# Patient Record
Sex: FEMALE | Race: WHITE | NOT HISPANIC OR LATINO | Employment: UNEMPLOYED | URBAN - NONMETROPOLITAN AREA
[De-identification: names, ages, dates, MRNs, and addresses within clinical notes are randomized per-mention and may not be internally consistent; named-entity substitution may affect disease eponyms.]

---

## 2017-05-09 ENCOUNTER — HOSPITAL ENCOUNTER (INPATIENT)
Facility: HOSPITAL | Age: 37
LOS: 3 days | Discharge: HOME | End: 2017-05-12
Attending: PSYCHIATRY & NEUROLOGY | Admitting: PSYCHIATRY & NEUROLOGY

## 2017-05-09 ENCOUNTER — HOSPITAL ENCOUNTER (EMERGENCY)
Facility: HOSPITAL | Age: 37
Discharge: PSYCHIATRIC OR SUBSTANCE USE FACILITY | End: 2017-05-09
Attending: EMERGENCY MEDICINE | Admitting: EMERGENCY MEDICINE

## 2017-05-09 VITALS
SYSTOLIC BLOOD PRESSURE: 132 MMHG | DIASTOLIC BLOOD PRESSURE: 87 MMHG | HEART RATE: 102 BPM | BODY MASS INDEX: 31.1 KG/M2 | TEMPERATURE: 98.9 F | HEIGHT: 69 IN | RESPIRATION RATE: 18 BRPM | WEIGHT: 210 LBS | OXYGEN SATURATION: 100 %

## 2017-05-09 DIAGNOSIS — F32.A DEPRESSION WITH SUICIDAL IDEATION: Primary | ICD-10-CM

## 2017-05-09 DIAGNOSIS — R45.851 DEPRESSION WITH SUICIDAL IDEATION: Primary | ICD-10-CM

## 2017-05-09 DIAGNOSIS — F29 PSYCHOSIS, UNSPECIFIED PSYCHOSIS TYPE (HCC): ICD-10-CM

## 2017-05-09 LAB
6-ACETYL MORPHINE: NEGATIVE
ALBUMIN SERPL-MCNC: 4.5 G/DL (ref 3.5–5)
ALBUMIN/GLOB SERPL: 1.3 G/DL (ref 1.5–2.5)
ALP SERPL-CCNC: 97 U/L (ref 35–104)
ALT SERPL W P-5'-P-CCNC: 23 U/L (ref 10–36)
AMPHET+METHAMPHET UR QL: NEGATIVE
ANION GAP SERPL CALCULATED.3IONS-SCNC: 7.8 MMOL/L (ref 3.6–11.2)
AST SERPL-CCNC: 23 U/L (ref 10–30)
B-HCG UR QL: NEGATIVE
BARBITURATES UR QL SCN: NEGATIVE
BASOPHILS # BLD AUTO: 0.05 10*3/MM3 (ref 0–0.3)
BASOPHILS NFR BLD AUTO: 0.4 % (ref 0–2)
BENZODIAZ UR QL SCN: NEGATIVE
BILIRUB SERPL-MCNC: 0.5 MG/DL (ref 0.2–1.8)
BILIRUB UR QL STRIP: NEGATIVE
BUN BLD-MCNC: 8 MG/DL (ref 7–21)
BUN/CREAT SERPL: 10.5 (ref 7–25)
BUPRENORPHINE SERPL-MCNC: NEGATIVE NG/ML
CALCIUM SPEC-SCNC: 10.2 MG/DL (ref 7.7–10)
CANNABINOIDS SERPL QL: POSITIVE
CHLORIDE SERPL-SCNC: 104 MMOL/L (ref 99–112)
CLARITY UR: CLEAR
CO2 SERPL-SCNC: 25.2 MMOL/L (ref 24.3–31.9)
COCAINE UR QL: NEGATIVE
COLOR UR: YELLOW
CREAT BLD-MCNC: 0.76 MG/DL (ref 0.43–1.29)
DEPRECATED RDW RBC AUTO: 40.9 FL (ref 37–54)
EOSINOPHIL # BLD AUTO: 0.04 10*3/MM3 (ref 0–0.7)
EOSINOPHIL NFR BLD AUTO: 0.3 % (ref 0–5)
ERYTHROCYTE [DISTWIDTH] IN BLOOD BY AUTOMATED COUNT: 13.2 % (ref 11.5–14.5)
ETHANOL BLD-MCNC: <10 MG/DL
ETHANOL UR QL: <0.01 %
GFR SERPL CREATININE-BSD FRML MDRD: 87 ML/MIN/1.73
GLOBULIN UR ELPH-MCNC: 3.4 GM/DL
GLUCOSE BLD-MCNC: 156 MG/DL (ref 70–110)
GLUCOSE UR STRIP-MCNC: NEGATIVE MG/DL
HCT VFR BLD AUTO: 42.6 % (ref 37–47)
HGB BLD-MCNC: 15 G/DL (ref 12–16)
HGB UR QL STRIP.AUTO: NEGATIVE
IMM GRANULOCYTES # BLD: 0.02 10*3/MM3 (ref 0–0.03)
IMM GRANULOCYTES NFR BLD: 0.1 % (ref 0–0.5)
KETONES UR QL STRIP: NEGATIVE
LEUKOCYTE ESTERASE UR QL STRIP.AUTO: NEGATIVE
LYMPHOCYTES # BLD AUTO: 3.94 10*3/MM3 (ref 1–3)
LYMPHOCYTES NFR BLD AUTO: 28.6 % (ref 21–51)
MCH RBC QN AUTO: 30.5 PG (ref 27–33)
MCHC RBC AUTO-ENTMCNC: 35.2 G/DL (ref 33–37)
MCV RBC AUTO: 86.8 FL (ref 80–94)
MDMA UR QL SCN: NEGATIVE
METHADONE UR QL SCN: NEGATIVE
MONOCYTES # BLD AUTO: 1.15 10*3/MM3 (ref 0.1–0.9)
MONOCYTES NFR BLD AUTO: 8.3 % (ref 0–10)
NEUTROPHILS # BLD AUTO: 8.6 10*3/MM3 (ref 1.4–6.5)
NEUTROPHILS NFR BLD AUTO: 62.3 % (ref 30–70)
NITRITE UR QL STRIP: NEGATIVE
OPIATES UR QL: NEGATIVE
OSMOLALITY SERPL CALC.SUM OF ELEC: 275.3 MOSM/KG (ref 273–305)
OXYCODONE UR QL SCN: NEGATIVE
PCP UR QL SCN: NEGATIVE
PH UR STRIP.AUTO: 7 [PH] (ref 5–8)
PLATELET # BLD AUTO: 310 10*3/MM3 (ref 130–400)
PMV BLD AUTO: 10.1 FL (ref 6–10)
POTASSIUM BLD-SCNC: 3.1 MMOL/L (ref 3.5–5.3)
PROT SERPL-MCNC: 7.9 G/DL (ref 6–8)
PROT UR QL STRIP: NEGATIVE
RBC # BLD AUTO: 4.91 10*6/MM3 (ref 4.2–5.4)
SODIUM BLD-SCNC: 137 MMOL/L (ref 135–153)
SP GR UR STRIP: 1.01 (ref 1–1.03)
UROBILINOGEN UR QL STRIP: NORMAL
WBC NRBC COR # BLD: 13.8 10*3/MM3 (ref 4.5–12.5)

## 2017-05-09 RX ORDER — ONDANSETRON 4 MG/1
4 TABLET, FILM COATED ORAL EVERY 6 HOURS PRN
Status: CANCELLED | OUTPATIENT
Start: 2017-05-09

## 2017-05-09 RX ORDER — MAGNESIUM HYDROXIDE/ALUMINUM HYDROXICE/SIMETHICONE 120; 1200; 1200 MG/30ML; MG/30ML; MG/30ML
30 SUSPENSION ORAL EVERY 6 HOURS PRN
Status: DISCONTINUED | OUTPATIENT
Start: 2017-05-09 | End: 2017-05-09 | Stop reason: CLARIF

## 2017-05-09 RX ORDER — LOPERAMIDE HYDROCHLORIDE 2 MG/1
2 CAPSULE ORAL 4 TIMES DAILY PRN
Status: CANCELLED | OUTPATIENT
Start: 2017-05-09

## 2017-05-09 RX ORDER — TRAZODONE HYDROCHLORIDE 50 MG/1
50 TABLET ORAL NIGHTLY PRN
Status: CANCELLED | OUTPATIENT
Start: 2017-05-09

## 2017-05-09 RX ORDER — ONDANSETRON 4 MG/1
4 TABLET, FILM COATED ORAL EVERY 6 HOURS PRN
Status: DISCONTINUED | OUTPATIENT
Start: 2017-05-09 | End: 2017-05-12 | Stop reason: HOSPADM

## 2017-05-09 RX ORDER — HYDROXYZINE 50 MG/1
50 TABLET, FILM COATED ORAL EVERY 6 HOURS PRN
Status: DISCONTINUED | OUTPATIENT
Start: 2017-05-09 | End: 2017-05-12 | Stop reason: HOSPADM

## 2017-05-09 RX ORDER — LOPERAMIDE HYDROCHLORIDE 2 MG/1
2 CAPSULE ORAL 4 TIMES DAILY PRN
Status: DISCONTINUED | OUTPATIENT
Start: 2017-05-09 | End: 2017-05-12 | Stop reason: HOSPADM

## 2017-05-09 RX ORDER — ECHINACEA PURPUREA EXTRACT 125 MG
2 TABLET ORAL AS NEEDED
Status: CANCELLED | OUTPATIENT
Start: 2017-05-09

## 2017-05-09 RX ORDER — TRAZODONE HYDROCHLORIDE 50 MG/1
50 TABLET ORAL NIGHTLY PRN
Status: DISCONTINUED | OUTPATIENT
Start: 2017-05-09 | End: 2017-05-12 | Stop reason: HOSPADM

## 2017-05-09 RX ORDER — IBUPROFEN 400 MG/1
600 TABLET ORAL EVERY 6 HOURS PRN
Status: DISCONTINUED | OUTPATIENT
Start: 2017-05-09 | End: 2017-05-12 | Stop reason: HOSPADM

## 2017-05-09 RX ORDER — ALUMINA, MAGNESIA, AND SIMETHICONE 2400; 2400; 240 MG/30ML; MG/30ML; MG/30ML
15 SUSPENSION ORAL EVERY 6 HOURS PRN
Status: DISCONTINUED | OUTPATIENT
Start: 2017-05-09 | End: 2017-05-12 | Stop reason: HOSPADM

## 2017-05-09 RX ORDER — BENZONATATE 100 MG/1
100 CAPSULE ORAL 3 TIMES DAILY PRN
Status: CANCELLED | OUTPATIENT
Start: 2017-05-09

## 2017-05-09 RX ORDER — ECHINACEA PURPUREA EXTRACT 125 MG
2 TABLET ORAL AS NEEDED
Status: DISCONTINUED | OUTPATIENT
Start: 2017-05-09 | End: 2017-05-12 | Stop reason: HOSPADM

## 2017-05-09 RX ORDER — NICOTINE 21 MG/24HR
1 PATCH, TRANSDERMAL 24 HOURS TRANSDERMAL EVERY 24 HOURS
Status: DISCONTINUED | OUTPATIENT
Start: 2017-05-10 | End: 2017-05-12 | Stop reason: HOSPADM

## 2017-05-09 RX ORDER — HYDROXYZINE 50 MG/1
50 TABLET, FILM COATED ORAL EVERY 6 HOURS PRN
Status: CANCELLED | OUTPATIENT
Start: 2017-05-09

## 2017-05-09 RX ORDER — BENZONATATE 100 MG/1
100 CAPSULE ORAL 3 TIMES DAILY PRN
Status: DISCONTINUED | OUTPATIENT
Start: 2017-05-09 | End: 2017-05-12 | Stop reason: HOSPADM

## 2017-05-09 RX ORDER — FAMOTIDINE 20 MG/1
20 TABLET, FILM COATED ORAL 2 TIMES DAILY PRN
Status: DISCONTINUED | OUTPATIENT
Start: 2017-05-09 | End: 2017-05-12 | Stop reason: HOSPADM

## 2017-05-09 RX ORDER — BUSPIRONE HYDROCHLORIDE 10 MG/1
10 TABLET ORAL 3 TIMES DAILY
Status: ON HOLD | COMMUNITY
End: 2017-05-12

## 2017-05-09 RX ORDER — IBUPROFEN 400 MG/1
600 TABLET ORAL EVERY 6 HOURS PRN
Status: CANCELLED | OUTPATIENT
Start: 2017-05-09

## 2017-05-09 RX ORDER — MAGNESIUM HYDROXIDE/ALUMINUM HYDROXICE/SIMETHICONE 120; 1200; 1200 MG/30ML; MG/30ML; MG/30ML
30 SUSPENSION ORAL EVERY 6 HOURS PRN
Status: CANCELLED | OUTPATIENT
Start: 2017-05-09

## 2017-05-09 RX ORDER — FAMOTIDINE 20 MG/1
20 TABLET, FILM COATED ORAL 2 TIMES DAILY PRN
Status: CANCELLED | OUTPATIENT
Start: 2017-05-09

## 2017-05-09 RX ORDER — NICOTINE 21 MG/24HR
1 PATCH, TRANSDERMAL 24 HOURS TRANSDERMAL EVERY 24 HOURS
Status: CANCELLED | OUTPATIENT
Start: 2017-05-09

## 2017-05-09 RX ORDER — OMEPRAZOLE 20 MG/1
20 CAPSULE, DELAYED RELEASE ORAL DAILY PRN
COMMUNITY
End: 2020-08-18 | Stop reason: HOSPADM

## 2017-05-09 RX ORDER — HYDROXYZINE HYDROCHLORIDE 25 MG/1
50 TABLET, FILM COATED ORAL EVERY 6 HOURS PRN
Status: CANCELLED | OUTPATIENT
Start: 2017-05-09

## 2017-05-09 RX ORDER — LISINOPRIL AND HYDROCHLOROTHIAZIDE 12.5; 1 MG/1; MG/1
1 TABLET ORAL DAILY
COMMUNITY
End: 2020-08-18 | Stop reason: HOSPADM

## 2017-05-09 RX ORDER — IBUPROFEN 600 MG/1
600 TABLET ORAL EVERY 6 HOURS PRN
Status: CANCELLED | OUTPATIENT
Start: 2017-05-09

## 2017-05-09 RX ORDER — FLUOXETINE HYDROCHLORIDE 40 MG/1
40 CAPSULE ORAL DAILY
COMMUNITY
End: 2017-05-12 | Stop reason: HOSPADM

## 2017-05-10 PROBLEM — F25.0 SCHIZOAFFECTIVE DISORDER, BIPOLAR TYPE (HCC): Status: ACTIVE | Noted: 2017-05-09

## 2017-05-10 PROCEDURE — 99222 1ST HOSP IP/OBS MODERATE 55: CPT | Performed by: PSYCHIATRY & NEUROLOGY

## 2017-05-10 RX ORDER — QUETIAPINE FUMARATE 25 MG/1
50 TABLET, FILM COATED ORAL NIGHTLY
Status: DISCONTINUED | OUTPATIENT
Start: 2017-05-10 | End: 2017-05-11

## 2017-05-10 RX ORDER — PANTOPRAZOLE SODIUM 40 MG/1
40 TABLET, DELAYED RELEASE ORAL EVERY MORNING
Status: DISCONTINUED | OUTPATIENT
Start: 2017-05-10 | End: 2017-05-12 | Stop reason: HOSPADM

## 2017-05-10 RX ORDER — POTASSIUM CHLORIDE 20 MEQ/1
20 TABLET, EXTENDED RELEASE ORAL ONCE
Status: COMPLETED | OUTPATIENT
Start: 2017-05-10 | End: 2017-05-10

## 2017-05-10 RX ORDER — POTASSIUM CHLORIDE 20 MEQ/1
40 TABLET, EXTENDED RELEASE ORAL EVERY 4 HOURS
Status: DISPENSED | OUTPATIENT
Start: 2017-05-10 | End: 2017-05-11

## 2017-05-10 RX ORDER — POTASSIUM CHLORIDE 20 MEQ/1
40 TABLET, EXTENDED RELEASE ORAL
Status: DISPENSED | OUTPATIENT
Start: 2017-05-10 | End: 2017-05-11

## 2017-05-10 RX ORDER — POTASSIUM CHLORIDE 750 MG/1
40 CAPSULE, EXTENDED RELEASE ORAL AS NEEDED
Status: DISCONTINUED | OUTPATIENT
Start: 2017-05-10 | End: 2017-05-12 | Stop reason: HOSPADM

## 2017-05-10 RX ORDER — LISINOPRIL AND HYDROCHLOROTHIAZIDE 12.5; 1 MG/1; MG/1
1 TABLET ORAL DAILY
Status: DISCONTINUED | OUTPATIENT
Start: 2017-05-10 | End: 2017-05-10 | Stop reason: CLARIF

## 2017-05-10 RX ORDER — HYDROCHLOROTHIAZIDE 12.5 MG/1
12.5 CAPSULE, GELATIN COATED ORAL DAILY
Status: DISCONTINUED | OUTPATIENT
Start: 2017-05-10 | End: 2017-05-12 | Stop reason: HOSPADM

## 2017-05-10 RX ORDER — BUSPIRONE HYDROCHLORIDE 10 MG/1
10 TABLET ORAL EVERY 8 HOURS SCHEDULED
Status: DISCONTINUED | OUTPATIENT
Start: 2017-05-10 | End: 2017-05-12 | Stop reason: HOSPADM

## 2017-05-10 RX ORDER — LISINOPRIL 10 MG/1
10 TABLET ORAL
Status: DISCONTINUED | OUTPATIENT
Start: 2017-05-10 | End: 2017-05-12 | Stop reason: HOSPADM

## 2017-05-10 RX ORDER — FLUOXETINE HYDROCHLORIDE 20 MG/1
40 CAPSULE ORAL DAILY
Status: CANCELLED | OUTPATIENT
Start: 2017-05-10

## 2017-05-10 RX ORDER — OLANZAPINE 5 MG/1
5 TABLET, ORALLY DISINTEGRATING ORAL EVERY 8 HOURS PRN
Status: DISCONTINUED | OUTPATIENT
Start: 2017-05-10 | End: 2017-05-12 | Stop reason: HOSPADM

## 2017-05-10 RX ADMIN — POTASSIUM CHLORIDE 20 MEQ: 1500 TABLET, EXTENDED RELEASE ORAL at 15:39

## 2017-05-10 RX ADMIN — NICOTINE 1 PATCH: 21 PATCH TRANSDERMAL at 08:42

## 2017-05-10 RX ADMIN — PANTOPRAZOLE SODIUM 40 MG: 40 TABLET, DELAYED RELEASE ORAL at 15:39

## 2017-05-10 RX ADMIN — LISINOPRIL 10 MG: 10 TABLET ORAL at 15:39

## 2017-05-10 RX ADMIN — BUSPIRONE HYDROCHLORIDE 10 MG: 10 TABLET ORAL at 15:39

## 2017-05-10 RX ADMIN — OLANZAPINE 5 MG: 5 TABLET, ORALLY DISINTEGRATING ORAL at 16:35

## 2017-05-10 RX ADMIN — HYDROCHLOROTHIAZIDE 12.5 MG: 12.5 CAPSULE, GELATIN COATED ORAL at 15:39

## 2017-05-10 RX ADMIN — POTASSIUM CHLORIDE 20 MEQ: 1500 TABLET, EXTENDED RELEASE ORAL at 16:35

## 2017-05-11 LAB — POTASSIUM BLD-SCNC: 3.8 MMOL/L (ref 3.5–5.3)

## 2017-05-11 PROCEDURE — 84132 ASSAY OF SERUM POTASSIUM: CPT | Performed by: PSYCHIATRY & NEUROLOGY

## 2017-05-11 PROCEDURE — 99232 SBSQ HOSP IP/OBS MODERATE 35: CPT | Performed by: PSYCHIATRY & NEUROLOGY

## 2017-05-11 RX ORDER — QUETIAPINE FUMARATE 100 MG/1
100 TABLET, FILM COATED ORAL NIGHTLY
Status: DISCONTINUED | OUTPATIENT
Start: 2017-05-11 | End: 2017-05-12 | Stop reason: HOSPADM

## 2017-05-11 RX ORDER — QUETIAPINE FUMARATE 25 MG/1
50 TABLET, FILM COATED ORAL DAILY
Status: DISCONTINUED | OUTPATIENT
Start: 2017-05-12 | End: 2017-05-12 | Stop reason: HOSPADM

## 2017-05-11 RX ORDER — FLUOXETINE HYDROCHLORIDE 20 MG/1
20 CAPSULE ORAL DAILY
Status: DISCONTINUED | OUTPATIENT
Start: 2017-05-11 | End: 2017-05-12 | Stop reason: HOSPADM

## 2017-05-11 RX ADMIN — BUSPIRONE HYDROCHLORIDE 10 MG: 10 TABLET ORAL at 05:11

## 2017-05-11 RX ADMIN — BUSPIRONE HYDROCHLORIDE 10 MG: 10 TABLET ORAL at 13:39

## 2017-05-11 RX ADMIN — LISINOPRIL 10 MG: 10 TABLET ORAL at 08:07

## 2017-05-11 RX ADMIN — IBUPROFEN 600 MG: 400 TABLET ORAL at 13:40

## 2017-05-11 RX ADMIN — QUETIAPINE FUMARATE 100 MG: 100 TABLET ORAL at 21:28

## 2017-05-11 RX ADMIN — BUSPIRONE HYDROCHLORIDE 10 MG: 10 TABLET ORAL at 21:27

## 2017-05-11 RX ADMIN — PANTOPRAZOLE SODIUM 40 MG: 40 TABLET, DELAYED RELEASE ORAL at 05:11

## 2017-05-11 RX ADMIN — FLUOXETINE 20 MG: 20 CAPSULE ORAL at 15:24

## 2017-05-11 RX ADMIN — NICOTINE 1 PATCH: 21 PATCH TRANSDERMAL at 08:07

## 2017-05-11 RX ADMIN — HYDROCHLOROTHIAZIDE 12.5 MG: 12.5 CAPSULE, GELATIN COATED ORAL at 08:06

## 2017-05-11 RX ADMIN — OLANZAPINE 5 MG: 5 TABLET, ORALLY DISINTEGRATING ORAL at 13:39

## 2017-05-12 VITALS
OXYGEN SATURATION: 99 % | RESPIRATION RATE: 18 BRPM | HEART RATE: 82 BPM | DIASTOLIC BLOOD PRESSURE: 72 MMHG | SYSTOLIC BLOOD PRESSURE: 122 MMHG | HEIGHT: 67 IN | WEIGHT: 202 LBS | BODY MASS INDEX: 31.71 KG/M2 | TEMPERATURE: 98.8 F

## 2017-05-12 PROCEDURE — 99238 HOSP IP/OBS DSCHRG MGMT 30/<: CPT | Performed by: PSYCHIATRY & NEUROLOGY

## 2017-05-12 RX ORDER — QUETIAPINE FUMARATE 100 MG/1
TABLET, FILM COATED ORAL
Qty: 45 TABLET | Refills: 0 | Status: SHIPPED | OUTPATIENT
Start: 2017-05-12 | End: 2017-06-01

## 2017-05-12 RX ORDER — FLUOXETINE HYDROCHLORIDE 20 MG/1
20 CAPSULE ORAL DAILY
Qty: 30 CAPSULE | Refills: 0 | Status: SHIPPED | OUTPATIENT
Start: 2017-05-12 | End: 2017-06-01 | Stop reason: SDUPTHER

## 2017-05-12 RX ORDER — BUSPIRONE HYDROCHLORIDE 10 MG/1
10 TABLET ORAL 3 TIMES DAILY
Qty: 90 TABLET | Refills: 0 | Status: SHIPPED | OUTPATIENT
Start: 2017-05-12 | End: 2017-06-01 | Stop reason: ALTCHOICE

## 2017-05-12 RX ADMIN — FLUOXETINE 20 MG: 20 CAPSULE ORAL at 08:56

## 2017-05-12 RX ADMIN — HYDROCHLOROTHIAZIDE 12.5 MG: 12.5 CAPSULE, GELATIN COATED ORAL at 08:55

## 2017-05-12 RX ADMIN — PANTOPRAZOLE SODIUM 40 MG: 40 TABLET, DELAYED RELEASE ORAL at 06:01

## 2017-05-12 RX ADMIN — BUSPIRONE HYDROCHLORIDE 10 MG: 10 TABLET ORAL at 14:26

## 2017-05-12 RX ADMIN — NICOTINE 1 PATCH: 21 PATCH TRANSDERMAL at 08:56

## 2017-05-12 RX ADMIN — LISINOPRIL 10 MG: 10 TABLET ORAL at 08:55

## 2017-05-12 RX ADMIN — QUETIAPINE FUMARATE 50 MG: 25 TABLET, FILM COATED ORAL at 08:56

## 2017-05-12 RX ADMIN — BUSPIRONE HYDROCHLORIDE 10 MG: 10 TABLET ORAL at 05:12

## 2017-06-01 ENCOUNTER — OFFICE VISIT (OUTPATIENT)
Dept: PSYCHIATRY | Facility: CLINIC | Age: 37
End: 2017-06-01

## 2017-06-01 VITALS
WEIGHT: 222 LBS | BODY MASS INDEX: 35.68 KG/M2 | DIASTOLIC BLOOD PRESSURE: 77 MMHG | HEIGHT: 66 IN | SYSTOLIC BLOOD PRESSURE: 114 MMHG | HEART RATE: 74 BPM

## 2017-06-01 DIAGNOSIS — F25.0 SCHIZOAFFECTIVE DISORDER, BIPOLAR TYPE (HCC): Primary | ICD-10-CM

## 2017-06-01 DIAGNOSIS — Z79.899 MEDICATION MANAGEMENT: ICD-10-CM

## 2017-06-01 LAB
AMPHETAMINE CUT-OFF: ABNORMAL
BENZODIAZIPINE CUT-OFF: ABNORMAL
BUPRENORPHINE CUT-OFF: ABNORMAL
COCAINE CUT-OFF: ABNORMAL
EXTERNAL AMPHETAMINE SCREEN URINE: NEGATIVE
EXTERNAL BENZODIAZEPINE SCREEN URINE: NEGATIVE
EXTERNAL BUPRENORPHINE SCREEN URINE: NEGATIVE
EXTERNAL COCAINE SCREEN URINE: NEGATIVE
EXTERNAL MDMA: NEGATIVE
EXTERNAL METHADONE SCREEN URINE: NEGATIVE
EXTERNAL METHAMPHETAMINE SCREEN URINE: NEGATIVE
EXTERNAL OPIATES SCREEN URINE: NEGATIVE
EXTERNAL OXYCODONE SCREEN URINE: NEGATIVE
EXTERNAL THC SCREEN URINE: POSITIVE
MDMA CUT-OFF: ABNORMAL
METHADONE CUT-OFF: ABNORMAL
METHAMPHETAMINE CUT-OFF: ABNORMAL
OPIATES CUT-OFF: ABNORMAL
OXYCODONE CUT-OFF: ABNORMAL
THC CUT-OFF: ABNORMAL

## 2017-06-01 PROCEDURE — 99214 OFFICE O/P EST MOD 30 MIN: CPT | Performed by: NURSE PRACTITIONER

## 2017-06-01 RX ORDER — QUETIAPINE FUMARATE 100 MG/1
TABLET, FILM COATED ORAL
Qty: 75 TABLET | Refills: 0 | Status: SHIPPED | OUTPATIENT
Start: 2017-06-01 | End: 2017-06-27 | Stop reason: SDUPTHER

## 2017-06-01 RX ORDER — HYDROXYZINE PAMOATE 25 MG/1
25-50 CAPSULE ORAL 3 TIMES DAILY PRN
Qty: 45 CAPSULE | Refills: 1 | Status: SHIPPED | OUTPATIENT
Start: 2017-06-01 | End: 2017-07-31 | Stop reason: SDUPTHER

## 2017-06-01 RX ORDER — FLUOXETINE HYDROCHLORIDE 40 MG/1
CAPSULE ORAL DAILY
Refills: 0 | COMMUNITY
Start: 2017-05-22 | End: 2017-06-01 | Stop reason: ALTCHOICE

## 2017-06-01 RX ORDER — FLUOXETINE HYDROCHLORIDE 20 MG/1
20 CAPSULE ORAL DAILY
Qty: 30 CAPSULE | Refills: 0 | Status: SHIPPED | OUTPATIENT
Start: 2017-06-01 | End: 2018-02-21 | Stop reason: SDUPTHER
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
## 2017-06-27 RX ORDER — QUETIAPINE FUMARATE 100 MG/1
TABLET, FILM COATED ORAL
Qty: 75 TABLET | Refills: 0 | Status: SHIPPED | OUTPATIENT
Start: 2017-06-27 | End: 2017-07-31 | Stop reason: SDUPTHER

## 2017-07-31 RX ORDER — HYDROXYZINE PAMOATE 25 MG/1
25-50 CAPSULE ORAL 3 TIMES DAILY PRN
Qty: 45 CAPSULE | Refills: 1 | Status: SHIPPED | OUTPATIENT
Start: 2017-07-31 | End: 2017-09-18 | Stop reason: SDUPTHER

## 2017-07-31 RX ORDER — QUETIAPINE FUMARATE 100 MG/1
TABLET, FILM COATED ORAL
Qty: 75 TABLET | Refills: 0 | Status: SHIPPED | OUTPATIENT
Start: 2017-07-31 | End: 2017-09-18 | Stop reason: SDUPTHER

## 2017-09-18 RX ORDER — QUETIAPINE FUMARATE 100 MG/1
TABLET, FILM COATED ORAL
Qty: 75 TABLET | Refills: 0 | Status: SHIPPED | OUTPATIENT
Start: 2017-09-18 | End: 2018-02-21 | Stop reason: SDUPTHER

## 2017-09-18 RX ORDER — HYDROXYZINE PAMOATE 25 MG/1
25-50 CAPSULE ORAL 3 TIMES DAILY PRN
Qty: 45 CAPSULE | Refills: 1 | Status: SHIPPED | OUTPATIENT
Start: 2017-09-18 | End: 2018-02-21 | Stop reason: SDUPTHER

## 2018-02-12 ENCOUNTER — TELEPHONE (OUTPATIENT)
Dept: PSYCHIATRY | Facility: CLINIC | Age: 38
End: 2018-02-12

## 2018-02-21 ENCOUNTER — OFFICE VISIT (OUTPATIENT)
Dept: PSYCHIATRY | Facility: CLINIC | Age: 38
End: 2018-02-21

## 2018-02-21 VITALS
WEIGHT: 232 LBS | HEIGHT: 66 IN | BODY MASS INDEX: 37.28 KG/M2 | HEART RATE: 79 BPM | SYSTOLIC BLOOD PRESSURE: 113 MMHG | DIASTOLIC BLOOD PRESSURE: 67 MMHG

## 2018-02-21 DIAGNOSIS — F43.21 UNRESOLVED GRIEF: ICD-10-CM

## 2018-02-21 DIAGNOSIS — Z79.899 MEDICATION MANAGEMENT: ICD-10-CM

## 2018-02-21 DIAGNOSIS — F25.0 SCHIZOAFFECTIVE DISORDER, MIXED TYPE (HCC): Primary | ICD-10-CM

## 2018-02-21 DIAGNOSIS — F12.90 MARIJUANA USE, CONTINUOUS: ICD-10-CM

## 2018-02-21 LAB
AMPHETAMINE CUT-OFF: 1000
BENZODIAZIPINE CUT-OFF: 300
BUPRENORPHINE CUT-OFF: 10
COCAINE CUT-OFF: 300
EXTERNAL AMPHETAMINE SCREEN URINE: NEGATIVE
EXTERNAL BENZODIAZEPINE SCREEN URINE: NEGATIVE
EXTERNAL BUPRENORPHINE SCREEN URINE: NEGATIVE
EXTERNAL COCAINE SCREEN URINE: NEGATIVE
EXTERNAL MDMA: NEGATIVE
EXTERNAL METHADONE SCREEN URINE: NEGATIVE
EXTERNAL METHAMPHETAMINE SCREEN URINE: NEGATIVE
EXTERNAL OPIATES SCREEN URINE: NEGATIVE
EXTERNAL OXYCODONE SCREEN URINE: NEGATIVE
EXTERNAL THC SCREEN URINE: POSITIVE
MDMA CUT-OFF: 500
METHADONE CUT-OFF: 300
METHAMPHETAMINE CUT-OFF: 1000
OPIATES CUT-OFF: 300
OXYCODONE CUT-OFF: 100
THC CUT-OFF: 50

## 2018-02-21 PROCEDURE — 99214 OFFICE O/P EST MOD 30 MIN: CPT | Performed by: NURSE PRACTITIONER

## 2018-02-21 RX ORDER — ARIPIPRAZOLE 2 MG/1
2 TABLET ORAL DAILY
Qty: 30 TABLET | Refills: 0 | Status: ON HOLD | OUTPATIENT
Start: 2018-02-21 | End: 2020-08-12

## 2018-02-21 RX ORDER — FLUTICASONE PROPIONATE 50 MCG
2 SPRAY, SUSPENSION (ML) NASAL DAILY
Refills: 0 | COMMUNITY
Start: 2018-01-26 | End: 2020-08-18 | Stop reason: HOSPADM

## 2018-02-21 RX ORDER — QUETIAPINE FUMARATE 100 MG/1
100 TABLET, FILM COATED ORAL NIGHTLY
Qty: 30 TABLET | Refills: 1 | Status: SHIPPED | OUTPATIENT
Start: 2018-02-21

## 2018-02-21 RX ORDER — TIZANIDINE 4 MG/1
TABLET ORAL
Refills: 2 | Status: ON HOLD | COMMUNITY
Start: 2018-01-18 | End: 2020-08-12

## 2018-02-21 RX ORDER — NICOTINE POLACRILEX 4 MG
GUM BUCCAL
Refills: 3 | Status: ON HOLD | COMMUNITY
Start: 2018-01-26 | End: 2020-08-12

## 2018-02-21 RX ORDER — AZELASTINE 1 MG/ML
SPRAY, METERED NASAL
Refills: 0 | Status: ON HOLD | COMMUNITY
Start: 2018-01-26 | End: 2020-08-12

## 2018-02-21 RX ORDER — ALBUTEROL SULFATE 90 UG/1
2 AEROSOL, METERED RESPIRATORY (INHALATION) EVERY 4 HOURS PRN
Refills: 0 | COMMUNITY
Start: 2017-12-21 | End: 2020-08-18 | Stop reason: HOSPADM

## 2018-02-21 RX ORDER — HYDROXYZINE PAMOATE 25 MG/1
25-50 CAPSULE ORAL 3 TIMES DAILY PRN
Qty: 90 CAPSULE | Refills: 1 | Status: ON HOLD | OUTPATIENT
Start: 2018-02-21 | End: 2020-08-12

## 2018-02-21 RX ORDER — LORATADINE 10 MG/1
TABLET ORAL
Refills: 0 | Status: ON HOLD | COMMUNITY
Start: 2018-01-02 | End: 2020-08-12

## 2018-02-21 RX ORDER — MIRTAZAPINE 15 MG/1
TABLET, FILM COATED ORAL
Refills: 0 | COMMUNITY
Start: 2018-01-18 | End: 2018-02-21 | Stop reason: SDUPTHER

## 2018-02-21 RX ORDER — MIRTAZAPINE 15 MG/1
15 TABLET, FILM COATED ORAL NIGHTLY
Qty: 30 TABLET | Refills: 1 | Status: ON HOLD | OUTPATIENT
Start: 2018-02-21 | End: 2020-08-12

## 2018-02-21 RX ORDER — DILTIAZEM HYDROCHLORIDE 60 MG/1
2 TABLET, FILM COATED ORAL 2 TIMES DAILY PRN
Refills: 0 | COMMUNITY
Start: 2017-12-21 | End: 2020-08-18 | Stop reason: HOSPADM

## 2018-02-21 RX ORDER — FLUOXETINE HYDROCHLORIDE 20 MG/1
20 CAPSULE ORAL DAILY
Qty: 30 CAPSULE | Refills: 1 | Status: SHIPPED | OUTPATIENT
Start: 2018-02-21 | End: 2020-08-18 | Stop reason: HOSPADM
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
## 2018-09-18 ENCOUNTER — HOSPITAL ENCOUNTER (EMERGENCY)
Facility: HOSPITAL | Age: 38
Discharge: HOME | End: 2018-09-18
Attending: EMERGENCY MEDICINE | Admitting: FAMILY MEDICINE

## 2018-09-18 VITALS
DIASTOLIC BLOOD PRESSURE: 68 MMHG | HEART RATE: 78 BPM | BODY MASS INDEX: 31.1 KG/M2 | TEMPERATURE: 98.1 F | RESPIRATION RATE: 18 BRPM | SYSTOLIC BLOOD PRESSURE: 115 MMHG | OXYGEN SATURATION: 98 % | HEIGHT: 69 IN | WEIGHT: 210 LBS

## 2018-09-18 DIAGNOSIS — M54.50 CHRONIC BILATERAL LOW BACK PAIN WITHOUT SCIATICA: Primary | ICD-10-CM

## 2018-09-18 DIAGNOSIS — G89.29 CHRONIC BILATERAL LOW BACK PAIN WITHOUT SCIATICA: Primary | ICD-10-CM

## 2018-09-18 LAB
ALBUMIN SERPL-MCNC: 4 G/DL (ref 3.5–5)
ALBUMIN/GLOB SERPL: 1.3 G/DL (ref 1.5–2.5)
ALP SERPL-CCNC: 74 U/L (ref 35–104)
ALT SERPL W P-5'-P-CCNC: 16 U/L (ref 10–36)
AMYLASE SERPL-CCNC: 45 U/L (ref 28–100)
ANION GAP SERPL CALCULATED.3IONS-SCNC: 5.9 MMOL/L (ref 3.6–11.2)
AST SERPL-CCNC: 38 U/L (ref 10–30)
BASOPHILS # BLD AUTO: 0.04 10*3/MM3 (ref 0–0.3)
BASOPHILS NFR BLD AUTO: 0.5 % (ref 0–2)
BILIRUB SERPL-MCNC: 0.4 MG/DL (ref 0.2–1.8)
BILIRUB UR QL STRIP: NEGATIVE
BUN BLD-MCNC: 9 MG/DL (ref 7–21)
BUN/CREAT SERPL: 10.1 (ref 7–25)
CALCIUM SPEC-SCNC: 9.2 MG/DL (ref 7.7–10)
CHLORIDE SERPL-SCNC: 109 MMOL/L (ref 99–112)
CLARITY UR: CLEAR
CO2 SERPL-SCNC: 25.1 MMOL/L (ref 24.3–31.9)
COLOR UR: YELLOW
CREAT BLD-MCNC: 0.89 MG/DL (ref 0.43–1.29)
DEPRECATED RDW RBC AUTO: 47.1 FL (ref 37–54)
EOSINOPHIL # BLD AUTO: 0.12 10*3/MM3 (ref 0–0.7)
EOSINOPHIL NFR BLD AUTO: 1.4 % (ref 0–5)
ERYTHROCYTE [DISTWIDTH] IN BLOOD BY AUTOMATED COUNT: 14.8 % (ref 11.5–14.5)
GFR SERPL CREATININE-BSD FRML MDRD: 71 ML/MIN/1.73
GLOBULIN UR ELPH-MCNC: 3.1 GM/DL
GLUCOSE BLD-MCNC: 105 MG/DL (ref 70–110)
GLUCOSE UR STRIP-MCNC: NEGATIVE MG/DL
HCT VFR BLD AUTO: 35 % (ref 37–47)
HGB BLD-MCNC: 12.2 G/DL (ref 12–16)
HGB UR QL STRIP.AUTO: NEGATIVE
IMM GRANULOCYTES # BLD: 0.01 10*3/MM3 (ref 0–0.03)
IMM GRANULOCYTES NFR BLD: 0.1 % (ref 0–0.5)
KETONES UR QL STRIP: NEGATIVE
LEUKOCYTE ESTERASE UR QL STRIP.AUTO: NEGATIVE
LIPASE SERPL-CCNC: 105 U/L (ref 13–60)
LYMPHOCYTES # BLD AUTO: 3.17 10*3/MM3 (ref 1–3)
LYMPHOCYTES NFR BLD AUTO: 37.1 % (ref 21–51)
MCH RBC QN AUTO: 30.9 PG (ref 27–33)
MCHC RBC AUTO-ENTMCNC: 34.9 G/DL (ref 33–37)
MCV RBC AUTO: 88.6 FL (ref 80–94)
MONOCYTES # BLD AUTO: 0.73 10*3/MM3 (ref 0.1–0.9)
MONOCYTES NFR BLD AUTO: 8.5 % (ref 0–10)
NEUTROPHILS # BLD AUTO: 4.47 10*3/MM3 (ref 1.4–6.5)
NEUTROPHILS NFR BLD AUTO: 52.4 % (ref 30–70)
NITRITE UR QL STRIP: NEGATIVE
OSMOLALITY SERPL CALC.SUM OF ELEC: 278.4 MOSM/KG (ref 273–305)
PH UR STRIP.AUTO: 7 [PH] (ref 5–8)
PLATELET # BLD AUTO: 268 10*3/MM3 (ref 130–400)
PMV BLD AUTO: 10.6 FL (ref 6–10)
POTASSIUM BLD-SCNC: 3.8 MMOL/L (ref 3.5–5.3)
PROT SERPL-MCNC: 7.1 G/DL (ref 6–8)
PROT UR QL STRIP: NEGATIVE
RBC # BLD AUTO: 3.95 10*6/MM3 (ref 4.2–5.4)
SODIUM BLD-SCNC: 140 MMOL/L (ref 135–153)
SP GR UR STRIP: 1.01 (ref 1–1.03)
UROBILINOGEN UR QL STRIP: NORMAL
WBC NRBC COR # BLD: 8.54 10*3/MM3 (ref 4.5–12.5)

## 2018-09-18 PROCEDURE — 25010000002 DEXAMETHASONE SODIUM PHOSPHATE 20 MG/5ML SOLUTION: Performed by: NURSE PRACTITIONER

## 2018-09-18 PROCEDURE — 25010000002 KETOROLAC TROMETHAMINE PER 15 MG: Performed by: NURSE PRACTITIONER

## 2018-09-18 PROCEDURE — 85025 COMPLETE CBC W/AUTO DIFF WBC: CPT | Performed by: NURSE PRACTITIONER

## 2018-09-18 PROCEDURE — 96374 THER/PROPH/DIAG INJ IV PUSH: CPT

## 2018-09-18 PROCEDURE — 96361 HYDRATE IV INFUSION ADD-ON: CPT

## 2018-09-18 PROCEDURE — 80053 COMPREHEN METABOLIC PANEL: CPT | Performed by: NURSE PRACTITIONER

## 2018-09-18 PROCEDURE — 82150 ASSAY OF AMYLASE: CPT | Performed by: NURSE PRACTITIONER

## 2018-09-18 PROCEDURE — 25010000002 ORPHENADRINE CITRATE PER 60 MG: Performed by: NURSE PRACTITIONER

## 2018-09-18 PROCEDURE — 99283 EMERGENCY DEPT VISIT LOW MDM: CPT

## 2018-09-18 PROCEDURE — 81003 URINALYSIS AUTO W/O SCOPE: CPT | Performed by: NURSE PRACTITIONER

## 2018-09-18 PROCEDURE — 96375 TX/PRO/DX INJ NEW DRUG ADDON: CPT

## 2018-09-18 PROCEDURE — 83690 ASSAY OF LIPASE: CPT | Performed by: NURSE PRACTITIONER

## 2018-09-18 RX ORDER — ORPHENADRINE CITRATE 30 MG/ML
60 INJECTION INTRAMUSCULAR; INTRAVENOUS ONCE
Status: COMPLETED | OUTPATIENT
Start: 2018-09-18 | End: 2018-09-18

## 2018-09-18 RX ORDER — SODIUM CHLORIDE 0.9 % (FLUSH) 0.9 %
10 SYRINGE (ML) INJECTION AS NEEDED
Status: DISCONTINUED | OUTPATIENT
Start: 2018-09-18 | End: 2018-09-19 | Stop reason: HOSPADM

## 2018-09-18 RX ORDER — HYDROCODONE BITARTRATE AND ACETAMINOPHEN 5; 325 MG/1; MG/1
1 TABLET ORAL EVERY 6 HOURS PRN
Status: DISCONTINUED | OUTPATIENT
Start: 2018-09-18 | End: 2018-09-18

## 2018-09-18 RX ORDER — DEXAMETHASONE SODIUM PHOSPHATE 4 MG/ML
8 INJECTION, SOLUTION INTRA-ARTICULAR; INTRALESIONAL; INTRAMUSCULAR; INTRAVENOUS; SOFT TISSUE ONCE
Status: COMPLETED | OUTPATIENT
Start: 2018-09-18 | End: 2018-09-18

## 2018-09-18 RX ORDER — KETOROLAC TROMETHAMINE 30 MG/ML
30 INJECTION, SOLUTION INTRAMUSCULAR; INTRAVENOUS ONCE
Status: COMPLETED | OUTPATIENT
Start: 2018-09-18 | End: 2018-09-18

## 2018-09-18 RX ORDER — HYDROCODONE BITARTRATE AND ACETAMINOPHEN 5; 325 MG/1; MG/1
1 TABLET ORAL ONCE
Status: COMPLETED | OUTPATIENT
Start: 2018-09-18 | End: 2018-09-18

## 2018-09-18 RX ADMIN — SODIUM CHLORIDE 1000 ML: 9 INJECTION, SOLUTION INTRAVENOUS at 20:34

## 2018-09-18 RX ADMIN — DEXAMETHASONE SODIUM PHOSPHATE 8 MG: 4 INJECTION, SOLUTION INTRAMUSCULAR; INTRAVENOUS at 22:13

## 2018-09-18 RX ADMIN — HYDROCODONE BITARTRATE AND ACETAMINOPHEN 1 TABLET: 5; 325 TABLET ORAL at 22:00

## 2018-09-18 RX ADMIN — KETOROLAC TROMETHAMINE 30 MG: 30 INJECTION, SOLUTION INTRAMUSCULAR; INTRAVENOUS at 21:59

## 2018-09-18 RX ADMIN — ORPHENADRINE CITRATE 60 MG: 30 INJECTION INTRAMUSCULAR; INTRAVENOUS at 22:02

## 2018-09-19 NOTE — ED PROVIDER NOTES
Subjective   Patient reports that she was advised to come to the ER for evaluation for pancreatitis as her lipase was elevated.         History provided by:  Patient  Back Pain   Location:  Lumbar spine  Quality:  Aching  Radiates to:  Does not radiate  Pain severity:  Moderate  Pain is:  Same all the time  Onset quality:  Gradual  Timing:  Constant  Progression:  Worsening  Chronicity:  Recurrent  Relieved by:  None tried  Worsened by:  Nothing  Ineffective treatments:  None tried  Associated symptoms: no abdominal pain, no bladder incontinence, no bowel incontinence, no chest pain, no dysuria, no fever, no numbness, no paresthesias and no perianal numbness        Review of Systems   Constitutional: Negative.  Negative for fever.   HENT: Negative.    Respiratory: Negative.    Cardiovascular: Negative.  Negative for chest pain.   Gastrointestinal: Negative.  Negative for abdominal pain and bowel incontinence.   Endocrine: Negative.    Genitourinary: Negative.  Negative for bladder incontinence and dysuria.   Musculoskeletal: Positive for back pain.   Skin: Negative.    Neurological: Negative.  Negative for numbness and paresthesias.   Psychiatric/Behavioral: Negative.    All other systems reviewed and are negative.      Past Medical History:   Diagnosis Date   • Anxiety    • Bipolar disorder (CMS/HCC)    • Crohn disease (CMS/HCC)    • Depression    • Hypertension    • Schizoaffective disorder (CMS/HCC)        Allergies   Allergen Reactions   • Imuran [Azathioprine] Other (See Comments)     Pt reports this medication caused her to have pancreatitis.        Past Surgical History:   Procedure Laterality Date   • APPENDECTOMY     •  SECTION     • CHOLECYSTECTOMY     • COLON SURGERY     • COLONOSCOPY     • MANDIBLE FRACTURE SURGERY     • OVARIAN CYST SURGERY     • TUBAL ABDOMINAL LIGATION         Family History   Problem Relation Age of Onset   • Diabetes Mother    • Anxiety disorder Mother    • Depression Mother     • Bipolar disorder Mother    • COPD Father    • Schizophrenia Father    • Schizophrenia Paternal Grandfather        Social History     Social History   • Marital status:      Social History Main Topics   • Smoking status: Current Every Day Smoker     Packs/day: 1.00     Years: 20.00     Types: Cigarettes   • Smokeless tobacco: Never Used   • Alcohol use No      Comment: denies   • Drug use: Yes     Types: Marijuana   • Sexual activity: No     Other Topics Concern   • Not on file           Objective   Physical Exam   Constitutional: She is oriented to person, place, and time. She appears well-developed and well-nourished. No distress.   HENT:   Head: Normocephalic and atraumatic.   Right Ear: External ear normal.   Left Ear: External ear normal.   Nose: Nose normal.   Eyes: Pupils are equal, round, and reactive to light. Conjunctivae and EOM are normal.   Neck: Normal range of motion. Neck supple. No JVD present. No tracheal deviation present.   Cardiovascular: Normal rate, regular rhythm and normal heart sounds.    No murmur heard.  Pulmonary/Chest: Effort normal and breath sounds normal. No respiratory distress. She has no wheezes.   Abdominal: Soft. Bowel sounds are normal. There is no tenderness.   Musculoskeletal: Normal range of motion. She exhibits no edema or deformity.        Lumbar back: She exhibits no tenderness.   Neurological: She is alert and oriented to person, place, and time. No cranial nerve deficit.   Skin: Skin is warm and dry. No rash noted. She is not diaphoretic. No erythema. No pallor.   Psychiatric: She has a normal mood and affect. Her behavior is normal. Thought content normal.   Nursing note and vitals reviewed.      Procedures           ED Course                  MDM      Final diagnoses:   Chronic bilateral low back pain without sciatica            Edith Madera APRN  09/18/18 [id number]       Edith Madera APRN  10/09/18 0148

## 2020-06-09 ENCOUNTER — TRANSCRIBE ORDERS (OUTPATIENT)
Dept: ADMINISTRATIVE | Facility: HOSPITAL | Age: 40
End: 2020-06-09

## 2020-06-09 DIAGNOSIS — Z01.818 PREOP EXAMINATION: Primary | ICD-10-CM

## 2020-06-17 ENCOUNTER — TRANSCRIBE ORDERS (OUTPATIENT)
Dept: ADMINISTRATIVE | Facility: HOSPITAL | Age: 40
End: 2020-06-17

## 2020-06-17 DIAGNOSIS — Z01.818 OTHER SPECIFIED PRE-OPERATIVE EXAMINATION: Primary | ICD-10-CM

## 2020-07-01 ENCOUNTER — APPOINTMENT (OUTPATIENT)
Dept: MAMMOGRAPHY | Facility: HOSPITAL | Age: 40
End: 2020-07-01

## 2020-07-01 ENCOUNTER — APPOINTMENT (OUTPATIENT)
Dept: ULTRASOUND IMAGING | Facility: HOSPITAL | Age: 40
End: 2020-07-01

## 2020-07-22 ENCOUNTER — HOSPITAL ENCOUNTER (OUTPATIENT)
Dept: ULTRASOUND IMAGING | Facility: HOSPITAL | Age: 40
Discharge: HOME | End: 2020-07-22
Admitting: NURSE PRACTITIONER

## 2020-07-22 ENCOUNTER — HOSPITAL ENCOUNTER (OUTPATIENT)
Dept: MAMMOGRAPHY | Facility: HOSPITAL | Age: 40
Discharge: HOME | End: 2020-07-22

## 2020-07-22 DIAGNOSIS — N63.20 LEFT BREAST LUMP: ICD-10-CM

## 2020-07-22 PROCEDURE — 76642 ULTRASOUND BREAST LIMITED: CPT

## 2020-07-22 PROCEDURE — G0279 TOMOSYNTHESIS, MAMMO: HCPCS

## 2020-07-22 PROCEDURE — 77066 DX MAMMO INCL CAD BI: CPT

## 2020-07-22 PROCEDURE — 76642 ULTRASOUND BREAST LIMITED: CPT | Performed by: RADIOLOGY

## 2020-07-22 PROCEDURE — 77066 DX MAMMO INCL CAD BI: CPT | Performed by: RADIOLOGY

## 2020-07-22 PROCEDURE — 77062 BREAST TOMOSYNTHESIS BI: CPT | Performed by: RADIOLOGY

## 2020-08-11 ENCOUNTER — HOSPITAL ENCOUNTER (INPATIENT)
Facility: HOSPITAL | Age: 40
LOS: 5 days | Discharge: ANOTHER HOSPITAL | End: 2020-08-17
Attending: EMERGENCY MEDICINE | Admitting: INTERNAL MEDICINE

## 2020-08-11 ENCOUNTER — APPOINTMENT (OUTPATIENT)
Dept: GENERAL RADIOLOGY | Facility: HOSPITAL | Age: 40
End: 2020-08-11

## 2020-08-11 ENCOUNTER — APPOINTMENT (OUTPATIENT)
Dept: CT IMAGING | Facility: HOSPITAL | Age: 40
End: 2020-08-11

## 2020-08-11 DIAGNOSIS — J18.9 PNEUMONIA OF BOTH LUNGS DUE TO INFECTIOUS ORGANISM, UNSPECIFIED PART OF LUNG: Primary | ICD-10-CM

## 2020-08-11 DIAGNOSIS — J96.21 ACUTE ON CHRONIC RESPIRATORY FAILURE WITH HYPOXIA (HCC): ICD-10-CM

## 2020-08-11 LAB
A-A DO2: 57.1 MMHG (ref 0–300)
ALBUMIN SERPL-MCNC: 3.23 G/DL (ref 3.5–5.2)
ALBUMIN/GLOB SERPL: 1.1 G/DL
ALP SERPL-CCNC: 67 U/L (ref 39–117)
ALT SERPL W P-5'-P-CCNC: 11 U/L (ref 1–33)
ANION GAP SERPL CALCULATED.3IONS-SCNC: 14.2 MMOL/L (ref 5–15)
ARTERIAL PATENCY WRIST A: POSITIVE
AST SERPL-CCNC: 16 U/L (ref 1–32)
ATMOSPHERIC PRESS: 727 MMHG
BACTERIA UR QL AUTO: ABNORMAL /HPF
BASE EXCESS BLDA CALC-SCNC: -0.5 MMOL/L (ref 0–2)
BASOPHILS # BLD AUTO: 0.08 10*3/MM3 (ref 0–0.2)
BASOPHILS NFR BLD AUTO: 0.5 % (ref 0–1.5)
BDY SITE: ABNORMAL
BILIRUB SERPL-MCNC: 1.3 MG/DL (ref 0–1.2)
BILIRUB UR QL STRIP: NEGATIVE
BODY TEMPERATURE: 0 C
BUN SERPL-MCNC: 8 MG/DL (ref 6–20)
BUN/CREAT SERPL: 12.5 (ref 7–25)
CALCIUM SPEC-SCNC: 8.5 MG/DL (ref 8.6–10.5)
CHLORIDE SERPL-SCNC: 101 MMOL/L (ref 98–107)
CLARITY UR: CLEAR
CO2 BLDA-SCNC: 22.6 MMOL/L (ref 22–33)
CO2 SERPL-SCNC: 19.8 MMOL/L (ref 22–29)
COHGB MFR BLD: 2.4 % (ref 0–5)
COLOR UR: YELLOW
CREAT SERPL-MCNC: 0.64 MG/DL (ref 0.57–1)
CRP SERPL-MCNC: 21.91 MG/DL (ref 0–0.5)
D-LACTATE SERPL-SCNC: 1 MMOL/L (ref 0.5–2)
DEPRECATED RDW RBC AUTO: 44.4 FL (ref 37–54)
EOSINOPHIL # BLD AUTO: 0.02 10*3/MM3 (ref 0–0.4)
EOSINOPHIL NFR BLD AUTO: 0.1 % (ref 0.3–6.2)
ERYTHROCYTE [DISTWIDTH] IN BLOOD BY AUTOMATED COUNT: 13.5 % (ref 12.3–15.4)
FERRITIN SERPL-MCNC: 176.5 NG/ML (ref 13–150)
GFR SERPL CREATININE-BSD FRML MDRD: 103 ML/MIN/1.73
GLOBULIN UR ELPH-MCNC: 3 GM/DL
GLUCOSE SERPL-MCNC: 133 MG/DL (ref 65–99)
GLUCOSE UR STRIP-MCNC: NEGATIVE MG/DL
HCO3 BLDA-SCNC: 21.8 MMOL/L (ref 20–26)
HCT VFR BLD AUTO: 32.2 % (ref 34–46.6)
HCT VFR BLD CALC: 35.8 % (ref 38–51)
HGB BLD-MCNC: 10.9 G/DL (ref 12–15.9)
HGB BLDA-MCNC: 11.7 G/DL (ref 13.5–17.5)
HGB UR QL STRIP.AUTO: NEGATIVE
HYALINE CASTS UR QL AUTO: ABNORMAL /LPF
IMM GRANULOCYTES # BLD AUTO: 0.06 10*3/MM3 (ref 0–0.05)
IMM GRANULOCYTES NFR BLD AUTO: 0.4 % (ref 0–0.5)
INHALED O2 CONCENTRATION: 21 %
KETONES UR QL STRIP: ABNORMAL
LDH SERPL-CCNC: 261 U/L (ref 135–214)
LEUKOCYTE ESTERASE UR QL STRIP.AUTO: ABNORMAL
LYMPHOCYTES # BLD AUTO: 2.18 10*3/MM3 (ref 0.7–3.1)
LYMPHOCYTES NFR BLD AUTO: 13.3 % (ref 19.6–45.3)
Lab: ABNORMAL
Lab: ABNORMAL
MCH RBC QN AUTO: 30.3 PG (ref 26.6–33)
MCHC RBC AUTO-ENTMCNC: 33.9 G/DL (ref 31.5–35.7)
MCV RBC AUTO: 89.4 FL (ref 79–97)
METHGB BLD QL: 0.2 % (ref 0–3)
MODALITY: ABNORMAL
MONOCYTES # BLD AUTO: 0.85 10*3/MM3 (ref 0.1–0.9)
MONOCYTES NFR BLD AUTO: 5.2 % (ref 5–12)
NEUTROPHILS NFR BLD AUTO: 13.23 10*3/MM3 (ref 1.7–7)
NEUTROPHILS NFR BLD AUTO: 80.5 % (ref 42.7–76)
NITRITE UR QL STRIP: NEGATIVE
NOTE: ABNORMAL
NOTIFIED BY: ABNORMAL
NOTIFIED WHO: ABNORMAL
NRBC BLD AUTO-RTO: 0 /100 WBC (ref 0–0.2)
OXYHGB MFR BLDV: 88.6 % (ref 94–99)
PCO2 BLDA: 28 MM HG (ref 35–45)
PCO2 TEMP ADJ BLD: ABNORMAL MM[HG]
PH BLDA: 7.5 PH UNITS (ref 7.35–7.45)
PH UR STRIP.AUTO: 6.5 [PH] (ref 5–8)
PH, TEMP CORRECTED: ABNORMAL
PLATELET # BLD AUTO: 233 10*3/MM3 (ref 140–450)
PMV BLD AUTO: 10.3 FL (ref 6–12)
PO2 BLDA: 54 MM HG (ref 83–108)
PO2 TEMP ADJ BLD: ABNORMAL MM[HG]
POTASSIUM SERPL-SCNC: 3.2 MMOL/L (ref 3.5–5.2)
PROT SERPL-MCNC: 6.2 G/DL (ref 6–8.5)
PROT UR QL STRIP: NEGATIVE
RBC # BLD AUTO: 3.6 10*6/MM3 (ref 3.77–5.28)
RBC # UR: ABNORMAL /HPF
REF LAB TEST METHOD: ABNORMAL
SAO2 % BLDCOA: 91 % (ref 94–99)
SARS-COV-2 RDRP RESP QL NAA+PROBE: NOT DETECTED
SODIUM SERPL-SCNC: 135 MMOL/L (ref 136–145)
SP GR UR STRIP: 1.01 (ref 1–1.03)
SQUAMOUS #/AREA URNS HPF: ABNORMAL /HPF
UROBILINOGEN UR QL STRIP: ABNORMAL
VENTILATOR MODE: ABNORMAL
WBC # BLD AUTO: 16.42 10*3/MM3 (ref 3.4–10.8)
WBC UR QL AUTO: ABNORMAL /HPF

## 2020-08-11 PROCEDURE — 80307 DRUG TEST PRSMV CHEM ANLYZR: CPT | Performed by: INTERNAL MEDICINE

## 2020-08-11 PROCEDURE — 83050 HGB METHEMOGLOBIN QUAN: CPT

## 2020-08-11 PROCEDURE — 82805 BLOOD GASES W/O2 SATURATION: CPT

## 2020-08-11 PROCEDURE — 25010000002 PIPERACILLIN SOD-TAZOBACTAM PER 1 G: Performed by: EMERGENCY MEDICINE

## 2020-08-11 PROCEDURE — 85025 COMPLETE CBC W/AUTO DIFF WBC: CPT | Performed by: EMERGENCY MEDICINE

## 2020-08-11 PROCEDURE — 81001 URINALYSIS AUTO W/SCOPE: CPT | Performed by: EMERGENCY MEDICINE

## 2020-08-11 PROCEDURE — 87086 URINE CULTURE/COLONY COUNT: CPT | Performed by: EMERGENCY MEDICINE

## 2020-08-11 PROCEDURE — 94640 AIRWAY INHALATION TREATMENT: CPT

## 2020-08-11 PROCEDURE — 83615 LACTATE (LD) (LDH) ENZYME: CPT | Performed by: EMERGENCY MEDICINE

## 2020-08-11 PROCEDURE — 36600 WITHDRAWAL OF ARTERIAL BLOOD: CPT

## 2020-08-11 PROCEDURE — 93005 ELECTROCARDIOGRAM TRACING: CPT | Performed by: NURSE PRACTITIONER

## 2020-08-11 PROCEDURE — 94799 UNLISTED PULMONARY SVC/PX: CPT

## 2020-08-11 PROCEDURE — 87635 SARS-COV-2 COVID-19 AMP PRB: CPT | Performed by: EMERGENCY MEDICINE

## 2020-08-11 PROCEDURE — 87040 BLOOD CULTURE FOR BACTERIA: CPT | Performed by: EMERGENCY MEDICINE

## 2020-08-11 PROCEDURE — 71045 X-RAY EXAM CHEST 1 VIEW: CPT

## 2020-08-11 PROCEDURE — 82728 ASSAY OF FERRITIN: CPT | Performed by: EMERGENCY MEDICINE

## 2020-08-11 PROCEDURE — 82375 ASSAY CARBOXYHB QUANT: CPT

## 2020-08-11 PROCEDURE — 93010 ELECTROCARDIOGRAM REPORT: CPT | Performed by: INTERNAL MEDICINE

## 2020-08-11 PROCEDURE — 86140 C-REACTIVE PROTEIN: CPT | Performed by: EMERGENCY MEDICINE

## 2020-08-11 PROCEDURE — 80053 COMPREHEN METABOLIC PANEL: CPT | Performed by: EMERGENCY MEDICINE

## 2020-08-11 PROCEDURE — 83605 ASSAY OF LACTIC ACID: CPT | Performed by: EMERGENCY MEDICINE

## 2020-08-11 PROCEDURE — 99284 EMERGENCY DEPT VISIT MOD MDM: CPT

## 2020-08-11 RX ORDER — IPRATROPIUM BROMIDE AND ALBUTEROL SULFATE 2.5; .5 MG/3ML; MG/3ML
3 SOLUTION RESPIRATORY (INHALATION) ONCE
Status: COMPLETED | OUTPATIENT
Start: 2020-08-11 | End: 2020-08-11

## 2020-08-11 RX ORDER — ACETAMINOPHEN 325 MG/1
650 TABLET ORAL ONCE
Status: COMPLETED | OUTPATIENT
Start: 2020-08-11 | End: 2020-08-11

## 2020-08-11 RX ORDER — POTASSIUM CHLORIDE 20 MEQ/1
40 TABLET, EXTENDED RELEASE ORAL ONCE
Status: COMPLETED | OUTPATIENT
Start: 2020-08-11 | End: 2020-08-11

## 2020-08-11 RX ORDER — FEXOFENADINE HYDROCHLORIDE 60 MG/1
60 TABLET, FILM COATED ORAL DAILY
COMMUNITY

## 2020-08-11 RX ADMIN — IPRATROPIUM BROMIDE AND ALBUTEROL SULFATE 3 ML: .5; 3 SOLUTION RESPIRATORY (INHALATION) at 23:49

## 2020-08-11 RX ADMIN — POTASSIUM CHLORIDE 40 MEQ: 1500 TABLET, EXTENDED RELEASE ORAL at 23:28

## 2020-08-11 RX ADMIN — ACETAMINOPHEN 650 MG: 325 TABLET ORAL at 23:29

## 2020-08-12 ENCOUNTER — APPOINTMENT (OUTPATIENT)
Dept: CT IMAGING | Facility: HOSPITAL | Age: 40
End: 2020-08-12

## 2020-08-12 PROBLEM — J18.9 PNEUMONIA OF BOTH LUNGS DUE TO INFECTIOUS ORGANISM: Status: ACTIVE | Noted: 2020-08-12

## 2020-08-12 LAB
6-ACETYL MORPHINE: NEGATIVE
A-A DO2: 80 MMHG (ref 0–300)
ALBUMIN SERPL-MCNC: 2.77 G/DL (ref 3.5–5.2)
ALBUMIN/GLOB SERPL: 0.9 G/DL
ALP SERPL-CCNC: 62 U/L (ref 39–117)
ALT SERPL W P-5'-P-CCNC: 9 U/L (ref 1–33)
AMPHET+METHAMPHET UR QL: POSITIVE
ANION GAP SERPL CALCULATED.3IONS-SCNC: 12.2 MMOL/L (ref 5–15)
ARTERIAL PATENCY WRIST A: POSITIVE
AST SERPL-CCNC: 15 U/L (ref 1–32)
ATMOSPHERIC PRESS: 727 MMHG
B PERT DNA SPEC QL NAA+PROBE: NOT DETECTED
BACTERIA SPEC AEROBE CULT: NO GROWTH
BARBITURATES UR QL SCN: NEGATIVE
BASE EXCESS BLDA CALC-SCNC: -1.6 MMOL/L (ref 0–2)
BASOPHILS # BLD AUTO: 0.06 10*3/MM3 (ref 0–0.2)
BASOPHILS NFR BLD AUTO: 0.5 % (ref 0–1.5)
BDY SITE: ABNORMAL
BENZODIAZ UR QL SCN: NEGATIVE
BILIRUB SERPL-MCNC: 1.2 MG/DL (ref 0–1.2)
BODY TEMPERATURE: 0 C
BUN SERPL-MCNC: 7 MG/DL (ref 6–20)
BUN/CREAT SERPL: 11.7 (ref 7–25)
BUPRENORPHINE SERPL-MCNC: NEGATIVE NG/ML
C PNEUM DNA NPH QL NAA+NON-PROBE: NOT DETECTED
CALCIUM SPEC-SCNC: 7.9 MG/DL (ref 8.6–10.5)
CANNABINOIDS SERPL QL: NEGATIVE
CHLORIDE SERPL-SCNC: 106 MMOL/L (ref 98–107)
CO2 BLDA-SCNC: 22.6 MMOL/L (ref 22–33)
CO2 SERPL-SCNC: 19.8 MMOL/L (ref 22–29)
COCAINE UR QL: NEGATIVE
COHGB MFR BLD: 2.2 % (ref 0–5)
CREAT SERPL-MCNC: 0.6 MG/DL (ref 0.57–1)
CRP SERPL-MCNC: 24.89 MG/DL (ref 0–0.5)
DEPRECATED RDW RBC AUTO: 47.1 FL (ref 37–54)
EOSINOPHIL # BLD AUTO: 0.05 10*3/MM3 (ref 0–0.4)
EOSINOPHIL NFR BLD AUTO: 0.4 % (ref 0.3–6.2)
ERYTHROCYTE [DISTWIDTH] IN BLOOD BY AUTOMATED COUNT: 13.8 % (ref 12.3–15.4)
FLUAV H1 2009 PAND RNA NPH QL NAA+PROBE: NOT DETECTED
FLUAV H1 HA GENE NPH QL NAA+PROBE: NOT DETECTED
FLUAV H3 RNA NPH QL NAA+PROBE: NOT DETECTED
FLUAV SUBTYP SPEC NAA+PROBE: NOT DETECTED
FLUBV RNA ISLT QL NAA+PROBE: NOT DETECTED
GFR SERPL CREATININE-BSD FRML MDRD: 111 ML/MIN/1.73
GLOBULIN UR ELPH-MCNC: 3 GM/DL
GLUCOSE SERPL-MCNC: 118 MG/DL (ref 65–99)
HADV DNA SPEC NAA+PROBE: NOT DETECTED
HCO3 BLDA-SCNC: 21.6 MMOL/L (ref 20–26)
HCOV 229E RNA SPEC QL NAA+PROBE: NOT DETECTED
HCOV HKU1 RNA SPEC QL NAA+PROBE: NOT DETECTED
HCOV NL63 RNA SPEC QL NAA+PROBE: NOT DETECTED
HCOV OC43 RNA SPEC QL NAA+PROBE: NOT DETECTED
HCT VFR BLD AUTO: 32.3 % (ref 34–46.6)
HCT VFR BLD CALC: 32 % (ref 38–51)
HGB BLD-MCNC: 10.1 G/DL (ref 12–15.9)
HGB BLDA-MCNC: 10.5 G/DL (ref 13.5–17.5)
HMPV RNA NPH QL NAA+NON-PROBE: NOT DETECTED
HPIV1 RNA SPEC QL NAA+PROBE: NOT DETECTED
HPIV2 RNA SPEC QL NAA+PROBE: NOT DETECTED
HPIV3 RNA NPH QL NAA+PROBE: NOT DETECTED
HPIV4 P GENE NPH QL NAA+PROBE: NOT DETECTED
IMM GRANULOCYTES # BLD AUTO: 0.05 10*3/MM3 (ref 0–0.05)
IMM GRANULOCYTES NFR BLD AUTO: 0.4 % (ref 0–0.5)
INHALED O2 CONCENTRATION: 28 %
L PNEUMO1 AG UR QL IA: NEGATIVE
LYMPHOCYTES # BLD AUTO: 2.62 10*3/MM3 (ref 0.7–3.1)
LYMPHOCYTES NFR BLD AUTO: 22.9 % (ref 19.6–45.3)
Lab: ABNORMAL
M PNEUMO IGG SER IA-ACNC: NOT DETECTED
MAGNESIUM SERPL-MCNC: 1.5 MG/DL (ref 1.6–2.6)
MCH RBC QN AUTO: 29.3 PG (ref 26.6–33)
MCHC RBC AUTO-ENTMCNC: 31.3 G/DL (ref 31.5–35.7)
MCV RBC AUTO: 93.6 FL (ref 79–97)
METHADONE UR QL SCN: NEGATIVE
METHGB BLD QL: 0.3 % (ref 0–3)
MODALITY: ABNORMAL
MONOCYTES # BLD AUTO: 0.78 10*3/MM3 (ref 0.1–0.9)
MONOCYTES NFR BLD AUTO: 6.8 % (ref 5–12)
NEUTROPHILS NFR BLD AUTO: 69 % (ref 42.7–76)
NEUTROPHILS NFR BLD AUTO: 7.89 10*3/MM3 (ref 1.7–7)
NOTE: ABNORMAL
NRBC BLD AUTO-RTO: 0 /100 WBC (ref 0–0.2)
OPIATES UR QL: NEGATIVE
OXYCODONE UR QL SCN: NEGATIVE
OXYHGB MFR BLDV: 93.9 % (ref 94–99)
PCO2 BLDA: 30.7 MM HG (ref 35–45)
PCO2 TEMP ADJ BLD: ABNORMAL MM[HG]
PCP UR QL SCN: NEGATIVE
PH BLDA: 7.46 PH UNITS (ref 7.35–7.45)
PH, TEMP CORRECTED: ABNORMAL
PLATELET # BLD AUTO: 213 10*3/MM3 (ref 140–450)
PMV BLD AUTO: 10.2 FL (ref 6–12)
PO2 BLDA: 76 MM HG (ref 83–108)
PO2 TEMP ADJ BLD: ABNORMAL MM[HG]
POTASSIUM SERPL-SCNC: 3.2 MMOL/L (ref 3.5–5.2)
POTASSIUM SERPL-SCNC: 3.7 MMOL/L (ref 3.5–5.2)
PROT SERPL-MCNC: 5.8 G/DL (ref 6–8.5)
RBC # BLD AUTO: 3.45 10*6/MM3 (ref 3.77–5.28)
RHINOVIRUS RNA SPEC NAA+PROBE: NOT DETECTED
RSV RNA NPH QL NAA+NON-PROBE: NOT DETECTED
S PNEUM AG SPEC QL LA: NEGATIVE
SAO2 % BLDCOA: 96.3 % (ref 94–99)
SODIUM SERPL-SCNC: 138 MMOL/L (ref 136–145)
VENTILATOR MODE: ABNORMAL
WBC # BLD AUTO: 11.45 10*3/MM3 (ref 3.4–10.8)

## 2020-08-12 PROCEDURE — 85025 COMPLETE CBC W/AUTO DIFF WBC: CPT | Performed by: INTERNAL MEDICINE

## 2020-08-12 PROCEDURE — 82375 ASSAY CARBOXYHB QUANT: CPT

## 2020-08-12 PROCEDURE — 84132 ASSAY OF SERUM POTASSIUM: CPT | Performed by: INTERNAL MEDICINE

## 2020-08-12 PROCEDURE — 83735 ASSAY OF MAGNESIUM: CPT | Performed by: INTERNAL MEDICINE

## 2020-08-12 PROCEDURE — 87205 SMEAR GRAM STAIN: CPT | Performed by: INTERNAL MEDICINE

## 2020-08-12 PROCEDURE — 94799 UNLISTED PULMONARY SVC/PX: CPT

## 2020-08-12 PROCEDURE — 86140 C-REACTIVE PROTEIN: CPT | Performed by: INTERNAL MEDICINE

## 2020-08-12 PROCEDURE — 87070 CULTURE OTHR SPECIMN AEROBIC: CPT | Performed by: INTERNAL MEDICINE

## 2020-08-12 PROCEDURE — 71250 CT THORAX DX C-: CPT

## 2020-08-12 PROCEDURE — 82805 BLOOD GASES W/O2 SATURATION: CPT

## 2020-08-12 PROCEDURE — 25010000002 CEFTRIAXONE PER 250 MG: Performed by: INTERNAL MEDICINE

## 2020-08-12 PROCEDURE — 99223 1ST HOSP IP/OBS HIGH 75: CPT | Performed by: INTERNAL MEDICINE

## 2020-08-12 PROCEDURE — 80053 COMPREHEN METABOLIC PANEL: CPT | Performed by: INTERNAL MEDICINE

## 2020-08-12 PROCEDURE — 25010000002 METHYLPREDNISOLONE PER 40 MG: Performed by: INTERNAL MEDICINE

## 2020-08-12 PROCEDURE — 87899 AGENT NOS ASSAY W/OPTIC: CPT | Performed by: INTERNAL MEDICINE

## 2020-08-12 PROCEDURE — 36600 WITHDRAWAL OF ARTERIAL BLOOD: CPT

## 2020-08-12 PROCEDURE — 25010000002 VANCOMYCIN: Performed by: EMERGENCY MEDICINE

## 2020-08-12 PROCEDURE — 83050 HGB METHEMOGLOBIN QUAN: CPT

## 2020-08-12 PROCEDURE — 0099U HC BIOFIRE FILMARRAY RESP PANEL 1: CPT | Performed by: INTERNAL MEDICINE

## 2020-08-12 PROCEDURE — 25010000002 HEPARIN (PORCINE) PER 1000 UNITS: Performed by: INTERNAL MEDICINE

## 2020-08-12 RX ORDER — FLUTICASONE PROPIONATE 50 MCG
2 SPRAY, SUSPENSION (ML) NASAL DAILY
Status: DISCONTINUED | OUTPATIENT
Start: 2020-08-12 | End: 2020-08-17

## 2020-08-12 RX ORDER — POTASSIUM CHLORIDE 7.45 MG/ML
10 INJECTION INTRAVENOUS
Status: DISCONTINUED | OUTPATIENT
Start: 2020-08-12 | End: 2020-08-18 | Stop reason: HOSPADM

## 2020-08-12 RX ORDER — TIZANIDINE 4 MG/1
4 TABLET ORAL EVERY 8 HOURS PRN
Status: DISCONTINUED | OUTPATIENT
Start: 2020-08-12 | End: 2020-08-18 | Stop reason: HOSPADM

## 2020-08-12 RX ORDER — QUETIAPINE FUMARATE 100 MG/1
100 TABLET, FILM COATED ORAL NIGHTLY
Status: DISCONTINUED | OUTPATIENT
Start: 2020-08-12 | End: 2020-08-18 | Stop reason: HOSPADM

## 2020-08-12 RX ORDER — SODIUM CHLORIDE 9 MG/ML
50 INJECTION, SOLUTION INTRAVENOUS CONTINUOUS
Status: DISCONTINUED | OUTPATIENT
Start: 2020-08-12 | End: 2020-08-14

## 2020-08-12 RX ORDER — POTASSIUM CHLORIDE 20 MEQ/1
40 TABLET, EXTENDED RELEASE ORAL EVERY 4 HOURS
Status: COMPLETED | OUTPATIENT
Start: 2020-08-12 | End: 2020-08-12

## 2020-08-12 RX ORDER — HEPARIN SODIUM 5000 [USP'U]/ML
5000 INJECTION, SOLUTION INTRAVENOUS; SUBCUTANEOUS EVERY 12 HOURS SCHEDULED
Status: DISCONTINUED | OUTPATIENT
Start: 2020-08-12 | End: 2020-08-14

## 2020-08-12 RX ORDER — METHYLPREDNISOLONE SODIUM SUCCINATE 40 MG/ML
40 INJECTION, POWDER, LYOPHILIZED, FOR SOLUTION INTRAMUSCULAR; INTRAVENOUS EVERY 12 HOURS
Status: DISCONTINUED | OUTPATIENT
Start: 2020-08-12 | End: 2020-08-17

## 2020-08-12 RX ORDER — FLUOXETINE HYDROCHLORIDE 20 MG/1
20 CAPSULE ORAL DAILY
Status: DISCONTINUED | OUTPATIENT
Start: 2020-08-12 | End: 2020-08-15

## 2020-08-12 RX ORDER — GUAIFENESIN/DEXTROMETHORPHAN 100-10MG/5
10 SYRUP ORAL EVERY 6 HOURS PRN
Status: DISCONTINUED | OUTPATIENT
Start: 2020-08-12 | End: 2020-08-18 | Stop reason: HOSPADM

## 2020-08-12 RX ORDER — IPRATROPIUM BROMIDE AND ALBUTEROL SULFATE 2.5; .5 MG/3ML; MG/3ML
3 SOLUTION RESPIRATORY (INHALATION)
Status: DISCONTINUED | OUTPATIENT
Start: 2020-08-12 | End: 2020-08-14

## 2020-08-12 RX ORDER — SODIUM CHLORIDE 0.9 % (FLUSH) 0.9 %
10 SYRINGE (ML) INJECTION AS NEEDED
Status: DISCONTINUED | OUTPATIENT
Start: 2020-08-12 | End: 2020-08-18 | Stop reason: HOSPADM

## 2020-08-12 RX ORDER — PANTOPRAZOLE SODIUM 40 MG/1
40 TABLET, DELAYED RELEASE ORAL EVERY MORNING
Status: DISCONTINUED | OUTPATIENT
Start: 2020-08-12 | End: 2020-08-15

## 2020-08-12 RX ORDER — BUDESONIDE AND FORMOTEROL FUMARATE DIHYDRATE 80; 4.5 UG/1; UG/1
2 AEROSOL RESPIRATORY (INHALATION)
Status: DISCONTINUED | OUTPATIENT
Start: 2020-08-13 | End: 2020-08-17

## 2020-08-12 RX ORDER — BUDESONIDE AND FORMOTEROL FUMARATE DIHYDRATE 80; 4.5 UG/1; UG/1
2 AEROSOL RESPIRATORY (INHALATION) 2 TIMES DAILY PRN
Status: DISCONTINUED | OUTPATIENT
Start: 2020-08-12 | End: 2020-08-12

## 2020-08-12 RX ORDER — ALBUTEROL SULFATE 2.5 MG/3ML
2.5 SOLUTION RESPIRATORY (INHALATION) EVERY 4 HOURS PRN
Status: CANCELLED | OUTPATIENT
Start: 2020-08-12

## 2020-08-12 RX ORDER — SODIUM CHLORIDE 0.9 % (FLUSH) 0.9 %
10 SYRINGE (ML) INJECTION EVERY 12 HOURS SCHEDULED
Status: DISCONTINUED | OUTPATIENT
Start: 2020-08-12 | End: 2020-08-18 | Stop reason: HOSPADM

## 2020-08-12 RX ORDER — POTASSIUM CHLORIDE 20 MEQ/1
40 TABLET, EXTENDED RELEASE ORAL AS NEEDED
Status: DISCONTINUED | OUTPATIENT
Start: 2020-08-12 | End: 2020-08-18 | Stop reason: HOSPADM

## 2020-08-12 RX ORDER — ACETAMINOPHEN 325 MG/1
650 TABLET ORAL EVERY 6 HOURS PRN
Status: DISCONTINUED | OUTPATIENT
Start: 2020-08-12 | End: 2020-08-18 | Stop reason: HOSPADM

## 2020-08-12 RX ORDER — TIZANIDINE 4 MG/1
4 TABLET ORAL EVERY 8 HOURS PRN
COMMUNITY

## 2020-08-12 RX ORDER — POTASSIUM CHLORIDE 1.5 G/1.77G
40 POWDER, FOR SOLUTION ORAL AS NEEDED
Status: DISCONTINUED | OUTPATIENT
Start: 2020-08-12 | End: 2020-08-18 | Stop reason: HOSPADM

## 2020-08-12 RX ORDER — CETIRIZINE HYDROCHLORIDE 10 MG/1
10 TABLET ORAL DAILY
Status: DISCONTINUED | OUTPATIENT
Start: 2020-08-12 | End: 2020-08-18 | Stop reason: HOSPADM

## 2020-08-12 RX ORDER — NICOTINE 21 MG/24HR
1 PATCH, TRANSDERMAL 24 HOURS TRANSDERMAL
Status: DISCONTINUED | OUTPATIENT
Start: 2020-08-12 | End: 2020-08-18 | Stop reason: HOSPADM

## 2020-08-12 RX ADMIN — SODIUM CHLORIDE 1000 ML: 9 INJECTION, SOLUTION INTRAVENOUS at 00:00

## 2020-08-12 RX ADMIN — DOXYCYCLINE 100 MG: 100 INJECTION, POWDER, LYOPHILIZED, FOR SOLUTION INTRAVENOUS at 17:11

## 2020-08-12 RX ADMIN — ACETAMINOPHEN 650 MG: 325 TABLET ORAL at 18:06

## 2020-08-12 RX ADMIN — IPRATROPIUM BROMIDE AND ALBUTEROL SULFATE 3 ML: .5; 3 SOLUTION RESPIRATORY (INHALATION) at 14:15

## 2020-08-12 RX ADMIN — QUETIAPINE FUMARATE 100 MG: 100 TABLET ORAL at 20:08

## 2020-08-12 RX ADMIN — SODIUM CHLORIDE, PRESERVATIVE FREE 10 ML: 5 INJECTION INTRAVENOUS at 20:08

## 2020-08-12 RX ADMIN — IPRATROPIUM BROMIDE AND ALBUTEROL SULFATE 3 ML: .5; 3 SOLUTION RESPIRATORY (INHALATION) at 06:29

## 2020-08-12 RX ADMIN — BUDESONIDE AND FORMOTEROL FUMARATE DIHYDRATE 2 PUFF: 80; 4.5 AEROSOL RESPIRATORY (INHALATION) at 06:29

## 2020-08-12 RX ADMIN — VANCOMYCIN HYDROCHLORIDE 2000 MG: 1 INJECTION, POWDER, LYOPHILIZED, FOR SOLUTION INTRAVENOUS at 00:00

## 2020-08-12 RX ADMIN — FLUOXETINE HYDROCHLORIDE 20 MG: 20 CAPSULE ORAL at 08:00

## 2020-08-12 RX ADMIN — GUAIFENESIN AND DEXTROMETHORPHAN 10 ML: 100; 10 SYRUP ORAL at 11:50

## 2020-08-12 RX ADMIN — PANTOPRAZOLE SODIUM 40 MG: 40 TABLET, DELAYED RELEASE ORAL at 08:00

## 2020-08-12 RX ADMIN — HEPARIN SODIUM 5000 UNITS: 5000 INJECTION INTRAVENOUS; SUBCUTANEOUS at 20:08

## 2020-08-12 RX ADMIN — NICOTINE 1 PATCH: 21 PATCH TRANSDERMAL at 08:01

## 2020-08-12 RX ADMIN — POTASSIUM CHLORIDE 40 MEQ: 1500 TABLET, EXTENDED RELEASE ORAL at 08:00

## 2020-08-12 RX ADMIN — POTASSIUM CHLORIDE 40 MEQ: 1500 TABLET, EXTENDED RELEASE ORAL at 11:50

## 2020-08-12 RX ADMIN — SODIUM CHLORIDE 100 ML/HR: 9 INJECTION, SOLUTION INTRAVENOUS at 04:25

## 2020-08-12 RX ADMIN — BUDESONIDE AND FORMOTEROL FUMARATE DIHYDRATE 2 PUFF: 80; 4.5 AEROSOL RESPIRATORY (INHALATION) at 18:28

## 2020-08-12 RX ADMIN — CETIRIZINE HYDROCHLORIDE 10 MG: 10 TABLET, FILM COATED ORAL at 08:00

## 2020-08-12 RX ADMIN — HEPARIN SODIUM 5000 UNITS: 5000 INJECTION INTRAVENOUS; SUBCUTANEOUS at 09:52

## 2020-08-12 RX ADMIN — IPRATROPIUM BROMIDE AND ALBUTEROL SULFATE 3 ML: .5; 3 SOLUTION RESPIRATORY (INHALATION) at 10:10

## 2020-08-12 RX ADMIN — METHYLPREDNISOLONE SODIUM SUCCINATE 40 MG: 40 INJECTION, POWDER, FOR SOLUTION INTRAMUSCULAR; INTRAVENOUS at 20:08

## 2020-08-12 RX ADMIN — ACETAMINOPHEN 650 MG: 325 TABLET ORAL at 12:12

## 2020-08-12 RX ADMIN — IPRATROPIUM BROMIDE AND ALBUTEROL SULFATE 3 ML: .5; 3 SOLUTION RESPIRATORY (INHALATION) at 04:53

## 2020-08-12 RX ADMIN — IPRATROPIUM BROMIDE AND ALBUTEROL SULFATE 3 ML: .5; 3 SOLUTION RESPIRATORY (INHALATION) at 18:28

## 2020-08-12 RX ADMIN — FLUTICASONE PROPIONATE 2 SPRAY: 50 SPRAY, METERED NASAL at 08:01

## 2020-08-12 RX ADMIN — ACETAMINOPHEN 650 MG: 325 TABLET ORAL at 05:27

## 2020-08-12 RX ADMIN — IPRATROPIUM BROMIDE AND ALBUTEROL SULFATE 3 ML: .5; 3 SOLUTION RESPIRATORY (INHALATION) at 22:24

## 2020-08-12 RX ADMIN — CEFTRIAXONE 2 G: 2 INJECTION, POWDER, FOR SOLUTION INTRAMUSCULAR; INTRAVENOUS at 04:25

## 2020-08-12 RX ADMIN — DOXYCYCLINE 100 MG: 100 INJECTION, POWDER, LYOPHILIZED, FOR SOLUTION INTRAVENOUS at 05:22

## 2020-08-12 RX ADMIN — SODIUM CHLORIDE, PRESERVATIVE FREE 10 ML: 5 INJECTION INTRAVENOUS at 08:01

## 2020-08-12 RX ADMIN — SODIUM CHLORIDE 100 ML/HR: 9 INJECTION, SOLUTION INTRAVENOUS at 16:33

## 2020-08-12 NOTE — ED PROVIDER NOTES
Subjective   History of Present Illness    Review of Systems    Past Medical History:   Diagnosis Date   • Anxiety    • Asthma    • Bipolar disorder (CMS/HCC)    • Crohn disease (CMS/HCC)    • Depression    • Hypertension    • Schizoaffective disorder (CMS/HCC)        Allergies   Allergen Reactions   • Imuran [Azathioprine] Other (See Comments)     Pt reports this medication caused her to have pancreatitis.        Past Surgical History:   Procedure Laterality Date   • APPENDECTOMY     •  SECTION     • CHOLECYSTECTOMY     • COLON SURGERY     • COLONOSCOPY     • MANDIBLE FRACTURE SURGERY     • OVARIAN CYST SURGERY     • TUBAL ABDOMINAL LIGATION         Family History   Problem Relation Age of Onset   • Diabetes Mother    • Anxiety disorder Mother    • Depression Mother    • Bipolar disorder Mother    • COPD Father    • Schizophrenia Father    • Schizophrenia Paternal Grandfather    • Breast cancer Neg Hx        Social History     Socioeconomic History   • Marital status:      Spouse name: Not on file   • Number of children: Not on file   • Years of education: Not on file   • Highest education level: Not on file   Tobacco Use   • Smoking status: Current Every Day Smoker     Packs/day: 1.00     Years: 20.00     Pack years: 20.00     Types: Cigarettes   • Smokeless tobacco: Never Used   Substance and Sexual Activity   • Alcohol use: No     Comment: denies   • Drug use: Yes     Types: Marijuana   • Sexual activity: Never           Objective   Physical Exam    Procedures           ED Course  ED Course as of Aug 12 0250   Tue Aug 11, 2020   2328 IMPRESSION:  Bilateral infiltrates most compatible with pneumonia.   XR Chest 1 View [KK]   Wed Aug 12, 2020   0158    IMPRESSION:     1. Diffuse bilateral pneumonia with small bilateral pleural effusions.  2. Mild mediastinal adenopathy, probably reactive. Consider follow-up chest CT in 3 months.   CT Chest Without Contrast [KK]      ED Course User Index  [KK]  Francesca Minaya, APRN                                           [facility]  Number of Diagnoses or Management Options  Pneumonia of both lungs due to infectious organism, unspecified part of lung: new and requires workup     Amount and/or Complexity of Data Reviewed  Clinical lab tests: ordered and reviewed  Tests in the radiology section of CPT®: reviewed and ordered  Tests in the medicine section of CPT®: ordered and reviewed    Risk of Complications, Morbidity, and/or Mortality  Presenting problems: moderate  Diagnostic procedures: moderate  Management options: moderate    Patient Progress  Patient progress: improved      Final diagnoses:   Pneumonia of both lungs due to infectious organism, unspecified part of lung            Francesca Minaya, DIEGO  08/12/20 0250

## 2020-08-12 NOTE — PLAN OF CARE
Problem: Patient Care Overview  Goal: Plan of Care Review  Outcome: Ongoing (interventions implemented as appropriate)  Flowsheets (Taken 8/12/2020 4508)  Progress: no change  Note:   Patient resting in bed. VSS, NSR. Patient has ran a low grade fever this shift, gave tylenol. She is was tachypneic with crackled breath sounds on assessment. 2L NC, and infrequent productive cough. No other complaints, will continue to monitor.

## 2020-08-12 NOTE — ED NOTES
Pt leaving the ED at this time via stretcher with ED tech and RN. Pt is alert and oriented, respirations even and unlabored, VSS, NADN. Pt IV patent and intact. Pt belongings being transferred with pt.      Pinky Lucero, RN  08/12/20 8341

## 2020-08-12 NOTE — ED NOTES
Unable to gain IV access at this time. Provider aware another RN going to attempt     Pinky Lucero RN  08/11/20 8280

## 2020-08-12 NOTE — PROGRESS NOTES
Discharge Planning Assessment  [facility]     Patient Name: Manda Al  MRN: [id number]  Today's Date: 8/12/2020    Admit Date: 8/11/2020    Discharge Needs Assessment     Row Name 08/12/20 1026       Living Environment    Lives With  parent(s)    Name(s) of Who Lives With Patient  Mother Itzel Ness     Current Living Arrangements  home/apartment/condo    Primary Care Provided by  self    Provides Primary Care For  no one    Family Caregiver if Needed  none    Quality of Family Relationships  helpful;involved;supportive       Resource/Environmental Concerns    Resource/Environmental Concerns  none    Transportation Concerns  car, none       Transition Planning    Patient/Family Anticipates Transition to  family members' home Mother     Patient/Family Anticipated Services at Transition  none    Transportation Anticipated  car, drives self;family or friend will provide       Discharge Needs Assessment    Readmission Within the Last 30 Days  no previous admission in last 30 days    Concerns to be Addressed  no discharge needs identified    Equipment Currently Used at Home  none    Anticipated Changes Related to Illness  none    Equipment Needed After Discharge  none    Provided Post Acute Provider List?  N/A    N/A Provider List Comment  No needs identified        Discharge Plan     Row Name 08/12/20 1027       Plan    Plan  Patient is an independent disabled female who lives at home with her Mother, where she plans to return at discharge.  Patient's PCP is Adali Sharpe and she uses [facility] in [place] for her prescription needs.  Patient denies any insurance or financial issues at this time.  Patient does not utilize Home Health or DME and denies the need at this time.  Patient's family will provide transportation at discharge via private vehicle.  No issues or concerns are noted at this time.  CM will continue to follow and assist with any discharge needs.      Row Name 08/12/20 0748       Plan    Plan  Comments  8/12:  R 20-32, Regular Diet, 2L N/C; IV Rocephin, Doxy, NS; pO2 54, Na+ 135, K+ 3.2, Bili 1.3, , Ferritin 176.5, UA +, UDS + Amphetamine, CRP 24.89, Mg 1.5, WBC 16.42, CXR=B infiltrates compatible with PNA, CT Chest=Diffuse B pNA with sm B pleural effusions, Mild mediastinal adenopathy, follow-up CT in 3 months; C.O brown productive cough & SOB X 2 days, Smokes 1 ppd X 20 years, THC use-KLS         Destination      Coordination has not been started for this encounter.      Durable Medical Equipment      Coordination has not been started for this encounter.      Dialysis/Infusion      Coordination has not been started for this encounter.      Home Medical Care      Coordination has not been started for this encounter.      Therapy      Coordination has not been started for this encounter.      Community Resources      Coordination has not been started for this encounter.          Demographic Summary     Row Name 08/12/20 1025       General Information    Admission Type  inpatient    Arrived From  home;emergency department    Referral Source  high risk screening;admission list    Reason for Consult  discharge planning;other (see comments) CM Trigger    Preferred Language  English     Used During This Interaction  no        Functional Status     Row Name 08/12/20 1025       Functional Status    Usual Activity Tolerance  good    Current Activity Tolerance  fair       Functional Status, IADL    Medications  independent    Meal Preparation  independent    Housekeeping  independent    Laundry  independent    Shopping  independent       Mental Status    General Appearance WDL  WDL       Mental Status Summary    Recent Changes in Mental Status/Cognitive Functioning  no changes       Employment/    Employment Status  disabled    Current or Previous Occupation  service industry    Employment/ Comments  /        Psychosocial    No documentation.       Abuse/Neglect    No  documentation.       Legal    No documentation.       Substance Abuse    No documentation.       Patient Forms    No documentation.           Chana Jeff RN

## 2020-08-12 NOTE — H&P
Hospitalist History and Physical    Patient Identification:  Name: Manda Al  Age/Sex: 40 y.o. female  :  1980  MRN: [id number]         Primary Care Physician: Adali Sharpe APRN    Chief Complaint   Patient presents with   • Cough   • Fever   • Shortness of Breath       History of Present Illness  Patient is a 40 y.o. female presents with the following: Cough, shortness of air and subjective fever    The patient is a 40-year-old female with past medical history significant for anxiety, asthma, Crohn's disease and tobacco abuse who presents to the emergency department complaining of a 2-day history of cough, fever and shortness of air.    The patient reports shortness of air with minimal exertion and occasionally upon talking in long sentences.  Patient reports subjective fevers.  The patient reports a productive cough with blood-tinged sputum; no clots.  She reports some abdominal tenderness from repeated coughing but denies any pleuritic-like chest pain.  She has no chest pressure and no palpitations.    Patient denies any nausea or vomiting.  She reports that she typically has Crohn's related diarrhea but states she has had constipation for the past 2 days which she relates to poor appetite.    Patient denies any known sick contacts. She states that she stays with her mother and will occasionally stay with a friend as she assists in caring for her friend's mother.  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  She denies any known coworkers with upper respiratory symptoms.  She denies any recent hospitalizations.  Patient denies any previous history of pneumonia.    Patient reports that she smokes approximately 1 pack of cigarettes per day.  She states that about 1 week ago she did try a THC pen caused her nausea and vomiting.  She denies illicit drug use.  She denies dysphasia.    In the emergency department, patient's  initial temperature 99.6 °F, heart rate 103, respiratory rate 24 with a blood pressure 115/72 mmHg.  Room air arterial blood gas revealed a pH of 7.499, PCO2 28, PO2 54, bicarbonate 21.8 with an oxygen saturation of 91%.  CMP revealed a sodium of 135, potassium 3.2, CO2 19.8, albumin 3.23 and calcium 8.5.  C-RP was elevated at 21.91.  Lactate normal at 1.0.  CBC revealed a white blood cell count of 16.42 with 80.5% neutrophils.  CT scan of the chest without contrast revealed diffuse bilateral pneumonia and small pleural effusions.  Patient was also noted to have mild mediastinal adenopathy, probably reactive.  Patient's COVID-19 swab ([facility] in house) was negative.    Patient was placed on 2 L per nasal cannula and has been admitted to the progressive care unit for further evaluation and management.    Past History:  Past Medical History:   Diagnosis Date   • Anxiety    • Asthma    • Bipolar disorder (CMS/HCC)    • Crohn disease (CMS/HCC)    • Depression    • Hypertension    • Schizoaffective disorder (CMS/HCC)      Past Surgical History:   Procedure Laterality Date   • APPENDECTOMY     •  SECTION     • CHOLECYSTECTOMY     • COLON SURGERY     • COLONOSCOPY     • MANDIBLE FRACTURE SURGERY     • OVARIAN CYST SURGERY     • TUBAL ABDOMINAL LIGATION       Family History   Problem Relation Age of Onset   • Diabetes Mother    • Anxiety disorder Mother    • Depression Mother    • Bipolar disorder Mother    • COPD Father    • Schizophrenia Father    • Schizophrenia Paternal Grandfather    • Breast cancer Neg Hx      Social History     Tobacco Use   • Smoking status: Current Every Day Smoker     Packs/day: 1.00     Years: 20.00     Pack years: 20.00     Types: Cigarettes   • Smokeless tobacco: Never Used   Substance Use Topics   • Alcohol use: No     Comment: denies   • Drug use: Yes     Types: Marijuana     Medications Prior to Admission   Medication Sig Dispense Refill Last Dose   • azelastine (ASTELIN) 0.1 % nasal  spray   0 Taking   • fexofenadine (ALLEGRA) 60 MG tablet Take 60 mg by mouth Daily.      • FLUoxetine (PROzac) 20 MG capsule Take 1 capsule by mouth Daily. 30 capsule 1    • fluticasone (FLONASE) 50 MCG/ACT nasal spray   0 Taking   • lisinopril-hydrochlorothiazide (PRINZIDE,ZESTORETIC) 10-12.5 MG per tablet Take 1 tablet by mouth Daily.   Taking   • omeprazole (priLOSEC) 20 MG capsule Take 20 mg by mouth Daily. Before biggest meal of the day   Taking   • QUEtiapine (SEROquel) 100 MG tablet Take 1 tablet by mouth Every Night. 30 tablet 1    • SYMBICORT 80-4.5 MCG/ACT inhaler   0 Taking   • VENTOLIN  (90 Base) MCG/ACT inhaler   0 Taking   • ARIPiprazole (ABILIFY) 2 MG tablet Take 1 tablet by mouth Daily. 30 tablet 0    • hydrOXYzine (VISTARIL) 25 MG capsule Take 1-2 capsules by mouth 3 (Three) Times a Day As Needed for Anxiety. 90 capsule 1    • loratadine (CLARITIN) 10 MG tablet   0 Taking   • mirtazapine (REMERON) 15 MG tablet Take 1 tablet by mouth Every Night. 30 tablet 1    • NICORETTE 4 MG gum   3 Taking   • tiZANidine (ZANAFLEX) 4 MG tablet   2 Taking     Allergies: Imuran [azathioprine]    Review of Systems:  Review of Systems   Constitutional: Positive for appetite change and fever. Negative for chills and diaphoresis.   HENT: Negative for hearing loss, tinnitus and trouble swallowing.    Eyes: Negative for photophobia, discharge and visual disturbance.   Respiratory: Positive for cough and shortness of breath. Negative for wheezing.    Cardiovascular: Negative for chest pain, palpitations and leg swelling.   Gastrointestinal: Positive for constipation. Negative for abdominal pain, diarrhea, nausea and vomiting.   Endocrine: Negative for polydipsia, polyphagia and polyuria.   Genitourinary: Negative for dysuria, frequency and hematuria.   Musculoskeletal: Negative for gait problem, myalgias and neck pain.   Skin: Negative for rash and wound.   Neurological: Positive for weakness. Negative for  dizziness, tremors, seizures, syncope and light-headedness.   Hematological: Does not bruise/bleed easily.   Psychiatric/Behavioral: Negative for confusion, hallucinations and suicidal ideas.        Vital Signs  Temp:  [98.4 °F (36.9 °C)-99.6 °F (37.6 °C)] 98.4 °F (36.9 °C)  Heart Rate:  [] 85  Resp:  [20-24] 22  BP: (104-115)/(55-72) 104/64  Body mass index is 35.36 kg/m².    Physical Exam:  Physical Exam   Constitutional: She is oriented to person, place, and time. She appears well-developed and well-nourished. She appears ill. No distress. Nasal cannula in place.   HENT:   Head: Normocephalic and atraumatic.   Mouth/Throat: Oropharynx is clear and moist.   Eyes: Pupils are equal, round, and reactive to light. Conjunctivae and EOM are normal.   Neck: Neck supple. No tracheal deviation present.   Cardiovascular: Normal rate and regular rhythm. Exam reveals no gallop and no friction rub.   No murmur heard.  Pulses:       Posterior tibial pulses are 2+ on the right side, and 2+ on the left side.   Pulmonary/Chest: No respiratory distress. She has decreased breath sounds in the left lower field. She has wheezes in the right middle field and the right lower field. She has no rales.   Abdominal: Soft. Bowel sounds are normal. She exhibits no distension. There is no tenderness. There is no guarding.   Musculoskeletal: Normal range of motion. She exhibits no tenderness.   No significant lower extremity edema.   Neurological: She is alert and oriented to person, place, and time. No cranial nerve deficit.   Skin: Skin is warm and dry. No rash noted. No erythema.   Psychiatric: She has a normal mood and affect.      Results Review:    Results from last 7 days   Lab Units 08/12/20  0233   PH, ARTERIAL pH units 7.456*   PO2 ART mm Hg 76.0*   PCO2, ARTERIAL mm Hg 30.7*   HCO3 ART mmol/L 21.6       Results from last 7 days   Lab Units 08/11/20  2129   WBC 10*3/mm3 16.42*   HEMOGLOBIN g/dL 10.9*   HEMATOCRIT % 32.2*    PLATELETS 10*3/mm3 233     Results from last 7 days   Lab Units 08/11/20  2129   SODIUM mmol/L 135*   POTASSIUM mmol/L 3.2*   CHLORIDE mmol/L 101   CO2 mmol/L 19.8*   BUN mg/dL 8   CREATININE mg/dL 0.64   CALCIUM mg/dL 8.5*   GLUCOSE mg/dL 133*     Results from last 7 days   Lab Units 08/11/20  2129   BILIRUBIN mg/dL 1.3*   ALK PHOS U/L 67   AST (SGOT) U/L 16   ALT (SGPT) U/L 11     Results from last 7 days   Lab Units 08/11/20  2254   CRP mg/dL 21.91*     Imaging:    I have personally reviewed the EKG. EKG is pending cardiology interpretation, however, per my view the patient has a normal sinus rhythm with no significant ST-T wave changes.  QTc 457 ms.    CT chest images reviewed and per my view patient has diffuse bilateral infiltrates are centrally located.    Imaging Results (Most Recent)     Procedure Component Value Units Date/Time    CT Chest Without Contrast [[id number]] Collected:  08/12/20 0130     Updated:  08/12/20 0133    Narrative:       CT Chest WO    INDICATION:   Shortness of air. Pneumonia. Abnormal chest x-ray. Fever. Negative COVID 19 test 8/11/2020.    TECHNIQUE:   CT of the thorax without IV contrast. Coronal and sagittal reconstructions were obtained.  Radiation dose reduction techniques included automated exposure control or exposure modulation based on body size. Count of known CT and cardiac nuc med studies  performed in previous 12 months: 0.     COMPARISON:   Chest x-ray 8/11/2020    FINDINGS:  There are small bilateral pleural effusions. No pericardial effusion is identified. Right paratracheal adenopathy measures 1.3 cm. Prominent AP window node measures 9 mm short axis. A few other small mediastinal nodes are present. The yudi are poorly  evaluated without contrast. Images through the upper abdomen show changes of cholecystectomy. There are multifocal groundglass and alveolar infiltrates with some septal thickening. Air bronchograms are present. Findings likely reflect bilateral  diffuse  pneumonia rather than pulmonary edema.      Impression:         1. Diffuse bilateral pneumonia with small bilateral pleural effusions.  2. Mild mediastinal adenopathy, probably reactive. Consider follow-up chest CT in 3 months.    Signer Name: Khadar Quintero MD   Signed: 8/12/2020 1:30 AM   Workstation [facility]: [facility]    [facility]    XR Chest 1 View [[id number]] Collected:  08/11/20 2203     Updated:  08/11/20 2205    Narrative:       CR Chest 1 Vw    INDICATION:   Fever and cough with shortness of air.     COMPARISON:    10/13/2012    FINDINGS:  Single portable AP view(s) of the chest.  Cardiac and mediastinal contours are normal. There are patchy bilateral fairly diffuse infiltrates with a basilar predominance. Findings likely reflect pneumonia. No pneumothorax is seen.      Impression:       Bilateral infiltrates most compatible with pneumonia.    Signer Name: Khadar Quintero MD   Signed: 8/11/2020 10:03 PM   Workstation Name: [facility]    [facility]iology [facility]          Assessment/Plan     -Severe sepsis, present upon admission and secondary to bilateral community-acquired pneumonia causing acute hypoxic respiratory failure: Patient has been admitted to the progressive care unit.  Continue with oxygen therapy as needed and titrate for saturations 90 to 95%.  Patient has been started on high-dose IV Rocephin and IV doxycycline.  Patient has been started on scheduled nebulized inhalants.  I have ordered as needed Robitussin.    I have requested a respiratory culture and testing for Streptococcus pneumonia and urine Legionella.  I will also request a respiratory viral PCR panel.  Repeat CBC and C-RP in a.m. and monitor temperature curve.    -Mediastinal adenopathy that is likely reactive: Recommend repeat CT scan of the chest in approximately 3 months.    -Acute, mild hypokalemia: Patient's potassium level is 3.2.  She has been started on supplementation.  Obtain  magnesium level.  Monitor on telemetry.    -Normocytic anemia: Patient's hemoglobin and hematocrit are 10.9 and 32.2 respectively.  Repeat CBC in a.m.    -Tobacco abuse: Counseled cessation.  Nicotine patch ordered.    -Crohn's disease: Patient reports she is currently on no maintenance medications.     -Generalized anxiety disorder/depression with bipolar disorder: Currently awaiting patient's home medication list.    -Essential hypertension, currently controlled: Currently awaiting patient's home medication list; consider holding antihypertensive medications for now in the setting of severe sepsis.    DVT prophylaxis: Subcutaneous heparin    Estimated Length of Stay: > 2 MNs    Patient is considered to be a high risk patient due to: severe sepsis, pneumonia, respiratory failure    I discussed the patients findings and my recommendations with patient      Pinky Corrales DO  08/12/20  04:29

## 2020-08-12 NOTE — ED PROVIDER NOTES
Subjective   Patient presents to ER with productive cough and shortness of breath for two days.      Cough   Cough characteristics:  Productive  Sputum characteristics:  Brown  Severity:  Moderate  Onset quality:  Gradual  Duration:  2 days  Timing:  Constant  Progression:  Worsening  Chronicity:  New  Smoker: yes    Relieved by:  Nothing  Worsened by:  Deep breathing and activity  Ineffective treatments:  None tried  Associated symptoms: chills, fever, shortness of breath and wheezing        Review of Systems   Constitutional: Positive for activity change, chills, fatigue and fever.   HENT: Negative.    Eyes: Negative.    Respiratory: Positive for cough, shortness of breath and wheezing.    Cardiovascular: Negative.    Gastrointestinal: Negative.    Endocrine: Negative.    Genitourinary: Negative.    Musculoskeletal: Negative.    Skin: Negative.    Allergic/Immunologic: Negative.    Neurological: Negative.    Hematological: Negative.    Psychiatric/Behavioral: Negative.        Past Medical History:   Diagnosis Date   • Anxiety    • Asthma    • Bipolar disorder (CMS/HCC)    • Crohn disease (CMS/HCC)    • Depression    • Hypertension    • Schizoaffective disorder (CMS/HCC)        Allergies   Allergen Reactions   • Imuran [Azathioprine] Other (See Comments)     Pt reports this medication caused her to have pancreatitis.        Past Surgical History:   Procedure Laterality Date   • APPENDECTOMY     •  SECTION     • CHOLECYSTECTOMY     • COLON SURGERY     • COLONOSCOPY     • MANDIBLE FRACTURE SURGERY     • OVARIAN CYST SURGERY     • TUBAL ABDOMINAL LIGATION         Family History   Problem Relation Age of Onset   • Diabetes Mother    • Anxiety disorder Mother    • Depression Mother    • Bipolar disorder Mother    • COPD Father    • Schizophrenia Father    • Schizophrenia Paternal Grandfather    • Breast cancer Neg Hx        Social History     Socioeconomic History   • Marital status:      Spouse name: Not  on file   • Number of children: Not on file   • Years of education: Not on file   • Highest education level: Not on file   Tobacco Use   • Smoking status: Current Every Day Smoker     Packs/day: 1.00     Years: 20.00     Pack years: 20.00     Types: Cigarettes   • Smokeless tobacco: Never Used   Substance and Sexual Activity   • Alcohol use: No     Comment: denies   • Drug use: Yes     Types: Marijuana   • Sexual activity: Never           Objective   Physical Exam   Constitutional: She appears toxic. She appears ill. She appears distressed.   HENT:   Head: Normocephalic and atraumatic.   Mouth/Throat: Oropharynx is clear and moist.   Eyes: Pupils are equal, round, and reactive to light. EOM are normal.   Neck: Normal range of motion.   Cardiovascular: Normal rate.   Pulmonary/Chest: Tachypnea noted. She is in respiratory distress. She has decreased breath sounds in the right middle field, the right lower field, the left middle field and the left lower field. She has wheezes in the right upper field and the left upper field. She has rales in the right upper field and the left upper field.   Abdominal: Soft.   Musculoskeletal: Normal range of motion.        Right lower leg: Normal.        Left lower leg: Normal.   Skin: Skin is warm. Capillary refill takes less than 2 seconds.   Psychiatric: She has a normal mood and affect.   Nursing note and vitals reviewed.      Procedures           ED Course  ED Course as of Aug 16 1344   Tue Aug 11, 2020   2328 IMPRESSION:  Bilateral infiltrates most compatible with pneumonia.   XR Chest 1 View [KK]   Wed Aug 12, 2020   0158    IMPRESSION:     1. Diffuse bilateral pneumonia with small bilateral pleural effusions.  2. Mild mediastinal adenopathy, probably reactive. Consider follow-up chest CT in 3 months.   CT Chest Without Contrast [KK]      ED Course User Index  [KK] Francesca Minaya, APRN                                           MDM    Final diagnoses:   Pneumonia of  both lungs due to infectious organism, unspecified part of lung            Armando Veloz MD  08/11/20 6455       Armando Veloz MD  08/16/20 5536

## 2020-08-12 NOTE — PLAN OF CARE
Problem: Patient Care Overview  Goal: Plan of Care Review  Outcome: Ongoing (interventions implemented as appropriate)  Flowsheets  Taken 8/12/2020 0442  Progress: improving  Taken 8/12/2020 0340  Plan of Care Reviewed With: patient  Note:   VSS. Patient is on 2L NC. Afebrile. Started on IV fluids and antibiotics. Will continue to monitor.   Goal: Individualization and Mutuality  Outcome: Ongoing (interventions implemented as appropriate)  Goal: Discharge Needs Assessment  Outcome: Ongoing (interventions implemented as appropriate)  Goal: Interprofessional Rounds/Family Conf  Outcome: Ongoing (interventions implemented as appropriate)     Problem: Infection, Risk/Actual (Adult)  Goal: Identify Related Risk Factors and Signs and Symptoms  Outcome: Ongoing (interventions implemented as appropriate)  Flowsheets (Taken 8/12/2020 0442)  Signs and Symptoms (Infection, Risk/Actual): respiratory rate increase; body temperature changes; skin cool/clammy  Goal: Infection Prevention/Resolution  Outcome: Ongoing (interventions implemented as appropriate)     Problem: Pneumonia (Adult)  Goal: Signs and Symptoms of Listed Potential Problems Will be Absent, Minimized or Managed (Pneumonia)  Outcome: Ongoing (interventions implemented as appropriate)     Problem: Sepsis/Septic Shock (Adult)  Goal: Signs and Symptoms of Listed Potential Problems Will be Absent, Minimized or Managed (Sepsis/Septic Shock)  Outcome: Ongoing (interventions implemented as appropriate)

## 2020-08-12 NOTE — PAYOR COMM NOTE
"CONTACT:  STALIN SUNG MSN, APRN  UTILIZATION MANAGEMENT DEPT.  [facility]  [address] [place], [address]  PHONE:  [phone]  FAX: [phone]    REQUEST FOR INPATIENT AUTHORIZATION.    Aminata Al (40 y.o. Female)     Date of Birth Social Security Number Address Home Phone MRN    1980  [address] [facility] [address] [phone] [id number]    Yazidism Marital Status          None        Admission Date Admission Type Admitting Provider Attending Provider Department, Room/Bed    20 Emergency Pinky Corrales DO Khan, Hafiz Sarfraz, MD [facility] PROGRESS CARE, P204/S2    Discharge Date Discharge Disposition Discharge Destination                       Attending Provider:  Lydia Hou MD    Allergies:  Imuran [Azathioprine]    Isolation:  None   Infection:  COVID (rule out) (20)   Code Status:  CPR    Ht:  172.7 cm (67.99\")   Wt:  106 kg (233 lb 11 oz)    Admission Cmt:  None   Principal Problem:  None                Active Insurance as of 2020     Primary Coverage     Payor Plan Insurance Group Employer/Plan Group    [facility] MEDICAID [id number]     Payor Plan Address Payor Plan Phone Number Payor Plan Fax Number Effective Dates    [address] [address] [phone]  2016 - None Entered    [place] [address]       Subscriber Name Subscriber Birth Date Member ID       AMINATA AL 1980 [id number]                 Emergency Contacts      (Rel.) Home Phone Work Phone Mobile Phone    PhanshelleyTran (Relative) [phone] -- --               History & Physical      Pinky Corrales DO at 20 0428          Hospitalist History and Physical    Patient Identification:  Name: Aminata Al  Age/Sex: 40 y.o. female  :  1980  MRN: [id number]         Primary Care Physician: Adali Sharpe, DIEGO    Chief Complaint   Patient presents with   • Cough   • Fever   • Shortness of Breath       "       History of Present Illness  Patient is a 40 y.o. female presents with the following: Cough, shortness of air and subjective fever    The patient is a 40-year-old female with past medical history significant for anxiety, asthma, Crohn's disease and tobacco abuse who presents to the emergency department complaining of a 2-day history of cough, fever and shortness of air.    The patient reports shortness of air with minimal exertion and occasionally upon talking in long sentences.  Patient reports subjective fevers.  The patient reports a productive cough with blood-tinged sputum; no clots.  She reports some abdominal tenderness from repeated coughing but denies any pleuritic-like chest pain.  She has no chest pressure and no palpitations.    Patient denies any nausea or vomiting.  She reports that she typically has Crohn's related diarrhea but states she has had constipation for the past 2 days which she relates to poor appetite.    Patient denies any known sick contacts. She states that she stays with her mother and will occasionally stay with a friend as she assists in caring for her friend's mother.  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  She denies any known coworkers with upper respiratory symptoms.  She denies any recent hospitalizations.  Patient denies any previous history of pneumonia.    Patient reports that she smokes approximately 1 pack of cigarettes per day.  She states that about 1 week ago she did try a THC pen caused her nausea and vomiting.  She denies illicit drug use.  She denies dysphasia.    In the emergency department, patient's initial temperature 99.6 °F, heart rate 103, respiratory rate 24 with a blood pressure 115/72 mmHg.  Room air arterial blood gas revealed a pH of 7.499, PCO2 28, PO2 54, bicarbonate 21.8 with an oxygen saturation of 91%.  CMP revealed a sodium of 135, potassium 3.2, CO2 19.8, albumin 3.23  and calcium 8.5.  C-RP was elevated at 21.91.  Lactate normal at 1.0.  CBC revealed a white blood cell count of 16.42 with 80.5% neutrophils.  CT scan of the chest without contrast revealed diffuse bilateral pneumonia and small pleural effusions.  Patient was also noted to have mild mediastinal adenopathy, probably reactive.  Patient's COVID-19 swab ([facility] in house) was negative.    Patient was placed on 2 L per nasal cannula and has been admitted to the progressive care unit for further evaluation and management.    Past History:  Past Medical History:   Diagnosis Date   • Anxiety    • Asthma    • Bipolar disorder (CMS/HCC)    • Crohn disease (CMS/HCC)    • Depression    • Hypertension    • Schizoaffective disorder (CMS/HCC)      Past Surgical History:   Procedure Laterality Date   • APPENDECTOMY     •  SECTION     • CHOLECYSTECTOMY     • COLON SURGERY     • COLONOSCOPY     • MANDIBLE FRACTURE SURGERY     • OVARIAN CYST SURGERY     • TUBAL ABDOMINAL LIGATION       Family History   Problem Relation Age of Onset   • Diabetes Mother    • Anxiety disorder Mother    • Depression Mother    • Bipolar disorder Mother    • COPD Father    • Schizophrenia Father    • Schizophrenia Paternal Grandfather    • Breast cancer Neg Hx      Social History     Tobacco Use   • Smoking status: Current Every Day Smoker     Packs/day: 1.00     Years: 20.00     Pack years: 20.00     Types: Cigarettes   • Smokeless tobacco: Never Used   Substance Use Topics   • Alcohol use: No     Comment: denies   • Drug use: Yes     Types: Marijuana     Medications Prior to Admission   Medication Sig Dispense Refill Last Dose   • azelastine (ASTELIN) 0.1 % nasal spray   0 Taking   • fexofenadine (ALLEGRA) 60 MG tablet Take 60 mg by mouth Daily.      • FLUoxetine (PROzac) 20 MG capsule Take 1 capsule by mouth Daily. 30 capsule 1    • fluticasone (FLONASE) 50 MCG/ACT nasal spray   0 Taking   • lisinopril-hydrochlorothiazide (PRINZIDE,ZESTORETIC)  10-12.5 MG per tablet Take 1 tablet by mouth Daily.   Taking   • omeprazole (priLOSEC) 20 MG capsule Take 20 mg by mouth Daily. Before biggest meal of the day   Taking   • QUEtiapine (SEROquel) 100 MG tablet Take 1 tablet by mouth Every Night. 30 tablet 1    • SYMBICORT 80-4.5 MCG/ACT inhaler   0 Taking   • VENTOLIN  (90 Base) MCG/ACT inhaler   0 Taking   • ARIPiprazole (ABILIFY) 2 MG tablet Take 1 tablet by mouth Daily. 30 tablet 0    • hydrOXYzine (VISTARIL) 25 MG capsule Take 1-2 capsules by mouth 3 (Three) Times a Day As Needed for Anxiety. 90 capsule 1    • loratadine (CLARITIN) 10 MG tablet   0 Taking   • mirtazapine (REMERON) 15 MG tablet Take 1 tablet by mouth Every Night. 30 tablet 1    • NICORETTE 4 MG gum   3 Taking   • tiZANidine (ZANAFLEX) 4 MG tablet   2 Taking     Allergies: Imuran [azathioprine]    Review of Systems:  Review of Systems   Constitutional: Positive for appetite change and fever. Negative for chills and diaphoresis.   HENT: Negative for hearing loss, tinnitus and trouble swallowing.    Eyes: Negative for photophobia, discharge and visual disturbance.   Respiratory: Positive for cough and shortness of breath. Negative for wheezing.    Cardiovascular: Negative for chest pain, palpitations and leg swelling.   Gastrointestinal: Positive for constipation. Negative for abdominal pain, diarrhea, nausea and vomiting.   Endocrine: Negative for polydipsia, polyphagia and polyuria.   Genitourinary: Negative for dysuria, frequency and hematuria.   Musculoskeletal: Negative for gait problem, myalgias and neck pain.   Skin: Negative for rash and wound.   Neurological: Positive for weakness. Negative for dizziness, tremors, seizures, syncope and light-headedness.   Hematological: Does not bruise/bleed easily.   Psychiatric/Behavioral: Negative for confusion, hallucinations and suicidal ideas.        Vital Signs  Temp:  [98.4 °F (36.9 °C)-99.6 °F (37.6 °C)] 98.4 °F (36.9 °C)  Heart Rate:   [] 85  Resp:  [20-24] 22  BP: (104-115)/(55-72) 104/64  Body mass index is 35.36 kg/m².    Physical Exam:  Physical Exam   Constitutional: She is oriented to person, place, and time. She appears well-developed and well-nourished. She appears ill. No distress. Nasal cannula in place.   HENT:   Head: Normocephalic and atraumatic.   Mouth/Throat: Oropharynx is clear and moist.   Eyes: Pupils are equal, round, and reactive to light. Conjunctivae and EOM are normal.   Neck: Neck supple. No tracheal deviation present.   Cardiovascular: Normal rate and regular rhythm. Exam reveals no gallop and no friction rub.   No murmur heard.  Pulses:       Posterior tibial pulses are 2+ on the right side, and 2+ on the left side.   Pulmonary/Chest: No respiratory distress. She has decreased breath sounds in the left lower field. She has wheezes in the right middle field and the right lower field. She has no rales.   Abdominal: Soft. Bowel sounds are normal. She exhibits no distension. There is no tenderness. There is no guarding.   Musculoskeletal: Normal range of motion. She exhibits no tenderness.   No significant lower extremity edema.   Neurological: She is alert and oriented to person, place, and time. No cranial nerve deficit.   Skin: Skin is warm and dry. No rash noted. No erythema.   Psychiatric: She has a normal mood and affect.      Results Review:    Results from last 7 days   Lab Units 08/12/20  0233   PH, ARTERIAL pH units 7.456*   PO2 ART mm Hg 76.0*   PCO2, ARTERIAL mm Hg 30.7*   HCO3 ART mmol/L 21.6       Results from last 7 days   Lab Units 08/11/20 2129   WBC 10*3/mm3 16.42*   HEMOGLOBIN g/dL 10.9*   HEMATOCRIT % 32.2*   PLATELETS 10*3/mm3 233     Results from last 7 days   Lab Units 08/11/20 2129   SODIUM mmol/L 135*   POTASSIUM mmol/L 3.2*   CHLORIDE mmol/L 101   CO2 mmol/L 19.8*   BUN mg/dL 8   CREATININE mg/dL 0.64   CALCIUM mg/dL 8.5*   GLUCOSE mg/dL 133*     Results from last 7 days   Lab Units  08/11/20  2129   BILIRUBIN mg/dL 1.3*   ALK PHOS U/L 67   AST (SGOT) U/L 16   ALT (SGPT) U/L 11     Results from last 7 days   Lab Units 08/11/20  2254   CRP mg/dL 21.91*     Imaging:    I have personally reviewed the EKG. EKG is pending cardiology interpretation, however, per my view the patient has a normal sinus rhythm with no significant ST-T wave changes.  QTc 457 ms.    CT chest images reviewed and per my view patient has diffuse bilateral infiltrates are centrally located.    Imaging Results (Most Recent)     Procedure Component Value Units Date/Time    CT Chest Without Contrast [[id number]] Collected:  08/12/20 0130     Updated:  08/12/20 0133    Narrative:       CT Chest WO    INDICATION:   Shortness of air. Pneumonia. Abnormal chest x-ray. Fever. Negative COVID 19 test 8/11/2020.    TECHNIQUE:   CT of the thorax without IV contrast. Coronal and sagittal reconstructions were obtained.  Radiation dose reduction techniques included automated exposure control or exposure modulation based on body size. Count of known CT and cardiac nuc med studies  performed in previous 12 months: 0.     COMPARISON:   Chest x-ray 8/11/2020    FINDINGS:  There are small bilateral pleural effusions. No pericardial effusion is identified. Right paratracheal adenopathy measures 1.3 cm. Prominent AP window node measures 9 mm short axis. A few other small mediastinal nodes are present. The yudi are poorly  evaluated without contrast. Images through the upper abdomen show changes of cholecystectomy. There are multifocal groundglass and alveolar infiltrates with some septal thickening. Air bronchograms are present. Findings likely reflect bilateral diffuse  pneumonia rather than pulmonary edema.      Impression:         1. Diffuse bilateral pneumonia with small bilateral pleural effusions.  2. Mild mediastinal adenopathy, probably reactive. Consider follow-up chest CT in 3 months.    Signer Name: Khadar Quintero MD   Signed: 8/12/2020  1:30 AM   Workstation Name: [facility]    [facility]    XR Chest 1 View [[id number]] Collected:  08/11/20 2203     Updated:  08/11/20 2205    Narrative:       CR Chest 1 Vw    INDICATION:   Fever and cough with shortness of air.     COMPARISON:    10/13/2012    FINDINGS:  Single portable AP view(s) of the chest.  Cardiac and mediastinal contours are normal. There are patchy bilateral fairly diffuse infiltrates with a basilar predominance. Findings likely reflect pneumonia. No pneumothorax is seen.      Impression:       Bilateral infiltrates most compatible with pneumonia.    Signer Name: Khadar Quintero MD   Signed: 8/11/2020 10:03 PM   Workstation Name: [facility]    [facility]          Assessment/Plan     -Severe sepsis, present upon admission and secondary to bilateral community-acquired pneumonia causing acute hypoxic respiratory failure: Patient has been admitted to the progressive care unit.  Continue with oxygen therapy as needed and titrate for saturations 90 to 95%.  Patient has been started on high-dose IV Rocephin and IV doxycycline.  Patient has been started on scheduled nebulized inhalants.  I have ordered as needed Robitussin.    I have requested a respiratory culture and testing for Streptococcus pneumonia and urine Legionella.  I will also request a respiratory viral PCR panel.  Repeat CBC and C-RP in a.m. and monitor temperature curve.    -Mediastinal adenopathy that is likely reactive: Recommend repeat CT scan of the chest in approximately 3 months.    -Acute, mild hypokalemia: Patient's potassium level is 3.2.  She has been started on supplementation.  Obtain magnesium level.  Monitor on telemetry.    -Normocytic anemia: Patient's hemoglobin and hematocrit are 10.9 and 32.2 respectively.  Repeat CBC in a.m.    -Tobacco abuse: Counseled cessation.  Nicotine patch ordered.    -Crohn's disease: Patient reports she is currently on no maintenance  medications.     -Generalized anxiety disorder/depression with bipolar disorder: Currently awaiting patient's home medication list.    -Essential hypertension, currently controlled: Currently awaiting patient's home medication list; consider holding antihypertensive medications for now in the setting of severe sepsis.    DVT prophylaxis: Subcutaneous heparin    Estimated Length of Stay: > 2 MNs    Patient is considered to be a high risk patient due to: severe sepsis, pneumonia, respiratory failure    I discussed the patients findings and my recommendations with patient      Pinky Corrales DO  08/12/20  04:29    Electronically signed by Pinky Corrales DO at 08/12/20 0520          Emergency Department Notes      Armando Veloz MD at 08/11/20 2233          Subjective   Patient presents to ER with productive cough and shortness of breath for two days.      Cough   Cough characteristics:  Productive  Sputum characteristics:  Brown  Severity:  Moderate  Onset quality:  Gradual  Duration:  2 days  Timing:  Constant  Progression:  Worsening  Chronicity:  New  Smoker: yes    Relieved by:  Nothing  Worsened by:  Deep breathing and activity  Ineffective treatments:  None tried  Associated symptoms: chills, fever, shortness of breath and wheezing        Review of Systems   Constitutional: Positive for activity change, chills, fatigue and fever.   HENT: Negative.    Eyes: Negative.    Respiratory: Positive for cough, shortness of breath and wheezing.    Cardiovascular: Negative.    Gastrointestinal: Negative.    Endocrine: Negative.    Genitourinary: Negative.    Musculoskeletal: Negative.    Skin: Negative.    Allergic/Immunologic: Negative.    Neurological: Negative.    Hematological: Negative.    Psychiatric/Behavioral: Negative.          Objective   Physical Exam   Constitutional: She appears toxic. She appears ill. She appears distressed.   HENT:   Head: Normocephalic and atraumatic.   Mouth/Throat:  Oropharynx is clear and moist.   Eyes: Pupils are equal, round, and reactive to light. EOM are normal.   Neck: Normal range of motion.   Cardiovascular: Normal rate.   Pulmonary/Chest: Tachypnea noted. She is in respiratory distress. She has decreased breath sounds in the right middle field, the right lower field, the left middle field and the left lower field. She has wheezes in the right upper field and the left upper field. She has rales in the right upper field and the left upper field.   Abdominal: Soft.   Musculoskeletal: Normal range of motion.        Right lower leg: Normal.        Left lower leg: Normal.   Skin: Skin is warm. Capillary refill takes less than 2 seconds.   Psychiatric: She has a normal mood and affect.   Nursing note and vitals reviewed.      Procedures          ED Course                                           MDM    Final diagnoses:   Pneumonia of both lungs due to infectious organism, unspecified part of lung            Armando Veloz MD  08/11/20 2237      Electronically signed by Armando Veloz MD at 08/11/20 2237       Francesca Minaya APRN at 08/12/20 0250          Subjective   History of Present Illness          ED Course  ED Course as of Aug 12 0250   Tue Aug 11, 2020   2328 IMPRESSION:  Bilateral infiltrates most compatible with pneumonia.   XR Chest 1 View [KK]   Wed Aug 12, 2020   0158    IMPRESSION:     1. Diffuse bilateral pneumonia with small bilateral pleural effusions.  2. Mild mediastinal adenopathy, probably reactive. Consider follow-up chest CT in 3 months.   CT Chest Without Contrast [KK]      ED Course User Index  [KK] Francesca Minaya APRN                                           MDM  Number of Diagnoses or Management Options  Pneumonia of both lungs due to infectious organism, unspecified part of lung: new and requires workup     Amount and/or Complexity of Data Reviewed  Clinical lab tests: ordered and reviewed  Tests in the radiology  section of CPT®:  reviewed and ordered  Tests in the medicine section of CPT®:  ordered and reviewed    Risk of Complications, Morbidity, and/or Mortality  Presenting problems: moderate  Diagnostic procedures: moderate  Management options: moderate    Patient Progress  Patient progress: improved      Final diagnoses:   Pneumonia of both lungs due to infectious organism, unspecified part of lung            Francesca Minaya APRN  08/12/20 0250      Electronically signed by Francesca Minaya APRN at 08/12/20 0250       Vital Signs (last 2 days)     Date/Time   Temp   Temp src   Pulse   Resp   BP   Patient Position   SpO2    08/12/20 0629   --   --   89   (!) 32   --   --   96    08/12/20 0508   99.2 (37.3)   Oral   88   24   116/73   Lying   99    08/12/20 0503   --   --   90   24   116/73   --   97    08/12/20 0456   99.2 (37.3)   Oral   --   --   --   --   --    08/12/20 0453   --   --   93   24   --   --   96    08/12/20 0403   --   --   85   24   126/83   --   93    08/12/20 0342   --   --   85   22   104/64   Lying   99    08/12/20 0333   98.4 (36.9)   Oral   --   --   --   --   --    08/12/20 0322   --   --   90   --   104/55   Lying   98    08/12/20 00:50:06   98.5 (36.9)   Oral   --   --   --   --   --    08/12/20 00:05:50   --   --   91   --   --   --   97    08/11/20 2356   --   --   96   20   --   --   97    08/11/20 2349   --   --   95   22   --   --   97    08/11/20 2042   99.6 (37.6)   Oral   103   24   115/72   Sitting   93            Intake & Output (last 2 days)       08/10 0701 - 08/11 0700 08/11 0701 - 08/12 0700 08/12 0701 - 08/13 0700    IV Piggyback  1600     Total Intake(mL/kg)  1600 (15.1)     Urine (mL/kg/hr)  300     Total Output  300     Net  +1300                  Facility-Administered Medications as of 8/12/2020   Medication Dose Route Frequency Provider Last Rate Last Dose   • [COMPLETED] acetaminophen (TYLENOL) tablet 650 mg  650 mg Oral Once Francesca Minaya,  APRN   650 mg at 08/11/20 2329   • acetaminophen (TYLENOL) tablet 650 mg  650 mg Oral Q6H PRN Pinky Corrales DO   650 mg at 08/12/20 0527   • budesonide-formoterol (SYMBICORT) 80-4.5 MCG/ACT inhaler 2 puff  2 puff Inhalation BID PRN Pinky Corrales DO   2 puff at 08/12/20 0629   • cefTRIAXone (ROCEPHIN) 2 g/100 mL 0.9% NS IVPB (MBP)  2 g Intravenous Q24H Pinky Corrales, DO   2 g at 08/12/20 0425   • cetirizine (zyrTEC) tablet 10 mg  10 mg Oral Daily Pinky Corrales, DO       • doxycycline (VIBRAMYCIN) 100 mg/100 mL 0.9% NS MBP  100 mg Intravenous Q12H Pinky Corrales, DO   100 mg at 08/12/20 0522   • FLUoxetine (PROzac) capsule 20 mg  20 mg Oral Daily Pinky Corrales, DO       • fluticasone (FLONASE) 50 MCG/ACT nasal spray 2 spray  2 spray Nasal Daily Pinky Corrales, DO       • guaiFENesin-dextromethorphan (ROBITUSSIN DM) 100-10 MG/5ML syrup 10 mL  10 mL Oral Q6H PRN Pinky Corrales, DO       • heparin (porcine) 5000 UNIT/ML injection 5,000 Units  5,000 Units Subcutaneous Q12H Pinky Corrales, DO       • [COMPLETED] ipratropium-albuterol (DUO-NEB) nebulizer solution 3 mL  3 mL Nebulization Once Francesca Minaya, APRN   3 mL at 08/11/20 2349   • ipratropium-albuterol (DUO-NEB) nebulizer solution 3 mL  3 mL Nebulization Q4H - RT Pinky Corrales DO   3 mL at 08/12/20 0629   • nicotine (NICODERM CQ) 21 MG/24HR patch 1 patch  1 patch Transdermal Q24H Pinky Corrales, DO       • pantoprazole (PROTONIX) EC tablet 40 mg  40 mg Oral QAM Pinky Corrales, DO       • [COMPLETED] piperacillin-tazobactam (ZOSYN) 3.375 g/100 mL 0.9% NS IVPB (mbp)  3.375 g Intravenous Once Armando Veloz MD   Stopped at 08/11/20 2330   • [COMPLETED] potassium chloride (K-DUR,KLOR-CON) CR tablet 40 mEq  40 mEq Oral Once Francesca Minaya APRN   40 mEq at 08/11/20 2328   • potassium chloride (K-DUR,KLOR-CON) CR tablet 40 mEq  40 mEq Oral PRN Pinky Corrales, DO         Or   • potassium chloride (KLOR-CON) packet 40 mEq  40 mEq Oral PRN Pinky Corrales, DO        Or   • potassium chloride 10 mEq in 100 mL IVPB  10 mEq Intravenous Q1H PRN Pinky Corrales DO       • potassium chloride (K-DUR,KLOR-CON) CR tablet 40 mEq  40 mEq Oral Q4H Pinky Corrales, DO       • QUEtiapine (SEROquel) tablet 100 mg  100 mg Oral Nightly Pinky Corrales, DO       • [COMPLETED] sodium chloride 0.9 % bolus 1,000 mL  1,000 mL Intravenous Once Francesca Minaya APRN   Stopped at 08/12/20 0030   • sodium chloride 0.9 % flush 10 mL  10 mL Intravenous Q12H Pinky Corrales DO       • sodium chloride 0.9 % flush 10 mL  10 mL Intravenous PRN Pinky Corrales, DO       • sodium chloride 0.9 % infusion  100 mL/hr Intravenous Continuous Pinky Corrales  mL/hr at 08/12/20 0425 100 mL/hr at 08/12/20 0425   • tiZANidine (ZANAFLEX) tablet 4 mg  4 mg Oral Q8H PRN Pinky Corrales DO       • [COMPLETED] vancomycin 2000 mg/500 mL 0.9% NS IVPB ([facility])  20 mg/kg Intravenous Once Armando Veloz MD   Stopped at 08/12/20 0302     Lab Results (all)     Procedure Component Value Units Date/Time    Legionella Antigen, Urine - Urine, Urine, Clean Catch [[id number]]  (Normal) Collected:  08/12/20 0529    Specimen:  Urine, Clean Catch Updated:  08/12/20 0553     LEGIONELLA ANTIGEN, URINE Negative    Narrative:       Presumptive negative for L. pneumophilia serogroup 1 antigen, suggesting no recent or current infection.    Respiratory Panel, PCR - Swab, Nasopharynx [[id number]]  (Normal) Collected:  08/12/20 0430    Specimen:  Swab from Nasopharynx Updated:  08/12/20 0552     ADENOVIRUS, PCR Not Detected     Coronavirus 229E Not Detected     Coronavirus HKU1 Not Detected     Coronavirus NL63 Not Detected     Coronavirus OC43 Not Detected     Human Metapneumovirus Not Detected     Human Rhinovirus/Enterovirus Not Detected     Influenza B PCR Not Detected     Parainfluenza  Virus 1 Not Detected     Parainfluenza Virus 2 Not Detected     Parainfluenza Virus 3 Not Detected     Parainfluenza Virus 4 Not Detected     Bordetella pertussis pcr Not Detected     Influenza A H1 2009 PCR Not Detected     Chlamydophila pneumoniae PCR Not Detected     Mycoplasma pneumo by PCR Not Detected     Influenza A PCR Not Detected     Influenza A H3 Not Detected     Influenza A H1 Not Detected     RSV, PCR Not Detected    Narrative:       The coronavirus on the RVP is NOT COVID-19 and is NOT indicative of infection with COVID-19.     Comprehensive Metabolic Panel [[id number]]  (Abnormal) Collected:  08/12/20 0420    Specimen:  Blood Updated:  08/12/20 0540     Glucose 118 mg/dL      BUN 7 mg/dL      Creatinine 0.60 mg/dL      Sodium 138 mmol/L      Potassium 3.2 mmol/L      Chloride 106 mmol/L      CO2 19.8 mmol/L      Calcium 7.9 mg/dL      Total Protein 5.8 g/dL      Albumin 2.77 g/dL      ALT (SGPT) 9 U/L      AST (SGOT) 15 U/L      Alkaline Phosphatase 62 U/L      Total Bilirubin 1.2 mg/dL      eGFR Non African Amer 111 mL/min/1.73      Globulin 3.0 gm/dL      A/G Ratio 0.9 g/dL      BUN/Creatinine Ratio 11.7     Anion Gap 12.2 mmol/L     Narrative:       GFR Normal >60  Chronic Kidney Disease <60  Kidney Failure <15      C-reactive Protein [[id number]]  (Abnormal) Collected:  08/12/20 0420    Specimen:  Blood Updated:  08/12/20 0540     C-Reactive Protein 24.89 mg/dL     Magnesium [[id number]]  (Abnormal) Collected:  08/12/20 0420    Specimen:  Blood Updated:  08/12/20 0540     Magnesium 1.5 mg/dL     S. Pneumo Ag Urine or CSF - Urine, Urine, Clean Catch [[id number]] Collected:  08/12/20 0529    Specimen:  Urine, Clean Catch Updated:  08/12/20 0536    Urine Drug Screen - Urine, Clean Catch [[id number]]  (Abnormal) Collected:  08/11/20 2304    Specimen:  Urine, Clean Catch Updated:  08/12/20 0534     Amphetamine Screen, Urine Positive     Barbiturates Screen, Urine Negative     Benzodiazepine Screen, Urine  Negative     Cocaine Screen, Urine Negative     Methadone Screen, Urine Negative     Opiate Screen Negative     Phencyclidine (PCP), Urine Negative     THC, Screen, Urine Negative     6-ACETYL MORPHINE Negative     Buprenorphine, Screen, Urine Negative     Oxycodone Screen, Urine Negative    Narrative:       Negative Thresholds For Drugs Screened:                  Amphetamines              1000 ng/ml               Barbiturates               200 ng/ml               Benzodiazepines            200 ng/ml              Cocaine                    300 ng/ml              Methadone                  300 ng/ml              Opiates                    300 ng/ml               Phencyclidine               25 ng/ml               THC                         50 ng/ml              6-Acetyl Morphine           10 ng/ml              Buprenorphine                5 ng/ml              Oxycodone                  300 ng/ml    The reference range for all drugs tested is negative. This report includes final unconfirmed qualitative results to be used for medical treatment purposes only. Unconfirmed results must not be used for non-medical purposes such as employment or legal testing. Clinical consideration should be applied to any drug of abuse test, especially when unconfirmed quantitative results are used.        CBC & Differential [[id number]] Collected:  08/12/20 0420    Specimen:  Blood Updated:  08/12/20 0523    Narrative:       The following orders were created for panel order CBC & Differential.  Procedure                               Abnormality         Status                     ---------                               -----------         ------                     CBC Auto Differential[[id number]]        Abnormal            Final result                 Please view results for these tests on the individual orders.    CBC Auto Differential [[id number]]  (Abnormal) Collected:  08/12/20 0420    Specimen:  Blood Updated:  08/12/20 0523     WBC  11.45 10*3/mm3      RBC 3.45 10*6/mm3      Hemoglobin 10.1 g/dL      Hematocrit 32.3 %      MCV 93.6 fL      MCH 29.3 pg      MCHC 31.3 g/dL      RDW 13.8 %      RDW-SD 47.1 fl      MPV 10.2 fL      Platelets 213 10*3/mm3      Neutrophil % 69.0 %      Lymphocyte % 22.9 %      Monocyte % 6.8 %      Eosinophil % 0.4 %      Basophil % 0.5 %      Immature Grans % 0.4 %      Neutrophils, Absolute 7.89 10*3/mm3      Lymphocytes, Absolute 2.62 10*3/mm3      Monocytes, Absolute 0.78 10*3/mm3      Eosinophils, Absolute 0.05 10*3/mm3      Basophils, Absolute 0.06 10*3/mm3      Immature Grans, Absolute 0.05 10*3/mm3      nRBC 0.0 /100 WBC     Blood Gas, Arterial With Co-Ox [[id number]]  (Abnormal) Collected:  08/12/20 0233    Specimen:  Arterial Blood Updated:  08/12/20 0242     Site Right Radial     Jose's Test Positive     pH, Arterial 7.456 pH units      Comment: 83 Value above reference range        pCO2, Arterial 30.7 mm Hg      pO2, Arterial 76.0 mm Hg      Comment: 84 Value below reference range        HCO3, Arterial 21.6 mmol/L      Base Excess, Arterial -1.6 mmol/L      O2 Saturation, Arterial 96.3 %      Hemoglobin, Blood Gas 10.5 g/dL      Comment: 84 Value below reference range        Hematocrit, Blood Gas 32.0 %      Comment: 84 Value below reference range        Oxyhemoglobin 93.9 %      Comment: 84 Value below reference range        Methemoglobin 0.30 %      Carboxyhemoglobin 2.2 %      A-a Gradiant 80.0 mmHg      CO2 Content 22.6 mmol/L      Temperature 0.0 C      Barometric Pressure for Blood Gas 727 mmHg      Modality Nasal Cannula     FIO2 28 %      Ventilator Mode NA     Note --     Collected by [id number]     Comment: Meter: [id number]     :  [id number]        pH, Temp Corrected --     pCO2, Temperature Corrected --     pO2, Temperature Corrected --    Ferritin [[id number]]  (Abnormal) Collected:  08/11/20 2254    Specimen:  Blood Updated:  08/11/20 2343     Ferritin 176.50 ng/mL     Narrative:        Results may be falsely decreased if patient taking Biotin.      Lactate Dehydrogenase [[id number]]  (Abnormal) Collected:  08/11/20 2254    Specimen:  Blood Updated:  08/11/20 2336      U/L     C-reactive Protein [[id number]]  (Abnormal) Collected:  08/11/20 2254    Specimen:  Blood Updated:  08/11/20 2336     C-Reactive Protein 21.91 mg/dL     Blood Culture - Blood, Hand, Left [[id number]] Collected:  08/11/20 2304    Specimen:  Blood from Hand, Left Updated:  08/11/20 2334    Lactic Acid, Plasma [[id number]]  (Normal) Collected:  08/11/20 2254    Specimen:  Blood Updated:  08/11/20 2331     Lactate 1.0 mmol/L     Urinalysis, Microscopic Only - Urine, Clean Catch [[id number]]  (Abnormal) Collected:  08/11/20 2304    Specimen:  Urine, Clean Catch Updated:  08/11/20 2324     RBC, UA 0-2 /HPF      WBC, UA 6-12 /HPF      Bacteria, UA 1+ /HPF      Squamous Epithelial Cells, UA 7-12 /HPF      Hyaline Casts, UA None Seen /LPF      Methodology Automated Microscopy    Urinalysis With Culture If Indicated - Urine, Clean Catch [[id number]]  (Abnormal) Collected:  08/11/20 2304    Specimen:  Urine, Clean Catch Updated:  08/11/20 2324     Color, UA Yellow     Appearance, UA Clear     pH, UA 6.5     Specific Gravity, UA 1.008     Glucose, UA Negative     Ketones, UA 15 mg/dL (1+)     Bilirubin, UA Negative     Blood, UA Negative     Protein, UA Negative     Leuk Esterase, UA Trace     Nitrite, UA Negative     Urobilinogen, UA 1.0 E.U./dL    Urine Culture - Urine, Urine, Clean Catch [[id number]] Collected:  08/11/20 2304    Specimen:  Urine, Clean Catch Updated:  08/11/20 2324    Blood Culture - Blood, Hand, Right [[id number]] Collected:  08/11/20 2240    Specimen:  Blood from Hand, Right Updated:  08/11/20 2312    COVID-19, [facility] IN-HOUSE,NP Swab (NO TRANSPORT MEDIA) 2 HR TAT - Swab, Nasopharynx [[id number]]  (Normal) Collected:  08/11/20 2133    Specimen:  Swab from Nasopharynx Updated:  08/11/20 2203     COVID19 Not Detected     Narrative:       Fact sheet for providers: https://www.fda.gov/media/249030/download     Fact sheet for patients: https://www.fda.gov/media/777276/download    Blood Gas, Arterial With Co-Ox [[id number]]  (Abnormal) Collected:  08/11/20 2202    Specimen:  Arterial Blood Updated:  08/11/20 2207     Site Right Radial     Jose's Test Positive     pH, Arterial 7.499 pH units      Comment: 83 Value above reference range        pCO2, Arterial 28.0 mm Hg      Comment: 84 Value below reference range        pO2, Arterial 54.0 mm Hg      Comment: 85 Value below critical limit        HCO3, Arterial 21.8 mmol/L      Base Excess, Arterial -0.5 mmol/L      O2 Saturation, Arterial 91.0 %      Comment: 84 Value below reference range        Hemoglobin, Blood Gas 11.7 g/dL      Comment: 84 Value below reference range        Hematocrit, Blood Gas 35.8 %      Comment: 84 Value below reference range        Oxyhemoglobin 88.6 %      Comment: 84 Value below reference range        Methemoglobin 0.20 %      Carboxyhemoglobin 2.4 %      A-a Gradiant 57.1 mmHg      CO2 Content 22.6 mmol/L      Temperature 0.0 C      Barometric Pressure for Blood Gas 727 mmHg      Modality Room Air     FIO2 21 %      Ventilator Mode NA     Note --     Notified Who DR CAMARGO AND RN     Notified By [id number]     Notified Time 08/11/2020 22:09     Collected by [id number]     Comment: Meter: [id number]     :  [id number]        pH, Temp Corrected --     pCO2, Temperature Corrected --     pO2, Temperature Corrected --    Comprehensive Metabolic Panel [[id number]]  (Abnormal) Collected:  08/11/20 2129    Specimen:  Blood Updated:  08/11/20 2201     Glucose 133 mg/dL      BUN 8 mg/dL      Creatinine 0.64 mg/dL      Sodium 135 mmol/L      Potassium 3.2 mmol/L      Chloride 101 mmol/L      CO2 19.8 mmol/L      Calcium 8.5 mg/dL      Total Protein 6.2 g/dL      Albumin 3.23 g/dL      ALT (SGPT) 11 U/L      AST (SGOT) 16 U/L      Alkaline Phosphatase 67 U/L       Total Bilirubin 1.3 mg/dL      eGFR Non African Amer 103 mL/min/1.73      Globulin 3.0 gm/dL      A/G Ratio 1.1 g/dL      BUN/Creatinine Ratio 12.5     Anion Gap 14.2 mmol/L     Narrative:       GFR Normal >60  Chronic Kidney Disease <60  Kidney Failure <15      CBC & Differential [[id number]] Collected:  08/11/20 2129    Specimen:  Blood Updated:  08/11/20 2141    Narrative:       The following orders were created for panel order CBC & Differential.  Procedure                               Abnormality         Status                     ---------                               -----------         ------                     CBC Auto Differential[[id number]]        Abnormal            Final result                 Please view results for these tests on the individual orders.    CBC Auto Differential [[id number]]  (Abnormal) Collected:  08/11/20 2129    Specimen:  Blood Updated:  08/11/20 2141     WBC 16.42 10*3/mm3      RBC 3.60 10*6/mm3      Hemoglobin 10.9 g/dL      Hematocrit 32.2 %      MCV 89.4 fL      MCH 30.3 pg      MCHC 33.9 g/dL      RDW 13.5 %      RDW-SD 44.4 fl      MPV 10.3 fL      Platelets 233 10*3/mm3      Neutrophil % 80.5 %      Lymphocyte % 13.3 %      Monocyte % 5.2 %      Eosinophil % 0.1 %      Basophil % 0.5 %      Immature Grans % 0.4 %      Neutrophils, Absolute 13.23 10*3/mm3      Lymphocytes, Absolute 2.18 10*3/mm3      Monocytes, Absolute 0.85 10*3/mm3      Eosinophils, Absolute 0.02 10*3/mm3      Basophils, Absolute 0.08 10*3/mm3      Immature Grans, Absolute 0.06 10*3/mm3      nRBC 0.0 /100 WBC           Imaging Results (All)     Procedure Component Value Units Date/Time    CT Chest Without Contrast [[id number]] Collected:  08/12/20 0130     Updated:  08/12/20 0133    Narrative:       CT Chest WO    INDICATION:   Shortness of air. Pneumonia. Abnormal chest x-ray. Fever. Negative COVID 19 test 8/11/2020.    TECHNIQUE:   CT of the thorax without IV contrast. Coronal and sagittal reconstructions  were obtained.  Radiation dose reduction techniques included automated exposure control or exposure modulation based on body size. Count of known CT and cardiac nuc med studies  performed in previous 12 months: 0.     COMPARISON:   Chest x-ray 8/11/2020    FINDINGS:  There are small bilateral pleural effusions. No pericardial effusion is identified. Right paratracheal adenopathy measures 1.3 cm. Prominent AP window node measures 9 mm short axis. A few other small mediastinal nodes are present. The yudi are poorly  evaluated without contrast. Images through the upper abdomen show changes of cholecystectomy. There are multifocal groundglass and alveolar infiltrates with some septal thickening. Air bronchograms are present. Findings likely reflect bilateral diffuse  pneumonia rather than pulmonary edema.      Impression:         1. Diffuse bilateral pneumonia with small bilateral pleural effusions.  2. Mild mediastinal adenopathy, probably reactive. Consider follow-up chest CT in 3 months.    Signer Name: Khadar Quintero MD   Signed: 8/12/2020 1:30 AM   Workstation Name: [facility]    XR Chest 1 View [[id number]] Collected:  08/11/20 2203     Updated:  08/11/20 2205    Narrative:       CR Chest 1 Vw    INDICATION:   Fever and cough with shortness of air.     COMPARISON:    10/13/2012    FINDINGS:  Single portable AP view(s) of the chest.  Cardiac and mediastinal contours are normal. There are patchy bilateral fairly diffuse infiltrates with a basilar predominance. Findings likely reflect pneumonia. No pneumothorax is seen.      Impression:       Bilateral infiltrates most compatible with pneumonia.    Signer Name: Khadar Quintero MD   Signed: 8/11/2020 10:03 PM   Workstation Name: [facility]          ECG/EMG Results (all)     Procedure Component Value Units Date/Time    ECG 12 Lead [[id number]] Collected:  08/11/20 2349     Updated:  08/11/20  2349          Orders (all)      Start     Ordered    08/12/20 0400  Vital Signs  Every 4 Hours     Comments:  Per per hospital policy    08/12/20 0338    08/12/20 0339  Intake & Output  Every Shift      08/12/20 0338    08/12/20 0339  Weigh patient  Once      08/12/20 0338    08/12/20 0339  Pulse Oximetry on Admit  Once      08/12/20 0338    08/12/20 0339  Tobacco Cessation Education  Once      08/12/20 0338    08/12/20 0339  Notify Provider if Patient Requires Greater Than 4LPM of Oxygen  Until Discontinued      08/12/20 0338    08/12/20 0339  Notify Physician (with Parameters)  Until Discontinued      08/12/20 0338    08/12/20 0339  Insert Peripheral IV  Once      08/12/20 0338    08/12/20 0339  Saline Lock & Maintain IV Access  Continuous      08/12/20 0338    08/12/20 0339  Pneumonia Finding Seen on CXR  Once      08/12/20 0338    08/12/20 0339  Pulse Oximetry, Continuous  Continuous      08/12/20 0338    08/12/20 0339  Activity - Ad Roberta  Until Discontinued      08/12/20 0338    08/12/20 0255  Code Status and Medical Interventions:  Continuous      08/12/20 0306    08/12/20 0255  Inpatient Admission  Once      08/12/20 0306    08/11/20 2217  Oxygen Therapy- Nasal Cannula; 2 LPM; Titrate for SPO2: equal to or greater than, 92%  Continuous      08/11/20 2216    08/11/20 2208  Blood Gas, Arterial With Co-Ox  Once      08/11/20 2202

## 2020-08-12 NOTE — PROGRESS NOTES
H&P reviewed.   Pt was seen with nursing staff. Pt is complaining of cough and sputum production. She states that her breathing is improved. Pt still continues to complain of shortness of breath.   Labs reviewed.   Continue rocephin and doxycycline. Continue duonebs, pulmicort and will add systemic steroids. Continue to monitor CBC, BMP and CRP.

## 2020-08-12 NOTE — PROGRESS NOTES
Pharmacy was consulted to  patient on smoking cessation. Patient seemed very motivated to quit and we discussed setting a quit date. The patient said the patch was somewhat helpful in reducing her cravings, but reported nicotine gum had helped her in the past. Dr. Hou said that it would be fine to order the gum for Ms. Al. I also talked to her about resources available for smoking cessation and gave her a couple booklets to help her quit.     Thank you,    Elena Cade, PharmD  08/12/20  16:12

## 2020-08-13 LAB
ANION GAP SERPL CALCULATED.3IONS-SCNC: 13.8 MMOL/L (ref 5–15)
BASOPHILS # BLD AUTO: 0.02 10*3/MM3 (ref 0–0.2)
BASOPHILS NFR BLD AUTO: 0.2 % (ref 0–1.5)
BUN SERPL-MCNC: 6 MG/DL (ref 6–20)
BUN/CREAT SERPL: 10.3 (ref 7–25)
CALCIUM SPEC-SCNC: 8.2 MG/DL (ref 8.6–10.5)
CHLORIDE SERPL-SCNC: 107 MMOL/L (ref 98–107)
CO2 SERPL-SCNC: 18.2 MMOL/L (ref 22–29)
CREAT SERPL-MCNC: 0.58 MG/DL (ref 0.57–1)
CRP SERPL-MCNC: 30.73 MG/DL (ref 0–0.5)
DEPRECATED RDW RBC AUTO: 47.3 FL (ref 37–54)
EOSINOPHIL # BLD AUTO: 0 10*3/MM3 (ref 0–0.4)
EOSINOPHIL NFR BLD AUTO: 0 % (ref 0.3–6.2)
ERYTHROCYTE [DISTWIDTH] IN BLOOD BY AUTOMATED COUNT: 13.7 % (ref 12.3–15.4)
GFR SERPL CREATININE-BSD FRML MDRD: 116 ML/MIN/1.73
GLUCOSE SERPL-MCNC: 219 MG/DL (ref 65–99)
HCT VFR BLD AUTO: 29.5 % (ref 34–46.6)
HGB BLD-MCNC: 9.5 G/DL (ref 12–15.9)
HYPOCHROMIA BLD QL: NORMAL
IMM GRANULOCYTES # BLD AUTO: 0.07 10*3/MM3 (ref 0–0.05)
IMM GRANULOCYTES NFR BLD AUTO: 0.6 % (ref 0–0.5)
LARGE PLATELETS: NORMAL
LYMPHOCYTES # BLD AUTO: 0.51 10*3/MM3 (ref 0.7–3.1)
LYMPHOCYTES NFR BLD AUTO: 4.6 % (ref 19.6–45.3)
MCH RBC QN AUTO: 30 PG (ref 26.6–33)
MCHC RBC AUTO-ENTMCNC: 32.2 G/DL (ref 31.5–35.7)
MCV RBC AUTO: 93.1 FL (ref 79–97)
MONOCYTES # BLD AUTO: 0.2 10*3/MM3 (ref 0.1–0.9)
MONOCYTES NFR BLD AUTO: 1.8 % (ref 5–12)
NEUTROPHILS NFR BLD AUTO: 10.18 10*3/MM3 (ref 1.7–7)
NEUTROPHILS NFR BLD AUTO: 92.8 % (ref 42.7–76)
NRBC BLD AUTO-RTO: 0 /100 WBC (ref 0–0.2)
PLATELET # BLD AUTO: 179 10*3/MM3 (ref 140–450)
PMV BLD AUTO: 10.9 FL (ref 6–12)
POTASSIUM SERPL-SCNC: 3.7 MMOL/L (ref 3.5–5.2)
RBC # BLD AUTO: 3.17 10*6/MM3 (ref 3.77–5.28)
SODIUM SERPL-SCNC: 139 MMOL/L (ref 136–145)
WBC # BLD AUTO: 10.98 10*3/MM3 (ref 3.4–10.8)

## 2020-08-13 PROCEDURE — 25010000002 HEPARIN (PORCINE) PER 1000 UNITS: Performed by: INTERNAL MEDICINE

## 2020-08-13 PROCEDURE — 94799 UNLISTED PULMONARY SVC/PX: CPT

## 2020-08-13 PROCEDURE — 85025 COMPLETE CBC W/AUTO DIFF WBC: CPT | Performed by: INTERNAL MEDICINE

## 2020-08-13 PROCEDURE — 86612 BLASTOMYCES ANTIBODY: CPT | Performed by: NURSE PRACTITIONER

## 2020-08-13 PROCEDURE — 25010000002 METHYLPREDNISOLONE PER 40 MG: Performed by: INTERNAL MEDICINE

## 2020-08-13 PROCEDURE — 85007 BL SMEAR W/DIFF WBC COUNT: CPT | Performed by: INTERNAL MEDICINE

## 2020-08-13 PROCEDURE — 99233 SBSQ HOSP IP/OBS HIGH 50: CPT | Performed by: INTERNAL MEDICINE

## 2020-08-13 PROCEDURE — 25010000002 MEROPENEM: Performed by: NURSE PRACTITIONER

## 2020-08-13 PROCEDURE — 25010000002 CEFTRIAXONE PER 250 MG: Performed by: INTERNAL MEDICINE

## 2020-08-13 PROCEDURE — 86606 ASPERGILLUS ANTIBODY: CPT | Performed by: NURSE PRACTITIONER

## 2020-08-13 PROCEDURE — 80048 BASIC METABOLIC PNL TOTAL CA: CPT | Performed by: INTERNAL MEDICINE

## 2020-08-13 PROCEDURE — 86140 C-REACTIVE PROTEIN: CPT | Performed by: INTERNAL MEDICINE

## 2020-08-13 PROCEDURE — 86698 HISTOPLASMA ANTIBODY: CPT | Performed by: NURSE PRACTITIONER

## 2020-08-13 RX ORDER — MAGNESIUM SULFATE HEPTAHYDRATE 40 MG/ML
4 INJECTION, SOLUTION INTRAVENOUS AS NEEDED
Status: DISCONTINUED | OUTPATIENT
Start: 2020-08-13 | End: 2020-08-18 | Stop reason: HOSPADM

## 2020-08-13 RX ORDER — MAGNESIUM SULFATE HEPTAHYDRATE 40 MG/ML
2 INJECTION, SOLUTION INTRAVENOUS AS NEEDED
Status: DISCONTINUED | OUTPATIENT
Start: 2020-08-13 | End: 2020-08-18 | Stop reason: HOSPADM

## 2020-08-13 RX ADMIN — ACETAMINOPHEN 650 MG: 325 TABLET ORAL at 23:31

## 2020-08-13 RX ADMIN — HEPARIN SODIUM 5000 UNITS: 5000 INJECTION INTRAVENOUS; SUBCUTANEOUS at 09:42

## 2020-08-13 RX ADMIN — IPRATROPIUM BROMIDE AND ALBUTEROL SULFATE 3 ML: .5; 3 SOLUTION RESPIRATORY (INHALATION) at 14:04

## 2020-08-13 RX ADMIN — TIZANIDINE 4 MG: 4 TABLET ORAL at 23:31

## 2020-08-13 RX ADMIN — SODIUM CHLORIDE 100 ML/HR: 9 INJECTION, SOLUTION INTRAVENOUS at 06:11

## 2020-08-13 RX ADMIN — PANTOPRAZOLE SODIUM 40 MG: 40 TABLET, DELAYED RELEASE ORAL at 06:11

## 2020-08-13 RX ADMIN — MEROPENEM 1 G: 1 INJECTION, POWDER, FOR SOLUTION INTRAVENOUS at 16:16

## 2020-08-13 RX ADMIN — QUETIAPINE FUMARATE 100 MG: 100 TABLET ORAL at 20:40

## 2020-08-13 RX ADMIN — SODIUM CHLORIDE, PRESERVATIVE FREE 10 ML: 5 INJECTION INTRAVENOUS at 20:40

## 2020-08-13 RX ADMIN — METHYLPREDNISOLONE SODIUM SUCCINATE 40 MG: 40 INJECTION, POWDER, FOR SOLUTION INTRAMUSCULAR; INTRAVENOUS at 09:42

## 2020-08-13 RX ADMIN — CEFTRIAXONE 2 G: 2 INJECTION, POWDER, FOR SOLUTION INTRAMUSCULAR; INTRAVENOUS at 04:51

## 2020-08-13 RX ADMIN — FLUOXETINE HYDROCHLORIDE 20 MG: 20 CAPSULE ORAL at 09:43

## 2020-08-13 RX ADMIN — IPRATROPIUM BROMIDE AND ALBUTEROL SULFATE 3 ML: .5; 3 SOLUTION RESPIRATORY (INHALATION) at 08:01

## 2020-08-13 RX ADMIN — BUDESONIDE AND FORMOTEROL FUMARATE DIHYDRATE 2 PUFF: 80; 4.5 AEROSOL RESPIRATORY (INHALATION) at 08:01

## 2020-08-13 RX ADMIN — SODIUM CHLORIDE, PRESERVATIVE FREE 10 ML: 5 INJECTION INTRAVENOUS at 09:43

## 2020-08-13 RX ADMIN — GUAIFENESIN AND DEXTROMETHORPHAN 10 ML: 100; 10 SYRUP ORAL at 18:12

## 2020-08-13 RX ADMIN — CETIRIZINE HYDROCHLORIDE 10 MG: 10 TABLET, FILM COATED ORAL at 09:43

## 2020-08-13 RX ADMIN — ACETAMINOPHEN 650 MG: 325 TABLET ORAL at 16:20

## 2020-08-13 RX ADMIN — METHYLPREDNISOLONE SODIUM SUCCINATE 40 MG: 40 INJECTION, POWDER, FOR SOLUTION INTRAMUSCULAR; INTRAVENOUS at 20:40

## 2020-08-13 RX ADMIN — HEPARIN SODIUM 5000 UNITS: 5000 INJECTION INTRAVENOUS; SUBCUTANEOUS at 20:40

## 2020-08-13 RX ADMIN — SODIUM CHLORIDE 100 ML/HR: 9 INJECTION, SOLUTION INTRAVENOUS at 16:16

## 2020-08-13 RX ADMIN — MEROPENEM 1 G: 1 INJECTION, POWDER, FOR SOLUTION INTRAVENOUS at 20:39

## 2020-08-13 RX ADMIN — FLUTICASONE PROPIONATE 2 SPRAY: 50 SPRAY, METERED NASAL at 09:43

## 2020-08-13 RX ADMIN — DOXYCYCLINE 100 MG: 100 INJECTION, POWDER, LYOPHILIZED, FOR SOLUTION INTRAVENOUS at 18:12

## 2020-08-13 RX ADMIN — DOXYCYCLINE 100 MG: 100 INJECTION, POWDER, LYOPHILIZED, FOR SOLUTION INTRAVENOUS at 06:11

## 2020-08-13 RX ADMIN — NICOTINE 1 PATCH: 21 PATCH TRANSDERMAL at 09:42

## 2020-08-13 NOTE — PLAN OF CARE
Patient has been afebrile today and states she is feeling a little bit better.  Respirations have been around 22 on average and states she doesn't feel as short of breath.  Continuing fluids and antibiotics at this time. Vitals are stable and will continue to monitor.

## 2020-08-13 NOTE — PLAN OF CARE
Problem: Patient Care Overview  Goal: Plan of Care Review  Outcome: Ongoing (interventions implemented as appropriate)  Flowsheets  Taken 8/12/2020 1634 by Vish Hernandez, RN  Progress: no change  Taken 8/13/2020 0240 by Ele Bruno, RN  Plan of Care Reviewed With: patient  Note:   VSS. Afebrile. NSR to ST. She is tachypneic with use of accessory muscles. Patient is on 2L NC. Patient has rested most of shift with no complaints. Continuing IV fluids and antibiotic therapy. Will continue to monitor.

## 2020-08-13 NOTE — PROGRESS NOTES
[facility] HOSPITALIST PROGRESS NOTE     Patient Identification:  Name:  Manda Al  Age:  40 y.o.  Sex:  female  :  1980  MRN:  [id number]  Visit Number:  [id number]  Primary Care Provider:  Adali Sharpe APRN    Length of stay:  1    Chief complaint:  40 y.o. old female admitted with Cough; Fever; and Shortness of Breath          Subjective:    Patient seen with nursing staff.  No family present at bedside.  Patient is lying comfortably in bed and said that she is feeling better than yesterday.  Patient still continues to have productive cough and shortness of breath.  She however states that her dyspnea is improved since yesterday.  Patient denies any chest pain or palpitations.           Current Hospital Meds:    budesonide-formoterol 2 puff Inhalation BID - RT   cefTRIAXone 2 g Intravenous Q24H   cetirizine 10 mg Oral Daily   doxycycline 100 mg Intravenous Q12H   FLUoxetine 20 mg Oral Daily   fluticasone 2 spray Nasal Daily   heparin (porcine) 5,000 Units Subcutaneous Q12H   ipratropium-albuterol 3 mL Nebulization Q4H - RT   methylPREDNISolone sodium succinate 40 mg Intravenous Q12H   nicotine 1 patch Transdermal Q24H   pantoprazole 40 mg Oral QAM   QUEtiapine 100 mg Oral Nightly   sodium chloride 10 mL Intravenous Q12H       sodium chloride 100 mL/hr Last Rate: 100 mL/hr (20 0611)     ----------------------------------------------------------------------------------------------------------------------  Vital Signs:  Temp:  [98.2 °F (36.8 °C)-99.3 °F (37.4 °C)] 98.2 °F (36.8 °C)  Heart Rate:  [] 97  Resp:  [19-34] 19  BP: ()/(42-83) 100/60      20  0400 20  0508 20  0400   Weight: 106 kg (232 lb 14.4 oz) 106 kg (233 lb 11 oz) 107 kg (234 lb 14.4 oz)     Body mass index is 35.72 kg/m².    Intake/Output Summary (Last 24 hours) at 2020 1014  Last data filed at 2020 0809  Gross per 24 hour   Intake 2388.06 ml   Output 3000 ml   Net  -611.94 ml     Diet Regular  ----------------------------------------------------------------------------------------------------------------------  Physical exam:  Constitutional:  Well-developed and well-nourished.     HENT:  Head:  Normocephalic and atraumatic.  Mouth:  Moist mucous membranes.    Eyes:  Conjunctivae and EOM are normal.  Pupils are equal, round, and reactive to light.   Neck:  Neck supple.  No JVD present.    Cardiovascular:  Regular rate and rhythm. S1+S2. No murmur, rubs or gallops.   Pulmonary/Chest: Inspiratory crackles bilateral bases.  Abdominal:  Soft. Non-tender. No viscera palpable.  Bowel sounds audible.   Musculoskeletal: No deformity or joint swelling.   Peripheral vascular: Bilateral dorsalis pedis palpable. No edema.   Neurological:  Alert and oriented to person, place, and time.  Cranial nerves grossly intact. Strength bilaterally symmetrical in upper and lower extremities.   Skin:  Skin is warm and dry. No rash noted. No pallor.   ----------------------------------------------------------------------------------------------------------------------  Tele:    ----------------------------------------------------------------------------------------------------------------------      Results from last 7 days   Lab Units 08/13/20  0119 08/12/20 0420 08/11/20 2254 08/11/20 2129   CRP mg/dL 30.73* 24.89* 21.91*  --    LACTATE mmol/L  --   --  1.0  --    WBC 10*3/mm3 10.98* 11.45*  --  16.42*   HEMOGLOBIN g/dL 9.5* 10.1*  --  10.9*   HEMATOCRIT % 29.5* 32.3*  --  32.2*   MCV fL 93.1 93.6  --  89.4   MCHC g/dL 32.2 31.3*  --  33.9   PLATELETS 10*3/mm3 179 213  --  233     Results from last 7 days   Lab Units 08/12/20  0233   PH, ARTERIAL pH units 7.456*   PO2 ART mm Hg 76.0*   PCO2, ARTERIAL mm Hg 30.7*   HCO3 ART mmol/L 21.6     Results from last 7 days   Lab Units 08/13/20  0119 08/12/20  1532 08/12/20 0420 08/11/20 2129   SODIUM mmol/L 139  --  138 135*   POTASSIUM mmol/L 3.7 3.7  3.2* 3.2*   MAGNESIUM mg/dL  --   --  1.5*  --    CHLORIDE mmol/L 107  --  106 101   CO2 mmol/L 18.2*  --  19.8* 19.8*   BUN mg/dL 6  --  7 8   CREATININE mg/dL 0.58  --  0.60 0.64   EGFR IF NONAFRICN AM mL/min/1.73 116  --  111 103   CALCIUM mg/dL 8.2*  --  7.9* 8.5*   GLUCOSE mg/dL 219*  --  118* 133*   ALBUMIN g/dL  --   --  2.77* 3.23*   BILIRUBIN mg/dL  --   --  1.2 1.3*   ALK PHOS U/L  --   --  62 67   AST (SGOT) U/L  --   --  15 16   ALT (SGPT) U/L  --   --  9 11   Estimated Creatinine Clearance: 166.7 mL/min (by C-G formula based on SCr of 0.58 mg/dL).    No results found for: AMMONIA      Blood Culture   Date Value Ref Range Status   08/11/2020 No growth at 24 hours  Preliminary   08/11/2020 No growth at 24 hours  Preliminary     Urine Culture   Date Value Ref Range Status   08/11/2020 No growth  Final     No results found for: WOUNDCX  No results found for: STOOLCX    I have personally looked at the labs and they are summarized above.  ----------------------------------------------------------------------------------------------------------------------  Imaging Results (Last 24 Hours)     ** No results found for the last 24 hours. **        ----------------------------------------------------------------------------------------------------------------------  Assessment and Plan:    -Severe sepsis due to pneumonia   Patient has been afebrile last 24 hours.  Tachycardia is improved.  Patient continues to have low blood pressure.  CRP is worsened and is up to 30 today from 24 yesterday.  Lactic acid was within normal limits.  WBC count remains unchanged since yesterday.  Blood cultures remain negative.  Currently on Rocephin and doxycycline will continue.  Will consult infectious diseases due to worsening CRP.    -Acute hypoxemic respiratory failure d/t PNA and COPD  Continue supplemental oxygen via nasal cannula as needed to keep SPO2 >90%.    -Bilateral pneumonia  CT scan of the chest showed diffuse  bilateral pneumonia with small bilateral effusions and mild mediastinal adenopathy.  Respiratory culture has not shown any growth so far.  Legionella antigen, Streptococcus pneumonia antigen and respiratory panel is negative.  Continue antibiotics as outlined above.  Pulmonology not available.  Patient may need bronchoscopy.     -Acute COPD exacerbation  Continue duo nebs, Symbicort and Solu-Medrol.  Continue antibiotics as outlined above.    -Mediastinal adenopathy  Likely reactive due to bilateral pneumonia.  Patient will need repeat imaging to document clearance.    -Essential hypertension  Blood pressure has been on the low side.  Continue to hold antihypertensives.    -Crohn's disease  Patient is not on any maintenance medications.  She denies any abdominal pain or diarrhea.    -Tobacco abuse/dependence  I discussed with the patient the dangers of tobacco use and advised her to quit smoking.    Activity: Up with assist  Nutrition: Regular diet  Fluids: NS at 100 mL/h  DVT prophylaxis: Heparin Subcu  GI prophylaxis: PPI    The patient is high risk due to: Respiratory failure, sepsis, pneumonia, COPD exacerbation    I discussed the patient's findings and my recommendations with patient and nursing staff.    Lydia Hou MD  08/13/20  10:14

## 2020-08-13 NOTE — CONSULTS
INFECTIOUS DISEASE CONSULTATION REPORT        Patient Identification:  Name:  Manda Al  Age:  40 y.o.  Sex:  female  :  1980  MRN:  [id number]   Visit Number:  [id number]  Primary Care Physician:  Adali Sharpe APRN       LOS: 1 day        Subjective       Subjective     History of present illness:      Thank you Dr. Hou for allowing us to participate in the care of your patient.  As you well know, Ms. Manda Al is a 40 y.o. female with past medical history significant for anxiety, asthma, Crohn's disease and tobacco abuse, who presented to [facility] Emergency Department on 2020 for shortness of breath, cough, and fever.  Patient denies any recent sick contacts or exposure.  T-max noted of 99.6.  WBC 10.98, improved from 16 on admission.  CRP significantly elevated at 30.73-worse, on admission noted to be 21.  Ferritin 176.5.  .  Respiratory panel PCR negative.  COVID-19 PCR negative.  Urinalysis on 2020 revealed 6-12 WBCs, trace leukocytes, negative nitrite, +1 bacteria.  Urine culture revealed no growth.  Urine drug screen positive for amphetamines.  CT scan on 2020 revealed diffuse bilateral pneumonia with bilateral mild pleural effusions.  Mediastinal adenopathy, probably reactive-recommend follow-up in 3 months, per radiology.      Infectious Disease consultation was requested for antimicrobial management.    ---------------------------------------------------------------------------------------------------------------------     Review Of Systems:    Constitutional: Reported fever at home.  Reports fatigue.    Eyes: no eye drainage, itching or redness.  HEENT: no mouth sores, dysphagia or nose bleed.  Respiratory: Reports shortness of breath and cough.  Cardiovascular: no chest pain, no palpitations, no orthopnea.  Gastrointestinal: no nausea, vomiting or diarrhea. No abdominal pain, hematemesis or rectal bleeding.  Genitourinary: no  dysuria or polyuria.  Hematologic/lymphatic: no lymph node abnormalities, no easy bruising or easy bleeding.  Musculoskeletal: no muscle or joint pain.  Skin: No rash and no itching.  Neurological: no loss of consciousness, no seizure, no headache.  Behavioral/Psych: no depression or suicidal ideation.  Endocrine: no hot flashes.  Immunologic: negative.    ---------------------------------------------------------------------------------------------------------------------     Past Medical History    Past Medical History:   Diagnosis Date   • Anxiety    • Asthma    • Bipolar disorder (CMS/HCC)    • Crohn disease (CMS/[facility])    • Depression    • Hypertension    • Schizoaffective disorder (CMS/HCC)        Past Surgical History    Past Surgical History:   Procedure Laterality Date   • APPENDECTOMY     •  SECTION     • CHOLECYSTECTOMY     • COLON SURGERY     • COLONOSCOPY     • MANDIBLE FRACTURE SURGERY     • OVARIAN CYST SURGERY     • TUBAL ABDOMINAL LIGATION         Family History    Family History   Problem Relation Age of Onset   • Diabetes Mother    • Anxiety disorder Mother    • Depression Mother    • Bipolar disorder Mother    • COPD Father    • Schizophrenia Father    • Schizophrenia Paternal Grandfather    • Breast cancer Neg Hx        Social History    Social History     Tobacco Use   • Smoking status: Current Every Day Smoker     Packs/day: 1.00     Years: 20.00     Pack years: 20.00     Types: Cigarettes   • Smokeless tobacco: Never Used   Substance Use Topics   • Alcohol use: No     Comment: denies   • Drug use: Yes     Types: Marijuana       Allergies    Imuran [azathioprine]  ---------------------------------------------------------------------------------------------------------------------     Home Medications:    Prior to Admission Medications     Prescriptions Last Dose Informant Patient Reported? Taking?    fexofenadine (ALLEGRA) 60 MG tablet 2020 Self Yes Yes    Take 60 mg by mouth Daily.      FLUoxetine (PROzac) 20 MG capsule 8/11/2020 Self No Yes    Take 1 capsule by mouth Daily.    fluticasone (FLONASE) 50 MCG/ACT nasal spray Past Week Self Yes Yes    2 sprays into the nostril(s) as directed by provider Daily.    lisinopril-hydrochlorothiazide (PRINZIDE,ZESTORETIC) 10-12.5 MG per tablet 8/11/2020 Self Yes Yes    Take 1 tablet by mouth Daily.    omeprazole (priLOSEC) 20 MG capsule Past Week Self Yes Yes    Take 20 mg by mouth Daily As Needed (acid reflux). Before biggest meal of the day     QUEtiapine (SEROquel) 100 MG tablet 8/10/2020 Self No Yes    Take 1 tablet by mouth Every Night.    SYMBICORT 80-4.5 MCG/ACT inhaler 8/11/2020 Self Yes Yes    Inhale 2 puffs 2 (Two) Times a Day As Needed (sob).    tiZANidine (ZANAFLEX) 4 MG tablet Past Month Self Yes Yes    Take 4 mg by mouth Every 8 (Eight) Hours As Needed for Muscle Spasms.    VENTOLIN  (90 Base) MCG/ACT inhaler 8/11/2020 Self Yes Yes    Inhale 2 puffs Every 4 (Four) Hours As Needed for Wheezing or Shortness of Air.        ---------------------------------------------------------------------------------------------------------------------    Objective       Objective     Hospital Scheduled Meds:    budesonide-formoterol 2 puff Inhalation BID - RT   cefTRIAXone 2 g Intravenous Q24H   cetirizine 10 mg Oral Daily   doxycycline 100 mg Intravenous Q12H   FLUoxetine 20 mg Oral Daily   fluticasone 2 spray Nasal Daily   heparin (porcine) 5,000 Units Subcutaneous Q12H   ipratropium-albuterol 3 mL Nebulization Q4H - RT   methylPREDNISolone sodium succinate 40 mg Intravenous Q12H   nicotine 1 patch Transdermal Q24H   pantoprazole 40 mg Oral QAM   QUEtiapine 100 mg Oral Nightly   sodium chloride 10 mL Intravenous Q12H       sodium chloride 100 mL/hr Last Rate: 100 mL/hr (08/13/20 0611)     ---------------------------------------------------------------------------------------------------------------------   Vital Signs:  Temp:  [98 °F (36.7 °C)-99.3  °F (37.4 °C)] 98 °F (36.7 °C)  Heart Rate:  [] 92  Resp:  [19-34] 22  BP: ()/(42-83) 103/65  Mean Arterial Pressure (Non-Invasive) for the past 24 hrs (Last 3 readings):   Noninvasive MAP (mmHg)   08/13/20 1205 82   08/13/20 1104 70   08/13/20 1004 73     SpO2 Percentage    08/13/20 1004 08/13/20 1104 08/13/20 1205   SpO2: 96% 96% 96%     SpO2:  [92 %-100 %] 96 %  on  Flow (L/min):  [2] 2;   Device (Oxygen Therapy): nasal cannula    Body mass index is 35.72 kg/m².  Wt Readings from Last 3 Encounters:   08/13/20 107 kg (234 lb 14.4 oz)   09/18/18 95.3 kg (210 lb)   02/21/18 105 kg (232 lb)     ---------------------------------------------------------------------------------------------------------------------     Physical Exam:    Constitutional:  Well-developed and well-nourished.  No respiratory distress.      HENT:  Head: Normocephalic and atraumatic.  Mouth:  Moist mucous membranes.    Eyes:  Conjunctivae and EOM are normal.  No scleral icterus.  Neck:  Neck supple.  No JVD present.    Cardiovascular:  Normal rate, regular rhythm and normal heart sounds with no murmur. No edema.  Pulmonary/Chest: Decreased breath sounds bilaterally with some scattered wheezes.  Abdominal:  Soft.  Bowel sounds are normal.  No distension and no tenderness.   Musculoskeletal:  No edema, no tenderness, and no deformity.  No swelling or redness of joints.  Neurological:  Alert and oriented to person, place, and time.  No facial droop.  No slurred speech.   Skin:  Skin is warm and dry.  No rash noted.  No pallor.   Psychiatric:  Normal mood and affect.  Behavior is normal.    ---------------------------------------------------------------------------------------------------------------------            Results from last 7 days   Lab Units 08/12/20  0233   PH, ARTERIAL pH units 7.456*   PO2 ART mm Hg 76.0*   PCO2, ARTERIAL mm Hg 30.7*   HCO3 ART mmol/L 21.6     Results from last 7 days   Lab Units 08/13/20  0119  08/12/20  0420 08/11/20 2254 08/11/20 2129   CRP mg/dL 30.73* 24.89* 21.91*  --    LACTATE mmol/L  --   --  1.0  --    WBC 10*3/mm3 10.98* 11.45*  --  16.42*   HEMOGLOBIN g/dL 9.5* 10.1*  --  10.9*   HEMATOCRIT % 29.5* 32.3*  --  32.2*   MCV fL 93.1 93.6  --  89.4   MCHC g/dL 32.2 31.3*  --  33.9   PLATELETS 10*3/mm3 179 213  --  233     Results from last 7 days   Lab Units 08/13/20  0119 08/12/20  1532 08/12/20 0420 08/11/20 2129   SODIUM mmol/L 139  --  138 135*   POTASSIUM mmol/L 3.7 3.7 3.2* 3.2*   MAGNESIUM mg/dL  --   --  1.5*  --    CHLORIDE mmol/L 107  --  106 101   CO2 mmol/L 18.2*  --  19.8* 19.8*   BUN mg/dL 6  --  7 8   CREATININE mg/dL 0.58  --  0.60 0.64   EGFR IF NONAFRICN AM mL/min/1.73 116  --  111 103   CALCIUM mg/dL 8.2*  --  7.9* 8.5*   GLUCOSE mg/dL 219*  --  118* 133*   ALBUMIN g/dL  --   --  2.77* 3.23*   BILIRUBIN mg/dL  --   --  1.2 1.3*   ALK PHOS U/L  --   --  62 67   AST (SGOT) U/L  --   --  15 16   ALT (SGPT) U/L  --   --  9 11   Estimated Creatinine Clearance: 166.7 mL/min (by C-G formula based on SCr of 0.58 mg/dL).  No results found for: AMMONIA    No results found for: HGBA1C, POCGLU  No results found for: HGBA1C  No results found for: TSH, FREET4    Blood Culture   Date Value Ref Range Status   08/11/2020 No growth at 24 hours  Preliminary   08/11/2020 No growth at 24 hours  Preliminary     Urine Culture   Date Value Ref Range Status   08/11/2020 No growth  Final     No results found for: WOUNDCX  No results found for: STOOLCX  Respiratory Culture   Date Value Ref Range Status   08/12/2020   Preliminary    Moderate growth (3+) Normal Respiratory Miya: NO S.aureus/MRSA or Pseudomonas aeruginosa     Pain Management Panel     Pain Management Panel Latest Ref Rng & Units 8/11/2020 5/9/2017    AMPHETAMINES SCREEN, URINE Negative Positive(A) Negative    BARBITURATES SCREEN Negative Negative Negative    BENZODIAZEPINE SCREEN, URINE Negative Negative Negative    BUPRENORPHINEUR  Negative Negative Negative    COCAINE SCREEN, URINE Negative Negative Negative    METHADONE SCREEN, URINE Negative Negative Negative        I have personally reviewed the above laboratory results.   ---------------------------------------------------------------------------------------------------------------------  Imaging Results (Last 7 Days)     Procedure Component Value Units Date/Time    CT Chest Without Contrast [[id number]] Collected:  08/12/20 0130     Updated:  08/12/20 0133    Narrative:       CT Chest WO    INDICATION:   Shortness of air. Pneumonia. Abnormal chest x-ray. Fever. Negative COVID 19 test 8/11/2020.    TECHNIQUE:   CT of the thorax without IV contrast. Coronal and sagittal reconstructions were obtained.  Radiation dose reduction techniques included automated exposure control or exposure modulation based on body size. Count of known CT and cardiac nuc med studies  performed in previous 12 months: 0.     COMPARISON:   Chest x-ray 8/11/2020    FINDINGS:  There are small bilateral pleural effusions. No pericardial effusion is identified. Right paratracheal adenopathy measures 1.3 cm. Prominent AP window node measures 9 mm short axis. A few other small mediastinal nodes are present. The yudi are poorly  evaluated without contrast. Images through the upper abdomen show changes of cholecystectomy. There are multifocal groundglass and alveolar infiltrates with some septal thickening. Air bronchograms are present. Findings likely reflect bilateral diffuse  pneumonia rather than pulmonary edema.      Impression:         1. Diffuse bilateral pneumonia with small bilateral pleural effusions.  2. Mild mediastinal adenopathy, probably reactive. Consider follow-up chest CT in 3 months.    Signer Name: Khadar Quintero MD   Signed: 8/12/2020 1:30 AM   Workstation Name: [email or web address]    [facility]    XR Chest 1 View [[id number]] Collected:  08/11/20 2203     Updated:  08/11/20 2205     Narrative:       CR Chest 1 Vw    INDICATION:   Fever and cough with shortness of air.     COMPARISON:    10/13/2012    FINDINGS:  Single portable AP view(s) of the chest.  Cardiac and mediastinal contours are normal. There are patchy bilateral fairly diffuse infiltrates with a basilar predominance. Findings likely reflect pneumonia. No pneumothorax is seen.      Impression:       Bilateral infiltrates most compatible with pneumonia.    Signer Name: Khadar Quintero MD   Signed: 8/11/2020 10:03 PM   Workstation Name: [facility]    [facility]        I have personally reviewed the above radiology results.   ---------------------------------------------------------------------------------------------------------------------      Assessment & Plan        Assessment/Plan       ASSESSMENT:    1.  Sepsis  2.  Acute respiratory failure  3.  Pneumonia    PLAN:    Patient presented with shortness of breath, cough, and fever.  Patient denies any recent sick contacts or exposure.  T-max noted of 99.6.  WBC 10.98, improved from 16 on admission.  CRP significantly elevated at 30.73-worse, on admission noted to be 21.  Ferritin 176.5.  .  Lactate 1.0.      Respiratory panel PCR negative.  COVID-19 PCR negative.      Urinalysis on 11/20/2020 revealed 6-12 WBCs, trace leukocytes, negative nitrite, +1 bacteria.  Urine culture revealed no growth.  Urine drug screen positive for amphetamines.      CT scan on 8/11/2020 revealed diffuse bilateral pneumonia with bilateral mild pleural effusions.  Mediastinal adenopathy, probably reactive-recommend follow-up in 3 months, per radiology.    Fungal serologies ordered.  We recommend bronchoscopy as we cannot rule out fungal and or Mycobacterium pneumonia.    We recommend to escalate coverage to Merrem IV and doxycycline 100 mg IV every 12 hours as we cannot rule out fungal or mycobacterial pneumonia.  If any further worsening from respiratory standpoint we will  consider starting empiric fungal coverage.    Again, thank you Dr. Hou for allowing us to participate in the care of your patient and please feel free to call for any questions you may have.        Code Status:   Code Status and Medical Interventions:   Ordered at: 08/12/20 0306     Code Status:    CPR     Medical Interventions (Level of Support Prior to Arrest):    Full           DIEGO Vogel  08/13/20  12:46

## 2020-08-14 ENCOUNTER — APPOINTMENT (OUTPATIENT)
Dept: GENERAL RADIOLOGY | Facility: HOSPITAL | Age: 40
End: 2020-08-14

## 2020-08-14 ENCOUNTER — APPOINTMENT (OUTPATIENT)
Dept: CARDIOLOGY | Facility: HOSPITAL | Age: 40
End: 2020-08-14

## 2020-08-14 LAB
A-A DO2: 221.2 MMHG (ref 0–300)
ANION GAP SERPL CALCULATED.3IONS-SCNC: 9.8 MMOL/L (ref 5–15)
ARTERIAL PATENCY WRIST A: POSITIVE
ATMOSPHERIC PRESS: 725 MMHG
BACTERIA SPEC RESP CULT: NORMAL
BASE EXCESS BLDA CALC-SCNC: -1.4 MMOL/L (ref 0–2)
BASOPHILS # BLD AUTO: 0.03 10*3/MM3 (ref 0–0.2)
BASOPHILS NFR BLD AUTO: 0.2 % (ref 0–1.5)
BDY SITE: ABNORMAL
BH CV ECHO MEAS - % IVS THICK: 33.8 %
BH CV ECHO MEAS - % LVPW THICK: 67.9 %
BH CV ECHO MEAS - ACS: 2 CM
BH CV ECHO MEAS - AI DEC SLOPE: 210 CM/SEC^2
BH CV ECHO MEAS - AI MAX PG: 57.2 MMHG
BH CV ECHO MEAS - AI MAX VEL: 378 CM/SEC
BH CV ECHO MEAS - AI P1/2T: 527.2 MSEC
BH CV ECHO MEAS - AO MAX PG: 13.5 MMHG
BH CV ECHO MEAS - AO MEAN PG: 7 MMHG
BH CV ECHO MEAS - AO ROOT AREA (BSA CORRECTED): 1.2
BH CV ECHO MEAS - AO ROOT AREA: 5.7 CM^2
BH CV ECHO MEAS - AO ROOT DIAM: 2.7 CM
BH CV ECHO MEAS - AO V2 MAX: 183.5 CM/SEC
BH CV ECHO MEAS - AO V2 MEAN: 120 CM/SEC
BH CV ECHO MEAS - AO V2 VTI: 32.1 CM
BH CV ECHO MEAS - BSA(HAYCOCK): 2.3 M^2
BH CV ECHO MEAS - BSA: 2.2 M^2
BH CV ECHO MEAS - BZI_BMI: 35.6 KILOGRAMS/M^2
BH CV ECHO MEAS - BZI_METRIC_HEIGHT: 172.7 CM
BH CV ECHO MEAS - BZI_METRIC_WEIGHT: 106.1 KG
BH CV ECHO MEAS - EDV(CUBED): 109.9 ML
BH CV ECHO MEAS - EDV(MOD-SP4): 38.9 ML
BH CV ECHO MEAS - EDV(TEICH): 107 ML
BH CV ECHO MEAS - EF(CUBED): 63.9 %
BH CV ECHO MEAS - EF(MOD-SP4): 60.9 %
BH CV ECHO MEAS - EF(TEICH): 55.4 %
BH CV ECHO MEAS - ESV(CUBED): 39.7 ML
BH CV ECHO MEAS - ESV(MOD-SP4): 15.2 ML
BH CV ECHO MEAS - ESV(TEICH): 47.8 ML
BH CV ECHO MEAS - FS: 28.8 %
BH CV ECHO MEAS - IVS/LVPW: 1.1
BH CV ECHO MEAS - IVSD: 0.92 CM
BH CV ECHO MEAS - IVSS: 1.2 CM
BH CV ECHO MEAS - LA DIMENSION: 4 CM
BH CV ECHO MEAS - LA/AO: 1.5
BH CV ECHO MEAS - LV DIASTOLIC VOL/BSA (35-75): 17.8 ML/M^2
BH CV ECHO MEAS - LV MASS(C)D: 143.4 GRAMS
BH CV ECHO MEAS - LV MASS(C)DI: 65.7 GRAMS/M^2
BH CV ECHO MEAS - LV MASS(C)S: 152.8 GRAMS
BH CV ECHO MEAS - LV MASS(C)SI: 69.9 GRAMS/M^2
BH CV ECHO MEAS - LV SYSTOLIC VOL/BSA (12-30): 7 ML/M^2
BH CV ECHO MEAS - LVIDD: 4.8 CM
BH CV ECHO MEAS - LVIDS: 3.4 CM
BH CV ECHO MEAS - LVLD AP4: 6.4 CM
BH CV ECHO MEAS - LVLS AP4: 5.4 CM
BH CV ECHO MEAS - LVOT AREA (M): 2.5 CM^2
BH CV ECHO MEAS - LVOT AREA: 2.5 CM^2
BH CV ECHO MEAS - LVOT DIAM: 1.8 CM
BH CV ECHO MEAS - LVPWD: 0.85 CM
BH CV ECHO MEAS - LVPWS: 1.4 CM
BH CV ECHO MEAS - MV A MAX VEL: 242 CM/SEC
BH CV ECHO MEAS - MV DEC SLOPE: 1284 CM/SEC^2
BH CV ECHO MEAS - MV E MAX VEL: 286 CM/SEC
BH CV ECHO MEAS - MV E/A: 1.2
BH CV ECHO MEAS - MV MAX PG: 29.1 MMHG
BH CV ECHO MEAS - MV MEAN PG: 16.5 MMHG
BH CV ECHO MEAS - MV P1/2T MAX VEL: 339 CM/SEC
BH CV ECHO MEAS - MV P1/2T: 77.3 MSEC
BH CV ECHO MEAS - MV V2 MAX: 269.5 CM/SEC
BH CV ECHO MEAS - MV V2 MEAN: 187.5 CM/SEC
BH CV ECHO MEAS - MV V2 VTI: 65.6 CM
BH CV ECHO MEAS - MVA P1/2T LCG: 0.65 CM^2
BH CV ECHO MEAS - MVA(P1/2T): 2.8 CM^2
BH CV ECHO MEAS - PA ACC TIME: 0.12 SEC
BH CV ECHO MEAS - PA PR(ACCEL): 25 MMHG
BH CV ECHO MEAS - RAP SYSTOLE: 10 MMHG
BH CV ECHO MEAS - RVSP: 82.5 MMHG
BH CV ECHO MEAS - SI(AO): 84 ML/M^2
BH CV ECHO MEAS - SI(CUBED): 32.2 ML/M^2
BH CV ECHO MEAS - SI(MOD-SP4): 10.8 ML/M^2
BH CV ECHO MEAS - SI(TEICH): 27.1 ML/M^2
BH CV ECHO MEAS - SV(AO): 183.5 ML
BH CV ECHO MEAS - SV(CUBED): 70.3 ML
BH CV ECHO MEAS - SV(MOD-SP4): 23.7 ML
BH CV ECHO MEAS - SV(TEICH): 59.2 ML
BH CV ECHO MEAS - TR MAX VEL: 425.3 CM/SEC
BODY TEMPERATURE: 0 C
BUN SERPL-MCNC: 12 MG/DL (ref 6–20)
BUN/CREAT SERPL: 20.3 (ref 7–25)
CALCIUM SPEC-SCNC: 8.2 MG/DL (ref 8.6–10.5)
CHLORIDE SERPL-SCNC: 111 MMOL/L (ref 98–107)
CO2 BLDA-SCNC: 22.3 MMOL/L (ref 22–33)
CO2 SERPL-SCNC: 19.2 MMOL/L (ref 22–29)
COHGB MFR BLD: 1.6 % (ref 0–5)
CREAT SERPL-MCNC: 0.59 MG/DL (ref 0.57–1)
CRP SERPL-MCNC: 18.6 MG/DL (ref 0–0.5)
DEPRECATED RDW RBC AUTO: 47.9 FL (ref 37–54)
EOSINOPHIL # BLD AUTO: 0 10*3/MM3 (ref 0–0.4)
EOSINOPHIL NFR BLD AUTO: 0 % (ref 0.3–6.2)
ERYTHROCYTE [DISTWIDTH] IN BLOOD BY AUTOMATED COUNT: 13.6 % (ref 12.3–15.4)
GAS FLOW AIRWAY: 6 LPM
GFR SERPL CREATININE-BSD FRML MDRD: 113 ML/MIN/1.73
GLUCOSE SERPL-MCNC: 188 MG/DL (ref 65–99)
GRAM STN SPEC: NORMAL
HCO3 BLDA-SCNC: 21.4 MMOL/L (ref 20–26)
HCT VFR BLD AUTO: 31.1 % (ref 34–46.6)
HCT VFR BLD CALC: 34.1 % (ref 38–51)
HGB BLD-MCNC: 9.6 G/DL (ref 12–15.9)
HGB BLDA-MCNC: 11.1 G/DL (ref 13.5–17.5)
IMM GRANULOCYTES # BLD AUTO: 0.09 10*3/MM3 (ref 0–0.05)
IMM GRANULOCYTES NFR BLD AUTO: 0.5 % (ref 0–0.5)
INHALED O2 CONCENTRATION: 44 %
LYMPHOCYTES # BLD AUTO: 0.88 10*3/MM3 (ref 0.7–3.1)
LYMPHOCYTES NFR BLD AUTO: 4.5 % (ref 19.6–45.3)
Lab: ABNORMAL
Lab: ABNORMAL
MAXIMAL PREDICTED HEART RATE: 181 BPM
MCH RBC QN AUTO: 29.4 PG (ref 26.6–33)
MCHC RBC AUTO-ENTMCNC: 30.9 G/DL (ref 31.5–35.7)
MCV RBC AUTO: 95.1 FL (ref 79–97)
METHGB BLD QL: 0 % (ref 0–3)
MODALITY: ABNORMAL
MONOCYTES # BLD AUTO: 0.48 10*3/MM3 (ref 0.1–0.9)
MONOCYTES NFR BLD AUTO: 2.5 % (ref 5–12)
NEUTROPHILS NFR BLD AUTO: 17.91 10*3/MM3 (ref 1.7–7)
NEUTROPHILS NFR BLD AUTO: 92.3 % (ref 42.7–76)
NOTE: ABNORMAL
NOTIFIED BY: ABNORMAL
NOTIFIED WHO: ABNORMAL
NRBC BLD AUTO-RTO: 0 /100 WBC (ref 0–0.2)
NT-PROBNP SERPL-MCNC: 3619 PG/ML (ref 0–450)
OXYHGB MFR BLDV: 82.3 % (ref 94–99)
PCO2 BLDA: 29 MM HG (ref 35–45)
PCO2 TEMP ADJ BLD: ABNORMAL MM[HG]
PH BLDA: 7.48 PH UNITS (ref 7.35–7.45)
PH, TEMP CORRECTED: ABNORMAL
PLATELET # BLD AUTO: 282 10*3/MM3 (ref 140–450)
PMV BLD AUTO: 10.4 FL (ref 6–12)
PO2 BLDA: 45.5 MM HG (ref 83–108)
PO2 TEMP ADJ BLD: ABNORMAL MM[HG]
POTASSIUM SERPL-SCNC: 3.8 MMOL/L (ref 3.5–5.2)
RBC # BLD AUTO: 3.27 10*6/MM3 (ref 3.77–5.28)
SAO2 % BLDCOA: 83.6 % (ref 94–99)
SODIUM SERPL-SCNC: 140 MMOL/L (ref 136–145)
STRESS TARGET HR: 154 BPM
VENTILATOR MODE: ABNORMAL
WBC # BLD AUTO: 19.39 10*3/MM3 (ref 3.4–10.8)

## 2020-08-14 PROCEDURE — 71045 X-RAY EXAM CHEST 1 VIEW: CPT

## 2020-08-14 PROCEDURE — 25010000002 HEPARIN (PORCINE) PER 1000 UNITS: Performed by: INTERNAL MEDICINE

## 2020-08-14 PROCEDURE — 36600 WITHDRAWAL OF ARTERIAL BLOOD: CPT

## 2020-08-14 PROCEDURE — 93306 TTE W/DOPPLER COMPLETE: CPT | Performed by: INTERNAL MEDICINE

## 2020-08-14 PROCEDURE — 25010000002 MEROPENEM: Performed by: NURSE PRACTITIONER

## 2020-08-14 PROCEDURE — 80048 BASIC METABOLIC PNL TOTAL CA: CPT | Performed by: INTERNAL MEDICINE

## 2020-08-14 PROCEDURE — 83050 HGB METHEMOGLOBIN QUAN: CPT

## 2020-08-14 PROCEDURE — 82375 ASSAY CARBOXYHB QUANT: CPT

## 2020-08-14 PROCEDURE — 25010000002 MEROPENEM PER 100 MG: Performed by: NURSE PRACTITIONER

## 2020-08-14 PROCEDURE — 99232 SBSQ HOSP IP/OBS MODERATE 35: CPT | Performed by: INTERNAL MEDICINE

## 2020-08-14 PROCEDURE — 25010000002 FUROSEMIDE PER 20 MG: Performed by: INTERNAL MEDICINE

## 2020-08-14 PROCEDURE — 85025 COMPLETE CBC W/AUTO DIFF WBC: CPT | Performed by: INTERNAL MEDICINE

## 2020-08-14 PROCEDURE — 25010000002 PROPOFOL 200 MG/20ML EMULSION

## 2020-08-14 PROCEDURE — 83880 ASSAY OF NATRIURETIC PEPTIDE: CPT | Performed by: NURSE PRACTITIONER

## 2020-08-14 PROCEDURE — 82805 BLOOD GASES W/O2 SATURATION: CPT

## 2020-08-14 PROCEDURE — 25010000002 MEROPENEM: Performed by: INTERNAL MEDICINE

## 2020-08-14 PROCEDURE — 94799 UNLISTED PULMONARY SVC/PX: CPT

## 2020-08-14 PROCEDURE — 71045 X-RAY EXAM CHEST 1 VIEW: CPT | Performed by: RADIOLOGY

## 2020-08-14 PROCEDURE — 86140 C-REACTIVE PROTEIN: CPT | Performed by: INTERNAL MEDICINE

## 2020-08-14 PROCEDURE — 93306 TTE W/DOPPLER COMPLETE: CPT

## 2020-08-14 PROCEDURE — 25010000002 METHYLPREDNISOLONE PER 40 MG: Performed by: INTERNAL MEDICINE

## 2020-08-14 RX ORDER — L.ACID,PARA/B.BIFIDUM/S.THERM 8B CELL
1 CAPSULE ORAL DAILY
Status: DISCONTINUED | OUTPATIENT
Start: 2020-08-14 | End: 2020-08-18 | Stop reason: HOSPADM

## 2020-08-14 RX ORDER — NICOTINE POLACRILEX 4 MG
15 LOZENGE BUCCAL
Status: DISCONTINUED | OUTPATIENT
Start: 2020-08-14 | End: 2020-08-18 | Stop reason: HOSPADM

## 2020-08-14 RX ORDER — DEXTROSE MONOHYDRATE 25 G/50ML
25 INJECTION, SOLUTION INTRAVENOUS
Status: DISCONTINUED | OUTPATIENT
Start: 2020-08-14 | End: 2020-08-18 | Stop reason: HOSPADM

## 2020-08-14 RX ORDER — FUROSEMIDE 10 MG/ML
40 INJECTION INTRAMUSCULAR; INTRAVENOUS ONCE
Status: COMPLETED | OUTPATIENT
Start: 2020-08-14 | End: 2020-08-14

## 2020-08-14 RX ADMIN — CETIRIZINE HYDROCHLORIDE 10 MG: 10 TABLET, FILM COATED ORAL at 09:22

## 2020-08-14 RX ADMIN — IPRATROPIUM BROMIDE 0.5 MG: 0.5 SOLUTION RESPIRATORY (INHALATION) at 13:32

## 2020-08-14 RX ADMIN — FLUOXETINE HYDROCHLORIDE 20 MG: 20 CAPSULE ORAL at 09:22

## 2020-08-14 RX ADMIN — IPRATROPIUM BROMIDE 0.5 MG: 0.5 SOLUTION RESPIRATORY (INHALATION) at 18:36

## 2020-08-14 RX ADMIN — QUETIAPINE FUMARATE 100 MG: 100 TABLET ORAL at 21:37

## 2020-08-14 RX ADMIN — BUDESONIDE AND FORMOTEROL FUMARATE DIHYDRATE 2 PUFF: 80; 4.5 AEROSOL RESPIRATORY (INHALATION) at 06:27

## 2020-08-14 RX ADMIN — PANTOPRAZOLE SODIUM 40 MG: 40 TABLET, DELAYED RELEASE ORAL at 06:03

## 2020-08-14 RX ADMIN — MEROPENEM 1 G: 1 INJECTION, POWDER, FOR SOLUTION INTRAVENOUS at 21:37

## 2020-08-14 RX ADMIN — MEROPENEM 1 G: 1 INJECTION, POWDER, FOR SOLUTION INTRAVENOUS at 05:59

## 2020-08-14 RX ADMIN — IPRATROPIUM BROMIDE AND ALBUTEROL SULFATE 3 ML: .5; 3 SOLUTION RESPIRATORY (INHALATION) at 06:28

## 2020-08-14 RX ADMIN — DOXYCYCLINE 100 MG: 100 INJECTION, POWDER, LYOPHILIZED, FOR SOLUTION INTRAVENOUS at 05:59

## 2020-08-14 RX ADMIN — Medication 1 CAPSULE: at 09:22

## 2020-08-14 RX ADMIN — BUDESONIDE AND FORMOTEROL FUMARATE DIHYDRATE 2 PUFF: 80; 4.5 AEROSOL RESPIRATORY (INHALATION) at 18:36

## 2020-08-14 RX ADMIN — ACETAMINOPHEN 650 MG: 325 TABLET ORAL at 21:29

## 2020-08-14 RX ADMIN — DOXYCYCLINE 100 MG: 100 INJECTION, POWDER, LYOPHILIZED, FOR SOLUTION INTRAVENOUS at 18:01

## 2020-08-14 RX ADMIN — FUROSEMIDE 40 MG: 10 INJECTION, SOLUTION INTRAMUSCULAR; INTRAVENOUS at 19:50

## 2020-08-14 RX ADMIN — NICOTINE 1 PATCH: 21 PATCH TRANSDERMAL at 09:24

## 2020-08-14 RX ADMIN — SODIUM CHLORIDE 100 ML/HR: 9 INJECTION, SOLUTION INTRAVENOUS at 05:59

## 2020-08-14 RX ADMIN — HEPARIN SODIUM 5000 UNITS: 5000 INJECTION INTRAVENOUS; SUBCUTANEOUS at 09:23

## 2020-08-14 RX ADMIN — SODIUM CHLORIDE, PRESERVATIVE FREE 10 ML: 5 INJECTION INTRAVENOUS at 21:37

## 2020-08-14 RX ADMIN — MEROPENEM 1 G: 1 INJECTION, POWDER, FOR SOLUTION INTRAVENOUS at 12:14

## 2020-08-14 RX ADMIN — FLUTICASONE PROPIONATE 2 SPRAY: 50 SPRAY, METERED NASAL at 09:32

## 2020-08-14 RX ADMIN — METHYLPREDNISOLONE SODIUM SUCCINATE 40 MG: 40 INJECTION, POWDER, FOR SOLUTION INTRAMUSCULAR; INTRAVENOUS at 09:23

## 2020-08-14 RX ADMIN — METHYLPREDNISOLONE SODIUM SUCCINATE 40 MG: 40 INJECTION, POWDER, FOR SOLUTION INTRAMUSCULAR; INTRAVENOUS at 19:50

## 2020-08-14 RX ADMIN — GUAIFENESIN AND DEXTROMETHORPHAN 10 ML: 100; 10 SYRUP ORAL at 16:47

## 2020-08-14 RX ADMIN — ACETAMINOPHEN 650 MG: 325 TABLET ORAL at 13:34

## 2020-08-14 NOTE — PLAN OF CARE
"  Problem: Patient Care Overview  Goal: Plan of Care Review  Outcome: Ongoing (interventions implemented as appropriate)  Flowsheets (Taken 8/14/2020 5082)  Outcome Summary: Pt continues to desat when moving or coughing.  Pt continues to cough up blood.  Pt states \"she is feeling some better today but very tired\".   Will continue to monitor.  Goal: Individualization and Mutuality  Outcome: Ongoing (interventions implemented as appropriate)  Goal: Discharge Needs Assessment  Outcome: Ongoing (interventions implemented as appropriate)  Goal: Interprofessional Rounds/Family Conf  Outcome: Ongoing (interventions implemented as appropriate)     Problem: Infection, Risk/Actual (Adult)  Goal: Identify Related Risk Factors and Signs and Symptoms  Outcome: Ongoing (interventions implemented as appropriate)  Goal: Infection Prevention/Resolution  Outcome: Ongoing (interventions implemented as appropriate)     Problem: Pneumonia (Adult)  Goal: Signs and Symptoms of Listed Potential Problems Will be Absent, Minimized or Managed (Pneumonia)  Outcome: Ongoing (interventions implemented as appropriate)     Problem: Sepsis/Septic Shock (Adult)  Goal: Signs and Symptoms of Listed Potential Problems Will be Absent, Minimized or Managed (Sepsis/Septic Shock)  Outcome: Ongoing (interventions implemented as appropriate)     Problem: Pain, Chronic (Adult)  Goal: Identify Related Risk Factors and Signs and Symptoms  Outcome: Ongoing (interventions implemented as appropriate)  Goal: Acceptable Pain/Comfort Level and Functional Ability  Outcome: Ongoing (interventions implemented as appropriate)     "

## 2020-08-14 NOTE — PROGRESS NOTES
[facility] HOSPITALIST PROGRESS NOTE     Patient Identification:  Name:  Manda Al  Age:  40 y.o.  Sex:  female  :  1980  MRN:  [id number]  Visit Number:  [id number]  Primary Care Provider:  Adali Sharpe APRN    Length of stay:  2    Chief complaint:  40 y.o. old female admitted with Cough; Fever; and Shortness of Breath          Subjective:    Patient seen with nursing staff.  No family present at bedside.  Patient is sitting in bed and states that she is feeling a little better.  Patient still continues to have cough productive of thin sputum which is blood-tinged today.  Patient denies any chest pain or palpitations.  She still continues to remain dyspneic which is slightly improved since yesterday.         Current Hospital Meds:    budesonide-formoterol 2 puff Inhalation BID - RT   cetirizine 10 mg Oral Daily   doxycycline 100 mg Intravenous Q12H   FLUoxetine 20 mg Oral Daily   fluticasone 2 spray Nasal Daily   heparin (porcine) 5,000 Units Subcutaneous Q12H   ipratropium-albuterol 3 mL Nebulization Q4H - RT   lactobacillus acidophilus 1 capsule Oral Daily   meropenem 1 g Intravenous Q8H   methylPREDNISolone sodium succinate 40 mg Intravenous Q12H   nicotine 1 patch Transdermal Q24H   pantoprazole 40 mg Oral QAM   QUEtiapine 100 mg Oral Nightly   sodium chloride 10 mL Intravenous Q12H       sodium chloride 100 mL/hr Last Rate: 100 mL/hr (20 0559)     ----------------------------------------------------------------------------------------------------------------------  Vital Signs:  Temp:  [98 °F (36.7 °C)-98.2 °F (36.8 °C)] 98 °F (36.7 °C)  Heart Rate:  [] 98  Resp:  [13-36] 36  BP: (100-124)/(52-87) 124/78      08/12/20  0508 20  0400 20  0400   Weight: 106 kg (233 lb 11 oz) 107 kg (234 lb 14.4 oz) 106 kg (234 lb 6.4 oz)     Body mass index is 35.65 kg/m².    Intake/Output Summary (Last 24 hours) at 2020 1022  Last data filed at 2020  0603  Gross per 24 hour   Intake 2815.16 ml   Output 3500 ml   Net -684.84 ml     Diet Regular  ----------------------------------------------------------------------------------------------------------------------  Physical exam:  Constitutional:  Well-developed and well-nourished.     HENT:  Head:  Normocephalic and atraumatic.  Mouth:  Moist mucous membranes.    Eyes:  Conjunctivae and EOM are normal.  Pupils are equal, round, and reactive to light.   Neck:  Neck supple.  No JVD present.    Cardiovascular:  Regular rate and rhythm. S1+S2. No murmur, rubs or gallops.   Pulmonary/Chest: Inspiratory crackles bilateral bases with some expiratory rhonchi.  Abdominal:  Soft. Non-tender. No viscera palpable.  Bowel sounds audible.   Musculoskeletal: No deformity or joint swelling.   Peripheral vascular: Bilateral dorsalis pedis palpable. No edema.   Neurological:  Alert and oriented to person, place, and time.  Cranial nerves grossly intact. Strength bilaterally symmetrical in upper and lower extremities.   Skin:  Skin is warm and dry. No rash noted. No pallor.   ----------------------------------------------------------------------------------------------------------------------  Tele: Sinus tachycardia with heart rate around 100  ----------------------------------------------------------------------------------------------------------------------      Results from last 7 days   Lab Units 08/14/20  0058 08/13/20  0119 08/12/20  0420 08/11/20  2254   CRP mg/dL 18.60* 30.73* 24.89* 21.91*   LACTATE mmol/L  --   --   --  1.0   WBC 10*3/mm3 19.39* 10.98* 11.45*  --    HEMOGLOBIN g/dL 9.6* 9.5* 10.1*  --    HEMATOCRIT % 31.1* 29.5* 32.3*  --    MCV fL 95.1 93.1 93.6  --    MCHC g/dL 30.9* 32.2 31.3*  --    PLATELETS 10*3/mm3 282 179 213  --      Results from last 7 days   Lab Units 08/12/20  0233   PH, ARTERIAL pH units 7.456*   PO2 ART mm Hg 76.0*   PCO2, ARTERIAL mm Hg 30.7*   HCO3 ART mmol/L 21.6     Results from last 7  days   Lab Units 08/14/20  0058 08/13/20  0119 08/12/20  1532 08/12/20  0420 08/11/20  2129   SODIUM mmol/L 140 139  --  138 135*   POTASSIUM mmol/L 3.8 3.7 3.7 3.2* 3.2*   MAGNESIUM mg/dL  --   --   --  1.5*  --    CHLORIDE mmol/L 111* 107  --  106 101   CO2 mmol/L 19.2* 18.2*  --  19.8* 19.8*   BUN mg/dL 12 6  --  7 8   CREATININE mg/dL 0.59 0.58  --  0.60 0.64   EGFR IF NONAFRICN AM mL/min/1.73 113 116  --  111 103   CALCIUM mg/dL 8.2* 8.2*  --  7.9* 8.5*   GLUCOSE mg/dL 188* 219*  --  118* 133*   ALBUMIN g/dL  --   --   --  2.77* 3.23*   BILIRUBIN mg/dL  --   --   --  1.2 1.3*   ALK PHOS U/L  --   --   --  62 67   AST (SGOT) U/L  --   --   --  15 16   ALT (SGPT) U/L  --   --   --  9 11   Estimated Creatinine Clearance: 163.1 mL/min (by C-G formula based on SCr of 0.59 mg/dL).    No results found for: AMMONIA      Blood Culture   Date Value Ref Range Status   08/11/2020 No growth at 24 hours  Preliminary   08/11/2020 No growth at 24 hours  Preliminary     Urine Culture   Date Value Ref Range Status   08/11/2020 No growth  Final     No results found for: WOUNDCX  No results found for: STOOLCX    I have personally looked at the labs and they are summarized above.  ----------------------------------------------------------------------------------------------------------------------  Imaging Results (Last 24 Hours)     ** No results found for the last 24 hours. **        ----------------------------------------------------------------------------------------------------------------------  Assessment and Plan:    -Severe sepsis due to pneumonia   Patient has remained afebrile last 24 hours.  Still continues to have some tachycardia but overall heart rate is improved since admission.  Blood pressure has been stable.  CRP is improving and down to 18 today from 30 yesterday.  Lactic acid was within normal limits.  WBC count has increased to 19.  Blood cultures have not shown any growth so far.  Antibiotic therapy  escalated to meropenem and doxycycline by infectious diseases and input is appreciated.  Continue antibiotics and follow inflammatory markers and culture results.    -Acute hypoxemic respiratory failure d/t PNA and COPD  Continue supplemental oxygen via nasal cannula as needed to keep SPO2 >90%.  I will check echocardiogram.    -Bilateral pneumonia  CT scan of the chest showed diffuse bilateral pneumonia with small bilateral effusions and mild mediastinal adenopathy.  Respiratory culture has not shown any growth so far.  Legionella antigen, Streptococcus pneumonia antigen and respiratory panel is negative.  Continue antibiotics as outlined above.  Fungal serologies sent by infectious diseases.  Patient is having some blood-tinged sputum.  Will discontinue heparin and and albuterol nebs.  We will continue to monitor patient closely in PCU.    -Acute COPD exacerbation  Continue duo nebs, Symbicort and Solu-Medrol.  Continue antibiotics as outlined above.    -Mediastinal adenopathy  Likely reactive due to bilateral pneumonia.  Patient will need repeat imaging to document clearance.    -Essential hypertension  Blood pressure has been on the low side.  Continue to hold antihypertensives.    -Crohn's disease  Patient is not on any maintenance medications.  She denies any abdominal pain or diarrhea.    -Tobacco abuse/dependence  I discussed with the patient the dangers of tobacco use and advised her to quit smoking.    Activity: Up with assist  Nutrition: Regular diet  Fluids: I will decrease IV fluids to 50 mL/h  DVT prophylaxis: Heparin Subcu  GI prophylaxis: PPI    The patient is high risk due to: Respiratory failure, sepsis, pneumonia, COPD exacerbation    I discussed the patient's findings and my recommendations with patient and nursing staff.    Lydia Hou MD  08/14/20  10:22

## 2020-08-14 NOTE — PROGRESS NOTES
Discharge Planning Assessment  [facility]     Patient Name: Manda Al  MRN: [id number]  Today's Date: 8/14/2020    Admit Date: 8/11/2020    Discharge Needs Assessment    No documentation.       Discharge Plan     Row Name 08/14/20 1444       Plan    Plan  Patient continues to go home with mother at discharge.  No needs identified at this time.     Row Name 08/14/20 1443       Plan    Plan Comments  8/14:  R 24-36 SpO2 , Regular Diet, 2L N/C; IV Doxy, Merrem, Solu-Medrol, NS; CRP 18.60, WBC 19.39; Continued SOB & blood tinged productive cough, can't r/o fungal or mycobacterial PNA, may consider empiric fungal coverage-KLS         Destination      Coordination has not been started for this encounter.      Durable Medical Equipment      Coordination has not been started for this encounter.      Dialysis/Infusion      Coordination has not been started for this encounter.      Home Medical Care      Coordination has not been started for this encounter.      Therapy      Coordination has not been started for this encounter.      Community Resources      Coordination has not been started for this encounter.        Expected Discharge Date and Time     Expected Discharge Date Expected Discharge Time    Aug 16, 2020         Demographic Summary    No documentation.       Functional Status    No documentation.       Psychosocial    No documentation.       Abuse/Neglect    No documentation.       Legal    No documentation.       Substance Abuse    No documentation.       Patient Forms    No documentation.           Chana Jeff RN

## 2020-08-14 NOTE — PROGRESS NOTES
I was contacted by patient's nurse regarding hypoxia and tachypnea.  Patient was saturating well on 2 L per nasal cannula until later this afternoon or evening when she states that she ambulated to the restroom and became severely dyspneic.  Patient states that since then she has not yet recovered and continues to report shortness of air.  Patient is currently saturating 88% via 6 L per nasal cannula    Physical exam reveals an acutely ill female sitting in her bedside recliner as she states she cannot lie supine.  Patient is tachypneic and appears in mild distress.  Heart is regular rate and tachycardic rhythm without obvious murmur or rub.  Lungs reveal diminished bilateral breath sounds with occasional left lower lobe rales heard anteriorly.    -Stat ABG ordered  -Discontinue intravenous fluids  -40 mg IV Lasix x 1 now    Discussed with patient that pending her blood gas results, we may need to consider BIPAP or CPAP. Also explained to the patient that should he condition continue to decline, we may need to consider intubation.     Patient verbalized understanding and was agreeable.     Patient's nurse present at bedside.         Patient to be placed on bubble nasal cannula now; titrate for saturations 90-95%. Will monitor for response to Lasix.    Patient got up to bedside commode and became very dyspnic with oxygen saturations dropping to the 60% via 10 L Nasal Cannula. The patient's oxygen was increased to 15 L bubble nasal cannula with no significant improvement in her O2 saturations. Patient was placed on a 100% NRB and saturation did improve to 90%. Explained to patient that we would try an external urinary catheter. Hopefully can place back on bubble nasal cannula once her tachypnea improves and she stabilizes.

## 2020-08-14 NOTE — PROGRESS NOTES
PROGRESS NOTE         Patient Identification:  Name:  Manda Al  Age:  40 y.o.  Sex:  female  :  1980  MRN:  [id number]  Visit Number:  [id number]  Primary Care Provider:  Adali Sharpe APRN         LOS: 2 days       ----------------------------------------------------------------------------------------------------------------------  Subjective       Chief Complaints:    Cough; Fever; and Shortness of Breath        Interval History:      Patient reports continued shortness of breath this morning.  She reports productive cough, states sputum is blood-tinged.  She is on 2 L via nasal cannula.  Afebrile, no diarrhea.  WBC trending up at 19.39.  CRP improving at 18.6.    Review of Systems:    Constitutional: Reported fever at home.  Reports fatigue.    Eyes: no eye drainage, itching or redness.  HEENT: no mouth sores, dysphagia or nose bleed.  Respiratory: Reports shortness of breath and cough.  Cardiovascular: no chest pain, no palpitations, no orthopnea.  Gastrointestinal: no nausea, vomiting or diarrhea. No abdominal pain, hematemesis or rectal bleeding.  Genitourinary: no dysuria or polyuria.  Hematologic/lymphatic: no lymph node abnormalities, no easy bruising or easy bleeding.  Musculoskeletal: no muscle or joint pain.  Skin: No rash and no itching.  Neurological: no loss of consciousness, no seizure, no headache.  Behavioral/Psych: no depression or suicidal ideation.  Endocrine: no hot flashes.  Immunologic: negative.  ----------------------------------------------------------------------------------------------------------------------      Objective       Current [facility] Meds:    budesonide-formoterol 2 puff Inhalation BID - RT   cetirizine 10 mg Oral Daily   doxycycline 100 mg Intravenous Q12H   FLUoxetine 20 mg Oral Daily   fluticasone 2 spray Nasal Daily   ipratropium 0.5 mg Nebulization 4x Daily - RT   lactobacillus acidophilus 1 capsule Oral Daily   meropenem 1 g  Intravenous Q8H   methylPREDNISolone sodium succinate 40 mg Intravenous Q12H   nicotine 1 patch Transdermal Q24H   pantoprazole 40 mg Oral QAM   QUEtiapine 100 mg Oral Nightly   sodium chloride 10 mL Intravenous Q12H       sodium chloride 50 mL/hr Last Rate: 50 mL/hr (08/14/20 1220)     ----------------------------------------------------------------------------------------------------------------------    Vital Signs:  Temp:  [97.6 °F (36.4 °C)-98.2 °F (36.8 °C)] 97.6 °F (36.4 °C)  Heart Rate:  [] 99  Resp:  [13-36] 24  BP: (100-135)/(52-93) 135/81  Mean Arterial Pressure (Non-Invasive) for the past 24 hrs (Last 3 readings):   Noninvasive MAP (mmHg)   08/14/20 1200 100   08/14/20 1104 110   08/14/20 0900 91     SpO2 Percentage    08/14/20 1000 08/14/20 1005 08/14/20 1200   SpO2: 94% 92% 91%     SpO2:  [89 %-99 %] 91 %  on  Flow (L/min):  [2] 2;   Device (Oxygen Therapy): humidified;nasal cannula    Body mass index is 35.65 kg/m².  Wt Readings from Last 3 Encounters:   08/14/20 106 kg (234 lb 6.4 oz)   09/18/18 95.3 kg (210 lb)   02/21/18 105 kg (232 lb)        Intake/Output Summary (Last 24 hours) at 8/14/2020 1238  Last data filed at 8/14/2020 1104  Gross per 24 hour   Intake 2565.16 ml   Output 3975 ml   Net -1409.84 ml     Diet Regular  ----------------------------------------------------------------------------------------------------------------------      Physical Exam:    Constitutional:  Well-developed and well-nourished.  No respiratory distress.      HENT:  Head: Normocephalic and atraumatic.  Mouth:  Moist mucous membranes.    Eyes:  Conjunctivae and EOM are normal.  No scleral icterus.  Neck:  Neck supple.  No JVD present.    Cardiovascular:  Normal rate, regular rhythm and normal heart sounds with no murmur. No edema.  Pulmonary/Chest: Decreased breath sounds bilaterally with some scattered wheezes.  Abdominal:  Soft.  Bowel sounds are normal.  No distension and no tenderness.   Musculoskeletal:   No edema, no tenderness, and no deformity.  No swelling or redness of joints.  Neurological:  Alert and oriented to person, place, and time.  No facial droop.  No slurred speech.   Skin:  Skin is warm and dry.  No rash noted.  No pallor.   Psychiatric:  Normal mood and affect.  Behavior is normal.  ----------------------------------------------------------------------------------------------------------------------          Results from last 7 days   Lab Units 08/12/20  0233   PH, ARTERIAL pH units 7.456*   PO2 ART mm Hg 76.0*   PCO2, ARTERIAL mm Hg 30.7*   HCO3 ART mmol/L 21.6     Results from last 7 days   Lab Units 08/14/20 0058 08/13/20 0119 08/12/20 0420 08/11/20  2254   CRP mg/dL 18.60* 30.73* 24.89* 21.91*   LACTATE mmol/L  --   --   --  1.0   WBC 10*3/mm3 19.39* 10.98* 11.45*  --    HEMOGLOBIN g/dL 9.6* 9.5* 10.1*  --    HEMATOCRIT % 31.1* 29.5* 32.3*  --    MCV fL 95.1 93.1 93.6  --    MCHC g/dL 30.9* 32.2 31.3*  --    PLATELETS 10*3/mm3 282 179 213  --      Results from last 7 days   Lab Units 08/14/20 0058 08/13/20 0119 08/12/20  1532 08/12/20 0420 08/11/20  2129   SODIUM mmol/L 140 139  --  138 135*   POTASSIUM mmol/L 3.8 3.7 3.7 3.2* 3.2*   MAGNESIUM mg/dL  --   --   --  1.5*  --    CHLORIDE mmol/L 111* 107  --  106 101   CO2 mmol/L 19.2* 18.2*  --  19.8* 19.8*   BUN mg/dL 12 6  --  7 8   CREATININE mg/dL 0.59 0.58  --  0.60 0.64   EGFR IF NONAFRICN AM mL/min/1.73 113 116  --  111 103   CALCIUM mg/dL 8.2* 8.2*  --  7.9* 8.5*   GLUCOSE mg/dL 188* 219*  --  118* 133*   ALBUMIN g/dL  --   --   --  2.77* 3.23*   BILIRUBIN mg/dL  --   --   --  1.2 1.3*   ALK PHOS U/L  --   --   --  62 67   AST (SGOT) U/L  --   --   --  15 16   ALT (SGPT) U/L  --   --   --  9 11   Estimated Creatinine Clearance: 163.1 mL/min (by C-G formula based on SCr of 0.59 mg/dL).  No results found for: AMMONIA    No results found for: HGBA1C, POCGLU  No results found for: HGBA1C  No results found for: TSH, FREET4    Blood Culture     Date Value Ref Range Status   08/11/2020 No growth at 2 days  Preliminary   08/11/2020 No growth at 2 days  Preliminary     Urine Culture   Date Value Ref Range Status   08/11/2020 No growth  Final     No results found for: WOUNDCX  No results found for: STOOLCX  Respiratory Culture   Date Value Ref Range Status   08/12/2020   Final    Moderate growth (3+) Normal Respiratory Miya: NO S.aureus/MRSA or Pseudomonas aeruginosa     Pain Management Panel     Pain Management Panel Latest Ref Rng & Units 8/11/2020 5/9/2017    AMPHETAMINES SCREEN, URINE Negative Positive(A) Negative    BARBITURATES SCREEN Negative Negative Negative    BENZODIAZEPINE SCREEN, URINE Negative Negative Negative    BUPRENORPHINEUR Negative Negative Negative    COCAINE SCREEN, URINE Negative Negative Negative    METHADONE SCREEN, URINE Negative Negative Negative            ----------------------------------------------------------------------------------------------------------------------  Imaging Results (Last 24 Hours)     ** No results found for the last 24 hours. **          ----------------------------------------------------------------------------------------------------------------------    Assessment/Plan       Assessment/Plan     ASSESSMENT:    1.  Sepsis  2.  Acute respiratory failure  3.  Pneumonia    PLAN:  Patient reports continued shortness of breath this morning.  She reports productive cough, states sputum is blood-tinged.  She is on 2 L via nasal cannula.  Afebrile, no diarrhea.  WBC trending up at 19.39.  CRP improving at 18.6.    Respiratory panel PCR negative.  COVID-19 PCR negative.       Urinalysis on 11/20/2020 revealed 6-12 WBCs, trace leukocytes, negative nitrite, +1 bacteria.  Urine culture revealed no growth.  Urine drug screen positive for amphetamines.       CT scan on 8/11/2020 revealed diffuse bilateral pneumonia with bilateral mild pleural effusions.  Mediastinal adenopathy, probably reactive-recommend follow-up  in 3 months, per radiology.     Fungal serologies ordered and pending.  We recommend bronchoscopy as we cannot rule out fungal and or Mycobacterium pneumonia.    Leukocytosis could be in relation to steroid therapy, as CRP is trending down.     We recommend to continue with Merrem IV and doxycycline 100 mg IV every 12 hours as we cannot rule out fungal or mycobacterial pneumonia.  If any further worsening from respiratory standpoint we will consider starting empiric fungal coverage.    Code Status:   Code Status and Medical Interventions:   Ordered at: 08/12/20 0306     Code Status:    CPR     Medical Interventions (Level of Support Prior to Arrest):    Full       Tanesha Mcdowell, DIEGO  08/14/20  12:38

## 2020-08-15 ENCOUNTER — APPOINTMENT (OUTPATIENT)
Dept: GENERAL RADIOLOGY | Facility: HOSPITAL | Age: 40
End: 2020-08-15

## 2020-08-15 LAB
A-A DO2: 528.8 MMHG (ref 0–300)
ANION GAP SERPL CALCULATED.3IONS-SCNC: 10.9 MMOL/L (ref 5–15)
ARTERIAL PATENCY WRIST A: ABNORMAL
ATMOSPHERIC PRESS: 724 MMHG
BASE EXCESS BLDA CALC-SCNC: 1.4 MMOL/L (ref 0–2)
BASOPHILS # BLD AUTO: 0.01 10*3/MM3 (ref 0–0.2)
BASOPHILS NFR BLD AUTO: 0.1 % (ref 0–1.5)
BODY TEMPERATURE: 0 C
BUN SERPL-MCNC: 16 MG/DL (ref 6–20)
BUN/CREAT SERPL: 24.6 (ref 7–25)
CALCIUM SPEC-SCNC: 8.5 MG/DL (ref 8.6–10.5)
CHLORIDE SERPL-SCNC: 104 MMOL/L (ref 98–107)
CO2 BLDA-SCNC: 28.3 MMOL/L (ref 22–33)
CO2 SERPL-SCNC: 22.1 MMOL/L (ref 22–29)
COHGB MFR BLD: 1.3 % (ref 0–5)
CREAT SERPL-MCNC: 0.65 MG/DL (ref 0.57–1)
CRP SERPL-MCNC: 12.14 MG/DL (ref 0–0.5)
DEPRECATED RDW RBC AUTO: 46.5 FL (ref 37–54)
EOSINOPHIL # BLD AUTO: 0.03 10*3/MM3 (ref 0–0.4)
EOSINOPHIL NFR BLD AUTO: 0.2 % (ref 0.3–6.2)
ERYTHROCYTE [DISTWIDTH] IN BLOOD BY AUTOMATED COUNT: 13.7 % (ref 12.3–15.4)
GFR SERPL CREATININE-BSD FRML MDRD: 101 ML/MIN/1.73
GLUCOSE BLDC GLUCOMTR-MCNC: 161 MG/DL (ref 70–130)
GLUCOSE BLDC GLUCOMTR-MCNC: 185 MG/DL (ref 70–130)
GLUCOSE SERPL-MCNC: 182 MG/DL (ref 65–99)
HCO3 BLDA-SCNC: 26.9 MMOL/L (ref 20–26)
HCT VFR BLD AUTO: 32 % (ref 34–46.6)
HCT VFR BLD CALC: 40.4 % (ref 38–51)
HGB BLD-MCNC: 10.1 G/DL (ref 12–15.9)
HGB BLDA-MCNC: 13.2 G/DL (ref 13.5–17.5)
IMM GRANULOCYTES # BLD AUTO: 0.09 10*3/MM3 (ref 0–0.05)
IMM GRANULOCYTES NFR BLD AUTO: 0.5 % (ref 0–0.5)
INHALED O2 CONCENTRATION: 100 %
LYMPHOCYTES # BLD AUTO: 1.49 10*3/MM3 (ref 0.7–3.1)
LYMPHOCYTES NFR BLD AUTO: 7.9 % (ref 19.6–45.3)
Lab: ABNORMAL
MAGNESIUM SERPL-MCNC: 1.7 MG/DL (ref 1.6–2.6)
MCH RBC QN AUTO: 29.4 PG (ref 26.6–33)
MCHC RBC AUTO-ENTMCNC: 31.6 G/DL (ref 31.5–35.7)
MCV RBC AUTO: 93.3 FL (ref 79–97)
METHGB BLD QL: 0.3 % (ref 0–3)
MODALITY: ABNORMAL
MONOCYTES # BLD AUTO: 0.79 10*3/MM3 (ref 0.1–0.9)
MONOCYTES NFR BLD AUTO: 4.2 % (ref 5–12)
NEUTROPHILS NFR BLD AUTO: 16.55 10*3/MM3 (ref 1.7–7)
NEUTROPHILS NFR BLD AUTO: 87.1 % (ref 42.7–76)
NOTE: ABNORMAL
NRBC BLD AUTO-RTO: 0 /100 WBC (ref 0–0.2)
OXYHGB MFR BLDV: 96.3 % (ref 94–99)
PCO2 BLDA: 45 MM HG (ref 35–45)
PCO2 TEMP ADJ BLD: ABNORMAL MM[HG]
PEEP RESPIRATORY: 5 CM[H2O]
PH BLDA: 7.38 PH UNITS (ref 7.35–7.45)
PH, TEMP CORRECTED: ABNORMAL
PHOSPHATE SERPL-MCNC: 3.6 MG/DL (ref 2.5–4.5)
PLATELET # BLD AUTO: 301 10*3/MM3 (ref 140–450)
PMV BLD AUTO: 10.4 FL (ref 6–12)
PO2 BLDA: 103 MM HG (ref 83–108)
PO2 TEMP ADJ BLD: ABNORMAL MM[HG]
POTASSIUM SERPL-SCNC: 3.6 MMOL/L (ref 3.5–5.2)
RBC # BLD AUTO: 3.43 10*6/MM3 (ref 3.77–5.28)
SAO2 % BLDCOA: 97.9 % (ref 94–99)
SARS-COV-2 RDRP RESP QL NAA+PROBE: NOT DETECTED
SET MECH RESP RATE: 18
SODIUM SERPL-SCNC: 137 MMOL/L (ref 136–145)
TOTAL RATE: 30 BREATHS/MINUTE
VENTILATOR MODE: ABNORMAL
VT ON VENT VENT: 500 ML
WBC # BLD AUTO: 18.96 10*3/MM3 (ref 3.4–10.8)

## 2020-08-15 PROCEDURE — 94799 UNLISTED PULMONARY SVC/PX: CPT

## 2020-08-15 PROCEDURE — 02H633Z INSERTION OF INFUSION DEVICE INTO RIGHT ATRIUM, PERCUTANEOUS APPROACH: ICD-10-PCS | Performed by: SURGERY

## 2020-08-15 PROCEDURE — 82375 ASSAY CARBOXYHB QUANT: CPT

## 2020-08-15 PROCEDURE — 99292 CRITICAL CARE ADDL 30 MIN: CPT | Performed by: INTERNAL MEDICINE

## 2020-08-15 PROCEDURE — 25010000002 MEROPENEM PER 100 MG: Performed by: INTERNAL MEDICINE

## 2020-08-15 PROCEDURE — 71045 X-RAY EXAM CHEST 1 VIEW: CPT

## 2020-08-15 PROCEDURE — 25010000002 FUROSEMIDE PER 20 MG: Performed by: INTERNAL MEDICINE

## 2020-08-15 PROCEDURE — 76937 US GUIDE VASCULAR ACCESS: CPT | Performed by: SURGERY

## 2020-08-15 PROCEDURE — 82805 BLOOD GASES W/O2 SATURATION: CPT

## 2020-08-15 PROCEDURE — 25010000002 MEROPENEM: Performed by: INTERNAL MEDICINE

## 2020-08-15 PROCEDURE — 0BH17EZ INSERTION OF ENDOTRACHEAL AIRWAY INTO TRACHEA, VIA NATURAL OR ARTIFICIAL OPENING: ICD-10-PCS | Performed by: INTERNAL MEDICINE

## 2020-08-15 PROCEDURE — 25010000002 PROPOFOL 10 MG/ML EMULSION: Performed by: INTERNAL MEDICINE

## 2020-08-15 PROCEDURE — 99291 CRITICAL CARE FIRST HOUR: CPT | Performed by: INTERNAL MEDICINE

## 2020-08-15 PROCEDURE — 25010000002 MICAFUNGIN SODIUM 100 MG RECONSTITUTED SOLUTION 1 EACH VIAL: Performed by: NURSE PRACTITIONER

## 2020-08-15 PROCEDURE — 25010000002 MIDAZOLAM PER 1MG: Performed by: INTERNAL MEDICINE

## 2020-08-15 PROCEDURE — 25010000002 FUROSEMIDE PER 20 MG: Performed by: SPECIALIST

## 2020-08-15 PROCEDURE — 87635 SARS-COV-2 COVID-19 AMP PRB: CPT | Performed by: NURSE PRACTITIONER

## 2020-08-15 PROCEDURE — 82962 GLUCOSE BLOOD TEST: CPT

## 2020-08-15 PROCEDURE — 85025 COMPLETE CBC W/AUTO DIFF WBC: CPT | Performed by: INTERNAL MEDICINE

## 2020-08-15 PROCEDURE — 83735 ASSAY OF MAGNESIUM: CPT | Performed by: INTERNAL MEDICINE

## 2020-08-15 PROCEDURE — 25010000003 MAGNESIUM SULFATE 4 GM/100ML SOLUTION: Performed by: INTERNAL MEDICINE

## 2020-08-15 PROCEDURE — 94002 VENT MGMT INPAT INIT DAY: CPT

## 2020-08-15 PROCEDURE — 31500 INSERT EMERGENCY AIRWAY: CPT | Performed by: INTERNAL MEDICINE

## 2020-08-15 PROCEDURE — 83050 HGB METHEMOGLOBIN QUAN: CPT

## 2020-08-15 PROCEDURE — 99251 PR INITL INPATIENT CONSULT NEW/ESTAB PT 20 MIN: CPT | Performed by: SURGERY

## 2020-08-15 PROCEDURE — 84100 ASSAY OF PHOSPHORUS: CPT | Performed by: NURSE PRACTITIONER

## 2020-08-15 PROCEDURE — 36556 INSERT NON-TUNNEL CV CATH: CPT | Performed by: SURGERY

## 2020-08-15 PROCEDURE — 86140 C-REACTIVE PROTEIN: CPT | Performed by: INTERNAL MEDICINE

## 2020-08-15 PROCEDURE — 25010000002 PROPOFOL 10 MG/ML EMULSION

## 2020-08-15 PROCEDURE — 94660 CPAP INITIATION&MGMT: CPT

## 2020-08-15 PROCEDURE — 63710000001 INSULIN ASPART PER 5 UNITS: Performed by: NURSE PRACTITIONER

## 2020-08-15 PROCEDURE — 99253 IP/OBS CNSLTJ NEW/EST LOW 45: CPT | Performed by: SPECIALIST

## 2020-08-15 PROCEDURE — 94003 VENT MGMT INPAT SUBQ DAY: CPT

## 2020-08-15 PROCEDURE — 80048 BASIC METABOLIC PNL TOTAL CA: CPT | Performed by: INTERNAL MEDICINE

## 2020-08-15 PROCEDURE — 25010000002 METHYLPREDNISOLONE PER 40 MG: Performed by: INTERNAL MEDICINE

## 2020-08-15 PROCEDURE — 36600 WITHDRAWAL OF ARTERIAL BLOOD: CPT

## 2020-08-15 RX ORDER — MIDAZOLAM HYDROCHLORIDE 1 MG/ML
4 INJECTION INTRAMUSCULAR; INTRAVENOUS ONCE
Status: COMPLETED | OUTPATIENT
Start: 2020-08-15 | End: 2020-08-15

## 2020-08-15 RX ORDER — FUROSEMIDE 10 MG/ML
20 INJECTION INTRAMUSCULAR; INTRAVENOUS EVERY 12 HOURS SCHEDULED
Status: DISCONTINUED | OUTPATIENT
Start: 2020-08-15 | End: 2020-08-18 | Stop reason: HOSPADM

## 2020-08-15 RX ORDER — SODIUM CHLORIDE 0.9 % (FLUSH) 0.9 %
10 SYRINGE (ML) INJECTION EVERY 12 HOURS SCHEDULED
Status: DISCONTINUED | OUTPATIENT
Start: 2020-08-15 | End: 2020-08-18 | Stop reason: HOSPADM

## 2020-08-15 RX ORDER — FENTANYL CITRATE/PF 100MCG/2ML
SYRINGE (ML) INTRAVENOUS
Status: COMPLETED
Start: 2020-08-15 | End: 2020-08-15

## 2020-08-15 RX ORDER — NOREPINEPHRINE BIT/0.9 % NACL 8 MG/250ML
.02-.3 INFUSION BOTTLE (ML) INTRAVENOUS
Status: DISCONTINUED | OUTPATIENT
Start: 2020-08-15 | End: 2020-08-15 | Stop reason: SDUPTHER

## 2020-08-15 RX ORDER — SODIUM CHLORIDE 0.9 % (FLUSH) 0.9 %
10 SYRINGE (ML) INJECTION AS NEEDED
Status: DISCONTINUED | OUTPATIENT
Start: 2020-08-15 | End: 2020-08-18 | Stop reason: HOSPADM

## 2020-08-15 RX ORDER — FENTANYL CITRATE/PF 100MCG/2ML
SYRINGE (ML) INTRAVENOUS
Status: DISCONTINUED | OUTPATIENT
Start: 2020-08-15 | End: 2020-08-18 | Stop reason: HOSPADM

## 2020-08-15 RX ORDER — NOREPINEPHRINE BIT/0.9 % NACL 8 MG/250ML
.02-.3 INFUSION BOTTLE (ML) INTRAVENOUS
Status: DISCONTINUED | OUTPATIENT
Start: 2020-08-15 | End: 2020-08-18 | Stop reason: HOSPADM

## 2020-08-15 RX ORDER — FUROSEMIDE 10 MG/ML
40 INJECTION INTRAMUSCULAR; INTRAVENOUS ONCE
Status: COMPLETED | OUTPATIENT
Start: 2020-08-15 | End: 2020-08-15

## 2020-08-15 RX ORDER — CHLORHEXIDINE GLUCONATE 0.12 MG/ML
15 RINSE ORAL EVERY 12 HOURS SCHEDULED
Status: DISCONTINUED | OUTPATIENT
Start: 2020-08-15 | End: 2020-08-18 | Stop reason: HOSPADM

## 2020-08-15 RX ORDER — POTASSIUM CHLORIDE 1.5 G/1.77G
40 POWDER, FOR SOLUTION ORAL EVERY 4 HOURS
Status: COMPLETED | OUTPATIENT
Start: 2020-08-15 | End: 2020-08-15

## 2020-08-15 RX ORDER — SODIUM CHLORIDE 0.9 % (FLUSH) 0.9 %
20 SYRINGE (ML) INJECTION AS NEEDED
Status: DISCONTINUED | OUTPATIENT
Start: 2020-08-15 | End: 2020-08-18 | Stop reason: HOSPADM

## 2020-08-15 RX ORDER — FAMOTIDINE 10 MG/ML
20 INJECTION, SOLUTION INTRAVENOUS EVERY 12 HOURS SCHEDULED
Status: DISCONTINUED | OUTPATIENT
Start: 2020-08-15 | End: 2020-08-18 | Stop reason: HOSPADM

## 2020-08-15 RX ORDER — MAGNESIUM SULFATE HEPTAHYDRATE 40 MG/ML
4 INJECTION, SOLUTION INTRAVENOUS ONCE
Status: COMPLETED | OUTPATIENT
Start: 2020-08-15 | End: 2020-08-15

## 2020-08-15 RX ORDER — PROPOFOL 10 MG/ML
VIAL (ML) INTRAVENOUS
Status: COMPLETED
Start: 2020-08-15 | End: 2020-08-15

## 2020-08-15 RX ADMIN — PROPOFOL 50 MCG/KG/MIN: 10 INJECTION, EMULSION INTRAVENOUS at 21:12

## 2020-08-15 RX ADMIN — QUETIAPINE FUMARATE 100 MG: 100 TABLET ORAL at 21:55

## 2020-08-15 RX ADMIN — FUROSEMIDE 40 MG: 10 INJECTION, SOLUTION INTRAMUSCULAR; INTRAVENOUS at 05:43

## 2020-08-15 RX ADMIN — POTASSIUM CHLORIDE 40 MEQ: 1.5 POWDER, FOR SOLUTION ORAL at 13:49

## 2020-08-15 RX ADMIN — DOXYCYCLINE 100 MG: 100 INJECTION, POWDER, LYOPHILIZED, FOR SOLUTION INTRAVENOUS at 05:41

## 2020-08-15 RX ADMIN — SODIUM CHLORIDE, PRESERVATIVE FREE 10 ML: 5 INJECTION INTRAVENOUS at 08:47

## 2020-08-15 RX ADMIN — PROPOFOL 50 MCG/KG/MIN: 10 INJECTION, EMULSION INTRAVENOUS at 18:51

## 2020-08-15 RX ADMIN — PROPOFOL 50 MCG/KG/MIN: 10 INJECTION, EMULSION INTRAVENOUS at 04:45

## 2020-08-15 RX ADMIN — Medication: at 15:30

## 2020-08-15 RX ADMIN — PROPOFOL INJECTABLE EMULSION 50 MCG/KG/MIN: 10 INJECTION, EMULSION INTRAVENOUS at 04:45

## 2020-08-15 RX ADMIN — IPRATROPIUM BROMIDE 0.5 MG: 0.5 SOLUTION RESPIRATORY (INHALATION) at 12:39

## 2020-08-15 RX ADMIN — FAMOTIDINE 20 MG: 10 INJECTION INTRAVENOUS at 21:55

## 2020-08-15 RX ADMIN — MIDAZOLAM HYDROCHLORIDE 4 MG: 1 INJECTION, SOLUTION INTRAMUSCULAR; INTRAVENOUS at 04:31

## 2020-08-15 RX ADMIN — CHLORHEXIDINE GLUCONATE 15 ML: 1.2 RINSE ORAL at 08:47

## 2020-08-15 RX ADMIN — MEROPENEM 1 G: 1 INJECTION, POWDER, FOR SOLUTION INTRAVENOUS at 21:55

## 2020-08-15 RX ADMIN — CHLORHEXIDINE GLUCONATE 15 ML: 1.2 RINSE ORAL at 21:55

## 2020-08-15 RX ADMIN — PROPOFOL 50 MCG/KG/MIN: 10 INJECTION, EMULSION INTRAVENOUS at 16:17

## 2020-08-15 RX ADMIN — IPRATROPIUM BROMIDE 0.5 MG: 0.5 SOLUTION RESPIRATORY (INHALATION) at 18:05

## 2020-08-15 RX ADMIN — Medication: at 05:39

## 2020-08-15 RX ADMIN — Medication 1 CAPSULE: at 08:48

## 2020-08-15 RX ADMIN — NOREPINEPHRINE BITARTRATE 0.02 MCG/KG/MIN: 1 INJECTION INTRAVENOUS at 08:34

## 2020-08-15 RX ADMIN — MEROPENEM 1 G: 1 INJECTION, POWDER, FOR SOLUTION INTRAVENOUS at 15:22

## 2020-08-15 RX ADMIN — CETIRIZINE HYDROCHLORIDE 10 MG: 10 TABLET, FILM COATED ORAL at 08:48

## 2020-08-15 RX ADMIN — POTASSIUM CHLORIDE 40 MEQ: 1.5 POWDER, FOR SOLUTION ORAL at 08:47

## 2020-08-15 RX ADMIN — PROPOFOL 40 MCG/KG/MIN: 10 INJECTION, EMULSION INTRAVENOUS at 06:49

## 2020-08-15 RX ADMIN — INSULIN ASPART 2 UNITS: 100 INJECTION, SOLUTION INTRAVENOUS; SUBCUTANEOUS at 01:46

## 2020-08-15 RX ADMIN — DOXYCYCLINE 100 MG: 100 INJECTION, POWDER, LYOPHILIZED, FOR SOLUTION INTRAVENOUS at 18:51

## 2020-08-15 RX ADMIN — PROPOFOL 50 MCG/KG/MIN: 10 INJECTION, EMULSION INTRAVENOUS at 13:48

## 2020-08-15 RX ADMIN — NICOTINE 1 PATCH: 21 PATCH TRANSDERMAL at 08:50

## 2020-08-15 RX ADMIN — Medication: at 20:35

## 2020-08-15 RX ADMIN — NOREPINEPHRINE BITARTRATE 0.14 MCG/KG/MIN: 1 INJECTION INTRAVENOUS at 15:30

## 2020-08-15 RX ADMIN — METHYLPREDNISOLONE SODIUM SUCCINATE 40 MG: 40 INJECTION, POWDER, FOR SOLUTION INTRAMUSCULAR; INTRAVENOUS at 21:55

## 2020-08-15 RX ADMIN — PROPOFOL 50 MCG/KG/MIN: 10 INJECTION, EMULSION INTRAVENOUS at 23:38

## 2020-08-15 RX ADMIN — IPRATROPIUM BROMIDE 0.5 MG: 0.5 SOLUTION RESPIRATORY (INHALATION) at 06:24

## 2020-08-15 RX ADMIN — PROPOFOL 50 MCG/KG/MIN: 10 INJECTION, EMULSION INTRAVENOUS at 09:47

## 2020-08-15 RX ADMIN — IPRATROPIUM BROMIDE 0.5 MG: 0.5 SOLUTION RESPIRATORY (INHALATION) at 01:08

## 2020-08-15 RX ADMIN — SODIUM CHLORIDE, PRESERVATIVE FREE 10 ML: 5 INJECTION INTRAVENOUS at 10:56

## 2020-08-15 RX ADMIN — FUROSEMIDE 20 MG: 10 INJECTION INTRAMUSCULAR; INTRAVENOUS at 15:30

## 2020-08-15 RX ADMIN — METHYLPREDNISOLONE SODIUM SUCCINATE 40 MG: 40 INJECTION, POWDER, FOR SOLUTION INTRAMUSCULAR; INTRAVENOUS at 08:49

## 2020-08-15 RX ADMIN — SODIUM CHLORIDE, PRESERVATIVE FREE 10 ML: 5 INJECTION INTRAVENOUS at 21:57

## 2020-08-15 RX ADMIN — MEROPENEM 1 G: 1 INJECTION, POWDER, FOR SOLUTION INTRAVENOUS at 05:41

## 2020-08-15 RX ADMIN — MICAFUNGIN SODIUM 100 MG: 100 INJECTION, POWDER, LYOPHILIZED, FOR SOLUTION INTRAVENOUS at 08:50

## 2020-08-15 RX ADMIN — Medication: at 10:55

## 2020-08-15 RX ADMIN — MAGNESIUM SULFATE HEPTAHYDRATE 4 G: 40 INJECTION, SOLUTION INTRAVENOUS at 08:47

## 2020-08-15 NOTE — PROCEDURES
"Insert Central Line At Bedside  Date/Time: 8/15/2020 10:20 AM  Performed by: Shravan Ramon MD  Authorized by: Shravan Ramon MD   Consent: The procedure was performed in an emergent situation.  Risks and benefits: risks, benefits and alternatives were discussed  Required items: required blood products, implants, devices, and special equipment available  Patient identity confirmed: arm band, provided demographic data and hospital-assigned identification number  Time out: Immediately prior to procedure a \"time out\" was called to verify the correct patient, procedure, equipment, support staff and site/side marked as required.  Indications: vascular access  Anesthesia: local infiltration    Anesthesia:  Local Anesthetic: lidocaine 1% with epinephrine  Preparation: skin prepped with ChloraPrep  Skin prep agent dried: skin prep agent completely dried prior to procedure  Sterile barriers: all five maximum sterile barriers used - cap, mask, sterile gown, sterile gloves, and large sterile sheet  Hand hygiene: hand hygiene performed prior to central venous catheter insertion  Location details: right internal jugular  Patient position: flat  Catheter type: triple lumen  Pre-procedure: landmarks identified  Ultrasound guidance: yes  Sterile ultrasound techniques: sterile gel and sterile probe covers were used  Number of attempts: 1  Successful placement: yes  Post-procedure: line sutured and dressing applied  Assessment: blood return through all ports,  free fluid flow and placement verified by x-ray  Patient tolerance: Patient tolerated the procedure well with no immediate complications        "

## 2020-08-15 NOTE — PAYOR COMM NOTE
"[facility]  PELON CONSTANTINO  PHONE  [phone]  FAX  [phone]  NPI:  [id number]    CLINICAL UPDATED    Aminata Al (40 y.o. Female)     Date of Birth Social Security Number Address Home Phone MRN    1980  [address] [facility] [address] [phone] [id number]    Alevism Marital Status          None        Admission Date Admission Type Admitting Provider Attending Provider Department, Room/Bed    8/11/20 Emergency Pinky Corrales DO Khan, Hafiz Sarfraz, MD [facility], CC08/1C    Discharge Date Discharge Disposition Discharge Destination                       Attending Provider:  Lydia Hou MD    Allergies:  Imuran [Azathioprine]    Isolation:  None   Infection:  None   Code Status:  CPR    Ht:  172.7 cm (67.99\")   Wt:  103 kg (227 lb 9.6 oz)    Admission Cmt:  None   Principal Problem:  None                Active Insurance as of 8/11/2020     Primary Coverage     Payor Plan Insurance Group Employer/Plan Group    [employer] [place] [employer] [place]ICA[id number]     Payor Plan Address Payor Plan Phone Number Payor Plan Fax Number Effective Dates    [address] [address] [phone]  12/2/2016 - None Entered    [facility] [id number]       Subscriber Name Subscriber Birth Date Member ID       AMINATA AL 1980 [id number]                 Emergency Contacts      (Rel.) Home Phone Work Phone Mobile Phone    Bela Al (Daughter) -- -- [phone]    Itzel Ness (Mother) -- -- [phone]    Tran Yee (Relative) [phone] -- --              Current Facility-Administered Medications   Medication Dose Route Frequency Provider Last Rate Last Dose   • acetaminophen (TYLENOL) tablet 650 mg  650 mg Oral Q6H PRN Pinky Corrales DO   650 mg at 08/14/20 2129   • budesonide-formoterol (SYMBICORT) 80-4.5 MCG/ACT inhaler 2 puff  2 puff Inhalation BID - RT Pinky Corrales DO   2 puff at 08/14/20 1836   • cetirizine " (zyrTEC) tablet 10 mg  10 mg Oral Daily Pinky Corrales DO   10 mg at 08/15/20 0848   • chlorhexidine (PERIDEX) 0.12 % solution 15 mL  15 mL Mouth/Throat Q12H Pinky Corrales DO   15 mL at 08/15/20 0847   • dextrose (D50W) 25 g/ 50mL Intravenous Solution 25 g  25 g Intravenous Q15 Min PRN Joe Watkins, APRN       • dextrose (GLUTOSE) oral gel 15 g  15 g Oral Q15 Min PRN Joe Watkins, APRN       • doxycycline (VIBRAMYCIN) 100 mg/100 mL 0.9% NS MBP  100 mg Intravenous Q12H Pinky Corrales DO   100 mg at 08/15/20 0541   • famotidine (PEPCID) injection 20 mg  20 mg Intravenous Q12H Pinky Corrales DO       • fentaNYL (SUBLIMAZE) PCA 1500 mcg/30 mL syringe   Intravenous Titrated Pinky Corrales DO       • fluticasone (FLONASE) 50 MCG/ACT nasal spray 2 spray  2 spray Nasal Daily Pinky Corrales DO   2 spray at 08/14/20 0932   • furosemide (LASIX) injection 20 mg  20 mg Intravenous Q12H Bess Montgomery MD       • glucagon (human recombinant) (GLUCAGEN DIAGNOSTIC) injection 1 mg  1 mg Subcutaneous Q15 Min PRN Joe Watkins APRN       • guaiFENesin-dextromethorphan (ROBITUSSIN DM) 100-10 MG/5ML syrup 10 mL  10 mL Oral Q6H PRN Pinky Corrales DO   10 mL at 08/14/20 1647   • insulin aspart (novoLOG) injection 0-9 Units  0-9 Units Subcutaneous Q6H Joe Watkins, APRN   2 Units at 08/15/20 0146   • ipratropium (ATROVENT) nebulizer solution 0.5 mg  0.5 mg Nebulization 4x Daily - RT Pinky Corrales DO   0.5 mg at 08/15/20 1239   • lactobacillus acidophilus (RISAQUAD) capsule 1 capsule  1 capsule Oral Daily Pinky Corrales DO   1 capsule at 08/15/20 0848   • Magnesium Sulfate 2 gram Bolus, followed by 8 gram infusion (total Mg dose 10 grams)- Mg less than or equal to 1mg/dL  2 g Intravenous PRN Pinky Corrales, DO        Or   • Magnesium Sulfate 2 gram / 50mL Infusion (GIVE X 3 BAGS TO EQUAL 6GM TOTAL DOSE) - Mg 1.1 - 1.5 mg/dl  2 g Intravenous PRN Pinky Corrales  DO Christiano        Or   • Magnesium Sulfate 4 gram infusion- Mg 1.6-1.9 mg/dL  4 g Intravenous PRN Pinky Corrales DO       • meropenem (MERREM) 1 g/100 mL 0.9% NS VTB (mbp)  1 g Intravenous Q8H Pinky Corrales DO   1 g at 08/15/20 0541   • methylPREDNISolone sodium succinate (SOLU-Medrol) injection 40 mg  40 mg Intravenous Q12H Pinky Corrales DO   40 mg at 08/15/20 0849   • micafungin sodium (MYCAMINE) 100 mg in sodium chloride 0.9 % 100 mL IVPB  100 mg Intravenous Q24H Tanesha Mcdowell APRN   100 mg at 08/15/20 0850   • nicotine (NICODERM CQ) 21 MG/24HR patch 1 patch  1 patch Transdermal Q24H Pinky Corrales DO   1 patch at 08/15/20 0850   • nicotine polacrilex (NICORETTE) gum 2 mg  2 mg Mouth/Throat Q1H PRN Pinky Corrales DO       • norepinephrine (LEVOPHED) 8 mg/250 mL (32 mcg/mL) in sodium chloride 0.9% infusion (premix)  0.02-0.3 mcg/kg/min Intravenous Titrated Lydia Hou MD 3.86 mL/hr at 08/15/20 0834 0.02 mcg/kg/min at 08/15/20 0834   • potassium chloride (K-DUR,KLOR-CON) CR tablet 40 mEq  40 mEq Oral PRN Pinky Corrales DO        Or   • potassium chloride (KLOR-CON) packet 40 mEq  40 mEq Oral PRN Pinky Corrales DO        Or   • potassium chloride 10 mEq in 100 mL IVPB  10 mEq Intravenous Q1H PRN Pinky Corrales DO       • propofol (DIPRIVAN) infusion 10 mg/mL 100 mL  5-50 mcg/kg/min Intravenous Titrated Pinky Corrales DO 31.8 mL/hr at 08/15/20 1348 50 mcg/kg/min at 08/15/20 1348   • QUEtiapine (SEROquel) tablet 100 mg  100 mg Oral Nightly Pinky Corrales DO   100 mg at 08/14/20 2137   • sodium chloride 0.9 % flush 10 mL  10 mL Intravenous Q12H Pinky Corrales, DO   10 mL at 08/15/20 0847   • sodium chloride 0.9 % flush 10 mL  10 mL Intravenous PRN Pinky Corrales, DO       • sodium chloride 0.9 % flush 10 mL  10 mL Intravenous PRN Pinky Corrales, DO       • sodium chloride 0.9 % flush 10 mL  10 mL Intravenous Q12H House,  Shravan Fenton MD       • sodium chloride 0.9 % flush 10 mL  10 mL Intravenous Q12H Shravan Ramon MD       • sodium chloride 0.9 % flush 10 mL  10 mL Intravenous Q12H Shravan Ramon MD   10 mL at 08/15/20 1056   • sodium chloride 0.9 % flush 10 mL  10 mL Intravenous PRN Shravan Ramon MD       • sodium chloride 0.9 % flush 20 mL  20 mL Intravenous PRN Shravan Ramon MD       • tiZANidine (ZANAFLEX) tablet 4 mg  4 mg Oral Q8H PRN Pinky Corrales DO   4 mg at 20 2331        Physician Progress Notes (last 72 hours) (Notes from 20 1515 through 08/15/20 1515)      Tanesha Mcdowell APRN at 08/15/20 1041                     PROGRESS NOTE         Patient Identification:  Name:  Manda Al  Age:  40 y.o.  Sex:  female  :  1980  MRN:  [id number]  Visit Number:  [id number]  Primary Care Provider:  Adali Sharpe APRN         LOS: 3 days       ----------------------------------------------------------------------------------------------------------------------  Subjective       Chief Complaints:    Cough; Fever; and Shortness of Breath        Interval History:      Patient became tachypneic with desaturations and required to be intubated overnight.  She is currently on 90% FiO2 with PEEP of 5.  Afebrile.  WBC worse at 18.9.  CRP trending down at 12.14.  Chest x-ray on 8/15/2020 revealed increase in bilateral diffuse infiltrates, concerning for ARDS.    Review of Systems:    Patient intubated and sedated.  Unable to obtain ROS.  ----------------------------------------------------------------------------------------------------------------------      Objective       Current [facility] Meds:    budesonide-formoterol 2 puff Inhalation BID - RT   cetirizine 10 mg Oral Daily   chlorhexidine 15 mL Mouth/Throat Q12H   doxycycline 100 mg Intravenous Q12H   famotidine 20 mg Intravenous Q12H   fluticasone 2 spray Nasal Daily   insulin aspart 0-9 Units Subcutaneous Q6H      ipratropium 0.5 mg Nebulization 4x Daily - RT   lactobacillus acidophilus 1 capsule Oral Daily   magnesium sulfate 4 g Intravenous Once   meropenem 1 g Intravenous Q8H   methylPREDNISolone sodium succinate 40 mg Intravenous Q12H   micafungin (MYCAMINE)  mg Intravenous Q24H   nicotine 1 patch Transdermal Q24H   potassium chloride 40 mEq Oral Q4H   QUEtiapine 100 mg Oral Nightly   sodium chloride 10 mL Intravenous Q12H   sodium chloride 10 mL Intravenous Q12H   sodium chloride 10 mL Intravenous Q12H   sodium chloride 10 mL Intravenous Q12H       fentaNYL Citrate     norepinephrine 0.02-0.3 mcg/kg/min Last Rate: 0.02 mcg/kg/min (08/15/20 0834)   propofol 5-50 mcg/kg/min Last Rate: 40 mcg/kg/min (08/15/20 0649)     ----------------------------------------------------------------------------------------------------------------------    Vital Signs:  Temp:  [97.6 °F (36.4 °C)-101 °F (38.3 °C)] 98.8 °F (37.1 °C)  Heart Rate:  [] 81  Resp:  [12-46] 18  BP: ()/() 116/73  FiO2 (%):  [90 %-100 %] 90 %  Mean Arterial Pressure (Non-Invasive) for the past 24 hrs (Last 3 readings):   Noninvasive MAP (mmHg)   08/15/20 0940 92   08/15/20 0935 85   08/15/20 0930 87     SpO2 Percentage    08/15/20 0930 08/15/20 0935 08/15/20 0940   SpO2: 100% 99% 99%     SpO2:  [77 %-100 %] 99 %  on  Flow (L/min):  [2-15] 15;   Device (Oxygen Therapy): ventilator    Body mass index is 34.61 kg/m².  Wt Readings from Last 3 Encounters:   08/15/20 103 kg (227 lb 9.6 oz)   09/18/18 95.3 kg (210 lb)   02/21/18 105 kg (232 lb)        Intake/Output Summary (Last 24 hours) at 8/15/2020 1041  Last data filed at 8/15/2020 0850  Gross per 24 hour   Intake 1731.66 ml   Output 3525 ml   Net -1793.34 ml     NPO Diet  ----------------------------------------------------------------------------------------------------------------------      Physical Exam:    Constitutional:   Intubated and sedated.    HENT:  Head: Normocephalic and  atraumatic.  Mouth:  Moist mucous membranes.    Eyes:  Conjunctivae and EOM are normal.  No scleral icterus.  Neck:  Neck supple.  No JVD present.    Cardiovascular:  Normal rate, regular rhythm and normal heart sounds with no murmur. No edema.  Pulmonary/Chest:  Diffuse wheezing bilaterally.  Coarse rhonchi present.  Abdominal:  Soft.  Bowel sounds are normal.  No distension and no tenderness.   Musculoskeletal:  No edema, no tenderness, and no deformity.  No swelling or redness of joints.  Neurological:    Intubated and sedated.  Skin:  Skin is warm and dry.  No rash noted.  No pallor.   Psychiatric:    Intubated and sedated.  ----------------------------------------------------------------------------------------------------------------------      Results from last 7 days   Lab Units 08/14/20  2304   PROBNP pg/mL 3,619.0*       Results from last 7 days   Lab Units 08/15/20  0554   PH, ARTERIAL pH units 7.385   PO2 ART mm Hg 103.0   PCO2, ARTERIAL mm Hg 45.0   HCO3 ART mmol/L 26.9*     Results from last 7 days   Lab Units 08/15/20  0412 08/14/20  0058 08/13/20  0119  08/11/20  2254   CRP mg/dL 12.14* 18.60* 30.73*   < > 21.91*   LACTATE mmol/L  --   --   --   --  1.0   WBC 10*3/mm3 18.96* 19.39* 10.98*   < >  --    HEMOGLOBIN g/dL 10.1* 9.6* 9.5*   < >  --    HEMATOCRIT % 32.0* 31.1* 29.5*   < >  --    MCV fL 93.3 95.1 93.1   < >  --    MCHC g/dL 31.6 30.9* 32.2   < >  --    PLATELETS 10*3/mm3 301 282 179   < >  --     < > = values in this interval not displayed.     Results from last 7 days   Lab Units 08/15/20  0412 08/14/20  0058 08/13/20  0119  08/12/20  0420 08/11/20  2129   SODIUM mmol/L 137 140 139  --  138 135*   POTASSIUM mmol/L 3.6 3.8 3.7   < > 3.2* 3.2*   MAGNESIUM mg/dL 1.7  --   --   --  1.5*  --    CHLORIDE mmol/L 104 111* 107  --  106 101   CO2 mmol/L 22.1 19.2* 18.2*  --  19.8* 19.8*   BUN mg/dL 16 12 6  --  7 8   CREATININE mg/dL 0.65 0.59 0.58  --  0.60 0.64   EGFR IF NONAFRICN AM mL/min/1.73  101 113 116  --  111 103   CALCIUM mg/dL 8.5* 8.2* 8.2*  --  7.9* 8.5*   GLUCOSE mg/dL 182* 188* 219*  --  118* 133*   ALBUMIN g/dL  --   --   --   --  2.77* 3.23*   BILIRUBIN mg/dL  --   --   --   --  1.2 1.3*   ALK PHOS U/L  --   --   --   --  62 67   AST (SGOT) U/L  --   --   --   --  15 16   ALT (SGPT) U/L  --   --   --   --  9 11    < > = values in this interval not displayed.   Estimated Creatinine Clearance: 145.8 mL/min (by C-G formula based on SCr of 0.65 mg/dL).  No results found for: AMMONIA    Glucose   Date/Time Value Ref Range Status   08/15/2020 0929 161 (H) 70 - 130 mg/dL Final   08/15/2020 0139 185 (H) 70 - 130 mg/dL Final     No results found for: HGBA1C  No results found for: TSH, FREET4    Blood Culture   Date Value Ref Range Status   08/11/2020 No growth at 2 days  Preliminary   08/11/2020 No growth at 2 days  Preliminary     Urine Culture   Date Value Ref Range Status   08/11/2020 No growth  Final     No results found for: WOUNDCX  No results found for: STOOLCX  Respiratory Culture   Date Value Ref Range Status   08/12/2020   Final    Moderate growth (3+) Normal Respiratory Miya: NO S.aureus/MRSA or Pseudomonas aeruginosa     Pain Management Panel     Pain Management Panel Latest Ref Rng & Units 8/11/2020 5/9/2017    AMPHETAMINES SCREEN, URINE Negative Positive(A) Negative    BARBITURATES SCREEN Negative Negative Negative    BENZODIAZEPINE SCREEN, URINE Negative Negative Negative    BUPRENORPHINEUR Negative Negative Negative    COCAINE SCREEN, URINE Negative Negative Negative    METHADONE SCREEN, URINE Negative Negative Negative            ----------------------------------------------------------------------------------------------------------------------  Imaging Results (Last 24 Hours)     Procedure Component Value Units Date/Time    XR Chest 1 View [[id number]] Resulted:  08/15/20 1023     Updated:  08/15/20 1040    XR Chest 1 View [[id number]] Collected:  08/15/20 0548     Updated:   08/15/20 0550    Narrative:       CR Chest 1 Vw    INDICATION:   Evaluate endotracheal tube and orogastric tube placement     COMPARISON:    8/14/2020    FINDINGS:  1 portable AP view(s) of the chest.    Endotracheal tube tip is 3 cm above the selina. Nasogastric tube is in the stomach    Dense bilateral infiltrates are present and much worse on yesterday's exam       Impression:       Increase in bilateral diffuse infiltrates suggesting ARDS.    Endotracheal tube and nasal gastric tube are in good position    Signer Name: Fady Laura MD   Signed: 8/15/2020 5:48 AM   Workstation Name: [email or web address]    [facility]    XR Chest 1 View [[id number]] Collected:  08/14/20 1351     Updated:  08/14/20 1524    Narrative:       EXAMINATION: XR CHEST 1 VW-      CLINICAL INDICATION:     dyspnea; J18.9-Pneumonia, unspecified organism     TECHNIQUE:  XR CHEST 1 VW-      COMPARISON: 1120      FINDINGS:      Interval worsening of diffuse bilateral airspace disease. Decreased lung  volumes.   Heart size, mediastinum, and pulmonary vascularity are unremarkable.   No pneumothorax.   No effusions.   No acute osseous findings.          Impression:       Interval worsening of diffuse lateral airspace disease with  decreased lung volumes.     This report was finalized on 8/14/2020 1:51 PM by Dr. Juan Carlos Smith MD.             ----------------------------------------------------------------------------------------------------------------------    Assessment/Plan       Assessment/Plan     ASSESSMENT:    1.  Sepsis  2.  Acute respiratory failure  3.  Pneumonia    PLAN:  Patient became tachypneic with desaturations and required to be intubated overnight.  She is currently on 90% FiO2 with PEEP of 5.  Afebrile.  WBC worse at 18.9.  CRP trending down at 12.14.  Chest x-ray on 8/15/2020 revealed increase in bilateral diffuse infiltrates, concerning for ARDS.    Respiratory panel PCR negative.  COVID-19 PCR negative.        Urine culture revealed no growth.  Urine drug screen positive for amphetamines.      Blood cultures on 2020 revealed no growth thus far.     CT scan on 2020 revealed diffuse bilateral pneumonia with bilateral mild pleural effusions.  Mediastinal adenopathy, probably reactive-recommend follow-up in 3 months, per radiology.     Fungal serologies ordered and pending.      We continue to recommend bronchoscopy, in the setting of clinical worsening and we are unable to rule out fungal and or Mycobacterium pneumonia.     We recommend to continue with Merrem IV and doxycycline 100 mg IV every 12 hours as we cannot rule out fungal or mycobacterial pneumonia.  As patient is demonstrating worsening from respiratory standpoint and required intubation overnight, we will escalate coverage and add micafungin 100 mg IV every 24 hours.    Code Status:   Code Status and Medical Interventions:   Ordered at: 20 0306     Code Status:    CPR     Medical Interventions (Level of Support Prior to Arrest):    Full       DIEGO Vogel  08/15/20  10:41    Electronically signed by Tanesha Mcdowell APRN at 08/15/20 1046     Lydia Hou MD at 08/15/20 1028              [facility] HOSPITALIST PROGRESS NOTE     Patient Identification:  Name:  Manda Al  Age:  40 y.o.  Sex:  female  :  1980  MRN:  [id number]  Visit Number:  [id number]  Primary Care Provider:  Adali Sharpe APRN    Length of stay:  3    Chief complaint:  40 y.o. old female admitted with Cough; Fever; and Shortness of Breath          Subjective:    Patient seen with nursing staff.  No family present at bedside.  Events overnight noted.  Patient became increasingly hypoxemic overnight and initially was put on high flow and then BiPAP.  Patient was eventually intubated and put on mechanical ventilator at this morning due to persistent tachypnea, respiratory distress.  Patient had central line placement due to low blood  patient was started on norepinephrine.  Patient received Lasix overnight and put out about 2 L of urine.   Currently patient is intubated and on vent.  Unable to help with HPI.     Patient is currently on norepinephrine, fentanyl and propofol for sedation.    Procedures:  8/15/2020: Intubation and mechanical ventilation  8/15/2020: Central line placement       Current [facility] Meds:    budesonide-formoterol 2 puff Inhalation BID - RT   cetirizine 10 mg Oral Daily   chlorhexidine 15 mL Mouth/Throat Q12H   doxycycline 100 mg Intravenous Q12H   famotidine 20 mg Intravenous Q12H   fluticasone 2 spray Nasal Daily   insulin aspart 0-9 Units Subcutaneous Q6H   ipratropium 0.5 mg Nebulization 4x Daily - RT   lactobacillus acidophilus 1 capsule Oral Daily   magnesium sulfate 4 g Intravenous Once   meropenem 1 g Intravenous Q8H   methylPREDNISolone sodium succinate 40 mg Intravenous Q12H   micafungin (MYCAMINE)  mg Intravenous Q24H   nicotine 1 patch Transdermal Q24H   potassium chloride 40 mEq Oral Q4H   QUEtiapine 100 mg Oral Nightly   sodium chloride 10 mL Intravenous Q12H   sodium chloride 10 mL Intravenous Q12H   sodium chloride 10 mL Intravenous Q12H   sodium chloride 10 mL Intravenous Q12H       fentaNYL Citrate     norepinephrine 0.02-0.3 mcg/kg/min Last Rate: 0.02 mcg/kg/min (08/15/20 0834)   propofol 5-50 mcg/kg/min Last Rate: 40 mcg/kg/min (08/15/20 0649)     ----------------------------------------------------------------------------------------------------------------------  Vital Signs:  Temp:  [97.6 °F (36.4 °C)-101 °F (38.3 °C)] 98.8 °F (37.1 °C)  Heart Rate:  [] 81  Resp:  [12-46] 18  BP: ()/() 116/73  FiO2 (%):  [90 %-100 %] 90 %      08/13/20  0400 08/14/20  0400 08/15/20  0649   Weight: 107 kg (234 lb 14.4 oz) 106 kg (234 lb 6.4 oz) 103 kg (227 lb 9.6 oz)     Body mass index is 34.61 kg/m².    Intake/Output Summary (Last 24 hours) at 8/15/2020 1028  Last data filed at 8/15/2020  0850  Gross per 24 hour   Intake 1731.66 ml   Output 3525 ml   Net -1793.34 ml     NPO Diet  ----------------------------------------------------------------------------------------------------------------------  Physical exam:  Constitutional: On vent.   HENT:  Head:  Normocephalic and atraumatic.    Eyes:  Pupils are equal, round, and reactive to light.    Cardiovascular:  Regular rate and rhythm. S1+S2.   Pulmonary/Chest: Bilateral inspiratory crackles and expiratory rhonchi.  Abdominal:  Soft. Non-tender. No viscera palpable.  Bowel sounds audible.   Musculoskeletal: No deformity or joint swelling.   Peripheral vascular: Bilateral dorsalis pedis palpable.   Neurological: Sedated.  Skin:  Skin is warm and dry. No rash noted. No pallor.   ----------------------------------------------------------------------------------------------------------------------  Tele: Sinus tachycardia with heart rate around 100  ----------------------------------------------------------------------------------------------------------------------      Results from last 7 days   Lab Units 08/15/20  0412 08/14/20  0058 08/13/20  0119  08/11/20  2254   CRP mg/dL 12.14* 18.60* 30.73*   < > 21.91*   LACTATE mmol/L  --   --   --   --  1.0   WBC 10*3/mm3 18.96* 19.39* 10.98*   < >  --    HEMOGLOBIN g/dL 10.1* 9.6* 9.5*   < >  --    HEMATOCRIT % 32.0* 31.1* 29.5*   < >  --    MCV fL 93.3 95.1 93.1   < >  --    MCHC g/dL 31.6 30.9* 32.2   < >  --    PLATELETS 10*3/mm3 301 282 179   < >  --     < > = values in this interval not displayed.     Results from last 7 days   Lab Units 08/15/20  0554   PH, ARTERIAL pH units 7.385   PO2 ART mm Hg 103.0   PCO2, ARTERIAL mm Hg 45.0   HCO3 ART mmol/L 26.9*     Results from last 7 days   Lab Units 08/15/20  0412 08/14/20  0058 08/13/20  0119  08/12/20  0420 08/11/20  2129   SODIUM mmol/L 137 140 139  --  138 135*   POTASSIUM mmol/L 3.6 3.8 3.7   < > 3.2* 3.2*   MAGNESIUM mg/dL 1.7  --   --   --  1.5*  --     CHLORIDE mmol/L 104 111* 107  --  106 101   CO2 mmol/L 22.1 19.2* 18.2*  --  19.8* 19.8*   BUN mg/dL 16 12 6  --  7 8   CREATININE mg/dL 0.65 0.59 0.58  --  0.60 0.64   EGFR IF NONAFRICN AM mL/min/1.73 101 113 116  --  111 103   CALCIUM mg/dL 8.5* 8.2* 8.2*  --  7.9* 8.5*   GLUCOSE mg/dL 182* 188* 219*  --  118* 133*   ALBUMIN g/dL  --   --   --   --  2.77* 3.23*   BILIRUBIN mg/dL  --   --   --   --  1.2 1.3*   ALK PHOS U/L  --   --   --   --  62 67   AST (SGOT) U/L  --   --   --   --  15 16   ALT (SGPT) U/L  --   --   --   --  9 11    < > = values in this interval not displayed.   Estimated Creatinine Clearance: 145.8 mL/min (by C-G formula based on SCr of 0.65 mg/dL).    No results found for: AMMONIA      Blood Culture   Date Value Ref Range Status   08/11/2020 No growth at 24 hours  Preliminary   08/11/2020 No growth at 24 hours  Preliminary     Urine Culture   Date Value Ref Range Status   08/11/2020 No growth  Final     No results found for: WOUNDCX  No results found for: STOOLCX    I have personally looked at the labs and they are summarized above.  ----------------------------------------------------------------------------------------------------------------------  Imaging Results (Last 24 Hours)     Procedure Component Value Units Date/Time    XR Chest 1 View [[id number]] Resulted:  08/15/20 1023     Updated:  08/15/20 1023    XR Chest 1 View [[id number]] Collected:  08/15/20 0548     Updated:  08/15/20 0550    Narrative:       CR Chest 1 Vw    INDICATION:   Evaluate endotracheal tube and orogastric tube placement     COMPARISON:    8/14/2020    FINDINGS:  1 portable AP view(s) of the chest.    Endotracheal tube tip is 3 cm above the selina. Nasogastric tube is in the stomach    Dense bilateral infiltrates are present and much worse on yesterday's exam       Impression:       Increase in bilateral diffuse infiltrates suggesting ARDS.    Endotracheal tube and nasal gastric tube are in good position    Signer  Name: Fady Laura MD   Signed: 8/15/2020 5:48 AM   Workstation Name: [email or web address]    [facility]    XR Chest 1 View [[id number]] Collected:  08/14/20 1351     Updated:  08/14/20 1524    Narrative:       EXAMINATION: XR CHEST 1 VW-      CLINICAL INDICATION:     dyspnea; J18.9-Pneumonia, unspecified organism     TECHNIQUE:  XR CHEST 1 VW-      COMPARISON: 1120      FINDINGS:      Interval worsening of diffuse bilateral airspace disease. Decreased lung  volumes.   Heart size, mediastinum, and pulmonary vascularity are unremarkable.   No pneumothorax.   No effusions.   No acute osseous findings.          Impression:       Interval worsening of diffuse lateral airspace disease with  decreased lung volumes.     This report was finalized on 8/14/2020 1:51 PM by Dr. Juan Carlos Smith MD.           ----------------------------------------------------------------------------------------------------------------------  Assessment and Plan:    -Septic shock d/t PNA  Patient has had worsening of her sepsis.  She is currently on norepinephrine to keep her MAP >65 mmHg.  Systolic blood pressure had gone down to 65 but is improved now and is up to 120 now.   CRP continues to improve and is down to 12 from 18 yesterday, WBC count is unchanged since yesterday and remains elevated.  Blood cultures have been negative so far.  Respiratory culture showing moderate growth of normal respiratory katherine.  Patient is on meropenem, doxycycline and micafungin added by ID.  Appreciate input from ID.    -Acute hypoxemic respiratory failure d/t PNA and COPD  Patient has had worsening of her respiratory status.  She is currently intubated and on vent and x-ray shows diffuse bilateral infiltrates to create ARDS.  Patient is currently on 90% FiO2 and maintaining her SPO2 >90%.  Patient had put out about 2 L last night after receiving Lasix.  I will give her another dose of Lasix and continue to monitor.  I will also called and  discussed with the intensivist in [facility] for expert opinion and advice since no intensivist is available.    -Bilateral pneumonia  CT scan of the chest on admission showed diffuse bilateral pneumonia with small bilateral effusions and mild mediastinal adenopathy.  Chest x-ray showed worsening diffuse bilateral infiltrates.    Respiratory culture has not shown any growth.  Legionella antigen, Streptococcus pneumonia antigen and respiratory panel is negative.  Continue antibiotics as outlined above.  Fungal serologies sent by infectious diseases.  Patient had some blood tinged sputum yesterday but has not had much secretions from ET tube.    -Acute COPD exacerbation  Continue Atrovent nebs, Symbicort and Solu-Medrol.  Continue antibiotics as outlined above.    -Mediastinal adenopathy  Likely reactive due to bilateral pneumonia.  Patient will need repeat imaging to document clearance.    -Essential hypertension  Currently patient has septic shock and antihypertensives are on hold.    -Crohn's disease  Patient is not on any maintenance medications.  Patient did not have any abdominal pain, diarrhea or hematochezia.    -Tobacco abuse/dependence  Patient is currently sedated and on vent.  Previously I discussed with the patient the dangers of continued tobacco use and I have advised her to quit smoking.  Patient was agreeable to quit.    Activity: Bedrest  Nutrition: N.p.o.  Fluids: None  DVT prophylaxis: SCDs  GI prophylaxis: PPI    The patient is high risk due to: Respiratory failure, sepsis, pneumonia, COPD exacerbation    I discussed the patient's findings and my recommendations with patient and nursing staff.    Lydia Hou MD  08/15/20  10:28     Time spent: 40 minutes    Electronically signed by Lydia Hou MD at 08/15/20 7592     Pinky Corrales DO at 08/15/20 8267        Patient continued to be tachypnic in the 40s-50s despite pressure support with CPAP. I discussed with patient  regarding intubation and she is agreeable. Lungs reveal coarse bilateral breath sounds. Heart is tachycardic rate; regular rhythm. No significant lower extremity edema.     Patient intubated by me; see procedure note for details. I have started the patient on propofol and fentanyl for sedation. I have placed an OG tube to 70 cm at the lip. I have made initial ventilator settings; will repeat ABG in 1 hour and make changes based on results. Will repeat IV Lasix later this morning. I have ordered a eckert catheter for accurate measurement of intake and output and to assess hemodynamic stability.  CXR has been ordered for tube placement and is currently pending; will follow up on results.     Critical Care time: 35 minutes    Electronically signed by Pinky Corrales DO at 08/15/20 0505     Progress Notes signed by Kyle Major MD at 08/14/20 9516         Nurse Practitioner - Brief Progress Note  PERMANENT  08/14/2020 22:30    [facility]  [facility] - [facility] - [facility] - [place], [place] ([facility])    AMINATA KUMAR    Date of Service 08/14/2020 22:30    HPI/Events of Note AICU Provider Assessment Note    39 y/o female admitted to ICU with Acute hypoxic respiratory failure, severe sepsis likely secondary to pneumonia    Hx of anxiety, asthma, Crohn's disease, tobacco abuse, bipolar disorder, HTN, schizoaffective disorder    Pt admitted to hospital on 8/11/2020 with cough, fever, SOB, sepsis/PNA. Urine drug screen positive for amphetamine. CT chest 8/12/2020: Diffuse bilateral pneumonia with small bilateral pleural effusions; mild mediastinal adenopathy, probably reactive;   consider follow-up chest CT in 3 months.    While on the floor, pt was hypoxic and tachypeneic. O2 sat 88% on 6L nasal cannula. ABG on 6L: pH 7.47, pCO2-29, pO2-45, HCO3-21. Placed on nonrebreather mask. Lasix 40 mg IV x1 given.  CXR 8/14/2020: Interval worsening of diffuse lateral airspace   disease with decreased lung volumes. Pt was  transferred to ICU for possible BiPAP.    Current VS:  ST, /74, RR 29, O2 sat 92% on HFNC, temp 100.9 F, GCS 15    Labs: Potassium 3.8, chloride 111, sodium 140, glucose 188, creatinine 0.59, calcium 8.2, magnesium 1.5, , lactate 1, Hgb 9.6/Hct 31.1, WBC 19, platelets 282, CRP 21 -> 18. Respiratory panel PCR negative.  COVID-19 PCR negative.    ECHO 8/14/2020: EF 61-65%    EKG 8/11/2020: Normal sinus rhythm rate 95. Nonspecific ST abnormality. QTc 457    Assessment and Plan:    Acute hypoxic respiratory failure, severe sepsis likely secondary to pneumonia  - On bubble high flow nasal cannula with O2 sat 92%. BiPAP PRN.   - Symbicort, Zyrtec, Flonase, Atrovent, Nicotine patch  - Lasix 40 mg IV x1 given. IV fluids heplock. Monitor urine output. Add BNP  - ID following.  Cannot rule out fungal or mycobacterial pneumonia per note. Continue Doxycycline, Meropenem  - Solumedrol 40 mg IV q12h. Accuchecks SSI q6h   - Electrolyte replacement protocol. BMP, Mg, Phos and CBC in the AM ordered  - Crohn's disease: Patient reports she is currently on no maintenance medications  -Mediastinal adenopathy that is likely reactive: Recommend repeat CT scan of the chest in approximately 3 months.  - Anemia, no active bleeding. Monitor H&H    __Y___   Video Assessment performed  __Y___   Most recent labs reviewed  __Y___   Vital Signs reviewed  __Y___   Best Practices addressed:                 VTE prophylaxis: SCDs                 SUP (when indicated): Protonix                 Glycemic control:                      Please notify bedside physician when present or [facility] if glc > 180 X 2                 Sepsis guidelines: Yes                 Lung protective strategy: N/A                 Targeted Temperature Management: N/A    __Y___     Spoke with bedside RN  __Y___     Orders written      Contact [facility] for any needs if bedside physician is not present.  Note drafted by Joe Watkins  ACNP-BC      Interventions Major-Respiratory failure - evaluation and management, Sepsis - evaluation and management  Intermediate-Best-practice therapies (e.g. VTE, beta blocker, etc.), Communication with other healthcare providers and/or family        Electronically Signed by: Joe Watkins (ANP,CCRN) on 08/14/2020 22:53    Annotated By: Kyle Major)    Date: 08/14/20 23:38  ===============    Reviewed AICU TONI assessment above. Video evaluation performed. Ms. Al is on high flow nasal cannula. She is tachypneic and mildly labored. The CT chest images from 08/11/20 are reviewed, bilateral opacities in all lobes with central predominance and   relative central sparing. Consider pneumonia and also consider pulmonary edema. The CXR 08/14/20 reveals dense bilateral opacities, perhaps more dense in the left lower lung fields. Respiratory status is tenuous, and if any clinical worsening she may   need endotracheal intubation. Continue closel monitoring in the ICU. Tele ICU will remain available to help; please call tele ICU with any clinical needs.    Electronically signed by Kyle Major MD at 08/14/20 4913     Pinky Corrales DO at 08/14/20 1957        I was contacted by patient's nurse regarding hypoxia and tachypnea.  Patient was saturating well on 2 L per nasal cannula until later this afternoon or evening when she states that she ambulated to the restroom and became severely dyspneic.  Patient states that since then she has not yet recovered and continues to report shortness of air.  Patient is currently saturating 88% via 6 L per nasal cannula    Physical exam reveals an acutely ill female sitting in her bedside recliner as she states she cannot lie supine.  Patient is tachypneic and appears in mild distress.  Heart is regular rate and tachycardic rhythm without obvious murmur or rub.  Lungs reveal diminished bilateral breath sounds with occasional left lower lobe rales heard anteriorly.    -Stat  ABG ordered  -Discontinue intravenous fluids  -40 mg IV Lasix x 1 now    Discussed with patient that pending her blood gas results, we may need to consider BIPAP or CPAP. Also explained to the patient that should he condition continue to decline, we may need to consider intubation.     Patient verbalized understanding and was agreeable.     Patient's nurse present at bedside.         Patient to be placed on bubble nasal cannula now; titrate for saturations 90-95%. Will monitor for response to Lasix.    Patient got up to bedside commode and became very dyspnic with oxygen saturations dropping to the 60% via 10 L Nasal Cannula. The patient's oxygen was increased to 15 L bubble nasal cannula with no significant improvement in her O2 saturations. Patient was placed on a 100% NRB and saturation did improve to 90%. Explained to patient that we would try an external urinary catheter. Hopefully can place back on bubble nasal cannula once her tachypnea improves and she stabilizes.     Electronically signed by Pinky Corrales DO at 20     Tanesha Mcdowell APRN at 20 1237                     PROGRESS NOTE         Patient Identification:  Name:  Manda Al  Age:  40 y.o.  Sex:  female  :  1980  MRN:  [id number]  Visit Number:  [id number]  Primary Care Provider:  Adali Sharpe APRN         LOS: 2 days       ----------------------------------------------------------------------------------------------------------------------  Subjective       Chief Complaints:    Cough; Fever; and Shortness of Breath        Interval History:      Patient reports continued shortness of breath this morning.  She reports productive cough, states sputum is blood-tinged.  She is on 2 L via nasal cannula.  Afebrile, no diarrhea.  WBC trending up at 19.39.  CRP improving at 18.6.    Review of Systems:    Constitutional: Reported fever at home.  Reports fatigue.    Eyes: no eye drainage, itching or redness.  HEENT:  no mouth sores, dysphagia or nose bleed.  Respiratory: Reports shortness of breath and cough.  Cardiovascular: no chest pain, no palpitations, no orthopnea.  Gastrointestinal: no nausea, vomiting or diarrhea. No abdominal pain, hematemesis or rectal bleeding.  Genitourinary: no dysuria or polyuria.  Hematologic/lymphatic: no lymph node abnormalities, no easy bruising or easy bleeding.  Musculoskeletal: no muscle or joint pain.  Skin: No rash and no itching.  Neurological: no loss of consciousness, no seizure, no headache.  Behavioral/Psych: no depression or suicidal ideation.  Endocrine: no hot flashes.  Immunologic: negative.  ----------------------------------------------------------------------------------------------------------------------      Objective       [facility] Meds:    budesonide-formoterol 2 puff Inhalation BID - RT   cetirizine 10 mg Oral Daily   doxycycline 100 mg Intravenous Q12H   FLUoxetine 20 mg Oral Daily   fluticasone 2 spray Nasal Daily   ipratropium 0.5 mg Nebulization 4x Daily - RT   lactobacillus acidophilus 1 capsule Oral Daily   meropenem 1 g Intravenous Q8H   methylPREDNISolone sodium succinate 40 mg Intravenous Q12H   nicotine 1 patch Transdermal Q24H   pantoprazole 40 mg Oral QAM   QUEtiapine 100 mg Oral Nightly   sodium chloride 10 mL Intravenous Q12H       sodium chloride 50 mL/hr Last Rate: 50 mL/hr (08/14/20 1220)     ----------------------------------------------------------------------------------------------------------------------    Vital Signs:  Temp:  [97.6 °F (36.4 °C)-98.2 °F (36.8 °C)] 97.6 °F (36.4 °C)  Heart Rate:  [] 99  Resp:  [13-36] 24  BP: (100-135)/(52-93) 135/81  Mean Arterial Pressure (Non-Invasive) for the past 24 hrs (Last 3 readings):   Noninvasive MAP (mmHg)   08/14/20 1200 100   08/14/20 1104 110   08/14/20 0900 91     SpO2 Percentage    08/14/20 1000 08/14/20 1005 08/14/20 1200   SpO2: 94% 92% 91%     SpO2:  [89 %-99 %] 91 %  on  Flow  (L/min):  [2] 2;   Device (Oxygen Therapy): humidified;nasal cannula    Body mass index is 35.65 kg/m².  Wt Readings from Last 3 Encounters:   08/14/20 106 kg (234 lb 6.4 oz)   09/18/18 95.3 kg (210 lb)   02/21/18 105 kg (232 lb)        Intake/Output Summary (Last 24 hours) at 8/14/2020 1238  Last data filed at 8/14/2020 1104  Gross per 24 hour   Intake 2565.16 ml   Output 3975 ml   Net -1409.84 ml     Diet Regular  ----------------------------------------------------------------------------------------------------------------------      Physical Exam:    Constitutional:  Well-developed and well-nourished.  No respiratory distress.      HENT:  Head: Normocephalic and atraumatic.  Mouth:  Moist mucous membranes.    Eyes:  Conjunctivae and EOM are normal.  No scleral icterus.  Neck:  Neck supple.  No JVD present.    Cardiovascular:  Normal rate, regular rhythm and normal heart sounds with no murmur. No edema.  Pulmonary/Chest: Decreased breath sounds bilaterally with some scattered wheezes.  Abdominal:  Soft.  Bowel sounds are normal.  No distension and no tenderness.   Musculoskeletal:  No edema, no tenderness, and no deformity.  No swelling or redness of joints.  Neurological:  Alert and oriented to person, place, and time.  No facial droop.  No slurred speech.   Skin:  Skin is warm and dry.  No rash noted.  No pallor.   Psychiatric:  Normal mood and affect.  Behavior is normal.  ----------------------------------------------------------------------------------------------------------------------          Results from last 7 days   Lab Units 08/12/20  0233   PH, ARTERIAL pH units 7.456*   PO2 ART mm Hg 76.0*   PCO2, ARTERIAL mm Hg 30.7*   HCO3 ART mmol/L 21.6     Results from last 7 days   Lab Units 08/14/20  0058 08/13/20  0119 08/12/20  0420 08/11/20  2254   CRP mg/dL 18.60* 30.73* 24.89* 21.91*   LACTATE mmol/L  --   --   --  1.0   WBC 10*3/mm3 19.39* 10.98* 11.45*  --    HEMOGLOBIN g/dL 9.6* 9.5* 10.1*  --     HEMATOCRIT % 31.1* 29.5* 32.3*  --    MCV fL 95.1 93.1 93.6  --    MCHC g/dL 30.9* 32.2 31.3*  --    PLATELETS 10*3/mm3 282 179 213  --      Results from last 7 days   Lab Units 08/14/20  0058 08/13/20  0119 08/12/20  1532 08/12/20  0420 08/11/20  2129   SODIUM mmol/L 140 139  --  138 135*   POTASSIUM mmol/L 3.8 3.7 3.7 3.2* 3.2*   MAGNESIUM mg/dL  --   --   --  1.5*  --    CHLORIDE mmol/L 111* 107  --  106 101   CO2 mmol/L 19.2* 18.2*  --  19.8* 19.8*   BUN mg/dL 12 6  --  7 8   CREATININE mg/dL 0.59 0.58  --  0.60 0.64   EGFR IF NONAFRICN AM mL/min/1.73 113 116  --  111 103   CALCIUM mg/dL 8.2* 8.2*  --  7.9* 8.5*   GLUCOSE mg/dL 188* 219*  --  118* 133*   ALBUMIN g/dL  --   --   --  2.77* 3.23*   BILIRUBIN mg/dL  --   --   --  1.2 1.3*   ALK PHOS U/L  --   --   --  62 67   AST (SGOT) U/L  --   --   --  15 16   ALT (SGPT) U/L  --   --   --  9 11   Estimated Creatinine Clearance: 163.1 mL/min (by C-G formula based on SCr of 0.59 mg/dL).  No results found for: AMMONIA    No results found for: HGBA1C, POCGLU  No results found for: HGBA1C  No results found for: TSH, FREET4    Blood Culture   Date Value Ref Range Status   08/11/2020 No growth at 2 days  Preliminary   08/11/2020 No growth at 2 days  Preliminary     Urine Culture   Date Value Ref Range Status   08/11/2020 No growth  Final     No results found for: WOUNDCX  No results found for: STOOLCX  Respiratory Culture   Date Value Ref Range Status   08/12/2020   Final    Moderate growth (3+) Normal Respiratory Miya: NO S.aureus/MRSA or Pseudomonas aeruginosa     Pain Management Panel     Pain Management Panel Latest Ref Rng & Units 8/11/2020 5/9/2017    AMPHETAMINES SCREEN, URINE Negative Positive(A) Negative    BARBITURATES SCREEN Negative Negative Negative    BENZODIAZEPINE SCREEN, URINE Negative Negative Negative    BUPRENORPHINEUR Negative Negative Negative    COCAINE SCREEN, URINE Negative Negative Negative    METHADONE SCREEN, URINE Negative Negative  Negative            ----------------------------------------------------------------------------------------------------------------------  Imaging Results (Last 24 Hours)     ** No results found for the last 24 hours. **          ----------------------------------------------------------------------------------------------------------------------    Assessment/Plan       Assessment/Plan     ASSESSMENT:    1.  Sepsis  2.  Acute respiratory failure  3.  Pneumonia    PLAN:  Patient reports continued shortness of breath this morning.  She reports productive cough, states sputum is blood-tinged.  She is on 2 L via nasal cannula.  Afebrile, no diarrhea.  WBC trending up at 19.39.  CRP improving at 18.6.    Respiratory panel PCR negative.  COVID-19 PCR negative.       Urinalysis on 11/20/2020 revealed 6-12 WBCs, trace leukocytes, negative nitrite, +1 bacteria.  Urine culture revealed no growth.  Urine drug screen positive for amphetamines.       CT scan on 8/11/2020 revealed diffuse bilateral pneumonia with bilateral mild pleural effusions.  Mediastinal adenopathy, probably reactive-recommend follow-up in 3 months, per radiology.     Fungal serologies ordered and pending.  We recommend bronchoscopy as we cannot rule out fungal and or Mycobacterium pneumonia.    Leukocytosis could be in relation to steroid therapy, as CRP is trending down.     We recommend to continue with Merrem IV and doxycycline 100 mg IV every 12 hours as we cannot rule out fungal or mycobacterial pneumonia.  If any further worsening from respiratory standpoint we will consider starting empiric fungal coverage.    Code Status:   Code Status and Medical Interventions:   Ordered at: 08/12/20 0306     Code Status:    CPR     Medical Interventions (Level of Support Prior to Arrest):    Full       DIEGO Vogel  08/14/20  12:38    Electronically signed by Tanesha Mcdowell APRN at 08/15/20 1041     Lydia Hou MD at 08/14/20 1022               [facility] HOSPITALIST PROGRESS NOTE     Patient Identification:  Name:  Manda Al  Age:  40 y.o.  Sex:  female  :  1980  MRN:  [id number]  Visit Number:  [id number]  Primary Care Provider:  Adali Sharpe APRN    Length of stay:  2    Chief complaint:  40 y.o. old female admitted with Cough; Fever; and Shortness of Breath          Subjective:    Patient seen with nursing staff.  No family present at bedside.  Patient is sitting in bed and states that she is feeling a little better.  Patient still continues to have cough productive of thin sputum which is blood-tinged today.  Patient denies any chest pain or palpitations.  She still continues to remain dyspneic which is slightly improved since yesterday.         Current Hospital Meds:    budesonide-formoterol 2 puff Inhalation BID - RT   cetirizine 10 mg Oral Daily   doxycycline 100 mg Intravenous Q12H   FLUoxetine 20 mg Oral Daily   fluticasone 2 spray Nasal Daily   heparin (porcine) 5,000 Units Subcutaneous Q12H   ipratropium-albuterol 3 mL Nebulization Q4H - RT   lactobacillus acidophilus 1 capsule Oral Daily   meropenem 1 g Intravenous Q8H   methylPREDNISolone sodium succinate 40 mg Intravenous Q12H   nicotine 1 patch Transdermal Q24H   pantoprazole 40 mg Oral QAM   QUEtiapine 100 mg Oral Nightly   sodium chloride 10 mL Intravenous Q12H       sodium chloride 100 mL/hr Last Rate: 100 mL/hr (20 0559)     ----------------------------------------------------------------------------------------------------------------------  Vital Signs:  Temp:  [98 °F (36.7 °C)-98.2 °F (36.8 °C)] 98 °F (36.7 °C)  Heart Rate:  [] 98  Resp:  [13-36] 36  BP: (100-124)/(52-87) 124/78      08/12/20  0508 20  0400 20  0400   Weight: 106 kg (233 lb 11 oz) 107 kg (234 lb 14.4 oz) 106 kg (234 lb 6.4 oz)     Body mass index is 35.65 kg/m².    Intake/Output Summary (Last 24 hours) at 2020 1022  Last data filed at 2020  0603  Gross per 24 hour   Intake 2815.16 ml   Output 3500 ml   Net -684.84 ml     Diet Regular  ----------------------------------------------------------------------------------------------------------------------  Physical exam:  Constitutional:  Well-developed and well-nourished.     HENT:  Head:  Normocephalic and atraumatic.  Mouth:  Moist mucous membranes.    Eyes:  Conjunctivae and EOM are normal.  Pupils are equal, round, and reactive to light.   Neck:  Neck supple.  No JVD present.    Cardiovascular:  Regular rate and rhythm. S1+S2. No murmur, rubs or gallops.   Pulmonary/Chest: Inspiratory crackles bilateral bases with some expiratory rhonchi.  Abdominal:  Soft. Non-tender. No viscera palpable.  Bowel sounds audible.   Musculoskeletal: No deformity or joint swelling.   Peripheral vascular: Bilateral dorsalis pedis palpable. No edema.   Neurological:  Alert and oriented to person, place, and time.  Cranial nerves grossly intact. Strength bilaterally symmetrical in upper and lower extremities.   Skin:  Skin is warm and dry. No rash noted. No pallor.   ----------------------------------------------------------------------------------------------------------------------  Tele: Sinus tachycardia with heart rate around 100  ----------------------------------------------------------------------------------------------------------------------      Results from last 7 days   Lab Units 08/14/20  0058 08/13/20  0119 08/12/20  0420 08/11/20  2254   CRP mg/dL 18.60* 30.73* 24.89* 21.91*   LACTATE mmol/L  --   --   --  1.0   WBC 10*3/mm3 19.39* 10.98* 11.45*  --    HEMOGLOBIN g/dL 9.6* 9.5* 10.1*  --    HEMATOCRIT % 31.1* 29.5* 32.3*  --    MCV fL 95.1 93.1 93.6  --    MCHC g/dL 30.9* 32.2 31.3*  --    PLATELETS 10*3/mm3 282 179 213  --      Results from last 7 days   Lab Units 08/12/20  0233   PH, ARTERIAL pH units 7.456*   PO2 ART mm Hg 76.0*   PCO2, ARTERIAL mm Hg 30.7*   HCO3 ART mmol/L 21.6     Results from last 7  days   Lab Units 08/14/20  0058 08/13/20  0119 08/12/20  1532 08/12/20  0420 08/11/20  2129   SODIUM mmol/L 140 139  --  138 135*   POTASSIUM mmol/L 3.8 3.7 3.7 3.2* 3.2*   MAGNESIUM mg/dL  --   --   --  1.5*  --    CHLORIDE mmol/L 111* 107  --  106 101   CO2 mmol/L 19.2* 18.2*  --  19.8* 19.8*   BUN mg/dL 12 6  --  7 8   CREATININE mg/dL 0.59 0.58  --  0.60 0.64   EGFR IF NONAFRICN AM mL/min/1.73 113 116  --  111 103   CALCIUM mg/dL 8.2* 8.2*  --  7.9* 8.5*   GLUCOSE mg/dL 188* 219*  --  118* 133*   ALBUMIN g/dL  --   --   --  2.77* 3.23*   BILIRUBIN mg/dL  --   --   --  1.2 1.3*   ALK PHOS U/L  --   --   --  62 67   AST (SGOT) U/L  --   --   --  15 16   ALT (SGPT) U/L  --   --   --  9 11   Estimated Creatinine Clearance: 163.1 mL/min (by C-G formula based on SCr of 0.59 mg/dL).    No results found for: AMMONIA      Blood Culture   Date Value Ref Range Status   08/11/2020 No growth at 24 hours  Preliminary   08/11/2020 No growth at 24 hours  Preliminary     Urine Culture   Date Value Ref Range Status   08/11/2020 No growth  Final     No results found for: WOUNDCX  No results found for: STOOLCX    I have personally looked at the labs and they are summarized above.  ----------------------------------------------------------------------------------------------------------------------  Imaging Results (Last 24 Hours)     ** No results found for the last 24 hours. **        ----------------------------------------------------------------------------------------------------------------------  Assessment and Plan:    -Severe sepsis due to pneumonia   Patient has remained afebrile last 24 hours.  Still continues to have some tachycardia but overall heart rate is improved since admission.  Blood pressure has been stable.  CRP is improving and down to 18 today from 30 yesterday.  Lactic acid was within normal limits.  WBC count has increased to 19.  Blood cultures have not shown any growth so far.  Antibiotic therapy  escalated to meropenem and doxycycline by infectious diseases and input is appreciated.  Continue antibiotics and follow inflammatory markers and culture results.    -Acute hypoxemic respiratory failure d/t PNA and COPD  Continue supplemental oxygen via nasal cannula as needed to keep SPO2 >90%.  I will check echocardiogram.    -Bilateral pneumonia  CT scan of the chest showed diffuse bilateral pneumonia with small bilateral effusions and mild mediastinal adenopathy.  Respiratory culture has not shown any growth so far.  Legionella antigen, Streptococcus pneumonia antigen and respiratory panel is negative.  Continue antibiotics as outlined above.  Fungal serologies sent by infectious diseases.  Patient is having some blood-tinged sputum.  Will discontinue heparin and and albuterol nebs.  We will continue to monitor patient closely in PCU.    -Acute COPD exacerbation  Continue duo nebs, Symbicort and Solu-Medrol.  Continue antibiotics as outlined above.    -Mediastinal adenopathy  Likely reactive due to bilateral pneumonia.  Patient will need repeat imaging to document clearance.    -Essential hypertension  Blood pressure has been on the low side.  Continue to hold antihypertensives.    -Crohn's disease  Patient is not on any maintenance medications.  She denies any abdominal pain or diarrhea.    -Tobacco abuse/dependence  I discussed with the patient the dangers of tobacco use and advised her to quit smoking.    Activity: Up with assist  Nutrition: Regular diet  Fluids: I will decrease IV fluids to 50 mL/h  DVT prophylaxis: Heparin Subcu  GI prophylaxis: PPI    The patient is high risk due to: Respiratory failure, sepsis, pneumonia, COPD exacerbation    I discussed the patient's findings and my recommendations with patient and nursing staff.    Lydia Hou MD  08/14/20  10:22    Electronically signed by Lydia Hou MD at 08/15/20 0000     Lydia Hou MD at 08/13/20 [facility]              [facility]  BEL HOSPITALIST PROGRESS NOTE     Patient Identification:  Name:  Manda Al  Age:  40 y.o.  Sex:  female  :  1980  MRN:  [id number]  Visit Number:  [id number]  Primary Care Provider:  Adali Sharpe APRN    Length of stay:  1    Chief complaint:  40 y.o. old female admitted with Cough; Fever; and Shortness of Breath          Subjective:    Patient seen with nursing staff.  No family present at bedside.  Patient is lying comfortably in bed and said that she is feeling better than yesterday.  Patient still continues to have productive cough and shortness of breath.  She however states that her dyspnea is improved since yesterday.  Patient denies any chest pain or palpitations.           Current Hospital Meds:    budesonide-formoterol 2 puff Inhalation BID - RT   cefTRIAXone 2 g Intravenous Q24H   cetirizine 10 mg Oral Daily   doxycycline 100 mg Intravenous Q12H   FLUoxetine 20 mg Oral Daily   fluticasone 2 spray Nasal Daily   heparin (porcine) 5,000 Units Subcutaneous Q12H   ipratropium-albuterol 3 mL Nebulization Q4H - RT   methylPREDNISolone sodium succinate 40 mg Intravenous Q12H   nicotine 1 patch Transdermal Q24H   pantoprazole 40 mg Oral QAM   QUEtiapine 100 mg Oral Nightly   sodium chloride 10 mL Intravenous Q12H       sodium chloride 100 mL/hr Last Rate: 100 mL/hr (20 0611)     ----------------------------------------------------------------------------------------------------------------------  Vital Signs:  Temp:  [98.2 °F (36.8 °C)-99.3 °F (37.4 °C)] 98.2 °F (36.8 °C)  Heart Rate:  [] 97  Resp:  [19-34] 19  BP: ()/(42-83) 100/60      20  0400 20  0508 20  0400   Weight: 106 kg (232 lb 14.4 oz) 106 kg (233 lb 11 oz) 107 kg (234 lb 14.4 oz)     Body mass index is 35.72 kg/m².    Intake/Output Summary (Last 24 hours) at 2020 1014  Last data filed at 2020 0809  Gross per 24 hour   Intake 2388.06 ml   Output 3000 ml   Net -611.94 ml     Diet  Regular  ----------------------------------------------------------------------------------------------------------------------  Physical exam:  Constitutional:  Well-developed and well-nourished.     HENT:  Head:  Normocephalic and atraumatic.  Mouth:  Moist mucous membranes.    Eyes:  Conjunctivae and EOM are normal.  Pupils are equal, round, and reactive to light.   Neck:  Neck supple.  No JVD present.    Cardiovascular:  Regular rate and rhythm. S1+S2. No murmur, rubs or gallops.   Pulmonary/Chest: Inspiratory crackles bilateral bases.  Abdominal:  Soft. Non-tender. No viscera palpable.  Bowel sounds audible.   Musculoskeletal: No deformity or joint swelling.   Peripheral vascular: Bilateral dorsalis pedis palpable. No edema.   Neurological:  Alert and oriented to person, place, and time.  Cranial nerves grossly intact. Strength bilaterally symmetrical in upper and lower extremities.   Skin:  Skin is warm and dry. No rash noted. No pallor.   ----------------------------------------------------------------------------------------------------------------------  Tele:    ----------------------------------------------------------------------------------------------------------------------      Results from last 7 days   Lab Units 08/13/20  0119 08/12/20 0420 08/11/20 2254 08/11/20 2129   CRP mg/dL 30.73* 24.89* 21.91*  --    LACTATE mmol/L  --   --  1.0  --    WBC 10*3/mm3 10.98* 11.45*  --  16.42*   HEMOGLOBIN g/dL 9.5* 10.1*  --  10.9*   HEMATOCRIT % 29.5* 32.3*  --  32.2*   MCV fL 93.1 93.6  --  89.4   MCHC g/dL 32.2 31.3*  --  33.9   PLATELETS 10*3/mm3 179 213  --  233     Results from last 7 days   Lab Units 08/12/20  0233   PH, ARTERIAL pH units 7.456*   PO2 ART mm Hg 76.0*   PCO2, ARTERIAL mm Hg 30.7*   HCO3 ART mmol/L 21.6     Results from last 7 days   Lab Units 08/13/20  0119 08/12/20  1532 08/12/20 0420 08/11/20 2129   SODIUM mmol/L 139  --  138 135*   POTASSIUM mmol/L 3.7 3.7 3.2* 3.2*   MAGNESIUM  mg/dL  --   --  1.5*  --    CHLORIDE mmol/L 107  --  106 101   CO2 mmol/L 18.2*  --  19.8* 19.8*   BUN mg/dL 6  --  7 8   CREATININE mg/dL 0.58  --  0.60 0.64   EGFR IF NONAFRICN AM mL/min/1.73 116  --  111 103   CALCIUM mg/dL 8.2*  --  7.9* 8.5*   GLUCOSE mg/dL 219*  --  118* 133*   ALBUMIN g/dL  --   --  2.77* 3.23*   BILIRUBIN mg/dL  --   --  1.2 1.3*   ALK PHOS U/L  --   --  62 67   AST (SGOT) U/L  --   --  15 16   ALT (SGPT) U/L  --   --  9 11   Estimated Creatinine Clearance: 166.7 mL/min (by C-G formula based on SCr of 0.58 mg/dL).    No results found for: AMMONIA      Blood Culture   Date Value Ref Range Status   08/11/2020 No growth at 24 hours  Preliminary   08/11/2020 No growth at 24 hours  Preliminary     Urine Culture   Date Value Ref Range Status   08/11/2020 No growth  Final     No results found for: WOUNDCX  No results found for: STOOLCX    I have personally looked at the labs and they are summarized above.  ----------------------------------------------------------------------------------------------------------------------  Imaging Results (Last 24 Hours)     ** No results found for the last 24 hours. **        ----------------------------------------------------------------------------------------------------------------------  Assessment and Plan:    -Severe sepsis due to pneumonia   Patient has been afebrile last 24 hours.  Tachycardia is improved.  Patient continues to have low blood pressure.  CRP is worsened and is up to 30 today from 24 yesterday.  Lactic acid was within normal limits.  WBC count remains unchanged since yesterday.  Blood cultures remain negative.  Currently on Rocephin and doxycycline will continue.  Will consult infectious diseases due to worsening CRP.    -Acute hypoxemic respiratory failure d/t PNA and COPD  Continue supplemental oxygen via nasal cannula as needed to keep SPO2 >90%.    -Bilateral pneumonia  CT scan of the chest showed diffuse bilateral pneumonia with  small bilateral effusions and mild mediastinal adenopathy.  Respiratory culture has not shown any growth so far.  Legionella antigen, Streptococcus pneumonia antigen and respiratory panel is negative.  Continue antibiotics as outlined above.  Pulmonology not available.  Patient may need bronchoscopy.     -Acute COPD exacerbation  Continue duo nebs, Symbicort and Solu-Medrol.  Continue antibiotics as outlined above.    -Mediastinal adenopathy  Likely reactive due to bilateral pneumonia.  Patient will need repeat imaging to document clearance.    -Essential hypertension  Blood pressure has been on the low side.  Continue to hold antihypertensives.    -Crohn's disease  Patient is not on any maintenance medications.  She denies any abdominal pain or diarrhea.    -Tobacco abuse/dependence  I discussed with the patient the dangers of tobacco use and advised her to quit smoking.    Activity: Up with assist  Nutrition: Regular diet  Fluids: NS at 100 mL/h  DVT prophylaxis: Heparin Subcu  GI prophylaxis: PPI    The patient is high risk due to: Respiratory failure, sepsis, pneumonia, COPD exacerbation    I discussed the patient's findings and my recommendations with patient and nursing staff.    Lydia Hou MD  08/13/20  10:14    Electronically signed by Lydia Hou MD at 08/13/20 1844     Lydia Hou MD at 08/12/20 1846        H&P reviewed.   Pt was seen with nursing staff. Pt is complaining of cough and sputum production. She states that her breathing is improved. Pt still continues to complain of shortness of breath.   Labs reviewed.   Continue rocephin and doxycycline. Continue duonebs, pulmicort and will add systemic steroids. Continue to monitor CBC, BMP and CRP.     Electronically signed by Lydia Hou MD at 08/12/20 1902          Consult Notes (last 72 hours) (Notes from 08/12/20 1515 through 08/15/20 1515)      Shravan Ramon MD at 08/15/20 1018          Consults    Patient Care  Team:  Adali Sharpe APRN as PCP - General (Family Medicine)    Chief complaint: Pneumonia with respiratory failure    Subjective     40-year-old female admitted with bilateral pneumonia requiring intubation due to respiratory failure.  The patient has poor venous access and requires pressors on multiple medications and will need central line placement.  No cervical incisions.  No contraindications to central line placement noted.      Review of Systems   Unable to perform ROS: Intubated        Past Medical History:   Diagnosis Date   • Anxiety    • Asthma    • Bipolar disorder (CMS/HCC)    • Crohn disease (CMS/HCC)    • Depression    • Hypertension    • Schizoaffective disorder (CMS/HCC)    ,   Past Surgical History:   Procedure Laterality Date   • APPENDECTOMY     •  SECTION     • CHOLECYSTECTOMY     • COLON SURGERY     • COLONOSCOPY     • MANDIBLE FRACTURE SURGERY     • OVARIAN CYST SURGERY     • TUBAL ABDOMINAL LIGATION     ,   Family History   Problem Relation Age of Onset   • Diabetes Mother    • Anxiety disorder Mother    • Depression Mother    • Bipolar disorder Mother    • COPD Father    • Schizophrenia Father    • Schizophrenia Paternal Grandfather    • Breast cancer Neg Hx    ,   Social History     Tobacco Use   • Smoking status: Current Every Day Smoker     Packs/day: 1.00     Years: 20.00     Pack years: 20.00     Types: Cigarettes   • Smokeless tobacco: Never Used   Substance Use Topics   • Alcohol use: No     Comment: denies   • Drug use: Yes     Types: Marijuana   ,   Medications Prior to Admission   Medication Sig Dispense Refill Last Dose   • fexofenadine (ALLEGRA) 60 MG tablet Take 60 mg by mouth Daily.   2020 at 0930   • FLUoxetine (PROzac) 20 MG capsule Take 1 capsule by mouth Daily. 30 capsule 1 2020 at 0930   • fluticasone (FLONASE) 50 MCG/ACT nasal spray 2 sprays into the nostril(s) as directed by provider Daily.  0 Past Week at Unknown time   •  lisinopril-hydrochlorothiazide (PRINZIDE,ZESTORETIC) 10-12.5 MG per tablet Take 1 tablet by mouth Daily.   8/11/2020 at 0930   • omeprazole (priLOSEC) 20 MG capsule Take 20 mg by mouth Daily As Needed (acid reflux). Before biggest meal of the day    Past Week at Unknown time   • QUEtiapine (SEROquel) 100 MG tablet Take 1 tablet by mouth Every Night. 30 tablet 1 8/10/2020 at Unknown time   • SYMBICORT 80-4.5 MCG/ACT inhaler Inhale 2 puffs 2 (Two) Times a Day As Needed (sob).  0 8/11/2020 at Unknown time   • tiZANidine (ZANAFLEX) 4 MG tablet Take 4 mg by mouth Every 8 (Eight) Hours As Needed for Muscle Spasms.   Past Month at Unknown time   • VENTOLIN  (90 Base) MCG/ACT inhaler Inhale 2 puffs Every 4 (Four) Hours As Needed for Wheezing or Shortness of Air.  0 8/11/2020 at Unknown time   , Scheduled Meds:    budesonide-formoterol 2 puff Inhalation BID - RT   cetirizine 10 mg Oral Daily   chlorhexidine 15 mL Mouth/Throat Q12H   doxycycline 100 mg Intravenous Q12H   famotidine 20 mg Intravenous Q12H   fluticasone 2 spray Nasal Daily   insulin aspart 0-9 Units Subcutaneous Q6H   ipratropium 0.5 mg Nebulization 4x Daily - RT   lactobacillus acidophilus 1 capsule Oral Daily   magnesium sulfate 4 g Intravenous Once   meropenem 1 g Intravenous Q8H   methylPREDNISolone sodium succinate 40 mg Intravenous Q12H   micafungin (MYCAMINE)  mg Intravenous Q24H   nicotine 1 patch Transdermal Q24H   potassium chloride 40 mEq Oral Q4H   QUEtiapine 100 mg Oral Nightly   sodium chloride 10 mL Intravenous Q12H   , Continuous Infusions:    fentaNYL Citrate     norepinephrine 0.02-0.3 mcg/kg/min Last Rate: 0.02 mcg/kg/min (08/15/20 0834)   propofol 5-50 mcg/kg/min Last Rate: 40 mcg/kg/min (08/15/20 0649)   , PRN Meds:  •  acetaminophen  •  dextrose  •  dextrose  •  glucagon (human recombinant)  •  guaiFENesin-dextromethorphan  •  magnesium sulfate **OR** magnesium sulfate **OR** magnesium sulfate  •  nicotine polacrilex  •   potassium chloride **OR** potassium chloride **OR** potassium chloride  •  sodium chloride  •  sodium chloride  •  tiZANidine and Allergies:  Imuran [azathioprine]    Objective      Vital Signs  Temp:  [97.6 °F (36.4 °C)-101 °F (38.3 °C)] 98.8 °F (37.1 °C)  Heart Rate:  [] 81  Resp:  [12-46] 18  BP: ()/() 116/73  FiO2 (%):  [90 %-100 %] 90 %    Physical Exam   Constitutional: She is oriented to person, place, and time. She appears well-developed.   HENT:   Head: Normocephalic and atraumatic.   Mouth/Throat: Mucous membranes are normal.   Eyes: Pupils are equal, round, and reactive to light. Conjunctivae are normal.   Neck: Neck supple. No JVD present. No tracheal deviation present. No thyromegaly present.   Cardiovascular: Normal rate and regular rhythm. Exam reveals no gallop and no friction rub.   No murmur heard.  Pulmonary/Chest: Effort normal. She has decreased breath sounds. She has wheezes.   Abdominal: Soft. She exhibits no distension. There is no splenomegaly or hepatomegaly. There is no tenderness. No hernia.   Musculoskeletal: Normal range of motion. She exhibits no deformity.   Neurological: She is alert and oriented to person, place, and time.   Skin: Skin is warm and dry.   Psychiatric: She has a normal mood and affect.       Results Review:    I reviewed the patient's new clinical results.        Assessment/Plan       Pneumonia of both lungs due to infectious organism      Assessment:  (40-year-old female with respiratory failure secondary to bilateral pneumonia and possibly developing a margins.  She has poor venous access and need for central line placement.).     Plan:   (Bedside central line placement performed.  Chest x-ray ordered to confirm placement.).       I discussed the patients findings and my recommendations with family and nursing staff    Shravan Ramon MD  08/15/20  10:18            Electronically signed by Shravan Ramon MD at 08/15/20 1019     [facility]  MD Bess at 08/15/20 1009      Consult Orders    1. Inpatient Cardiology Consult [[id number]] ordered by Pinky Corrales DO at 08/14/20 1946                Consult Note        Date of Admit: 8/11/2020  Date of Consult: 08/15/20  No ref. provider found          Reason for consultation: Mitral valve stenosis    Subjective       Subjective       History of Presenting Illness: Manda Al is a 40 y.o. female  presented with c/o worsening dyspnea, reported fever, and cough. She has a past medical history significant for asthma, tobacco abuse, and Crohn's disease. She presented to [facility] ED on 8/12/2929. She was diagnosed with pneumonia and sepsis initially. Yesterday, she developed respiratory distress and was intubated overnight. An echocardiogram was ordered and revealed normal LV function with possible severe mitral valve stenosis. Hence, cardiology has been consulted. A proBNP is 3619, CXR consistent with airspace disease.    Please note the history is taken from the chart as the patient is currently intubated, also history from her mother she said that she had a scarlet fever when she was a child also she used Fen/Phen as a diet pill she was told that she had a valve problem in the past but no particular action was taken.      Cardiac risk factors:hypertension and smoking    Last Echo: 8/14/2020  · Left ventricular systolic function is normal. Estimated EF appears to be in the range of 61 - 65%  · Left atrial cavity size is mild-to-moderately dilated.  · The mitral valve is abnormal in structure. There is severe bileaflet mitral valve thickening with rheumatic changes of the mitral valve apparatus is present. Mild-to-moderate mitral valve regurgitation is present. Severe mitral valve stenosis is present based on pressure gradients, mean gradient of 16 mmHg, but valve area calculated using PT1/2 was 2.8 cm2 which doesnt meet criteria for severe MS, pressure gradients are elevated disproportinately  party due to tachycardia and hyperdynamic flow.  · The tricuspid valve is grossly normal. Moderate tricuspid valve regurgitation is present. Estimated right ventricular systolic pressure from tricuspid regurgitation is markedly elevated (>55 mmHg). Severe pulmonary hypertension is present.  · Right ventricular cavity is mildly dilated.  · There is no evidence of pericardial effusion  · Pt will need repeat Echo / DONNA for assessment of MS severity after HR control.        Past Medical History:   Diagnosis Date   • Anxiety    • Asthma    • Bipolar disorder (CMS/HCC)    • Crohn disease (CMS/HCC)    • Depression    • Hypertension    • Schizoaffective disorder (CMS/HCC)      Past Surgical History:   Procedure Laterality Date   • APPENDECTOMY     •  SECTION     • CHOLECYSTECTOMY     • COLON SURGERY     • COLONOSCOPY     • MANDIBLE FRACTURE SURGERY     • OVARIAN CYST SURGERY     • TUBAL ABDOMINAL LIGATION       Family History   Problem Relation Age of Onset   • Diabetes Mother    • Anxiety disorder Mother    • Depression Mother    • Bipolar disorder Mother    • COPD Father    • Schizophrenia Father    • Schizophrenia Paternal Grandfather    • Breast cancer Neg Hx      Social History     Tobacco Use   • Smoking status: Current Every Day Smoker     Packs/day: 1.00     Years: 20.00     Pack years: 20.00     Types: Cigarettes   • Smokeless tobacco: Never Used   Substance Use Topics   • Alcohol use: No     Comment: denies   • Drug use: Yes     Types: Marijuana     Medications Prior to Admission   Medication Sig Dispense Refill Last Dose   • fexofenadine (ALLEGRA) 60 MG tablet Take 60 mg by mouth Daily.   2020 at 0930   • FLUoxetine (PROzac) 20 MG capsule Take 1 capsule by mouth Daily. 30 capsule 1 2020 at 0930   • fluticasone (FLONASE) 50 MCG/ACT nasal spray 2 sprays into the nostril(s) as directed by provider Daily.  0 Past Week at Unknown time   • lisinopril-hydrochlorothiazide (PRINZIDE,ZESTORETIC) 10-12.5  MG per tablet Take 1 tablet by mouth Daily.   8/11/2020 at 0930   • omeprazole (priLOSEC) 20 MG capsule Take 20 mg by mouth Daily As Needed (acid reflux). Before biggest meal of the day    Past Week at Unknown time   • QUEtiapine (SEROquel) 100 MG tablet Take 1 tablet by mouth Every Night. 30 tablet 1 8/10/2020 at Unknown time   • SYMBICORT 80-4.5 MCG/ACT inhaler Inhale 2 puffs 2 (Two) Times a Day As Needed (sob).  0 8/11/2020 at Unknown time   • tiZANidine (ZANAFLEX) 4 MG tablet Take 4 mg by mouth Every 8 (Eight) Hours As Needed for Muscle Spasms.   Past Month at Unknown time   • VENTOLIN  (90 Base) MCG/ACT inhaler Inhale 2 puffs Every 4 (Four) Hours As Needed for Wheezing or Shortness of Air.  0 8/11/2020 at Unknown time     Allergies:  Imuran [azathioprine]    Review of Systems   Unable to perform ROS: Intubated       Objective       Objective      Vital Signs  Temp:  [97.6 °F (36.4 °C)-101 °F (38.3 °C)] 98.8 °F (37.1 °C)  Heart Rate:  [] 81  Resp:  [12-46] 18  BP: ()/() 116/73  FiO2 (%):  [90 %-100 %] 90 %  Vital Signs (last 72 hrs)       08/12 0700  -  08/13 0659 08/13 0700  -  08/14 0659 08/14 0700  -  08/15 0659 08/15 0700  -  08/15 1010   Most Recent    Temp (°F) 98.2 -  99.3    98 -  98.2    97.6 -  101       98.8 (37.1)    Heart Rate 89 -  114    75 -  108    82 -  118    68 -  81     81    Resp 20 -  34    18 -  33    12 -  46       18    BP 92/50 -  132/79    84/62 -  119/87    101/63 -  149/101    62/37 -  121/74     116/73    SpO2 (%) 92 -  100    89 -  99    77 -  100    93 -  100     99        Body mass index is 34.61 kg/m².    Intake/Output Summary (Last 24 hours) at 8/15/2020 1010  Last data filed at 8/15/2020 0850  Gross per 24 hour   Intake 1731.66 ml   Output 3525 ml   Net -1793.34 ml       Physical Exam:  Physical exam:  Constitutional:    HENT:  Head:  Normocephalic and atraumatic.    Eyes:  Conjunctivae and EOM are normal.  Pupils are equal, round, and reactive to  light.  No scleral icterus.    Neck:  Neck supple.  No JVD present.    Cardiovascular: Normal rate, regular rhythm, loud S1 normal S2, 1/6 diastolic rumbling murmur heard best at the apex NO S3 / S4  Pulmonary/Chest:  Vesicular breath sounds B/L. Intubated bilateral inspiratory crackles.  Abdominal:  Soft.  Bowel sounds are normal.  No distension and no tenderness.    Neurological:  Patient sedated intubated and on the ventilator.  Skin:  Skin is warm and dry. No rash noted. No pallor.   Musculoskeletal:  No tenderness, and no deformity.  No red or swollen joints anywhere.   Lower extremities: No edema, peripheral vascular: equal and palpable,  2+ Pulses B/L       Results review     Results Review:    I reviewed the patient's new clinical results.      Results from last 7 days   Lab Units 08/15/20  0412 08/14/20  0058 08/13/20  0119 08/12/20  0420 08/11/20 2129   WBC 10*3/mm3 18.96* 19.39* 10.98* 11.45* 16.42*   HEMOGLOBIN g/dL 10.1* 9.6* 9.5* 10.1* 10.9*   PLATELETS 10*3/mm3 301 282 179 213 233     Results from last 7 days   Lab Units 08/15/20  0412 08/14/20  0058 08/13/20  0119 08/12/20  1532 08/12/20  0420 08/11/20  2129   SODIUM mmol/L 137 140 139  --  138 135*   POTASSIUM mmol/L 3.6 3.8 3.7 3.7 3.2* 3.2*   CHLORIDE mmol/L 104 111* 107  --  106 101   CO2 mmol/L 22.1 19.2* 18.2*  --  19.8* 19.8*   BUN mg/dL 16 12 6  --  7 8   CREATININE mg/dL 0.65 0.59 0.58  --  0.60 0.64   CALCIUM mg/dL 8.5* 8.2* 8.2*  --  7.9* 8.5*   GLUCOSE mg/dL 182* 188* 219*  --  118* 133*   ALT (SGPT) U/L  --   --   --   --  9 11   AST (SGOT) U/L  --   --   --   --  15 16     No results found for: INR  Lab Results   Component Value Date    MG 1.7 08/15/2020    MG 1.5 (L) 08/12/2020     No results found for: TSH, PSA, CHLPL, TRIG, HDL, LDL   Lab Results   Component Value Date    PROBNP 3,619.0 (H) 08/14/2020       ECG  ECG/EMG Results (last 24 hours)     Procedure Component Value Units Date/Time    Adult Transthoracic Echo Complete W/ Cont  if Necessary Per Protocol [[id number]] Collected:  08/14/20 1255     Updated:  08/14/20 1933     BSA 2.2 m^2      IVSd 0.92 cm      IVSs 1.2 cm      LVIDd 4.8 cm      LVIDs 3.4 cm      LVPWd 0.85 cm      BH CV ECHO KEILY - LVPWS 1.4 cm      IVS/LVPW 1.1     FS 28.8 %      EDV(Teich) 107.0 ml      ESV(Teich) 47.8 ml      EF(Teich) 55.4 %      EDV(cubed) 109.9 ml      ESV(cubed) 39.7 ml      EF(cubed) 63.9 %      % IVS thick 33.8 %      % LVPW thick 67.9 %      LV mass(C)d 143.4 grams      LV mass(C)dI 65.7 grams/m^2      LV mass(C)s 152.8 grams      LV mass(C)sI 69.9 grams/m^2      SV(Teich) 59.2 ml      SI(Teich) 27.1 ml/m^2      SV(cubed) 70.3 ml      SI(cubed) 32.2 ml/m^2      Ao root diam 2.7 cm      Ao root area 5.7 cm^2      ACS 2.0 cm      LA dimension 4.0 cm      LA/Ao 1.5     LVOT diam 1.8 cm      LVOT area 2.5 cm^2      LVOT area(traced) 2.5 cm^2      LVLd ap4 6.4 cm      EDV(MOD-sp4) 38.9 ml      LVLs ap4 5.4 cm      ESV(MOD-sp4) 15.2 ml      EF(MOD-sp4) 60.9 %      SV(MOD-sp4) 23.7 ml      SI(MOD-sp4) 10.8 ml/m^2      Ao root area (BSA corrected) 1.2     LV Herrmann Vol (BSA corrected) 17.8 ml/m^2      LV Sys Vol (BSA corrected) 7.0 ml/m^2      MV E max marlin 286.0 cm/sec      MV A max marlin 242.0 cm/sec      MV E/A 1.2     MV V2 max 269.5 cm/sec      MV max PG 29.1 mmHg      MV V2 mean 187.5 cm/sec      MV mean PG 16.5 mmHg      MV V2 VTI 65.6 cm      MV P1/2t max marlin 339.0 cm/sec      MV P1/2t 77.3 msec      MVA(P1/2t) 2.8 cm^2      MV dec slope 1,284 cm/sec^2      Ao pk marlin 183.5 cm/sec      Ao max PG 13.5 mmHg      Ao V2 mean 120.0 cm/sec      Ao mean PG 7.0 mmHg      Ao V2 VTI 32.1 cm      AI max marlin 378.0 cm/sec      AI max PG 57.2 mmHg      AI dec slope 210.0 cm/sec^2      AI P1/2t 527.2 msec      SV(Ao) 183.5 ml      SI(Ao) 84.0 ml/m^2      PA acc time 0.12 sec      TR max marlin 425.3 cm/sec      RVSP(TR) 82.5 mmHg      RAP systole 10.0 mmHg      PA pr(Accel) 25.0 mmHg      MVA P1/2T LCG 0.65 cm^2      BH CV  ECHO KEILY - BZI_BMI 35.6 kilograms/m^2      BH CV ECHO KEILY - BSA([facility]) 2.3 m^2      BH CV ECHO KEILY - BZI_METRIC_WEIGHT 106.1 kg      BH CV ECHO KEILY - BZI_METRIC_HEIGHT 172.7 cm      Target HR (85%) 154 bpm      Max. Pred. HR (100%) 181 bpm     Narrative:       · Left ventricular systolic function is normal. Estimated EF appears to be   in the range of 61 - 65%  · Left atrial cavity size is mild-to-moderately dilated.  · The mitral valve is abnormal in structure. There is severe bileaflet   mitral valve thickening with rheumatic changes of the mitral valve   apparatus is present. Mild-to-moderate mitral valve regurgitation is   present. Severe mitral valve stenosis is present based on pressure   gradients, mean gradient of 16 mmHg, but valve area calculated using PT1/2   was 2.8 cm2 which doesnt meet criteria for severe MS, pressure gradients   are elevated disproportinately party due to tachycardia and hyperdynamic   flow.  · The tricuspid valve is grossly normal. Moderate tricuspid valve   regurgitation is present. Estimated right ventricular systolic pressure   from tricuspid regurgitation is markedly elevated (>55 mmHg). Severe   pulmonary hypertension is present.  · Right ventricular cavity is mildly dilated.  · There is no evidence of pericardial effusion  · Pt will need repeat Echo / DONNA for assessment of MS severity after HR   control.             Imaging Results (Last 72 Hours)     Procedure Component Value Units Date/Time    XR Chest 1 View [[id number]] Collected:  08/15/20 0548     Updated:  08/15/20 0550    Narrative:       CR Chest 1 Vw    INDICATION:   Evaluate endotracheal tube and orogastric tube placement     COMPARISON:    8/14/2020    FINDINGS:  1 portable AP view(s) of the chest.    Endotracheal tube tip is 3 cm above the selina. Nasogastric tube is in the stomach    Dense bilateral infiltrates are present and much worse on yesterday's exam       Impression:       Increase in bilateral  diffuse infiltrates suggesting ARDS.    Endotracheal tube and nasal gastric tube are in good position    Signer Name: Fady Laura MD   Signed: 8/15/2020 5:48 AM   Workstation Name: [facility]    [facility]    XR Chest 1 View [[id number]] Collected:  08/14/20 1351     Updated:  08/14/20 1524    Narrative:       EXAMINATION: XR CHEST 1 VW-      CLINICAL INDICATION:     dyspnea; J18.9-Pneumonia, unspecified organism     TECHNIQUE:  XR CHEST 1 VW-      COMPARISON: 1120      FINDINGS:      Interval worsening of diffuse bilateral airspace disease. Decreased lung  volumes.   Heart size, mediastinum, and pulmonary vascularity are unremarkable.   No pneumothorax.   No effusions.   No acute osseous findings.          Impression:       Interval worsening of diffuse lateral airspace disease with  decreased lung volumes.     This report was finalized on 8/14/2020 1:51 PM by Dr. Juan Carlos Smith MD.             Assessment      1. Severe mitral valve stenosis noted on echocardiogram 8/14/2020.  2. Severe sepsis secondary to pneumonia  3. Acute respiratory failure  4. Acute COPD exacerbation  5. Bilateral pneumonia  6. Essential hypertension, currently hypotensive on levophed.  7. Crohn's Disease  8. Tobacco abuse      Recommendations     1. Patient with severe mitral valve stenosis and also moderate mitral valve regurgitation she probably will need eventually mitral valve surgery  2. I suspect also significant pulmonary edema so we will continue with diuresing we will keep her at least 1 L negative per day  3. Continue hemodynamic support she is requiring pressor support will try to avoid tachycardia      I have discussed my impression and recommendations with the patient and family.    Thank you very much for asking us to be involved in this patient's care.  We will follow along with you.      Екатерина Vergara, DIEGO  08/15/20  10:10  IBess MD, [facility], performed the services described in this  documentation as documented by the above-named individual under my supervision and made necessary changes in the note is both accurate and complete      Electronically signed by Bess Montgomery MD at 08/15/20 2331     Susan Eagle MD at 20 1246      Consult Orders    1. Inpatient Infectious Diseases Consult [[id number]] ordered by Lydia Hou MD at 20 1014                        INFECTIOUS DISEASE CONSULTATION REPORT        Patient Identification:  Name:  Manda Al  Age:  40 y.o.  Sex:  female  :  1980  MRN:  [id number]   Visit Number:  [id number]  Primary Care Physician:  Adali Sharpe APRN       LOS: 1 day        Subjective       Subjective     History of present illness:      Thank you Dr. Hou for allowing us to participate in the care of your patient.  As you well know, Ms. Manda Al is a 40 y.o. female with past medical history significant for anxiety, asthma, Crohn's disease and tobacco abuse, who presented to [facility] Emergency Department on 2020 for shortness of breath, cough, and fever.  Patient denies any recent sick contacts or exposure.  T-max noted of 99.6.  WBC 10.98, improved from 16 on admission.  CRP significantly elevated at 30.73-worse, on admission noted to be 21.  Ferritin 176.5.  .  Respiratory panel PCR negative.  COVID-19 PCR negative.  Urinalysis on 2020 revealed 6-12 WBCs, trace leukocytes, negative nitrite, +1 bacteria.  Urine culture revealed no growth.  Urine drug screen positive for amphetamines.  CT scan on 2020 revealed diffuse bilateral pneumonia with bilateral mild pleural effusions.  Mediastinal adenopathy, probably reactive-recommend follow-up in 3 months, per radiology.      Infectious Disease consultation was requested for antimicrobial management.    ---------------------------------------------------------------------------------------------------------------------     Review Of  Systems:    Constitutional: Reported fever at home.  Reports fatigue.    Eyes: no eye drainage, itching or redness.  HEENT: no mouth sores, dysphagia or nose bleed.  Respiratory: Reports shortness of breath and cough.  Cardiovascular: no chest pain, no palpitations, no orthopnea.  Gastrointestinal: no nausea, vomiting or diarrhea. No abdominal pain, hematemesis or rectal bleeding.  Genitourinary: no dysuria or polyuria.  Hematologic/lymphatic: no lymph node abnormalities, no easy bruising or easy bleeding.  Musculoskeletal: no muscle or joint pain.  Skin: No rash and no itching.  Neurological: no loss of consciousness, no seizure, no headache.  Behavioral/Psych: no depression or suicidal ideation.  Endocrine: no hot flashes.  Immunologic: negative.    ---------------------------------------------------------------------------------------------------------------------     Past Medical History    Past Medical History:   Diagnosis Date   • Anxiety    • Asthma    • Bipolar disorder (CMS/HCC)    • Crohn disease (CMS/HCC)    • Depression    • Hypertension    • Schizoaffective disorder (CMS/HCC)        Past Surgical History    Past Surgical History:   Procedure Laterality Date   • APPENDECTOMY     •  SECTION     • CHOLECYSTECTOMY     • COLON SURGERY     • COLONOSCOPY     • MANDIBLE FRACTURE SURGERY     • OVARIAN CYST SURGERY     • TUBAL ABDOMINAL LIGATION         Family History    Family History   Problem Relation Age of Onset   • Diabetes Mother    • Anxiety disorder Mother    • Depression Mother    • Bipolar disorder Mother    • COPD Father    • Schizophrenia Father    • Schizophrenia Paternal Grandfather    • Breast cancer Neg Hx        Social History    Social History     Tobacco Use   • Smoking status: Current Every Day Smoker     Packs/day: 1.00     Years: 20.00     Pack years: 20.00     Types: Cigarettes   • Smokeless tobacco: Never Used   Substance Use Topics   • Alcohol use: No     Comment: denies   • Drug  use: Yes     Types: Marijuana       Allergies    Imuran [azathioprine]  ---------------------------------------------------------------------------------------------------------------------     Home Medications:    Prior to Admission Medications     Prescriptions Last Dose Informant Patient Reported? Taking?    fexofenadine (ALLEGRA) 60 MG tablet 8/11/2020 Self Yes Yes    Take 60 mg by mouth Daily.    FLUoxetine (PROzac) 20 MG capsule 8/11/2020 Self No Yes    Take 1 capsule by mouth Daily.    fluticasone (FLONASE) 50 MCG/ACT nasal spray Past Week Self Yes Yes    2 sprays into the nostril(s) as directed by provider Daily.    lisinopril-hydrochlorothiazide (PRINZIDE,ZESTORETIC) 10-12.5 MG per tablet 8/11/2020 Self Yes Yes    Take 1 tablet by mouth Daily.    omeprazole (priLOSEC) 20 MG capsule Past Week Self Yes Yes    Take 20 mg by mouth Daily As Needed (acid reflux). Before biggest meal of the day     QUEtiapine (SEROquel) 100 MG tablet 8/10/2020 Self No Yes    Take 1 tablet by mouth Every Night.    SYMBICORT 80-4.5 MCG/ACT inhaler 8/11/2020 Self Yes Yes    Inhale 2 puffs 2 (Two) Times a Day As Needed (sob).    tiZANidine (ZANAFLEX) 4 MG tablet Past Month Self Yes Yes    Take 4 mg by mouth Every 8 (Eight) Hours As Needed for Muscle Spasms.    VENTOLIN  (90 Base) MCG/ACT inhaler 8/11/2020 Self Yes Yes    Inhale 2 puffs Every 4 (Four) Hours As Needed for Wheezing or Shortness of Air.        ---------------------------------------------------------------------------------------------------------------------    Objective       Objective     Hospital Scheduled Meds:    budesonide-formoterol 2 puff Inhalation BID - RT   cefTRIAXone 2 g Intravenous Q24H   cetirizine 10 mg Oral Daily   doxycycline 100 mg Intravenous Q12H   FLUoxetine 20 mg Oral Daily   fluticasone 2 spray Nasal Daily   heparin (porcine) 5,000 Units Subcutaneous Q12H   ipratropium-albuterol 3 mL Nebulization Q4H - RT   methylPREDNISolone sodium  succinate 40 mg Intravenous Q12H   nicotine 1 patch Transdermal Q24H   pantoprazole 40 mg Oral QAM   QUEtiapine 100 mg Oral Nightly   sodium chloride 10 mL Intravenous Q12H       sodium chloride 100 mL/hr Last Rate: 100 mL/hr (08/13/20 0611)     ---------------------------------------------------------------------------------------------------------------------   Vital Signs:  Temp:  [98 °F (36.7 °C)-99.3 °F (37.4 °C)] 98 °F (36.7 °C)  Heart Rate:  [] 92  Resp:  [19-34] 22  BP: ()/(42-83) 103/65  Mean Arterial Pressure (Non-Invasive) for the past 24 hrs (Last 3 readings):   Noninvasive MAP (mmHg)   08/13/20 1205 82   08/13/20 1104 70   08/13/20 1004 73     SpO2 Percentage    08/13/20 1004 08/13/20 1104 08/13/20 1205   SpO2: 96% 96% 96%     SpO2:  [92 %-100 %] 96 %  on  Flow (L/min):  [2] 2;   Device (Oxygen Therapy): nasal cannula    Body mass index is 35.72 kg/m².  Wt Readings from Last 3 Encounters:   08/13/20 107 kg (234 lb 14.4 oz)   09/18/18 95.3 kg (210 lb)   02/21/18 105 kg (232 lb)     ---------------------------------------------------------------------------------------------------------------------     Physical Exam:    Constitutional:  Well-developed and well-nourished.  No respiratory distress.      HENT:  Head: Normocephalic and atraumatic.  Mouth:  Moist mucous membranes.    Eyes:  Conjunctivae and EOM are normal.  No scleral icterus.  Neck:  Neck supple.  No JVD present.    Cardiovascular:  Normal rate, regular rhythm and normal heart sounds with no murmur. No edema.  Pulmonary/Chest: Decreased breath sounds bilaterally with some scattered wheezes.  Abdominal:  Soft.  Bowel sounds are normal.  No distension and no tenderness.   Musculoskeletal:  No edema, no tenderness, and no deformity.  No swelling or redness of joints.  Neurological:  Alert and oriented to person, place, and time.  No facial droop.  No slurred speech.   Skin:  Skin is warm and dry.  No rash noted.  No pallor.    Psychiatric:  Normal mood and affect.  Behavior is normal.    ---------------------------------------------------------------------------------------------------------------------            Results from last 7 days   Lab Units 08/12/20  0233   PH, ARTERIAL pH units 7.456*   PO2 ART mm Hg 76.0*   PCO2, ARTERIAL mm Hg 30.7*   HCO3 ART mmol/L 21.6     Results from last 7 days   Lab Units 08/13/20 0119 08/12/20 0420 08/11/20  2254 08/11/20  2129   CRP mg/dL 30.73* 24.89* 21.91*  --    LACTATE mmol/L  --   --  1.0  --    WBC 10*3/mm3 10.98* 11.45*  --  16.42*   HEMOGLOBIN g/dL 9.5* 10.1*  --  10.9*   HEMATOCRIT % 29.5* 32.3*  --  32.2*   MCV fL 93.1 93.6  --  89.4   MCHC g/dL 32.2 31.3*  --  33.9   PLATELETS 10*3/mm3 179 213  --  233     Results from last 7 days   Lab Units 08/13/20 0119 08/12/20  1532 08/12/20  0420 08/11/20  2129   SODIUM mmol/L 139  --  138 135*   POTASSIUM mmol/L 3.7 3.7 3.2* 3.2*   MAGNESIUM mg/dL  --   --  1.5*  --    CHLORIDE mmol/L 107  --  106 101   CO2 mmol/L 18.2*  --  19.8* 19.8*   BUN mg/dL 6  --  7 8   CREATININE mg/dL 0.58  --  0.60 0.64   EGFR IF NONAFRICN AM mL/min/1.73 116  --  111 103   CALCIUM mg/dL 8.2*  --  7.9* 8.5*   GLUCOSE mg/dL 219*  --  118* 133*   ALBUMIN g/dL  --   --  2.77* 3.23*   BILIRUBIN mg/dL  --   --  1.2 1.3*   ALK PHOS U/L  --   --  62 67   AST (SGOT) U/L  --   --  15 16   ALT (SGPT) U/L  --   --  9 11   Estimated Creatinine Clearance: 166.7 mL/min (by C-G formula based on SCr of 0.58 mg/dL).  No results found for: AMMONIA    No results found for: HGBA1C, POCGLU  No results found for: HGBA1C  No results found for: TSH, FREET4    Blood Culture   Date Value Ref Range Status   08/11/2020 No growth at 24 hours  Preliminary   08/11/2020 No growth at 24 hours  Preliminary     Urine Culture   Date Value Ref Range Status   08/11/2020 No growth  Final     No results found for: WOUNDCX  No results found for: STOOLCX  Respiratory Culture   Date Value Ref Range Status    08/12/2020   Preliminary    Moderate growth (3+) Normal Respiratory Miya: NO S.aureus/MRSA or Pseudomonas aeruginosa     Pain Management Panel     Pain Management Panel Latest Ref Rng & Units 8/11/2020 5/9/2017    AMPHETAMINES SCREEN, URINE Negative Positive(A) Negative    BARBITURATES SCREEN Negative Negative Negative    BENZODIAZEPINE SCREEN, URINE Negative Negative Negative    BUPRENORPHINEUR Negative Negative Negative    COCAINE SCREEN, URINE Negative Negative Negative    METHADONE SCREEN, URINE Negative Negative Negative        I have personally reviewed the above laboratory results.   ---------------------------------------------------------------------------------------------------------------------  Imaging Results (Last 7 Days)     Procedure Component Value Units Date/Time    CT Chest Without Contrast [[id number]] Collected:  08/12/20 0130     Updated:  08/12/20 0133    Narrative:       CT Chest WO    INDICATION:   Shortness of air. Pneumonia. Abnormal chest x-ray. Fever. Negative COVID 19 test 8/11/2020.    TECHNIQUE:   CT of the thorax without IV contrast. Coronal and sagittal reconstructions were obtained.  Radiation dose reduction techniques included automated exposure control or exposure modulation based on body size. Count of known CT and cardiac nuc med studies  performed in previous 12 months: 0.     COMPARISON:   Chest x-ray 8/11/2020    FINDINGS:  There are small bilateral pleural effusions. No pericardial effusion is identified. Right paratracheal adenopathy measures 1.3 cm. Prominent AP window node measures 9 mm short axis. A few other small mediastinal nodes are present. The yudi are poorly  evaluated without contrast. Images through the upper abdomen show changes of cholecystectomy. There are multifocal groundglass and alveolar infiltrates with some septal thickening. Air bronchograms are present. Findings likely reflect bilateral diffuse  pneumonia rather than pulmonary edema.       Impression:         1. Diffuse bilateral pneumonia with small bilateral pleural effusions.  2. Mild mediastinal adenopathy, probably reactive. Consider follow-up chest CT in 3 months.    Signer Name: Khadar Quintero MD   Signed: 8/12/2020 1:30 AM   Workstation Name: [facility]    [facility]    XR Chest 1 View [[id number]] Collected:  08/11/20 2203     Updated:  08/11/20 2205    Narrative:       CR Chest 1 Vw    INDICATION:   Fever and cough with shortness of air.     COMPARISON:    10/13/2012    FINDINGS:  Single portable AP view(s) of the chest.  Cardiac and mediastinal contours are normal. There are patchy bilateral fairly diffuse infiltrates with a basilar predominance. Findings likely reflect pneumonia. No pneumothorax is seen.      Impression:       Bilateral infiltrates most compatible with pneumonia.    Signer Name: Khadar Quintero MD   Signed: 8/11/2020 10:03 PM   Workstation Name: [facility]    [facility]        I have personally reviewed the above radiology results.   ---------------------------------------------------------------------------------------------------------------------      Assessment & Plan        Assessment/Plan       ASSESSMENT:    1.  Sepsis  2.  Acute respiratory failure  3.  Pneumonia    PLAN:    Patient presented with shortness of breath, cough, and fever.  Patient denies any recent sick contacts or exposure.  T-max noted of 99.6.  WBC 10.98, improved from 16 on admission.  CRP significantly elevated at 30.73-worse, on admission noted to be 21.  Ferritin 176.5.  .  Lactate 1.0.      Respiratory panel PCR negative.  COVID-19 PCR negative.      Urinalysis on 11/20/2020 revealed 6-12 WBCs, trace leukocytes, negative nitrite, +1 bacteria.  Urine culture revealed no growth.  Urine drug screen positive for amphetamines.      CT scan on 8/11/2020 revealed diffuse bilateral pneumonia with bilateral mild pleural effusions.  Mediastinal  adenopathy, probably reactive-recommend follow-up in 3 months, per radiology.    Fungal serologies ordered.  We recommend bronchoscopy as we cannot rule out fungal and or Mycobacterium pneumonia.    We recommend to escalate coverage to Merrem IV and doxycycline 100 mg IV every 12 hours as we cannot rule out fungal or mycobacterial pneumonia.  If any further worsening from respiratory standpoint we will consider starting empiric fungal coverage.    Again, thank you Dr. Hou for allowing us to participate in the care of your patient and please feel free to call for any questions you may have.        Code Status:   Code Status and Medical Interventions:   Ordered at: 08/12/20 0306     Code Status:    CPR     Medical Interventions (Level of Support Prior to Arrest):    Full           DIEGO Vogel  08/13/20  12:46    Electronically signed by Susan Eagle MD at 08/13/20 7906

## 2020-08-15 NOTE — PROCEDURES
Intubation  Date/Time: 8/15/2020 4:55 AM  Performed by: Pinky Corrales DO  Authorized by: Pinky Corrales DO   Consent: Verbal consent obtained.  Risks and benefits: risks, benefits and alternatives were discussed  Consent given by: patient  Patient understanding: patient states understanding of the procedure being performed  Patient identity confirmed: verbally with patient, arm band and provided demographic data  Indications: respiratory distress and  respiratory failure  Intubation method: video-assisted  Patient status: awake  Preoxygenation: BVM  Pretreatment medications: midazolam and vecuronium  Sedatives: etomidate, propofol and see MAR for details  Tube size: 8.0 mm  Tube type: cuffed  Number of attempts: 1  Cords visualized: yes  Post-procedure assessment: CO2 detector  Breath sounds: equal  Cuff inflated: yes  ETT to lip: 24 cm  Tube secured with: ETT chambers  Patient tolerance: Patient tolerated the procedure well with no immediate complications  Comments: CXR pending at this time.      Present during procedure: Tabitha RN, Leandro, CEDRIC, Kristi, RRT and Anjelica RRT

## 2020-08-15 NOTE — PLAN OF CARE
Problem: Patient Care Overview  Goal: Plan of Care Review  Outcome: Ongoing (interventions implemented as appropriate)  Goal: Individualization and Mutuality  Outcome: Ongoing (interventions implemented as appropriate)  Goal: Discharge Needs Assessment  Outcome: Ongoing (interventions implemented as appropriate)  Goal: Interprofessional Rounds/Family Conf  Outcome: Ongoing (interventions implemented as appropriate)     Problem: Infection, Risk/Actual (Adult)  Goal: Identify Related Risk Factors and Signs and Symptoms  Outcome: Ongoing (interventions implemented as appropriate)  Goal: Infection Prevention/Resolution  Outcome: Ongoing (interventions implemented as appropriate)     Problem: Pneumonia (Adult)  Goal: Signs and Symptoms of Listed Potential Problems Will be Absent, Minimized or Managed (Pneumonia)  Outcome: Ongoing (interventions implemented as appropriate)     Problem: Sepsis/Septic Shock (Adult)  Goal: Signs and Symptoms of Listed Potential Problems Will be Absent, Minimized or Managed (Sepsis/Septic Shock)  Outcome: Ongoing (interventions implemented as appropriate)     Problem: Pain, Chronic (Adult)  Goal: Identify Related Risk Factors and Signs and Symptoms  Outcome: Ongoing (interventions implemented as appropriate)  Goal: Acceptable Pain/Comfort Level and Functional Ability  Outcome: Ongoing (interventions implemented as appropriate)

## 2020-08-15 NOTE — CONSULTS
Consult Note        Date of Admit: 8/11/2020  Date of Consult: 08/15/20  No ref. provider found          Reason for consultation: Mitral valve stenosis    Subjective       Subjective       History of Presenting Illness: Manda Al is a 40 y.o. female  presented with c/o worsening dyspnea, reported fever, and cough. She has a past medical history significant for asthma, tobacco abuse, and Crohn's disease. She presented to [facility] ED on 8/12/2929. She was diagnosed with pneumonia and sepsis initially. Yesterday, she developed respiratory distress and was intubated overnight. An echocardiogram was ordered and revealed normal LV function with possible severe mitral valve stenosis. Hence, cardiology has been consulted. A proBNP is 3619, CXR consistent with airspace disease.    Please note the history is taken from the chart as the patient is currently intubated, also history from her mother she said that she had a scarlet fever when she was a child also she used Fen/Phen as a diet pill she was told that she had a valve problem in the past but no particular action was taken.      Cardiac risk factors:hypertension and smoking    Last Echo: 8/14/2020  · Left ventricular systolic function is normal. Estimated EF appears to be in the range of 61 - 65%  · Left atrial cavity size is mild-to-moderately dilated.  · The mitral valve is abnormal in structure. There is severe bileaflet mitral valve thickening with rheumatic changes of the mitral valve apparatus is present. Mild-to-moderate mitral valve regurgitation is present. Severe mitral valve stenosis is present based on pressure gradients, mean gradient of 16 mmHg, but valve area calculated using PT1/2 was 2.8 cm2 which doesnt meet criteria for severe MS, pressure gradients are elevated disproportinately party due to tachycardia and hyperdynamic flow.  · The tricuspid valve is grossly normal. Moderate tricuspid valve regurgitation is present. Estimated right  ventricular systolic pressure from tricuspid regurgitation is markedly elevated (>55 mmHg). Severe pulmonary hypertension is present.  · Right ventricular cavity is mildly dilated.  · There is no evidence of pericardial effusion  · Pt will need repeat Echo / DONNA for assessment of MS severity after HR control.        Past Medical History:   Diagnosis Date   • Anxiety    • Asthma    • Bipolar disorder (CMS/HCC)    • Crohn disease (CMS/HCC)    • Depression    • Hypertension    • Schizoaffective disorder (CMS/HCC)      Past Surgical History:   Procedure Laterality Date   • APPENDECTOMY     •  SECTION     • CHOLECYSTECTOMY     • COLON SURGERY     • COLONOSCOPY     • MANDIBLE FRACTURE SURGERY     • OVARIAN CYST SURGERY     • TUBAL ABDOMINAL LIGATION       Family History   Problem Relation Age of Onset   • Diabetes Mother    • Anxiety disorder Mother    • Depression Mother    • Bipolar disorder Mother    • COPD Father    • Schizophrenia Father    • Schizophrenia Paternal Grandfather    • Breast cancer Neg Hx      Social History     Tobacco Use   • Smoking status: Current Every Day Smoker     Packs/day: 1.00     Years: 20.00     Pack years: 20.00     Types: Cigarettes   • Smokeless tobacco: Never Used   Substance Use Topics   • Alcohol use: No     Comment: denies   • Drug use: Yes     Types: Marijuana     Medications Prior to Admission   Medication Sig Dispense Refill Last Dose   • fexofenadine (ALLEGRA) 60 MG tablet Take 60 mg by mouth Daily.   2020 at 0930   • FLUoxetine (PROzac) 20 MG capsule Take 1 capsule by mouth Daily. 30 capsule 1 2020 at 0930   • fluticasone (FLONASE) 50 MCG/ACT nasal spray 2 sprays into the nostril(s) as directed by provider Daily.  0 Past Week at Unknown time   • lisinopril-hydrochlorothiazide (PRINZIDE,ZESTORETIC) 10-12.5 MG per tablet Take 1 tablet by mouth Daily.   2020 at 0930   • omeprazole (priLOSEC) 20 MG capsule Take 20 mg by mouth Daily As Needed (acid reflux).  Before biggest meal of the day    Past Week at Unknown time   • QUEtiapine (SEROquel) 100 MG tablet Take 1 tablet by mouth Every Night. 30 tablet 1 8/10/2020 at Unknown time   • SYMBICORT 80-4.5 MCG/ACT inhaler Inhale 2 puffs 2 (Two) Times a Day As Needed (sob).  0 8/11/2020 at Unknown time   • tiZANidine (ZANAFLEX) 4 MG tablet Take 4 mg by mouth Every 8 (Eight) Hours As Needed for Muscle Spasms.   Past Month at Unknown time   • VENTOLIN  (90 Base) MCG/ACT inhaler Inhale 2 puffs Every 4 (Four) Hours As Needed for Wheezing or Shortness of Air.  0 8/11/2020 at Unknown time     Allergies:  Imuran [azathioprine]    Review of Systems   Unable to perform ROS: Intubated       Objective       Objective      Vital Signs  Temp:  [97.6 °F (36.4 °C)-101 °F (38.3 °C)] 98.8 °F (37.1 °C)  Heart Rate:  [] 81  Resp:  [12-46] 18  BP: ()/() 116/73  FiO2 (%):  [90 %-100 %] 90 %  Vital Signs (last 72 hrs)       08/12 0700  -  08/13 0659 08/13 0700  -  08/14 0659 08/14 0700  -  08/15 0659 08/15 0700  -  08/15 1010   Most Recent    Temp (°F) 98.2 -  99.3    98 -  98.2    97.6 -  101       98.8 (37.1)    Heart Rate 89 -  114    75 -  108    82 -  118    68 -  81     81    Resp 20 -  34    18 -  33    12 -  46       18    BP 92/50 -  132/79    84/62 -  119/87    101/63 -  149/101    62/37 -  121/74     116/73    SpO2 (%) 92 -  100    89 -  99    77 -  100    93 -  100     99        Body mass index is 34.61 kg/m².    Intake/Output Summary (Last 24 hours) at 8/15/2020 1010  Last data filed at 8/15/2020 0850  Gross per 24 hour   Intake 1731.66 ml   Output 3525 ml   Net -1793.34 ml       Physical Exam:  Physical exam:  Constitutional:    HENT:  Head:  Normocephalic and atraumatic.    Eyes:  Conjunctivae and EOM are normal.  Pupils are equal, round, and reactive to light.  No scleral icterus.    Neck:  Neck supple.  No JVD present.    Cardiovascular: Normal rate, regular rhythm, loud S1 normal S2, 1/6 diastolic rumbling  murmur heard best at the apex NO S3 / S4  Pulmonary/Chest:  Vesicular breath sounds B/L. Intubated bilateral inspiratory crackles.  Abdominal:  Soft.  Bowel sounds are normal.  No distension and no tenderness.    Neurological:  Patient sedated intubated and on the ventilator.  Skin:  Skin is warm and dry. No rash noted. No pallor.   Musculoskeletal:  No tenderness, and no deformity.  No red or swollen joints anywhere.   Lower extremities: No edema, peripheral vascular: equal and palpable,  2+ Pulses B/L       Results review     Results Review:    I reviewed the patient's new clinical results.      Results from last 7 days   Lab Units 08/15/20  0412 08/14/20  0058 08/13/20  0119 08/12/20  0420 08/11/20 2129   WBC 10*3/mm3 18.96* 19.39* 10.98* 11.45* 16.42*   HEMOGLOBIN g/dL 10.1* 9.6* 9.5* 10.1* 10.9*   PLATELETS 10*3/mm3 301 282 179 213 233     Results from last 7 days   Lab Units 08/15/20  0412 08/14/20  0058 08/13/20  0119 08/12/20  1532 08/12/20  0420 08/11/20 2129   SODIUM mmol/L 137 140 139  --  138 135*   POTASSIUM mmol/L 3.6 3.8 3.7 3.7 3.2* 3.2*   CHLORIDE mmol/L 104 111* 107  --  106 101   CO2 mmol/L 22.1 19.2* 18.2*  --  19.8* 19.8*   BUN mg/dL 16 12 6  --  7 8   CREATININE mg/dL 0.65 0.59 0.58  --  0.60 0.64   CALCIUM mg/dL 8.5* 8.2* 8.2*  --  7.9* 8.5*   GLUCOSE mg/dL 182* 188* 219*  --  118* 133*   ALT (SGPT) U/L  --   --   --   --  9 11   AST (SGOT) U/L  --   --   --   --  15 16     No results found for: INR  Lab Results   Component Value Date    MG 1.7 08/15/2020    MG 1.5 (L) 08/12/2020     No results found for: TSH, PSA, CHLPL, TRIG, HDL, LDL   Lab Results   Component Value Date    PROBNP 3,619.0 (H) 08/14/2020       ECG  ECG/EMG Results (last 24 hours)     Procedure Component Value Units Date/Time    Adult Transthoracic Echo Complete W/ Cont if Necessary Per Protocol [[id number]] Collected:  08/14/20 1255     Updated:  08/14/20 1933     BSA 2.2 m^2      IVSd 0.92 cm      IVSs 1.2 cm      LVIDd 4.8  cm      LVIDs 3.4 cm      LVPWd 0.85 cm      BH CV ECHO KEILY - LVPWS 1.4 cm      IVS/LVPW 1.1     FS 28.8 %      EDV(Teich) 107.0 ml      ESV(Teich) 47.8 ml      EF(Teich) 55.4 %      EDV(cubed) 109.9 ml      ESV(cubed) 39.7 ml      EF(cubed) 63.9 %      % IVS thick 33.8 %      % LVPW thick 67.9 %      LV mass(C)d 143.4 grams      LV mass(C)dI 65.7 grams/m^2      LV mass(C)s 152.8 grams      LV mass(C)sI 69.9 grams/m^2      SV(Teich) 59.2 ml      SI(Teich) 27.1 ml/m^2      SV(cubed) 70.3 ml      SI(cubed) 32.2 ml/m^2      Ao root diam 2.7 cm      Ao root area 5.7 cm^2      ACS 2.0 cm      LA dimension 4.0 cm      LA/Ao 1.5     LVOT diam 1.8 cm      LVOT area 2.5 cm^2      LVOT area(traced) 2.5 cm^2      LVLd ap4 6.4 cm      EDV(MOD-sp4) 38.9 ml      LVLs ap4 5.4 cm      ESV(MOD-sp4) 15.2 ml      EF(MOD-sp4) 60.9 %      SV(MOD-sp4) 23.7 ml      SI(MOD-sp4) 10.8 ml/m^2      Ao root area (BSA corrected) 1.2     LV Herrmann Vol (BSA corrected) 17.8 ml/m^2      LV Sys Vol (BSA corrected) 7.0 ml/m^2      MV E max marlin 286.0 cm/sec      MV A max marlin 242.0 cm/sec      MV E/A 1.2     MV V2 max 269.5 cm/sec      MV max PG 29.1 mmHg      MV V2 mean 187.5 cm/sec      MV mean PG 16.5 mmHg      MV V2 VTI 65.6 cm      MV P1/2t max marlin 339.0 cm/sec      MV P1/2t 77.3 msec      MVA(P1/2t) 2.8 cm^2      MV dec slope 1,284 cm/sec^2      Ao pk marlin 183.5 cm/sec      Ao max PG 13.5 mmHg      Ao V2 mean 120.0 cm/sec      Ao mean PG 7.0 mmHg      Ao V2 VTI 32.1 cm      AI max marlin 378.0 cm/sec      AI max PG 57.2 mmHg      AI dec slope 210.0 cm/sec^2      AI P1/2t 527.2 msec      SV(Ao) 183.5 ml      SI(Ao) 84.0 ml/m^2      PA acc time 0.12 sec      TR max marlin 425.3 cm/sec      RVSP(TR) 82.5 mmHg      RAP systole 10.0 mmHg      PA pr(Accel) 25.0 mmHg      MVA P1/2T LCG 0.65 cm^2       CV ECHO KEILY - BZI_BMI 35.6 kilograms/m^2       CV ECHO KEILY - BSA(HAYCOCK) 2.3 m^2       CV ECHO KEILY - BZI_METRIC_WEIGHT 106.1 kg       CV ECHO KEILY -  BZI_METRIC_HEIGHT 172.7 cm      Target HR (85%) 154 bpm      Max. Pred. HR (100%) 181 bpm     Narrative:       · Left ventricular systolic function is normal. Estimated EF appears to be   in the range of 61 - 65%  · Left atrial cavity size is mild-to-moderately dilated.  · The mitral valve is abnormal in structure. There is severe bileaflet   mitral valve thickening with rheumatic changes of the mitral valve   apparatus is present. Mild-to-moderate mitral valve regurgitation is   present. Severe mitral valve stenosis is present based on pressure   gradients, mean gradient of 16 mmHg, but valve area calculated using PT1/2   was 2.8 cm2 which doesnt meet criteria for severe MS, pressure gradients   are elevated disproportinately party due to tachycardia and hyperdynamic   flow.  · The tricuspid valve is grossly normal. Moderate tricuspid valve   regurgitation is present. Estimated right ventricular systolic pressure   from tricuspid regurgitation is markedly elevated (>55 mmHg). Severe   pulmonary hypertension is present.  · Right ventricular cavity is mildly dilated.  · There is no evidence of pericardial effusion  · Pt will need repeat Echo / DONNA for assessment of MS severity after HR   control.             Imaging Results (Last 72 Hours)     Procedure Component Value Units Date/Time    XR Chest 1 View [[id number]] Collected:  08/15/20 0548     Updated:  08/15/20 0550    Narrative:       CR Chest 1 Vw    INDICATION:   Evaluate endotracheal tube and orogastric tube placement     COMPARISON:    8/14/2020    FINDINGS:  1 portable AP view(s) of the chest.    Endotracheal tube tip is 3 cm above the selina. Nasogastric tube is in the stomach    Dense bilateral infiltrates are present and much worse on yesterday's exam       Impression:       Increase in bilateral diffuse infiltrates suggesting ARDS.    Endotracheal tube and nasal gastric tube are in good position    Signer Name: Fady Laura MD   Signed: 8/15/2020 5:48 AM    Workstation Name: [email or web address]    [facility]    XR Chest 1 View [[id number]] Collected:  08/14/20 1351     Updated:  08/14/20 1524    Narrative:       EXAMINATION: XR CHEST 1 VW-      CLINICAL INDICATION:     dyspnea; J18.9-Pneumonia, unspecified organism     TECHNIQUE:  XR CHEST 1 VW-      COMPARISON: 1120      FINDINGS:      Interval worsening of diffuse bilateral airspace disease. Decreased lung  volumes.   Heart size, mediastinum, and pulmonary vascularity are unremarkable.   No pneumothorax.   No effusions.   No acute osseous findings.          Impression:       Interval worsening of diffuse lateral airspace disease with  decreased lung volumes.     This report was finalized on 8/14/2020 1:51 PM by Dr. Juan Carlos Smith MD.             Assessment      1. Severe mitral valve stenosis noted on echocardiogram 8/14/2020.  2. Severe sepsis secondary to pneumonia  3. Acute respiratory failure  4. Acute COPD exacerbation  5. Bilateral pneumonia  6. Essential hypertension, currently hypotensive on levophed.  7. Crohn's Disease  8. Tobacco abuse      Recommendations     1. Patient with severe mitral valve stenosis and also moderate mitral valve regurgitation she probably will need eventually mitral valve surgery  2. I suspect also significant pulmonary edema so we will continue with diuresing we will keep her at least 1 L negative per day  3. Continue hemodynamic support she is requiring pressor support will try to avoid tachycardia      I have discussed my impression and recommendations with the patient and family.    Thank you very much for asking us to be involved in this patient's care.  We will follow along with you.      Екатерина Vergara, DIEGO  08/15/20  10:10  I, Bess Montgomery MD, [facility], performed the services described in this documentation as documented by the above-named individual under my supervision and made necessary changes in the note is both accurate and complete

## 2020-08-15 NOTE — PROGRESS NOTES
[facility] HOSPITALIST PROGRESS NOTE     Patient Identification:  Name:  Manda Al  Age:  40 y.o.  Sex:  female  :  1980  MRN:  [id number]  Visit Number:  [id number]  Primary Care Provider:  Adali Sharpe APRN    Length of stay:  3    Chief complaint:  40 y.o. old female admitted with Cough; Fever; and Shortness of Breath          Subjective:    Patient seen with nursing staff.  No family present at bedside.  Events overnight noted.  Patient became increasingly hypoxemic overnight and initially was put on high flow and then BiPAP.  Patient was eventually intubated and put on mechanical ventilator at this morning due to persistent tachypnea, respiratory distress.  Patient had central line placement due to low blood patient was started on norepinephrine.  Patient received Lasix overnight and put out about 2 L of urine.   Currently patient is intubated and on vent.  Unable to help with HPI.     Patient is currently on norepinephrine, fentanyl and propofol for sedation.    Procedures:  8/15/2020: Intubation and mechanical ventilation  8/15/2020: Central line placement       Current Hospital Meds:    budesonide-formoterol 2 puff Inhalation BID - RT   cetirizine 10 mg Oral Daily   chlorhexidine 15 mL Mouth/Throat Q12H   doxycycline 100 mg Intravenous Q12H   famotidine 20 mg Intravenous Q12H   fluticasone 2 spray Nasal Daily   insulin aspart 0-9 Units Subcutaneous Q6H   ipratropium 0.5 mg Nebulization 4x Daily - RT   lactobacillus acidophilus 1 capsule Oral Daily   magnesium sulfate 4 g Intravenous Once   meropenem 1 g Intravenous Q8H   methylPREDNISolone sodium succinate 40 mg Intravenous Q12H   micafungin (MYCAMINE)  mg Intravenous Q24H   nicotine 1 patch Transdermal Q24H   potassium chloride 40 mEq Oral Q4H   QUEtiapine 100 mg Oral Nightly   sodium chloride 10 mL Intravenous Q12H   sodium chloride 10 mL Intravenous Q12H   sodium chloride 10 mL Intravenous Q12H   sodium chloride  10 mL Intravenous Q12H       fentaNYL Citrate     norepinephrine 0.02-0.3 mcg/kg/min Last Rate: 0.02 mcg/kg/min (08/15/20 0834)   propofol 5-50 mcg/kg/min Last Rate: 40 mcg/kg/min (08/15/20 0649)     ----------------------------------------------------------------------------------------------------------------------  Vital Signs:  Temp:  [97.6 °F (36.4 °C)-101 °F (38.3 °C)] 98.8 °F (37.1 °C)  Heart Rate:  [] 81  Resp:  [12-46] 18  BP: ()/() 116/73  FiO2 (%):  [90 %-100 %] 90 %      08/13/20  0400 08/14/20  0400 08/15/20  0649   Weight: 107 kg (234 lb 14.4 oz) 106 kg (234 lb 6.4 oz) 103 kg (227 lb 9.6 oz)     Body mass index is 34.61 kg/m².    Intake/Output Summary (Last 24 hours) at 8/15/2020 1028  Last data filed at 8/15/2020 0850  Gross per 24 hour   Intake 1731.66 ml   Output 3525 ml   Net -1793.34 ml     NPO Diet  ----------------------------------------------------------------------------------------------------------------------  Physical exam:  Constitutional: On vent.   HENT:  Head:  Normocephalic and atraumatic.    Eyes:  Pupils are equal, round, and reactive to light.    Cardiovascular:  Regular rate and rhythm. S1+S2.   Pulmonary/Chest: Bilateral inspiratory crackles and expiratory rhonchi.  Abdominal:  Soft. Non-tender. No viscera palpable.  Bowel sounds audible.   Musculoskeletal: No deformity or joint swelling.   Peripheral vascular: Bilateral dorsalis pedis palpable.   Neurological: Sedated.  Skin:  Skin is warm and dry. No rash noted. No pallor.   ----------------------------------------------------------------------------------------------------------------------  Tele: Sinus tachycardia with heart rate around 100  ----------------------------------------------------------------------------------------------------------------------      Results from last 7 days   Lab Units 08/15/20  0412 08/14/20  0058 08/13/20  0119  08/11/20  2254   CRP mg/dL 12.14* 18.60* 30.73*   < > 21.91*      LACTATE mmol/L  --   --   --   --  1.0   WBC 10*3/mm3 18.96* 19.39* 10.98*   < >  --    HEMOGLOBIN g/dL 10.1* 9.6* 9.5*   < >  --    HEMATOCRIT % 32.0* 31.1* 29.5*   < >  --    MCV fL 93.3 95.1 93.1   < >  --    MCHC g/dL 31.6 30.9* 32.2   < >  --    PLATELETS 10*3/mm3 301 282 179   < >  --     < > = values in this interval not displayed.     Results from last 7 days   Lab Units 08/15/20  0554   PH, ARTERIAL pH units 7.385   PO2 ART mm Hg 103.0   PCO2, ARTERIAL mm Hg 45.0   HCO3 ART mmol/L 26.9*     Results from last 7 days   Lab Units 08/15/20  0412 08/14/20  0058 08/13/20  0119  08/12/20  0420 08/11/20  2129   SODIUM mmol/L 137 140 139  --  138 135*   POTASSIUM mmol/L 3.6 3.8 3.7   < > 3.2* 3.2*   MAGNESIUM mg/dL 1.7  --   --   --  1.5*  --    CHLORIDE mmol/L 104 111* 107  --  106 101   CO2 mmol/L 22.1 19.2* 18.2*  --  19.8* 19.8*   BUN mg/dL 16 12 6  --  7 8   CREATININE mg/dL 0.65 0.59 0.58  --  0.60 0.64   EGFR IF NONAFRICN AM mL/min/1.73 101 113 116  --  111 103   CALCIUM mg/dL 8.5* 8.2* 8.2*  --  7.9* 8.5*   GLUCOSE mg/dL 182* 188* 219*  --  118* 133*   ALBUMIN g/dL  --   --   --   --  2.77* 3.23*   BILIRUBIN mg/dL  --   --   --   --  1.2 1.3*   ALK PHOS U/L  --   --   --   --  62 67   AST (SGOT) U/L  --   --   --   --  15 16   ALT (SGPT) U/L  --   --   --   --  9 11    < > = values in this interval not displayed.   Estimated Creatinine Clearance: 145.8 mL/min (by C-G formula based on SCr of 0.65 mg/dL).    No results found for: AMMONIA      Blood Culture   Date Value Ref Range Status   08/11/2020 No growth at 24 hours  Preliminary   08/11/2020 No growth at 24 hours  Preliminary     Urine Culture   Date Value Ref Range Status   08/11/2020 No growth  Final     No results found for: WOUNDCX  No results found for: STOOLCX    I have personally looked at the labs and they are summarized  above.  ----------------------------------------------------------------------------------------------------------------------  Imaging Results (Last 24 Hours)     Procedure Component Value Units Date/Time    XR Chest 1 View [[id number]] Resulted:  08/15/20 1023     Updated:  08/15/20 1023    XR Chest 1 View [[id number]] Collected:  08/15/20 0548     Updated:  08/15/20 0550    Narrative:       CR Chest 1 Vw    INDICATION:   Evaluate endotracheal tube and orogastric tube placement     COMPARISON:    8/14/2020    FINDINGS:  1 portable AP view(s) of the chest.    Endotracheal tube tip is 3 cm above the selina. Nasogastric tube is in the stomach    Dense bilateral infiltrates are present and much worse on yesterday's exam       Impression:       Increase in bilateral diffuse infiltrates suggesting ARDS.    Endotracheal tube and nasal gastric tube are in good position    Signer Name: Fady Laura MD   Signed: 8/15/2020 5:48 AM   Workstation Name: [email or web address]    [facility]    XR Chest 1 View [[id number]] Collected:  08/14/20 1351     Updated:  08/14/20 1524    Narrative:       EXAMINATION: XR CHEST 1 VW-      CLINICAL INDICATION:     dyspnea; J18.9-Pneumonia, unspecified organism     TECHNIQUE:  XR CHEST 1 VW-      COMPARISON: 1120      FINDINGS:      Interval worsening of diffuse bilateral airspace disease. Decreased lung  volumes.   Heart size, mediastinum, and pulmonary vascularity are unremarkable.   No pneumothorax.   No effusions.   No acute osseous findings.          Impression:       Interval worsening of diffuse lateral airspace disease with  decreased lung volumes.     This report was finalized on 8/14/2020 1:51 PM by Dr. Juan Carlos Smith MD.           ----------------------------------------------------------------------------------------------------------------------  Assessment and Plan:    -Septic shock d/t PNA  Patient has had worsening of her sepsis.  She is currently on norepinephrine to  keep her MAP >65 mmHg.  Systolic blood pressure had gone down to 65 but is improved now and is up to 120 now.   CRP continues to improve and is down to 12 from 18 yesterday, WBC count is unchanged since yesterday and remains elevated.  Blood cultures have been negative so far.  Respiratory culture showing moderate growth of normal respiratory katherine.  Patient is on meropenem, doxycycline and micafungin added by ID.  Appreciate input from ID.    -Acute hypoxemic respiratory failure d/t PNA and COPD  Patient has had worsening of her respiratory status.  She is currently intubated and on vent and x-ray shows diffuse bilateral infiltrates to create ARDS.  Patient is currently on 90% FiO2 and maintaining her SPO2 >90%.  Patient had put out about 2 L last night after receiving Lasix.  I will give her another dose of Lasix and continue to monitor.  I will also called and discussed with the intensivist in [facility] for expert opinion and advice since no intensivist is available.    -Bilateral pneumonia  CT scan of the chest on admission showed diffuse bilateral pneumonia with small bilateral effusions and mild mediastinal adenopathy.  Chest x-ray showed worsening diffuse bilateral infiltrates.    Respiratory culture has not shown any growth.  Legionella antigen, Streptococcus pneumonia antigen and respiratory panel is negative.  Continue antibiotics as outlined above.  Fungal serologies sent by infectious diseases.  Patient had some blood tinged sputum yesterday but has not had much secretions from ET tube.    -Acute COPD exacerbation  Continue Atrovent nebs, Symbicort and Solu-Medrol.  Continue antibiotics as outlined above.    -Mediastinal adenopathy  Likely reactive due to bilateral pneumonia.  Patient will need repeat imaging to document clearance.    -Essential hypertension  Currently patient has septic shock and antihypertensives are on hold.    -Crohn's disease  Patient is not on any maintenance medications.   Patient did not have any abdominal pain, diarrhea or hematochezia.    -Tobacco abuse/dependence  Patient is currently sedated and on vent.  Previously I discussed with the patient the dangers of continued tobacco use and I have advised her to quit smoking.  Patient was agreeable to quit.    Activity: Bedrest  Nutrition: N.p.o.  Fluids: None  DVT prophylaxis: SCDs  GI prophylaxis: PPI    The patient is high risk due to: Respiratory failure, sepsis, pneumonia, COPD exacerbation    I discussed the patient's findings and my recommendations with patient and nursing staff.    Lydia Hou MD  08/15/20  10:28     Time spent: 40 minutes

## 2020-08-15 NOTE — CONSULTS
Consults    Patient Care Team:  Adali Sharpe APRN as PCP - General (Family Medicine)    Chief complaint: Pneumonia with respiratory failure    Subjective     40-year-old female admitted with bilateral pneumonia requiring intubation due to respiratory failure.  The patient has poor venous access and requires pressors on multiple medications and will need central line placement.  No cervical incisions.  No contraindications to central line placement noted.      Review of Systems   Unable to perform ROS: Intubated        Past Medical History:   Diagnosis Date   • Anxiety    • Asthma    • Bipolar disorder (CMS/HCC)    • Crohn disease (CMS/HCC)    • Depression    • Hypertension    • Schizoaffective disorder (CMS/HCC)    ,   Past Surgical History:   Procedure Laterality Date   • APPENDECTOMY     •  SECTION     • CHOLECYSTECTOMY     • COLON SURGERY     • COLONOSCOPY     • MANDIBLE FRACTURE SURGERY     • OVARIAN CYST SURGERY     • TUBAL ABDOMINAL LIGATION     ,   Family History   Problem Relation Age of Onset   • Diabetes Mother    • Anxiety disorder Mother    • Depression Mother    • Bipolar disorder Mother    • COPD Father    • Schizophrenia Father    • Schizophrenia Paternal Grandfather    • Breast cancer Neg Hx    ,   Social History     Tobacco Use   • Smoking status: Current Every Day Smoker     Packs/day: 1.00     Years: 20.00     Pack years: 20.00     Types: Cigarettes   • Smokeless tobacco: Never Used   Substance Use Topics   • Alcohol use: No     Comment: denies   • Drug use: Yes     Types: Marijuana   ,   Medications Prior to Admission   Medication Sig Dispense Refill Last Dose   • fexofenadine (ALLEGRA) 60 MG tablet Take 60 mg by mouth Daily.   2020 at 0930   • FLUoxetine (PROzac) 20 MG capsule Take 1 capsule by mouth Daily. 30 capsule 1 2020 at 0930   • fluticasone (FLONASE) 50 MCG/ACT nasal spray 2 sprays into the nostril(s) as directed by provider Daily.  0 Past Week at Unknown time    • lisinopril-hydrochlorothiazide (PRINZIDE,ZESTORETIC) 10-12.5 MG per tablet Take 1 tablet by mouth Daily.   8/11/2020 at 0930   • omeprazole (priLOSEC) 20 MG capsule Take 20 mg by mouth Daily As Needed (acid reflux). Before biggest meal of the day    Past Week at Unknown time   • QUEtiapine (SEROquel) 100 MG tablet Take 1 tablet by mouth Every Night. 30 tablet 1 8/10/2020 at Unknown time   • SYMBICORT 80-4.5 MCG/ACT inhaler Inhale 2 puffs 2 (Two) Times a Day As Needed (sob).  0 8/11/2020 at Unknown time   • tiZANidine (ZANAFLEX) 4 MG tablet Take 4 mg by mouth Every 8 (Eight) Hours As Needed for Muscle Spasms.   Past Month at Unknown time   • VENTOLIN  (90 Base) MCG/ACT inhaler Inhale 2 puffs Every 4 (Four) Hours As Needed for Wheezing or Shortness of Air.  0 8/11/2020 at Unknown time   , Scheduled Meds:    budesonide-formoterol 2 puff Inhalation BID - RT   cetirizine 10 mg Oral Daily   chlorhexidine 15 mL Mouth/Throat Q12H   doxycycline 100 mg Intravenous Q12H   famotidine 20 mg Intravenous Q12H   fluticasone 2 spray Nasal Daily   insulin aspart 0-9 Units Subcutaneous Q6H   ipratropium 0.5 mg Nebulization 4x Daily - RT   lactobacillus acidophilus 1 capsule Oral Daily   magnesium sulfate 4 g Intravenous Once   meropenem 1 g Intravenous Q8H   methylPREDNISolone sodium succinate 40 mg Intravenous Q12H   micafungin (MYCAMINE)  mg Intravenous Q24H   nicotine 1 patch Transdermal Q24H   potassium chloride 40 mEq Oral Q4H   QUEtiapine 100 mg Oral Nightly   sodium chloride 10 mL Intravenous Q12H   , Continuous Infusions:    fentaNYL Citrate     norepinephrine 0.02-0.3 mcg/kg/min Last Rate: 0.02 mcg/kg/min (08/15/20 0834)   propofol 5-50 mcg/kg/min Last Rate: 40 mcg/kg/min (08/15/20 0649)   , PRN Meds:  •  acetaminophen  •  dextrose  •  dextrose  •  glucagon (human recombinant)  •  guaiFENesin-dextromethorphan  •  magnesium sulfate **OR** magnesium sulfate **OR** magnesium sulfate  •  nicotine polacrilex  •   potassium chloride **OR** potassium chloride **OR** potassium chloride  •  sodium chloride  •  sodium chloride  •  tiZANidine and Allergies:  Imuran [azathioprine]    Objective      Vital Signs  Temp:  [97.6 °F (36.4 °C)-101 °F (38.3 °C)] 98.8 °F (37.1 °C)  Heart Rate:  [] 81  Resp:  [12-46] 18  BP: ()/() 116/73  FiO2 (%):  [90 %-100 %] 90 %    Physical Exam   Constitutional: She is oriented to person, place, and time. She appears well-developed.   HENT:   Head: Normocephalic and atraumatic.   Mouth/Throat: Mucous membranes are normal.   Eyes: Pupils are equal, round, and reactive to light. Conjunctivae are normal.   Neck: Neck supple. No JVD present. No tracheal deviation present. No thyromegaly present.   Cardiovascular: Normal rate and regular rhythm. Exam reveals no gallop and no friction rub.   No murmur heard.  Pulmonary/Chest: Effort normal. She has decreased breath sounds. She has wheezes.   Abdominal: Soft. She exhibits no distension. There is no splenomegaly or hepatomegaly. There is no tenderness. No hernia.   Musculoskeletal: Normal range of motion. She exhibits no deformity.   Neurological: She is alert and oriented to person, place, and time.   Skin: Skin is warm and dry.   Psychiatric: She has a normal mood and affect.       Results Review:    I reviewed the patient's new clinical results.        Assessment/Plan       Pneumonia of both lungs due to infectious organism      Assessment:  (40-year-old female with respiratory failure secondary to bilateral pneumonia and possibly developing a margins.  She has poor venous access and need for central line placement.).     Plan:   (Bedside central line placement performed.  Chest x-ray ordered to confirm placement.).       I discussed the patients findings and my recommendations with family and nursing staff    Shravan Ramon MD  08/15/20  10:18

## 2020-08-15 NOTE — PROGRESS NOTES
Patient continued to be tachypnic in the 40s-50s despite pressure support with CPAP. I discussed with patient regarding intubation and she is agreeable. Lungs reveal coarse bilateral breath sounds. Heart is tachycardic rate; regular rhythm. No significant lower extremity edema.     Patient intubated by me; see procedure note for details. I have started the patient on propofol and fentanyl for sedation. I have placed an OG tube to 70 cm at the lip. I have made initial ventilator settings; will repeat ABG in 1 hour and make changes based on results. Will repeat IV Lasix later this morning. I have ordered a eckert catheter for accurate measurement of intake and output and to assess hemodynamic stability.  CXR has been ordered for tube placement and is currently pending; will follow up on results.     Critical Care time: 35 minutes

## 2020-08-15 NOTE — PLAN OF CARE
Problem: Patient Care Overview  Goal: Plan of Care Review  Outcome: Ongoing (interventions implemented as appropriate)  Flowsheets (Taken 8/15/2020 2086)  Progress: declining  Outcome Summary: pt requiring levophed at 0.14, sedation at maximum titrations to keep RR less than 35, UOP 2650 for the shift. TM

## 2020-08-15 NOTE — PROGRESS NOTES
PROGRESS NOTE         Patient Identification:  Name:  Manda Al  Age:  40 y.o.  Sex:  female  :  1980  MRN:  [id number]  Visit Number:  [id number]  Primary Care Provider:  Adali Sharpe APRN         LOS: 3 days       ----------------------------------------------------------------------------------------------------------------------  Subjective       Chief Complaints:    Cough; Fever; and Shortness of Breath        Interval History:      Patient became tachypneic with desaturations and required to be intubated overnight.  She is currently on 90% FiO2 with PEEP of 5.  Afebrile.  WBC worse at 18.9.  CRP trending down at 12.14.  Chest x-ray on 8/15/2020 revealed increase in bilateral diffuse infiltrates, concerning for ARDS.    Review of Systems:    Patient intubated and sedated.  Unable to obtain ROS.  ----------------------------------------------------------------------------------------------------------------------      Objective       Current Hospital Meds:    budesonide-formoterol 2 puff Inhalation BID - RT   cetirizine 10 mg Oral Daily   chlorhexidine 15 mL Mouth/Throat Q12H   doxycycline 100 mg Intravenous Q12H   famotidine 20 mg Intravenous Q12H   fluticasone 2 spray Nasal Daily   insulin aspart 0-9 Units Subcutaneous Q6H   ipratropium 0.5 mg Nebulization 4x Daily - RT   lactobacillus acidophilus 1 capsule Oral Daily   magnesium sulfate 4 g Intravenous Once   meropenem 1 g Intravenous Q8H   methylPREDNISolone sodium succinate 40 mg Intravenous Q12H   micafungin (MYCAMINE)  mg Intravenous Q24H   nicotine 1 patch Transdermal Q24H   potassium chloride 40 mEq Oral Q4H   QUEtiapine 100 mg Oral Nightly   sodium chloride 10 mL Intravenous Q12H   sodium chloride 10 mL Intravenous Q12H   sodium chloride 10 mL Intravenous Q12H   sodium chloride 10 mL Intravenous Q12H       fentaNYL Citrate     norepinephrine 0.02-0.3 mcg/kg/min Last Rate: 0.02 mcg/kg/min (08/15/20 0834)   propofol  5-50 mcg/kg/min Last Rate: 40 mcg/kg/min (08/15/20 0649)     ----------------------------------------------------------------------------------------------------------------------    Vital Signs:  Temp:  [97.6 °F (36.4 °C)-101 °F (38.3 °C)] 98.8 °F (37.1 °C)  Heart Rate:  [] 81  Resp:  [12-46] 18  BP: ()/() 116/73  FiO2 (%):  [90 %-100 %] 90 %  Mean Arterial Pressure (Non-Invasive) for the past 24 hrs (Last 3 readings):   Noninvasive MAP (mmHg)   08/15/20 0940 92   08/15/20 0935 85   08/15/20 0930 87     SpO2 Percentage    08/15/20 0930 08/15/20 0935 08/15/20 0940   SpO2: 100% 99% 99%     SpO2:  [77 %-100 %] 99 %  on  Flow (L/min):  [2-15] 15;   Device (Oxygen Therapy): ventilator    Body mass index is 34.61 kg/m².  Wt Readings from Last 3 Encounters:   08/15/20 103 kg (227 lb 9.6 oz)   09/18/18 95.3 kg (210 lb)   02/21/18 105 kg (232 lb)        Intake/Output Summary (Last 24 hours) at 8/15/2020 1041  Last data filed at 8/15/2020 0850  Gross per 24 hour   Intake 1731.66 ml   Output 3525 ml   Net -1793.34 ml     NPO Diet  ----------------------------------------------------------------------------------------------------------------------      Physical Exam:    Constitutional:   Intubated and sedated.    HENT:  Head: Normocephalic and atraumatic.  Mouth:  Moist mucous membranes.    Eyes:  Conjunctivae and EOM are normal.  No scleral icterus.  Neck:  Neck supple.  No JVD present.    Cardiovascular:  Normal rate, regular rhythm and normal heart sounds with no murmur. No edema.  Pulmonary/Chest:  Diffuse wheezing bilaterally.  Coarse rhonchi present.  Abdominal:  Soft.  Bowel sounds are normal.  No distension and no tenderness.   Musculoskeletal:  No edema, no tenderness, and no deformity.  No swelling or redness of joints.  Neurological:   Intubated and sedated.  Skin:  Skin is warm and dry.  No rash noted.  No pallor.   Psychiatric:   Intubated and  sedated.  ----------------------------------------------------------------------------------------------------------------------      Results from last 7 days   Lab Units 08/14/20  2304   PROBNP pg/mL 3,619.0*       Results from last 7 days   Lab Units 08/15/20  0554   PH, ARTERIAL pH units 7.385   PO2 ART mm Hg 103.0   PCO2, ARTERIAL mm Hg 45.0   HCO3 ART mmol/L 26.9*     Results from last 7 days   Lab Units 08/15/20  0412 08/14/20  0058 08/13/20  0119  08/11/20  2254   CRP mg/dL 12.14* 18.60* 30.73*   < > 21.91*   LACTATE mmol/L  --   --   --   --  1.0   WBC 10*3/mm3 18.96* 19.39* 10.98*   < >  --    HEMOGLOBIN g/dL 10.1* 9.6* 9.5*   < >  --    HEMATOCRIT % 32.0* 31.1* 29.5*   < >  --    MCV fL 93.3 95.1 93.1   < >  --    MCHC g/dL 31.6 30.9* 32.2   < >  --    PLATELETS 10*3/mm3 301 282 179   < >  --     < > = values in this interval not displayed.     Results from last 7 days   Lab Units 08/15/20  0412 08/14/20  0058 08/13/20  0119  08/12/20  0420 08/11/20  2129   SODIUM mmol/L 137 140 139  --  138 135*   POTASSIUM mmol/L 3.6 3.8 3.7   < > 3.2* 3.2*   MAGNESIUM mg/dL 1.7  --   --   --  1.5*  --    CHLORIDE mmol/L 104 111* 107  --  106 101   CO2 mmol/L 22.1 19.2* 18.2*  --  19.8* 19.8*   BUN mg/dL 16 12 6  --  7 8   CREATININE mg/dL 0.65 0.59 0.58  --  0.60 0.64   EGFR IF NONAFRICN AM mL/min/1.73 101 113 116  --  111 103   CALCIUM mg/dL 8.5* 8.2* 8.2*  --  7.9* 8.5*   GLUCOSE mg/dL 182* 188* 219*  --  118* 133*   ALBUMIN g/dL  --   --   --   --  2.77* 3.23*   BILIRUBIN mg/dL  --   --   --   --  1.2 1.3*   ALK PHOS U/L  --   --   --   --  62 67   AST (SGOT) U/L  --   --   --   --  15 16   ALT (SGPT) U/L  --   --   --   --  9 11    < > = values in this interval not displayed.   Estimated Creatinine Clearance: 145.8 mL/min (by C-G formula based on SCr of 0.65 mg/dL).  No results found for: AMMONIA    Glucose   Date/Time Value Ref Range Status   08/15/2020 0929 161 (H) 70 - 130 mg/dL Final   08/15/2020 0139 185 (H) 70  - 130 mg/dL Final     No results found for: HGBA1C  No results found for: TSH, FREET4    Blood Culture   Date Value Ref Range Status   08/11/2020 No growth at 2 days  Preliminary   08/11/2020 No growth at 2 days  Preliminary     Urine Culture   Date Value Ref Range Status   08/11/2020 No growth  Final     No results found for: WOUNDCX  No results found for: STOOLCX  Respiratory Culture   Date Value Ref Range Status   08/12/2020   Final    Moderate growth (3+) Normal Respiratory Miya: NO S.aureus/MRSA or Pseudomonas aeruginosa     Pain Management Panel     Pain Management Panel Latest Ref Rng & Units 8/11/2020 5/9/2017    AMPHETAMINES SCREEN, URINE Negative Positive(A) Negative    BARBITURATES SCREEN Negative Negative Negative    BENZODIAZEPINE SCREEN, URINE Negative Negative Negative    BUPRENORPHINEUR Negative Negative Negative    COCAINE SCREEN, URINE Negative Negative Negative    METHADONE SCREEN, URINE Negative Negative Negative            ----------------------------------------------------------------------------------------------------------------------  Imaging Results (Last 24 Hours)     Procedure Component Value Units Date/Time    XR Chest 1 View [[id number]] Resulted:  08/15/20 1023     Updated:  08/15/20 1040    XR Chest 1 View [[id number]] Collected:  08/15/20 0548     Updated:  08/15/20 0550    Narrative:       CR Chest 1 Vw    INDICATION:   Evaluate endotracheal tube and orogastric tube placement     COMPARISON:    8/14/2020    FINDINGS:  1 portable AP view(s) of the chest.    Endotracheal tube tip is 3 cm above the selina. Nasogastric tube is in the stomach    Dense bilateral infiltrates are present and much worse on yesterday's exam       Impression:       Increase in bilateral diffuse infiltrates suggesting ARDS.    Endotracheal tube and nasal gastric tube are in good position    Signer Name: Fady Laura MD   Signed: 8/15/2020 5:48 AM   Workstation Name: [facility]    [facility]    XR Chest 1 View [[id number]] Collected:  08/14/20 1351     Updated:  08/14/20 1524    Narrative:       EXAMINATION: XR CHEST 1 VW-      CLINICAL INDICATION:     dyspnea; J18.9-Pneumonia, unspecified organism     TECHNIQUE:  XR CHEST 1 VW-      COMPARISON: 1120      FINDINGS:      Interval worsening of diffuse bilateral airspace disease. Decreased lung  volumes.   Heart size, mediastinum, and pulmonary vascularity are unremarkable.   No pneumothorax.   No effusions.   No acute osseous findings.          Impression:       Interval worsening of diffuse lateral airspace disease with  decreased lung volumes.     This report was finalized on 8/14/2020 1:51 PM by Dr. Juan Carlos Smith MD.             ----------------------------------------------------------------------------------------------------------------------    Assessment/Plan       Assessment/Plan     ASSESSMENT:    1.  Sepsis  2.  Acute respiratory failure  3.  Pneumonia    PLAN:  Patient became tachypneic with desaturations and required to be intubated overnight.  She is currently on 90% FiO2 with PEEP of 5.  Afebrile.  WBC worse at 18.9.  CRP trending down at 12.14.  Chest x-ray on 8/15/2020 revealed increase in bilateral diffuse infiltrates, concerning for ARDS.    Respiratory panel PCR negative.  COVID-19 PCR negative.       Urine culture revealed no growth.  Urine drug screen positive for amphetamines.      Blood cultures on 8/11/2020 revealed no growth thus far.     CT scan on 8/11/2020 revealed diffuse bilateral pneumonia with bilateral mild pleural effusions.  Mediastinal adenopathy, probably reactive-recommend follow-up in 3 months, per radiology.     Fungal serologies ordered and pending.      We continue to recommend bronchoscopy, in the setting of clinical worsening and we are unable to rule out fungal and or Mycobacterium pneumonia.     We recommend to continue with Merrem IV and doxycycline 100 mg IV every 12 hours as we cannot rule out  fungal or mycobacterial pneumonia.  As patient is demonstrating worsening from respiratory standpoint and required intubation overnight, we will escalate coverage and add micafungin 100 mg IV every 24 hours.    Code Status:   Code Status and Medical Interventions:   Ordered at: 08/12/20 0306     Code Status:    CPR     Medical Interventions (Level of Support Prior to Arrest):    Full       Tanesha Mcdowell, APRN  08/15/20  10:41

## 2020-08-15 NOTE — PROGRESS NOTES
Nurse Practitioner - Brief Progress Note  PERMANENT  08/14/2020 22:30    [facility]  [facility] - [facility] - [facility] - [place], [place] ([facility])    AMINATA KUMAR    Date of Service 08/14/2020 22:30    HPI/Events of Note AICU Provider Assessment Note    39 y/o female admitted to ICU with Acute hypoxic respiratory failure, severe sepsis likely secondary to pneumonia    Hx of anxiety, asthma, Crohn's disease, tobacco abuse, bipolar disorder, HTN, schizoaffective disorder    Pt admitted to hospital on 8/11/2020 with cough, fever, SOB, sepsis/PNA. Urine drug screen positive for amphetamine. CT chest 8/12/2020: Diffuse bilateral pneumonia with small bilateral pleural effusions; mild mediastinal adenopathy, probably reactive;   consider follow-up chest CT in 3 months.    While on the floor, pt was hypoxic and tachypeneic. O2 sat 88% on 6L nasal cannula. ABG on 6L: pH 7.47, pCO2-29, pO2-45, HCO3-21. Placed on nonrebreather mask. Lasix 40 mg IV x1 given.  CXR 8/14/2020: Interval worsening of diffuse lateral airspace   disease with decreased lung volumes. Pt was transferred to ICU for possible BiPAP.    Current VS:  ST, /74, RR 29, O2 sat 92% on HFNC, temp 100.9 F, GCS 15    Labs: Potassium 3.8, chloride 111, sodium 140, glucose 188, creatinine 0.59, calcium 8.2, magnesium 1.5, , lactate 1, Hgb 9.6/Hct 31.1, WBC 19, platelets 282, CRP 21 -> 18. Respiratory panel PCR negative.  COVID-19 PCR negative.    ECHO 8/14/2020: EF 61-65%    EKG 8/11/2020: Normal sinus rhythm rate 95. Nonspecific ST abnormality. QTc 457    Assessment and Plan:    Acute hypoxic respiratory failure, severe sepsis likely secondary to pneumonia  - On bubble high flow nasal cannula with O2 sat 92%. BiPAP PRN.   - Symbicort, Zyrtec, Flonase, Atrovent, Nicotine patch  - Lasix 40 mg IV x1 given. IV fluids heplock. Monitor urine output. Add BNP  - ID following.  Cannot rule out fungal or mycobacterial pneumonia per note. Continue  Doxycycline, Meropenem  - Solumedrol 40 mg IV q12h. Accuchecks SSI q6h   - Electrolyte replacement protocol. BMP, Mg, Phos and CBC in the AM ordered  - Crohn's disease: Patient reports she is currently on no maintenance medications  -Mediastinal adenopathy that is likely reactive: Recommend repeat CT scan of the chest in approximately 3 months.  - Anemia, no active bleeding. Monitor H&H    __Y___   Video Assessment performed  __Y___   Most recent labs reviewed  __Y___   Vital Signs reviewed  __Y___   Best Practices addressed:                 VTE prophylaxis: SCDs                 SUP (when indicated): Protonix                 Glycemic control:                      Please notify bedside physician when present or [facility] if glc > 180 X 2                 Sepsis guidelines: Yes                 Lung protective strategy: N/A                 Targeted Temperature Management: N/A    __Y___     Spoke with bedside RN  __Y___     Orders written      Contact [facility] for any needs if bedside physician is not present.  Note drafted by KENA Holden-BC      Interventions Major-Respiratory failure - evaluation and management, Sepsis - evaluation and management  Intermediate-Best-practice therapies (e.g. VTE, beta blocker, etc.), Communication with other healthcare providers and/or family        Electronically Signed by: Joe Watkins (ANP,CCRN) on 08/14/2020 22:53    Annotated By: Kyle Major)    Date: 08/14/20 23:38  ===============    Reviewed AICU TONI assessment above. Video evaluation performed. Ms. Al is on high flow nasal cannula. She is tachypneic and mildly labored. The CT chest images from 08/11/20 are reviewed, bilateral opacities in all lobes with central predominance and   relative central sparing. Consider pneumonia and also consider pulmonary edema. The CXR 08/14/20 reveals dense bilateral opacities, perhaps more dense in the left lower lung fields. Respiratory status is tenuous, and  if any clinical worsening she may   need endotracheal intubation. Continue closel monitoring in the ICU. Tele ICU will remain available to help; please call tele ICU with any clinical needs.

## 2020-08-16 LAB
ANION GAP SERPL CALCULATED.3IONS-SCNC: 9.5 MMOL/L (ref 5–15)
BACTERIA SPEC AEROBE CULT: NORMAL
BACTERIA SPEC AEROBE CULT: NORMAL
BUN SERPL-MCNC: 20 MG/DL (ref 6–20)
BUN/CREAT SERPL: 31.3 (ref 7–25)
CALCIUM SPEC-SCNC: 8.6 MG/DL (ref 8.6–10.5)
CHLORIDE SERPL-SCNC: 101 MMOL/L (ref 98–107)
CO2 SERPL-SCNC: 28.5 MMOL/L (ref 22–29)
CREAT SERPL-MCNC: 0.64 MG/DL (ref 0.57–1)
CRP SERPL-MCNC: 18.43 MG/DL (ref 0–0.5)
DEPRECATED RDW RBC AUTO: 44.7 FL (ref 37–54)
ERYTHROCYTE [DISTWIDTH] IN BLOOD BY AUTOMATED COUNT: 13.2 % (ref 12.3–15.4)
GFR SERPL CREATININE-BSD FRML MDRD: 103 ML/MIN/1.73
GLUCOSE BLDC GLUCOMTR-MCNC: 213 MG/DL (ref 70–130)
GLUCOSE BLDC GLUCOMTR-MCNC: 222 MG/DL (ref 70–130)
GLUCOSE BLDC GLUCOMTR-MCNC: 234 MG/DL (ref 70–130)
GLUCOSE BLDC GLUCOMTR-MCNC: 256 MG/DL (ref 70–130)
GLUCOSE SERPL-MCNC: 214 MG/DL (ref 65–99)
HCT VFR BLD AUTO: 29.7 % (ref 34–46.6)
HGB BLD-MCNC: 9.5 G/DL (ref 12–15.9)
HYPOCHROMIA BLD QL: ABNORMAL
LYMPHOCYTES # BLD MANUAL: 1 10*3/MM3 (ref 0.7–3.1)
LYMPHOCYTES NFR BLD MANUAL: 4 % (ref 5–12)
LYMPHOCYTES NFR BLD MANUAL: 8 % (ref 19.6–45.3)
MCH RBC QN AUTO: 29.4 PG (ref 26.6–33)
MCHC RBC AUTO-ENTMCNC: 32 G/DL (ref 31.5–35.7)
MCV RBC AUTO: 92 FL (ref 79–97)
MONOCYTES # BLD AUTO: 0.5 10*3/MM3 (ref 0.1–0.9)
NEUTROPHILS # BLD AUTO: 10.95 10*3/MM3 (ref 1.7–7)
NEUTROPHILS NFR BLD MANUAL: 84 % (ref 42.7–76)
NEUTS BAND NFR BLD MANUAL: 4 % (ref 0–5)
PLAT MORPH BLD: NORMAL
PLATELET # BLD AUTO: 309 10*3/MM3 (ref 140–450)
PMV BLD AUTO: 9.9 FL (ref 6–12)
POTASSIUM SERPL-SCNC: 4.4 MMOL/L (ref 3.5–5.2)
RBC # BLD AUTO: 3.23 10*6/MM3 (ref 3.77–5.28)
SODIUM SERPL-SCNC: 139 MMOL/L (ref 136–145)
WBC # BLD AUTO: 12.44 10*3/MM3 (ref 3.4–10.8)

## 2020-08-16 PROCEDURE — 94799 UNLISTED PULMONARY SVC/PX: CPT

## 2020-08-16 PROCEDURE — 82962 GLUCOSE BLOOD TEST: CPT

## 2020-08-16 PROCEDURE — 25010000002 FUROSEMIDE PER 20 MG: Performed by: SPECIALIST

## 2020-08-16 PROCEDURE — 99232 SBSQ HOSP IP/OBS MODERATE 35: CPT | Performed by: SPECIALIST

## 2020-08-16 PROCEDURE — 86140 C-REACTIVE PROTEIN: CPT | Performed by: INTERNAL MEDICINE

## 2020-08-16 PROCEDURE — 63710000001 INSULIN ASPART PER 5 UNITS: Performed by: NURSE PRACTITIONER

## 2020-08-16 PROCEDURE — 80048 BASIC METABOLIC PNL TOTAL CA: CPT | Performed by: INTERNAL MEDICINE

## 2020-08-16 PROCEDURE — 99233 SBSQ HOSP IP/OBS HIGH 50: CPT | Performed by: INTERNAL MEDICINE

## 2020-08-16 PROCEDURE — 25010000002 MICAFUNGIN SODIUM 100 MG RECONSTITUTED SOLUTION 1 EACH VIAL: Performed by: NURSE PRACTITIONER

## 2020-08-16 PROCEDURE — 25010000002 MEROPENEM: Performed by: INTERNAL MEDICINE

## 2020-08-16 PROCEDURE — 25010000002 METHYLPREDNISOLONE PER 40 MG: Performed by: INTERNAL MEDICINE

## 2020-08-16 PROCEDURE — 25010000002 PROPOFOL 10 MG/ML EMULSION: Performed by: INTERNAL MEDICINE

## 2020-08-16 PROCEDURE — 85025 COMPLETE CBC W/AUTO DIFF WBC: CPT | Performed by: INTERNAL MEDICINE

## 2020-08-16 PROCEDURE — 85007 BL SMEAR W/DIFF WBC COUNT: CPT | Performed by: INTERNAL MEDICINE

## 2020-08-16 PROCEDURE — 94003 VENT MGMT INPAT SUBQ DAY: CPT

## 2020-08-16 RX ADMIN — INSULIN ASPART 4 UNITS: 100 INJECTION, SOLUTION INTRAVENOUS; SUBCUTANEOUS at 05:12

## 2020-08-16 RX ADMIN — PROPOFOL 50 MCG/KG/MIN: 10 INJECTION, EMULSION INTRAVENOUS at 14:52

## 2020-08-16 RX ADMIN — MEROPENEM 1 G: 1 INJECTION, POWDER, FOR SOLUTION INTRAVENOUS at 12:10

## 2020-08-16 RX ADMIN — SODIUM CHLORIDE, PRESERVATIVE FREE 10 ML: 5 INJECTION INTRAVENOUS at 22:03

## 2020-08-16 RX ADMIN — CHLORHEXIDINE GLUCONATE 15 ML: 1.2 RINSE ORAL at 22:03

## 2020-08-16 RX ADMIN — PROPOFOL 50 MCG/KG/MIN: 10 INJECTION, EMULSION INTRAVENOUS at 02:03

## 2020-08-16 RX ADMIN — FAMOTIDINE 20 MG: 10 INJECTION INTRAVENOUS at 09:40

## 2020-08-16 RX ADMIN — DOXYCYCLINE 100 MG: 100 INJECTION, POWDER, LYOPHILIZED, FOR SOLUTION INTRAVENOUS at 17:26

## 2020-08-16 RX ADMIN — MEROPENEM 1 G: 1 INJECTION, POWDER, FOR SOLUTION INTRAVENOUS at 22:02

## 2020-08-16 RX ADMIN — Medication: at 07:04

## 2020-08-16 RX ADMIN — IPRATROPIUM BROMIDE 0.5 MG: 0.5 SOLUTION RESPIRATORY (INHALATION) at 06:24

## 2020-08-16 RX ADMIN — IPRATROPIUM BROMIDE 0.5 MG: 0.5 SOLUTION RESPIRATORY (INHALATION) at 12:09

## 2020-08-16 RX ADMIN — CHLORHEXIDINE GLUCONATE 15 ML: 1.2 RINSE ORAL at 09:43

## 2020-08-16 RX ADMIN — Medication 1 CAPSULE: at 09:41

## 2020-08-16 RX ADMIN — FAMOTIDINE 20 MG: 10 INJECTION INTRAVENOUS at 22:02

## 2020-08-16 RX ADMIN — IPRATROPIUM BROMIDE 0.5 MG: 0.5 SOLUTION RESPIRATORY (INHALATION) at 18:39

## 2020-08-16 RX ADMIN — INSULIN ASPART 4 UNITS: 100 INJECTION, SOLUTION INTRAVENOUS; SUBCUTANEOUS at 17:36

## 2020-08-16 RX ADMIN — INSULIN ASPART 4 UNITS: 100 INJECTION, SOLUTION INTRAVENOUS; SUBCUTANEOUS at 11:27

## 2020-08-16 RX ADMIN — FUROSEMIDE 20 MG: 10 INJECTION INTRAMUSCULAR; INTRAVENOUS at 11:25

## 2020-08-16 RX ADMIN — QUETIAPINE FUMARATE 100 MG: 100 TABLET ORAL at 22:02

## 2020-08-16 RX ADMIN — IPRATROPIUM BROMIDE 0.5 MG: 0.5 SOLUTION RESPIRATORY (INHALATION) at 00:40

## 2020-08-16 RX ADMIN — SODIUM CHLORIDE, PRESERVATIVE FREE 10 ML: 5 INJECTION INTRAVENOUS at 09:43

## 2020-08-16 RX ADMIN — PROPOFOL 50 MCG/KG/MIN: 10 INJECTION, EMULSION INTRAVENOUS at 17:26

## 2020-08-16 RX ADMIN — METHYLPREDNISOLONE SODIUM SUCCINATE 40 MG: 40 INJECTION, POWDER, FOR SOLUTION INTRAMUSCULAR; INTRAVENOUS at 09:41

## 2020-08-16 RX ADMIN — Medication: at 01:47

## 2020-08-16 RX ADMIN — CETIRIZINE HYDROCHLORIDE 10 MG: 10 TABLET, FILM COATED ORAL at 09:41

## 2020-08-16 RX ADMIN — PROPOFOL 50 MCG/KG/MIN: 10 INJECTION, EMULSION INTRAVENOUS at 22:31

## 2020-08-16 RX ADMIN — NOREPINEPHRINE BITARTRATE 0.08 MCG/KG/MIN: 1 INJECTION INTRAVENOUS at 03:20

## 2020-08-16 RX ADMIN — MICAFUNGIN SODIUM 100 MG: 100 INJECTION, POWDER, LYOPHILIZED, FOR SOLUTION INTRAVENOUS at 09:41

## 2020-08-16 RX ADMIN — PROPOFOL 50 MCG/KG/MIN: 10 INJECTION, EMULSION INTRAVENOUS at 12:09

## 2020-08-16 RX ADMIN — NICOTINE 1 PATCH: 21 PATCH TRANSDERMAL at 09:41

## 2020-08-16 RX ADMIN — PROPOFOL 50 MCG/KG/MIN: 10 INJECTION, EMULSION INTRAVENOUS at 19:54

## 2020-08-16 RX ADMIN — METHYLPREDNISOLONE SODIUM SUCCINATE 40 MG: 40 INJECTION, POWDER, FOR SOLUTION INTRAMUSCULAR; INTRAVENOUS at 22:02

## 2020-08-16 RX ADMIN — PROPOFOL 50 MCG/KG/MIN: 10 INJECTION, EMULSION INTRAVENOUS at 07:04

## 2020-08-16 RX ADMIN — Medication: at 12:09

## 2020-08-16 RX ADMIN — Medication: at 17:26

## 2020-08-16 RX ADMIN — FUROSEMIDE 20 MG: 10 INJECTION INTRAMUSCULAR; INTRAVENOUS at 00:56

## 2020-08-16 RX ADMIN — DOXYCYCLINE 100 MG: 100 INJECTION, POWDER, LYOPHILIZED, FOR SOLUTION INTRAVENOUS at 05:08

## 2020-08-16 RX ADMIN — PROPOFOL 50 MCG/KG/MIN: 10 INJECTION, EMULSION INTRAVENOUS at 04:56

## 2020-08-16 RX ADMIN — MEROPENEM 1 G: 1 INJECTION, POWDER, FOR SOLUTION INTRAVENOUS at 04:55

## 2020-08-16 RX ADMIN — Medication: at 22:31

## 2020-08-16 RX ADMIN — PROPOFOL 50 MCG/KG/MIN: 10 INJECTION, EMULSION INTRAVENOUS at 09:40

## 2020-08-16 NOTE — PROGRESS NOTES
PROGRESS NOTE         Patient Identification:  Name:  Manda Al  Age:  40 y.o.  Sex:  female  :  1980  MRN:  [id number]  Visit Number:  [id number]  Primary Care Provider:  Adali Sharpe APRN         LOS: 4 days       ----------------------------------------------------------------------------------------------------------------------  Subjective       Chief Complaints:    Cough; Fever; and Shortness of Breath        Interval History:      Patient remains intubated and sedated on 60% FiO2.  CRP trending up at 18.43.  WBC improved at 12.44.  Afebrile, no reports of diarrhea.  COVID-19 PCR on 8/15/2020-.  Chest x-ray on 8/15/2020 revealed severe diffuse alveolar disease worsening.    Review of Systems:    Patient intubated and sedated.  Unable to obtain ROS.  ----------------------------------------------------------------------------------------------------------------------      Objective       Current Hospital Meds:    budesonide-formoterol 2 puff Inhalation BID - RT   cetirizine 10 mg Oral Daily   chlorhexidine 15 mL Mouth/Throat Q12H   doxycycline 100 mg Intravenous Q12H   famotidine 20 mg Intravenous Q12H   fluticasone 2 spray Nasal Daily   furosemide 20 mg Intravenous Q12H   insulin aspart 0-9 Units Subcutaneous Q6H   ipratropium 0.5 mg Nebulization 4x Daily - RT   lactobacillus acidophilus 1 capsule Oral Daily   meropenem 1 g Intravenous Q8H   methylPREDNISolone sodium succinate 40 mg Intravenous Q12H   micafungin (MYCAMINE)  mg Intravenous Q24H   nicotine 1 patch Transdermal Q24H   QUEtiapine 100 mg Oral Nightly   sodium chloride 10 mL Intravenous Q12H   sodium chloride 10 mL Intravenous Q12H   sodium chloride 10 mL Intravenous Q12H   sodium chloride 10 mL Intravenous Q12H       fentaNYL Citrate     norepinephrine 0.02-0.3 mcg/kg/min Last Rate: 0.02 mcg/kg/min (20 1053)   propofol 5-50 mcg/kg/min Last Rate: 50 mcg/kg/min (20 1209)          ----------------------------------------------------------------------------------------------------------------------    Vital Signs:  Temp:  [98.2 °F (36.8 °C)-99 °F (37.2 °C)] 98.3 °F (36.8 °C)  Heart Rate:  [57-81] 67  Resp:  [18] 18  BP: ()/(41-91) 88/54  FiO2 (%):  [55 %-85 %] 55 %  Mean Arterial Pressure (Non-Invasive) for the past 24 hrs (Last 3 readings):   Noninvasive MAP (mmHg)   08/16/20 1203 66   08/16/20 1133 68   08/16/20 1103 66     SpO2 Percentage    08/16/20 1133 08/16/20 1203 08/16/20 1209   SpO2: 99% 97% 97%     SpO2:  [97 %-100 %] 97 %  on   ;   Device (Oxygen Therapy): ventilator    Body mass index is 33.15 kg/m².  Wt Readings from Last 3 Encounters:   08/16/20 98.9 kg (218 lb)   09/18/18 95.3 kg (210 lb)   02/21/18 105 kg (232 lb)        Intake/Output Summary (Last 24 hours) at 8/16/2020 1234  Last data filed at 8/16/2020 1209  Gross per 24 hour   Intake 1962.41 ml   Output 4500 ml   Net -2537.59 ml     NPO Diet  ----------------------------------------------------------------------------------------------------------------------      Physical Exam:    Constitutional:   Intubated and sedated.    HENT:  Head: Normocephalic and atraumatic.  Mouth:  Moist mucous membranes.    Eyes:  Conjunctivae and EOM are normal.  No scleral icterus.  Neck:  Neck supple.  No JVD present.    Cardiovascular:  Normal rate, regular rhythm and normal heart sounds with no murmur. No edema.  Pulmonary/Chest:  Diffuse wheezing bilaterally.  Coarse rhonchi present.  Abdominal:  Soft.  Bowel sounds are normal.  No distension and no tenderness.   Musculoskeletal:  No edema, no tenderness, and no deformity.  No swelling or redness of joints.  Neurological:   Intubated and sedated.  Skin:  Skin is warm and dry.  No rash noted.  No pallor.   Psychiatric:   Intubated and sedated.  ----------------------------------------------------------------------------------------------------------------------      Results from  last 7 days   Lab Units 08/14/20  2304   PROBNP pg/mL 3,619.0*       Results from last 7 days   Lab Units 08/15/20  0554   PH, ARTERIAL pH units 7.385   PO2 ART mm Hg 103.0   PCO2, ARTERIAL mm Hg 45.0   HCO3 ART mmol/L 26.9*     Results from last 7 days   Lab Units 08/16/20  0123 08/16/20  0042 08/15/20  0412 08/14/20 0058  08/11/20  2254   CRP mg/dL  --  18.43* 12.14* 18.60*   < > 21.91*   LACTATE mmol/L  --   --   --   --   --  1.0   WBC 10*3/mm3 12.44*  --  18.96* 19.39*   < >  --    HEMOGLOBIN g/dL 9.5*  --  10.1* 9.6*   < >  --    HEMATOCRIT % 29.7*  --  32.0* 31.1*   < >  --    MCV fL 92.0  --  93.3 95.1   < >  --    MCHC g/dL 32.0  --  31.6 30.9*   < >  --    PLATELETS 10*3/mm3 309  --  301 282   < >  --     < > = values in this interval not displayed.     Results from last 7 days   Lab Units 08/16/20  0042 08/15/20  0412 08/14/20  0058  08/12/20  0420 08/11/20  2129   SODIUM mmol/L 139 137 140   < > 138 135*   POTASSIUM mmol/L 4.4 3.6 3.8   < > 3.2* 3.2*   MAGNESIUM mg/dL  --  1.7  --   --  1.5*  --    CHLORIDE mmol/L 101 104 111*   < > 106 101   CO2 mmol/L 28.5 22.1 19.2*   < > 19.8* 19.8*   BUN mg/dL 20 16 12   < > 7 8   CREATININE mg/dL 0.64 0.65 0.59   < > 0.60 0.64   EGFR IF NONAFRICN AM mL/min/1.73 103 101 113   < > 111 103   CALCIUM mg/dL 8.6 8.5* 8.2*   < > 7.9* 8.5*   GLUCOSE mg/dL 214* 182* 188*   < > 118* 133*   ALBUMIN g/dL  --   --   --   --  2.77* 3.23*   BILIRUBIN mg/dL  --   --   --   --  1.2 1.3*   ALK PHOS U/L  --   --   --   --  62 67   AST (SGOT) U/L  --   --   --   --  15 16   ALT (SGPT) U/L  --   --   --   --  9 11    < > = values in this interval not displayed.   Estimated Creatinine Clearance: 145.1 mL/min (by C-G formula based on SCr of 0.64 mg/dL).  No results found for: AMMONIA    Glucose   Date/Time Value Ref Range Status   08/16/2020 1123 222 (H) 70 - 130 mg/dL Final   08/16/2020 0511 256 (H) 70 - 130 mg/dL Final   08/16/2020 0056 234 (H) 70 - 130 mg/dL Final   08/15/2020 0929  161 (H) 70 - 130 mg/dL Final   08/15/2020 0139 185 (H) 70 - 130 mg/dL Final     No results found for: HGBA1C  No results found for: TSH, FREET4    Blood Culture   Date Value Ref Range Status   08/11/2020 No growth at 2 days  Preliminary   08/11/2020 No growth at 2 days  Preliminary     Urine Culture   Date Value Ref Range Status   08/11/2020 No growth  Final     No results found for: WOUNDCX  No results found for: STOOLCX  Respiratory Culture   Date Value Ref Range Status   08/12/2020   Final    Moderate growth (3+) Normal Respiratory Miya: NO S.aureus/MRSA or Pseudomonas aeruginosa     Pain Management Panel     Pain Management Panel Latest Ref Rng & Units 8/11/2020 5/9/2017    AMPHETAMINES SCREEN, URINE Negative Positive(A) Negative    BARBITURATES SCREEN Negative Negative Negative    BENZODIAZEPINE SCREEN, URINE Negative Negative Negative    BUPRENORPHINEUR Negative Negative Negative    COCAINE SCREEN, URINE Negative Negative Negative    METHADONE SCREEN, URINE Negative Negative Negative            ----------------------------------------------------------------------------------------------------------------------  Imaging Results (Last 24 Hours)     ** No results found for the last 24 hours. **          ----------------------------------------------------------------------------------------------------------------------    Assessment/Plan       Assessment/Plan     ASSESSMENT:    1.  Sepsis  2.  Acute respiratory failure  3.  Pneumonia    PLAN:  Patient remains intubated and sedated on 60% FiO2.  CRP trending up at 18.43.  WBC improved at 12.44.  Afebrile, no reports of diarrhea.  COVID-19 PCR on 8/15/2020-.  Chest x-ray on 8/15/2020 revealed severe diffuse alveolar disease worsening.    Respiratory panel PCR negative.  COVID-19 PCR negative.       Urine culture revealed no growth.  Urine drug screen positive for amphetamines.      Blood cultures on 8/11/2020 revealed no growth thus far.     CT scan on 8/11/2020  revealed diffuse bilateral pneumonia with bilateral mild pleural effusions.  Mediastinal adenopathy, probably reactive-recommend follow-up in 3 months, per radiology.     Fungal serologies ordered and pending.      We continue to recommend bronchoscopy, in the setting of clinical worsening and we are unable to rule out fungal and or Mycobacterium pneumonia.     We recommend to continue with Merrem IV and doxycycline 100 mg IV every 12 hours as we cannot rule out fungal or mycobacterial pneumonia.  Recommend to continue with micafungin IV for empiric fungal coverage.      Code Status:   Code Status and Medical Interventions:   Ordered at: 08/12/20 0306     Code Status:    CPR     Medical Interventions (Level of Support Prior to Arrest):    Full       Tanesha Mcdowell, DIEGO  08/16/20  12:34

## 2020-08-16 NOTE — PLAN OF CARE
Problem: Patient Care Overview  Goal: Plan of Care Review  Outcome: Ongoing (interventions implemented as appropriate)  Flowsheets (Taken 8/16/2020 4299)  Progress: improving  Outcome Summary: Fio2 down to 55%, levophed down to 0.02, VSS, UOP 2900 for shift, pulmonology consult placed for Monday with recommendation for bronchoscopy from ID and hospitalist.

## 2020-08-16 NOTE — NURSING NOTE
Pt. Respiratory rate consistently 40-50's on 15L BHF and NRB, sats in the mid 90's.  Changed to CPAP with no improvement in RR.  Dr. Corrales made decision to intubate, at bedside at this time.      0431: 4mg Versed  0433: 20 Etomidate  0436: 1 mg Vec  0438: 15 Propofol   0439: intubated 8.0 ETT, 24 at the lip.   0445: 3mg Vec

## 2020-08-16 NOTE — PROGRESS NOTES
Patient Identification:  Name:  Manda Al  Age:  40 y.o.  Sex:  female  :  1980  MRN:  [id number]  Visit Number:  [id number]    Chief Complaint:   Severe mitral valve stenosis    Subjective:  No history is available from the patient was still on the ventilator and sedated no new issues ----------------------------------------------------------------------------------------------------------------------  [facility] Meds:    budesonide-formoterol 2 puff Inhalation BID - RT   cetirizine 10 mg Oral Daily   chlorhexidine 15 mL Mouth/Throat Q12H   doxycycline 100 mg Intravenous Q12H   famotidine 20 mg Intravenous Q12H   fluticasone 2 spray Nasal Daily   furosemide 20 mg Intravenous Q12H   insulin aspart 0-9 Units Subcutaneous Q6H   ipratropium 0.5 mg Nebulization 4x Daily - RT   lactobacillus acidophilus 1 capsule Oral Daily   meropenem 1 g Intravenous Q8H   methylPREDNISolone sodium succinate 40 mg Intravenous Q12H   micafungin (MYCAMINE)  mg Intravenous Q24H   nicotine 1 patch Transdermal Q24H   QUEtiapine 100 mg Oral Nightly   sodium chloride 10 mL Intravenous Q12H   sodium chloride 10 mL Intravenous Q12H   sodium chloride 10 mL Intravenous Q12H   sodium chloride 10 mL Intravenous Q12H       fentaNYL Citrate     norepinephrine 0.02-0.3 mcg/kg/min Last Rate: 0.08 mcg/kg/min (20)   propofol 5-50 mcg/kg/min Last Rate: 50 mcg/kg/min (20)     ----------------------------------------------------------------------------------------------------------------------  Vital Signs:  Temp:  [98.2 °F (36.8 °C)-99 °F (37.2 °C)] 98.3 °F (36.8 °C)  Heart Rate:  [57-85] 67  Resp:  [18] 18  BP: ()/(41-91) 131/74  FiO2 (%):  [60 %-85 %] 60 %      20  0400 08/15/20  0649 20  0500   Weight: 106 kg (234 lb 6.4 oz) 103 kg (227 lb 9.6 oz) 98.9 kg (218 lb)     Body mass index is 33.15 kg/m².    Intake/Output Summary (Last 24 hours) at 2020 1030  Last data filed at 2020  0941  Gross per 24 hour   Intake 2102.41 ml   Output 5100 ml   Net -2997.59 ml     NPO Diet  ----------------------------------------------------------------------------------------------------------------------  Physical exam:    HEENT:  Head:  Normocephalic and atraumatic.      Neck:  Neck supple.  No JVD present.  No bruit.  Cardiovascular: Normal rate, regular rhythm, 1/6 diastolic rumbling murmur heard best at the apex NO S3 / S4  Pulmonary/Chest:  Vesicular breath sounds B/L, intubated, bilateral inspiratory crackles remains on the ventilator.  Abdominal:  Soft.  Bowel sounds are normal.  No distension and no tenderness.  No organomegaly.  Neurological:  Sedated, No focal defecits  Skin:  Skin is warm and dry. No rash noted. No pallor.   Musculoskeletal:  No tenderness, and no deformity.  No red or swollen joints anywhere.   Lower extremities: No edema, Peripheral vascular:  2+ Pulses B/L DP.  ----------------------------------------------------------------------------------------------------------------------    ----------------------------------------------------------------------------------------------------------------------      Results from last 7 days   Lab Units 08/16/20  0123 08/16/20  0042 08/15/20  0412 08/14/20  0058  08/11/20  2254   CRP mg/dL  --  18.43* 12.14* 18.60*   < > 21.91*   LACTATE mmol/L  --   --   --   --   --  1.0   WBC 10*3/mm3 12.44*  --  18.96* 19.39*   < >  --    HEMOGLOBIN g/dL 9.5*  --  10.1* 9.6*   < >  --    HEMATOCRIT % 29.7*  --  32.0* 31.1*   < >  --    MCV fL 92.0  --  93.3 95.1   < >  --    MCHC g/dL 32.0  --  31.6 30.9*   < >  --    PLATELETS 10*3/mm3 309  --  301 282   < >  --     < > = values in this interval not displayed.     Results from last 7 days   Lab Units 08/15/20  0554   PH, ARTERIAL pH units 7.385   PO2 ART mm Hg 103.0   PCO2, ARTERIAL mm Hg 45.0   HCO3 ART mmol/L 26.9*     Results from last 7 days   Lab Units 08/16/20  0042 08/15/20  0412 08/14/20  0058   08/12/20  0420 08/11/20  2129   SODIUM mmol/L 139 137 140   < > 138 135*   POTASSIUM mmol/L 4.4 3.6 3.8   < > 3.2* 3.2*   MAGNESIUM mg/dL  --  1.7  --   --  1.5*  --    CHLORIDE mmol/L 101 104 111*   < > 106 101   CO2 mmol/L 28.5 22.1 19.2*   < > 19.8* 19.8*   BUN mg/dL 20 16 12   < > 7 8   CREATININE mg/dL 0.64 0.65 0.59   < > 0.60 0.64   EGFR IF NONAFRICN AM mL/min/1.73 103 101 113   < > 111 103   CALCIUM mg/dL 8.6 8.5* 8.2*   < > 7.9* 8.5*   GLUCOSE mg/dL 214* 182* 188*   < > 118* 133*   ALBUMIN g/dL  --   --   --   --  2.77* 3.23*   BILIRUBIN mg/dL  --   --   --   --  1.2 1.3*   ALK PHOS U/L  --   --   --   --  62 67   AST (SGOT) U/L  --   --   --   --  15 16   ALT (SGPT) U/L  --   --   --   --  9 11    < > = values in this interval not displayed.   Estimated Creatinine Clearance: 145.1 mL/min (by C-G formula based on SCr of 0.64 mg/dL).    No results found for: AMMONIA      Blood Culture   Date Value Ref Range Status   08/11/2020 No growth at 4 days  Preliminary   08/11/2020 No growth at 4 days  Preliminary     Urine Culture   Date Value Ref Range Status   08/11/2020 No growth  Final     No results found for: WOUNDCX  No results found for: STOOLCX    I have personally looked at the labs and they are summarized above.  ----------------------------------------------------------------------------------------------------------------------  Imaging Results (Last 24 Hours)     Procedure Component Value Units Date/Time    XR Chest 1 View [[id number]] Collected:  08/15/20 1054     Updated:  08/15/20 1056    Narrative:       CR Chest 1 Vw    INDICATION:   Central line placement pneumonia unspecified organism hypoxia.     COMPARISON:    Chest x-ray 8/15/2020 at 5:17 AM    FINDINGS:  Single portable AP view(s) of the chest.    Endotracheal tube satisfactory. Nasogastric tube terminates in the stomach. There is new right IJ approach central venous catheter that terminates expected location right atrium. There is severe  bilateral alveolar disease. This obscures the cardiac  silhouette. Prior.    There is no pneumothorax. Cannot exclude pleural fluid.       Impression:       Interval placement of a right IJ approach catheter that terminates right atrial level. No pneumothorax.  2. Severe diffuse alveolar disease worsening since earlier film. Correlate for clinical evidence of Covid pneumonia.  3. Tubes and lines are otherwise unchanged    Signer Name: Zhane Hays MD   Signed: 8/15/2020 10:54 AM   Workstation Name: [email or web address]    [facility]        ----------------------------------------------------------------------------------------------------------------------    Assessment:  1. Severe mitral valve stenosis noted on echocardiogram 8/14/2020.  2. Acute diastolic congestive heart failure, proBNP 3619, CT chest revealing pulmonary edema  3. Severe sepsis secondary to pneumonia  4. Acute respiratory failure  5. Acute COPD exacerbation  6. Bilateral pneumonia  7. Essential hypertension, currently hypotensive on levophed.  8. Crohn's Disease  9. Tobacco abuse    Plan:  1.  Severe mitral stenosis is tachycardia resolved issue the mitral stenosis can be addressed after patient is extubated and respiratory status improved  2.  She is a been diuresing really well continue current management  3.  We will can wean off the norepinephrine today her blood pressure is much better  4.  Bronchoscopy tomorrow  5.  Antibiotic management as per medicine service      Екатерина Jai, APRN   08/16/20 10:30     Addendum:  I, Bess Montgomery MD, [facility], performed the services described in this documentation as documented by the above-named individual under my supervision and made necessary changes in the note is both accurate and complete  Dr. Bess Montgomery MD

## 2020-08-16 NOTE — PROGRESS NOTES
[facility] HOSPITALIST PROGRESS NOTE     Patient Identification:  Name:  Manda Al  Age:  40 y.o.  Sex:  female  :  1980  MRN:  [id number]  Visit Number:  [id number]  Primary Care Provider:  Adali Sharpe APRN    Length of stay:  4    Chief complaint:  40 y.o. old female admitted with Cough; Fever; and Shortness of Breath          Subjective:    Patient seen with nursing staff.  No family present at bedside.  Patient remains intubated and on vent.  Patient unable to help with HPI.  Nursing staff reported patient has had improvement in her oxygenation and her FiO2 is down to 60% this morning.  Patient has been diuresing well.    Patient remains on norepinephrine the blood pressure is improving and is being titrated down.  Continues to remain on fentanyl and propofol for sedation.    Vent Setting:  FiO2 (%):  [60 %-85 %] 60 %  S RR:  [18] 18  PEEP/CPAP (cm H2O):  [5 cm H20] 5 cm H20  MAP (cm H2O):  [7-12] 11      Procedures:  8/15/2020: Intubation and mechanical ventilation  8/15/2020: Central line placement       Current [facility] Meds:    budesonide-formoterol 2 puff Inhalation BID - RT   cetirizine 10 mg Oral Daily   chlorhexidine 15 mL Mouth/Throat Q12H   doxycycline 100 mg Intravenous Q12H   famotidine 20 mg Intravenous Q12H   fluticasone 2 spray Nasal Daily   furosemide 20 mg Intravenous Q12H   insulin aspart 0-9 Units Subcutaneous Q6H   ipratropium 0.5 mg Nebulization 4x Daily - RT   lactobacillus acidophilus 1 capsule Oral Daily   meropenem 1 g Intravenous Q8H   methylPREDNISolone sodium succinate 40 mg Intravenous Q12H   micafungin (MYCAMINE)  mg Intravenous Q24H   nicotine 1 patch Transdermal Q24H   QUEtiapine 100 mg Oral Nightly   sodium chloride 10 mL Intravenous Q12H   sodium chloride 10 mL Intravenous Q12H   sodium chloride 10 mL Intravenous Q12H   sodium chloride 10 mL Intravenous Q12H       fentaNYL Citrate     norepinephrine 0.02-0.3 mcg/kg/min Last Rate: 0.08  mcg/kg/min (08/16/20 0320)   propofol 5-50 mcg/kg/min Last Rate: 50 mcg/kg/min (08/16/20 0940)     ----------------------------------------------------------------------------------------------------------------------  Vital Signs:  Temp:  [98.2 °F (36.8 °C)-99 °F (37.2 °C)] 98.3 °F (36.8 °C)  Heart Rate:  [57-85] 67  Resp:  [18] 18  BP: ()/(41-91) 131/74  FiO2 (%):  [60 %-90 %] 60 %      08/14/20  0400 08/15/20  0649 08/16/20  0500   Weight: 106 kg (234 lb 6.4 oz) 103 kg (227 lb 9.6 oz) 98.9 kg (218 lb)     Body mass index is 33.15 kg/m².    Intake/Output Summary (Last 24 hours) at 8/16/2020 1002  Last data filed at 8/16/2020 0941  Gross per 24 hour   Intake 2102.41 ml   Output 5100 ml   Net -2997.59 ml     NPO Diet  ----------------------------------------------------------------------------------------------------------------------  Physical exam:  Constitutional: On vent.   HENT:  Head:  Normocephalic and atraumatic.    Eyes:  Pupils are equal, round, and reactive to light.    Cardiovascular:  Regular rate and rhythm. S1+S2.   Pulmonary/Chest: Bilateral inspiratory crackles, rhonchi improved since yesterday.  Patient is breathing better.  Abdominal:  Soft. Non-tender. No viscera palpable.  Bowel sounds audible.   Musculoskeletal: No deformity or joint swelling.   Peripheral vascular: Bilateral dorsalis pedis palpable.   Neurological: Sedated.  Skin:  Skin is warm and dry. No rash noted. No pallor.   ----------------------------------------------------------------------------------------------------------------------  Tele: Sinus rhythm  ----------------------------------------------------------------------------------------------------------------------      Results from last 7 days   Lab Units 08/16/20  0123 08/16/20  0042 08/15/20  0412 08/14/20  0058  08/11/20  4653   CRP mg/dL  --  18.43* 12.14* 18.60*   < > 21.91*   LACTATE mmol/L  --   --   --   --   --  1.0   WBC 10*3/mm3 12.44*  --  18.96* 19.39*    < >  --    HEMOGLOBIN g/dL 9.5*  --  10.1* 9.6*   < >  --    HEMATOCRIT % 29.7*  --  32.0* 31.1*   < >  --    MCV fL 92.0  --  93.3 95.1   < >  --    MCHC g/dL 32.0  --  31.6 30.9*   < >  --    PLATELETS 10*3/mm3 309  --  301 282   < >  --     < > = values in this interval not displayed.     Results from last 7 days   Lab Units 08/15/20  0554   PH, ARTERIAL pH units 7.385   PO2 ART mm Hg 103.0   PCO2, ARTERIAL mm Hg 45.0   HCO3 ART mmol/L 26.9*     Results from last 7 days   Lab Units 08/16/20  0042 08/15/20  0412 08/14/20  0058  08/12/20  0420 08/11/20  2129   SODIUM mmol/L 139 137 140   < > 138 135*   POTASSIUM mmol/L 4.4 3.6 3.8   < > 3.2* 3.2*   MAGNESIUM mg/dL  --  1.7  --   --  1.5*  --    CHLORIDE mmol/L 101 104 111*   < > 106 101   CO2 mmol/L 28.5 22.1 19.2*   < > 19.8* 19.8*   BUN mg/dL 20 16 12   < > 7 8   CREATININE mg/dL 0.64 0.65 0.59   < > 0.60 0.64   EGFR IF NONAFRICN AM mL/min/1.73 103 101 113   < > 111 103   CALCIUM mg/dL 8.6 8.5* 8.2*   < > 7.9* 8.5*   GLUCOSE mg/dL 214* 182* 188*   < > 118* 133*   ALBUMIN g/dL  --   --   --   --  2.77* 3.23*   BILIRUBIN mg/dL  --   --   --   --  1.2 1.3*   ALK PHOS U/L  --   --   --   --  62 67   AST (SGOT) U/L  --   --   --   --  15 16   ALT (SGPT) U/L  --   --   --   --  9 11    < > = values in this interval not displayed.   Estimated Creatinine Clearance: 145.1 mL/min (by C-G formula based on SCr of 0.64 mg/dL).    No results found for: AMMONIA      Blood Culture   Date Value Ref Range Status   08/11/2020 No growth at 24 hours  Preliminary   08/11/2020 No growth at 24 hours  Preliminary     Urine Culture   Date Value Ref Range Status   08/11/2020 No growth  Final     No results found for: WOUNDCX  No results found for: STOOLCX    I have personally looked at the labs and they are summarized above.  ----------------------------------------------------------------------------------------------------------------------  Imaging Results (Last 24 Hours)     Procedure  Component Value Units Date/Time    XR Chest 1 View [[id number]] Collected:  08/15/20 1054     Updated:  08/15/20 1056    Narrative:       CR Chest 1 Vw    INDICATION:   Central line placement pneumonia unspecified organism hypoxia.     COMPARISON:    Chest x-ray 8/15/2020 at 5:17 AM    FINDINGS:  Single portable AP view(s) of the chest.    Endotracheal tube satisfactory. Nasogastric tube terminates in the stomach. There is new right IJ approach central venous catheter that terminates expected location right atrium. There is severe bilateral alveolar disease. This obscures the cardiac  silhouette. Prior.    There is no pneumothorax. Cannot exclude pleural fluid.       Impression:       Interval placement of a right IJ approach catheter that terminates right atrial level. No pneumothorax.  2. Severe diffuse alveolar disease worsening since earlier film. Correlate for clinical evidence of Covid pneumonia.  3. Tubes and lines are otherwise unchanged    Signer Name: Zhane Hays MD   Signed: 8/15/2020 10:54 AM   Workstation Name: [email or web address]    [facility]        ----------------------------------------------------------------------------------------------------------------------  Assessment and Plan:    -Septic shock d/t PNA  Patient has remained afebrile last 24 hours, heart rate and blood pressure is improved though she still continues to remain on norepinephrine.  CRP has increased to 18 today from 12 yesterday, however, but WBC count is improved to 12 from 18.9 yesterday.    Continue vasopressor as needed to keep MAP >65 mmHg.    Blood cultures have been negative so far.  Respiratory culture showing moderate growth of normal respiratory katherine.  Patient is on meropenem, doxycycline and micafungin added by ID.  Appreciate input from ID.    -Acute hypoxemic respiratory failure/ARDS d/t PNA and COPD  Patient is improving and FiO2 is down to 60% today.  Patient remains intubated and on vent.    Chest  x-ray showed diffuse bilateral infiltrates.    Patient has been diuresing well and output was about 5 L last 24 hours.  Continue supplemental oxygen as needed to keep SPO2 >90% and continue diuresis.  Continue treatment of underlying pneumonia..    -Bilateral pneumonia  CT scan of the chest on admission showed diffuse bilateral pneumonia with small bilateral effusions and mild mediastinal adenopathy.  Chest x-ray showed worsening diffuse bilateral infiltrates.    Respiratory culture has not shown any growth.  Legionella antigen, Streptococcus pneumonia antigen and respiratory panel is negative.  Continue antibiotics as outlined above.  Fungal serologies sent by infectious diseases.  Patient is not having much secretions from ET tube.  Pulmonology not available today but will be available tomorrow.  Will consult pulmonology in a.m. once available.  Patient likely will need bronchoscopy.  Will defer the decision to pulmonology.    -Acute decompensation of chronic diastolic CHF  proBNP was 3600.  CT chest and chest x-ray showing diffuse pulmonary edema.  Patient is diuresing well and net fluid balance is -4800 mL.  Continue diuresis with Lasix.  Echocardiogram showed normal LV SF, EF of 61 to 65%, mild to moderately dilated LA cavity, mild to moderate MVR, severe MVS, moderate TVR, markedly elevated RV systolic pressure (>55 mmHg), severe pulmonary hypertension, mildly dilated RV cavity and no evidence of pericardial effusion.    -Severe MVS  Echocardiogram showed severe bileaflet mitral valve thickening with rheumatic changes, mild to moderate MVR and severe MVS with a mean gradient of 16 mmHg.  Cardiology on board and patient may need valve replacement.     -Acute COPD exacerbation  Continue Atrovent nebs, Symbicort and Solu-Medrol.  Continue antibiotics as outlined above.    -Mediastinal adenopathy  Likely reactive due to bilateral pneumonia.  Patient will need bronchoscopy and repeat imaging to document  clearance.    -Essential hypertension  Currently patient has septic shock and antihypertensives are on hold.    -Crohn's disease  Patient is not on any maintenance medications.  Patient did not have any abdominal pain, diarrhea or hematochezia.    -Tobacco abuse/dependence  Patient is currently sedated and on vent.  Previously I discussed with the patient the dangers of continued tobacco use and I have advised her to quit smoking.  Patient was agreeable to quit.    Activity: Bedrest  Nutrition: N.p.o.  Fluids: None  DVT prophylaxis: SCDs  GI prophylaxis: PPI    The patient is high risk due to: Respiratory failure, sepsis, pneumonia, COPD exacerbation    I discussed the patient's findings and my recommendations with patient and nursing staff.    Lydia Hou MD  08/16/20  10:02

## 2020-08-16 NOTE — PLAN OF CARE
Problem: Patient Care Overview  Goal: Plan of Care Review  Outcome: Ongoing (interventions implemented as appropriate)  Goal: Individualization and Mutuality  Outcome: Ongoing (interventions implemented as appropriate)  Goal: Discharge Needs Assessment  Outcome: Ongoing (interventions implemented as appropriate)  Goal: Interprofessional Rounds/Family Conf  Outcome: Ongoing (interventions implemented as appropriate)     Problem: Infection, Risk/Actual (Adult)  Goal: Identify Related Risk Factors and Signs and Symptoms  Outcome: Ongoing (interventions implemented as appropriate)  Goal: Infection Prevention/Resolution  Outcome: Ongoing (interventions implemented as appropriate)     Problem: Pneumonia (Adult)  Goal: Signs and Symptoms of Listed Potential Problems Will be Absent, Minimized or Managed (Pneumonia)  Outcome: Ongoing (interventions implemented as appropriate)     Problem: Sepsis/Septic Shock (Adult)  Goal: Signs and Symptoms of Listed Potential Problems Will be Absent, Minimized or Managed (Sepsis/Septic Shock)  Outcome: Ongoing (interventions implemented as appropriate)     Problem: Pain, Chronic (Adult)  Goal: Identify Related Risk Factors and Signs and Symptoms  Outcome: Ongoing (interventions implemented as appropriate)  Goal: Acceptable Pain/Comfort Level and Functional Ability  Outcome: Ongoing (interventions implemented as appropriate)     Problem: Fall Risk (Adult)  Goal: Identify Related Risk Factors and Signs and Symptoms  Outcome: Ongoing (interventions implemented as appropriate)  Goal: Absence of Fall  Outcome: Ongoing (interventions implemented as appropriate)

## 2020-08-17 ENCOUNTER — APPOINTMENT (OUTPATIENT)
Dept: CT IMAGING | Facility: HOSPITAL | Age: 40
End: 2020-08-17

## 2020-08-17 ENCOUNTER — APPOINTMENT (OUTPATIENT)
Dept: CARDIOLOGY | Facility: HOSPITAL | Age: 40
End: 2020-08-17

## 2020-08-17 VITALS
SYSTOLIC BLOOD PRESSURE: 113 MMHG | HEIGHT: 68 IN | WEIGHT: 212 LBS | RESPIRATION RATE: 16 BRPM | BODY MASS INDEX: 32.13 KG/M2 | HEART RATE: 69 BPM | OXYGEN SATURATION: 99 % | TEMPERATURE: 98.3 F | DIASTOLIC BLOOD PRESSURE: 71 MMHG

## 2020-08-17 LAB
A-A DO2: 107.6 MMHG (ref 0–300)
A-A DO2: 119.6 MMHG (ref 0–300)
AMYLASE SERPL-CCNC: 16 U/L (ref 28–100)
ANION GAP SERPL CALCULATED.3IONS-SCNC: 12.4 MMOL/L (ref 5–15)
ARTERIAL PATENCY WRIST A: ABNORMAL
ARTERIAL PATENCY WRIST A: ABNORMAL
ATMOSPHERIC PRESS: 728 MMHG
ATMOSPHERIC PRESS: 728 MMHG
BASE EXCESS BLDA CALC-SCNC: 14.1 MMOL/L (ref 0–2)
BASE EXCESS BLDA CALC-SCNC: 15.1 MMOL/L (ref 0–2)
BASOPHILS # BLD AUTO: 0.01 10*3/MM3 (ref 0–0.2)
BASOPHILS NFR BLD AUTO: 0.1 % (ref 0–1.5)
BDY SITE: ABNORMAL
BDY SITE: ABNORMAL
BH CV ECHO MEAS - BSA(HAYCOCK): 2.2 M^2
BH CV ECHO MEAS - BSA: 2.1 M^2
BH CV ECHO MEAS - BZI_BMI: 32.2 KILOGRAMS/M^2
BH CV ECHO MEAS - BZI_METRIC_HEIGHT: 172.7 CM
BH CV ECHO MEAS - BZI_METRIC_WEIGHT: 96.2 KG
BH CV ECHO MEAS - LV MAX PG: 4.8 MMHG
BH CV ECHO MEAS - LV MEAN PG: 2 MMHG
BH CV ECHO MEAS - LV V1 MAX: 109 CM/SEC
BH CV ECHO MEAS - LV V1 MEAN: 70.6 CM/SEC
BH CV ECHO MEAS - LV V1 VTI: 23.8 CM
BH CV ECHO MEAS - LVOT AREA (M): 3.5 CM^2
BH CV ECHO MEAS - LVOT AREA: 3.5 CM^2
BH CV ECHO MEAS - LVOT DIAM: 2.1 CM
BH CV ECHO MEAS - MV A MAX VEL: 134 CM/SEC
BH CV ECHO MEAS - MV DEC SLOPE: 219 CM/SEC^2
BH CV ECHO MEAS - MV E MAX VEL: 178 CM/SEC
BH CV ECHO MEAS - MV E/A: 1.3
BH CV ECHO MEAS - MV MAX PG: 10.8 MMHG
BH CV ECHO MEAS - MV MEAN PG: 3.5 MMHG
BH CV ECHO MEAS - MV P1/2T MAX VEL: 171 CM/SEC
BH CV ECHO MEAS - MV P1/2T: 228.7 MSEC
BH CV ECHO MEAS - MV V2 MAX: 164.5 CM/SEC
BH CV ECHO MEAS - MV V2 MEAN: 84.9 CM/SEC
BH CV ECHO MEAS - MV V2 VTI: 75.5 CM
BH CV ECHO MEAS - MVA P1/2T LCG: 1.3 CM^2
BH CV ECHO MEAS - MVA(P1/2T): 0.96 CM^2
BH CV ECHO MEAS - MVA(VTI): 1.1 CM^2
BH CV ECHO MEAS - SI(LVOT): 39.3 ML/M^2
BH CV ECHO MEAS - SV(LVOT): 82.4 ML
BODY TEMPERATURE: 0 C
BODY TEMPERATURE: 0 C
BUN SERPL-MCNC: 23 MG/DL (ref 6–20)
BUN/CREAT SERPL: 32.9 (ref 7–25)
CALCIUM SPEC-SCNC: 9.4 MG/DL (ref 8.6–10.5)
CHLORIDE SERPL-SCNC: 94 MMOL/L (ref 98–107)
CO2 BLDA-SCNC: 39.9 MMOL/L (ref 22–33)
CO2 BLDA-SCNC: 40.8 MMOL/L (ref 22–33)
CO2 SERPL-SCNC: 34.6 MMOL/L (ref 22–29)
COHGB MFR BLD: 1 % (ref 0–5)
COHGB MFR BLD: 1 % (ref 0–5)
CREAT SERPL-MCNC: 0.7 MG/DL (ref 0.57–1)
CRP SERPL-MCNC: 10.54 MG/DL (ref 0–0.5)
D-LACTATE SERPL-SCNC: 1.3 MMOL/L (ref 0.5–2)
DEPRECATED RDW RBC AUTO: 44 FL (ref 37–54)
EOSINOPHIL # BLD AUTO: 0.01 10*3/MM3 (ref 0–0.4)
EOSINOPHIL NFR BLD AUTO: 0.1 % (ref 0.3–6.2)
ERYTHROCYTE [DISTWIDTH] IN BLOOD BY AUTOMATED COUNT: 13.1 % (ref 12.3–15.4)
GFR SERPL CREATININE-BSD FRML MDRD: 93 ML/MIN/1.73
GLUCOSE BLDC GLUCOMTR-MCNC: 153 MG/DL (ref 70–130)
GLUCOSE BLDC GLUCOMTR-MCNC: 189 MG/DL (ref 70–130)
GLUCOSE BLDC GLUCOMTR-MCNC: 205 MG/DL (ref 70–130)
GLUCOSE BLDC GLUCOMTR-MCNC: 212 MG/DL (ref 70–130)
GLUCOSE SERPL-MCNC: 170 MG/DL (ref 65–99)
HCO3 BLDA-SCNC: 38.6 MMOL/L (ref 20–26)
HCO3 BLDA-SCNC: 39.2 MMOL/L (ref 20–26)
HCT VFR BLD AUTO: 34.3 % (ref 34–46.6)
HCT VFR BLD CALC: 33.2 % (ref 38–51)
HCT VFR BLD CALC: 34.3 % (ref 38–51)
HGB BLD-MCNC: 10.7 G/DL (ref 12–15.9)
HGB BLDA-MCNC: 10.8 G/DL (ref 13.5–17.5)
HGB BLDA-MCNC: 11.2 G/DL (ref 13.5–17.5)
IMM GRANULOCYTES # BLD AUTO: 0.07 10*3/MM3 (ref 0–0.05)
IMM GRANULOCYTES NFR BLD AUTO: 0.5 % (ref 0–0.5)
INHALED O2 CONCENTRATION: 40 %
INHALED O2 CONCENTRATION: 40 %
LIPASE SERPL-CCNC: 12 U/L (ref 13–60)
LYMPHOCYTES # BLD AUTO: 1.76 10*3/MM3 (ref 0.7–3.1)
LYMPHOCYTES NFR BLD AUTO: 13.6 % (ref 19.6–45.3)
Lab: ABNORMAL
Lab: ABNORMAL
MAGNESIUM SERPL-MCNC: 2.4 MG/DL (ref 1.6–2.6)
MCH RBC QN AUTO: 29 PG (ref 26.6–33)
MCHC RBC AUTO-ENTMCNC: 31.2 G/DL (ref 31.5–35.7)
MCV RBC AUTO: 93 FL (ref 79–97)
METHGB BLD QL: 0.3 % (ref 0–3)
METHGB BLD QL: 0.4 % (ref 0–3)
MODALITY: ABNORMAL
MODALITY: ABNORMAL
MONOCYTES # BLD AUTO: 0.66 10*3/MM3 (ref 0.1–0.9)
MONOCYTES NFR BLD AUTO: 5.1 % (ref 5–12)
NEUTROPHILS NFR BLD AUTO: 10.46 10*3/MM3 (ref 1.7–7)
NEUTROPHILS NFR BLD AUTO: 80.6 % (ref 42.7–76)
NOTE: ABNORMAL
NOTE: ABNORMAL
NRBC BLD AUTO-RTO: 0 /100 WBC (ref 0–0.2)
OXYHGB MFR BLDV: 96.7 % (ref 94–99)
OXYHGB MFR BLDV: 98.2 % (ref 94–99)
PCO2 BLDA: 42.1 MM HG (ref 35–45)
PCO2 BLDA: 50.7 MM HG (ref 35–45)
PCO2 TEMP ADJ BLD: ABNORMAL MM[HG]
PCO2 TEMP ADJ BLD: ABNORMAL MM[HG]
PEEP RESPIRATORY: 5 CM[H2O]
PEEP RESPIRATORY: 5 CM[H2O]
PH BLDA: 7.5 PH UNITS (ref 7.35–7.45)
PH BLDA: 7.57 PH UNITS (ref 7.35–7.45)
PH, TEMP CORRECTED: ABNORMAL
PH, TEMP CORRECTED: ABNORMAL
PLATELET # BLD AUTO: 387 10*3/MM3 (ref 140–450)
PMV BLD AUTO: 10 FL (ref 6–12)
PO2 BLDA: 119 MM HG (ref 83–108)
PO2 BLDA: 97 MM HG (ref 83–108)
PO2 TEMP ADJ BLD: ABNORMAL MM[HG]
PO2 TEMP ADJ BLD: ABNORMAL MM[HG]
POTASSIUM SERPL-SCNC: 3.9 MMOL/L (ref 3.5–5.2)
RBC # BLD AUTO: 3.69 10*6/MM3 (ref 3.77–5.28)
RETICS # AUTO: 0.08 10*6/MM3 (ref 0.02–0.13)
RETICS/RBC NFR AUTO: 2.07 % (ref 0.7–1.9)
SAO2 % BLDCOA: 98.1 % (ref 94–99)
SAO2 % BLDCOA: >99.2 % (ref 94–99)
SET MECH RESP RATE: 16
SET MECH RESP RATE: 18
SODIUM SERPL-SCNC: 141 MMOL/L (ref 136–145)
TSH SERPL DL<=0.05 MIU/L-ACNC: 0.98 UIU/ML (ref 0.27–4.2)
VENTILATOR MODE: ABNORMAL
VENTILATOR MODE: ABNORMAL
VT ON VENT VENT: 450 ML
VT ON VENT VENT: 500 ML
WBC # BLD AUTO: 12.97 10*3/MM3 (ref 3.4–10.8)

## 2020-08-17 PROCEDURE — 94003 VENT MGMT INPAT SUBQ DAY: CPT

## 2020-08-17 PROCEDURE — 82150 ASSAY OF AMYLASE: CPT | Performed by: INTERNAL MEDICINE

## 2020-08-17 PROCEDURE — 93325 DOPPLER ECHO COLOR FLOW MAPG: CPT | Performed by: SPECIALIST

## 2020-08-17 PROCEDURE — 99291 CRITICAL CARE FIRST HOUR: CPT | Performed by: INTERNAL MEDICINE

## 2020-08-17 PROCEDURE — 94799 UNLISTED PULMONARY SVC/PX: CPT

## 2020-08-17 PROCEDURE — 71250 CT THORAX DX C-: CPT | Performed by: RADIOLOGY

## 2020-08-17 PROCEDURE — 5A1945Z RESPIRATORY VENTILATION, 24-96 CONSECUTIVE HOURS: ICD-10-PCS | Performed by: INTERNAL MEDICINE

## 2020-08-17 PROCEDURE — 36600 WITHDRAWAL OF ARTERIAL BLOOD: CPT

## 2020-08-17 PROCEDURE — 93005 ELECTROCARDIOGRAM TRACING: CPT | Performed by: INTERNAL MEDICINE

## 2020-08-17 PROCEDURE — 25010000002 MEROPENEM PER 100 MG: Performed by: INTERNAL MEDICINE

## 2020-08-17 PROCEDURE — 25010000002 MICAFUNGIN SODIUM 100 MG RECONSTITUTED SOLUTION 1 EACH VIAL: Performed by: NURSE PRACTITIONER

## 2020-08-17 PROCEDURE — 82375 ASSAY CARBOXYHB QUANT: CPT

## 2020-08-17 PROCEDURE — 25010000002 PROPOFOL 10 MG/ML EMULSION: Performed by: INTERNAL MEDICINE

## 2020-08-17 PROCEDURE — 99232 SBSQ HOSP IP/OBS MODERATE 35: CPT | Performed by: SPECIALIST

## 2020-08-17 PROCEDURE — 25010000002 HEPARIN (PORCINE) PER 1000 UNITS: Performed by: INTERNAL MEDICINE

## 2020-08-17 PROCEDURE — 82962 GLUCOSE BLOOD TEST: CPT

## 2020-08-17 PROCEDURE — 93010 ELECTROCARDIOGRAM REPORT: CPT | Performed by: INTERNAL MEDICINE

## 2020-08-17 PROCEDURE — 83050 HGB METHEMOGLOBIN QUAN: CPT

## 2020-08-17 PROCEDURE — 25010000002 FUROSEMIDE PER 20 MG: Performed by: SPECIALIST

## 2020-08-17 PROCEDURE — 25010000002 ACETAZOLAMIDE PER 500 MG: Performed by: INTERNAL MEDICINE

## 2020-08-17 PROCEDURE — 83690 ASSAY OF LIPASE: CPT | Performed by: INTERNAL MEDICINE

## 2020-08-17 PROCEDURE — 93325 DOPPLER ECHO COLOR FLOW MAPG: CPT

## 2020-08-17 PROCEDURE — 93321 DOPPLER ECHO F-UP/LMTD STD: CPT | Performed by: SPECIALIST

## 2020-08-17 PROCEDURE — 82805 BLOOD GASES W/O2 SATURATION: CPT

## 2020-08-17 PROCEDURE — 99239 HOSP IP/OBS DSCHRG MGMT >30: CPT | Performed by: INTERNAL MEDICINE

## 2020-08-17 PROCEDURE — 85025 COMPLETE CBC W/AUTO DIFF WBC: CPT | Performed by: INTERNAL MEDICINE

## 2020-08-17 PROCEDURE — 83735 ASSAY OF MAGNESIUM: CPT | Performed by: INTERNAL MEDICINE

## 2020-08-17 PROCEDURE — 83605 ASSAY OF LACTIC ACID: CPT | Performed by: PHYSICIAN ASSISTANT

## 2020-08-17 PROCEDURE — 71250 CT THORAX DX C-: CPT

## 2020-08-17 PROCEDURE — 84443 ASSAY THYROID STIM HORMONE: CPT | Performed by: INTERNAL MEDICINE

## 2020-08-17 PROCEDURE — 85060 BLOOD SMEAR INTERPRETATION: CPT | Performed by: INTERNAL MEDICINE

## 2020-08-17 PROCEDURE — 93308 TTE F-UP OR LMTD: CPT

## 2020-08-17 PROCEDURE — 93321 DOPPLER ECHO F-UP/LMTD STD: CPT

## 2020-08-17 PROCEDURE — 25010000002 MEROPENEM: Performed by: INTERNAL MEDICINE

## 2020-08-17 PROCEDURE — 86140 C-REACTIVE PROTEIN: CPT | Performed by: NURSE PRACTITIONER

## 2020-08-17 PROCEDURE — 25010000002 METHYLPREDNISOLONE PER 40 MG: Performed by: INTERNAL MEDICINE

## 2020-08-17 PROCEDURE — 63710000001 INSULIN ASPART PER 5 UNITS: Performed by: NURSE PRACTITIONER

## 2020-08-17 PROCEDURE — 85045 AUTOMATED RETICULOCYTE COUNT: CPT | Performed by: INTERNAL MEDICINE

## 2020-08-17 PROCEDURE — 93308 TTE F-UP OR LMTD: CPT | Performed by: SPECIALIST

## 2020-08-17 PROCEDURE — 99292 CRITICAL CARE ADDL 30 MIN: CPT | Performed by: INTERNAL MEDICINE

## 2020-08-17 PROCEDURE — 80048 BASIC METABOLIC PNL TOTAL CA: CPT | Performed by: INTERNAL MEDICINE

## 2020-08-17 RX ORDER — HEPARIN SODIUM 5000 [USP'U]/ML
5000 INJECTION, SOLUTION INTRAVENOUS; SUBCUTANEOUS EVERY 8 HOURS SCHEDULED
Status: DISCONTINUED | OUTPATIENT
Start: 2020-08-17 | End: 2020-08-18 | Stop reason: HOSPADM

## 2020-08-17 RX ORDER — FUROSEMIDE 10 MG/ML
20 INJECTION INTRAMUSCULAR; INTRAVENOUS EVERY 12 HOURS SCHEDULED
Start: 2020-08-18

## 2020-08-17 RX ORDER — FAMOTIDINE 10 MG/ML
20 INJECTION, SOLUTION INTRAVENOUS EVERY 12 HOURS SCHEDULED
Start: 2020-08-17

## 2020-08-17 RX ORDER — HEPARIN SODIUM 5000 [USP'U]/ML
5000 INJECTION, SOLUTION INTRAVENOUS; SUBCUTANEOUS EVERY 8 HOURS SCHEDULED
Start: 2020-08-17

## 2020-08-17 RX ORDER — NOREPINEPHRINE BIT/0.9 % NACL 8 MG/250ML
.02-.3 INFUSION BOTTLE (ML) INTRAVENOUS
Start: 2020-08-17

## 2020-08-17 RX ORDER — MIDAZOLAM HCL IN 0.9 % NACL/PF 1 MG/ML
1-10 PLASTIC BAG, INJECTION (ML) INTRAVENOUS
Start: 2020-08-17 | End: 2020-08-27

## 2020-08-17 RX ORDER — FENTANYL CITRATE/PF 100MCG/2ML
SYRINGE (ML) INTRAVENOUS
Start: 2020-08-17

## 2020-08-17 RX ORDER — MIDAZOLAM HCL IN 0.9 % NACL/PF 1 MG/ML
1-10 PLASTIC BAG, INJECTION (ML) INTRAVENOUS
Status: DISCONTINUED | OUTPATIENT
Start: 2020-08-17 | End: 2020-08-18 | Stop reason: HOSPADM

## 2020-08-17 RX ADMIN — NOREPINEPHRINE BITARTRATE 0.02 MCG/KG/MIN: 1 INJECTION INTRAVENOUS at 08:55

## 2020-08-17 RX ADMIN — DOXYCYCLINE 100 MG: 100 INJECTION, POWDER, LYOPHILIZED, FOR SOLUTION INTRAVENOUS at 05:07

## 2020-08-17 RX ADMIN — FAMOTIDINE 20 MG: 10 INJECTION INTRAVENOUS at 08:12

## 2020-08-17 RX ADMIN — FUROSEMIDE 20 MG: 10 INJECTION INTRAMUSCULAR; INTRAVENOUS at 00:00

## 2020-08-17 RX ADMIN — MEROPENEM 1 G: 1 INJECTION, POWDER, FOR SOLUTION INTRAVENOUS at 12:04

## 2020-08-17 RX ADMIN — PROPOFOL 40 MCG/KG/MIN: 10 INJECTION, EMULSION INTRAVENOUS at 08:11

## 2020-08-17 RX ADMIN — Medication 1 MG/HR: at 17:48

## 2020-08-17 RX ADMIN — MEROPENEM 1 G: 1 INJECTION, POWDER, FOR SOLUTION INTRAVENOUS at 04:13

## 2020-08-17 RX ADMIN — SODIUM CHLORIDE 250 MG: 9 INJECTION, SOLUTION INTRAVENOUS at 10:02

## 2020-08-17 RX ADMIN — SODIUM CHLORIDE, PRESERVATIVE FREE 10 ML: 5 INJECTION INTRAVENOUS at 08:13

## 2020-08-17 RX ADMIN — ACETAMINOPHEN 650 MG: 325 TABLET ORAL at 05:06

## 2020-08-17 RX ADMIN — SODIUM CHLORIDE, PRESERVATIVE FREE 10 ML: 5 INJECTION INTRAVENOUS at 08:27

## 2020-08-17 RX ADMIN — NICOTINE 1 PATCH: 21 PATCH TRANSDERMAL at 08:16

## 2020-08-17 RX ADMIN — INSULIN ASPART 2 UNITS: 100 INJECTION, SOLUTION INTRAVENOUS; SUBCUTANEOUS at 11:36

## 2020-08-17 RX ADMIN — PROPOFOL 35 MCG/KG/MIN: 10 INJECTION, EMULSION INTRAVENOUS at 15:04

## 2020-08-17 RX ADMIN — MICAFUNGIN SODIUM 100 MG: 100 INJECTION, POWDER, LYOPHILIZED, FOR SOLUTION INTRAVENOUS at 08:55

## 2020-08-17 RX ADMIN — FUROSEMIDE 20 MG: 10 INJECTION INTRAMUSCULAR; INTRAVENOUS at 12:01

## 2020-08-17 RX ADMIN — CETIRIZINE HYDROCHLORIDE 10 MG: 10 TABLET, FILM COATED ORAL at 08:12

## 2020-08-17 RX ADMIN — PROPOFOL 35 MCG/KG/MIN: 10 INJECTION, EMULSION INTRAVENOUS at 11:40

## 2020-08-17 RX ADMIN — IPRATROPIUM BROMIDE 0.5 MG: 0.5 SOLUTION RESPIRATORY (INHALATION) at 00:20

## 2020-08-17 RX ADMIN — HEPARIN SODIUM 5000 UNITS: 5000 INJECTION INTRAVENOUS; SUBCUTANEOUS at 14:02

## 2020-08-17 RX ADMIN — Medication: at 15:05

## 2020-08-17 RX ADMIN — Medication: at 03:30

## 2020-08-17 RX ADMIN — SODIUM CHLORIDE, PRESERVATIVE FREE 10 ML: 5 INJECTION INTRAVENOUS at 08:14

## 2020-08-17 RX ADMIN — SODIUM CHLORIDE, PRESERVATIVE FREE 10 ML: 5 INJECTION INTRAVENOUS at 08:12

## 2020-08-17 RX ADMIN — CHLORHEXIDINE GLUCONATE 15 ML: 1.2 RINSE ORAL at 08:12

## 2020-08-17 RX ADMIN — DOXYCYCLINE 100 MG: 100 INJECTION, POWDER, LYOPHILIZED, FOR SOLUTION INTRAVENOUS at 17:00

## 2020-08-17 RX ADMIN — Medication 1 CAPSULE: at 08:12

## 2020-08-17 RX ADMIN — INSULIN ASPART 4 UNITS: 100 INJECTION, SOLUTION INTRAVENOUS; SUBCUTANEOUS at 16:54

## 2020-08-17 RX ADMIN — METHYLPREDNISOLONE SODIUM SUCCINATE 40 MG: 40 INJECTION, POWDER, FOR SOLUTION INTRAMUSCULAR; INTRAVENOUS at 08:12

## 2020-08-17 RX ADMIN — IPRATROPIUM BROMIDE 0.5 MG: 0.5 SOLUTION RESPIRATORY (INHALATION) at 12:42

## 2020-08-17 RX ADMIN — PROPOFOL 45 MCG/KG/MIN: 10 INJECTION, EMULSION INTRAVENOUS at 01:56

## 2020-08-17 RX ADMIN — PROPOFOL 40 MCG/KG/MIN: 10 INJECTION, EMULSION INTRAVENOUS at 05:03

## 2020-08-17 RX ADMIN — IPRATROPIUM BROMIDE 0.5 MG: 0.5 SOLUTION RESPIRATORY (INHALATION) at 06:31

## 2020-08-17 RX ADMIN — Medication: at 08:13

## 2020-08-17 NOTE — PROGRESS NOTES
Nutrition Services    Patient Name:  Manda Al  YOB: 1980  MRN: [id number]  Admit Date:  8/11/2020    Noted pt on mechanical vent and propofol, providing 671 kcals at this time.   If TF to be initiated recommend Peptamen Intense VHP @ 45 mL/hr with 20 mL/hr water flushes. This regimen will provide 1080 kcals w/o propofol, 1751 kcal w/ propofol, 100g pro, and 1387 mL total fluids.    Will continue to follow.   Electronically signed by:  Evi Barrett  08/17/20 10:59

## 2020-08-17 NOTE — PROGRESS NOTES
[facility] HOSPITALIST PROGRESS NOTE     Patient Identification:  Name:  Manda Al  Age:  40 y.o.  Sex:  female  :  1980  MRN:  [id number]  Visit Number:  [id number]  ROOM: [address]     Primary Care Provider:  Adali Sharpe APRN    Length of stay in inpatient status:  5    Subjective     Chief Compliant:    Chief Complaint   Patient presents with   • Cough   • Fever   • Shortness of Breath     History of Presenting Illness:    Patient remains critically ill, intubated and sedated, I reviewed hospital course, all labs and images, AM ABG stable though did decrease TV based on height, decreased RR as well as was slightly alkalotic, discussed case w/ pulmonary who do not plan to bronch and recommending transfer to higher level of care of evaluation of heart valve and swan-cristhian monitoring, she is continued on broad spectrum Abx as per ID and awaiting updated recs today, has fungal serologies pending, CRP had been elevated up to 18 but back down to 10 today, WBC count flat at 12K, patient remains afebrile, BP stable, still on levophed as well, cards has been diuresing, awaiting updated cardiology and ID recs today, plan to call for transfer.    Objective     Current Hospital Meds:  cetirizine 10 mg Oral Daily   chlorhexidine 15 mL Mouth/Throat Q12H   doxycycline 100 mg Intravenous Q12H   famotidine 20 mg Intravenous Q12H   furosemide 20 mg Intravenous Q12H   heparin (porcine) 5,000 Units Subcutaneous Q8H   insulin aspart 0-9 Units Subcutaneous Q6H   ipratropium 0.5 mg Nebulization 4x Daily - RT   lactobacillus acidophilus 1 capsule Oral Daily   meropenem 1 g Intravenous Q8H   micafungin (MYCAMINE)  mg Intravenous Q24H   nicotine 1 patch Transdermal Q24H   QUEtiapine 100 mg Oral Nightly   sodium chloride 10 mL Intravenous Q12H   sodium chloride 10 mL Intravenous Q12H   sodium chloride 10 mL Intravenous Q12H   sodium chloride 10 mL Intravenous Q12H     fentaNYL Citrate        norepinephrine 0.02-0.3 mcg/kg/min Last Rate: 0.02 mcg/kg/min (08/17/20 0855)   propofol 5-50 mcg/kg/min Last Rate: 35 mcg/kg/min (08/17/20 1002)     Current Antimicrobial Therapy:  Anti-Infectives (From admission, onward)    Ordered     Dose/Rate Route Frequency Start Stop    08/15/20 0729  micafungin sodium (MYCAMINE) 100 mg in sodium chloride 0.9 % 100 mL IVPB     Tanesha Mcdowell APRN reviewed the order on 08/16/20 0818.   Ordering Provider:  Tanesha Mcdowell APRN    100 mg  over 60 Minutes Intravenous Every 24 Hours 08/15/20 0900 08/22/20 0859    08/13/20 1414  meropenem (MERREM) 1 g/100 mL 0.9% NS VTB (mbp)     Tanesha Mcdowell APRN reviewed the order on 08/14/20 0753.   Ordering Provider:  Pinky Corrales DO    1 g  over 3 Hours Intravenous Every 8 Hours 08/13/20 2100 08/20/20 2059    08/13/20 1414  meropenem (MERREM) 1 g/100 mL 0.9% NS VTB (mbp)     Ordering Provider:  Tanesha Mcdowell APRN    1 g  over 30 Minutes Intravenous Once 08/13/20 1500 08/13/20 1646    08/12/20 0338  doxycycline (VIBRAMYCIN) 100 mg/100 mL 0.9% NS MBP     Tanesha Mcdowell APRN reviewed the order on 08/13/20 1414.   Ordering Provider:  Pinky Corrales DO    100 mg  over 60 Minutes Intravenous Every 12 Hours 08/12/20 0600 08/19/20 0559    08/11/20 2216  piperacillin-tazobactam (ZOSYN) 3.375 g/100 mL 0.9% NS IVPB (mbp)     Ordering Provider:  Armando Veloz MD    3.375 g  over 30 Minutes Intravenous Once 08/11/20 2218 08/11/20 2330    08/11/20 2216  vancomycin 2000 mg/500 mL 0.9% NS IVPB ([facility])     Ordering Provider:  Armando Veloz MD    20 mg/kg × 104 kg  over 120 Minutes Intravenous Once 08/11/20 2218 08/12/20 0302        Current Diuretic Therapy:  Diuretics (From admission, onward)    Ordered     Dose/Rate Route Frequency Start Stop    08/17/20 0840  acetaZOLAMIDE (DIAMOX) 250 mg in sodium chloride 0.9 % 50 mL IVPB     Ordering Provider:  Barrett Rizzo MD    250 mg  200 mL/hr over 15 Minutes Intravenous Once 08/17/20 0930  08/17/20 1017    08/15/20 1351  furosemide (LASIX) injection 20 mg     Ordering Provider:  Bess Montgomery MD    20 mg Intravenous Every 12 Hours Scheduled 08/15/20 1500      08/15/20 0453  furosemide (LASIX) injection 40 mg     Ordering Provider:  Pinky Corrales DO    40 mg Intravenous Once 08/15/20 0600 08/15/20 0543    08/14/20 1935  furosemide (LASIX) injection 40 mg     Ordering Provider:  Pinky Corrales, DO    40 mg Intravenous Once 08/14/20 2030 08/14/20 1950        ----------------------------------------------------------------------------------------------------------------------  Vital Signs:  Temp:  [98.7 °F (37.1 °C)-99.6 °F (37.6 °C)] 99 °F (37.2 °C)  Heart Rate:  [54-88] 57  Resp:  [16-18] 16  BP: ()/(43-89) 95/60  FiO2 (%):  [40 %-60 %] 40 %  SpO2:  [96 %-100 %] 99 %  on   ;   Device (Oxygen Therapy): ventilator  Body mass index is 32.24 kg/m².    Wt Readings from Last 3 Encounters:   08/17/20 96.2 kg (212 lb)   09/18/18 95.3 kg (210 lb)   02/21/18 105 kg (232 lb)     Intake & Output (last 3 days)       08/14 0701 - 08/15 0700 08/15 0701 - 08/16 0700 08/16 0701 - 08/17 0700 08/17 0701 - 08/18 0700    P.O. 400       I.V. (mL/kg) 831.7 (8.1) 1347.4 (13.6) 991.8 (10.3)     NG/GT  455 135     IV Piggyback 400 500 700 150    Total Intake(mL/kg) 1631.7 (15.8) 2302.4 (23.3) 1826.8 (19) 150 (1.6)    Urine (mL/kg/hr) 3525 (1.4) 5250 (2.2) 5000 (2.2) 350 (1.1)    Emesis/NG output 0 0      Stool 0 0 0     Total Output 3525 5250 5000 350    Net -1893.3 -2947.6 -3173.2 -200            Urine Unmeasured Occurrence 1 x       Stool Unmeasured Occurrence 2 x           NPO Diet  ----------------------------------------------------------------------------------------------------------------------  Physical exam:  Constitutional:  Well-developed and well-nourished.  Intubated/sedated   HENT:  Head:  Normocephalic and atraumatic.  Mouth:  Moist mucous membranes.    Eyes:  Conjunctivae and EOM are normal.  No scleral icterus.    Neck:  Neck supple.  No JVD present.    Cardiovascular:  Normal rate, regular rhythm and normal heart sounds with no murmur.  Pulmonary/Chest: intubated, mechanically ventilated, RR 18, satting mid 90's, b/l bibasilar crackles  Abdominal:  Soft.  No distension and no tenderness.   Musculoskeletal:  No tenderness, and no deformity.  No red or swollen joints anywhere.    Neurological:  Unable to determine due to intubation/sedation   Skin:  Skin is warm and dry. No rash noted. No pallor.   Peripheral vascular:  no clubbing, no cyanosis, no edema.  ----------------------------------------------------------------------------------------------------------------------  Tele:    ----------------------------------------------------------------------------------------------------------------------  Results from last 7 days   Lab Units 08/17/20  0922 08/17/20  0044 08/16/20  0123 08/16/20  0042 08/15/20  0412  08/11/20  6024   CRP mg/dL  --  10.54*  --  18.43* 12.14*   < > 21.91*   LACTATE mmol/L 1.3  --   --   --   --   --  1.0   WBC 10*3/mm3  --  12.97* 12.44*  --  18.96*   < >  --    HEMOGLOBIN g/dL  --  10.7* 9.5*  --  10.1*   < >  --    HEMATOCRIT %  --  34.3 29.7*  --  32.0*   < >  --    MCV fL  --  93.0 92.0  --  93.3   < >  --    MCHC g/dL  --  31.2* 32.0  --  31.6   < >  --    PLATELETS 10*3/mm3  --  387 309  --  301   < >  --     < > = values in this interval not displayed.     Results from last 7 days   Lab Units 08/17/20  0923   PH, ARTERIAL pH units 7.497*   PO2 ART mm Hg 97.0   PCO2, ARTERIAL mm Hg 50.7*   HCO3 ART mmol/L 39.2*     Results from last 7 days   Lab Units 08/17/20  0044 08/16/20  0042 08/15/20  0412  08/12/20  0420 08/11/20  2129   SODIUM mmol/L 141 139 137   < > 138 135*   POTASSIUM mmol/L 3.9 4.4 3.6   < > 3.2* 3.2*   MAGNESIUM mg/dL 2.4  --  1.7  --  1.5*  --    CHLORIDE mmol/L 94* 101 104   < > 106 101   CO2 mmol/L 34.6* 28.5 22.1   < > 19.8* 19.8*   BUN mg/dL 23* 20 16    < > 7 8   CREATININE mg/dL 0.70 0.64 0.65   < > 0.60 0.64   EGFR IF NONAFRICN AM mL/min/1.73 93 103 101   < > 111 103   CALCIUM mg/dL 9.4 8.6 8.5*   < > 7.9* 8.5*   PHOSPHORUS mg/dL  --   --  3.6  --   --   --    GLUCOSE mg/dL 170* 214* 182*   < > 118* 133*   ALBUMIN g/dL  --   --   --   --  2.77* 3.23*   BILIRUBIN mg/dL  --   --   --   --  1.2 1.3*   ALK PHOS U/L  --   --   --   --  62 67   AST (SGOT) U/L  --   --   --   --  15 16   ALT (SGPT) U/L  --   --   --   --  9 11    < > = values in this interval not displayed.   Estimated Creatinine Clearance: 130.8 mL/min (by C-G formula based on SCr of 0.7 mg/dL).  No results found for: AMMONIA      Results from last 7 days   Lab Units 08/14/20  2304   PROBNP pg/mL 3,619.0*         Glucose   Date/Time Value Ref Range Status   08/17/2020 0606 212 (H) 70 - 130 mg/dL Final   08/17/2020 0002 153 (H) 70 - 130 mg/dL Final   08/16/2020 1735 213 (H) 70 - 130 mg/dL Final   08/16/2020 1123 222 (H) 70 - 130 mg/dL Final   08/16/2020 0511 256 (H) 70 - 130 mg/dL Final   08/16/2020 0056 234 (H) 70 - 130 mg/dL Final   08/15/2020 0929 161 (H) 70 - 130 mg/dL Final   08/15/2020 0139 185 (H) 70 - 130 mg/dL Final     Lab Results   Component Value Date    TSH 0.985 08/17/2020     Lab Results   Component Value Date    PREGTESTUR Negative 05/09/2017     Pain Management Panel     Pain Management Panel Latest Ref Rng & Units 8/11/2020 5/9/2017    AMPHETAMINES SCREEN, URINE Negative Positive(A) Negative    BARBITURATES SCREEN Negative Negative Negative    BENZODIAZEPINE SCREEN, URINE Negative Negative Negative    BUPRENORPHINEUR Negative Negative Negative    COCAINE SCREEN, URINE Negative Negative Negative    METHADONE SCREEN, URINE Negative Negative Negative        Brief Urine Lab Results  (Last result in the past 365 days)      Color   Clarity   Blood   Leuk Est   Nitrite   Protein   CREAT   Urine HCG        08/11/20 2304 Yellow Clear Negative Trace Negative Negative             Blood Culture      Date Value Ref Range Status   08/11/2020 No growth at 5 days  Final   08/11/2020 No growth at 5 days  Final     Urine Culture   Date Value Ref Range Status   08/11/2020 No growth  Final     No results found for: WOUNDCX  No results found for: STOOLCX  Respiratory Culture   Date Value Ref Range Status   08/12/2020   Final    Moderate growth (3+) Normal Respiratory Miya: NO S.aureus/MRSA or Pseudomonas aeruginosa     No results found for: AFBCX  Results from last 7 days   Lab Units 08/17/20  0922 08/17/20  0044 08/16/20  0042 08/15/20  0412 08/14/20  0058 08/13/20  0119 08/12/20  0420 08/11/20  2254   LACTATE mmol/L 1.3  --   --   --   --   --   --  1.0   CRP mg/dL  --  10.54* 18.43* 12.14* 18.60* 30.73* 24.89* 21.91*     I have personally looked at the labs and they are summarized above.  ----------------------------------------------------------------------------------------------------------------------  Detailed radiology reports for the last 24 hours:    Imaging Results (Last 24 Hours)     ** No results found for the last 24 hours. **        Assessment & Plan    40F Smoker PMH Anxiety, Asthma, Crohn's, presented to  [facility] 8[facility] w/ complaint of cough, SOA, subjective fevers.    #Shock 2/2 Sepsis vs Cardiogenic  #PNA, Bacterial, treating for MDR Organisms but also covering for fungal etiology  #Severe MS w/ Mild-Mod MR and Severe Pulm HTN c/b Acute Decompensated HFpEF  #Acute Hypoxic Respiratory Failure/ARDS requiring intubation and MV 2/2 PNA, CHF Exacerbation, Acute COPD Exacerbation  - Patient presented w/ , RR 24, Afebrile, cough, shortness of breath and worsening respiratory distress requiring intubation and mechanical ventilation  - Labs on admission showed WBC count 16K, CRP 21, lactate 1.0, proBNP 3619, Covid 19 negative, Resp panel and cultures were negative, S. Pneumo negative, Bcx's NGTD.  - CT chest showed diffuse b/l infiltrates w/ small pleural effusions, mild mediastinal lymphadenopathy  -  Echo showed LVEF 61-65%, mild-mod dilated LA, severe bileaflet MV thickening w/ rheumatic changes of MV w/ severe MVS w/ mean gradient 16mmHg, mild-mod MVR, RVSP > 55mmHg, mild dilated RV  - Cardiology consulted; Following  - ID consulted; Following  - Pulm consulted; Recs pending  - Continue doxy, terrie, micafungin per ID, fungal serologies pending  - Continue IV lasix per Cardiology  - Continue MV, have decreased TV and RR based on AM ABG, repeat ABG pending  - S/p 5 days steroids, continue nebs PRN  - Continue propofol and Fentanyl for sedation, continue norepinephrine   - Continue to monitor in CCU, discussed w/ pulmonary and will need transfer to higher level of care, will work on transfer, awaiting updated cardiology recs but per pulm no bronchoscopy planned at this time.     #HTN  - Holding home antihypertensives due to lower blood pressures.    #Crohn's Disease  - No home maintenance medications, supportive care    #Tobacco Dependence  - On ventilator, NRT PRN, address when appropriate.    F: NPO  E: Monitor & Replace PRN  N: NPO  Ppx: SQH  Code: Full Code    Dispo: Pending transfer for higher level of care    *This patient is considered high risk due to shock, sepsis, acute copd exacerbation, PNA, acute CHF exacerbation, severe MVS, mild-mod MVR, severe pulm HTN.     *I have spent 30min of critical care time (8:30AM-9:00AM) w/ > 50% of time in direct patient care reviewing hospital course, adjusting vent settings based on AM ABG, evaluating the patient at bedside, managing pain/sedation gtt's and reviewing w/ nursing staff, discussing case w/ pulmonary team.    VTE Prophylaxis:   Mechanical Order History:      Ordered        08/14/20 1133  Place Sequential Compression Device  Once         08/14/20 1133  Maintain Sequential Compression Device  Continuous                 Pharmalogical Order History:     Ordered     Dose Route Frequency Stop    08/17/20 0843  heparin (porcine) 5000 UNIT/ML injection 5,000 Units       5,000 Units SC Every 8 Hours Scheduled --    08/12/20 0338  heparin (porcine) 5000 UNIT/ML injection 5,000 Units  Status:  Discontinued      5,000 Units SC Every 12 Hours Scheduled 08/14/20 1133        Kevin Woodard MD  [facility] Hospitalist  08/17/20  10:28

## 2020-08-17 NOTE — PROGRESS NOTES
PROGRESS NOTE         Patient Identification:  Name:  Manda Al  Age:  40 y.o.  Sex:  female  :  1980  MRN:  [id number]  Visit Number:  [id number]  Primary Care Provider:  Adali Sharpe APRN         LOS: 5 days       ----------------------------------------------------------------------------------------------------------------------  Subjective       Chief Complaints:    Cough; Fever; and Shortness of Breath        Interval History:      Patient remains intubated at 40% FiO2 and PEEP of 5. WBC 12.97. CRP 10.54. T-max of 99.4. No diarrhea reported. Lung sounds decreased on exam. Remains on Levophed.    Review of Systems:    Patient intubated and sedated.  Unable to obtain ROS.  ----------------------------------------------------------------------------------------------------------------------      Objective       Current [facility] Meds:    cetirizine 10 mg Oral Daily   chlorhexidine 15 mL Mouth/Throat Q12H   doxycycline 100 mg Intravenous Q12H   famotidine 20 mg Intravenous Q12H   furosemide 20 mg Intravenous Q12H   heparin (porcine) 5,000 Units Subcutaneous Q8H   insulin aspart 0-9 Units Subcutaneous Q6H   ipratropium 0.5 mg Nebulization 4x Daily - RT   lactobacillus acidophilus 1 capsule Oral Daily   meropenem 1 g Intravenous Q8H   micafungin (MYCAMINE)  mg Intravenous Q24H   nicotine 1 patch Transdermal Q24H   QUEtiapine 100 mg Oral Nightly   sodium chloride 10 mL Intravenous Q12H   sodium chloride 10 mL Intravenous Q12H   sodium chloride 10 mL Intravenous Q12H   sodium chloride 10 mL Intravenous Q12H       fentaNYL Citrate     norepinephrine 0.02-0.3 mcg/kg/min Last Rate: 0.04 mcg/kg/min (20 1409)   propofol 5-50 mcg/kg/min Last Rate: 35 mcg/kg/min (20 1140)     ----------------------------------------------------------------------------------------------------------------------    Vital Signs:  Temp:  [98.6 °F (37 °C)-99.6 °F (37.6 °C)] 98.6 °F (37 °C)  Heart  Rate:  [48-88] 52  Resp:  [16-18] 16  BP: ()/(43-89) 117/77  FiO2 (%):  [40 %-50 %] 40 %  Mean Arterial Pressure (Non-Invasive) for the past 24 hrs (Last 3 readings):   Noninvasive MAP (mmHg)   08/17/20 1402 62   08/17/20 1332 65   08/17/20 1302 63     SpO2 Percentage    08/17/20 1332 08/17/20 1402 08/17/20 1409   SpO2: 99% 98% 100%     SpO2:  [96 %-100 %] 100 %  on   ;   Device (Oxygen Therapy): ventilator    Body mass index is 32.24 kg/m².  Wt Readings from Last 3 Encounters:   08/17/20 96.2 kg (212 lb)   09/18/18 95.3 kg (210 lb)   02/21/18 105 kg (232 lb)        Intake/Output Summary (Last 24 hours) at 8/17/2020 1454  Last data filed at 8/17/2020 1409  Gross per 24 hour   Intake 1392.22 ml   Output 4180 ml   Net -2787.78 ml     NPO Diet  ----------------------------------------------------------------------------------------------------------------------      Physical Exam:    Constitutional:   Intubated and sedated.    HENT:  Head: Normocephalic and atraumatic.  Mouth:  Moist mucous membranes.    Eyes:  Conjunctivae and EOM are normal.  No scleral icterus.  Neck:  Neck supple.  No JVD present.    Cardiovascular:  Normal rate, regular rhythm and normal heart sounds with no murmur. No edema.  Pulmonary/Chest:  Diffuse wheezing bilaterally.  Coarse rhonchi present.  Abdominal:  Soft.  Bowel sounds are normal.  No distension and no tenderness.   Musculoskeletal:  No edema, no tenderness, and no deformity.  No swelling or redness of joints.  Neurological:   Intubated and sedated.  Skin:  Skin is warm and dry.  No rash noted.  No pallor.   Psychiatric:   Intubated and sedated.  ----------------------------------------------------------------------------------------------------------------------      Results from last 7 days   Lab Units 08/14/20  2304   PROBNP pg/mL 3,619.0*       Results from last 7 days   Lab Units 08/17/20  0923   PH, ARTERIAL pH units 7.497*   PO2 ART mm Hg 97.0   PCO2, ARTERIAL mm Hg 50.7*     HCO3 ART mmol/L 39.2*     Results from last 7 days   Lab Units 08/17/20  0922 08/17/20  0044 08/16/20  0123 08/16/20  0042 08/15/20  0412  08/11/20  2254   CRP mg/dL  --  10.54*  --  18.43* 12.14*   < > 21.91*   LACTATE mmol/L 1.3  --   --   --   --   --  1.0   WBC 10*3/mm3  --  12.97* 12.44*  --  18.96*   < >  --    HEMOGLOBIN g/dL  --  10.7* 9.5*  --  10.1*   < >  --    HEMATOCRIT %  --  34.3 29.7*  --  32.0*   < >  --    MCV fL  --  93.0 92.0  --  93.3   < >  --    MCHC g/dL  --  31.2* 32.0  --  31.6   < >  --    PLATELETS 10*3/mm3  --  387 309  --  301   < >  --     < > = values in this interval not displayed.     Results from last 7 days   Lab Units 08/17/20  0044 08/16/20  0042 08/15/20  0412  08/12/20  0420 08/11/20  2129   SODIUM mmol/L 141 139 137   < > 138 135*   POTASSIUM mmol/L 3.9 4.4 3.6   < > 3.2* 3.2*   MAGNESIUM mg/dL 2.4  --  1.7  --  1.5*  --    CHLORIDE mmol/L 94* 101 104   < > 106 101   CO2 mmol/L 34.6* 28.5 22.1   < > 19.8* 19.8*   BUN mg/dL 23* 20 16   < > 7 8   CREATININE mg/dL 0.70 0.64 0.65   < > 0.60 0.64   EGFR IF NONAFRICN AM mL/min/1.73 93 103 101   < > 111 103   CALCIUM mg/dL 9.4 8.6 8.5*   < > 7.9* 8.5*   GLUCOSE mg/dL 170* 214* 182*   < > 118* 133*   ALBUMIN g/dL  --   --   --   --  2.77* 3.23*   BILIRUBIN mg/dL  --   --   --   --  1.2 1.3*   ALK PHOS U/L  --   --   --   --  62 67   AST (SGOT) U/L  --   --   --   --  15 16   ALT (SGPT) U/L  --   --   --   --  9 11    < > = values in this interval not displayed.   Estimated Creatinine Clearance: 130.8 mL/min (by C-G formula based on SCr of 0.7 mg/dL).  No results found for: AMMONIA    Glucose   Date/Time Value Ref Range Status   08/17/2020 1049 189 (H) 70 - 130 mg/dL Final   08/17/2020 0606 212 (H) 70 - 130 mg/dL Final   08/17/2020 0002 153 (H) 70 - 130 mg/dL Final   08/16/2020 1735 213 (H) 70 - 130 mg/dL Final   08/16/2020 1123 222 (H) 70 - 130 mg/dL Final   08/16/2020 0511 256 (H) 70 - 130 mg/dL Final   08/16/2020 0056 234 (H) 70  - 130 mg/dL Final   08/15/2020 0929 161 (H) 70 - 130 mg/dL Final     No results found for: HGBA1C  Lab Results   Component Value Date    TSH 0.985 08/17/2020       Blood Culture   Date Value Ref Range Status   08/11/2020 No growth at 2 days  Preliminary   08/11/2020 No growth at 2 days  Preliminary     Urine Culture   Date Value Ref Range Status   08/11/2020 No growth  Final     No results found for: WOUNDCX  No results found for: STOOLCX  Respiratory Culture   Date Value Ref Range Status   08/12/2020   Final    Moderate growth (3+) Normal Respiratory Miya: NO S.aureus/MRSA or Pseudomonas aeruginosa     Pain Management Panel     Pain Management Panel Latest Ref Rng & Units 8/11/2020 5/9/2017    AMPHETAMINES SCREEN, URINE Negative Positive(A) Negative    BARBITURATES SCREEN Negative Negative Negative    BENZODIAZEPINE SCREEN, URINE Negative Negative Negative    BUPRENORPHINEUR Negative Negative Negative    COCAINE SCREEN, URINE Negative Negative Negative    METHADONE SCREEN, URINE Negative Negative Negative            ----------------------------------------------------------------------------------------------------------------------  Imaging Results (Last 24 Hours)     ** No results found for the last 24 hours. **          ----------------------------------------------------------------------------------------------------------------------    Assessment/Plan       Assessment/Plan     ASSESSMENT:    1.  Sepsis  2.  Acute respiratory failure  3.  Pneumonia    PLAN:  Patient remains intubated at 40% FiO2 and PEEP of 5. WBC 12.97. CRP 10.54. T-max of 99.4. No diarrhea reported. Lung sounds decreased on exam. Remains on Levophed. Chest x-ray on 8/15/2020 revealed severe diffuse alveolar disease worsening.    Respiratory panel PCR negative.  COVID-19 PCR negative.       Urine culture revealed no growth.  Urine drug screen positive for amphetamines.      Blood cultures on 8/11/2020 revealed no growth thus far.     CT  scan on 8/11/2020 revealed diffuse bilateral pneumonia with bilateral mild pleural effusions.  Mediastinal adenopathy, probably reactive-recommend follow-up in 3 months, per radiology.     Fungal serologies ordered and pending.      We continue to recommend bronchoscopy, in the setting of clinical worsening and we are unable to rule out fungal and or Mycobacterium pneumonia.     We recommend to continue with Merrem IV and doxycycline 100 mg IV every 12 hours as we cannot rule out fungal or mycobacterial pneumonia.  Recommend to continue with micafungin IV for empiric fungal coverage.      Code Status:   Code Status and Medical Interventions:   Ordered at: 08/12/20 0306     Code Status:    CPR     Medical Interventions (Level of Support Prior to Arrest):    Full       DIEGO Vogel  08/17/20  14:54

## 2020-08-17 NOTE — PROGRESS NOTES
Called healthcare surrogate Tran Chisholmmatthewerika to update on transfer, she was amenable to transfer.

## 2020-08-17 NOTE — NURSING NOTE
Spoke with [facility] concerning transfer to [facility] , stated could take transfer to [place]

## 2020-08-17 NOTE — DISCHARGE INSTR - APPOINTMENTS
Patient is going for a higher level of care to [facility] all follow ups will be done at time of discharge

## 2020-08-17 NOTE — PLAN OF CARE
Pt remains intubated, sedated and on levophed.  MD has accepted patient but no bed availabe yet,; face sheet has been faxed. VSS, NAD noted, awaiting transport team for CT scan of chest. Will continue to monitor.

## 2020-08-17 NOTE — NURSING NOTE
now has bed for patient: [facility] *th floor bed 215 with Dr. Byron Stallings accepting. Dr. Izquierdo has been informed and discharge is done. Lead RN currently on phone with  [facility] ambulance service.

## 2020-08-17 NOTE — PROGRESS NOTES
Patient Identification:  Name:  Manda Al  Age:  40 y.o.  Sex:  female  :  1980  MRN:  [id number]  Visit Number:  [id number]    Chief Complaint:   Severe mitral stenosis, respiratory failure with pulmonary edema and possible pneumonia    Subjective:    Patient still intubated on the ventilator sedated no new events  ----------------------------------------------------------------------------------------------------------------------  Current Hospital Meds:    cetirizine 10 mg Oral Daily   chlorhexidine 15 mL Mouth/Throat Q12H   doxycycline 100 mg Intravenous Q12H   famotidine 20 mg Intravenous Q12H   furosemide 20 mg Intravenous Q12H   heparin (porcine) 5,000 Units Subcutaneous Q8H   insulin aspart 0-9 Units Subcutaneous Q6H   ipratropium 0.5 mg Nebulization 4x Daily - RT   lactobacillus acidophilus 1 capsule Oral Daily   meropenem 1 g Intravenous Q8H   micafungin (MYCAMINE)  mg Intravenous Q24H   nicotine 1 patch Transdermal Q24H   QUEtiapine 100 mg Oral Nightly   sodium chloride 10 mL Intravenous Q12H   sodium chloride 10 mL Intravenous Q12H   sodium chloride 10 mL Intravenous Q12H   sodium chloride 10 mL Intravenous Q12H       fentaNYL Citrate     norepinephrine 0.02-0.3 mcg/kg/min Last Rate: 0.02 mcg/kg/min (20 0855)   propofol 5-50 mcg/kg/min Last Rate: 35 mcg/kg/min (20 1002)     ----------------------------------------------------------------------------------------------------------------------  Vital Signs:  Temp:  [98.7 °F (37.1 °C)-99.6 °F (37.6 °C)] 99 °F (37.2 °C)  Heart Rate:  [54-88] 57  Resp:  [16-18] 16  BP: ()/(43-89) 95/60  FiO2 (%):  [40 %-55 %] 40 %  Vital Signs (last 72 hrs)        0700  -  08/15 0659 08/15 07  -   0659  07  -   0659  07  -   1100   Most Recent    Temp (°F) 97.6 -  101    98.2 -  99    98.3 -  99.6       99 (37.2)    Heart Rate 82 -  118    57 -  85    54 -  83    56 -  88     57    Resp 12 -  46      18       18      16     16    /63 -  149/101    62/37 -  143/91    75/43 -  136/83    88/49 -  138/89     95/60    SpO2 (%) 77 -  100    93 -  100    96 -  100    96 -  100     99            08/15/20  0649 08/16/20  0500 08/17/20  0400   Weight: 103 kg (227 lb 9.6 oz) 98.9 kg (218 lb) 96.2 kg (212 lb)     Body mass index is 32.24 kg/m².    Intake/Output Summary (Last 24 hours) at 8/17/2020 1100  Last data filed at 8/17/2020 1002  Gross per 24 hour   Intake 1876.77 ml   Output 5100 ml   Net -3223.23 ml     NPO Diet  ----------------------------------------------------------------------------------------------------------------------  Physical exam:    HEENT:  Head:  Normocephalic and atraumatic.     Eyes:  Conjunctivae and EOM are normal.  Pupils are equal, round, and reactive to light.  No scleral icterus.    Neck:  Neck supple.  No JVD present.  No bruit.  Cardiovascular: Normal rate, regular rhythm, S1 S2+, 1/6 mid diastolic murmur heard best at the apex , NO S3 / S4  Pulmonary/Chest:  Vesicular breath sounds B/L, Clear to auscultation, with bilateral rest expiratory crackles at the bases  Abdominal:  Soft.  Bowel sounds are normal.  No distension and no tenderness.  No organomegaly.  Neurological:  Alert and oriented to person, place, and time. No focal defecits  Skin:  Skin is warm and dry. No rash noted. No pallor.   Musculoskeletal:  No tenderness, and no deformity.  No red or swollen joints anywhere.   Lower extremities: No edema, Peripheral vascular:  2+ Pulses B/L DP.  ----------------------------------------------------------------------------------------------------------------------    ----------------------------------------------------------------------------------------------------------------------      Results from last 7 days   Lab Units 08/17/20  0922 08/17/20  0044 08/16/20  0123 08/16/20  0042 08/15/20  0412  08/11/20  2254   CRP mg/dL  --  10.54*  --  18.43* 12.14*   < > 21.91*   LACTATE  mmol/L 1.3  --   --   --   --   --  1.0   WBC 10*3/mm3  --  12.97* 12.44*  --  18.96*   < >  --    HEMOGLOBIN g/dL  --  10.7* 9.5*  --  10.1*   < >  --    HEMATOCRIT %  --  34.3 29.7*  --  32.0*   < >  --    MCV fL  --  93.0 92.0  --  93.3   < >  --    MCHC g/dL  --  31.2* 32.0  --  31.6   < >  --    PLATELETS 10*3/mm3  --  387 309  --  301   < >  --     < > = values in this interval not displayed.     Results from last 7 days   Lab Units 08/17/20  0923   PH, ARTERIAL pH units 7.497*   PO2 ART mm Hg 97.0   PCO2, ARTERIAL mm Hg 50.7*   HCO3 ART mmol/L 39.2*     Results from last 7 days   Lab Units 08/17/20  0044 08/16/20  0042 08/15/20  0412  08/12/20  0420 08/11/20  2129   SODIUM mmol/L 141 139 137   < > 138 135*   POTASSIUM mmol/L 3.9 4.4 3.6   < > 3.2* 3.2*   MAGNESIUM mg/dL 2.4  --  1.7  --  1.5*  --    CHLORIDE mmol/L 94* 101 104   < > 106 101   CO2 mmol/L 34.6* 28.5 22.1   < > 19.8* 19.8*   BUN mg/dL 23* 20 16   < > 7 8   CREATININE mg/dL 0.70 0.64 0.65   < > 0.60 0.64   EGFR IF NONAFRICN AM mL/min/1.73 93 103 101   < > 111 103   CALCIUM mg/dL 9.4 8.6 8.5*   < > 7.9* 8.5*   GLUCOSE mg/dL 170* 214* 182*   < > 118* 133*   ALBUMIN g/dL  --   --   --   --  2.77* 3.23*   BILIRUBIN mg/dL  --   --   --   --  1.2 1.3*   ALK PHOS U/L  --   --   --   --  62 67   AST (SGOT) U/L  --   --   --   --  15 16   ALT (SGPT) U/L  --   --   --   --  9 11    < > = values in this interval not displayed.   Estimated Creatinine Clearance: 130.8 mL/min (by C-G formula based on SCr of 0.7 mg/dL).    No results found for: AMMONIA      Blood Culture   Date Value Ref Range Status   08/11/2020 No growth at 5 days  Final   08/11/2020 No growth at 5 days  Final     Urine Culture   Date Value Ref Range Status   08/11/2020 No growth  Final     No results found for: WOUNDCX  No results found for: STOOLCX    I have personally looked at the labs and they are summarized  above.  ----------------------------------------------------------------------------------------------------------------------  Imaging Results (Last 24 Hours)     ** No results found for the last 24 hours. **        ----------------------------------------------------------------------------------------------------------------------    Assessment:  1.  Severe mitral stenosis  2.  Respiratory failure combined with pulmonary edema and bilateral pneumonia  3.  Severe sepsis secondary to pneumonia  4.  Acute COPD exacerbation  5.  History of essential hypertension currently hypotensive requiring pressor support  6.  Crohn's disease  7.  Tobacco abuse    Plan:  1.  Patient has been diuresing well again avoid tachycardia continue with current dose of diuretics  2.  Her CRP is coming down as well as her white blood count  3.  She remains a little bit hypotensive we will try to wean off the Levophed if she can tolerate that  4.  Eventually the patient will need a mitral valve surgery  5.  Apparently [facility] pulmonology has recommended transferring to a tertiary center which should be appropriate      Bess Montgomery MD   08/17/20 11:00

## 2020-08-17 NOTE — DISCHARGE SUMMARY
[facility] HOSPITALISTS DISCHARGE SUMMARY    Patient Identification:  Name:  Manda Al  Age:  40 y.o.  Sex:  female  :  1980  MRN:  [id number]  Visit Number:  [id number]    Date of Admission: 2020  Date of Discharge:  2020     PCP: Adali Sharpe, APRN    DISCHARGE DIAGNOSIS  Shock 2/2 Sepsis vs Cardiogenic  PNA, Bacterial, treating for MDR Organisms but also covering for fungal etiology  Mod-Severe MS w/ Mild-Mod MR and Severe Pulm HTN  Acute Decompensated HFpEF  Acute Hypoxic Respiratory Failure/ARDS requiring intubation and MV  HTN  Crohn's Disease  Acute Mild Hypokalemia  Tobacco Dependence  Obesity    CONSULTS   Cardiology  Infectious Disease  Pulmonary  General Surgery    PROCEDURES PERFORMED  Intubation    HOSPITAL COURSE  40F Smoker PMH Anxiety, Asthma, Crohn's, presented to [facility]  w/ complaint of cough, SOA, subjective fevers.    #Shock 2/2 Sepsis vs Cardiogenic  #PNA, Bacterial, treating for MDR Organisms but also covering for fungal etiology  #Severe MS w/ Mild-Mod MR and Severe Pulm HTN c/b Acute Decompensated HFpEF  #Acute Hypoxic Respiratory Failure/ARDS requiring intubation and MV 2/2 PNA, CHF Exacerbation, Acute COPD Exacerbation  Patient presented with on  with complaints of cough, SOA, subjective fevers, shortness of breath with minimal exertion and difficulty speaking in full sentences, also complaining of pleuritic like chest pain.  On admission , RR 24, afebrile.  Her cough, shortness of breath and respiratory distress worsened requiring intubation and mechanical ventilation on 8/15.  Labs on admission showed WBC count 16K, CRP 21, lactate 1.0, proBNP 3619, Covid 19 negative, Resp panel and cultures were negative, S. Pneumo negative, Bcx's NGTD. CT chest on admission showed diffuse b/l infiltrates w/ small pleural effusions, mild mediastinal lymphadenopathy.  Repeat CT chest today showed RLL consolidation, minimal LLL airspace dz,  patchy b/l GGO's c/f atypical infection vs superimposed alveolar edema.  Echo on 8/14 showed LVEF 61-65%, mild-mod dilated LA, severe bileaflet MV thickening w/ rheumatic changes of MV w/ severe MVS w/ mean gradient 16mmHg, mild-mod MVR, RVSP > 55mmHg, mild dilated RV.  Repeat Echo today showed LVEF 51-55%, mod-severe MVS w/ estimated MV area 1cm^2, mean gradient 3mmHg, peak 11mmHg, mild-mod MVR.  Cardiology consulted and have been following, have been diuresing w/ IV lasix BID but also has required IV pressor medications for lower blood pressures.  ID consulted and have been following, currently have on Ritu, Doxy and micafungin w/ fungal serologies pending.  Pulmonary consulted today as had been out of town over the weekend and were asked to perform bronchoscopy but have deferred and recommending transfer to higher level of care which I agree with given her young age and valvular abnormalities.  General surgery was consulted for central line placement 8/15.  This AM patient remains intubated and sedated, her oxygenation has been stable and slightly alkalotic and have decreased TV and RR.  Her HR and vitals have remained stable.  On this day I have assumed care from previous hospitalist and called and discussed w/  Cardiac Critical Care attending Dr. Stallings who has accepted for transfer. Will arrange patient to travel ALS as she has required continued pain and sedation gtt's as well as low dose levophed.      #HTN  Holding home antihypertensives due to lower blood pressures.    #Crohn's Disease  No home maintenance medications, supportive care    #Electrolyte Abnormalities  Acute Mild Hypokalemia - Replaced, treated per protocol    #Tobacco Dependence  On ventilator, NRT PRN, address when appropriate.    #Obesity  BMI 32, complicates all aspects of care.    VITAL SIGNS:  Temp:  [98.3 °F (36.8 °C)-99.6 °F (37.6 °C)] 98.3 °F (36.8 °C)  Heart Rate:  [48-88] 60  Resp:  [16-18] 16  BP: ()/(43-89) 104/54  FiO2  (%):  [40 %-50 %] 40 %  SpO2:  [96 %-100 %] 98 %  on   ;   Device (Oxygen Therapy): ventilator    Body mass index is 32.24 kg/m².  Wt Readings from Last 3 Encounters:   08/17/20 96.2 kg (212 lb)   09/18/18 95.3 kg (210 lb)   02/21/18 105 kg (232 lb)     PHYSICAL EXAM:  Constitutional:  Well-developed and well-nourished.  Intubated/sedated   HENT:  Head:  Normocephalic and atraumatic.  Mouth:  Moist mucous membranes.    Eyes:  Conjunctivae and EOM are normal. No scleral icterus.    Neck:  Neck supple.  No JVD present.    Cardiovascular:  Normal rate, regular rhythm and normal heart sounds with no murmur.  Pulmonary/Chest: intubated, mechanically ventilated, RR 18, satting mid 90's, b/l bibasilar crackles  Abdominal:  Soft.  No distension and no tenderness.   Musculoskeletal:  No tenderness, and no deformity.  No red or swollen joints anywhere.    Neurological:  Unable to determine due to intubation/sedation   Skin:  Skin is warm and dry. No rash noted. No pallor.   Peripheral vascular:  no clubbing, no cyanosis, no edema.    DISCHARGE DISPOSITION   Guarded    DISCHARGE MEDICATIONS:     Discharge Medications      New Medications      Instructions Start Date   doxycycline  Commonly known as:  VIBRAMYCIN   100 mg, Intravenous, Every 12 Hours      famotidine 10 MG/ML solution injection  Commonly known as:  PEPCID   20 mg, Intravenous, Every 12 Hours Scheduled      fentaNYL Citrate 1500 MCG/30ML solution prefilled syringe   Nurse Loading Dose: 0 mcg Patient Bolus Dose: 0 mcg Lockout Interval: Not Ordered Basal Rate: 50 mcg/hr One Hour Dose Limit: 300 mcg      furosemide 10 MG/ML injection  Commonly known as:  LASIX   20 mg, Intravenous, Every 12 Hours Scheduled   Start Date:  August 18, 2020     heparin (porcine) 5000 UNIT/ML injection   5,000 Units, Subcutaneous, Every 8 Hours Scheduled      insulin aspart 100 UNIT/ML injection  Commonly known as:  novoLOG   0-9 Units, Subcutaneous, Every 6 Hours      ipratropium 0.02 %  nebulizer solution  Commonly known as:  ATROVENT   0.5 mg, Nebulization, 4 Times Daily - RT      meropenem  Commonly known as:  MERREM   1 g, Intravenous, Every 8 Hours      micafungin sodium 100 mg in sodium chloride 0.9 % 100 mL IVPB   100 mg, Intravenous, Every 24 Hours   Start Date:  August 18, 2020     midazolam 1 mg/mL 100mL -0.9 MG/100ML-% solution  Commonly known as:  VERSED   1-10 mg/hr (1-10 mg/hr), Intravenous, Titrated      Norepinephrine-Sodium Chloride 8-0.9 MG/250ML-% solution infusion  Commonly known as:  LEVOPHED   0.02-0.3 mcg/kg/min (2.06-30.9 mcg/min), Intravenous, Titrated         Continue These Medications      Instructions Start Date   fexofenadine 60 MG tablet  Commonly known as:  ALLEGRA   60 mg, Oral, Daily      QUEtiapine 100 MG tablet  Commonly known as:  SEROquel   100 mg, Oral, Nightly      tiZANidine 4 MG tablet  Commonly known as:  ZANAFLEX   4 mg, Oral, Every 8 Hours PRN         Stop These Medications    FLUoxetine 20 MG capsule  Commonly known as:  PROzac     fluticasone 50 MCG/ACT nasal spray  Commonly known as:  FLONASE     lisinopril-hydrochlorothiazide 10-12.5 MG per tablet  Commonly known as:  PRINZIDE,ZESTORETIC     omeprazole 20 MG capsule  Commonly known as:  priLOSEC     Symbicort 80-4.5 MCG/ACT inhaler  Generic drug:  budesonide-formoterol     Ventolin  (90 Base) MCG/ACT inhaler  Generic drug:  albuterol sulfate HFA            Follow-up Information     Adali Sharpe, APRN .    Specialty:  Family Medicine  Contact information:  [address] [place] [address] [address]  [facility] [address] [phone]                  TEST  RESULTS PENDING AT DISCHARGE   Order Current Status    Fungal Antibodies, DID In process    Peripheral Blood Smear In process        CODE STATUS  Code Status and Medical Interventions:   Ordered at: 08/12/20 0306     Code Status:    CPR     Medical Interventions (Level of Support Prior to Arrest):    Full     Kevin Woodard MD  [facility]  Hospitalist  08/17/20  17:02    Please note that this discharge summary required more than 30 minutes to complete.

## 2020-08-17 NOTE — NURSING NOTE
Progressive Mobility    Date: 08/17/20     Mobility Initiation Contradictions: Heart Rate <60, >120 beats.min    Day of Mobility  Activity Performed: none      Patient: not attempted    Assisted by 0 person/persons    Device(s) used: none    Laurel Hargrove RN   08/17/20

## 2020-08-17 NOTE — PLAN OF CARE
Problem: Patient Care Overview  Goal: Plan of Care Review  Outcome: Ongoing (interventions implemented as appropriate)  Goal: Individualization and Mutuality  Outcome: Ongoing (interventions implemented as appropriate)  Goal: Discharge Needs Assessment  Outcome: Ongoing (interventions implemented as appropriate)  Goal: Interprofessional Rounds/Family Conf  Outcome: Ongoing (interventions implemented as appropriate)     Problem: Infection, Risk/Actual (Adult)  Goal: Identify Related Risk Factors and Signs and Symptoms  Outcome: Ongoing (interventions implemented as appropriate)  Goal: Infection Prevention/Resolution  Outcome: Ongoing (interventions implemented as appropriate)     Problem: Pneumonia (Adult)  Goal: Signs and Symptoms of Listed Potential Problems Will be Absent, Minimized or Managed (Pneumonia)  Outcome: Ongoing (interventions implemented as appropriate)     Problem: Sepsis/Septic Shock (Adult)  Goal: Signs and Symptoms of Listed Potential Problems Will be Absent, Minimized or Managed (Sepsis/Septic Shock)  Outcome: Ongoing (interventions implemented as appropriate)     Problem: Pain, Chronic (Adult)  Goal: Identify Related Risk Factors and Signs and Symptoms  Outcome: Ongoing (interventions implemented as appropriate)  Goal: Acceptable Pain/Comfort Level and Functional Ability  Outcome: Ongoing (interventions implemented as appropriate)     Problem: Fall Risk (Adult)  Goal: Identify Related Risk Factors and Signs and Symptoms  Outcome: Ongoing (interventions implemented as appropriate)  Goal: Absence of Fall  Outcome: Ongoing (interventions implemented as appropriate)     Problem: Ventilation, Mechanical Invasive (Adult)  Goal: Signs and Symptoms of Listed Potential Problems Will be Absent, Minimized or Managed (Ventilation, Mechanical Invasive)  Outcome: Ongoing (interventions implemented as appropriate)     Problem: Skin Injury Risk (Adult)  Goal: Identify Related Risk Factors and Signs and  Symptoms  Outcome: Ongoing (interventions implemented as appropriate)  Goal: Skin Health and Integrity  Outcome: Ongoing (interventions implemented as appropriate)

## 2020-08-17 NOTE — CONSULTS
Patient Care Team:  Adali Sharpe APRN as PCP - General (Family Medicine)    Chief complaint: Shortness of breath    Subjective     History of Present Illness     Patient is a female past medical history including anxiety, asthma, Crohn's disease, tobacco abuse.  History is obtained today from as the patient is currently intubated and sedated.  She initially presented to [facility] ED on 8/11/2020 due to complaints of shortness of breath and productive cough for the past 2 days with progressive worsening decreased activity.  No other complaints were noted at that time.  Cough was significant for blood-tinged sputum.  Symptoms included subjective fever, decreased appetite.  He denies chest pain, nausea, vomiting.  She typically experiences diarrhea related to Crohn's disease, but has had constipation for the past 2 days. She denies any known sick contacts.  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  She reported smoking history significant for 1 pack/day.  She admits to trying to use the 1 week ago, but has not used this since as it resulted in nausea vomiting.  Initial evaluation was notable for hyponatremia, hypokalemia hypocalcemia with hypoalbuminemia.  Decreased serum bicarbonate noted.  CRP was elevated at 20.1.  Lactate noted at 1.0.  LDH was elevated at 261.  Ferritin was elevated at 176.  CBC was notable for leukocytosis and anemia with hemoglobin of 10.9.  No platelet abnormality present.  Urinalysis was significant for 1+ ketones, trace leukocytes, 1+ bacteria, few WBCs and squamous epithelial cells.  Urine toxicology screen was positive for amphetamines.   Initial ABG was notable for respiratory alkalosis with decreased PO2 on room air.  Chest x-ray was notable for bilateral infiltrates.  COVID-19 testing was negative.  She was started on 2 L per nasal cannula and admitted to PCU initially for acute hypoxia, sepsis related to  bilateral pneumonia and concern of UTI.  Infectious diseases consulted and patient was started on broad-spectrum antibiotics. Initial O2 decline was noted during the night of 8/14, requiring increase from 2 to 6 L. After only brief stabilization and PO2 noted at 45.5 on ABG, O2 was increased to 10 L and eventually 15 L to NRB mask.   Echo was completed on 8/14 and notable for severe mitral valve stenosis, mitral valve regurgitation, and severe pulmonary hypertension. Cardiology was consulted, and recommended for continuation of intermittent diuresis with goal net negative output of 1 L.  Due to continued respiratory decline the morning of 8/15, she was progressed to BiPAP, but later intubated. Central line was also placed at that time and norepinephrine was started following drop in blood pressure.   She remains on ventilator support today with settings of rate 16, tidal volume 450, FIO2 40% and PEEP 5. Sedated wit propofol and fentanyl. Recent ventilator changes were made of the current rate and tidal volume in the setting of alkalosis. She remains on norepinephrine at 0.02 mcg/kg/min, with RN reporting progressive improvement in BP this morning. Afebrile overnight. Notable for 5 L output in the last 24 hours with overall net negative 3.1 L.         Review of Systems     Unable to obtain review of systems due to intubation and sedation.       Objective     Vital Signs  Temp:  [98.6 °F (37 °C)-99.6 °F (37.6 °C)] 98.6 °F (37 °C)  Heart Rate:  [51-88] 51  Resp:  [16-18] 16  BP: ()/(43-89) 105/62  FiO2 (%):  [40 %-55 %] 40 %  Body mass index is 32.24 kg/m².    Intake/Output Summary (Last 24 hours) at 8/17/2020 1205  Last data filed at 8/17/2020 1204  Gross per 24 hour   Intake 1976.77 ml   Output 5150 ml   Net -3173.23 ml     I/O this shift:  In: 250 [IV Piggyback:250]  Out: 750 [Urine:750]    Physical Exam   Constitutional:   Intubated and sedated.  Appears to be resting comfortably.   HENT:   Head:  Normocephalic and atraumatic.   Nose: Nose normal.   ET tube in place.  Oral mucosa pink, moist   Eyes: Pupils are equal, round, and reactive to light. No scleral icterus.   Neck: No tracheal deviation present.   Cardiovascular: Normal rate, regular rhythm, normal heart sounds and intact distal pulses.   No murmur heard.  Pulmonary/Chest:   Bilateral air entry positive.  No wheezing, rhonchi. Fine crackles present bilaterally.    Abdominal: Soft. Bowel sounds are normal. She exhibits no distension.   Genitourinary:   Genitourinary Comments: Thornton catheter present.    Musculoskeletal: She exhibits edema (1+ of the lower extremities). She exhibits no deformity.   Neurological:   Unable to assess due to sedation   Skin: Skin is warm and dry.   Psychiatric:   Unable to assess due to sedation   Vitals reviewed.         Results Review:    I reviewed the patient's new clinical results.    ABG this morning notable for alkalosis with pH of 7.571.  PO2 noted 119 on 40% FiO2.  BMP notable for CO2 elevation in the setting of alkalosis, hypochloremia.   Creatinine stable at 0.7 with BUN elevated at 23.  TSH is within normal limits.  CRP remains elevated but trending downward today.  CBC shows stable leukocytosis.  Anemia showing interval improvement.  No platelet abnormality.    Initial urinalysis from 8/11 was notable for 1+ ketones, trace leukocytes, WBCs, squamous cells, 1+ bacteria.  Blood cultures and urine culture from 8/11 were negative.  Respiratory PCR, Legionella antigen, strep antigen, respiratory culture from 8/12 are negative.  COVID-19 testing was negative on 8/15 and 8/11.  Urine toxicology screen on 8/11 was positive for amphetamines.    Chest x-ray imaging from 8/15 notable for diffuse bilateral infiltrates.    CT chest without contrast on 8/12 notable for diffuse bilateral pleural effusions and mild mediastinal lymphadenopathy.    Echocardiogram on 8/14 notable for        EKG from 8/1 was reviewed.  Normal  sinus rhythm noted without QTC prolongation.      WBC WBC   Date Value Ref Range Status   08/17/2020 12.97 (H) 3.40 - 10.80 10*3/mm3 Final   08/16/2020 12.44 (H) 3.40 - 10.80 10*3/mm3 Final   08/15/2020 18.96 (H) 3.40 - 10.80 10*3/mm3 Final      HGB Hemoglobin   Date Value Ref Range Status   08/17/2020 10.7 (L) 12.0 - 15.9 g/dL Final   08/16/2020 9.5 (L) 12.0 - 15.9 g/dL Final   08/15/2020 10.1 (L) 12.0 - 15.9 g/dL Final      HCT Hematocrit   Date Value Ref Range Status   08/17/2020 34.3 34.0 - 46.6 % Final   08/16/2020 29.7 (L) 34.0 - 46.6 % Final   08/15/2020 32.0 (L) 34.0 - 46.6 % Final      Platlets No results found for: LABPLAT     PT/INR:    No results found for: PROTIME/No results found for: INR    Sodium Sodium   Date Value Ref Range Status   08/17/2020 141 136 - 145 mmol/L Final   08/16/2020 139 136 - 145 mmol/L Final   08/15/2020 137 136 - 145 mmol/L Final      Potassium Potassium   Date Value Ref Range Status   08/17/2020 3.9 3.5 - 5.2 mmol/L Final   08/16/2020 4.4 3.5 - 5.2 mmol/L Final   08/15/2020 3.6 3.5 - 5.2 mmol/L Final      Chloride Chloride   Date Value Ref Range Status   08/17/2020 94 (L) 98 - 107 mmol/L Final   08/16/2020 101 98 - 107 mmol/L Final   08/15/2020 104 98 - 107 mmol/L Final      Bicarbonate No results found for: PLASMABICARB   BUN BUN   Date Value Ref Range Status   08/17/2020 23 (H) 6 - 20 mg/dL Final   08/16/2020 20 6 - 20 mg/dL Final   08/15/2020 16 6 - 20 mg/dL Final      Creatinine Creatinine   Date Value Ref Range Status   08/17/2020 0.70 0.57 - 1.00 mg/dL Final   08/16/2020 0.64 0.57 - 1.00 mg/dL Final   08/15/2020 0.65 0.57 - 1.00 mg/dL Final      Calcium Calcium   Date Value Ref Range Status   08/17/2020 9.4 8.6 - 10.5 mg/dL Final   08/16/2020 8.6 8.6 - 10.5 mg/dL Final   08/15/2020 8.5 (L) 8.6 - 10.5 mg/dL Final      Magnesium Magnesium   Date Value Ref Range Status   08/17/2020 2.4 1.6 - 2.6 mg/dL Final   08/15/2020 1.7 1.6 - 2.6 mg/dL Final        pH pH, Arterial   Date  Value Ref Range Status   08/17/2020 7.497 (H) 7.350 - 7.450 pH units Final     Comment:     83 Value above reference range   08/17/2020 7.571 (H) 7.350 - 7.450 pH units Final     Comment:     83 Value above reference range   08/15/2020 7.385 7.350 - 7.450 pH units Final   08/14/2020 7.476 (H) 7.350 - 7.450 pH units Final     Comment:     83 Value above reference range      pO2 pO2, Arterial   Date Value Ref Range Status   08/17/2020 97.0 83.0 - 108.0 mm Hg Final   08/17/2020 119.0 (H) 83.0 - 108.0 mm Hg Final     Comment:     83 Value above reference range   08/15/2020 103.0 83.0 - 108.0 mm Hg Final     Comment:     83 Value above reference range   08/14/2020 45.5 (C) 83.0 - 108.0 mm Hg Final     Comment:     85 Value below critical limit      pCO2 pCO2, Arterial   Date Value Ref Range Status   08/17/2020 50.7 (H) 35.0 - 45.0 mm Hg Final     Comment:     83 Value above reference range   08/17/2020 42.1 35.0 - 45.0 mm Hg Final   08/15/2020 45.0 35.0 - 45.0 mm Hg Final   08/14/2020 29.0 (L) 35.0 - 45.0 mm Hg Final     Comment:     84 Value below reference range      HCO3 HCO3, Arterial   Date Value Ref Range Status   08/17/2020 39.2 (H) 20.0 - 26.0 mmol/L Final     Comment:     83 Value above reference range   08/17/2020 38.6 (H) 20.0 - 26.0 mmol/L Final     Comment:     83 Value above reference range   08/15/2020 26.9 (H) 20.0 - 26.0 mmol/L Final     Comment:     83 Value above reference range   08/14/2020 21.4 20.0 - 26.0 mmol/L Final        Scheduled Meds:  cetirizine 10 mg Oral Daily   chlorhexidine 15 mL Mouth/Throat Q12H   doxycycline 100 mg Intravenous Q12H   famotidine 20 mg Intravenous Q12H   furosemide 20 mg Intravenous Q12H   heparin (porcine) 5,000 Units Subcutaneous Q8H   insulin aspart 0-9 Units Subcutaneous Q6H   ipratropium 0.5 mg Nebulization 4x Daily - RT   lactobacillus acidophilus 1 capsule Oral Daily   meropenem 1 g Intravenous Q8H   micafungin (MYCAMINE)  mg Intravenous Q24H   nicotine 1  patch Transdermal Q24H   QUEtiapine 100 mg Oral Nightly   sodium chloride 10 mL Intravenous Q12H   sodium chloride 10 mL Intravenous Q12H   sodium chloride 10 mL Intravenous Q12H   sodium chloride 10 mL Intravenous Q12H     Continuous Infusions:  fentaNYL Citrate     norepinephrine 0.02-0.3 mcg/kg/min Last Rate: 0.02 mcg/kg/min (08/17/20 0855)   propofol 5-50 mcg/kg/min Last Rate: 35 mcg/kg/min (08/17/20 1140)     PRN Meds:.•  acetaminophen  •  dextrose  •  dextrose  •  glucagon (human recombinant)  •  guaiFENesin-dextromethorphan  •  magnesium sulfate **OR** magnesium sulfate **OR** magnesium sulfate  •  nicotine polacrilex  •  potassium chloride **OR** potassium chloride **OR** potassium chloride  •  sodium chloride  •  sodium chloride  •  sodium chloride  •  sodium chloride  •  tiZANidine      Assessment/Plan     Patient Active Problem List   Diagnosis Code   • Schizoaffective disorder, bipolar type (CMS/[facility]) F25.0   • Pneumonia of both lungs due to infectious organism J18.9       Assessment & Plan         Central nervous system  Urine toxicology screen positive for amphetamines    Plan:  -Patient is currently on  Propofol, fentanyl; RASS goal: 0 to -1. Patient does not have biot's type of breathing pattern while on opioids.   -Avoid nighttime disturbances and light exposure during night to maintain normal circadian rhythms to avoid hypoactive or hyperactive delirium.       Cardiovascular system:  Severe mitral valve stenosis  Mild-moderate mitral regurgitation  Moderate tricuspid regurgitation  Severe pulmonary hypertension  Septic shock    Plan:  -Vasopressor: Norepinephrine,  Targeting mean arterial pressure of 65 mmHg     Results for orders placed during the hospital encounter of 08/11/20   Adult Transthoracic Echo Complete W/ Cont if Necessary Per Protocol    Narrative · Left ventricular systolic function is normal. Estimated EF appears to be   in the range of 61 - 65%  · Left atrial cavity size is  mild-to-moderately dilated.  · The mitral valve is abnormal in structure. There is severe bileaflet   mitral valve thickening with rheumatic changes of the mitral valve   apparatus is present. Mild-to-moderate mitral valve regurgitation is   present. Severe mitral valve stenosis is present based on pressure   gradients, mean gradient of 16 mmHg, but valve area calculated using PT1/2   was 2.8 cm2 which doesnt meet criteria for severe MS, pressure gradients   are elevated disproportinately party due to tachycardia and hyperdynamic   flow.  · The tricuspid valve is grossly normal. Moderate tricuspid valve   regurgitation is present. Estimated right ventricular systolic pressure   from tricuspid regurgitation is markedly elevated (>55 mmHg). Severe   pulmonary hypertension is present.  · Right ventricular cavity is mildly dilated.  · There is no evidence of pericardial effusion  · Pt will need repeat Echo / DONNA for assessment of MS severity after HR   control.          -Ordered EKG  -Ordered lactic acid level  -Ordered limited echo for additional assessment of the mitral valve.  -Cardiology on board.  Recommended continued intermittent diuresis and weaning of norepinephrine as tolerated.  Discussed with Dr. Woodard that patient would benefit from transfer to allow for Proctor-Clifford catheter to allow for more frequent PCWP monitoring in the setting of pulmonary hypertension requiring intermittent diuresis with ongoing pressure support.  [facility] has been contacted for transfer.   -Started on DVT prophylaxis with heparin subcutaneous.  Currently on diuresis with Lasix 20 mg IV every 12 hours-          Respiratory system:  Acute hypoxic respiratory failure requiring mechanical ventilation  Bilateral diffuse pneumonia of unspecified organism  Pulmonary edema in the setting of Mitral Stenosis/Regurgitation  Severe pulmonary hypertension  History of asthma  Tobacco abuse    Plan:  -Mode of mechanical ventilation:  Descending ramp flow assist control volume-cycled mode of mechanical ventilation.     Vent Settings       Vt (Set, L): 0.45 L  Resp Rate (Set): 16     FiO2 (%): 40 %  PEEP/CPAP (cm H2O): 5 cm H20    Minute Ventilation (L/min) (Obs): 7.35 L/min  Resp Rate (Observed) Vent: 16     I:E Ratio (Obs): 1:3.60    PIP Observed (cm H2O): 20 cm H2O      -  Patient Lines/Drains/Airways Status    Active Airway     Name:   Placement date:   Placement time:   Site:   Days:    ETT    08/15/20    0439     2              -Ventilator settings were recently changed to rate 16 and tidal volume 450.  ABG will be repeated to assess these changes.     - I have reviewed the ventilator graphics. Flow time scalar was reviewed and auto PEEP is negative, volume time scalar was reviewed and leak is negative. Pressure volume loop was assessed.  Auto triggering is negative.  Missed triggering is negative.  Double triggering is negative.  Active expiration is negative.  Peak pressure is 20.  Plateau pressure is 17.   -Suctioning of oral secretions as per protocol  -Maintaining internal cuff pressures within recommended range.  Recommend careful monitoring of the internal cuff pressure during transport of the patient outside ICU.  -Oral care with chlorhexidine  -Keeping the patient at semi-recumbent position.  Head of the bed elevated at 35 degrees.  -On lateral rotation therapy  -Ordered ET tube respiratory culture  -Significant improvement in FiO2 and PEEP with diuresis.  Patient is currently on broad-spectrum antibiotics.  Yield of bronchoscopy will be low in the setting of broad-spectrum antibiotics.  I will hold off on bronchoscopy for now.  -Severe pulmonary hypertension.  Likely group 2 due to valvular abnormalities but group 1, 3, and 4 cannot be ruled out.  Patient will require extensive work-up for pulmonary hypertension.    Patient will be transferred to [facility] center of care for management of valvular pathology  Currently receiving Lasix  20 mg IV every 12 hours  - Discontinued methylprednisolone, Symbicort, fluticasone  -Ordered repeat CT chest without contrast to assess infiltrates following diuresis since admission.  -On doxycycline, meropenem, micafungin  -On scheduled Atrovent nebs  -On NicoDerm CQ         Liver, gallbladder, Pancreas and gastrointestinal system:    Plan:  -On famotidine for GI ulcer prophylaxis.  -Ordered amylase, lipase levels    Dietary Orders (From admission, onward)     Start     Ordered    08/15/20 0453  NPO Diet  Diet Effective Now      08/15/20 0453                     Genitourinary system:   Metabolic alkalosis    Plan     Intake/Output Summary (Last 24 hours) at 8/17/2020 1205  Last data filed at 8/17/2020 1204  Gross per 24 hour   Intake 1976.77 ml   Output 5150 ml   Net -3173.23 ml     -Avoiding nephrotoxic agent.  -Following serum creatinine and GFR closely with urine output daily.  -Initial concern for UTI upon admission with urine culture negative.  -Ordered Diamox 250 mg for metabolic alkalosis due to diuresis  -On Lasix 20 mg IV every 12 hours         Endocrine system:    Plan:  - Insulin coverage: NovoLog  - Goal serum glucose level is  140-180 mg/dL while in ICU.    - I strongly recommend starting insulin drip if 2 consecutive serum glucose levels are greater than 200 mg/dL.   -Ordered TSH            Infectious disease system:  Bilateral diffuse pneumonia of unspecified organism    Blood Culture   Date Value Ref Range Status   08/11/2020 No growth at 5 days  Final     No results found for: BCIDPCR, CXREFLEX, CSFCX, CULTURETIS  No results found for: CULTURES, HSVCX, URCX  No results found for: EYECULTURE, GCCX, LABHSV  No results found for: LEGIONELLA, MRSACX, MUMPSCX, MYCOPLASCX  No results found for: NOCARDIACX, STOOLCX  Urine Culture   Date Value Ref Range Status   08/11/2020 No growth  Final     No results found for: VIRALCULTU, WOUNDCX     Plan:  - Current antibiotics: Doxycycline, meropenem, micafungin  -  Cultures:   Blood cultures and urine culture from 8/11 were negative.  Respiratory PCR, Legionella antigen, strep antigen, respiratory culture from 8/12 are negative.  COVID-19 testing was negative on 8/15 and 8/11.  Ordered repeat respiratory culture  Repeating chest x-ray is there is likely a significant component of pulmonary edema with underlying pneumonia in the setting of severe pulmonary hypertension         Hematological system:  Normocytic anemia    Plan   - I recommend PRBC transfusion if hemoglobin is less than 7 g/dL unless active bleeding or cardiac ischemia.  Mechanical Order History:      Ordered        08/14/20 1133  Place Sequential Compression Device  Once         08/14/20 1133  Maintain Sequential Compression Device  Continuous                 Pharmalogical Order History:     Ordered     Dose Route Frequency Stop    08/17/20 0843  heparin (porcine) 5000 UNIT/ML injection 5,000 Units      5,000 Units SC Every 8 Hours Scheduled --    08/12/20 0338  heparin (porcine) 5000 UNIT/ML injection 5,000 Units  Status:  Discontinued      5,000 Units SC Every 12 Hours Scheduled 08/14/20 1133        - On DVT prophylaxis.  I recommend holding chemical anticoagulation if platelet count is less than 50,000.  -Reticulocyte count, peripheral smear, iron profile, ferritin level  -Consider hematology referral for normocytic anemia.       Rheumatological and dermatological system:    Plan:  - Turn the patient every 2 hours       Activity:  - I recommend aggressive PT/OT while in hospital to avoid critical care neuropathy/myopathy.         I have updated CEDRIC Barragan of the plan of care.       Rita Wright PA-C  08/17/20  12:05    Scribed for Dr. Rizzo by Rita Wright PA-C    The clinical plan was discussed with   The clinical plan was discussed extensively with the nurse, and respiratory therapist.   Critical Care time spent in direct patient care: 86 minutes (excluding procedure time).  Patient is  critically ill and is at higher risk for further mortality/morbidity.     I, Barrett iRzzo M.D. attest that the above note accurately reflects the work and decisions made by me. Patient was seen and evaluated by me, including history of present illness, physical exam, assessment, and treatment plan.  The above note was reviewed and edited by me.    Barrett Rizzo M.D  Pulmonary and Critical Care Medicine

## 2020-08-18 LAB
A FLAVUS AB SER QL ID: NEGATIVE
A FUMIGATUS AB SER QL ID: NEGATIVE
A NIGER AB SER QL ID: NEGATIVE
B DERMAT AB TITR SER: NEGATIVE {TITER}
CYTOLOGIST CVX/VAG CYTO: NORMAL
H CAPSUL AB TITR SER ID: NEGATIVE {TITER}
PATH INTERP BLD-IMP: NORMAL

## 2020-08-18 NOTE — PAYOR COMM NOTE
"[facility]  NPI: [id number]    Utilization Review   Contact:Winnie Tipton MSN, APRN, FNP- BC  Phone: [phone]  Fax: [phone]    [employer]/ attn:yanet  Discharge Notification  REF# [id number]  D/c to [facility] 20    Aminata Al (40 y.o. Female)     Date of Birth Social Security Number Address Home Phone MRN    1980  [address] [place], [place] [id number] [phone] [id number]    [employer] Marital Status          None        Admission Date Admission Type Admitting Provider Attending Provider Department, Room/Bed    20 Emergency Pinky Corrales DO  [facility] CRITICAL CARE, CC08/1C    Discharge Date Discharge Disposition Discharge Destination        2020 Transfer to Another Facility              Attending Provider:  (none)   Allergies:  Imuran [Azathioprine]    Isolation:  None   Infection:  None   Code Status:  Prior    Ht:  172.7 cm (67.99\")   Wt:  96.2 kg (212 lb)    Admission Cmt:  None   Principal Problem:  None                Active Insurance as of 2020     Primary Coverage     Payor Plan Insurance Group Employer/Plan Group    [facility] MEDICAID      Payor Plan Address Payor Plan Phone Number Payor Plan Fax Number Effective Dates    [address] [address] [phone]  2016 - None Entered    [facility] [id number]       Subscriber Name Subscriber Birth Date Member ID       AMINATA AL 1980 [id number]                 Emergency Contacts      (Rel.) Home Phone Work Phone Mobile Phone    Bela Al (Daughter) -- -- [phone]    Itzel Ness (Mother) -- -- [phone]    Tran Yee (Relative) [phone] -- --               Discharge Summary      Kevin Woodard MD at [address] [place] [address] HOSPITALISTS DISCHARGE SUMMARY    Patient Identification:  Name:  Aminata Al  Age:  40 y.o.  Sex:  female  :  1980  MRN:  [id number]  Visit Number:  " [id number]    Date of Admission: 8/11/2020  Date of Discharge:  8/17/2020     PCP: Adali Sharpe, APRN    DISCHARGE DIAGNOSIS  Shock 2/2 Sepsis vs Cardiogenic  PNA, Bacterial, treating for MDR Organisms but also covering for fungal etiology  Mod-Severe MS w/ Mild-Mod MR and Severe Pulm HTN  Acute Decompensated HFpEF  Acute Hypoxic Respiratory Failure/ARDS requiring intubation and MV  HTN  Crohn's Disease  Acute Mild Hypokalemia  Tobacco Dependence  Obesity    CONSULTS   Cardiology  Infectious Disease  Pulmonary  General Surgery    PROCEDURES PERFORMED  Intubation    HOSPITAL COURSE  40F Smoker PMH Anxiety, Asthma, Crohn's, presented to [facility] 8/12 w/ complaint of cough, SOA, subjective fevers.    #Shock 2/2 Sepsis vs Cardiogenic  #PNA, Bacterial, treating for MDR Organisms but also covering for fungal etiology  #Severe MS w/ Mild-Mod MR and Severe Pulm HTN c/b Acute Decompensated HFpEF  #Acute Hypoxic Respiratory Failure/ARDS requiring intubation and MV 2/2 PNA, CHF Exacerbation, Acute COPD Exacerbation  Patient presented with on 8/12 with complaints of cough, SOA, subjective fevers, shortness of breath with minimal exertion and difficulty speaking in full sentences, also complaining of pleuritic like chest pain.  On admission , RR 24, afebrile.  Her cough, shortness of breath and respiratory distress worsened requiring intubation and mechanical ventilation on 8/15.  Labs on admission showed WBC count 16K, CRP 21, lactate 1.0, proBNP 3619, Covid 19 negative, Resp panel and cultures were negative, S. Pneumo negative, Bcx's NGTD. CT chest on admission showed diffuse b/l infiltrates w/ small pleural effusions, mild mediastinal lymphadenopathy.  Repeat CT chest today showed RLL consolidation, minimal LLL airspace dz, patchy b/l GGO's c/f atypical infection vs superimposed alveolar edema.  Echo on 8/14 showed LVEF 61-65%, mild-mod dilated LA, severe bileaflet MV thickening w/ rheumatic changes of MV w/  severe MVS w/ mean gradient 16mmHg, mild-mod MVR, RVSP > 55mmHg, mild dilated RV.  Repeat Echo today showed LVEF 51-55%, mod-severe MVS w/ estimated MV area 1cm^2, mean gradient 3mmHg, peak 11mmHg, mild-mod MVR.  Cardiology consulted and have been following, have been diuresing w/ IV lasix BID but also has required IV pressor medications for lower blood pressures.  ID consulted and have been following, currently have on Ritu, Doxy and micafungin w/ fungal serologies pending.  Pulmonary consulted today as had been out of town over the weekend and were asked to perform bronchoscopy but have deferred and recommending transfer to higher level of care which I agree with given her young age and valvular abnormalities.  General surgery was consulted for central line placement 8/15.  This AM patient remains intubated and sedated, her oxygenation has been stable and slightly alkalotic and have decreased TV and RR.  Her HR and vitals have remained stable.  On this day I have assumed care from previous hospitalist and called and discussed w/ [facility] attending Dr. Stallings who has accepted for transfer. Will arrange patient to travel ALS as she has required continued pain and sedation gtt's as well as low dose levophed.      #HTN  Holding home antihypertensives due to lower blood pressures.    #Crohn's Disease  No home maintenance medications, supportive care    #Electrolyte Abnormalities  Acute Mild Hypokalemia - Replaced, treated per protocol    #Tobacco Dependence  On ventilator, NRT PRN, address when appropriate.    #Obesity  BMI 32, complicates all aspects of care.    VITAL SIGNS:  Temp:  [98.3 °F (36.8 °C)-99.6 °F (37.6 °C)] 98.3 °F (36.8 °C)  Heart Rate:  [48-88] 60  Resp:  [16-18] 16  BP: ()/(43-89) 104/54  FiO2 (%):  [40 %-50 %] 40 %  SpO2:  [96 %-100 %] 98 %  on   ;   Device (Oxygen Therapy): ventilator    Body mass index is 32.24 kg/m².  Wt Readings from Last 3 Encounters:   08/17/20 96.2 kg (212  lb)   09/18/18 95.3 kg (210 lb)   02/21/18 105 kg (232 lb)     PHYSICAL EXAM:  Constitutional:  Well-developed and well-nourished.  Intubated/sedated   HENT:  Head:  Normocephalic and atraumatic.  Mouth:  Moist mucous membranes.    Eyes:  Conjunctivae and EOM are normal. No scleral icterus.    Neck:  Neck supple.  No JVD present.    Cardiovascular:  Normal rate, regular rhythm and normal heart sounds with no murmur.  Pulmonary/Chest: intubated, mechanically ventilated, RR 18, satting mid 90's, b/l bibasilar crackles  Abdominal:  Soft.  No distension and no tenderness.   Musculoskeletal:  No tenderness, and no deformity.  No red or swollen joints anywhere.    Neurological:  Unable to determine due to intubation/sedation   Skin:  Skin is warm and dry. No rash noted. No pallor.   Peripheral vascular:  no clubbing, no cyanosis, no edema.    DISCHARGE DISPOSITION   Guarded    DISCHARGE MEDICATIONS:     Discharge Medications      New Medications      Instructions Start Date   doxycycline  Commonly known as:  VIBRAMYCIN   100 mg, Intravenous, Every 12 Hours      famotidine 10 MG/ML solution injection  Commonly known as:  PEPCID   20 mg, Intravenous, Every 12 Hours Scheduled      fentaNYL Citrate 1500 MCG/30ML solution prefilled syringe   Nurse Loading Dose: 0 mcg Patient Bolus Dose: 0 mcg Lockout Interval: Not Ordered Basal Rate: 50 mcg/hr One Hour Dose Limit: 300 mcg      furosemide 10 MG/ML injection  Commonly known as:  LASIX   20 mg, Intravenous, Every 12 Hours Scheduled   Start Date:  August 18, 2020     heparin (porcine) 5000 UNIT/ML injection   5,000 Units, Subcutaneous, Every 8 Hours Scheduled      insulin aspart 100 UNIT/ML injection  Commonly known as:  novoLOG   0-9 Units, Subcutaneous, Every 6 Hours      ipratropium 0.02 % nebulizer solution  Commonly known as:  ATROVENT   0.5 mg, Nebulization, 4 Times Daily - RT      meropenem  Commonly known as:  MERREM   1 g, Intravenous, Every 8 Hours      micafungin sodium  100 mg in sodium chloride 0.9 % 100 mL IVPB   100 mg, Intravenous, Every 24 Hours   Start Date:  August 18, 2020     midazolam 1 mg/mL 100mL -0.9 MG/100ML-% solution  Commonly known as:  VERSED   1-10 mg/hr (1-10 mg/hr), Intravenous, Titrated      Norepinephrine-Sodium Chloride 8-0.9 MG/250ML-% solution infusion  Commonly known as:  LEVOPHED   0.02-0.3 mcg/kg/min (2.06-30.9 mcg/min), Intravenous, Titrated         Continue These Medications      Instructions Start Date   fexofenadine 60 MG tablet  Commonly known as:  ALLEGRA   60 mg, Oral, Daily      QUEtiapine 100 MG tablet  Commonly known as:  SEROquel   100 mg, Oral, Nightly      tiZANidine 4 MG tablet  Commonly known as:  ZANAFLEX   4 mg, Oral, Every 8 Hours PRN         Stop These Medications    FLUoxetine 20 MG capsule  Commonly known as:  PROzac     fluticasone 50 MCG/ACT nasal spray  Commonly known as:  FLONASE     lisinopril-hydrochlorothiazide 10-12.5 MG per tablet  Commonly known as:  PRINZIDE,ZESTORETIC     omeprazole 20 MG capsule  Commonly known as:  priLOSEC     Symbicort 80-4.5 MCG/ACT inhaler  Generic drug:  budesonide-formoterol     Ventolin  (90 Base) MCG/ACT inhaler  Generic drug:  albuterol sulfate HFA            Follow-up Information     Adali Sharpe, APRN .    Specialty:  Family Medicine  Contact information:  [address] [place] [address] DR MONTES DE OCA [address] [place] [id number] [phone]                  TEST  RESULTS PENDING AT DISCHARGE   Order Current Status    Fungal Antibodies, DID In process    Peripheral Blood Smear In process        CODE STATUS  Code Status and Medical Interventions:   Ordered at: 08/12/20 0306     Code Status:    CPR     Medical Interventions (Level of Support Prior to Arrest):    Full     Kevin Woodard MD  [facility]  08/17/20  17:02    Please note that this discharge summary required more than 30 minutes to complete.      Electronically signed by Kevin Woodard MD at 08/17/20 1801

## 2021-03-18 ENCOUNTER — BULK ORDERING (OUTPATIENT)
Dept: CASE MANAGEMENT | Facility: OTHER | Age: 41
End: 2021-03-18

## 2021-03-18 DIAGNOSIS — Z23 IMMUNIZATION DUE: ICD-10-CM

## 2022-03-19 ENCOUNTER — HOSPITAL ENCOUNTER (EMERGENCY)
Facility: HOSPITAL | Age: 42
Discharge: HOME | End: 2022-03-20
Attending: EMERGENCY MEDICINE | Admitting: EMERGENCY MEDICINE

## 2022-03-19 ENCOUNTER — APPOINTMENT (OUTPATIENT)
Dept: GENERAL RADIOLOGY | Facility: HOSPITAL | Age: 42
End: 2022-03-19

## 2022-03-19 DIAGNOSIS — B97.89 VIRAL RESPIRATORY INFECTION: ICD-10-CM

## 2022-03-19 DIAGNOSIS — J98.8 VIRAL RESPIRATORY INFECTION: ICD-10-CM

## 2022-03-19 DIAGNOSIS — J02.8 PHARYNGITIS DUE TO OTHER ORGANISM: ICD-10-CM

## 2022-03-19 DIAGNOSIS — J01.10 ACUTE FRONTAL SINUSITIS, RECURRENCE NOT SPECIFIED: Primary | ICD-10-CM

## 2022-03-19 LAB
ALBUMIN SERPL-MCNC: 3.97 G/DL (ref 3.5–5.2)
ALBUMIN/GLOB SERPL: 1 G/DL
ALP SERPL-CCNC: 127 U/L (ref 39–117)
ALT SERPL W P-5'-P-CCNC: 22 U/L (ref 1–33)
ANION GAP SERPL CALCULATED.3IONS-SCNC: 16.4 MMOL/L (ref 5–15)
AST SERPL-CCNC: 21 U/L (ref 1–32)
BASOPHILS # BLD MANUAL: 0.09 10*3/MM3 (ref 0–0.2)
BASOPHILS NFR BLD MANUAL: 1 % (ref 0–1.5)
BILIRUB SERPL-MCNC: 0.3 MG/DL (ref 0–1.2)
BUN SERPL-MCNC: 19 MG/DL (ref 6–20)
BUN/CREAT SERPL: 19 (ref 7–25)
CALCIUM SPEC-SCNC: 9.2 MG/DL (ref 8.6–10.5)
CHLORIDE SERPL-SCNC: 96 MMOL/L (ref 98–107)
CO2 SERPL-SCNC: 21.6 MMOL/L (ref 22–29)
CREAT SERPL-MCNC: 1 MG/DL (ref 0.57–1)
CRP SERPL-MCNC: 0.63 MG/DL (ref 0–0.5)
DEPRECATED RDW RBC AUTO: 39.7 FL (ref 37–54)
EGFRCR SERPLBLD CKD-EPI 2021: 73.2 ML/MIN/1.73
ERYTHROCYTE [DISTWIDTH] IN BLOOD BY AUTOMATED COUNT: 13 % (ref 12.3–15.4)
FLUAV SUBTYP SPEC NAA+PROBE: NOT DETECTED
FLUBV RNA ISLT QL NAA+PROBE: NOT DETECTED
GLOBULIN UR ELPH-MCNC: 4 GM/DL
GLUCOSE SERPL-MCNC: 175 MG/DL (ref 65–99)
HCT VFR BLD AUTO: 44.5 % (ref 34–46.6)
HGB BLD-MCNC: 15.4 G/DL (ref 12–15.9)
HOLD SPECIMEN: NORMAL
HOLD SPECIMEN: NORMAL
LYMPHOCYTES # BLD MANUAL: 3.98 10*3/MM3 (ref 0.7–3.1)
LYMPHOCYTES NFR BLD MANUAL: 11 % (ref 5–12)
MCH RBC QN AUTO: 29.5 PG (ref 26.6–33)
MCHC RBC AUTO-ENTMCNC: 34.6 G/DL (ref 31.5–35.7)
MCV RBC AUTO: 85.2 FL (ref 79–97)
MONOCYTES # BLD: 1.02 10*3/MM3 (ref 0.1–0.9)
NEUTROPHILS # BLD AUTO: 4.17 10*3/MM3 (ref 1.7–7)
NEUTROPHILS NFR BLD MANUAL: 43 % (ref 42.7–76)
NEUTS BAND NFR BLD MANUAL: 2 % (ref 0–5)
PLAT MORPH BLD: NORMAL
PLATELET # BLD AUTO: 323 10*3/MM3 (ref 140–450)
PMV BLD AUTO: 9 FL (ref 6–12)
POTASSIUM SERPL-SCNC: 3.5 MMOL/L (ref 3.5–5.2)
PROT SERPL-MCNC: 8 G/DL (ref 6–8.5)
RBC # BLD AUTO: 5.22 10*6/MM3 (ref 3.77–5.28)
RBC MORPH BLD: NORMAL
S PYO AG THROAT QL: NEGATIVE
SARS-COV-2 RNA PNL SPEC NAA+PROBE: NOT DETECTED
SCAN SLIDE: NORMAL
SODIUM SERPL-SCNC: 134 MMOL/L (ref 136–145)
VARIANT LYMPHS NFR BLD MANUAL: 43 % (ref 19.6–45.3)
WBC NRBC COR # BLD: 9.26 10*3/MM3 (ref 3.4–10.8)
WHOLE BLOOD HOLD SPECIMEN: NORMAL
WHOLE BLOOD HOLD SPECIMEN: NORMAL

## 2022-03-19 PROCEDURE — 36415 COLL VENOUS BLD VENIPUNCTURE: CPT

## 2022-03-19 PROCEDURE — 86140 C-REACTIVE PROTEIN: CPT | Performed by: PHYSICIAN ASSISTANT

## 2022-03-19 PROCEDURE — 87880 STREP A ASSAY W/OPTIC: CPT | Performed by: PHYSICIAN ASSISTANT

## 2022-03-19 PROCEDURE — 85025 COMPLETE CBC W/AUTO DIFF WBC: CPT | Performed by: PHYSICIAN ASSISTANT

## 2022-03-19 PROCEDURE — 99283 EMERGENCY DEPT VISIT LOW MDM: CPT

## 2022-03-19 PROCEDURE — 87081 CULTURE SCREEN ONLY: CPT | Performed by: PHYSICIAN ASSISTANT

## 2022-03-19 PROCEDURE — 87147 CULTURE TYPE IMMUNOLOGIC: CPT | Performed by: PHYSICIAN ASSISTANT

## 2022-03-19 PROCEDURE — 71045 X-RAY EXAM CHEST 1 VIEW: CPT

## 2022-03-19 PROCEDURE — 80053 COMPREHEN METABOLIC PANEL: CPT | Performed by: PHYSICIAN ASSISTANT

## 2022-03-19 PROCEDURE — 85007 BL SMEAR W/DIFF WBC COUNT: CPT | Performed by: PHYSICIAN ASSISTANT

## 2022-03-19 PROCEDURE — 87636 SARSCOV2 & INF A&B AMP PRB: CPT | Performed by: PHYSICIAN ASSISTANT

## 2022-03-20 VITALS
OXYGEN SATURATION: 100 % | BODY MASS INDEX: 37.03 KG/M2 | WEIGHT: 250 LBS | HEART RATE: 74 BPM | HEIGHT: 69 IN | SYSTOLIC BLOOD PRESSURE: 124 MMHG | DIASTOLIC BLOOD PRESSURE: 71 MMHG | RESPIRATION RATE: 18 BRPM | TEMPERATURE: 97 F

## 2022-03-20 RX ORDER — AMOXICILLIN AND CLAVULANATE POTASSIUM 875; 125 MG/1; MG/1
1 TABLET, FILM COATED ORAL 2 TIMES DAILY
Qty: 20 TABLET | Refills: 0 | Status: SHIPPED | OUTPATIENT
Start: 2022-03-19 | End: 2022-03-29

## 2022-03-20 NOTE — ED NOTES
MEDICAL SCREENING:    Reason for Visit: Cough, fever, fatigue    Patient initially seen in triage.  The patient was advised further evaluation and diagnostic testing will be needed, some of the treatment and testing will be initiated in the lobby in order to begin the process.  The patient will be returned to the waiting area for the time being and possibly be re-assessed by a subsequent ED provider.  The patient will be brought back to the treatment area in as timely manner as possible.         Nancy Linares PA-C  03/19/22 7513

## 2022-03-22 LAB — BACTERIA SPEC AEROBE CULT: ABNORMAL

## 2022-03-25 NOTE — ED PROVIDER NOTES
Subjective   Patient is a 41-year-old female with past medical history positive for anxiety, asthma, bipolar, Crohn's disease, hypertension presenting to the ER reporting mild cough nonproductive, sore throat and fatigue starting 12 days ago.  Patient states that she started off with a sore throat and then it developed into a light cough nonproductive and now she is experiencing fatigue.  Patient denies chest pain, shortness of air, nausea, vomiting, diarrhea, abdominal pain or any additional symptoms at this time.      History provided by:  Patient   used: No        Review of Systems   Constitutional: Positive for fatigue. Negative for fever.   HENT: Positive for sore throat.    Respiratory: Positive for cough.    Cardiovascular: Negative.  Negative for chest pain.   Gastrointestinal: Negative.  Negative for abdominal pain.   Endocrine: Negative.    Genitourinary: Negative.  Negative for dysuria.   Skin: Negative.    Neurological: Negative.    Psychiatric/Behavioral: Negative.    All other systems reviewed and are negative.      Past Medical History:   Diagnosis Date   • Anxiety    • Asthma    • Bipolar disorder (CMS/HCC)    • Crohn disease (CMS/HCC)    • Depression    • Hypertension    • Schizoaffective disorder (CMS/HCC)        Allergies   Allergen Reactions   • Imuran [Azathioprine] Other (See Comments)     Pt reports this medication caused her to have pancreatitis.        Past Surgical History:   Procedure Laterality Date   • APPENDECTOMY     •  SECTION     • CHOLECYSTECTOMY     • COLON SURGERY     • COLONOSCOPY     • MANDIBLE FRACTURE SURGERY     • OVARIAN CYST SURGERY     • TUBAL ABDOMINAL LIGATION         Family History   Problem Relation Age of Onset   • Diabetes Mother    • Anxiety disorder Mother    • Depression Mother    • Bipolar disorder Mother    • COPD Father    • Schizophrenia Father    • Schizophrenia Paternal Grandfather    • Breast cancer Neg Hx        Social History      Socioeconomic History   • Marital status:    Tobacco Use   • Smoking status: Current Every Day Smoker     Packs/day: 1.00     Years: 20.00     Pack years: 20.00     Types: Cigarettes   • Smokeless tobacco: Never Used   Substance and Sexual Activity   • Alcohol use: No     Comment: denies   • Drug use: Yes     Types: Marijuana   • Sexual activity: Never           Objective   Physical Exam  Vitals and nursing note reviewed.   Constitutional:       General: She is not in acute distress.     Appearance: Normal appearance. She is well-developed. She is not ill-appearing or diaphoretic.   HENT:      Head: Normocephalic and atraumatic.      Right Ear: Tympanic membrane, ear canal and external ear normal.      Left Ear: Tympanic membrane, ear canal and external ear normal.      Nose: Congestion present.      Mouth/Throat:      Pharynx: Posterior oropharyngeal erythema present.   Eyes:      Conjunctiva/sclera: Conjunctivae normal.      Pupils: Pupils are equal, round, and reactive to light.   Neck:      Vascular: No JVD.      Trachea: No tracheal deviation.   Cardiovascular:      Rate and Rhythm: Normal rate and regular rhythm.      Heart sounds: Normal heart sounds. No murmur heard.  Pulmonary:      Effort: Pulmonary effort is normal. No respiratory distress.      Breath sounds: Normal breath sounds. No wheezing.   Abdominal:      General: Bowel sounds are normal.      Palpations: Abdomen is soft.      Tenderness: There is no abdominal tenderness.   Musculoskeletal:         General: No deformity. Normal range of motion.      Cervical back: Normal range of motion and neck supple.   Skin:     General: Skin is warm and dry.      Coloration: Skin is not pale.      Findings: No erythema or rash.   Neurological:      Mental Status: She is alert and oriented to person, place, and time.      Cranial Nerves: No cranial nerve deficit.   Psychiatric:         Behavior: Behavior normal.         Thought Content: Thought  content normal.         Procedures           ED Course  ED Course as of 03/26/22 2250   Sat Mar 19, 2022   2158 XR Chest 1 View  IMPRESSION:  No acute cardiopulmonary findings.     Signer Name: Khadar Avelar MD   Signed: 3/19/2022 9:07 PM   Workstation Name: [id number]    [facility] [[facility]]   2158 Work up and results were discussed throughly with the patients.  The patient will be discharged for further monitoring and management with their PCP.  Red flags, warning signs, worsening symptoms, and when to return to the ER discussed with and understood by the patients.  Patient will follow up with their PCP in a timely manner.  Vitals stable at discharge.  [SM]   2159 This care is provided during an unprecedented national emergency due to the Novel Coronavirus (COVID-19). COVID-19 infections and transmission risks place heavy strains on healthcare resources. As this pandemic evolves, the Hospital and providers strive to respond fluidly, to remain operational, and to provide care relative to available resources and information. Outcomes are unpredictable and treatments are without well-defined guidelines. Further, the impact of COVID-19 on all aspects of emergency care, including the impact to patients seeking care for reasons other than COVID-19, is unavoidable during this national emergency.    This note was dictated using a [id number] tool. Occasional wrong-word or 'sound-a-like' substitutions may have occurred due to the inherent limitations of voice recognition software.  Read the chart carefully and recognize, using context, where substitutions have occurred.  [SM]      ED Course User Index  [] Yamilka Whitlock, APRN                                                 [facility]    Final diagnoses:   Acute frontal sinusitis, recurrence not specified   Pharyngitis due to other organism   Viral respiratory infection       ED Disposition  ED Disposition     ED Disposition   Discharge    Condition    Stable    Comment   --             Adali Sharpe, APRN  [address] [facility] [address]  [phone]    Schedule an appointment as soon as possible for a visit in 2 days           Medication List      New Prescriptions    amoxicillin-clavulanate 875-125 MG per tablet  Commonly known as: AUGMENTIN  Take 1 tablet by mouth 2 (Two) Times a Day for 10 days.           Where to Get Your Medications      These medications were sent to [facility]  [address] [place] [id number]    Hours: 8AM-6PM Mon-Fri Phone: [phone]   · amoxicillin-clavulanate 875-125 MG per tablet          Yamilka Whitlock, APRN  03/26/22 7885

## 2022-09-12 ENCOUNTER — APPOINTMENT (OUTPATIENT)
Dept: MAMMOGRAPHY | Facility: HOSPITAL | Age: 42
End: 2022-09-12

## 2022-09-13 ENCOUNTER — HOSPITAL ENCOUNTER (OUTPATIENT)
Dept: MAMMOGRAPHY | Facility: HOSPITAL | Age: 42
Discharge: HOME | End: 2022-09-13
Admitting: FAMILY MEDICINE

## 2022-09-13 DIAGNOSIS — Z12.31 VISIT FOR SCREENING MAMMOGRAM: ICD-10-CM

## 2022-09-13 PROCEDURE — 77067 SCR MAMMO BI INCL CAD: CPT | Performed by: RADIOLOGY

## 2022-09-13 PROCEDURE — 77063 BREAST TOMOSYNTHESIS BI: CPT | Performed by: RADIOLOGY

## 2022-09-13 PROCEDURE — 77067 SCR MAMMO BI INCL CAD: CPT

## 2022-09-13 PROCEDURE — 77063 BREAST TOMOSYNTHESIS BI: CPT

## 2023-11-09 ENCOUNTER — TRANSCRIBE ORDERS (OUTPATIENT)
Dept: ADMINISTRATIVE | Facility: HOSPITAL | Age: 43
End: 2023-11-09
Payer: COMMERCIAL

## 2023-11-09 DIAGNOSIS — Z12.31 VISIT FOR SCREENING MAMMOGRAM: Primary | ICD-10-CM

## 2024-05-07 ENCOUNTER — APPOINTMENT (OUTPATIENT)
Dept: CT IMAGING | Facility: HOSPITAL | Age: 44
End: 2024-05-07
Payer: COMMERCIAL

## 2024-05-07 ENCOUNTER — HOSPITAL ENCOUNTER (EMERGENCY)
Facility: HOSPITAL | Age: 44
Discharge: HOME | End: 2024-05-08
Attending: STUDENT IN AN ORGANIZED HEALTH CARE EDUCATION/TRAINING PROGRAM
Payer: COMMERCIAL

## 2024-05-07 DIAGNOSIS — E87.6 HYPOKALEMIA: ICD-10-CM

## 2024-05-07 DIAGNOSIS — K52.9 ENTERITIS: Primary | ICD-10-CM

## 2024-05-07 LAB
ALBUMIN SERPL-MCNC: 3.4 G/DL (ref 3.5–5.2)
ALBUMIN/GLOB SERPL: 0.9 G/DL
ALP SERPL-CCNC: 85 U/L (ref 39–117)
ALT SERPL W P-5'-P-CCNC: 17 U/L (ref 1–33)
AMYLASE SERPL-CCNC: 22 U/L (ref 28–100)
ANION GAP SERPL CALCULATED.3IONS-SCNC: 13.3 MMOL/L (ref 5–15)
AST SERPL-CCNC: 20 U/L (ref 1–32)
BACTERIA UR QL AUTO: ABNORMAL /HPF
BASOPHILS # BLD AUTO: 0.04 10*3/MM3 (ref 0–0.2)
BASOPHILS # BLD AUTO: 0.06 10*3/MM3 (ref 0–0.2)
BASOPHILS NFR BLD AUTO: 0.4 % (ref 0–1.5)
BASOPHILS NFR BLD AUTO: 0.6 % (ref 0–1.5)
BILIRUB SERPL-MCNC: 0.3 MG/DL (ref 0–1.2)
BILIRUB UR QL STRIP: NEGATIVE
BUN SERPL-MCNC: 8 MG/DL (ref 6–20)
BUN/CREAT SERPL: 5.7 (ref 7–25)
CALCIUM SPEC-SCNC: 8.8 MG/DL (ref 8.6–10.5)
CHLORIDE SERPL-SCNC: 95 MMOL/L (ref 98–107)
CLARITY UR: ABNORMAL
CO2 SERPL-SCNC: 22.7 MMOL/L (ref 22–29)
COLOR UR: YELLOW
CREAT SERPL-MCNC: 1.41 MG/DL (ref 0.57–1)
DEPRECATED RDW RBC AUTO: 37.7 FL (ref 37–54)
DEPRECATED RDW RBC AUTO: 38.3 FL (ref 37–54)
EGFRCR SERPLBLD CKD-EPI 2021: 47.9 ML/MIN/1.73
EOSINOPHIL # BLD AUTO: 0.15 10*3/MM3 (ref 0–0.4)
EOSINOPHIL # BLD AUTO: 0.17 10*3/MM3 (ref 0–0.4)
EOSINOPHIL NFR BLD AUTO: 1.5 % (ref 0.3–6.2)
EOSINOPHIL NFR BLD AUTO: 1.6 % (ref 0.3–6.2)
ERYTHROCYTE [DISTWIDTH] IN BLOOD BY AUTOMATED COUNT: 12.5 % (ref 12.3–15.4)
ERYTHROCYTE [DISTWIDTH] IN BLOOD BY AUTOMATED COUNT: 12.7 % (ref 12.3–15.4)
GLOBULIN UR ELPH-MCNC: 3.7 GM/DL
GLUCOSE SERPL-MCNC: 188 MG/DL (ref 65–99)
GLUCOSE UR STRIP-MCNC: NEGATIVE MG/DL
HCG SERPL QL: NEGATIVE
HCT VFR BLD AUTO: 42.1 % (ref 34–46.6)
HCT VFR BLD AUTO: 42.2 % (ref 34–46.6)
HGB BLD-MCNC: 15.3 G/DL (ref 12–15.9)
HGB BLD-MCNC: 15.3 G/DL (ref 12–15.9)
HGB UR QL STRIP.AUTO: ABNORMAL
HOLD SPECIMEN: NORMAL
HYALINE CASTS UR QL AUTO: ABNORMAL /LPF
IMM GRANULOCYTES # BLD AUTO: 0.02 10*3/MM3 (ref 0–0.05)
IMM GRANULOCYTES # BLD AUTO: 0.04 10*3/MM3 (ref 0–0.05)
IMM GRANULOCYTES NFR BLD AUTO: 0.2 % (ref 0–0.5)
IMM GRANULOCYTES NFR BLD AUTO: 0.4 % (ref 0–0.5)
KETONES UR QL STRIP: NEGATIVE
LEUKOCYTE ESTERASE UR QL STRIP.AUTO: ABNORMAL
LIPASE SERPL-CCNC: 120 U/L (ref 13–60)
LYMPHOCYTES # BLD AUTO: 3.99 10*3/MM3 (ref 0.7–3.1)
LYMPHOCYTES # BLD AUTO: 4.33 10*3/MM3 (ref 0.7–3.1)
LYMPHOCYTES NFR BLD AUTO: 38 % (ref 19.6–45.3)
LYMPHOCYTES NFR BLD AUTO: 44.2 % (ref 19.6–45.3)
MAGNESIUM SERPL-MCNC: 1.7 MG/DL (ref 1.6–2.6)
MCH RBC QN AUTO: 30.6 PG (ref 26.6–33)
MCH RBC QN AUTO: 30.6 PG (ref 26.6–33)
MCHC RBC AUTO-ENTMCNC: 36.3 G/DL (ref 31.5–35.7)
MCHC RBC AUTO-ENTMCNC: 36.3 G/DL (ref 31.5–35.7)
MCV RBC AUTO: 84.2 FL (ref 79–97)
MCV RBC AUTO: 84.4 FL (ref 79–97)
MONOCYTES # BLD AUTO: 0.81 10*3/MM3 (ref 0.1–0.9)
MONOCYTES # BLD AUTO: 0.89 10*3/MM3 (ref 0.1–0.9)
MONOCYTES NFR BLD AUTO: 8.3 % (ref 5–12)
MONOCYTES NFR BLD AUTO: 8.5 % (ref 5–12)
NEUTROPHILS NFR BLD AUTO: 4.42 10*3/MM3 (ref 1.7–7)
NEUTROPHILS NFR BLD AUTO: 45.2 % (ref 42.7–76)
NEUTROPHILS NFR BLD AUTO: 5.37 10*3/MM3 (ref 1.7–7)
NEUTROPHILS NFR BLD AUTO: 51.1 % (ref 42.7–76)
NITRITE UR QL STRIP: NEGATIVE
NRBC BLD AUTO-RTO: 0 /100 WBC (ref 0–0.2)
NRBC BLD AUTO-RTO: 0 /100 WBC (ref 0–0.2)
PH UR STRIP.AUTO: 6.5 [PH] (ref 5–8)
PLATELET # BLD AUTO: 280 10*3/MM3 (ref 140–450)
PLATELET # BLD AUTO: 282 10*3/MM3 (ref 140–450)
PMV BLD AUTO: 10.2 FL (ref 6–12)
PMV BLD AUTO: 10.2 FL (ref 6–12)
POTASSIUM SERPL-SCNC: 2.7 MMOL/L (ref 3.5–5.2)
PROT SERPL-MCNC: 7.1 G/DL (ref 6–8.5)
PROT UR QL STRIP: NEGATIVE
RBC # BLD AUTO: 5 10*6/MM3 (ref 3.77–5.28)
RBC # BLD AUTO: 5 10*6/MM3 (ref 3.77–5.28)
RBC # UR STRIP: ABNORMAL /HPF
REF LAB TEST METHOD: ABNORMAL
SODIUM SERPL-SCNC: 131 MMOL/L (ref 136–145)
SP GR UR STRIP: 1.01 (ref 1–1.03)
SQUAMOUS #/AREA URNS HPF: ABNORMAL /HPF
UROBILINOGEN UR QL STRIP: ABNORMAL
WBC # UR STRIP: ABNORMAL /HPF
WBC NRBC COR # BLD AUTO: 10.5 10*3/MM3 (ref 3.4–10.8)
WBC NRBC COR # BLD AUTO: 9.79 10*3/MM3 (ref 3.4–10.8)
WHOLE BLOOD HOLD COAG: NORMAL
WHOLE BLOOD HOLD SPECIMEN: NORMAL

## 2024-05-07 PROCEDURE — 96366 THER/PROPH/DIAG IV INF ADDON: CPT

## 2024-05-07 PROCEDURE — 96365 THER/PROPH/DIAG IV INF INIT: CPT

## 2024-05-07 PROCEDURE — 85025 COMPLETE CBC W/AUTO DIFF WBC: CPT | Performed by: STUDENT IN AN ORGANIZED HEALTH CARE EDUCATION/TRAINING PROGRAM

## 2024-05-07 PROCEDURE — 74176 CT ABD & PELVIS W/O CONTRAST: CPT

## 2024-05-07 PROCEDURE — 80053 COMPREHEN METABOLIC PANEL: CPT | Performed by: STUDENT IN AN ORGANIZED HEALTH CARE EDUCATION/TRAINING PROGRAM

## 2024-05-07 PROCEDURE — 82150 ASSAY OF AMYLASE: CPT | Performed by: NURSE PRACTITIONER

## 2024-05-07 PROCEDURE — 83735 ASSAY OF MAGNESIUM: CPT | Performed by: NURSE PRACTITIONER

## 2024-05-07 PROCEDURE — 83690 ASSAY OF LIPASE: CPT | Performed by: STUDENT IN AN ORGANIZED HEALTH CARE EDUCATION/TRAINING PROGRAM

## 2024-05-07 PROCEDURE — 87086 URINE CULTURE/COLONY COUNT: CPT | Performed by: STUDENT IN AN ORGANIZED HEALTH CARE EDUCATION/TRAINING PROGRAM

## 2024-05-07 PROCEDURE — 96375 TX/PRO/DX INJ NEW DRUG ADDON: CPT

## 2024-05-07 PROCEDURE — 81001 URINALYSIS AUTO W/SCOPE: CPT | Performed by: STUDENT IN AN ORGANIZED HEALTH CARE EDUCATION/TRAINING PROGRAM

## 2024-05-07 PROCEDURE — 96361 HYDRATE IV INFUSION ADD-ON: CPT

## 2024-05-07 PROCEDURE — 84703 CHORIONIC GONADOTROPIN ASSAY: CPT | Performed by: STUDENT IN AN ORGANIZED HEALTH CARE EDUCATION/TRAINING PROGRAM

## 2024-05-07 PROCEDURE — 99284 EMERGENCY DEPT VISIT MOD MDM: CPT

## 2024-05-07 RX ORDER — POTASSIUM CHLORIDE 7.45 MG/ML
10 INJECTION INTRAVENOUS ONCE
Qty: 100 ML | Refills: 0 | Status: COMPLETED | OUTPATIENT
Start: 2024-05-08 | End: 2024-05-08

## 2024-05-07 RX ORDER — SODIUM CHLORIDE 0.9 % (FLUSH) 0.9 %
10 SYRINGE (ML) INJECTION AS NEEDED
Status: DISCONTINUED | OUTPATIENT
Start: 2024-05-07 | End: 2024-05-08 | Stop reason: HOSPADM

## 2024-05-07 RX ORDER — POTASSIUM CHLORIDE 7.45 MG/ML
10 INJECTION INTRAVENOUS ONCE
Qty: 100 ML | Refills: 0 | Status: DISCONTINUED | OUTPATIENT
Start: 2024-05-08 | End: 2024-05-08

## 2024-05-08 VITALS
SYSTOLIC BLOOD PRESSURE: 100 MMHG | BODY MASS INDEX: 32.43 KG/M2 | OXYGEN SATURATION: 100 % | HEIGHT: 68 IN | RESPIRATION RATE: 18 BRPM | WEIGHT: 214 LBS | HEART RATE: 66 BPM | DIASTOLIC BLOOD PRESSURE: 70 MMHG | TEMPERATURE: 98.9 F

## 2024-05-08 LAB
ALBUMIN SERPL-MCNC: 2.9 G/DL (ref 3.5–5.2)
ALBUMIN/GLOB SERPL: 1 G/DL
ALP SERPL-CCNC: 65 U/L (ref 39–117)
ALT SERPL W P-5'-P-CCNC: 13 U/L (ref 1–33)
ANION GAP SERPL CALCULATED.3IONS-SCNC: 9.2 MMOL/L (ref 5–15)
AST SERPL-CCNC: 16 U/L (ref 1–32)
BILIRUB SERPL-MCNC: 0.2 MG/DL (ref 0–1.2)
BUN SERPL-MCNC: 6 MG/DL (ref 6–20)
BUN/CREAT SERPL: 5.3 (ref 7–25)
CALCIUM SPEC-SCNC: 7.5 MG/DL (ref 8.6–10.5)
CHLORIDE SERPL-SCNC: 104 MMOL/L (ref 98–107)
CO2 SERPL-SCNC: 20.8 MMOL/L (ref 22–29)
CREAT SERPL-MCNC: 1.13 MG/DL (ref 0.57–1)
EGFRCR SERPLBLD CKD-EPI 2021: 62.4 ML/MIN/1.73
GLOBULIN UR ELPH-MCNC: 2.8 GM/DL
GLUCOSE SERPL-MCNC: 155 MG/DL (ref 65–99)
POTASSIUM SERPL-SCNC: 2.9 MMOL/L (ref 3.5–5.2)
PROT SERPL-MCNC: 5.7 G/DL (ref 6–8.5)
QT INTERVAL: 424 MS
QTC INTERVAL: 492 MS
SODIUM SERPL-SCNC: 134 MMOL/L (ref 136–145)

## 2024-05-08 PROCEDURE — 96361 HYDRATE IV INFUSION ADD-ON: CPT

## 2024-05-08 PROCEDURE — 25810000003 SODIUM CHLORIDE 0.9 % SOLUTION: Performed by: NURSE PRACTITIONER

## 2024-05-08 PROCEDURE — 36415 COLL VENOUS BLD VENIPUNCTURE: CPT | Performed by: NURSE PRACTITIONER

## 2024-05-08 PROCEDURE — 96375 TX/PRO/DX INJ NEW DRUG ADDON: CPT

## 2024-05-08 PROCEDURE — 96366 THER/PROPH/DIAG IV INF ADDON: CPT

## 2024-05-08 PROCEDURE — 25010000002 PROCHLORPERAZINE 10 MG/2ML SOLUTION: Performed by: NURSE PRACTITIONER

## 2024-05-08 PROCEDURE — 96365 THER/PROPH/DIAG IV INF INIT: CPT

## 2024-05-08 PROCEDURE — 74176 CT ABD & PELVIS W/O CONTRAST: CPT | Performed by: RADIOLOGY

## 2024-05-08 PROCEDURE — 25010000002 POTASSIUM CHLORIDE 10 MEQ/100ML SOLUTION: Performed by: NURSE PRACTITIONER

## 2024-05-08 PROCEDURE — 93005 ELECTROCARDIOGRAM TRACING: CPT | Performed by: NURSE PRACTITIONER

## 2024-05-08 PROCEDURE — 25010000002 HYDROMORPHONE 1 MG/ML SOLUTION: Performed by: NURSE PRACTITIONER

## 2024-05-08 PROCEDURE — 80053 COMPREHEN METABOLIC PANEL: CPT | Performed by: NURSE PRACTITIONER

## 2024-05-08 RX ORDER — POTASSIUM CHLORIDE 20 MEQ/1
40 TABLET, EXTENDED RELEASE ORAL ONCE
Status: COMPLETED | OUTPATIENT
Start: 2024-05-08 | End: 2024-05-08

## 2024-05-08 RX ORDER — SODIUM CHLORIDE 9 MG/ML
125 INJECTION, SOLUTION INTRAVENOUS CONTINUOUS
Status: DISCONTINUED | OUTPATIENT
Start: 2024-05-08 | End: 2024-05-08 | Stop reason: HOSPADM

## 2024-05-08 RX ORDER — PROCHLORPERAZINE EDISYLATE 5 MG/ML
5 INJECTION INTRAMUSCULAR; INTRAVENOUS ONCE
Status: COMPLETED | OUTPATIENT
Start: 2024-05-08 | End: 2024-05-08

## 2024-05-08 RX ADMIN — POTASSIUM CHLORIDE 10 MEQ: 7.46 INJECTION, SOLUTION INTRAVENOUS at 02:53

## 2024-05-08 RX ADMIN — HYDROMORPHONE HYDROCHLORIDE 1 MG: 1 INJECTION, SOLUTION INTRAMUSCULAR; INTRAVENOUS; SUBCUTANEOUS at 01:57

## 2024-05-08 RX ADMIN — SODIUM CHLORIDE 125 ML/HR: 9 INJECTION, SOLUTION INTRAVENOUS at 02:55

## 2024-05-08 RX ADMIN — PROCHLORPERAZINE EDISYLATE 5 MG: 5 INJECTION INTRAMUSCULAR; INTRAVENOUS at 02:50

## 2024-05-08 RX ADMIN — POTASSIUM CHLORIDE 10 MEQ: 7.46 INJECTION, SOLUTION INTRAVENOUS at 01:40

## 2024-05-08 RX ADMIN — POTASSIUM CHLORIDE 10 MEQ: 7.46 INJECTION, SOLUTION INTRAVENOUS at 00:35

## 2024-05-08 RX ADMIN — SODIUM CHLORIDE 1000 ML: 9 INJECTION, SOLUTION INTRAVENOUS at 01:37

## 2024-05-08 RX ADMIN — POTASSIUM CHLORIDE 40 MEQ: 1500 TABLET, EXTENDED RELEASE ORAL at 04:03

## 2024-05-08 RX ADMIN — SODIUM CHLORIDE 1000 ML: 9 INJECTION, SOLUTION INTRAVENOUS at 00:34

## 2024-05-08 RX ADMIN — POTASSIUM CHLORIDE 10 MEQ: 7.46 INJECTION, SOLUTION INTRAVENOUS at 04:00

## 2024-05-08 NOTE — ED PROVIDER NOTES
Subjective   History of Present Illness  Patient is a 43-year-old female with past medical history significant for Crohn's disease, hypertension, pancreatitis, schizoaffective disorder, depression, anxiety and bipolar disorder.  She presents to the ED today with complaints of generalized abdominal pain and vomiting.  She states that she was recently diagnosed with a UTI by her PCP and started on Bactrim.  She reports that she started having a lot of abdominal pain and back pain with vomiting and was concerned that she may have pancreatitis again.  She denies any fever.  Denies any other significant complaints.        Review of Systems   Constitutional: Negative.  Negative for fever.   HENT: Negative.     Eyes: Negative.    Respiratory: Negative.     Cardiovascular: Negative.  Negative for chest pain.   Gastrointestinal:  Positive for abdominal pain, nausea and vomiting.   Endocrine: Negative.    Genitourinary: Negative.  Negative for dysuria.   Musculoskeletal:  Positive for back pain.   Skin: Negative.    Allergic/Immunologic: Negative.    Neurological: Negative.    Hematological: Negative.    Psychiatric/Behavioral: Negative.     All other systems reviewed and are negative.      Past Medical History:   Diagnosis Date    Anxiety     Asthma     Bipolar disorder     Crohn disease     Depression     Hypertension     Schizoaffective disorder        Allergies   Allergen Reactions    Imuran [Azathioprine] Other (See Comments)     Pt reports this medication caused her to have pancreatitis.        Past Surgical History:   Procedure Laterality Date    APPENDECTOMY       SECTION      CHOLECYSTECTOMY      COLON SURGERY      COLONOSCOPY      MANDIBLE FRACTURE SURGERY      OVARIAN CYST SURGERY      TUBAL ABDOMINAL LIGATION         Family History   Problem Relation Age of Onset    Diabetes Mother     Anxiety disorder Mother     Depression Mother     Bipolar disorder Mother     COPD Father     Schizophrenia Father      Schizophrenia Paternal Grandfather     Breast cancer Neg Hx        Social History     Socioeconomic History    Marital status:    Tobacco Use    Smoking status: Every Day     Current packs/day: 1.00     Average packs/day: 1 pack/day for 20.0 years (20.0 ttl pk-yrs)     Types: Cigarettes    Smokeless tobacco: Never   Substance and Sexual Activity    Alcohol use: No     Comment: denies    Drug use: Yes     Types: Marijuana    Sexual activity: Never           Objective   Physical Exam  Vitals and nursing note reviewed.   Constitutional:       General: She is not in acute distress.     Appearance: She is well-developed. She is not diaphoretic.   HENT:      Head: Normocephalic and atraumatic.      Right Ear: External ear normal.      Left Ear: External ear normal.      Nose: Nose normal.   Eyes:      Conjunctiva/sclera: Conjunctivae normal.      Pupils: Pupils are equal, round, and reactive to light.   Neck:      Vascular: No JVD.      Trachea: No tracheal deviation.   Cardiovascular:      Rate and Rhythm: Normal rate and regular rhythm.      Heart sounds: Normal heart sounds. No murmur heard.  Pulmonary:      Effort: Pulmonary effort is normal. No respiratory distress.      Breath sounds: Normal breath sounds. No wheezing.   Abdominal:      General: Bowel sounds are normal.      Palpations: Abdomen is soft.      Tenderness: There is no abdominal tenderness.   Musculoskeletal:         General: No deformity. Normal range of motion.      Cervical back: Normal range of motion and neck supple.   Skin:     General: Skin is warm and dry.      Capillary Refill: Capillary refill takes less than 2 seconds.      Coloration: Skin is not pale.      Findings: No erythema or rash.   Neurological:      General: No focal deficit present.      Mental Status: She is alert and oriented to person, place, and time.      Cranial Nerves: No cranial nerve deficit.   Psychiatric:         Mood and Affect: Mood normal.         Behavior:  Behavior normal.         Thought Content: Thought content normal.         Judgment: Judgment normal.         Procedures       Results for orders placed or performed during the hospital encounter of 05/07/24   Comprehensive Metabolic Panel    Specimen: Arm, Right; Blood   Result Value Ref Range    Glucose 188 (H) 65 - 99 mg/dL    BUN 8 6 - 20 mg/dL    Creatinine 1.41 (H) 0.57 - 1.00 mg/dL    Sodium 131 (L) 136 - 145 mmol/L    Potassium 2.7 (L) 3.5 - 5.2 mmol/L    Chloride 95 (L) 98 - 107 mmol/L    CO2 22.7 22.0 - 29.0 mmol/L    Calcium 8.8 8.6 - 10.5 mg/dL    Total Protein 7.1 6.0 - 8.5 g/dL    Albumin 3.4 (L) 3.5 - 5.2 g/dL    ALT (SGPT) 17 1 - 33 U/L    AST (SGOT) 20 1 - 32 U/L    Alkaline Phosphatase 85 39 - 117 U/L    Total Bilirubin 0.3 0.0 - 1.2 mg/dL    Globulin 3.7 gm/dL    A/G Ratio 0.9 g/dL    BUN/Creatinine Ratio 5.7 (L) 7.0 - 25.0    Anion Gap 13.3 5.0 - 15.0 mmol/L    eGFR 47.9 (L) >60.0 mL/min/1.73   Lipase    Specimen: Arm, Right; Blood   Result Value Ref Range    Lipase 120 (H) 13 - 60 U/L   hCG, Serum, Qualitative    Specimen: Arm, Right; Blood   Result Value Ref Range    HCG Qualitative Negative Negative   Urinalysis With Culture If Indicated - Urine, Clean Catch    Specimen: Urine, Clean Catch   Result Value Ref Range    Color, UA Yellow Yellow, Straw    Appearance, UA Cloudy (A) Clear    pH, UA 6.5 5.0 - 8.0    Specific Gravity, UA 1.008 1.005 - 1.030    Glucose, UA Negative Negative    Ketones, UA Negative Negative    Bilirubin, UA Negative Negative    Blood, UA Large (3+) (A) Negative    Protein, UA Negative Negative    Leuk Esterase, UA Small (1+) (A) Negative    Nitrite, UA Negative Negative    Urobilinogen, UA 0.2 E.U./dL 0.2 - 1.0 E.U./dL   CBC Auto Differential    Specimen: Arm, Right; Blood   Result Value Ref Range    WBC 9.79 3.40 - 10.80 10*3/mm3    RBC 5.00 3.77 - 5.28 10*6/mm3    Hemoglobin 15.3 12.0 - 15.9 g/dL    Hematocrit 42.1 34.0 - 46.6 %    MCV 84.2 79.0 - 97.0 fL    MCH 30.6  26.6 - 33.0 pg    MCHC 36.3 (H) 31.5 - 35.7 g/dL    RDW 12.7 12.3 - 15.4 %    RDW-SD 38.3 37.0 - 54.0 fl    MPV 10.2 6.0 - 12.0 fL    Platelets 280 140 - 450 10*3/mm3    Neutrophil % 45.2 42.7 - 76.0 %    Lymphocyte % 44.2 19.6 - 45.3 %    Monocyte % 8.3 5.0 - 12.0 %    Eosinophil % 1.5 0.3 - 6.2 %    Basophil % 0.6 0.0 - 1.5 %    Immature Grans % 0.2 0.0 - 0.5 %    Neutrophils, Absolute 4.42 1.70 - 7.00 10*3/mm3    Lymphocytes, Absolute 4.33 (H) 0.70 - 3.10 10*3/mm3    Monocytes, Absolute 0.81 0.10 - 0.90 10*3/mm3    Eosinophils, Absolute 0.15 0.00 - 0.40 10*3/mm3    Basophils, Absolute 0.06 0.00 - 0.20 10*3/mm3    Immature Grans, Absolute 0.02 0.00 - 0.05 10*3/mm3    nRBC 0.0 0.0 - 0.2 /100 WBC   Urinalysis, Microscopic Only - Urine, Clean Catch    Specimen: Urine, Clean Catch   Result Value Ref Range    RBC, UA 3-5 (A) None Seen, 0-2 /HPF    WBC, UA 6-10 (A) None Seen, 0-2 /HPF    Bacteria, UA Trace (A) None Seen /HPF    Squamous Epithelial Cells, UA 7-12 (A) None Seen, 0-2 /HPF    Hyaline Casts, UA 0-2 None Seen /LPF    Methodology Manual Light Microscopy    CBC Auto Differential    Specimen: Arm, Right; Blood   Result Value Ref Range    WBC 10.50 3.40 - 10.80 10*3/mm3    RBC 5.00 3.77 - 5.28 10*6/mm3    Hemoglobin 15.3 12.0 - 15.9 g/dL    Hematocrit 42.2 34.0 - 46.6 %    MCV 84.4 79.0 - 97.0 fL    MCH 30.6 26.6 - 33.0 pg    MCHC 36.3 (H) 31.5 - 35.7 g/dL    RDW 12.5 12.3 - 15.4 %    RDW-SD 37.7 37.0 - 54.0 fl    MPV 10.2 6.0 - 12.0 fL    Platelets 282 140 - 450 10*3/mm3    Neutrophil % 51.1 42.7 - 76.0 %    Lymphocyte % 38.0 19.6 - 45.3 %    Monocyte % 8.5 5.0 - 12.0 %    Eosinophil % 1.6 0.3 - 6.2 %    Basophil % 0.4 0.0 - 1.5 %    Immature Grans % 0.4 0.0 - 0.5 %    Neutrophils, Absolute 5.37 1.70 - 7.00 10*3/mm3    Lymphocytes, Absolute 3.99 (H) 0.70 - 3.10 10*3/mm3    Monocytes, Absolute 0.89 0.10 - 0.90 10*3/mm3    Eosinophils, Absolute 0.17 0.00 - 0.40 10*3/mm3    Basophils, Absolute 0.04 0.00 - 0.20  10*3/mm3    Immature Grans, Absolute 0.04 0.00 - 0.05 10*3/mm3    nRBC 0.0 0.0 - 0.2 /100 WBC   Magnesium    Specimen: Arm, Right; Blood   Result Value Ref Range    Magnesium 1.7 1.6 - 2.6 mg/dL   Amylase    Specimen: Arm, Right; Blood   Result Value Ref Range    Amylase 22 (L) 28 - 100 U/L   Comprehensive Metabolic Panel    Specimen: Arm, Right; Blood   Result Value Ref Range    Glucose 155 (H) 65 - 99 mg/dL    BUN 6 6 - 20 mg/dL    Creatinine 1.13 (H) 0.57 - 1.00 mg/dL    Sodium 134 (L) 136 - 145 mmol/L    Potassium 2.9 (L) 3.5 - 5.2 mmol/L    Chloride 104 98 - 107 mmol/L    CO2 20.8 (L) 22.0 - 29.0 mmol/L    Calcium 7.5 (L) 8.6 - 10.5 mg/dL    Total Protein 5.7 (L) 6.0 - 8.5 g/dL    Albumin 2.9 (L) 3.5 - 5.2 g/dL    ALT (SGPT) 13 1 - 33 U/L    AST (SGOT) 16 1 - 32 U/L    Alkaline Phosphatase 65 39 - 117 U/L    Total Bilirubin 0.2 0.0 - 1.2 mg/dL    Globulin 2.8 gm/dL    A/G Ratio 1.0 g/dL    BUN/Creatinine Ratio 5.3 (L) 7.0 - 25.0    Anion Gap 9.2 5.0 - 15.0 mmol/L    eGFR 62.4 >60.0 mL/min/1.73   ECG 12 Lead Electrolyte Imbalance   Result Value Ref Range    QT Interval 424 ms    QTC Interval 492 ms   Green Top (Gel)   Result Value Ref Range    Extra Tube Hold for add-ons.    Lavender Top   Result Value Ref Range    Extra Tube hold for add-on    Light Blue Top   Result Value Ref Range    Extra Tube Hold for add-ons.       CT Abdomen Pelvis Without Contrast   Final Result       1.  Mild enteritis and mild mesenteric adenitis.   2.  Postoperative changes to bowel segments in the right lower quadrant.   3.  Appendectomy.   4.  Bilateral adnexal clips.   5.  Cholecystectomy.   6.  No free fluid or free air.   7.  No abscess or hematoma.       This report was finalized on 5/8/2024 12:59 AM by Khadar Marie MD.               ED Course  ED Course as of 05/08/24 0734   Wed May 08, 2024   0329 Patient receiving potassium replacement.  Denies any complaints at this time. [MB]      ED Course User Index  [MB] Tanesha Mcdowell,  APRN                                             Medical Decision Making  Amount and/or Complexity of Data Reviewed  Labs: ordered.  Radiology: ordered.  ECG/medicine tests: ordered.    Risk  Prescription drug management.        Final diagnoses:   Enteritis   Hypokalemia       ED Disposition  ED Disposition       ED Disposition   Discharge    Condition   Stable    Comment   --               Mi Rivera MD  [address] [place] [address]  [phone]    Call in 2 days           Medication List      No changes were made to your prescriptions during this visit.            Tanesha Mcdowell, DIEGO  05/08/24 0734

## 2024-05-08 NOTE — ED TRIAGE NOTES
MEDICAL SCREENING:    Reason for Visit: dysuria, recent abx    Patient initially seen in triage.  The patient was advised further evaluation and diagnostic testing will be needed, some of the treatment and testing will be initiated in the lobby in order to begin the process.  The patient will be returned to the waiting area for the time being and possibly be re-assessed by a subsequent ED provider.  The patient will be brought back to the treatment area in as timely manner as possible.

## 2024-05-09 LAB — BACTERIA SPEC AEROBE CULT: NO GROWTH

## 2024-05-12 ENCOUNTER — HOSPITAL ENCOUNTER (INPATIENT)
Facility: HOSPITAL | Age: 44
LOS: 12 days | Discharge: HOME | DRG: 438 | End: 2024-05-24
Attending: EMERGENCY MEDICINE | Admitting: HOSPITALIST
Payer: COMMERCIAL

## 2024-05-12 ENCOUNTER — APPOINTMENT (OUTPATIENT)
Dept: CT IMAGING | Facility: HOSPITAL | Age: 44
DRG: 438 | End: 2024-05-12
Payer: COMMERCIAL

## 2024-05-12 DIAGNOSIS — R50.9 RECURRENT FEVER, CAUSE UNKNOWN: ICD-10-CM

## 2024-05-12 DIAGNOSIS — I50.30 HEART FAILURE WITH PRESERVED EJECTION FRACTION, UNSPECIFIED HF CHRONICITY: ICD-10-CM

## 2024-05-12 DIAGNOSIS — K85.90 ACUTE PANCREATITIS, UNSPECIFIED COMPLICATION STATUS, UNSPECIFIED PANCREATITIS TYPE: Primary | ICD-10-CM

## 2024-05-12 DIAGNOSIS — I34.0 MODERATE MITRAL REGURGITATION: ICD-10-CM

## 2024-05-12 DIAGNOSIS — J96.01 ACUTE RESPIRATORY FAILURE WITH HYPOXIA: ICD-10-CM

## 2024-05-12 DIAGNOSIS — I05.0 MODERATE MITRAL STENOSIS: ICD-10-CM

## 2024-05-12 LAB
ALBUMIN SERPL-MCNC: 3.4 G/DL (ref 3.5–5.2)
ALBUMIN/GLOB SERPL: 1 G/DL
ALP SERPL-CCNC: 74 U/L (ref 39–117)
ALT SERPL W P-5'-P-CCNC: 16 U/L (ref 1–33)
ANION GAP SERPL CALCULATED.3IONS-SCNC: 11.4 MMOL/L (ref 5–15)
AST SERPL-CCNC: 23 U/L (ref 1–32)
BACTERIA UR QL AUTO: ABNORMAL /HPF
BASOPHILS # BLD AUTO: 0.09 10*3/MM3 (ref 0–0.2)
BASOPHILS NFR BLD AUTO: 1 % (ref 0–1.5)
BILIRUB SERPL-MCNC: 0.3 MG/DL (ref 0–1.2)
BILIRUB UR QL STRIP: NEGATIVE
BUN SERPL-MCNC: 5 MG/DL (ref 6–20)
BUN/CREAT SERPL: 5.3 (ref 7–25)
CALCIUM SPEC-SCNC: 8.8 MG/DL (ref 8.6–10.5)
CHLORIDE SERPL-SCNC: 105 MMOL/L (ref 98–107)
CLARITY UR: ABNORMAL
CO2 SERPL-SCNC: 21.6 MMOL/L (ref 22–29)
COLOR UR: YELLOW
CREAT SERPL-MCNC: 0.94 MG/DL (ref 0.57–1)
CRP SERPL-MCNC: 0.56 MG/DL (ref 0–0.5)
D-LACTATE SERPL-SCNC: 1.5 MMOL/L (ref 0.5–2)
DEPRECATED RDW RBC AUTO: 40.6 FL (ref 37–54)
EGFRCR SERPLBLD CKD-EPI 2021: 77.9 ML/MIN/1.73
EOSINOPHIL # BLD AUTO: 0.23 10*3/MM3 (ref 0–0.4)
EOSINOPHIL NFR BLD AUTO: 2.6 % (ref 0.3–6.2)
ERYTHROCYTE [DISTWIDTH] IN BLOOD BY AUTOMATED COUNT: 13 % (ref 12.3–15.4)
GLOBULIN UR ELPH-MCNC: 3.4 GM/DL
GLUCOSE BLDC GLUCOMTR-MCNC: 113 MG/DL (ref 70–130)
GLUCOSE BLDC GLUCOMTR-MCNC: 156 MG/DL (ref 70–130)
GLUCOSE SERPL-MCNC: 278 MG/DL (ref 65–99)
GLUCOSE UR STRIP-MCNC: ABNORMAL MG/DL
HBA1C MFR BLD: 6.9 % (ref 4.8–5.6)
HCT VFR BLD AUTO: 39.2 % (ref 34–46.6)
HGB BLD-MCNC: 13.9 G/DL (ref 12–15.9)
HGB UR QL STRIP.AUTO: ABNORMAL
HOLD SPECIMEN: NORMAL
HOLD SPECIMEN: NORMAL
HYALINE CASTS UR QL AUTO: ABNORMAL /LPF
IMM GRANULOCYTES # BLD AUTO: 0.02 10*3/MM3 (ref 0–0.05)
IMM GRANULOCYTES NFR BLD AUTO: 0.2 % (ref 0–0.5)
KETONES UR QL STRIP: NEGATIVE
LEUKOCYTE ESTERASE UR QL STRIP.AUTO: ABNORMAL
LIPASE SERPL-CCNC: 251 U/L (ref 13–60)
LYMPHOCYTES # BLD AUTO: 3.9 10*3/MM3 (ref 0.7–3.1)
LYMPHOCYTES NFR BLD AUTO: 43.9 % (ref 19.6–45.3)
MAGNESIUM SERPL-MCNC: 1.7 MG/DL (ref 1.6–2.6)
MCH RBC QN AUTO: 30.9 PG (ref 26.6–33)
MCHC RBC AUTO-ENTMCNC: 35.5 G/DL (ref 31.5–35.7)
MCV RBC AUTO: 87.1 FL (ref 79–97)
MONOCYTES # BLD AUTO: 0.72 10*3/MM3 (ref 0.1–0.9)
MONOCYTES NFR BLD AUTO: 8.1 % (ref 5–12)
NEUTROPHILS NFR BLD AUTO: 3.93 10*3/MM3 (ref 1.7–7)
NEUTROPHILS NFR BLD AUTO: 44.2 % (ref 42.7–76)
NITRITE UR QL STRIP: NEGATIVE
NRBC BLD AUTO-RTO: 0 /100 WBC (ref 0–0.2)
PH UR STRIP.AUTO: 6.5 [PH] (ref 5–8)
PLATELET # BLD AUTO: 308 10*3/MM3 (ref 140–450)
PMV BLD AUTO: 9.4 FL (ref 6–12)
POTASSIUM SERPL-SCNC: 3.5 MMOL/L (ref 3.5–5.2)
PROT SERPL-MCNC: 6.8 G/DL (ref 6–8.5)
PROT UR QL STRIP: NEGATIVE
RBC # BLD AUTO: 4.5 10*6/MM3 (ref 3.77–5.28)
RBC # UR STRIP: ABNORMAL /HPF
REF LAB TEST METHOD: ABNORMAL
SODIUM SERPL-SCNC: 138 MMOL/L (ref 136–145)
SP GR UR STRIP: <=1.005 (ref 1–1.03)
SQUAMOUS #/AREA URNS HPF: ABNORMAL /HPF
UROBILINOGEN UR QL STRIP: ABNORMAL
WBC # UR STRIP: ABNORMAL /HPF
WBC NRBC COR # BLD AUTO: 8.89 10*3/MM3 (ref 3.4–10.8)
WHOLE BLOOD HOLD COAG: NORMAL
WHOLE BLOOD HOLD SPECIMEN: NORMAL

## 2024-05-12 PROCEDURE — 25810000003 SODIUM CHLORIDE 0.9 % SOLUTION: Performed by: HOSPITALIST

## 2024-05-12 PROCEDURE — 85025 COMPLETE CBC W/AUTO DIFF WBC: CPT | Performed by: PHYSICIAN ASSISTANT

## 2024-05-12 PROCEDURE — 81001 URINALYSIS AUTO W/SCOPE: CPT | Performed by: PHYSICIAN ASSISTANT

## 2024-05-12 PROCEDURE — 74178 CT ABD&PLV WO CNTR FLWD CNTR: CPT | Performed by: RADIOLOGY

## 2024-05-12 PROCEDURE — 87040 BLOOD CULTURE FOR BACTERIA: CPT

## 2024-05-12 PROCEDURE — 25010000002 ONDANSETRON PER 1 MG: Performed by: EMERGENCY MEDICINE

## 2024-05-12 PROCEDURE — 25010000002 HYDROMORPHONE PER 4 MG: Performed by: EMERGENCY MEDICINE

## 2024-05-12 PROCEDURE — 93005 ELECTROCARDIOGRAM TRACING: CPT | Performed by: HOSPITALIST

## 2024-05-12 PROCEDURE — 99223 1ST HOSP IP/OBS HIGH 75: CPT | Performed by: HOSPITALIST

## 2024-05-12 PROCEDURE — 86140 C-REACTIVE PROTEIN: CPT | Performed by: PHYSICIAN ASSISTANT

## 2024-05-12 PROCEDURE — 25010000002 HYDROMORPHONE PER 4 MG: Performed by: HOSPITALIST

## 2024-05-12 PROCEDURE — 83735 ASSAY OF MAGNESIUM: CPT | Performed by: HOSPITALIST

## 2024-05-12 PROCEDURE — 25010000002 CEFTRIAXONE PER 250 MG: Performed by: PHYSICIAN ASSISTANT

## 2024-05-12 PROCEDURE — 87040 BLOOD CULTURE FOR BACTERIA: CPT | Performed by: HOSPITALIST

## 2024-05-12 PROCEDURE — 25010000002 MAGNESIUM SULFATE 4 GM/100ML SOLUTION: Performed by: HOSPITALIST

## 2024-05-12 PROCEDURE — 83605 ASSAY OF LACTIC ACID: CPT | Performed by: HOSPITALIST

## 2024-05-12 PROCEDURE — 99285 EMERGENCY DEPT VISIT HI MDM: CPT

## 2024-05-12 PROCEDURE — 80053 COMPREHEN METABOLIC PANEL: CPT | Performed by: PHYSICIAN ASSISTANT

## 2024-05-12 PROCEDURE — 25010000002 MORPHINE PER 10 MG: Performed by: EMERGENCY MEDICINE

## 2024-05-12 PROCEDURE — 25010000002 ONDANSETRON PER 1 MG: Performed by: HOSPITALIST

## 2024-05-12 PROCEDURE — 74178 CT ABD&PLV WO CNTR FLWD CNTR: CPT

## 2024-05-12 PROCEDURE — 63710000001 INSULIN REGULAR HUMAN PER 5 UNITS: Performed by: HOSPITALIST

## 2024-05-12 PROCEDURE — 93010 ELECTROCARDIOGRAM REPORT: CPT | Performed by: INTERNAL MEDICINE

## 2024-05-12 PROCEDURE — 25510000001 IOPAMIDOL 61 % SOLUTION: Performed by: EMERGENCY MEDICINE

## 2024-05-12 PROCEDURE — 87086 URINE CULTURE/COLONY COUNT: CPT | Performed by: PHYSICIAN ASSISTANT

## 2024-05-12 PROCEDURE — 83036 HEMOGLOBIN GLYCOSYLATED A1C: CPT | Performed by: HOSPITALIST

## 2024-05-12 PROCEDURE — 36415 COLL VENOUS BLD VENIPUNCTURE: CPT

## 2024-05-12 PROCEDURE — 83690 ASSAY OF LIPASE: CPT | Performed by: PHYSICIAN ASSISTANT

## 2024-05-12 PROCEDURE — 82948 REAGENT STRIP/BLOOD GLUCOSE: CPT

## 2024-05-12 PROCEDURE — 25810000003 SODIUM CHLORIDE 0.9 % SOLUTION: Performed by: PHYSICIAN ASSISTANT

## 2024-05-12 RX ORDER — HYDROMORPHONE HYDROCHLORIDE 1 MG/ML
0.5 INJECTION, SOLUTION INTRAMUSCULAR; INTRAVENOUS; SUBCUTANEOUS
Status: DISCONTINUED | OUTPATIENT
Start: 2024-05-12 | End: 2024-05-14

## 2024-05-12 RX ORDER — MAGNESIUM SULFATE HEPTAHYDRATE 40 MG/ML
4 INJECTION, SOLUTION INTRAVENOUS ONCE
Status: COMPLETED | OUTPATIENT
Start: 2024-05-12 | End: 2024-05-13

## 2024-05-12 RX ORDER — SODIUM CHLORIDE 0.9 % (FLUSH) 0.9 %
10 SYRINGE (ML) INJECTION AS NEEDED
Status: DISCONTINUED | OUTPATIENT
Start: 2024-05-12 | End: 2024-05-24 | Stop reason: HOSPADM

## 2024-05-12 RX ORDER — SODIUM CHLORIDE 9 MG/ML
40 INJECTION, SOLUTION INTRAVENOUS AS NEEDED
Status: DISCONTINUED | OUTPATIENT
Start: 2024-05-12 | End: 2024-05-24 | Stop reason: HOSPADM

## 2024-05-12 RX ORDER — METFORMIN HYDROCHLORIDE 500 MG/1
1000 TABLET, EXTENDED RELEASE ORAL 2 TIMES DAILY
COMMUNITY

## 2024-05-12 RX ORDER — METOPROLOL SUCCINATE 200 MG/1
200 TABLET, EXTENDED RELEASE ORAL DAILY
COMMUNITY

## 2024-05-12 RX ORDER — CETIRIZINE HYDROCHLORIDE 10 MG/1
10 TABLET ORAL DAILY
Status: DISCONTINUED | OUTPATIENT
Start: 2024-05-13 | End: 2024-05-24 | Stop reason: HOSPADM

## 2024-05-12 RX ORDER — FLUOXETINE 10 MG/1
10 CAPSULE ORAL DAILY
Status: DISCONTINUED | OUTPATIENT
Start: 2024-05-13 | End: 2024-05-24 | Stop reason: HOSPADM

## 2024-05-12 RX ORDER — BISACODYL 5 MG/1
5 TABLET, DELAYED RELEASE ORAL DAILY PRN
Status: DISCONTINUED | OUTPATIENT
Start: 2024-05-12 | End: 2024-05-24 | Stop reason: HOSPADM

## 2024-05-12 RX ORDER — ONDANSETRON 2 MG/ML
4 INJECTION INTRAMUSCULAR; INTRAVENOUS EVERY 6 HOURS PRN
Status: DISCONTINUED | OUTPATIENT
Start: 2024-05-12 | End: 2024-05-18

## 2024-05-12 RX ORDER — METOPROLOL SUCCINATE 50 MG/1
200 TABLET, EXTENDED RELEASE ORAL DAILY
Status: DISCONTINUED | OUTPATIENT
Start: 2024-05-13 | End: 2024-05-18

## 2024-05-12 RX ORDER — BISACODYL 10 MG
10 SUPPOSITORY, RECTAL RECTAL DAILY PRN
Status: DISCONTINUED | OUTPATIENT
Start: 2024-05-12 | End: 2024-05-24 | Stop reason: HOSPADM

## 2024-05-12 RX ORDER — CETIRIZINE HYDROCHLORIDE 10 MG/1
10 TABLET ORAL DAILY
Status: CANCELLED | OUTPATIENT
Start: 2024-05-13

## 2024-05-12 RX ORDER — GLUCAGON 1 MG/ML
1 KIT INJECTION
Status: DISCONTINUED | OUTPATIENT
Start: 2024-05-12 | End: 2024-05-19

## 2024-05-12 RX ORDER — CHLORTHALIDONE 50 MG/1
25 TABLET ORAL DAILY
Status: CANCELLED | OUTPATIENT
Start: 2024-05-13

## 2024-05-12 RX ORDER — AMOXICILLIN 250 MG
2 CAPSULE ORAL 2 TIMES DAILY PRN
Status: DISCONTINUED | OUTPATIENT
Start: 2024-05-12 | End: 2024-05-24 | Stop reason: HOSPADM

## 2024-05-12 RX ORDER — SODIUM CHLORIDE 0.9 % (FLUSH) 0.9 %
10 SYRINGE (ML) INJECTION EVERY 12 HOURS SCHEDULED
Status: DISCONTINUED | OUTPATIENT
Start: 2024-05-12 | End: 2024-05-24 | Stop reason: HOSPADM

## 2024-05-12 RX ORDER — NICOTINE POLACRILEX 4 MG
15 LOZENGE BUCCAL
Status: DISCONTINUED | OUTPATIENT
Start: 2024-05-12 | End: 2024-05-19

## 2024-05-12 RX ORDER — POTASSIUM CHLORIDE 20 MEQ/1
40 TABLET, EXTENDED RELEASE ORAL EVERY 4 HOURS
Status: COMPLETED | OUTPATIENT
Start: 2024-05-12 | End: 2024-05-13

## 2024-05-12 RX ORDER — FLUOXETINE 10 MG/1
10 CAPSULE ORAL DAILY
Status: CANCELLED | OUTPATIENT
Start: 2024-05-13

## 2024-05-12 RX ORDER — MAGNESIUM OXIDE 400 MG/1
400 TABLET ORAL DAILY
COMMUNITY

## 2024-05-12 RX ORDER — SODIUM CHLORIDE 9 MG/ML
125 INJECTION, SOLUTION INTRAVENOUS CONTINUOUS
Status: DISCONTINUED | OUTPATIENT
Start: 2024-05-12 | End: 2024-05-16

## 2024-05-12 RX ORDER — FLUOXETINE 10 MG/1
10 CAPSULE ORAL DAILY
COMMUNITY

## 2024-05-12 RX ORDER — ONDANSETRON 2 MG/ML
4 INJECTION INTRAMUSCULAR; INTRAVENOUS ONCE
Status: COMPLETED | OUTPATIENT
Start: 2024-05-12 | End: 2024-05-12

## 2024-05-12 RX ORDER — METOPROLOL SUCCINATE 50 MG/1
200 TABLET, EXTENDED RELEASE ORAL DAILY
Status: CANCELLED | OUTPATIENT
Start: 2024-05-13

## 2024-05-12 RX ORDER — POLYETHYLENE GLYCOL 3350 17 G/17G
17 POWDER, FOR SOLUTION ORAL DAILY PRN
Status: DISCONTINUED | OUTPATIENT
Start: 2024-05-12 | End: 2024-05-24 | Stop reason: HOSPADM

## 2024-05-12 RX ORDER — DEXTROSE MONOHYDRATE 25 G/50ML
25 INJECTION, SOLUTION INTRAVENOUS
Status: DISCONTINUED | OUTPATIENT
Start: 2024-05-12 | End: 2024-05-19

## 2024-05-12 RX ORDER — FEXOFENADINE HCL 180 MG/1
180 TABLET ORAL DAILY
COMMUNITY

## 2024-05-12 RX ORDER — HYDROMORPHONE HYDROCHLORIDE 1 MG/ML
0.25 INJECTION, SOLUTION INTRAMUSCULAR; INTRAVENOUS; SUBCUTANEOUS ONCE
Status: COMPLETED | OUTPATIENT
Start: 2024-05-12 | End: 2024-05-12

## 2024-05-12 RX ORDER — CHLORTHALIDONE 25 MG/1
25 TABLET ORAL DAILY
COMMUNITY
End: 2024-05-24 | Stop reason: HOSPADM

## 2024-05-12 RX ADMIN — POTASSIUM CHLORIDE 40 MEQ: 1500 TABLET, EXTENDED RELEASE ORAL at 21:09

## 2024-05-12 RX ADMIN — ONDANSETRON 4 MG: 2 INJECTION INTRAMUSCULAR; INTRAVENOUS at 21:08

## 2024-05-12 RX ADMIN — SODIUM CHLORIDE 1000 ML: 9 INJECTION, SOLUTION INTRAVENOUS at 16:42

## 2024-05-12 RX ADMIN — CEFTRIAXONE 1000 MG: 1 INJECTION, POWDER, FOR SOLUTION INTRAMUSCULAR; INTRAVENOUS at 18:53

## 2024-05-12 RX ADMIN — HYDROMORPHONE HYDROCHLORIDE 0.5 MG: 1 INJECTION, SOLUTION INTRAMUSCULAR; INTRAVENOUS; SUBCUTANEOUS at 21:08

## 2024-05-12 RX ADMIN — MORPHINE SULFATE 4 MG: 4 INJECTION, SOLUTION INTRAMUSCULAR; INTRAVENOUS at 16:44

## 2024-05-12 RX ADMIN — ONDANSETRON 4 MG: 2 INJECTION INTRAMUSCULAR; INTRAVENOUS at 16:43

## 2024-05-12 RX ADMIN — MAGNESIUM SULFATE HEPTAHYDRATE 4 G: 40 INJECTION, SOLUTION INTRAVENOUS at 22:43

## 2024-05-12 RX ADMIN — SODIUM CHLORIDE 125 ML/HR: 9 INJECTION, SOLUTION INTRAVENOUS at 21:08

## 2024-05-12 RX ADMIN — HYDROMORPHONE HYDROCHLORIDE 0.25 MG: 1 INJECTION, SOLUTION INTRAMUSCULAR; INTRAVENOUS; SUBCUTANEOUS at 19:27

## 2024-05-12 RX ADMIN — IOPAMIDOL 85 ML: 612 INJECTION, SOLUTION INTRAVENOUS at 17:37

## 2024-05-12 RX ADMIN — Medication 10 ML: at 21:08

## 2024-05-12 RX ADMIN — INSULIN HUMAN 2 UNITS: 100 INJECTION, SOLUTION PARENTERAL at 20:57

## 2024-05-12 NOTE — ED PROVIDER NOTES
Subjective   History of Present Illness  43-year-old female with past medical history of anxiety, asthma, bipolar disorder, Crohn's, depression, hypertension, schizoaffective disorder, and pancreatitis presents to the emergency room with left flank pain which radiates to her back.  Patient states the pain that she is feeling feels consistent with prior episodes of pancreatitis which she has had before.  She does report nausea and vomiting.  Denies dysuria, hematuria, or decreased urination.  Patient states she was seen at our facility 5 days ago and since that time has continued to have abdominal pain and has been unable to sleep secondary to the pain and nausea.  She denies any fever, chills, or bodyaches.  She denies any alcohol use.  She states that prior episodes of pancreatitis was thought to be secondary to medications.  Denies any alleviating factors.  Denies any other complaints or concerns at this time.    History provided by:  Patient   used: No        Review of Systems   Constitutional: Negative.  Negative for fever.   HENT: Negative.     Respiratory: Negative.     Cardiovascular: Negative.  Negative for chest pain.   Gastrointestinal:  Positive for abdominal pain, nausea and vomiting.   Endocrine: Negative.    Genitourinary:  Positive for flank pain. Negative for dysuria.   Skin: Negative.    Neurological: Negative.    Psychiatric/Behavioral: Negative.     All other systems reviewed and are negative.      Past Medical History:   Diagnosis Date    Anxiety     Asthma     Bipolar disorder     Crohn disease     Depression     Hypertension     Schizoaffective disorder        Allergies   Allergen Reactions    Imuran [Azathioprine] Other (See Comments)     Pt reports this medication caused her to have pancreatitis.        Past Surgical History:   Procedure Laterality Date    APPENDECTOMY       SECTION      CHOLECYSTECTOMY      COLON SURGERY      COLONOSCOPY      MANDIBLE FRACTURE  SURGERY      OVARIAN CYST SURGERY      TUBAL ABDOMINAL LIGATION         Family History   Problem Relation Age of Onset    Diabetes Mother     Anxiety disorder Mother     Depression Mother     Bipolar disorder Mother     COPD Father     Schizophrenia Father     Schizophrenia Paternal Grandfather     Breast cancer Neg Hx        Social History     Socioeconomic History    Marital status:    Tobacco Use    Smoking status: Every Day     Current packs/day: 1.00     Average packs/day: 1 pack/day for 20.0 years (20.0 ttl pk-yrs)     Types: Cigarettes    Smokeless tobacco: Never   Substance and Sexual Activity    Alcohol use: No     Comment: denies    Drug use: Yes     Types: Marijuana    Sexual activity: Never           Objective   Physical Exam  Vitals and nursing note reviewed.   Constitutional:       General: She is not in acute distress.     Appearance: She is well-developed. She is not diaphoretic.   HENT:      Head: Normocephalic and atraumatic.      Right Ear: External ear normal.      Left Ear: External ear normal.      Nose: Nose normal.   Eyes:      Conjunctiva/sclera: Conjunctivae normal.      Pupils: Pupils are equal, round, and reactive to light.   Neck:      Vascular: No JVD.      Trachea: No tracheal deviation.   Cardiovascular:      Rate and Rhythm: Normal rate and regular rhythm.      Heart sounds: Normal heart sounds. No murmur heard.  Pulmonary:      Effort: Pulmonary effort is normal. No respiratory distress.      Breath sounds: Normal breath sounds. No wheezing.   Abdominal:      General: Bowel sounds are normal.      Palpations: Abdomen is soft.      Tenderness: There is no abdominal tenderness in the right upper quadrant, epigastric area and left upper quadrant. There is left CVA tenderness. There is no right CVA tenderness.   Musculoskeletal:         General: No deformity. Normal range of motion.      Cervical back: Normal range of motion and neck supple.   Skin:     General: Skin is warm and  dry.      Coloration: Skin is not pale.      Findings: No erythema or rash.   Neurological:      Mental Status: She is alert and oriented to person, place, and time.      Cranial Nerves: No cranial nerve deficit.   Psychiatric:         Behavior: Behavior normal.         Thought Content: Thought content normal.         Procedures           ED Course  ED Course as of 05/12/24 2040   Sun May 12, 2024   1822 CT Abdomen Pelvis With & Without Contrast [TK]   1823 Lipase(!): 251 [TK]   1832 Paged hospitalist [TK]   1934 Pt has been accepted to hospitalist services by Dr. Sauceda. [TK]   2025 EKG obtained and independently interpreted sinus normal sinus rhythm without acute ischemic findings or arrhythmia  Electronically signed by Antonio Yarbrough MD, 05/12/24, 8:25 PM EDT.   [AS]      ED Course User Index  [AS] Antonio Yarbrough MD  [TK] Florinda Torres PA-C                                   Results for orders placed or performed during the hospital encounter of 05/12/24   Comprehensive Metabolic Panel    Specimen: Arm, Right; Blood   Result Value Ref Range    Glucose 278 (H) 65 - 99 mg/dL    BUN 5 (L) 6 - 20 mg/dL    Creatinine 0.94 0.57 - 1.00 mg/dL    Sodium 138 136 - 145 mmol/L    Potassium 3.5 3.5 - 5.2 mmol/L    Chloride 105 98 - 107 mmol/L    CO2 21.6 (L) 22.0 - 29.0 mmol/L    Calcium 8.8 8.6 - 10.5 mg/dL    Total Protein 6.8 6.0 - 8.5 g/dL    Albumin 3.4 (L) 3.5 - 5.2 g/dL    ALT (SGPT) 16 1 - 33 U/L    AST (SGOT) 23 1 - 32 U/L    Alkaline Phosphatase 74 39 - 117 U/L    Total Bilirubin 0.3 0.0 - 1.2 mg/dL    Globulin 3.4 gm/dL    A/G Ratio 1.0 g/dL    BUN/Creatinine Ratio 5.3 (L) 7.0 - 25.0    Anion Gap 11.4 5.0 - 15.0 mmol/L    eGFR 77.9 >60.0 mL/min/1.73   Lipase    Specimen: Arm, Right; Blood   Result Value Ref Range    Lipase 251 (H) 13 - 60 U/L   Urinalysis With Culture If Indicated - Urine, Clean Catch    Specimen: Urine, Clean Catch   Result Value Ref Range    Color, UA Yellow Yellow, Straw    Appearance,  UA Cloudy (A) Clear    pH, UA 6.5 5.0 - 8.0    Specific Gravity, UA <=1.005 1.005 - 1.030    Glucose,  mg/dL (1+) (A) Negative    Ketones, UA Negative Negative    Bilirubin, UA Negative Negative    Blood, UA Trace (A) Negative    Protein, UA Negative Negative    Leuk Esterase, UA Small (1+) (A) Negative    Nitrite, UA Negative Negative    Urobilinogen, UA 0.2 E.U./dL 0.2 - 1.0 E.U./dL   CBC Auto Differential    Specimen: Arm, Right; Blood   Result Value Ref Range    WBC 8.89 3.40 - 10.80 10*3/mm3    RBC 4.50 3.77 - 5.28 10*6/mm3    Hemoglobin 13.9 12.0 - 15.9 g/dL    Hematocrit 39.2 34.0 - 46.6 %    MCV 87.1 79.0 - 97.0 fL    MCH 30.9 26.6 - 33.0 pg    MCHC 35.5 31.5 - 35.7 g/dL    RDW 13.0 12.3 - 15.4 %    RDW-SD 40.6 37.0 - 54.0 fl    MPV 9.4 6.0 - 12.0 fL    Platelets 308 140 - 450 10*3/mm3    Neutrophil % 44.2 42.7 - 76.0 %    Lymphocyte % 43.9 19.6 - 45.3 %    Monocyte % 8.1 5.0 - 12.0 %    Eosinophil % 2.6 0.3 - 6.2 %    Basophil % 1.0 0.0 - 1.5 %    Immature Grans % 0.2 0.0 - 0.5 %    Neutrophils, Absolute 3.93 1.70 - 7.00 10*3/mm3    Lymphocytes, Absolute 3.90 (H) 0.70 - 3.10 10*3/mm3    Monocytes, Absolute 0.72 0.10 - 0.90 10*3/mm3    Eosinophils, Absolute 0.23 0.00 - 0.40 10*3/mm3    Basophils, Absolute 0.09 0.00 - 0.20 10*3/mm3    Immature Grans, Absolute 0.02 0.00 - 0.05 10*3/mm3    nRBC 0.0 0.0 - 0.2 /100 WBC   C-reactive Protein    Specimen: Arm, Right; Blood   Result Value Ref Range    C-Reactive Protein 0.56 (H) 0.00 - 0.50 mg/dL   Urinalysis, Microscopic Only - Urine, Clean Catch    Specimen: Urine, Clean Catch   Result Value Ref Range    RBC, UA 6-10 (A) None Seen, 0-2 /HPF    WBC, UA 11-20 (A) None Seen, 0-2 /HPF    Bacteria, UA 2+ (A) None Seen /HPF    Squamous Epithelial Cells, UA 7-12 (A) None Seen, 0-2 /HPF    Hyaline Casts, UA None Seen None Seen /LPF    Methodology Automated Microscopy    Magnesium    Specimen: Arm, Right; Blood   Result Value Ref Range    Magnesium 1.7 1.6 - 2.6 mg/dL    Hemoglobin A1c    Specimen: Arm, Right; Blood   Result Value Ref Range    Hemoglobin A1C 6.90 (H) 4.80 - 5.60 %   ECG 12 Lead QT Measurement   Result Value Ref Range    QT Interval 376 ms    QTC Interval 436 ms   Green Top (Gel)   Result Value Ref Range    Extra Tube Hold for add-ons.    Lavender Top   Result Value Ref Range    Extra Tube hold for add-on    Gold Top - SST   Result Value Ref Range    Extra Tube Hold for add-ons.    Light Blue Top   Result Value Ref Range    Extra Tube Hold for add-ons.        CT Abdomen Pelvis With & Without Contrast   Final Result       Findings may represent acute pancreatitis. Correlation with lab values.        This report was finalized on 5/12/2024 6:14 PM by Titus Martinez MD.                        Medical Decision Making  43-year-old female with past medical history of anxiety, asthma, bipolar disorder, Crohn's, depression, hypertension, schizoaffective disorder, and pancreatitis presents to the emergency room with left flank pain which radiates to her back.  Patient states the pain that she is feeling feels consistent with prior episodes of pancreatitis which she has had before.  She does report nausea and vomiting.  Denies dysuria, hematuria, or decreased urination.  Patient states she was seen at our facility 5 days ago and since that time has continued to have abdominal pain and has been unable to sleep secondary to the pain and nausea.  She denies any fever, chills, or bodyaches.  She denies any alcohol use.  She states that prior episodes of pancreatitis was thought to be secondary to medications.  Denies any alleviating factors.  Denies any other complaints or concerns at this time.      Problems Addressed:  Acute pancreatitis, unspecified complication status, unspecified pancreatitis type: complicated acute illness or injury    Amount and/or Complexity of Data Reviewed  Labs: ordered. Decision-making details documented in ED Course.  Radiology: ordered.  Decision-making details documented in ED Course.  Discussion of management or test interpretation with external provider(s): Sohail - hospitalist    Risk  Prescription drug management.  Decision regarding hospitalization.        Final diagnoses:   Acute pancreatitis, unspecified complication status, unspecified pancreatitis type       ED Disposition  ED Disposition       ED Disposition   Decision to Admit    Condition   --    Comment   Level of Care: Med/Surg [1]   Diagnosis: Acute pancreatitis [577.0.ICD-9-CM]   Admitting Physician: JOSESITO ELY [[id number]]   Attending Physician: JOSESITO ELY [[id number]]   Certification: I Certify That Inpatient Hospital Services Are Medically Necessary For Greater Than 2 Midnights                 No follow-up provider specified.       Medication List      No changes were made to your prescriptions during this visit.            Florinda Torres PA-C  05/12/24 2040

## 2024-05-13 LAB
ALBUMIN SERPL-MCNC: 2.9 G/DL (ref 3.5–5.2)
ALBUMIN/GLOB SERPL: 1 G/DL
ALP SERPL-CCNC: 62 U/L (ref 39–117)
ALT SERPL W P-5'-P-CCNC: 17 U/L (ref 1–33)
ANION GAP SERPL CALCULATED.3IONS-SCNC: 6.9 MMOL/L (ref 5–15)
AST SERPL-CCNC: 27 U/L (ref 1–32)
BASOPHILS # BLD AUTO: 0.09 10*3/MM3 (ref 0–0.2)
BASOPHILS NFR BLD AUTO: 1.1 % (ref 0–1.5)
BILIRUB SERPL-MCNC: 0.2 MG/DL (ref 0–1.2)
BUN SERPL-MCNC: 5 MG/DL (ref 6–20)
BUN/CREAT SERPL: 5.4 (ref 7–25)
CALCIUM SPEC-SCNC: 8.3 MG/DL (ref 8.6–10.5)
CHLORIDE SERPL-SCNC: 109 MMOL/L (ref 98–107)
CO2 SERPL-SCNC: 21.1 MMOL/L (ref 22–29)
CREAT SERPL-MCNC: 0.92 MG/DL (ref 0.57–1)
DEPRECATED RDW RBC AUTO: 44.2 FL (ref 37–54)
EGFRCR SERPLBLD CKD-EPI 2021: 79.9 ML/MIN/1.73
EOSINOPHIL # BLD AUTO: 0.27 10*3/MM3 (ref 0–0.4)
EOSINOPHIL NFR BLD AUTO: 3.3 % (ref 0.3–6.2)
ERYTHROCYTE [DISTWIDTH] IN BLOOD BY AUTOMATED COUNT: 13.3 % (ref 12.3–15.4)
GLOBULIN UR ELPH-MCNC: 2.8 GM/DL
GLUCOSE BLDC GLUCOMTR-MCNC: 106 MG/DL (ref 70–130)
GLUCOSE BLDC GLUCOMTR-MCNC: 115 MG/DL (ref 70–130)
GLUCOSE BLDC GLUCOMTR-MCNC: 119 MG/DL (ref 70–130)
GLUCOSE BLDC GLUCOMTR-MCNC: 124 MG/DL (ref 70–130)
GLUCOSE SERPL-MCNC: 135 MG/DL (ref 65–99)
HCT VFR BLD AUTO: 34.3 % (ref 34–46.6)
HGB BLD-MCNC: 11.4 G/DL (ref 12–15.9)
IMM GRANULOCYTES # BLD AUTO: 0.03 10*3/MM3 (ref 0–0.05)
IMM GRANULOCYTES NFR BLD AUTO: 0.4 % (ref 0–0.5)
LIPASE SERPL-CCNC: 116 U/L (ref 13–60)
LYMPHOCYTES # BLD AUTO: 4.14 10*3/MM3 (ref 0.7–3.1)
LYMPHOCYTES NFR BLD AUTO: 51.4 % (ref 19.6–45.3)
MAGNESIUM SERPL-MCNC: 2.9 MG/DL (ref 1.6–2.6)
MCH RBC QN AUTO: 30.4 PG (ref 26.6–33)
MCHC RBC AUTO-ENTMCNC: 33.2 G/DL (ref 31.5–35.7)
MCV RBC AUTO: 91.5 FL (ref 79–97)
MONOCYTES # BLD AUTO: 0.67 10*3/MM3 (ref 0.1–0.9)
MONOCYTES NFR BLD AUTO: 8.3 % (ref 5–12)
NEUTROPHILS NFR BLD AUTO: 2.86 10*3/MM3 (ref 1.7–7)
NEUTROPHILS NFR BLD AUTO: 35.5 % (ref 42.7–76)
NRBC BLD AUTO-RTO: 0 /100 WBC (ref 0–0.2)
PLATELET # BLD AUTO: 243 10*3/MM3 (ref 140–450)
PMV BLD AUTO: 9.7 FL (ref 6–12)
POTASSIUM SERPL-SCNC: 4.3 MMOL/L (ref 3.5–5.2)
POTASSIUM SERPL-SCNC: 4.3 MMOL/L (ref 3.5–5.2)
PROT SERPL-MCNC: 5.7 G/DL (ref 6–8.5)
RBC # BLD AUTO: 3.75 10*6/MM3 (ref 3.77–5.28)
SODIUM SERPL-SCNC: 137 MMOL/L (ref 136–145)
WBC NRBC COR # BLD AUTO: 8.06 10*3/MM3 (ref 3.4–10.8)

## 2024-05-13 PROCEDURE — 84132 ASSAY OF SERUM POTASSIUM: CPT | Performed by: HOSPITALIST

## 2024-05-13 PROCEDURE — 25010000002 ONDANSETRON PER 1 MG: Performed by: HOSPITALIST

## 2024-05-13 PROCEDURE — 25010000002 HYDROMORPHONE PER 4 MG: Performed by: HOSPITALIST

## 2024-05-13 PROCEDURE — 36410 VNPNXR 3YR/> PHY/QHP DX/THER: CPT

## 2024-05-13 PROCEDURE — 25810000003 SODIUM CHLORIDE 0.9 % SOLUTION: Performed by: HOSPITALIST

## 2024-05-13 PROCEDURE — 80053 COMPREHEN METABOLIC PANEL: CPT | Performed by: HOSPITALIST

## 2024-05-13 PROCEDURE — 99232 SBSQ HOSP IP/OBS MODERATE 35: CPT

## 2024-05-13 PROCEDURE — C1751 CATH, INF, PER/CENT/MIDLINE: HCPCS

## 2024-05-13 PROCEDURE — 82948 REAGENT STRIP/BLOOD GLUCOSE: CPT

## 2024-05-13 PROCEDURE — 83690 ASSAY OF LIPASE: CPT | Performed by: HOSPITALIST

## 2024-05-13 PROCEDURE — 83735 ASSAY OF MAGNESIUM: CPT | Performed by: HOSPITALIST

## 2024-05-13 PROCEDURE — 85025 COMPLETE CBC W/AUTO DIFF WBC: CPT | Performed by: HOSPITALIST

## 2024-05-13 RX ORDER — FLUCONAZOLE 150 MG/1
150 TABLET ORAL ONCE
Status: COMPLETED | OUTPATIENT
Start: 2024-05-13 | End: 2024-05-13

## 2024-05-13 RX ORDER — SODIUM CHLORIDE 0.9 % (FLUSH) 0.9 %
10 SYRINGE (ML) INJECTION EVERY 12 HOURS SCHEDULED
Status: DISCONTINUED | OUTPATIENT
Start: 2024-05-13 | End: 2024-05-24 | Stop reason: HOSPADM

## 2024-05-13 RX ORDER — SODIUM CHLORIDE 0.9 % (FLUSH) 0.9 %
10 SYRINGE (ML) INJECTION AS NEEDED
Status: DISCONTINUED | OUTPATIENT
Start: 2024-05-13 | End: 2024-05-24 | Stop reason: HOSPADM

## 2024-05-13 RX ORDER — SODIUM CHLORIDE 9 MG/ML
40 INJECTION, SOLUTION INTRAVENOUS AS NEEDED
Status: DISCONTINUED | OUTPATIENT
Start: 2024-05-13 | End: 2024-05-24 | Stop reason: HOSPADM

## 2024-05-13 RX ADMIN — ONDANSETRON 4 MG: 2 INJECTION INTRAMUSCULAR; INTRAVENOUS at 03:19

## 2024-05-13 RX ADMIN — HYDROMORPHONE HYDROCHLORIDE 0.5 MG: 1 INJECTION, SOLUTION INTRAMUSCULAR; INTRAVENOUS; SUBCUTANEOUS at 23:50

## 2024-05-13 RX ADMIN — SODIUM CHLORIDE 125 ML/HR: 9 INJECTION, SOLUTION INTRAVENOUS at 08:10

## 2024-05-13 RX ADMIN — Medication 10 ML: at 20:17

## 2024-05-13 RX ADMIN — HYDROMORPHONE HYDROCHLORIDE 0.5 MG: 1 INJECTION, SOLUTION INTRAMUSCULAR; INTRAVENOUS; SUBCUTANEOUS at 12:01

## 2024-05-13 RX ADMIN — HYDROMORPHONE HYDROCHLORIDE 0.5 MG: 1 INJECTION, SOLUTION INTRAMUSCULAR; INTRAVENOUS; SUBCUTANEOUS at 08:10

## 2024-05-13 RX ADMIN — ONDANSETRON 4 MG: 2 INJECTION INTRAMUSCULAR; INTRAVENOUS at 17:39

## 2024-05-13 RX ADMIN — ONDANSETRON 4 MG: 2 INJECTION INTRAMUSCULAR; INTRAVENOUS at 10:01

## 2024-05-13 RX ADMIN — HYDROMORPHONE HYDROCHLORIDE 0.5 MG: 1 INJECTION, SOLUTION INTRAMUSCULAR; INTRAVENOUS; SUBCUTANEOUS at 17:41

## 2024-05-13 RX ADMIN — HYDROMORPHONE HYDROCHLORIDE 0.5 MG: 1 INJECTION, SOLUTION INTRAMUSCULAR; INTRAVENOUS; SUBCUTANEOUS at 05:07

## 2024-05-13 RX ADMIN — HYDROMORPHONE HYDROCHLORIDE 0.5 MG: 1 INJECTION, SOLUTION INTRAMUSCULAR; INTRAVENOUS; SUBCUTANEOUS at 15:02

## 2024-05-13 RX ADMIN — SODIUM CHLORIDE 125 ML/HR: 9 INJECTION, SOLUTION INTRAVENOUS at 17:39

## 2024-05-13 RX ADMIN — CETIRIZINE HYDROCHLORIDE 10 MG: 10 TABLET, FILM COATED ORAL at 08:10

## 2024-05-13 RX ADMIN — METOPROLOL SUCCINATE 200 MG: 50 TABLET, EXTENDED RELEASE ORAL at 08:10

## 2024-05-13 RX ADMIN — POTASSIUM CHLORIDE 40 MEQ: 1500 TABLET, EXTENDED RELEASE ORAL at 01:27

## 2024-05-13 RX ADMIN — HYDROMORPHONE HYDROCHLORIDE 0.5 MG: 1 INJECTION, SOLUTION INTRAMUSCULAR; INTRAVENOUS; SUBCUTANEOUS at 01:27

## 2024-05-13 RX ADMIN — FLUCONAZOLE 150 MG: 150 TABLET ORAL at 12:01

## 2024-05-13 RX ADMIN — CARIPRAZINE 3 MG: 3 CAPSULE, GELATIN COATED ORAL at 08:10

## 2024-05-13 RX ADMIN — FLUOXETINE HYDROCHLORIDE 10 MG: 10 CAPSULE ORAL at 08:10

## 2024-05-13 RX ADMIN — Medication 10 ML: at 08:10

## 2024-05-13 NOTE — CASE MANAGEMENT/SOCIAL WORK
Discharge Planning Assessment  [facility]     Patient Name: Manda Al  MRN: [id number]  Today's Date: 5/12/2024    Admit Date: 5/12/2024    Plan: Pt lives at home with her mom Itzel and plans to return home at discharge her mom provides her transportation. Pcp is Dr Rivera, she uses [facility] and has [facility]. Pt is independent with adl's and does not use any dme, home health or home o2.   Discharge Needs Assessment       Row Name 05/12/24 2036       Living Environment    People in Home parent(s)    Current Living Arrangements home    Potentially Unsafe Housing Conditions none    Primary Care Provided by self    Family Caregiver if Needed none    Quality of Family Relationships helpful;involved;supportive    Able to Return to Prior Arrangements yes       Resource/Environmental Concerns    Resource/Environmental Concerns none       Transition Planning    Patient/Family Anticipates Transition to home with family    Patient/Family Anticipated Services at Transition none    Transportation Anticipated family or friend will provide       Discharge Needs Assessment    Readmission Within the Last 30 Days no previous admission in last 30 days    Equipment Currently Used at Home none    Concerns to be Addressed no discharge needs identified;denies needs/concerns at this time    Anticipated Changes Related to Illness none    Equipment Needed After Discharge none                   Discharge Plan       Row Name 05/12/24 2037       Plan    Plan Pt lives at home with her mom Itzel and plans to return home at discharge her mom provides her transportation. Pcp is Dr Rivera, she uses [facility] and has [facility]. Pt is independent with adl's and does not use any dme, home health or home o2.    Patient/Family in Agreement with Plan yes                  Continued Care and Services - Admitted Since 5/12/2024    No active coordination exists for this encounter.       Expected Discharge Date and Time        Expected Discharge Date Expected Discharge Time    May 14, 2024            Demographic Summary       Row Name 05/12/24 2036       General Information    Admission Type inpatient    Arrived From home    Referral Source emergency department    Reason for Consult discharge planning                    Laura Nuñez RN

## 2024-05-13 NOTE — PLAN OF CARE
Goal Outcome Evaluation:  Plan of Care Reviewed With: patient        Progress: no change     Pt was admitted via er. Pt is resting well no acute changes at this time will continue to monitor and follow current plan of care.

## 2024-05-13 NOTE — PROGRESS NOTES
Patient Identification:  Name:  Manda Al  Age:  43 y.o.  Sex:  female  :  1980  MRN:  [id number]  Visit Number:  [id number]  Primary Care Provider:  Mi Rivera MD    Length of stay:  1    Subjective/Interval History/Consultants/Procedures     Chief Complaint:   Chief Complaint   Patient presents with    Flank Pain       Subjective/Interval History:    43 y.o. female who was admitted on 2024 with acute pancreatitis     PMH is significant for anxiety, schizoaffective disorder- bipolar type, HTN, Crohn's disease   For complete admission information, please see history and physical.     Consultations:      Procedures/Scans:  CT abdomen and pelvis w, w/o contrast       Today, the patient was seen and examined sitting up in bed in no distress. She reports feeling improved this AM and abdominal/back pain controlled with current regimen. Denied any desire to eat or advance diet. She denied N/V. No dysuria or difficulty with urination but did report itching consistent with past yeast infections. She denies any alcohol use.      Room location at the time of evaluation was 340b.    ----------------------------------------------------------------------------------------------------------------------  Current Hospital Meds:  Cariprazine HCl, 3 mg, Oral, Daily  cetirizine, 10 mg, Oral, Daily  FLUoxetine, 10 mg, Oral, Daily  insulin regular, 2-7 Units, Subcutaneous, Q6H  metoprolol succinate XL, 200 mg, Oral, Daily  sodium chloride, 10 mL, Intravenous, Q12H      sodium chloride, 125 mL/hr, Last Rate: 125 mL/hr (24 0810)      ----------------------------------------------------------------------------------------------------------------------      Objective     Vital Signs:  Temp:  [97.7 °F (36.5 °C)-98.6 °F (37 °C)] 97.7 °F (36.5 °C)  Heart Rate:  [74-88] 76  Resp:  [16-18] 16  BP: (103-155)/(57-95) 103/57      24  1556 24   Weight: 97.1 kg (214 lb) 96.4 kg (212 lb 8.4 oz)      Body mass index is 32.31 kg/m².    Intake/Output Summary (Last 24 hours) at 5/13/2024 1055  Last data filed at 5/13/2024 0900  Gross per 24 hour   Intake 517 ml   Output --   Net 517 ml     No intake/output data recorded.  NPO Diet NPO Type: Ice Chips, Sips with Meds  ----------------------------------------------------------------------------------------------------------------------    Physical Exam  Vitals and nursing note reviewed.   Constitutional:       General: She is not in acute distress.     Appearance: She is obese.   HENT:      Head: Normocephalic and atraumatic.   Cardiovascular:      Rate and Rhythm: Normal rate and regular rhythm.   Pulmonary:      Effort: Pulmonary effort is normal.      Breath sounds: Normal breath sounds.   Abdominal:      Palpations: Abdomen is soft.      Tenderness: There is abdominal tenderness (epigastric).   Musculoskeletal:      Right lower leg: No edema.      Left lower leg: No edema.   Skin:     General: Skin is warm and dry.   Neurological:      Mental Status: She is alert. Mental status is at baseline.   Psychiatric:         Mood and Affect: Mood normal.         Behavior: Behavior normal.                ----------------------------------------------------------------------------------------------------------------------  Tele:        ----------------------------------------------------------------------------------------------------------------------      Results from last 7 days   Lab Units 05/13/24 0645 05/12/24 2021 05/12/24 1648 05/07/24  2326   CRP mg/dL  --   --  0.56*  --    LACTATE mmol/L  --  1.5  --   --    WBC 10*3/mm3 8.06  --  8.89 10.50   HEMOGLOBIN g/dL 11.4*  --  13.9 15.3   HEMATOCRIT % 34.3  --  39.2 42.2   MCV fL 91.5  --  87.1 84.4   MCHC g/dL 33.2  --  35.5 36.3*   PLATELETS 10*3/mm3 243  --  308 282         Results from last 7 days   Lab Units 05/13/24 0645 05/12/24 1648 05/08/24 0557 05/07/24 2047   SODIUM mmol/L 137 138 134* 131*  "  POTASSIUM mmol/L 4.3  4.3 3.5 2.9* 2.7*   MAGNESIUM mg/dL  --  1.7  --  1.7   CHLORIDE mmol/L 109* 105 104 95*   CO2 mmol/L 21.1* 21.6* 20.8* 22.7   BUN mg/dL 5* 5* 6 8   CREATININE mg/dL 0.92 0.94 1.13* 1.41*   CALCIUM mg/dL 8.3* 8.8 7.5* 8.8   GLUCOSE mg/dL 135* 278* 155* 188*   ALBUMIN g/dL 2.9* 3.4* 2.9* 3.4*   BILIRUBIN mg/dL 0.2 0.3 0.2 0.3   ALK PHOS U/L 62 74 65 85   AST (SGOT) U/L 27 23 16 20   ALT (SGPT) U/L 17 16 13 17   Estimated Creatinine Clearance: 96.7 mL/min (by C-G formula based on SCr of 0.92 mg/dL).  No results found for: \"AMMONIA\"      No results found for: \"BLOODCX\"  Urine Culture   Date Value Ref Range Status   05/07/2024 No growth  Final     No results found for: \"WOUNDCX\"  No results found for: \"STOOLCX\"  ----------------------------------------------------------------------------------------------------------------------  Imaging Results (Last 24 Hours)       Procedure Component Value Units Date/Time    CT Abdomen Pelvis With & Without Contrast [[id number]] Collected: 05/12/24 1811     Updated: 05/12/24 1816    Narrative:      Procedure: Helical CT Abdomen and Pelvis examination performed with  axial sections prior to and after administration of non-ionic IV  contrast. Coronal and sagittal 3 mm thick reformats also acquired. CT  scan performed according to ALARA(as low as reasonably achievable)dose  protocol.     Comparison: None available.     History: flank pain; LLQ     Date: 5/12/2024 5:17 PM     Findings:     Liver: Normal liver and size.  No focal lesion.  Gallbladder: Surgically absent.   Spleen: Normal in size.  Pancreas: Atrophy. Mild surrounding edema.  Adrenal glands: No nodules.  Kidneys: Symmetric bilateral renal enhancement is present. No  hydronephrosis.  Peritoneum: There is no free air or abscess.  Bowel: No obstruction. No evidence of inflamed bowel loops. Appendix is  normal.  Mesentery: No adenopathy.  Retroperitoneum: Aorta and inferior vena cava " unremarkable.  Pelvis: Bladder is unremarkable.  Bones: No acute fracture.  Lung bases: Visualized lung bases are negative. Heart is normal in size.       Impression:         Findings may represent acute pancreatitis. Correlation with lab values.      This report was finalized on 5/12/2024 6:14 PM by Titus Martinez MD.             ----------------------------------------------------------------------------------------------------------------------   I have reviewed the above laboratory values for 05/13/24    Assessment/Plan     Active Hospital Problems    Diagnosis  POA    **Acute pancreatitis [K85.90]  Yes         ASSESSMENT/PLAN:    Acute pancreatitis  Continue supportive care measures   Continue IVF replacement   Lipase repeat remains pending   Replace electrolytes as needed per protocol   Home chlorthalidone held on admission given association with pancreatitis.   Repeat labs in the AM     Abnormal UA  Recent UTI, s/p antibiotic course  Received rocephin in the ED. F/u urine culture and treat as indicated. Patient reports recently completed course of bactrim, flagyl in the outpatient setting.  Does report  itching consistent with previous yeast infections. Diflucan x1 dose and repeat in 72 hours if symptoms persist     Chronic:   Anxiety/depression  Schizoaffective disorder- bipolar type  HTN  Crohn's disease  Home meds resumed as indicated  Monitor VS  Chlorthalidone held as above     -----------  -DVT prophylaxis: SCDs  -Disposition plans/anticipated needs: pending course, anticipate return home once clinically improved         The patient is high risk due to the following diagnoses/reasons:  acute pancreatitis         Brenton Alves PA-C  05/13/24  10:55 EDT

## 2024-05-13 NOTE — H&P
Hospitalist History and Physical        Patient Identification  Name: Manda Al  Age/Sex: 43 y.o. female  :  1980        MRN: [id number]  Visit Number: [id number]  Admit Date: 2024   PCP: Mi Rivera MD          Chief complaint abdominal pain, nausea, vomiting    History of Present Illness:  Patient is a 43 y.o. female with history of anxiety, depression, bipolar disorder, asthms, crohn disease, HTN, schizoaffective disorder, and recurrent episodes of pancreatitis along with type II DM who presents with complaints of epigastric abdominal pain, nausea and vomiting for nearly a week now. She reports 4 episodes of pancreatitis in the past--the first time due to gallstones, the second and third due to medication (imuran for one episode, the other she cannot remember but she thinks it was an immunologic medication for crohn disease as well), and the fourth time no source was discovered. She initially presented to our ED on  with her above symptoms. She had been recently diagnosed with a UTI and started on bactrim by her PCP. She was concerned that she may have pancreatitis again. Lipase at that time was only 120 and CT reportedly showed mild enteritis and mesenteric adenitis, without evidence of pancreatitis. She received IV and oral potassium replacement along with I dilaudid and this was discharged home without any prescriptions. She reports she has had worsening epigastric abdominal pain as well as worsening nausea and vomiting since then. She describes the pain as gnawing and radiating to both sides of her upper abdomen as well as to her back. In the ED today, vitals were fairly stable. Labs showed K+ 3.5, mag 1.7, bicarb 21.6, anion gap normal, BUN 5, Cr 0.94, glucose 278, A1c 6.9, CRP 0.56, lactate 1.5, lipase 257, CBC unremarkable, and UA showed small leuk esterase, 6-10 RBC, 11-20 WBC (up from 6-10 5/7), 2+ bacteria (up from trace /), but still with 7-12 squamous epithelial cells.  Patient reports her urinary symptoms have resolved since completing her antibiotic. Urine culture is pending. CT abdomen/pelvis showed findings that may represent acute pancreatitis on this occasion.     Review of Systems  Review of Systems   Constitutional:  Positive for appetite change and fatigue. Negative for activity change, chills and fever.   HENT:  Negative for congestion, postnasal drip, rhinorrhea, sinus pressure, sinus pain and sore throat.    Eyes:  Negative for photophobia and visual disturbance.   Respiratory:  Negative for cough, shortness of breath and wheezing.    Cardiovascular:  Negative for chest pain, palpitations and leg swelling.   Gastrointestinal:  Positive for abdominal distention, nausea and vomiting. Negative for abdominal pain, constipation and diarrhea.   Genitourinary:  Negative for difficulty urinating, dysuria, flank pain, frequency and hematuria.   Musculoskeletal:  Negative for arthralgias, back pain, joint swelling and myalgias.   Skin:  Negative for color change, pallor, rash and wound.   Neurological:  Positive for weakness (generalized). Negative for dizziness, seizures, syncope, light-headedness, numbness and headaches.   Hematological:  Negative for adenopathy. Does not bruise/bleed easily.   Psychiatric/Behavioral:  Negative for agitation, behavioral problems and confusion.        History  Past Medical History:   Diagnosis Date    Anxiety     Asthma     Bipolar disorder     Crohn disease     Depression     Hypertension     Schizoaffective disorder      Past Surgical History:   Procedure Laterality Date    APPENDECTOMY       SECTION      CHOLECYSTECTOMY      COLON SURGERY      COLONOSCOPY      MANDIBLE FRACTURE SURGERY      OVARIAN CYST SURGERY      TUBAL ABDOMINAL LIGATION       Family History   Problem Relation Age of Onset    Diabetes Mother     Anxiety disorder Mother     Depression Mother     Bipolar disorder Mother     COPD Father     Schizophrenia Father      Schizophrenia Paternal Grandfather     Breast cancer Neg Hx      Social History     Tobacco Use    Smoking status: Every Day     Current packs/day: 1.00     Average packs/day: 1 pack/day for 20.0 years (20.0 ttl pk-yrs)     Types: Cigarettes    Smokeless tobacco: Never   Substance Use Topics    Alcohol use: No     Comment: denies    Drug use: Yes     Types: Marijuana     (Not in a hospital admission)    Allergies:  Imuran [azathioprine]    Objective     Vital Signs  Temp:  [97.7 °F (36.5 °C)] 97.7 °F (36.5 °C)  Heart Rate:  [74-88] 78  Resp:  [17] 17  BP: (123-153)/(92-95) 123/92  Body mass index is 32.54 kg/m².    Physical Exam:  Physical Exam  Constitutional:       General: She is not in acute distress.     Appearance: Normal appearance. She is ill-appearing.   HENT:      Head: Normocephalic and atraumatic.      Right Ear: External ear normal.      Left Ear: External ear normal.      Nose: Nose normal.      Mouth/Throat:      Mouth: Mucous membranes are dry.      Pharynx: Oropharynx is clear.   Eyes:      Extraocular Movements: Extraocular movements intact.      Conjunctiva/sclera: Conjunctivae normal.      Pupils: Pupils are equal, round, and reactive to light.   Cardiovascular:      Rate and Rhythm: Normal rate and regular rhythm.      Pulses: Normal pulses.      Heart sounds: Normal heart sounds. No murmur heard.  Pulmonary:      Effort: Pulmonary effort is normal. No respiratory distress.      Breath sounds: Normal breath sounds. No wheezing or rales.   Abdominal:      General: Abdomen is flat. Bowel sounds are normal. There is no distension.      Palpations: Abdomen is soft.      Tenderness: There is abdominal tenderness (epigastric region).   Musculoskeletal:         General: Normal range of motion.      Cervical back: Normal range of motion and neck supple. No tenderness.      Right lower leg: No edema.      Left lower leg: No edema.   Lymphadenopathy:      Cervical: No cervical adenopathy.   Skin:      General: Skin is warm and dry.      Capillary Refill: Capillary refill takes less than 2 seconds.      Coloration: Skin is not jaundiced.      Findings: No bruising or lesion.   Neurological:      General: No focal deficit present.      Mental Status: She is alert and oriented to person, place, and time.   Psychiatric:         Mood and Affect: Mood normal.         Behavior: Behavior normal.           Results Review:       Lab Results:  Results from last 7 days   Lab Units 05/12/24  1648 05/07/24  2326 05/07/24 2047   WBC 10*3/mm3 8.89 10.50 9.79   HEMOGLOBIN g/dL 13.9 15.3 15.3   PLATELETS 10*3/mm3 308 282 280     Results from last 7 days   Lab Units 05/12/24  1648   CRP mg/dL 0.56*     Results from last 7 days   Lab Units 05/12/24 1648 05/08/24  0557 05/07/24 2047   SODIUM mmol/L 138 134* 131*   POTASSIUM mmol/L 3.5 2.9* 2.7*   CHLORIDE mmol/L 105 104 95*   CO2 mmol/L 21.6* 20.8* 22.7   BUN mg/dL 5* 6 8   CREATININE mg/dL 0.94 1.13* 1.41*   CALCIUM mg/dL 8.8 7.5* 8.8   GLUCOSE mg/dL 278* 155* 188*     Results from last 7 days   Lab Units 05/12/24  1648 05/07/24 2047   MAGNESIUM mg/dL 1.7 1.7     Hemoglobin A1C   Date Value Ref Range Status   05/12/2024 6.90 (H) 4.80 - 5.60 % Final     Results from last 7 days   Lab Units 05/12/24 1648 05/08/24  0557 05/07/24 2047   BILIRUBIN mg/dL 0.3 0.2 0.3   ALK PHOS U/L 74 65 85   AST (SGOT) U/L 23 16 20   ALT (SGPT) U/L 16 13 17                       I have reviewed the patient's laboratory results.    Imaging:  Imaging Results (Last 72 Hours)       Procedure Component Value Units Date/Time    CT Abdomen Pelvis With & Without Contrast [[id number]] Collected: 05/12/24 1811     Updated: 05/12/24 1816    Narrative:      Procedure: Helical CT Abdomen and Pelvis examination performed with  axial sections prior to and after administration of non-ionic IV  contrast. Coronal and sagittal 3 mm thick reformats also acquired. CT  scan performed according to ALARA(as low as  reasonably achievable)dose  protocol.     Comparison: None available.     History: flank pain; LLQ     Date: 5/12/2024 5:17 PM     Findings:     Liver: Normal liver and size.  No focal lesion.  Gallbladder: Surgically absent.   Spleen: Normal in size.  Pancreas: Atrophy. Mild surrounding edema.  Adrenal glands: No nodules.  Kidneys: Symmetric bilateral renal enhancement is present. No  hydronephrosis.  Peritoneum: There is no free air or abscess.  Bowel: No obstruction. No evidence of inflamed bowel loops. Appendix is  normal.  Mesentery: No adenopathy.  Retroperitoneum: Aorta and inferior vena cava unremarkable.  Pelvis: Bladder is unremarkable.  Bones: No acute fracture.  Lung bases: Visualized lung bases are negative. Heart is normal in size.       Impression:         Findings may represent acute pancreatitis. Correlation with lab values.      This report was finalized on 5/12/2024 6:14 PM by Titus Martinez MD.               I have personally reviewed the patient's radiologic imaging.        EKG:   Normal sinus rhythm, HR 81, QTc 436  Junctional ST depression, probably normal  Borderline ECG  When compared with ECG of 08-MAY-2024 02:17,  QT has shortened    I have personally reviewed the patient's EKG. No overt ST changes appreciated.         Assessment & Plan     - Acute pancreatitis: admit to medical surgical unit. Treat supportively with bowel rest, NPO status except ice chips, IV analgesics and antiemetics, and IV fluid hydration. Repeat lipase in the morning. Start advancing diet as patient tolerates in the morning. As for etiology, patient's prelim med list includes chlorthalidone which can be associated with pancreatitis. If this is medication is confirmed, would recommend discontinuing.   - Borderline low K+: 3.5, up from 2.7-2.9 when in our ED last week. Replace per protocol. Mag borderline low at 1.7 so will replace it as well.   - Abnormal UA: worse than on 5/7, with 11-20 WBC and 2+ bacteria now, but  still with 7-12 squamous epithelial cells so could just be from contamination. Patient states she has been on period recently as well which could be contributing. No urinary symptoms at this time. Received IV rocephin in the ED. Will follow up urine culture and if positive, then will continue rocephin further.   - Bipolar disorder, schizoaffective disorder: review home meds once confirmed by pharmacy.   - Type II DM, non insulin dependent: hold metformin. Monitor accuchecks, cover with SSI for now.     DVT Prophylaxis: SCDs    Estimated Length of Stay >2 midnights    I discussed the patient's findings, assessment and plan with the patient and ED provider Florinda Torres PA-C    Patient is high risk due to acute pancreatitis requiring parenteral opioids for pain control.    Adin Sauceda MD  05/12/24  20:43 EDT

## 2024-05-13 NOTE — PAYOR COMM NOTE
"CONTACT: AGATA ROA RN  UTILIZATION MANAGEMENT DEPT.  [facility]  [address] [place] [address]  PHONE: [phone]  FAX: [phone]      INPATIENT AUTH REQUEST    Aminata Al (43 y.o. Female)       Date of Birth   1980    Social Security Number       Address   [address] [place] [id number]    Home Phone   [phone]    MRN   [id number]       Baptism   None    Marital Status                               Admission Date   24    Admission Type   Emergency    Admitting Provider   Adin Sauceda MD    Attending Provider   Rohan Yee MD    Department, Room/Bed   [address], [facility]/2S       Discharge Date       Discharge Disposition       Discharge Destination                                 Attending Provider: Rohan Yee MD    Allergies: Imuran [Azathioprine]    Isolation: None   Infection: None   Code Status: CPR    Ht: 172.7 cm (68\")   Wt: 96.4 kg (212 lb 8.4 oz)    Admission Cmt: None   Principal Problem: Acute pancreatitis [K85.90]                   Active Insurance as of 2024       Primary Coverage       Payor Plan Insurance Group Employer/Plan Group    [employer] [place] [employer] [place]ICA[place]        Payor Plan Address Payor Plan Phone Number Payor Plan Fax Number Effective Dates    [address] [address] [phone]  2016 - None Entered    [facility] [id number]         Subscriber Name Subscriber Birth Date Member ID       AMINATA AL 1980 [id number]                     Emergency Contacts        (Rel.) Home Phone Work Phone Mobile Phone    NessItzel garcia (Mother) -- -- [phone]    Tran Yee (Relative) [phone] -- --                 History & Physical        Adin Sauceda MD at 24 2043          Hospitalist History and Physical        Patient Identification  Name: Aminata Al  Age/Sex: 43 y.o. female  :  1980        MRN: [id number]  Visit Number: " [id number]  Admit Date: 5/12/2024   PCP: Mi Rivera MD          Chief complaint abdominal pain, nausea, vomiting    History of Present Illness:  Patient is a 43 y.o. female with history of anxiety, depression, bipolar disorder, asthms, crohn disease, HTN, schizoaffective disorder, and recurrent episodes of pancreatitis along with type II DM who presents with complaints of epigastric abdominal pain, nausea and vomiting for nearly a week now. She reports 4 episodes of pancreatitis in the past--the first time due to gallstones, the second and third due to medication (imuran for one episode, the other she cannot remember but she thinks it was an immunologic medication for crohn disease as well), and the fourth time no source was discovered. She initially presented to our ED on 5/7 with her above symptoms. She had been recently diagnosed with a UTI and started on bactrim by her PCP. She was concerned that she may have pancreatitis again. Lipase at that time was only 120 and CT reportedly showed mild enteritis and mesenteric adenitis, without evidence of pancreatitis. She received IV and oral potassium replacement along with I dilaudid and this was discharged home without any prescriptions. She reports she has had worsening epigastric abdominal pain as well as worsening nausea and vomiting since then. She describes the pain as gnawing and radiating to both sides of her upper abdomen as well as to her back. In the ED today, vitals were fairly stable. Labs showed K+ 3.5, mag 1.7, bicarb 21.6, anion gap normal, BUN 5, Cr 0.94, glucose 278, A1c 6.9, CRP 0.56, lactate 1.5, lipase 257, CBC unremarkable, and UA showed small leuk esterase, 6-10 RBC, 11-20 WBC (up from 6-10 5/7), 2+ bacteria (up from trace 5/7), but still with 7-12 squamous epithelial cells. Patient reports her urinary symptoms have resolved since completing her antibiotic. Urine culture is pending. CT abdomen/pelvis showed findings that may represent acute  pancreatitis on this occasion.     Review of Systems  Review of Systems   Constitutional:  Positive for appetite change and fatigue. Negative for activity change, chills and fever.   HENT:  Negative for congestion, postnasal drip, rhinorrhea, sinus pressure, sinus pain and sore throat.    Eyes:  Negative for photophobia and visual disturbance.   Respiratory:  Negative for cough, shortness of breath and wheezing.    Cardiovascular:  Negative for chest pain, palpitations and leg swelling.   Gastrointestinal:  Positive for abdominal distention, nausea and vomiting. Negative for abdominal pain, constipation and diarrhea.   Genitourinary:  Negative for difficulty urinating, dysuria, flank pain, frequency and hematuria.   Musculoskeletal:  Negative for arthralgias, back pain, joint swelling and myalgias.   Skin:  Negative for color change, pallor, rash and wound.   Neurological:  Positive for weakness (generalized). Negative for dizziness, seizures, syncope, light-headedness, numbness and headaches.   Hematological:  Negative for adenopathy. Does not bruise/bleed easily.   Psychiatric/Behavioral:  Negative for agitation, behavioral problems and confusion.        History  Past Medical History:   Diagnosis Date    Anxiety     Asthma     Bipolar disorder     Crohn disease     Depression     Hypertension     Schizoaffective disorder      Past Surgical History:   Procedure Laterality Date    APPENDECTOMY       SECTION      CHOLECYSTECTOMY      COLON SURGERY      COLONOSCOPY      MANDIBLE FRACTURE SURGERY      OVARIAN CYST SURGERY      TUBAL ABDOMINAL LIGATION       Family History   Problem Relation Age of Onset    Diabetes Mother     Anxiety disorder Mother     Depression Mother     Bipolar disorder Mother     COPD Father     Schizophrenia Father     Schizophrenia Paternal Grandfather     Breast cancer Neg Hx      Social History     Tobacco Use    Smoking status: Every Day     Current packs/day: 1.00     Average  packs/day: 1 pack/day for 20.0 years (20.0 ttl pk-yrs)     Types: Cigarettes    Smokeless tobacco: Never   Substance Use Topics    Alcohol use: No     Comment: denies    Drug use: Yes     Types: Marijuana     (Not in a hospital admission)    Allergies:  Imuran [azathioprine]    Objective    Vital Signs  Temp:  [97.7 °F (36.5 °C)] 97.7 °F (36.5 °C)  Heart Rate:  [74-88] 78  Resp:  [17] 17  BP: (123-153)/(92-95) 123/92  Body mass index is 32.54 kg/m².    Physical Exam:  Physical Exam  Constitutional:       General: She is not in acute distress.     Appearance: Normal appearance. She is ill-appearing.   HENT:      Head: Normocephalic and atraumatic.      Right Ear: External ear normal.      Left Ear: External ear normal.      Nose: Nose normal.      Mouth/Throat:      Mouth: Mucous membranes are dry.      Pharynx: Oropharynx is clear.   Eyes:      Extraocular Movements: Extraocular movements intact.      Conjunctiva/sclera: Conjunctivae normal.      Pupils: Pupils are equal, round, and reactive to light.   Cardiovascular:      Rate and Rhythm: Normal rate and regular rhythm.      Pulses: Normal pulses.      Heart sounds: Normal heart sounds. No murmur heard.  Pulmonary:      Effort: Pulmonary effort is normal. No respiratory distress.      Breath sounds: Normal breath sounds. No wheezing or rales.   Abdominal:      General: Abdomen is flat. Bowel sounds are normal. There is no distension.      Palpations: Abdomen is soft.      Tenderness: There is abdominal tenderness (epigastric region).   Musculoskeletal:         General: Normal range of motion.      Cervical back: Normal range of motion and neck supple. No tenderness.      Right lower leg: No edema.      Left lower leg: No edema.   Lymphadenopathy:      Cervical: No cervical adenopathy.   Skin:     General: Skin is warm and dry.      Capillary Refill: Capillary refill takes less than 2 seconds.      Coloration: Skin is not jaundiced.      Findings: No bruising or  lesion.   Neurological:      General: No focal deficit present.      Mental Status: She is alert and oriented to person, place, and time.   Psychiatric:         Mood and Affect: Mood normal.         Behavior: Behavior normal.           Results Review:       Lab Results:  Results from last 7 days   Lab Units 05/12/24  1648 05/07/24  2326 05/07/24  2047   WBC 10*3/mm3 8.89 10.50 9.79   HEMOGLOBIN g/dL 13.9 15.3 15.3   PLATELETS 10*3/mm3 308 282 280     Results from last 7 days   Lab Units 05/12/24  1648   CRP mg/dL 0.56*     Results from last 7 days   Lab Units 05/12/24  1648 05/08/24  0557 05/07/24  2047   SODIUM mmol/L 138 134* 131*   POTASSIUM mmol/L 3.5 2.9* 2.7*   CHLORIDE mmol/L 105 104 95*   CO2 mmol/L 21.6* 20.8* 22.7   BUN mg/dL 5* 6 8   CREATININE mg/dL 0.94 1.13* 1.41*   CALCIUM mg/dL 8.8 7.5* 8.8   GLUCOSE mg/dL 278* 155* 188*     Results from last 7 days   Lab Units 05/12/24  1648 05/07/24  2047   MAGNESIUM mg/dL 1.7 1.7     Hemoglobin A1C   Date Value Ref Range Status   05/12/2024 6.90 (H) 4.80 - 5.60 % Final     Results from last 7 days   Lab Units 05/12/24 1648 05/08/24  0557 05/07/24  2047   BILIRUBIN mg/dL 0.3 0.2 0.3   ALK PHOS U/L 74 65 85   AST (SGOT) U/L 23 16 20   ALT (SGPT) U/L 16 13 17                       I have reviewed the patient's laboratory results.    Imaging:  Imaging Results (Last 72 Hours)       Procedure Component Value Units Date/Time    CT Abdomen Pelvis With & Without Contrast [[id number]] Collected: 05/12/24 1811     Updated: 05/12/24 1816    Narrative:      Procedure: Helical CT Abdomen and Pelvis examination performed with  axial sections prior to and after administration of non-ionic IV  contrast. Coronal and sagittal 3 mm thick reformats also acquired. CT  scan performed according to ALARA(as low as reasonably achievable)dose  protocol.     Comparison: None available.     History: flank pain; LLQ     Date: 5/12/2024 5:17 PM     Findings:     Liver: Normal liver and size.   No focal lesion.  Gallbladder: Surgically absent.   Spleen: Normal in size.  Pancreas: Atrophy. Mild surrounding edema.  Adrenal glands: No nodules.  Kidneys: Symmetric bilateral renal enhancement is present. No  hydronephrosis.  Peritoneum: There is no free air or abscess.  Bowel: No obstruction. No evidence of inflamed bowel loops. Appendix is  normal.  Mesentery: No adenopathy.  Retroperitoneum: Aorta and inferior vena cava unremarkable.  Pelvis: Bladder is unremarkable.  Bones: No acute fracture.  Lung bases: Visualized lung bases are negative. Heart is normal in size.       Impression:         Findings may represent acute pancreatitis. Correlation with lab values.      This report was finalized on 5/12/2024 6:14 PM by Titus Martinez MD.               I have personally reviewed the patient's radiologic imaging.        EKG:   Normal sinus rhythm, HR 81, QTc 436  Junctional ST depression, probably normal  Borderline ECG  When compared with ECG of 08-MAY-2024 02:17,  QT has shortened    I have personally reviewed the patient's EKG. No overt ST changes appreciated.         Assessment & Plan    - Acute pancreatitis: admit to medical surgical unit. Treat supportively with bowel rest, NPO status except ice chips, IV analgesics and antiemetics, and IV fluid hydration. Repeat lipase in the morning. Start advancing diet as patient tolerates in the morning. As for etiology, patient's prelim med list includes chlorthalidone which can be associated with pancreatitis. If this is medication is confirmed, would recommend discontinuing.   - Borderline low K+: 3.5, up from 2.7-2.9 when in our ED last week. Replace per protocol. Mag borderline low at 1.7 so will replace it as well.   - Abnormal UA: worse than on 5/7, with 11-20 WBC and 2+ bacteria now, but still with 7-12 squamous epithelial cells so could just be from contamination. Patient states she has been on period recently as well which could be contributing. No urinary  symptoms at this time. Received IV rocephin in the ED. Will follow up urine culture and if positive, then will continue rocephin further.   - Bipolar disorder, schizoaffective disorder: review home meds once confirmed by pharmacy.   - Type II DM, non insulin dependent: hold metformin. Monitor accuchecks, cover with SSI for now.     DVT Prophylaxis: SCDs    Estimated Length of Stay >2 midnights    I discussed the patient's findings, assessment and plan with the patient and ED provider Florinda Torres PA-C    Patient is high risk due to acute pancreatitis requiring parenteral opioids for pain control.    Adin Sauceda MD  05/12/24  20:43 EDT      Electronically signed by Adin Sauceda MD at 05/12/24 2106          Emergency Department Notes        Rosi Marcus at 05/12/24 2024          EKG completed @ 19:55 given to Dr. Yarbrough @ 19:57    Electronically signed by Rosi Marcus at 05/12/24 2025       Michael Evans PCT at 05/12/24 1946          Went to get labs patient not in room     Electronically signed by Michael Evans PCT at 05/12/24 1947       Florinda Torres PA-C at 05/12/24 1618          Subjective   History of Present Illness  43-year-old female with past medical history of anxiety, asthma, bipolar disorder, Crohn's, depression, hypertension, schizoaffective disorder, and pancreatitis presents to the emergency room with left flank pain which radiates to her back.  Patient states the pain that she is feeling feels consistent with prior episodes of pancreatitis which she has had before.  She does report nausea and vomiting.  Denies dysuria, hematuria, or decreased urination.  Patient states she was seen at our facility 5 days ago and since that time has continued to have abdominal pain and has been unable to sleep secondary to the pain and nausea.  She denies any fever, chills, or bodyaches.  She denies any alcohol use.  She states that prior episodes of pancreatitis was thought to be  secondary to medications.  Denies any alleviating factors.  Denies any other complaints or concerns at this time.    History provided by:  Patient   used: No        Review of Systems   Constitutional: Negative.  Negative for fever.   HENT: Negative.     Respiratory: Negative.     Cardiovascular: Negative.  Negative for chest pain.   Gastrointestinal:  Positive for abdominal pain, nausea and vomiting.   Endocrine: Negative.    Genitourinary:  Positive for flank pain. Negative for dysuria.   Skin: Negative.    Neurological: Negative.    Psychiatric/Behavioral: Negative.     All other systems reviewed and are negative.      Past Medical History:   Diagnosis Date    Anxiety     Asthma     Bipolar disorder     Crohn disease     Depression     Hypertension     Schizoaffective disorder        Allergies   Allergen Reactions    Imuran [Azathioprine] Other (See Comments)     Pt reports this medication caused her to have pancreatitis.        Past Surgical History:   Procedure Laterality Date    APPENDECTOMY       SECTION      CHOLECYSTECTOMY      COLON SURGERY      COLONOSCOPY      MANDIBLE FRACTURE SURGERY      OVARIAN CYST SURGERY      TUBAL ABDOMINAL LIGATION         Family History   Problem Relation Age of Onset    Diabetes Mother     Anxiety disorder Mother     Depression Mother     Bipolar disorder Mother     COPD Father     Schizophrenia Father     Schizophrenia Paternal Grandfather     Breast cancer Neg Hx        Social History     Socioeconomic History    Marital status:    Tobacco Use    Smoking status: Every Day     Current packs/day: 1.00     Average packs/day: 1 pack/day for 20.0 years (20.0 ttl pk-yrs)     Types: Cigarettes    Smokeless tobacco: Never   Substance and Sexual Activity    Alcohol use: No     Comment: denies    Drug use: Yes     Types: Marijuana    Sexual activity: Never           Objective   Physical Exam  Vitals and nursing note reviewed.   Constitutional:        General: She is not in acute distress.     Appearance: She is well-developed. She is not diaphoretic.   HENT:      Head: Normocephalic and atraumatic.      Right Ear: External ear normal.      Left Ear: External ear normal.      Nose: Nose normal.   Eyes:      Conjunctiva/sclera: Conjunctivae normal.      Pupils: Pupils are equal, round, and reactive to light.   Neck:      Vascular: No JVD.      Trachea: No tracheal deviation.   Cardiovascular:      Rate and Rhythm: Normal rate and regular rhythm.      Heart sounds: Normal heart sounds. No murmur heard.  Pulmonary:      Effort: Pulmonary effort is normal. No respiratory distress.      Breath sounds: Normal breath sounds. No wheezing.   Abdominal:      General: Bowel sounds are normal.      Palpations: Abdomen is soft.      Tenderness: There is no abdominal tenderness in the right upper quadrant, epigastric area and left upper quadrant. There is left CVA tenderness. There is no right CVA tenderness.   Musculoskeletal:         General: No deformity. Normal range of motion.      Cervical back: Normal range of motion and neck supple.   Skin:     General: Skin is warm and dry.      Coloration: Skin is not pale.      Findings: No erythema or rash.   Neurological:      Mental Status: She is alert and oriented to person, place, and time.      Cranial Nerves: No cranial nerve deficit.   Psychiatric:         Behavior: Behavior normal.         Thought Content: Thought content normal.         Procedures          ED Course  ED Course as of 05/12/24 2040   Sun May 12, 2024   1822 CT Abdomen Pelvis With & Without Contrast [TK]   1823 Lipase(!): 251 [TK]   1832 Paged hospitalist [TK]   1934 Pt has been accepted to hospitalist services by Dr. Sauceda. [TK]   2025 EKG obtained and independently interpreted sinus normal sinus rhythm without acute ischemic findings or arrhythmia  Electronically signed by Antonio Yarbrough MD, 05/12/24, 8:25 PM EDT.   [AS]      ED Course User Index  [AS]  Antonio Yarbrough MD  [TK] Florinda Torres PA-C                                   Results for orders placed or performed during the hospital encounter of 05/12/24   Comprehensive Metabolic Panel    Specimen: Arm, Right; Blood   Result Value Ref Range    Glucose 278 (H) 65 - 99 mg/dL    BUN 5 (L) 6 - 20 mg/dL    Creatinine 0.94 0.57 - 1.00 mg/dL    Sodium 138 136 - 145 mmol/L    Potassium 3.5 3.5 - 5.2 mmol/L    Chloride 105 98 - 107 mmol/L    CO2 21.6 (L) 22.0 - 29.0 mmol/L    Calcium 8.8 8.6 - 10.5 mg/dL    Total Protein 6.8 6.0 - 8.5 g/dL    Albumin 3.4 (L) 3.5 - 5.2 g/dL    ALT (SGPT) 16 1 - 33 U/L    AST (SGOT) 23 1 - 32 U/L    Alkaline Phosphatase 74 39 - 117 U/L    Total Bilirubin 0.3 0.0 - 1.2 mg/dL    Globulin 3.4 gm/dL    A/G Ratio 1.0 g/dL    BUN/Creatinine Ratio 5.3 (L) 7.0 - 25.0    Anion Gap 11.4 5.0 - 15.0 mmol/L    eGFR 77.9 >60.0 mL/min/1.73   Lipase    Specimen: Arm, Right; Blood   Result Value Ref Range    Lipase 251 (H) 13 - 60 U/L   Urinalysis With Culture If Indicated - Urine, Clean Catch    Specimen: Urine, Clean Catch   Result Value Ref Range    Color, UA Yellow Yellow, Straw    Appearance, UA Cloudy (A) Clear    pH, UA 6.5 5.0 - 8.0    Specific Gravity, UA <=1.005 1.005 - 1.030    Glucose,  mg/dL (1+) (A) Negative    Ketones, UA Negative Negative    Bilirubin, UA Negative Negative    Blood, UA Trace (A) Negative    Protein, UA Negative Negative    Leuk Esterase, UA Small (1+) (A) Negative    Nitrite, UA Negative Negative    Urobilinogen, UA 0.2 E.U./dL 0.2 - 1.0 E.U./dL   CBC Auto Differential    Specimen: Arm, Right; Blood   Result Value Ref Range    WBC 8.89 3.40 - 10.80 10*3/mm3    RBC 4.50 3.77 - 5.28 10*6/mm3    Hemoglobin 13.9 12.0 - 15.9 g/dL    Hematocrit 39.2 34.0 - 46.6 %    MCV 87.1 79.0 - 97.0 fL    MCH 30.9 26.6 - 33.0 pg    MCHC 35.5 31.5 - 35.7 g/dL    RDW 13.0 12.3 - 15.4 %    RDW-SD 40.6 37.0 - 54.0 fl    MPV 9.4 6.0 - 12.0 fL    Platelets 308 140 - 450 10*3/mm3     Neutrophil % 44.2 42.7 - 76.0 %    Lymphocyte % 43.9 19.6 - 45.3 %    Monocyte % 8.1 5.0 - 12.0 %    Eosinophil % 2.6 0.3 - 6.2 %    Basophil % 1.0 0.0 - 1.5 %    Immature Grans % 0.2 0.0 - 0.5 %    Neutrophils, Absolute 3.93 1.70 - 7.00 10*3/mm3    Lymphocytes, Absolute 3.90 (H) 0.70 - 3.10 10*3/mm3    Monocytes, Absolute 0.72 0.10 - 0.90 10*3/mm3    Eosinophils, Absolute 0.23 0.00 - 0.40 10*3/mm3    Basophils, Absolute 0.09 0.00 - 0.20 10*3/mm3    Immature Grans, Absolute 0.02 0.00 - 0.05 10*3/mm3    nRBC 0.0 0.0 - 0.2 /100 WBC   C-reactive Protein    Specimen: Arm, Right; Blood   Result Value Ref Range    C-Reactive Protein 0.56 (H) 0.00 - 0.50 mg/dL   Urinalysis, Microscopic Only - Urine, Clean Catch    Specimen: Urine, Clean Catch   Result Value Ref Range    RBC, UA 6-10 (A) None Seen, 0-2 /HPF    WBC, UA 11-20 (A) None Seen, 0-2 /HPF    Bacteria, UA 2+ (A) None Seen /HPF    Squamous Epithelial Cells, UA 7-12 (A) None Seen, 0-2 /HPF    Hyaline Casts, UA None Seen None Seen /LPF    Methodology Automated Microscopy    Magnesium    Specimen: Arm, Right; Blood   Result Value Ref Range    Magnesium 1.7 1.6 - 2.6 mg/dL   Hemoglobin A1c    Specimen: Arm, Right; Blood   Result Value Ref Range    Hemoglobin A1C 6.90 (H) 4.80 - 5.60 %   ECG 12 Lead QT Measurement   Result Value Ref Range    QT Interval 376 ms    QTC Interval 436 ms   Green Top (Gel)   Result Value Ref Range    Extra Tube Hold for add-ons.    Lavender Top   Result Value Ref Range    Extra Tube hold for add-on    Gold Top - SST   Result Value Ref Range    Extra Tube Hold for add-ons.    Light Blue Top   Result Value Ref Range    Extra Tube Hold for add-ons.        CT Abdomen Pelvis With & Without Contrast   Final Result       Findings may represent acute pancreatitis. Correlation with lab values.        This report was finalized on 5/12/2024 6:14 PM by Titus Martinez MD.                        Medical Decision Making  43-year-old female with past medical  history of anxiety, asthma, bipolar disorder, Crohn's, depression, hypertension, schizoaffective disorder, and pancreatitis presents to the emergency room with left flank pain which radiates to her back.  Patient states the pain that she is feeling feels consistent with prior episodes of pancreatitis which she has had before.  She does report nausea and vomiting.  Denies dysuria, hematuria, or decreased urination.  Patient states she was seen at our facility 5 days ago and since that time has continued to have abdominal pain and has been unable to sleep secondary to the pain and nausea.  She denies any fever, chills, or bodyaches.  She denies any alcohol use.  She states that prior episodes of pancreatitis was thought to be secondary to medications.  Denies any alleviating factors.  Denies any other complaints or concerns at this time.      Problems Addressed:  Acute pancreatitis, unspecified complication status, unspecified pancreatitis type: complicated acute illness or injury    Amount and/or Complexity of Data Reviewed  Labs: ordered. Decision-making details documented in ED Course.  Radiology: ordered. Decision-making details documented in ED Course.  Discussion of management or test interpretation with external provider(s): Sohail - hospitalist    Risk  Prescription drug management.  Decision regarding hospitalization.        Final diagnoses:   Acute pancreatitis, unspecified complication status, unspecified pancreatitis type       ED Disposition  ED Disposition       ED Disposition   Decision to Admit    Condition   --    Comment   Level of Care: Med/Surg [1]   Diagnosis: Acute pancreatitis [577.0.ICD-9-CM]   Admitting Physician: JOSESITO ELY [[id number]]   Attending Physician: JOSESITO ELY [[id number]]   Certification: I Certify That Inpatient Hospital Services Are Medically Necessary For Greater Than 2 Midnights                 No follow-up provider specified.       Medication List      No changes  were made to your prescriptions during this visit.            Florinda Torres PA-C  05/12/24 2040      Electronically signed by Florinda Torres PA-C at 05/12/24 2040       Facility-Administered Medications as of 5/13/2024   Medication Dose Route Frequency Provider Last Rate Last Admin    sennosides-docusate (PERICOLACE) 8.6-50 MG per tablet 2 tablet  2 tablet Oral BID PRN Adin Sauceda MD        And    polyethylene glycol (MIRALAX) packet 17 g  17 g Oral Daily PRN Adin Sauceda MD        And    bisacodyl (DULCOLAX) EC tablet 5 mg  5 mg Oral Daily PRN Adin Sauceda MD        And    bisacodyl (DULCOLAX) suppository 10 mg  10 mg Rectal Daily PRN Adin Sauceda MD        Cariprazine HCl (VRAYLAR) capsule capsule 3 mg  3 mg Oral Daily Adin Sauceda MD   3 mg at 05/13/24 0810    [COMPLETED] cefTRIAXone (ROCEPHIN) 1,000 mg in sodium chloride 0.9 % 100 mL IVPB-VTB  1,000 mg Intravenous Once Florinda Torres PA-C   Stopped at 05/12/24 1923    cetirizine (zyrTEC) tablet 10 mg  10 mg Oral Daily Adin Sauceda MD   10 mg at 05/13/24 0810    dextrose (D50W) (25 g/50 mL) IV injection 25 g  25 g Intravenous Q15 Min PRN Adin Sauceda MD        dextrose (GLUTOSE) oral gel 15 g  15 g Oral Q15 Min PRN Adin Sauceda MD        FLUoxetine (PROzac) capsule 10 mg  10 mg Oral Daily Adin Sauceda MD   10 mg at 05/13/24 0810    glucagon HCl (Diagnostic) injection 1 mg  1 mg Intramuscular Q15 Min PRN Adin Sauceda MD        [COMPLETED] HYDROmorphone (DILAUDID) injection 0.25 mg  0.25 mg Intravenous Once Neeraj Keith MD   0.25 mg at 05/12/24 1927    HYDROmorphone (DILAUDID) injection 0.5 mg  0.5 mg Intravenous Q3H PRN Adin Sauceda MD   0.5 mg at 05/13/24 0810    insulin regular (humuLIN R,novoLIN R) injection 2-7 Units  2-7 Units Subcutaneous Q6H Adin Sauceda MD   2 Units at 05/12/24 2057    [COMPLETED] iopamidol  (ISOVUE-300) 61 % injection 100 mL  100 mL Intravenous Once in imaging Neeraj Keith MD   85 mL at 05/12/24 1737    Magnesium Cardiology Dose Replacement - Follow Nurse / BPA Driven Protocol   Does not apply PRN Adin Sauceda MD        [COMPLETED] magnesium sulfate 4g/100mL (PREMIX) infusion  4 g Intravenous Once Adin Sauceda MD   4 g at 05/12/24 2243    metoprolol succinate XL (TOPROL-XL) 24 hr tablet 200 mg  200 mg Oral Daily Adin Sauceda MD   200 mg at 05/13/24 0810    [COMPLETED] morphine injection 4 mg  4 mg Intravenous Once Neeraj Keith MD   4 mg at 05/12/24 1644    [COMPLETED] ondansetron (ZOFRAN) injection 4 mg  4 mg Intravenous Once Neeraj Keith MD   4 mg at 05/12/24 1643    ondansetron (ZOFRAN) injection 4 mg  4 mg Intravenous Q6H PRN Adin Sauceda MD   4 mg at 05/13/24 0319    [COMPLETED] potassium chloride (KLOR-CON M20) CR tablet 40 mEq  40 mEq Oral Q4H Adin Sauceda MD   40 mEq at 05/13/24 0127    Potassium Replacement - Follow Nurse / BPA Driven Protocol   Does not apply PRN Adin Sauceda MD        [COMPLETED] sodium chloride 0.9 % bolus 1,000 mL  1,000 mL Intravenous Once Florinda Torres PALamontC   Stopped at 05/12/24 1905    sodium chloride 0.9 % flush 10 mL  10 mL Intravenous PRN Adin Sauceda MD        sodium chloride 0.9 % flush 10 mL  10 mL Intravenous Q12H Adin Sauceda MD   10 mL at 05/13/24 0810    sodium chloride 0.9 % flush 10 mL  10 mL Intravenous PRN Adin Sauceda MD        sodium chloride 0.9 % infusion 40 mL  40 mL Intravenous PRN Adin Sauceda MD        sodium chloride 0.9 % infusion  125 mL/hr Intravenous Continuous Adin Sauceda  mL/hr at 05/13/24 0810 125 mL/hr at 05/13/24 0810     Orders (all)        Start     Ordered    05/13/24 0900  Cariprazine HCl (VRAYLAR) capsule capsule 3 mg  Daily         05/12/24 2152 05/13/24 0900  cetirizine (zyrTEC) tablet 10 mg  Daily      "    05/12/24 2152 05/13/24 0900  FLUoxetine (PROzac) capsule 10 mg  Daily         05/12/24 2152 05/13/24 0900  metoprolol succinate XL (TOPROL-XL) 24 hr tablet 200 mg  Daily         05/12/24 2152 05/13/24 0800  Oral Care  2 Times Daily       05/12/24 2048 05/13/24 0632  Comprehensive Metabolic Panel  Morning Draw         05/12/24 2048 05/13/24 0632  Potassium  Timed         05/12/24 2050 05/13/24 0610  CBC Auto Differential  Morning Draw         05/12/24 2048 05/13/24 0600  Lipase  Morning Draw         05/12/24 2059 05/13/24 0512  POC Glucose Once  PROCEDURE ONCE        Comments: Complete no more than 45 minutes prior to patient eating      05/13/24 0506    05/13/24 0000  Vital Signs  Every 8 Hours        Comments: Per per hospital policy    05/12/24 2048 05/13/24 0000  Strict Intake & Output  Every 6 Hours         05/12/24 2048 05/13/24 0000  Magnesium  Morning Draw         05/12/24 2050 05/12/24 2326  POC Glucose Once  PROCEDURE ONCE        Comments: Complete no more than 45 minutes prior to patient eating      05/12/24 2319 05/12/24 2325  Blood Culture - Blood, Arm, Right  STAT        Placed in \"And\" Linked Group    05/12/24 2304 05/12/24 2305  Blood Culture - Blood, Hand, Left  STAT        Comments: From a different site than #1.     Placed in \"And\" Linked Group    05/12/24 2304 05/12/24 2200  Apply Cold Compress to Affected Area for 20 Minutes  4 Times Daily       05/12/24 2159 05/12/24 2159  Extravasation Standing Orders - IMMEDIATELY STOP INFUSION  Once         05/12/24 2159 05/12/24 2159  Extravasation Standing Orders - DO NOT REMOVE CATHETER / NEEDLE  Once         05/12/24 2159 05/12/24 2159  Extravasation Standing Orders - AVOID PRESSURE  Once         05/12/24 2159 05/12/24 2159  Extravasation Standing Orders - Disconnect the IV Administration Set  Once         05/12/24 2159 05/12/24 2159  Extravasation Standing Orders - Evaluate the Site for " Extravasation of Fluid (Check for Blood Return)  Once         05/12/24 2159 05/12/24 2159  Extravasation Standing Orders - Aspirate as Much of the Medication From the Needle / Cannula As Possible Using A Small (1-5mL) Syringe - Verify Recommended Treatment - If Antidote Does Not Require Infusion Through Extravasated Line Remove Needle / Cannula After Aspiration  Once         05/12/24 2159 05/12/24 2159  Extravasation Standing Orders - David Around the Area With A Pen  Once         05/12/24 2159 05/12/24 2159  Extravasation Standing Orders - Photograph the Area for Medical Record If Indicated Per Photo Policy  Once         05/12/24 2159 05/12/24 2159  Extravasation Standing Orders - Elevate Affected Extremity for 24-48 Hours  Continuous         05/12/24 2159 05/12/24 2159  Notify Provider Prior to Administration of Antidote - Extravasation Standing Orders  Continuous        Comments: Open Order Report to View Parameters Requiring Provider Notification    05/12/24 2159 05/12/24 2159  Notify Provider for Tissue Sloughing, Necrosis or Blistering at Extravasation Site  Continuous        Comments: Open Order Report to View Parameters Requiring Provider Notification    05/12/24 2159 05/12/24 2159  Indicate Extravasated Medication to Determine Antidote to Initiate  Once        Comments: Select infusion extravasated to determine antidote to initiate. (Search by generic name)    05/12/24 2159 05/12/24 2159  No Specific Antidote Recommendations  Once         05/12/24 2159 05/12/24 2145  sodium chloride 0.9 % flush 10 mL  Every 12 Hours Scheduled         05/12/24 2048 05/12/24 2145  sodium chloride 0.9 % infusion  Continuous         05/12/24 2048 05/12/24 2145  magnesium sulfate 4g/100mL (PREMIX) infusion  Once         05/12/24 2050 05/12/24 2145  potassium chloride (KLOR-CON M20) CR tablet 40 mEq  Every 4 Hours         05/12/24 2050 05/12/24 2105  NPO Diet NPO Type: Ice Chips, Sips with  "Meds  Diet Effective Now         05/12/24 2105 05/12/24 2057  ondansetron (ZOFRAN) injection 4 mg  Every 6 Hours PRN         05/12/24 2057 05/12/24 2051  POC Glucose Once  PROCEDURE ONCE        Comments: Complete no more than 45 minutes prior to patient eating      05/12/24 2044 05/12/24 2049  Notify Physician (with Parameters)  Until Discontinued         05/12/24 2048 05/12/24 2049  Insert Peripheral IV  Once         05/12/24 2048 05/12/24 2049  Saline Lock & Maintain IV Access  Continuous         05/12/24 2048 05/12/24 2049  Place Sequential Compression Device  Once         05/12/24 2048 05/12/24 2049  Maintain Sequential Compression Device  Continuous         05/12/24 2048 05/12/24 2049  Continuous Pulse Oximetry  Continuous         05/12/24 2048 05/12/24 2049  Daily Weights  Daily       05/12/24 2048 05/12/24 2049  NPO Diet NPO Type: Ice Chips  Diet Effective Now,   Status:  Canceled         05/12/24 2048 05/12/24 2048  sodium chloride 0.9 % flush 10 mL  As Needed         05/12/24 2048 05/12/24 2048  sodium chloride 0.9 % infusion 40 mL  As Needed         05/12/24 2048 05/12/24 2048  sennosides-docusate (PERICOLACE) 8.6-50 MG per tablet 2 tablet  2 Times Daily PRN        Placed in \"And\" Linked Group    05/12/24 2048 05/12/24 2048  polyethylene glycol (MIRALAX) packet 17 g  Daily PRN        Placed in \"And\" Linked Group    05/12/24 2048 05/12/24 2048  bisacodyl (DULCOLAX) EC tablet 5 mg  Daily PRN        Placed in \"And\" Linked Group    05/12/24 2048 05/12/24 2048  bisacodyl (DULCOLAX) suppository 10 mg  Daily PRN        Placed in \"And\" Linked Group    05/12/24 2048 05/12/24 2000  insulin regular (humuLIN R,novoLIN R) injection 2-7 Units  Every 6 Hours Scheduled         05/12/24 1932 05/12/24 1933  POC Glucose Q6H  Every 6 Hours      Comments: Complete no more than 45 minutes prior to patient eating      05/12/24 1932 05/12/24 1933  Hemoglobin A1c  STAT      " "   05/12/24 1932    05/12/24 1932  Magnesium  STAT         05/12/24 1931 05/12/24 1932  Lactic Acid, Plasma  STAT         05/12/24 1931 05/12/24 1931  dextrose (GLUTOSE) oral gel 15 g  Every 15 Minutes PRN         05/12/24 1932    05/12/24 1931  dextrose (D50W) (25 g/50 mL) IV injection 25 g  Every 15 Minutes PRN         05/12/24 1932 05/12/24 1931  glucagon HCl (Diagnostic) injection 1 mg  Every 15 Minutes PRN         05/12/24 1932    05/12/24 1931  Potassium Replacement - Follow Nurse / BPA Driven Protocol  As Needed         05/12/24 1931 05/12/24 1931  Magnesium Cardiology Dose Replacement - Follow Nurse / BPA Driven Protocol  As Needed         05/12/24 1931 05/12/24 1931  Hospitalist (on-call MD unless specified)  Once        Specialty:  Hospitalist  Provider:  (Not yet assigned)    05/12/24 1930 05/12/24 1930  HYDROmorphone (DILAUDID) injection 0.5 mg  Every 3 Hours PRN         05/12/24 1930    05/12/24 1929  Inpatient Admission  Once         05/12/24 1930 05/12/24 1929  Code Status and Medical Interventions:  Continuous         05/12/24 1930 05/12/24 1928  Blood Culture - Blood,  Once,   Status:  Canceled        Placed in \"And\" Linked Group    05/12/24 1927 05/12/24 1928  Blood Culture - Blood, Arm, Left  Once        Comments: From a different site than #1.     Placed in \"And\" Linked Group    05/12/24 1927 05/12/24 1928  ECG 12 Lead QT Measurement  STAT         05/12/24 1927 05/12/24 1926  HYDROmorphone (DILAUDID) injection 0.25 mg  Once         05/12/24 1910 05/12/24 1850  cefTRIAXone (ROCEPHIN) 1,000 mg in sodium chloride 0.9 % 100 mL IVPB-VTB  Once         05/12/24 1834    05/12/24 1752  iopamidol (ISOVUE-300) 61 % injection 100 mL  Once in Imaging         05/12/24 1736    05/12/24 1700  Urine Culture - Urine, Urine, Clean Catch  Once         05/12/24 1659    05/12/24 1658  Urinalysis, Microscopic Only - Urine, Clean Catch  Once         05/12/24 1657    05/12/24 1652  " "ondansetron (ZOFRAN) injection 4 mg  Once         05/12/24 1636    05/12/24 1652  morphine injection 4 mg  Once         05/12/24 1636    05/12/24 1635  sodium chloride 0.9 % bolus 1,000 mL  Once         05/12/24 1619    05/12/24 1621  CT Abdomen Pelvis With & Without Contrast  1 Time Imaging         05/12/24 1620    05/12/24 1620  [place] Draw  Once,   Status:  Canceled         05/12/24 1619    05/12/24 1620  Insert Peripheral IV  Once        Placed in \"And\" Linked Group    05/12/24 1619    05/12/24 1620  C-reactive Protein  STAT         05/12/24 1619    05/12/24 1620  Green Top (Gel)  PROCEDURE ONCE,   Status:  Canceled         05/12/24 1619    05/12/24 1620  Lavender Top  PROCEDURE ONCE,   Status:  Canceled         05/12/24 1619    05/12/24 1620  Gold Top - SST  PROCEDURE ONCE,   Status:  Canceled         05/12/24 1619    05/12/24 1620  Light Blue Top  PROCEDURE ONCE,   Status:  Canceled         05/12/24 1619    05/12/24 1619  sodium chloride 0.9 % flush 10 mL  As Needed        Placed in \"And\" Linked Group    05/12/24 1619    05/12/24 1609  CBC & Differential  Once         05/12/24 1608    05/12/24 1609  Comprehensive Metabolic Panel  Once         05/12/24 1608    05/12/24 1609  Lipase  Once         05/12/24 1608    05/12/24 1609  Urinalysis With Culture If Indicated - Urine, Clean Catch  Once         05/12/24 1608    05/12/24 1609  [place] Draw  Once         05/12/24 1608    05/12/24 1609  CBC Auto Differential  PROCEDURE ONCE         05/12/24 1608    05/12/24 1609  Green Top (Gel)  PROCEDURE ONCE         05/12/24 1608    05/12/24 1609  Lavender Top  PROCEDURE ONCE         05/12/24 1608    05/12/24 1609  Gold Top - SST  PROCEDURE ONCE         05/12/24 1608    05/12/24 1609  Light Blue Top  PROCEDURE ONCE         05/12/24 1608    Unscheduled  Up With Assistance  As Needed       05/12/24 2048    Unscheduled  Follow Hypoglycemia Standing Orders For Blood Glucose <70 & Notify Provider of Treatment  As Needed    "   Comments: Follow Hypoglycemia Orders As Outlined in Process Instructions (Open Order Report to View Full Instructions)  Notify Provider Any Time Hypoglycemia Treatment is Administered    05/12/24 1932    Unscheduled  Extravasation Standing Orders - Encourage Active Range of Motion After 48 Hours  As Needed       05/12/24 2159    --  chlorthalidone (HYGROTON) 25 MG tablet  Daily         05/12/24 2055    --  metoprolol succinate XL (Toprol XL) 200 MG 24 hr tablet  Daily         05/12/24 2055    --  FLUoxetine (PROzac) 10 MG capsule  Daily         05/12/24 2055    --  metFORMIN ER (GLUCOPHAGE-XR) 500 MG 24 hr tablet  2 Times Daily         05/12/24 2055    --  Cariprazine HCl (Vraylar) 3 MG capsule capsule  Daily         05/12/24 2055    --  fexofenadine (ALLEGRA) 180 MG tablet  Daily         05/12/24 2055    --  magnesium oxide (MAG-OX) 400 MG tablet  Daily         05/12/24 2055    Pending  chlorthalidone (HYGROTEN) tablet 25 mg  Daily         Pending    Pending  metoprolol succinate XL (TOPROL-XL) 24 hr tablet 200 mg  Daily         Pending    Pending  FLUoxetine (PROzac) capsule 10 mg  Daily         Pending    Pending  Cariprazine HCl (VRAYLAR) capsule capsule 3 mg  Daily         Pending    Pending  magnesium oxide (MAG-OX) tablet 400 mg  Daily         Pending    Pending  cetirizine (zyrTEC) tablet 10 mg  Daily         Pending                  Physician Progress Notes (all)    No notes of this type exist for this encounter.       Consult Notes (all)    No notes of this type exist for this encounter.

## 2024-05-13 NOTE — PLAN OF CARE
Goal Outcome Evaluation:           Progress: no change  Outcome Evaluation: Patient has been given PRN medication for nausea and pain, US guided IV has been placed by midline RN, patient has been tolerating ice chips. Patient has ambulated independently

## 2024-05-13 NOTE — CONSULTS
"Assessment:  Diagnosis: acute pancreatitis    Allergies   Allergen Reactions    Imuran [Azathioprine] Other (See Comments)     Pt reports this medication caused her to have pancreatitis.        Order Date/Time: 5/13/2024 1328  Indications: difficult access  LABS:  No results found for: \"INR\", \"PROTIME\"  No results found for: \"PTT\"  Lab Results   Component Value Date    WBC 8.06 05/13/2024    HGB 11.4 (L) 05/13/2024    HCT 34.3 05/13/2024    MCV 91.5 05/13/2024     05/13/2024     Lab Results   Component Value Date    BUN 5 (L) 05/13/2024     Lab Results   Component Value Date    CREATININE 0.92 05/13/2024     Lab Results   Component Value Date    EGFRIFNONA 93 08/17/2020     Labs Reviewed: all labs reviewed    Contraindications for PICC/Midline:  No contraindications noted    US PIV requested per consult    Recommendations:  AccuCath Ace Intravascular Catheter; 18 g; 2.25 in   Upper Right Cephalic    Procedure Time Out:  Time out Time: 1340  Correct Patient Identity: Yes  Correct Surgical Side and Site Are Marked: Yes  Agreement on Procedure to be done: Yes  Antibiotic Given: N/A  RN: OSMANI Leal RN    AccuCath Ace Intravascular Catheter placed with ultrasound guidance and verified by blood return. Minimal blood loss noted. Catheter flushes easily. Blood return noted. Patient nurse, Rosanne STEELE, made aware that catheter is in upper R arm and ready for use.      Fransisca Leal RN    "

## 2024-05-14 LAB
ANION GAP SERPL CALCULATED.3IONS-SCNC: 9.4 MMOL/L (ref 5–15)
BACTERIA SPEC AEROBE CULT: NO GROWTH
BUN SERPL-MCNC: 5 MG/DL (ref 6–20)
BUN/CREAT SERPL: 4.7 (ref 7–25)
CALCIUM SPEC-SCNC: 8.3 MG/DL (ref 8.6–10.5)
CHLORIDE SERPL-SCNC: 105 MMOL/L (ref 98–107)
CO2 SERPL-SCNC: 18.6 MMOL/L (ref 22–29)
CREAT SERPL-MCNC: 1.06 MG/DL (ref 0.57–1)
DEPRECATED RDW RBC AUTO: 43.8 FL (ref 37–54)
EGFRCR SERPLBLD CKD-EPI 2021: 67.4 ML/MIN/1.73
ERYTHROCYTE [DISTWIDTH] IN BLOOD BY AUTOMATED COUNT: 13.3 % (ref 12.3–15.4)
GLUCOSE BLDC GLUCOMTR-MCNC: 115 MG/DL (ref 70–130)
GLUCOSE BLDC GLUCOMTR-MCNC: 117 MG/DL (ref 70–130)
GLUCOSE BLDC GLUCOMTR-MCNC: 99 MG/DL (ref 70–130)
GLUCOSE SERPL-MCNC: 105 MG/DL (ref 65–99)
HCT VFR BLD AUTO: 35 % (ref 34–46.6)
HGB BLD-MCNC: 11.9 G/DL (ref 12–15.9)
MCH RBC QN AUTO: 31.2 PG (ref 26.6–33)
MCHC RBC AUTO-ENTMCNC: 34 G/DL (ref 31.5–35.7)
MCV RBC AUTO: 91.6 FL (ref 79–97)
PLATELET # BLD AUTO: 230 10*3/MM3 (ref 140–450)
PMV BLD AUTO: 9.5 FL (ref 6–12)
POTASSIUM SERPL-SCNC: 4.9 MMOL/L (ref 3.5–5.2)
RBC # BLD AUTO: 3.82 10*6/MM3 (ref 3.77–5.28)
SODIUM SERPL-SCNC: 133 MMOL/L (ref 136–145)
WBC NRBC COR # BLD AUTO: 6.99 10*3/MM3 (ref 3.4–10.8)

## 2024-05-14 PROCEDURE — 80048 BASIC METABOLIC PNL TOTAL CA: CPT

## 2024-05-14 PROCEDURE — 94761 N-INVAS EAR/PLS OXIMETRY MLT: CPT

## 2024-05-14 PROCEDURE — 99232 SBSQ HOSP IP/OBS MODERATE 35: CPT

## 2024-05-14 PROCEDURE — 82948 REAGENT STRIP/BLOOD GLUCOSE: CPT

## 2024-05-14 PROCEDURE — 25010000002 ONDANSETRON PER 1 MG: Performed by: HOSPITALIST

## 2024-05-14 PROCEDURE — 25010000002 HYDROMORPHONE PER 4 MG: Performed by: HOSPITALIST

## 2024-05-14 PROCEDURE — 85027 COMPLETE CBC AUTOMATED: CPT

## 2024-05-14 PROCEDURE — 25810000003 SODIUM CHLORIDE 0.9 % SOLUTION: Performed by: HOSPITALIST

## 2024-05-14 RX ORDER — OXYCODONE HYDROCHLORIDE 5 MG/1
5 TABLET ORAL EVERY 4 HOURS PRN
Status: DISPENSED | OUTPATIENT
Start: 2024-05-14 | End: 2024-05-24

## 2024-05-14 RX ADMIN — SODIUM CHLORIDE 125 ML/HR: 9 INJECTION, SOLUTION INTRAVENOUS at 10:45

## 2024-05-14 RX ADMIN — ONDANSETRON 4 MG: 2 INJECTION INTRAMUSCULAR; INTRAVENOUS at 11:18

## 2024-05-14 RX ADMIN — OXYCODONE HYDROCHLORIDE 5 MG: 10 TABLET ORAL at 21:43

## 2024-05-14 RX ADMIN — CARIPRAZINE 3 MG: 3 CAPSULE, GELATIN COATED ORAL at 09:00

## 2024-05-14 RX ADMIN — Medication 10 ML: at 09:02

## 2024-05-14 RX ADMIN — HYDROMORPHONE HYDROCHLORIDE 0.5 MG: 1 INJECTION, SOLUTION INTRAMUSCULAR; INTRAVENOUS; SUBCUTANEOUS at 07:37

## 2024-05-14 RX ADMIN — Medication 10 ML: at 07:37

## 2024-05-14 RX ADMIN — OXYCODONE HYDROCHLORIDE 5 MG: 10 TABLET ORAL at 17:53

## 2024-05-14 RX ADMIN — ONDANSETRON 4 MG: 2 INJECTION INTRAMUSCULAR; INTRAVENOUS at 21:46

## 2024-05-14 RX ADMIN — HYDROMORPHONE HYDROCHLORIDE 0.5 MG: 1 INJECTION, SOLUTION INTRAMUSCULAR; INTRAVENOUS; SUBCUTANEOUS at 10:47

## 2024-05-14 RX ADMIN — SODIUM CHLORIDE 125 ML/HR: 9 INJECTION, SOLUTION INTRAVENOUS at 02:42

## 2024-05-14 RX ADMIN — HYDROMORPHONE HYDROCHLORIDE 0.5 MG: 1 INJECTION, SOLUTION INTRAMUSCULAR; INTRAVENOUS; SUBCUTANEOUS at 04:15

## 2024-05-14 RX ADMIN — CETIRIZINE HYDROCHLORIDE 10 MG: 10 TABLET, FILM COATED ORAL at 09:00

## 2024-05-14 RX ADMIN — OXYCODONE HYDROCHLORIDE 5 MG: 10 TABLET ORAL at 14:01

## 2024-05-14 RX ADMIN — FLUOXETINE HYDROCHLORIDE 10 MG: 10 CAPSULE ORAL at 09:00

## 2024-05-14 NOTE — PLAN OF CARE
Goal Outcome Evaluation:      Pt has rested well this shift. Pt ambulates in room independently. Pt tolerating advancement of diet well. Pt had complaints of pain, PRN pain med given. Pt refused to bathe this shift, pt educated on importance of daily hygiene. No acute changes noted at this time. Will continue to follow plan of care.

## 2024-05-14 NOTE — PLAN OF CARE
Goal Outcome Evaluation:  Plan of Care Reviewed With: patient        Progress: no change  Outcome Evaluation: Pt resting in bed during assessment. Pt C/O pain, PRN meds given per orders. Pt has no other complaints or concerns at this time. No acute changes to note. Will cont POC

## 2024-05-15 ENCOUNTER — APPOINTMENT (OUTPATIENT)
Dept: GENERAL RADIOLOGY | Facility: HOSPITAL | Age: 44
DRG: 438 | End: 2024-05-15
Payer: COMMERCIAL

## 2024-05-15 LAB
ALBUMIN SERPL-MCNC: 3 G/DL (ref 3.5–5.2)
ALBUMIN/GLOB SERPL: 1 G/DL
ALP SERPL-CCNC: 69 U/L (ref 39–117)
ALT SERPL W P-5'-P-CCNC: 17 U/L (ref 1–33)
ANION GAP SERPL CALCULATED.3IONS-SCNC: 11.3 MMOL/L (ref 5–15)
AST SERPL-CCNC: 32 U/L (ref 1–32)
BASOPHILS # BLD AUTO: 0.04 10*3/MM3 (ref 0–0.2)
BASOPHILS NFR BLD AUTO: 0.8 % (ref 0–1.5)
BILIRUB SERPL-MCNC: 0.3 MG/DL (ref 0–1.2)
BILIRUB UR QL STRIP: NEGATIVE
BUN SERPL-MCNC: 3 MG/DL (ref 6–20)
BUN/CREAT SERPL: 2.6 (ref 7–25)
CALCIUM SPEC-SCNC: 8.2 MG/DL (ref 8.6–10.5)
CHLORIDE SERPL-SCNC: 100 MMOL/L (ref 98–107)
CLARITY UR: CLEAR
CO2 SERPL-SCNC: 19.7 MMOL/L (ref 22–29)
COLOR UR: YELLOW
CREAT SERPL-MCNC: 1.14 MG/DL (ref 0.57–1)
DEPRECATED RDW RBC AUTO: 41.8 FL (ref 37–54)
EGFRCR SERPLBLD CKD-EPI 2021: 61.8 ML/MIN/1.73
EOSINOPHIL # BLD AUTO: 0 10*3/MM3 (ref 0–0.4)
EOSINOPHIL NFR BLD AUTO: 0 % (ref 0.3–6.2)
ERYTHROCYTE [DISTWIDTH] IN BLOOD BY AUTOMATED COUNT: 12.8 % (ref 12.3–15.4)
FLUAV RNA RESP QL NAA+PROBE: NOT DETECTED
FLUBV RNA ISLT QL NAA+PROBE: NOT DETECTED
GLOBULIN UR ELPH-MCNC: 3.1 GM/DL
GLUCOSE BLDC GLUCOMTR-MCNC: 114 MG/DL (ref 70–130)
GLUCOSE BLDC GLUCOMTR-MCNC: 150 MG/DL (ref 70–130)
GLUCOSE BLDC GLUCOMTR-MCNC: 151 MG/DL (ref 70–130)
GLUCOSE BLDC GLUCOMTR-MCNC: 153 MG/DL (ref 70–130)
GLUCOSE BLDC GLUCOMTR-MCNC: 154 MG/DL (ref 70–130)
GLUCOSE BLDC GLUCOMTR-MCNC: 162 MG/DL (ref 70–130)
GLUCOSE SERPL-MCNC: 163 MG/DL (ref 65–99)
GLUCOSE UR STRIP-MCNC: NEGATIVE MG/DL
HCT VFR BLD AUTO: 35.4 % (ref 34–46.6)
HGB BLD-MCNC: 12.3 G/DL (ref 12–15.9)
HGB UR QL STRIP.AUTO: NEGATIVE
IMM GRANULOCYTES # BLD AUTO: 0.02 10*3/MM3 (ref 0–0.05)
IMM GRANULOCYTES NFR BLD AUTO: 0.4 % (ref 0–0.5)
KETONES UR QL STRIP: NEGATIVE
LEUKOCYTE ESTERASE UR QL STRIP.AUTO: NEGATIVE
LYMPHOCYTES # BLD AUTO: 1.28 10*3/MM3 (ref 0.7–3.1)
LYMPHOCYTES NFR BLD AUTO: 24.9 % (ref 19.6–45.3)
MCH RBC QN AUTO: 30.9 PG (ref 26.6–33)
MCHC RBC AUTO-ENTMCNC: 34.7 G/DL (ref 31.5–35.7)
MCV RBC AUTO: 88.9 FL (ref 79–97)
MONOCYTES # BLD AUTO: 0.22 10*3/MM3 (ref 0.1–0.9)
MONOCYTES NFR BLD AUTO: 4.3 % (ref 5–12)
NEUTROPHILS NFR BLD AUTO: 3.59 10*3/MM3 (ref 1.7–7)
NEUTROPHILS NFR BLD AUTO: 69.6 % (ref 42.7–76)
NITRITE UR QL STRIP: NEGATIVE
NRBC BLD AUTO-RTO: 0 /100 WBC (ref 0–0.2)
PH UR STRIP.AUTO: 6 [PH] (ref 5–8)
PLATELET # BLD AUTO: 181 10*3/MM3 (ref 140–450)
PMV BLD AUTO: 9.4 FL (ref 6–12)
POTASSIUM SERPL-SCNC: 3.7 MMOL/L (ref 3.5–5.2)
PROT SERPL-MCNC: 6.1 G/DL (ref 6–8.5)
PROT UR QL STRIP: NEGATIVE
QT INTERVAL: 376 MS
QTC INTERVAL: 436 MS
RBC # BLD AUTO: 3.98 10*6/MM3 (ref 3.77–5.28)
SARS-COV-2 RNA RESP QL NAA+PROBE: NOT DETECTED
SODIUM SERPL-SCNC: 131 MMOL/L (ref 136–145)
SP GR UR STRIP: <=1.005 (ref 1–1.03)
UROBILINOGEN UR QL STRIP: NORMAL
WBC NRBC COR # BLD AUTO: 5.15 10*3/MM3 (ref 3.4–10.8)

## 2024-05-15 PROCEDURE — 87636 SARSCOV2 & INF A&B AMP PRB: CPT | Performed by: INTERNAL MEDICINE

## 2024-05-15 PROCEDURE — 71045 X-RAY EXAM CHEST 1 VIEW: CPT | Performed by: RADIOLOGY

## 2024-05-15 PROCEDURE — 25010000002 ONDANSETRON PER 1 MG: Performed by: HOSPITALIST

## 2024-05-15 PROCEDURE — 81003 URINALYSIS AUTO W/O SCOPE: CPT | Performed by: INTERNAL MEDICINE

## 2024-05-15 PROCEDURE — 80053 COMPREHEN METABOLIC PANEL: CPT | Performed by: INTERNAL MEDICINE

## 2024-05-15 PROCEDURE — 82948 REAGENT STRIP/BLOOD GLUCOSE: CPT

## 2024-05-15 PROCEDURE — 87040 BLOOD CULTURE FOR BACTERIA: CPT | Performed by: INTERNAL MEDICINE

## 2024-05-15 PROCEDURE — 63710000001 INSULIN REGULAR HUMAN PER 5 UNITS: Performed by: HOSPITALIST

## 2024-05-15 PROCEDURE — 71045 X-RAY EXAM CHEST 1 VIEW: CPT

## 2024-05-15 PROCEDURE — 85025 COMPLETE CBC W/AUTO DIFF WBC: CPT | Performed by: INTERNAL MEDICINE

## 2024-05-15 PROCEDURE — 99232 SBSQ HOSP IP/OBS MODERATE 35: CPT

## 2024-05-15 PROCEDURE — 25810000003 SODIUM CHLORIDE 0.9 % SOLUTION: Performed by: INTERNAL MEDICINE

## 2024-05-15 PROCEDURE — 25810000003 SODIUM CHLORIDE 0.9 % SOLUTION

## 2024-05-15 PROCEDURE — 25010000002 KETOROLAC TROMETHAMINE PER 15 MG: Performed by: INTERNAL MEDICINE

## 2024-05-15 RX ORDER — ACETAMINOPHEN 325 MG/1
650 TABLET ORAL EVERY 6 HOURS PRN
Status: DISCONTINUED | OUTPATIENT
Start: 2024-05-15 | End: 2024-05-17

## 2024-05-15 RX ORDER — KETOROLAC TROMETHAMINE 30 MG/ML
30 INJECTION, SOLUTION INTRAMUSCULAR; INTRAVENOUS ONCE
Status: COMPLETED | OUTPATIENT
Start: 2024-05-15 | End: 2024-05-15

## 2024-05-15 RX ORDER — ACETAMINOPHEN 325 MG/1
650 TABLET ORAL EVERY 6 HOURS PRN
Status: DISCONTINUED | OUTPATIENT
Start: 2024-05-15 | End: 2024-05-15

## 2024-05-15 RX ADMIN — OXYCODONE HYDROCHLORIDE 5 MG: 10 TABLET ORAL at 01:43

## 2024-05-15 RX ADMIN — OXYCODONE HYDROCHLORIDE 5 MG: 10 TABLET ORAL at 18:27

## 2024-05-15 RX ADMIN — OXYCODONE HYDROCHLORIDE 5 MG: 10 TABLET ORAL at 05:43

## 2024-05-15 RX ADMIN — CARIPRAZINE 3 MG: 3 CAPSULE, GELATIN COATED ORAL at 08:45

## 2024-05-15 RX ADMIN — SODIUM CHLORIDE 50 ML/HR: 9 INJECTION, SOLUTION INTRAVENOUS at 01:39

## 2024-05-15 RX ADMIN — ACETAMINOPHEN 650 MG: 325 TABLET ORAL at 07:47

## 2024-05-15 RX ADMIN — ACETAMINOPHEN 650 MG: 325 TABLET ORAL at 05:37

## 2024-05-15 RX ADMIN — Medication 10 ML: at 08:45

## 2024-05-15 RX ADMIN — ACETAMINOPHEN 650 MG: 325 TABLET ORAL at 16:38

## 2024-05-15 RX ADMIN — CETIRIZINE HYDROCHLORIDE 10 MG: 10 TABLET, FILM COATED ORAL at 08:44

## 2024-05-15 RX ADMIN — ONDANSETRON 4 MG: 2 INJECTION INTRAMUSCULAR; INTRAVENOUS at 18:27

## 2024-05-15 RX ADMIN — KETOROLAC TROMETHAMINE 30 MG: 30 INJECTION, SOLUTION INTRAMUSCULAR; INTRAVENOUS at 09:44

## 2024-05-15 RX ADMIN — FLUOXETINE HYDROCHLORIDE 10 MG: 10 CAPSULE ORAL at 08:45

## 2024-05-15 RX ADMIN — INSULIN HUMAN 2 UNITS: 100 INJECTION, SOLUTION PARENTERAL at 11:15

## 2024-05-15 RX ADMIN — Medication 10 ML: at 08:46

## 2024-05-15 RX ADMIN — OXYCODONE HYDROCHLORIDE 5 MG: 10 TABLET ORAL at 23:30

## 2024-05-15 RX ADMIN — SODIUM CHLORIDE 100 ML/HR: 9 INJECTION, SOLUTION INTRAVENOUS at 16:38

## 2024-05-15 RX ADMIN — OXYCODONE HYDROCHLORIDE 5 MG: 10 TABLET ORAL at 11:15

## 2024-05-15 NOTE — PROGRESS NOTES
Patient Identification:  Name:  Manda Al  Age:  43 y.o.  Sex:  female  :  1980  MRN:  [id number]  Visit Number:  [id number]  Primary Care Provider:  Mi Rivera MD    Length of stay:  3    Subjective/Interval History/Consultants/Procedures     Chief Complaint:   Chief Complaint   Patient presents with    Flank Pain       Subjective/Interval History:    43 y.o. female who was admitted on 2024 with acute pancreatitis     PMH is significant for anxiety, schizoaffective disorder- bipolar type, HTN, Crohn's disease   For complete admission information, please see history and physical.     Consultations:      Procedures/Scans:  CT abdomen and pelvis w & w/o contrast   CXR    Today, the patient was seen and examined on 3N. She appeared ill but in no discomfort. She reports headache this AM without neck stiffness or rigidity. Febrile overnight. No diarrhea, shortness of breath or cough. No dysuria or difficulty with urination. Flu/Covid negative. Stated she tolerated diet well yesterday. Abdominal pain seems to be resolved at this point, some mid upper back pain persisting but she states substantially improved. Had some mild nausea following meals.     Room location at the time of evaluation was 340b.    ----------------------------------------------------------------------------------------------------------------------  Current Hospital Meds:  Cariprazine HCl, 3 mg, Oral, Daily  cetirizine, 10 mg, Oral, Daily  FLUoxetine, 10 mg, Oral, Daily  insulin regular, 2-7 Units, Subcutaneous, Q6H  metoprolol succinate XL, 200 mg, Oral, Daily  sodium chloride, 10 mL, Intravenous, Q12H  sodium chloride, 10 mL, Intravenous, Q12H      sodium chloride, 100 mL/hr, Last Rate: 100 mL/hr (05/15/24 0944)      ----------------------------------------------------------------------------------------------------------------------      Objective     Vital Signs:  Temp:  [98.2 °F (36.8 °C)-102.9 °F (39.4 °C)] 98.4 °F  (36.9 °C)  Heart Rate:  [] 99  Resp:  [16-18] 18  BP: ()/(61-71) 107/68      05/12/24 2036 05/14/24  0500 05/15/24  0500   Weight: 96.4 kg (212 lb 8.4 oz) 99.6 kg (219 lb 9.6 oz) (Rn Aware.) 100 kg (220 lb 6.4 oz)     Body mass index is 33.51 kg/m².    Intake/Output Summary (Last 24 hours) at 5/15/2024 1026  Last data filed at 5/15/2024 0843  Gross per 24 hour   Intake 2716 ml   Output --   Net 2716 ml     I/O this shift:  In: 240 [P.O.:240]  Out: -   Diet: Liquid; Clear Liquid; Fluid Consistency: Thin (IDDSI 0)  ----------------------------------------------------------------------------------------------------------------------    Physical Exam  Vitals and nursing note reviewed.   Constitutional:       Appearance: She is obese. She is ill-appearing.   HENT:      Head: Normocephalic and atraumatic.   Eyes:      Extraocular Movements: Extraocular movements intact.      Conjunctiva/sclera: Conjunctivae normal.   Cardiovascular:      Rate and Rhythm: Normal rate and regular rhythm.   Pulmonary:      Effort: Pulmonary effort is normal.      Breath sounds: Normal breath sounds.   Abdominal:      Palpations: Abdomen is soft.      Tenderness: There is no abdominal tenderness.   Musculoskeletal:      Right lower leg: No edema.      Left lower leg: No edema.   Skin:     General: Skin is warm and dry.   Neurological:      Mental Status: She is alert. Mental status is at baseline.   Psychiatric:         Mood and Affect: Mood normal.         Behavior: Behavior normal.                ----------------------------------------------------------------------------------------------------------------------  Tele:      ----------------------------------------------------------------------------------------------------------------------      Results from last 7 days   Lab Units 05/15/24  0827 05/14/24  0559 05/13/24  0645 05/12/24  2021 05/12/24  1648   CRP mg/dL  --   --   --   --  0.56*   LACTATE mmol/L  --   --   --   "1.5  --    WBC 10*3/mm3 5.15 6.99 8.06  --  8.89   HEMOGLOBIN g/dL 12.3 11.9* 11.4*  --  13.9   HEMATOCRIT % 35.4 35.0 34.3  --  39.2   MCV fL 88.9 91.6 91.5  --  87.1   MCHC g/dL 34.7 34.0 33.2  --  35.5   PLATELETS 10*3/mm3 181 230 243  --  308         Results from last 7 days   Lab Units 05/15/24  0827 05/14/24  0508 05/13/24  0645 05/13/24  0549 05/12/24  1648   SODIUM mmol/L 131* 133* 137  --  138   POTASSIUM mmol/L 3.7 4.9 4.3  4.3  --  3.5   MAGNESIUM mg/dL  --   --   --  2.9* 1.7   CHLORIDE mmol/L 100 105 109*  --  105   CO2 mmol/L 19.7* 18.6* 21.1*  --  21.6*   BUN mg/dL 3* 5* 5*  --  5*   CREATININE mg/dL 1.14* 1.06* 0.92  --  0.94   CALCIUM mg/dL 8.2* 8.3* 8.3*  --  8.8   GLUCOSE mg/dL 163* 105* 135*  --  278*   ALBUMIN g/dL 3.0*  --  2.9*  --  3.4*   BILIRUBIN mg/dL 0.3  --  0.2  --  0.3   ALK PHOS U/L 69  --  62  --  74   AST (SGOT) U/L 32  --  27  --  23   ALT (SGPT) U/L 17  --  17  --  16   Estimated Creatinine Clearance: 79.5 mL/min (A) (by C-G formula based on SCr of 1.14 mg/dL (H)).  No results found for: \"AMMONIA\"      Blood Culture   Date Value Ref Range Status   05/12/2024 No growth at 2 days  Preliminary   05/12/2024 No growth at 2 days  Preliminary   05/12/2024 No growth at 2 days  Preliminary     Urine Culture   Date Value Ref Range Status   05/12/2024 No growth  Final     No results found for: \"WOUNDCX\"  No results found for: \"STOOLCX\"  ----------------------------------------------------------------------------------------------------------------------  Imaging Results (Last 24 Hours)       Procedure Component Value Units Date/Time    XR Chest 1 View [[id number]] Collected: 05/15/24 0941     Updated: 05/15/24 0943    Narrative:      EXAM:    XR Chest, 1 View     EXAM DATE:    5/15/2024 8:43 AM     CLINICAL HISTORY:    fever; K85.90-Acute pancreatitis without necrosis or infection,  unspecified     TECHNIQUE:    Frontal view of the chest.     COMPARISON:    No relevant prior studies " available.     FINDINGS:    Lungs and pleural spaces:  Unremarkable as visualized.  No  consolidation.  No pneumothorax.    Heart:  Unremarkable as visualized.  No cardiomegaly.    Mediastinum:  Unremarkable as visualized.  Normal mediastinal contour.    Bones/joints:  Unremarkable as visualized.  No acute fracture.       Impression:        Unremarkable exam. No acute cardiopulmonary findings identified.        This report was finalized on 5/15/2024 9:41 AM by Dr. Juan Carlos Smith MD.             ----------------------------------------------------------------------------------------------------------------------   I have reviewed the above laboratory values for 05/15/24    Assessment/Plan     Active Hospital Problems    Diagnosis  POA    **Acute pancreatitis [K85.90]  Yes         ASSESSMENT/PLAN:    Acute, recurrent,  pancreatitis  Continue supportive care measures- transitioned to PO oxycodone PRN.  Advanced diet to CLD and tolerated OK.   Lipase repeated and downtrending.   Replace electrolytes as needed per protocol   Home chlorthalidone held on admission given association with pancreatitis.   Abdominal pain resolved, back pain resolving. Unfortunately with new fevers overnight, Tmax 102.9. Now mildly tachycardic as well. Flu/Covid negative, CXR neg, UA negative. No leukocytosis on CBC. Repeat blood cultures pending. Fever potentially related to inflammatory response in the setting of pancreatitis.   Creatinine trend up, increase NS rate to 100 mL/h. Supportive care for fevers.  Repeat CBC/BMP in the AM     Abnormal UA  Recent UTI, s/p antibiotic course  Received rocephin in the ED and urine culture negative. Patient reports recently completed course of bactrim, flagyl in the outpatient setting as well. Hold further Abx for now  Does report  itching consistent with previous yeast infections. Diflucan x1 dose and repeat in 72 hours if symptoms persist- reports improvement on follow up      Chronic:    Anxiety/depression  Schizoaffective disorder- bipolar type  HTN  Crohn's disease  Home meds resumed as indicated  Monitor VS  Chlorthalidone held as above       -----------  -DVT prophylaxis: SCDs  -Disposition plans/anticipated needs: pending course.         The patient is high risk due to the following diagnoses/reasons:  Acute pancreatitis         Brenotn Alves PA-C  05/15/24  10:26 EDT

## 2024-05-15 NOTE — PLAN OF CARE
Goal Outcome Evaluation:  Plan of Care Reviewed With: patient        Progress: no change  Outcome Evaluation: pt complaining of headache this shift. temp 100.7. pa notified. see orders. pt refusing nasal spray. nausea x1. tolerating po liquids. will continue poc

## 2024-05-15 NOTE — PLAN OF CARE
Goal Outcome Evaluation:  Plan of Care Reviewed With: patient        Progress: no change  Outcome Evaluation: Pt's resting in bed, A&O, stable on RA, c/o back pain. Pt had fever this am, dropped to normal with tylenol given, some lab.work done per MD order. Temps started building up afternoon, tylenol given PRN. Con't IV fluid. Will con't to monitor.

## 2024-05-16 ENCOUNTER — APPOINTMENT (OUTPATIENT)
Dept: ULTRASOUND IMAGING | Facility: HOSPITAL | Age: 44
DRG: 438 | End: 2024-05-16
Payer: COMMERCIAL

## 2024-05-16 ENCOUNTER — APPOINTMENT (OUTPATIENT)
Dept: CT IMAGING | Facility: HOSPITAL | Age: 44
DRG: 438 | End: 2024-05-16
Payer: COMMERCIAL

## 2024-05-16 ENCOUNTER — APPOINTMENT (OUTPATIENT)
Dept: GENERAL RADIOLOGY | Facility: HOSPITAL | Age: 44
DRG: 438 | End: 2024-05-16
Payer: COMMERCIAL

## 2024-05-16 ENCOUNTER — APPOINTMENT (OUTPATIENT)
Dept: NUCLEAR MEDICINE | Facility: HOSPITAL | Age: 44
DRG: 438 | End: 2024-05-16
Payer: COMMERCIAL

## 2024-05-16 LAB
A-A DO2: 50.1 MMHG (ref 0–300)
ALBUMIN SERPL-MCNC: 2.3 G/DL (ref 3.5–5.2)
ALP SERPL-CCNC: 57 U/L (ref 39–117)
ALT SERPL W P-5'-P-CCNC: 40 U/L (ref 1–33)
ANION GAP SERPL CALCULATED.3IONS-SCNC: 10.5 MMOL/L (ref 5–15)
ARTERIAL PATENCY WRIST A: ABNORMAL
AST SERPL-CCNC: 93 U/L (ref 1–32)
ATMOSPHERIC PRESS: 723 MMHG
B PARAPERT DNA SPEC QL NAA+PROBE: NOT DETECTED
B PERT DNA SPEC QL NAA+PROBE: NOT DETECTED
BASE EXCESS BLDA CALC-SCNC: -3.2 MMOL/L (ref 0–2)
BASOPHILS # BLD AUTO: 0.05 10*3/MM3 (ref 0–0.2)
BASOPHILS NFR BLD AUTO: 1.4 % (ref 0–1.5)
BDY SITE: ABNORMAL
BILIRUB CONJ SERPL-MCNC: <0.2 MG/DL (ref 0–0.3)
BILIRUB INDIRECT SERPL-MCNC: ABNORMAL MG/DL
BILIRUB SERPL-MCNC: 0.4 MG/DL (ref 0–1.2)
BUN SERPL-MCNC: 6 MG/DL (ref 6–20)
BUN/CREAT SERPL: 5.2 (ref 7–25)
C PNEUM DNA NPH QL NAA+NON-PROBE: NOT DETECTED
CALCIUM SPEC-SCNC: 7.9 MG/DL (ref 8.6–10.5)
CHLORIDE SERPL-SCNC: 101 MMOL/L (ref 98–107)
CO2 BLDA-SCNC: 20.3 MMOL/L (ref 22–33)
CO2 SERPL-SCNC: 21.5 MMOL/L (ref 22–29)
COHGB MFR BLD: 1.8 % (ref 0–5)
CORTIS SERPL-MCNC: 9.38 MCG/DL
CREAT SERPL-MCNC: 1.15 MG/DL (ref 0.57–1)
CRP SERPL-MCNC: 9.82 MG/DL (ref 0–0.5)
D DIMER PPP FEU-MCNC: >20 MCGFEU/ML (ref 0–0.5)
D-LACTATE SERPL-SCNC: 0.8 MMOL/L (ref 0.5–2)
DEPRECATED RDW RBC AUTO: 43.2 FL (ref 37–54)
EGFRCR SERPLBLD CKD-EPI 2021: 61.1 ML/MIN/1.73
EOSINOPHIL # BLD AUTO: 0 10*3/MM3 (ref 0–0.4)
EOSINOPHIL NFR BLD AUTO: 0 % (ref 0.3–6.2)
ERYTHROCYTE [DISTWIDTH] IN BLOOD BY AUTOMATED COUNT: 13.2 % (ref 12.3–15.4)
FERRITIN SERPL-MCNC: 501.2 NG/ML (ref 13–150)
FLUAV SUBTYP SPEC NAA+PROBE: NOT DETECTED
FLUBV RNA ISLT QL NAA+PROBE: NOT DETECTED
GLUCOSE BLDC GLUCOMTR-MCNC: 109 MG/DL (ref 70–130)
GLUCOSE BLDC GLUCOMTR-MCNC: 138 MG/DL (ref 70–130)
GLUCOSE BLDC GLUCOMTR-MCNC: 139 MG/DL (ref 70–130)
GLUCOSE BLDC GLUCOMTR-MCNC: 145 MG/DL (ref 70–130)
GLUCOSE BLDC GLUCOMTR-MCNC: 173 MG/DL (ref 70–130)
GLUCOSE SERPL-MCNC: 133 MG/DL (ref 65–99)
HADV DNA SPEC NAA+PROBE: NOT DETECTED
HAV IGM SERPL QL IA: NORMAL
HBV CORE IGM SERPL QL IA: NORMAL
HBV SURFACE AG SERPL QL IA: NORMAL
HCO3 BLDA-SCNC: 19.4 MMOL/L (ref 20–26)
HCOV 229E RNA SPEC QL NAA+PROBE: NOT DETECTED
HCOV HKU1 RNA SPEC QL NAA+PROBE: NOT DETECTED
HCOV NL63 RNA SPEC QL NAA+PROBE: NOT DETECTED
HCOV OC43 RNA SPEC QL NAA+PROBE: NOT DETECTED
HCT VFR BLD AUTO: 36.2 % (ref 34–46.6)
HCT VFR BLD CALC: 34.4 % (ref 38–51)
HCV AB SER QL: NORMAL
HGB BLD-MCNC: 12.7 G/DL (ref 12–15.9)
HGB BLDA-MCNC: 11.2 G/DL (ref 13.5–17.5)
HIV 1+2 AB+HIV1 P24 AG SERPL QL IA: NORMAL
HMPV RNA NPH QL NAA+NON-PROBE: NOT DETECTED
HPIV1 RNA ISLT QL NAA+PROBE: NOT DETECTED
HPIV2 RNA SPEC QL NAA+PROBE: NOT DETECTED
HPIV3 RNA NPH QL NAA+PROBE: NOT DETECTED
HPIV4 P GENE NPH QL NAA+PROBE: NOT DETECTED
IMM GRANULOCYTES # BLD AUTO: 0.02 10*3/MM3 (ref 0–0.05)
IMM GRANULOCYTES NFR BLD AUTO: 0.5 % (ref 0–0.5)
INHALED O2 CONCENTRATION: 21 %
LYMPHOCYTES # BLD AUTO: 0.83 10*3/MM3 (ref 0.7–3.1)
LYMPHOCYTES NFR BLD AUTO: 22.4 % (ref 19.6–45.3)
Lab: ABNORMAL
M PNEUMO IGG SER IA-ACNC: NOT DETECTED
MCH RBC QN AUTO: 31.4 PG (ref 26.6–33)
MCHC RBC AUTO-ENTMCNC: 35.1 G/DL (ref 31.5–35.7)
MCV RBC AUTO: 89.6 FL (ref 79–97)
METHGB BLD QL: <-0.1 % (ref 0–3)
MODALITY: ABNORMAL
MONOCYTES # BLD AUTO: 0.17 10*3/MM3 (ref 0.1–0.9)
MONOCYTES NFR BLD AUTO: 4.6 % (ref 5–12)
NEUTROPHILS NFR BLD AUTO: 2.63 10*3/MM3 (ref 1.7–7)
NEUTROPHILS NFR BLD AUTO: 71.1 % (ref 42.7–76)
NRBC BLD AUTO-RTO: 0 /100 WBC (ref 0–0.2)
OXYHGB MFR BLDV: 93.3 % (ref 94–99)
PCO2 BLDA: 26.9 MM HG (ref 35–45)
PCO2 TEMP ADJ BLD: ABNORMAL MM[HG]
PH BLDA: 7.47 PH UNITS (ref 7.35–7.45)
PH, TEMP CORRECTED: ABNORMAL
PLATELET # BLD AUTO: 151 10*3/MM3 (ref 140–450)
PMV BLD AUTO: 9.7 FL (ref 6–12)
PO2 BLDA: 61.5 MM HG (ref 83–108)
PO2 TEMP ADJ BLD: ABNORMAL MM[HG]
POTASSIUM SERPL-SCNC: 4.3 MMOL/L (ref 3.5–5.2)
PROCALCITONIN SERPL-MCNC: 0.76 NG/ML (ref 0–0.25)
PROT SERPL-MCNC: 5 G/DL (ref 6–8.5)
RBC # BLD AUTO: 4.04 10*6/MM3 (ref 3.77–5.28)
RHINOVIRUS RNA SPEC NAA+PROBE: NOT DETECTED
RSV RNA NPH QL NAA+NON-PROBE: NOT DETECTED
SAO2 % BLDCOA: 94.6 % (ref 94–99)
SARS-COV-2 RNA NPH QL NAA+NON-PROBE: NOT DETECTED
SODIUM SERPL-SCNC: 133 MMOL/L (ref 136–145)
TSH SERPL DL<=0.05 MIU/L-ACNC: 1.84 UIU/ML (ref 0.27–4.2)
VENTILATOR MODE: ABNORMAL
WBC NRBC COR # BLD AUTO: 3.7 10*3/MM3 (ref 3.4–10.8)

## 2024-05-16 PROCEDURE — A9567 TECHNETIUM TC-99M AEROSOL: HCPCS | Performed by: INTERNAL MEDICINE

## 2024-05-16 PROCEDURE — A9540 TC99M MAA: HCPCS | Performed by: INTERNAL MEDICINE

## 2024-05-16 PROCEDURE — 82728 ASSAY OF FERRITIN: CPT | Performed by: INTERNAL MEDICINE

## 2024-05-16 PROCEDURE — 82805 BLOOD GASES W/O2 SATURATION: CPT

## 2024-05-16 PROCEDURE — 78582 LUNG VENTILAT&PERFUS IMAGING: CPT

## 2024-05-16 PROCEDURE — 94799 UNLISTED PULMONARY SVC/PX: CPT

## 2024-05-16 PROCEDURE — 83605 ASSAY OF LACTIC ACID: CPT | Performed by: INTERNAL MEDICINE

## 2024-05-16 PROCEDURE — 0202U NFCT DS 22 TRGT SARS-COV-2: CPT | Performed by: INTERNAL MEDICINE

## 2024-05-16 PROCEDURE — 84145 PROCALCITONIN (PCT): CPT

## 2024-05-16 PROCEDURE — 80074 ACUTE HEPATITIS PANEL: CPT | Performed by: INTERNAL MEDICINE

## 2024-05-16 PROCEDURE — 87484 EHRLICHA CHAFFEENSIS AMP PRB: CPT | Performed by: INTERNAL MEDICINE

## 2024-05-16 PROCEDURE — 36410 VNPNXR 3YR/> PHY/QHP DX/THER: CPT

## 2024-05-16 PROCEDURE — 86618 LYME DISEASE ANTIBODY: CPT | Performed by: INTERNAL MEDICINE

## 2024-05-16 PROCEDURE — 87468 ANAPLSMA PHGCYTOPHLM AMP PRB: CPT | Performed by: INTERNAL MEDICINE

## 2024-05-16 PROCEDURE — 25010000002 ONDANSETRON PER 1 MG: Performed by: INTERNAL MEDICINE

## 2024-05-16 PROCEDURE — 85379 FIBRIN DEGRADATION QUANT: CPT | Performed by: INTERNAL MEDICINE

## 2024-05-16 PROCEDURE — 82375 ASSAY CARBOXYHB QUANT: CPT

## 2024-05-16 PROCEDURE — 63710000001 INSULIN REGULAR HUMAN PER 5 UNITS: Performed by: INTERNAL MEDICINE

## 2024-05-16 PROCEDURE — 86140 C-REACTIVE PROTEIN: CPT

## 2024-05-16 PROCEDURE — 87798 DETECT AGENT NOS DNA AMP: CPT | Performed by: INTERNAL MEDICINE

## 2024-05-16 PROCEDURE — 25010000002 PIPERACILLIN SOD-TAZOBACTAM PER 1 G: Performed by: INTERNAL MEDICINE

## 2024-05-16 PROCEDURE — 36600 WITHDRAWAL OF ARTERIAL BLOOD: CPT

## 2024-05-16 PROCEDURE — 94761 N-INVAS EAR/PLS OXIMETRY MLT: CPT

## 2024-05-16 PROCEDURE — 25010000002 VANCOMYCIN 1 G RECONSTITUTED SOLUTION 1 EACH VIAL: Performed by: INTERNAL MEDICINE

## 2024-05-16 PROCEDURE — 83050 HGB METHEMOGLOBIN QUAN: CPT

## 2024-05-16 PROCEDURE — 86037 ANCA TITER EACH ANTIBODY: CPT | Performed by: INTERNAL MEDICINE

## 2024-05-16 PROCEDURE — 74178 CT ABD&PLV WO CNTR FLWD CNTR: CPT | Performed by: RADIOLOGY

## 2024-05-16 PROCEDURE — 74178 CT ABD&PLV WO CNTR FLWD CNTR: CPT

## 2024-05-16 PROCEDURE — 25810000003 SODIUM CHLORIDE 0.9 % SOLUTION: Performed by: INTERNAL MEDICINE

## 2024-05-16 PROCEDURE — 0 TECHNETIUM TC 99M PENTETATE INHALER: Performed by: INTERNAL MEDICINE

## 2024-05-16 PROCEDURE — 99232 SBSQ HOSP IP/OBS MODERATE 35: CPT

## 2024-05-16 PROCEDURE — 83516 IMMUNOASSAY NONANTIBODY: CPT | Performed by: INTERNAL MEDICINE

## 2024-05-16 PROCEDURE — G0432 EIA HIV-1/HIV-2 SCREEN: HCPCS | Performed by: INTERNAL MEDICINE

## 2024-05-16 PROCEDURE — 25510000001 IOPAMIDOL 61 % SOLUTION: Performed by: INTERNAL MEDICINE

## 2024-05-16 PROCEDURE — 85025 COMPLETE CBC W/AUTO DIFF WBC: CPT

## 2024-05-16 PROCEDURE — 82533 TOTAL CORTISOL: CPT | Performed by: INTERNAL MEDICINE

## 2024-05-16 PROCEDURE — 93970 EXTREMITY STUDY: CPT | Performed by: RADIOLOGY

## 2024-05-16 PROCEDURE — 80076 HEPATIC FUNCTION PANEL: CPT | Performed by: INTERNAL MEDICINE

## 2024-05-16 PROCEDURE — 84443 ASSAY THYROID STIM HORMONE: CPT | Performed by: INTERNAL MEDICINE

## 2024-05-16 PROCEDURE — 0 TECHNETIUM ALBUMIN AGGREGATED: Performed by: INTERNAL MEDICINE

## 2024-05-16 PROCEDURE — 80048 BASIC METABOLIC PNL TOTAL CA: CPT

## 2024-05-16 PROCEDURE — 25010000002 VANCOMYCIN 5 G RECONSTITUTED SOLUTION: Performed by: INTERNAL MEDICINE

## 2024-05-16 PROCEDURE — 71045 X-RAY EXAM CHEST 1 VIEW: CPT

## 2024-05-16 PROCEDURE — 25810000003 SODIUM CHLORIDE 0.9 % SOLUTION 250 ML FLEX CONT: Performed by: INTERNAL MEDICINE

## 2024-05-16 PROCEDURE — 93970 EXTREMITY STUDY: CPT

## 2024-05-16 PROCEDURE — C1751 CATH, INF, PER/CENT/MIDLINE: HCPCS

## 2024-05-16 PROCEDURE — 78582 LUNG VENTILAT&PERFUS IMAGING: CPT | Performed by: RADIOLOGY

## 2024-05-16 PROCEDURE — 25010000002 ONDANSETRON PER 1 MG: Performed by: HOSPITALIST

## 2024-05-16 PROCEDURE — 82948 REAGENT STRIP/BLOOD GLUCOSE: CPT

## 2024-05-16 PROCEDURE — 71045 X-RAY EXAM CHEST 1 VIEW: CPT | Performed by: RADIOLOGY

## 2024-05-16 RX ORDER — NICOTINE 21 MG/24HR
1 PATCH, TRANSDERMAL 24 HOURS TRANSDERMAL
Status: DISCONTINUED | OUTPATIENT
Start: 2024-05-16 | End: 2024-05-24 | Stop reason: HOSPADM

## 2024-05-16 RX ORDER — SODIUM CHLORIDE 0.9 % (FLUSH) 0.9 %
10 SYRINGE (ML) INJECTION AS NEEDED
Status: DISCONTINUED | OUTPATIENT
Start: 2024-05-16 | End: 2024-05-24 | Stop reason: HOSPADM

## 2024-05-16 RX ORDER — SODIUM CHLORIDE 9 MG/ML
40 INJECTION, SOLUTION INTRAVENOUS AS NEEDED
Status: DISCONTINUED | OUTPATIENT
Start: 2024-05-16 | End: 2024-05-24 | Stop reason: HOSPADM

## 2024-05-16 RX ORDER — VANCOMYCIN 2 GRAM/500 ML IN 0.9 % SODIUM CHLORIDE INTRAVENOUS
2000 ONCE
Status: COMPLETED | OUTPATIENT
Start: 2024-05-16 | End: 2024-05-16

## 2024-05-16 RX ORDER — SODIUM CHLORIDE 0.9 % (FLUSH) 0.9 %
10 SYRINGE (ML) INJECTION EVERY 12 HOURS SCHEDULED
Status: DISCONTINUED | OUTPATIENT
Start: 2024-05-16 | End: 2024-05-24 | Stop reason: HOSPADM

## 2024-05-16 RX ORDER — IPRATROPIUM BROMIDE AND ALBUTEROL SULFATE 2.5; .5 MG/3ML; MG/3ML
3 SOLUTION RESPIRATORY (INHALATION)
Status: DISCONTINUED | OUTPATIENT
Start: 2024-05-16 | End: 2024-05-23

## 2024-05-16 RX ADMIN — FLUOXETINE HYDROCHLORIDE 10 MG: 10 CAPSULE ORAL at 08:33

## 2024-05-16 RX ADMIN — Medication 10 ML: at 20:33

## 2024-05-16 RX ADMIN — Medication 10 ML: at 20:32

## 2024-05-16 RX ADMIN — PIPERACILLIN SODIUM AND TAZOBACTAM SODIUM 3.38 G: 3; .375 INJECTION, POWDER, LYOPHILIZED, FOR SOLUTION INTRAVENOUS at 15:52

## 2024-05-16 RX ADMIN — NICOTINE 1 PATCH: 14 PATCH, EXTENDED RELEASE TRANSDERMAL at 14:17

## 2024-05-16 RX ADMIN — VANCOMYCIN HYDROCHLORIDE 1000 MG: 1 INJECTION, POWDER, FOR SOLUTION INTRAVENOUS at 20:32

## 2024-05-16 RX ADMIN — Medication 10 ML: at 08:32

## 2024-05-16 RX ADMIN — ONDANSETRON 4 MG: 2 INJECTION INTRAMUSCULAR; INTRAVENOUS at 17:18

## 2024-05-16 RX ADMIN — IPRATROPIUM BROMIDE AND ALBUTEROL SULFATE 3 ML: 2.5; .5 SOLUTION RESPIRATORY (INHALATION) at 19:34

## 2024-05-16 RX ADMIN — DOXYCYCLINE 100 MG: 100 INJECTION, POWDER, LYOPHILIZED, FOR SOLUTION INTRAVENOUS at 14:18

## 2024-05-16 RX ADMIN — ACETAMINOPHEN 650 MG: 325 TABLET ORAL at 02:35

## 2024-05-16 RX ADMIN — PIPERACILLIN SODIUM AND TAZOBACTAM SODIUM 3.38 G: 3; .375 INJECTION, POWDER, LYOPHILIZED, FOR SOLUTION INTRAVENOUS at 08:33

## 2024-05-16 RX ADMIN — ACETAMINOPHEN 650 MG: 325 TABLET ORAL at 11:00

## 2024-05-16 RX ADMIN — SODIUM CHLORIDE 100 ML/HR: 9 INJECTION, SOLUTION INTRAVENOUS at 03:41

## 2024-05-16 RX ADMIN — KIT FOR THE PREPARATION OF TECHNETIUM TC 99M ALBUMIN AGGREGATED 1 DOSE: 2.5 INJECTION, POWDER, FOR SOLUTION INTRAVENOUS at 14:05

## 2024-05-16 RX ADMIN — IOPAMIDOL 70 ML: 612 INJECTION, SOLUTION INTRAVENOUS at 08:22

## 2024-05-16 RX ADMIN — ONDANSETRON 4 MG: 2 INJECTION INTRAMUSCULAR; INTRAVENOUS at 02:35

## 2024-05-16 RX ADMIN — CETIRIZINE HYDROCHLORIDE 10 MG: 10 TABLET, FILM COATED ORAL at 08:33

## 2024-05-16 RX ADMIN — MUPIROCIN 1 APPLICATION: 20 OINTMENT TOPICAL at 20:32

## 2024-05-16 RX ADMIN — OXYCODONE HYDROCHLORIDE 5 MG: 10 TABLET ORAL at 15:52

## 2024-05-16 RX ADMIN — CARIPRAZINE 3 MG: 3 CAPSULE, GELATIN COATED ORAL at 08:34

## 2024-05-16 RX ADMIN — PIPERACILLIN SODIUM AND TAZOBACTAM SODIUM 3.38 G: 3; .375 INJECTION, POWDER, LYOPHILIZED, FOR SOLUTION INTRAVENOUS at 22:04

## 2024-05-16 RX ADMIN — VANCOMYCIN HYDROCHLORIDE 2000 MG: 5 INJECTION, POWDER, LYOPHILIZED, FOR SOLUTION INTRAVENOUS at 11:00

## 2024-05-16 RX ADMIN — KIT FOR THE PREPARATION OF TECHNETIUM TC 99M PENTETATE 1 DOSE: 20 INJECTION, POWDER, LYOPHILIZED, FOR SOLUTION INTRAVENOUS; RESPIRATORY (INHALATION) at 13:50

## 2024-05-16 RX ADMIN — OXYCODONE HYDROCHLORIDE 5 MG: 10 TABLET ORAL at 20:31

## 2024-05-16 RX ADMIN — INSULIN HUMAN 2 UNITS: 100 INJECTION, SOLUTION PARENTERAL at 11:05

## 2024-05-16 RX ADMIN — ACETAMINOPHEN 650 MG: 325 TABLET ORAL at 18:55

## 2024-05-16 RX ADMIN — OXYCODONE HYDROCHLORIDE 5 MG: 10 TABLET ORAL at 03:37

## 2024-05-16 NOTE — PROGRESS NOTES
Patient Identification:  Name:  Manda Al  Age:  43 y.o.  Sex:  female  :  1980  MRN:  [id number]  Visit Number:  [id number]  Primary Care Provider:  Mi Rivera MD    Length of stay:  4    Subjective/Interval History/Consultants/Procedures     Chief Complaint:   Chief Complaint   Patient presents with    Flank Pain       Subjective/Interval History:    43 y.o. female who was admitted on 2024 with acute pancreatitis     PMH is significant for anxiety, schizoaffective disorder- bipolar type, HTN, Crohn's disease   For complete admission information, please see history and physical.     Consultations:      Procedures/Scans:  CT abdomen and pelvis w & w/o contrast x 2  CXR  VQ scan  Bilateral lower extremity venous Doppler US    Today, the patient was seen and examined in the PCU remains ill-appearing but was in no distress.  Reports some persisting mid upper back pain, no further abdominal pain.  No difficulty with urination or decreased urine output.  No diarrhea.  She denies shortness of breath or cough.  On further discussion patient did report that she removed a tick from her left hip about a week prior to admission.  No rash in the area but does have a healing scabbed over area.  Was again febrile overnight to 101.7 with no further fever today.    Room location at the time of evaluation was [facility].    ----------------------------------------------------------------------------------------------------------------------  Current Hospital Meds:  Cariprazine HCl, 3 mg, Oral, Daily  cetirizine, 10 mg, Oral, Daily  doxycycline, 100 mg, Intravenous, Q12H  FLUoxetine, 10 mg, Oral, Daily  insulin regular, 2-7 Units, Subcutaneous, Q6H  metoprolol succinate XL, 200 mg, Oral, Daily  mupirocin, 1 application , Each Nare, BID  nicotine, 1 patch, Transdermal, Q24H  piperacillin-tazobactam, 3.375 g, Intravenous, Q8H  sodium chloride, 500 mL, Intravenous, Once  sodium chloride, 10 mL, Intravenous,  Q12H  sodium chloride, 10 mL, Intravenous, Q12H  sodium chloride, 10 mL, Intravenous, Q12H  vancomycin, 1,000 mg, Intravenous, Q12H      sodium chloride, 125 mL/hr, Last Rate: 125 mL/hr (05/16/24 1001)      ----------------------------------------------------------------------------------------------------------------------      Objective     Vital Signs:  Temp:  [98.1 °F (36.7 °C)-101.7 °F (38.7 °C)] 98.1 °F (36.7 °C)  Heart Rate:  [70-96] 71  Resp:  [12-24] 24  BP: ()/(51-74) 109/66      05/14/24  0500 05/15/24  0500 05/16/24  0300   Weight: 99.6 kg (219 lb 9.6 oz) (Rn Aware.) 100 kg (220 lb 6.4 oz) 101 kg (221 lb 14.4 oz)     Body mass index is 33.74 kg/m².    Intake/Output Summary (Last 24 hours) at 5/16/2024 1626  Last data filed at 5/16/2024 1552  Gross per 24 hour   Intake 2340 ml   Output 480 ml   Net 1860 ml     I/O this shift:  In: 1080 [P.O.:480; IV Piggyback:600]  Out: 480 [Urine:480]  Diet: Liquid; Clear Liquid; Fluid Consistency: Thin (IDDSI 0)  ----------------------------------------------------------------------------------------------------------------------    Physical Exam  Vitals and nursing note reviewed.   Constitutional:       General: She is not in acute distress.     Appearance: She is obese.   HENT:      Head: Normocephalic and atraumatic.   Eyes:      Extraocular Movements: Extraocular movements intact.   Cardiovascular:      Rate and Rhythm: Normal rate and regular rhythm.   Pulmonary:      Effort: Pulmonary effort is normal.      Breath sounds: Normal breath sounds.   Abdominal:      Palpations: Abdomen is soft.   Musculoskeletal:      Right lower leg: No edema.      Left lower leg: No edema.   Skin:     General: Skin is warm and dry.      Comments: Scabbed area without surrounding rash to left gluteus where patient states he removed a tick about 1 week ago   Neurological:      Mental Status: She is alert. Mental status is at baseline.   Psychiatric:         Mood and Affect: Mood  "normal.         Behavior: Behavior normal.                ----------------------------------------------------------------------------------------------------------------------  Tele:      ----------------------------------------------------------------------------------------------------------------------      Results from last 7 days   Lab Units 05/16/24  1034 05/16/24  0705 05/16/24  0221 05/15/24  0827 05/14/24  0559 05/13/24  0645 05/12/24 2021 05/12/24  1648   CRP mg/dL 9.82*  --   --   --   --   --   --  0.56*   LACTATE mmol/L  --  0.8  --   --   --   --  1.5  --    WBC 10*3/mm3  --   --  3.70 5.15 6.99   < >  --  8.89   HEMOGLOBIN g/dL  --   --  12.7 12.3 11.9*   < >  --  13.9   HEMATOCRIT %  --   --  36.2 35.4 35.0   < >  --  39.2   MCV fL  --   --  89.6 88.9 91.6   < >  --  87.1   MCHC g/dL  --   --  35.1 34.7 34.0   < >  --  35.5   PLATELETS 10*3/mm3  --   --  151 181 230   < >  --  308    < > = values in this interval not displayed.         Results from last 7 days   Lab Units 05/16/24  1034 05/16/24  0221 05/15/24  0827 05/14/24  0508 05/13/24  0645 05/13/24  0549 05/12/24  1648   SODIUM mmol/L  --  133* 131* 133* 137  --  138   POTASSIUM mmol/L  --  4.3 3.7 4.9 4.3  4.3  --  3.5   MAGNESIUM mg/dL  --   --   --   --   --  2.9* 1.7   CHLORIDE mmol/L  --  101 100 105 109*  --  105   CO2 mmol/L  --  21.5* 19.7* 18.6* 21.1*  --  21.6*   BUN mg/dL  --  6 3* 5* 5*  --  5*   CREATININE mg/dL  --  1.15* 1.14* 1.06* 0.92  --  0.94   CALCIUM mg/dL  --  7.9* 8.2* 8.3* 8.3*  --  8.8   GLUCOSE mg/dL  --  133* 163* 105* 135*  --  278*   ALBUMIN g/dL 2.3*  --  3.0*  --  2.9*  --  3.4*   BILIRUBIN mg/dL 0.4  --  0.3  --  0.2  --  0.3   ALK PHOS U/L 57  --  69  --  62  --  74   AST (SGOT) U/L 93*  --  32  --  27  --  23   ALT (SGPT) U/L 40*  --  17  --  17  --  16   Estimated Creatinine Clearance: 79.2 mL/min (A) (by C-G formula based on SCr of 1.15 mg/dL (H)).  No results found for: \"AMMONIA\"      Blood Culture " "  Date Value Ref Range Status   05/15/2024 No growth at 24 hours  Preliminary   05/15/2024 No growth at 24 hours  Preliminary   05/12/2024 No growth at 3 days  Preliminary   05/12/2024 No growth at 3 days  Preliminary   05/12/2024 No growth at 3 days  Preliminary     Urine Culture   Date Value Ref Range Status   05/12/2024 No growth  Final     No results found for: \"WOUNDCX\"  No results found for: \"STOOLCX\"  ----------------------------------------------------------------------------------------------------------------------  Imaging Results (Last 24 Hours)       Procedure Component Value Units Date/Time    NM Lung Ventilation Perfusion [[id number]] Collected: 05/16/24 1440     Updated: 05/16/24 1443    Narrative:      EXAM:    NM Lung Perfusion and Ventilation Scan     EXAM DATE:    5/16/2024 1:46 PM     CLINICAL HISTORY:    r/o PE; K85.90-Acute pancreatitis without necrosis or infection,  unspecified     TECHNIQUE:    Nuclear Medicine ventilation and perfusion images of the lungs were  obtained in multiple projections following radiopharmaceutical  inhalation followed by injection of Tc99m MAA.     COMPARISON:    No relevant prior studies available.     FINDINGS:    Ventilation:  Unremarkable as visualized.  No ventilation defects.    Perfusion:  Unremarkable as visualized.  No perfusion defects.       Impression:      Low probability of PE.        This report was finalized on 5/16/2024 2:41 PM by Dr. Juan Carlos Smith MD.       US Venous Doppler Lower Extremity Bilateral (duplex) [[id number]] Collected: 05/16/24 1249     Updated: 05/16/24 1252    Narrative:      EXAMINATION: US VENOUS DOPPLER LOWER EXTREMITY BILATERAL (DUPLEX)-      CLINICAL INDICATION:  r/o DVT; K85.90-Acute pancreatitis without  necrosis or infection, unspecified     TECHNIQUE: Multiplanar gray scale and Doppler vascular sonographic  imaging of the deep veins of  BILATERAL   lower extremity, without and  with compression.      COMPARISON: NONE    "   FINDINGS:   Deep Veins: The visualized deep veins demonstrate flow and are  compressible. No evidence of deep venous thrombosis.   Superficial veins: Unremarkable. Saphenofemoral junction is patent  without thrombus.  Soft tissues: Soft tissue edema is noted.       Impression:      No DVT.     This report was finalized on 5/16/2024 12:50 PM by Dr. Juan Carlos Smith MD.       CT Abdomen Pelvis With & Without Contrast [[id number]] Collected: 05/16/24 0839     Updated: 05/16/24 0903    Narrative:      EXAM:    CT Abdomen and Pelvis Without and With Intravenous Contrast     EXAM DATE:    5/16/2024 8:05 AM     CLINICAL HISTORY:    abdominal pain, SIRS, pancreatitis; K85.90-Acute pancreatitis without  necrosis or infection, unspecified     TECHNIQUE:    Axial computed tomography images of the abdomen and pelvis without and  with intravenous contrast.  Sagittal and coronal reformatted images were  created and reviewed.  This CT exam was performed using one or more of  the following dose reduction techniques:  automated exposure control,  adjustment of the mA and/or kV according to patient size, and/or use of  iterative reconstruction technique.     COMPARISON:    5/12/2024     FINDINGS:    Lung bases:  Unremarkable as visualized.  No mass.  No consolidation.      ABDOMEN:    Liver:  Diffuse fatty liver.    Gallbladder and bile ducts:  Cholecystectomy.  No ductal dilation.    Pancreas:  Previously described subtle inflammation near the head of  the pancreas has essentially resolved on CT.  No ductal dilation.  No  pancreatic pseudocyst.    Spleen:  Unremarkable as visualized.  No splenomegaly.    Adrenals:  Unremarkable as visualized.  No mass.    Kidneys and ureters:  Bilateral renal cortical scarring record of the  left kidneys.  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 No hydronephrosis.    Stomach and bowel:  Scattered air-fluid levels throughout nondilated  small and large  intestine which is nonspecific. No bowel obstruction.   No mucosal thickening.      PELVIS:    Appendix:  No findings to suggest acute appendicitis.    Bladder:  Distended urinary bladder without wall thickening.  No  stones.    Reproductive:  Unremarkable as visualized.      ABDOMEN and PELVIS:    Intraperitoneal space:  Unremarkable as visualized.  No free air.  No  ascites or abscess.    Bones/joints:  Degenerative changes lower lumbar spine.  No acute  fracture.  No dislocation.    Soft tissues:  Tubulization clips noted.    Vasculature:  Unremarkable as visualized.  No abdominal aortic  aneurysm.    Lymph nodes:  Unremarkable as visualized.  No enlarged lymph nodes.       Impression:      1.  No inflammatory changes identified involving the pancreas on current  exam.  2.  No pancreatic pseudocyst.  3.  No ascites or free air.  4.  Fatty liver.  5.  Cholecystectomy.  6.  Nonspecific but nonobstructive bowel gas pattern.  7.  Mild volume overload changes with cardiomegaly, interstitial edema,  and trace effusions.  8.  Otherwise stable exam with other nonacute findings as detailed  above.        This report was finalized on 5/16/2024 9:01 AM by Dr. Juan Carlos Smith MD.             ----------------------------------------------------------------------------------------------------------------------   I have reviewed the above laboratory values for 05/16/24    Assessment/Plan     Active Hospital Problems    Diagnosis  POA    **Acute pancreatitis [K85.90]  Yes         ASSESSMENT/PLAN:    Acute, recurrent,  pancreatitis  Continue supportive care measures- transitioned to PO oxycodone PRN.  Advanced diet to CLD and tolerated OK- further advance to fulls.   Lipase repeated and downtrending.   Replace electrolytes as needed per protocol   Home chlorthalidone held on admission given association with pancreatitis.   Abdominal pain resolved, back pain resolving.   Continue supportive care measures  Repeat scan without  inflammation involving the pancreas, no pseudocyst    SIRS, uncertain source  Transaminitis  Concern for tickborne illness  Patient with new fevers starting 5/14, later developed intermittent tachycardia and soft BP-Flu/Covid negative, CXR neg, UA negative. No leukocytosis on CBC. Repeat blood cultures pending- NGTD  CT abdomen repeated and unremarkable, resolved/resolving pancreatitis as above  BLE doppler US ruled out DVT, VQ scan low probability   Continue empiric vanc/zosyn- patient reports recent tick exposure and will cover with doxycycline as well. LFTs notable for AST 93, ALT 40  Tick panel sent   NS IVF continued Supportive care for fevers.     Abnormal UA  Recent UTI, s/p antibiotic course  Received rocephin in the ED and urine culture negative. Patient reports recently completed course of bactrim, flagyl in the outpatient setting as well.  Does report  itching consistent with previous yeast infections. Diflucan x1 dose and repeat in 72 hours if symptoms persist- reports improvement on follow up      Chronic:   Anxiety/depression  Schizoaffective disorder- bipolar type  HTN  Crohn's disease  Home meds resumed as indicated  Monitor VS  Chlorthalidone held as above      -----------  -DVT prophylaxis: SCDs  -Disposition plans/anticipated needs: Pending course and further work up         The patient is high risk due to the following diagnoses/reasons:  Acute pancreatitis, SIRS        Brenton Alves PA-C  05/16/24  16:26 EDT

## 2024-05-16 NOTE — PROGRESS NOTES
Pharmacokinetics Service Note:    Manda Al is a 43 y.o. female being evaluated by Pharmacy for vancomycin dosing.    Indication for Therapy: sepsis  Currently Estimated Renal Function: ClCr 79 mL/min using serum Cr of 1.15 mg/dL  Proposed Empiric Regimen Using Predicted Pharmacokinetic Parameters and Demographics: 2000 mg x 1 then 1000 mg every 12 hours  Duration of Therapy Ordered: 5 days  Target Steady State AUC Range: 501 mg/L*hr  PAUC: 73%    Monitoring Planned: Will plan to check a level prior to the third dose in the morning or earlier if clinically indicated.     Jasper Eastman, Sarina  05/16/24  07:21 EDT

## 2024-05-16 NOTE — PLAN OF CARE
Goal Outcome Evaluation:  Plan of Care Reviewed With: patient        Progress: no change  Outcome Evaluation: Patient has been resting in bed throughout the night. pt had complaints of pain and was running a fever, see mar. con't IV fluid. will continue with plan of care.

## 2024-05-16 NOTE — CONSULTS
"Assessment:  Diagnosis: Acute Pancreatitis    Allergies   Allergen Reactions    Imuran [Azathioprine] Other (See Comments)     Pt reports this medication caused her to have pancreatitis.        Order Date/Time: 5/16/2024 1325  Indications: iv abx; difficult access  LABS:  No results found for: \"INR\", \"PROTIME\"  No results found for: \"PTT\"  Lab Results   Component Value Date    WBC 3.70 05/16/2024    HGB 12.7 05/16/2024    HCT 36.2 05/16/2024    MCV 89.6 05/16/2024     05/16/2024     Lab Results   Component Value Date    BUN 6 05/16/2024     Lab Results   Component Value Date    CREATININE 1.15 (H) 05/16/2024     Lab Results   Component Value Date    EGFRIFNONA 93 08/17/2020     Labs Reviewed: all labs reviewed    Contraindications for PICC/Midline:  No contraindications noted    Recommendations:  PowerGlide Pro Midline Catheter; 18 g; 10 cm  Upper Right Brachial    Procedure Time Out:  Time out Time: 1525  Correct Patient Identity: Yes  Correct Surgical Side and Site Are Marked: Yes  Agreement on Procedure to be done: Yes  Antibiotic Given: N/A  RN: OSMANI Leal RN    PowerGlide Pro Midline Catheter placed with ultrasound guidance and verified by blood return. Minimal blood loss noted. Catheter flushes easily. Blood return noted. Patient nurse, Donna STEELE, made aware that midline is in upper R arm and ready for use.      Fransisca Leal RN    "

## 2024-05-16 NOTE — NURSING NOTE
"Patient moved to PCU room 203 with all her belongs.  Patient stated \"already text her family about transfer\".  "

## 2024-05-16 NOTE — PLAN OF CARE
Goal Outcome Evaluation:  Plan of Care Reviewed With: patient           Outcome Evaluation: pt transfered to PCU during shift. pt resting in bed at this time. Bp soft otherwise VSS. Pt on RA. tolerating well. No request at this time. Will continue to follow plan of care til 7p.

## 2024-05-17 LAB
ALBUMIN SERPL-MCNC: 2.5 G/DL (ref 3.5–5.2)
ALBUMIN/GLOB SERPL: 0.9 G/DL
ALP SERPL-CCNC: 73 U/L (ref 39–117)
ALT SERPL W P-5'-P-CCNC: 44 U/L (ref 1–33)
ANION GAP SERPL CALCULATED.3IONS-SCNC: 10 MMOL/L (ref 5–15)
ANION GAP SERPL CALCULATED.3IONS-SCNC: 10.8 MMOL/L (ref 5–15)
AST SERPL-CCNC: 87 U/L (ref 1–32)
BACTERIA SPEC AEROBE CULT: NORMAL
BASOPHILS # BLD MANUAL: 0.04 10*3/MM3 (ref 0–0.2)
BASOPHILS NFR BLD MANUAL: 1 % (ref 0–1.5)
BILIRUB SERPL-MCNC: 0.5 MG/DL (ref 0–1.2)
BUN SERPL-MCNC: 5 MG/DL (ref 6–20)
BUN SERPL-MCNC: 6 MG/DL (ref 6–20)
BUN/CREAT SERPL: 5 (ref 7–25)
BUN/CREAT SERPL: 6.1 (ref 7–25)
CALCIUM SPEC-SCNC: 7.3 MG/DL (ref 8.6–10.5)
CALCIUM SPEC-SCNC: 7.5 MG/DL (ref 8.6–10.5)
CHLORIDE SERPL-SCNC: 105 MMOL/L (ref 98–107)
CHLORIDE SERPL-SCNC: 99 MMOL/L (ref 98–107)
CO2 SERPL-SCNC: 18.2 MMOL/L (ref 22–29)
CO2 SERPL-SCNC: 20 MMOL/L (ref 22–29)
CREAT SERPL-MCNC: 0.99 MG/DL (ref 0.57–1)
CREAT SERPL-MCNC: 1 MG/DL (ref 0.57–1)
DEPRECATED RDW RBC AUTO: 42.9 FL (ref 37–54)
EGFRCR SERPLBLD CKD-EPI 2021: 72.3 ML/MIN/1.73
EGFRCR SERPLBLD CKD-EPI 2021: 73.2 ML/MIN/1.73
ERYTHROCYTE [DISTWIDTH] IN BLOOD BY AUTOMATED COUNT: 13.2 % (ref 12.3–15.4)
ERYTHROCYTE [SEDIMENTATION RATE] IN BLOOD: 11 MM/HR (ref 0–20)
FIBRINOGEN PPP-MCNC: 313 MG/DL (ref 173–524)
FIBRINOGEN PPP-MCNC: 357 MG/DL (ref 173–524)
GLOBULIN UR ELPH-MCNC: 2.8 GM/DL
GLUCOSE BLDC GLUCOMTR-MCNC: 142 MG/DL (ref 70–130)
GLUCOSE BLDC GLUCOMTR-MCNC: 154 MG/DL (ref 70–130)
GLUCOSE BLDC GLUCOMTR-MCNC: 200 MG/DL (ref 70–130)
GLUCOSE BLDC GLUCOMTR-MCNC: 211 MG/DL (ref 70–130)
GLUCOSE SERPL-MCNC: 116 MG/DL (ref 65–99)
GLUCOSE SERPL-MCNC: 152 MG/DL (ref 65–99)
HAPTOGLOB SERPL-MCNC: 102 MG/DL (ref 30–200)
HCT VFR BLD AUTO: 32.4 % (ref 34–46.6)
HGB BLD-MCNC: 11.1 G/DL (ref 12–15.9)
INR PPP: 1.26 (ref 0.9–1.1)
LDH SERPL-CCNC: 449 U/L (ref 135–214)
LYMPHOCYTES # BLD MANUAL: 1.13 10*3/MM3 (ref 0.7–3.1)
LYMPHOCYTES NFR BLD MANUAL: 2 % (ref 5–12)
MCH RBC QN AUTO: 30.2 PG (ref 26.6–33)
MCHC RBC AUTO-ENTMCNC: 34.3 G/DL (ref 31.5–35.7)
MCV RBC AUTO: 88.3 FL (ref 79–97)
MONOCYTES # BLD: 0.07 10*3/MM3 (ref 0.1–0.9)
NEUTROPHILS # BLD AUTO: 2.29 10*3/MM3 (ref 1.7–7)
NEUTROPHILS NFR BLD MANUAL: 60 % (ref 42.7–76)
NEUTS BAND NFR BLD MANUAL: 5 % (ref 0–5)
PLAT MORPH BLD: NORMAL
PLATELET # BLD AUTO: 109 10*3/MM3 (ref 140–450)
PMV BLD AUTO: 10.5 FL (ref 6–12)
POTASSIUM SERPL-SCNC: 3.4 MMOL/L (ref 3.5–5.2)
POTASSIUM SERPL-SCNC: 4 MMOL/L (ref 3.5–5.2)
PROT SERPL-MCNC: 5.3 G/DL (ref 6–8.5)
PROTHROMBIN TIME: 15.9 SECONDS (ref 12.1–14.7)
RBC # BLD AUTO: 3.67 10*6/MM3 (ref 3.77–5.28)
RBC MORPH BLD: NORMAL
SODIUM SERPL-SCNC: 128 MMOL/L (ref 136–145)
SODIUM SERPL-SCNC: 135 MMOL/L (ref 136–145)
VANCOMYCIN TROUGH SERPL-MCNC: 14.3 MCG/ML (ref 5–20)
VARIANT LYMPHS NFR BLD MANUAL: 32 % (ref 19.6–45.3)
WBC NRBC COR # BLD AUTO: 3.52 10*3/MM3 (ref 3.4–10.8)

## 2024-05-17 PROCEDURE — 85652 RBC SED RATE AUTOMATED: CPT

## 2024-05-17 PROCEDURE — 94799 UNLISTED PULMONARY SVC/PX: CPT

## 2024-05-17 PROCEDURE — 25010000002 ONDANSETRON PER 1 MG: Performed by: INTERNAL MEDICINE

## 2024-05-17 PROCEDURE — 94664 DEMO&/EVAL PT USE INHALER: CPT

## 2024-05-17 PROCEDURE — 25010000002 KETOROLAC TROMETHAMINE PER 15 MG: Performed by: INTERNAL MEDICINE

## 2024-05-17 PROCEDURE — 25010000002 PIPERACILLIN SOD-TAZOBACTAM PER 1 G: Performed by: INTERNAL MEDICINE

## 2024-05-17 PROCEDURE — 83615 LACTATE (LD) (LDH) ENZYME: CPT | Performed by: INTERNAL MEDICINE

## 2024-05-17 PROCEDURE — 80053 COMPREHEN METABOLIC PANEL: CPT | Performed by: INTERNAL MEDICINE

## 2024-05-17 PROCEDURE — 25010000002 VANCOMYCIN 1 G RECONSTITUTED SOLUTION 1 EACH VIAL: Performed by: INTERNAL MEDICINE

## 2024-05-17 PROCEDURE — 85025 COMPLETE CBC W/AUTO DIFF WBC: CPT | Performed by: INTERNAL MEDICINE

## 2024-05-17 PROCEDURE — 85007 BL SMEAR W/DIFF WBC COUNT: CPT | Performed by: INTERNAL MEDICINE

## 2024-05-17 PROCEDURE — 82948 REAGENT STRIP/BLOOD GLUCOSE: CPT

## 2024-05-17 PROCEDURE — 80202 ASSAY OF VANCOMYCIN: CPT

## 2024-05-17 PROCEDURE — 85610 PROTHROMBIN TIME: CPT | Performed by: INTERNAL MEDICINE

## 2024-05-17 PROCEDURE — 25810000003 SODIUM CHLORIDE 0.9 % SOLUTION 250 ML FLEX CONT: Performed by: INTERNAL MEDICINE

## 2024-05-17 PROCEDURE — 83010 ASSAY OF HAPTOGLOBIN QUANT: CPT | Performed by: INTERNAL MEDICINE

## 2024-05-17 PROCEDURE — 85384 FIBRINOGEN ACTIVITY: CPT | Performed by: INTERNAL MEDICINE

## 2024-05-17 PROCEDURE — 99232 SBSQ HOSP IP/OBS MODERATE 35: CPT

## 2024-05-17 PROCEDURE — 63710000001 INSULIN REGULAR HUMAN PER 5 UNITS: Performed by: INTERNAL MEDICINE

## 2024-05-17 RX ORDER — POTASSIUM CHLORIDE 20 MEQ/1
40 TABLET, EXTENDED RELEASE ORAL EVERY 4 HOURS
Status: COMPLETED | OUTPATIENT
Start: 2024-05-17 | End: 2024-05-17

## 2024-05-17 RX ORDER — KETOROLAC TROMETHAMINE 30 MG/ML
30 INJECTION, SOLUTION INTRAMUSCULAR; INTRAVENOUS ONCE
Status: COMPLETED | OUTPATIENT
Start: 2024-05-17 | End: 2024-05-17

## 2024-05-17 RX ORDER — BUTALBITAL, ACETAMINOPHEN AND CAFFEINE 50; 325; 40 MG/1; MG/1; MG/1
2 TABLET ORAL ONCE
Status: DISCONTINUED | OUTPATIENT
Start: 2024-05-17 | End: 2024-05-17

## 2024-05-17 RX ORDER — BUTALBITAL, ACETAMINOPHEN AND CAFFEINE 50; 325; 40 MG/1; MG/1; MG/1
1 TABLET ORAL ONCE AS NEEDED
Status: COMPLETED | OUTPATIENT
Start: 2024-05-17 | End: 2024-05-17

## 2024-05-17 RX ORDER — BUTALBITAL, ACETAMINOPHEN AND CAFFEINE 50; 325; 40 MG/1; MG/1; MG/1
2 TABLET ORAL EVERY 4 HOURS PRN
Status: DISCONTINUED | OUTPATIENT
Start: 2024-05-17 | End: 2024-05-24 | Stop reason: HOSPADM

## 2024-05-17 RX ADMIN — IPRATROPIUM BROMIDE AND ALBUTEROL SULFATE 3 ML: 2.5; .5 SOLUTION RESPIRATORY (INHALATION) at 06:21

## 2024-05-17 RX ADMIN — ONDANSETRON 4 MG: 2 INJECTION INTRAMUSCULAR; INTRAVENOUS at 06:37

## 2024-05-17 RX ADMIN — MUPIROCIN 1 APPLICATION: 20 OINTMENT TOPICAL at 08:54

## 2024-05-17 RX ADMIN — Medication 10 ML: at 08:53

## 2024-05-17 RX ADMIN — BUTALBITAL, ACETAMINOPHEN AND CAFFEINE 1 TABLET: 325; 50; 40 TABLET ORAL at 14:44

## 2024-05-17 RX ADMIN — INSULIN HUMAN 3 UNITS: 100 INJECTION, SOLUTION PARENTERAL at 23:27

## 2024-05-17 RX ADMIN — DOXYCYCLINE 100 MG: 100 INJECTION, POWDER, LYOPHILIZED, FOR SOLUTION INTRAVENOUS at 12:46

## 2024-05-17 RX ADMIN — ACETAMINOPHEN 650 MG: 325 TABLET ORAL at 04:00

## 2024-05-17 RX ADMIN — DOXYCYCLINE 100 MG: 100 INJECTION, POWDER, LYOPHILIZED, FOR SOLUTION INTRAVENOUS at 01:33

## 2024-05-17 RX ADMIN — INSULIN HUMAN 2 UNITS: 100 INJECTION, SOLUTION PARENTERAL at 11:20

## 2024-05-17 RX ADMIN — Medication 10 ML: at 20:06

## 2024-05-17 RX ADMIN — CARIPRAZINE 3 MG: 3 CAPSULE, GELATIN COATED ORAL at 08:48

## 2024-05-17 RX ADMIN — INSULIN HUMAN 3 UNITS: 100 INJECTION, SOLUTION PARENTERAL at 17:44

## 2024-05-17 RX ADMIN — FLUOXETINE HYDROCHLORIDE 10 MG: 10 CAPSULE ORAL at 08:48

## 2024-05-17 RX ADMIN — BUTALBITAL, ACETAMINOPHEN AND CAFFEINE 2 TABLET: 325; 50; 40 TABLET ORAL at 20:05

## 2024-05-17 RX ADMIN — MUPIROCIN 1 APPLICATION: 20 OINTMENT TOPICAL at 20:05

## 2024-05-17 RX ADMIN — OXYCODONE HYDROCHLORIDE 5 MG: 10 TABLET ORAL at 22:09

## 2024-05-17 RX ADMIN — VANCOMYCIN HYDROCHLORIDE 1000 MG: 1 INJECTION, POWDER, FOR SOLUTION INTRAVENOUS at 08:58

## 2024-05-17 RX ADMIN — PIPERACILLIN SODIUM AND TAZOBACTAM SODIUM 3.38 G: 3; .375 INJECTION, POWDER, LYOPHILIZED, FOR SOLUTION INTRAVENOUS at 14:13

## 2024-05-17 RX ADMIN — KETOROLAC TROMETHAMINE 30 MG: 30 INJECTION, SOLUTION INTRAMUSCULAR at 08:48

## 2024-05-17 RX ADMIN — POTASSIUM CHLORIDE 40 MEQ: 1500 TABLET, EXTENDED RELEASE ORAL at 06:23

## 2024-05-17 RX ADMIN — Medication 10 ML: at 08:51

## 2024-05-17 RX ADMIN — Medication 10 ML: at 20:05

## 2024-05-17 RX ADMIN — NICOTINE 1 PATCH: 14 PATCH, EXTENDED RELEASE TRANSDERMAL at 08:55

## 2024-05-17 RX ADMIN — CETIRIZINE HYDROCHLORIDE 10 MG: 10 TABLET, FILM COATED ORAL at 08:48

## 2024-05-17 RX ADMIN — IPRATROPIUM BROMIDE AND ALBUTEROL SULFATE 3 ML: 2.5; .5 SOLUTION RESPIRATORY (INHALATION) at 12:11

## 2024-05-17 RX ADMIN — ONDANSETRON 4 MG: 2 INJECTION INTRAMUSCULAR; INTRAVENOUS at 22:09

## 2024-05-17 RX ADMIN — POTASSIUM CHLORIDE 40 MEQ: 1500 TABLET, EXTENDED RELEASE ORAL at 03:21

## 2024-05-17 RX ADMIN — PIPERACILLIN SODIUM AND TAZOBACTAM SODIUM 3.38 G: 3; .375 INJECTION, POWDER, LYOPHILIZED, FOR SOLUTION INTRAVENOUS at 22:05

## 2024-05-17 RX ADMIN — ACETAMINOPHEN 650 MG: 325 TABLET ORAL at 12:47

## 2024-05-17 RX ADMIN — PIPERACILLIN SODIUM AND TAZOBACTAM SODIUM 3.38 G: 3; .375 INJECTION, POWDER, LYOPHILIZED, FOR SOLUTION INTRAVENOUS at 06:20

## 2024-05-17 NOTE — PAYOR COMM NOTE
"CONTACT:  YUNI EHRNANDEZ RN  UTILIZATION MANAGEMENT DEPT.   [facility]   [address] [facility], [address]   PHONE:  [phone]   FAX: [phone]         CLINICAL UPDATES-EXTENDED DAYS REQUESTED    AUTH # [id number]            Aminata Al (43 y.o. Female)       Date of Birth   1980    Social Security Number       Address   [address] [place] [address]    Home Phone   [phone]    MRN   [id number]       Synagogue   None    Marital Status                               Admission Date   5/12/24    Admission Type   Emergency    Admitting Provider   Adin Sauceda MD    Attending Provider   Rohan Yee MD    Department, Room/Bed   [facility] PROGRESS CARE, P203/S2       Discharge Date       Discharge Disposition       Discharge Destination                                 Attending Provider: Rohan Yee MD    Allergies: Imuran [Azathioprine]    Isolation: None   Infection: None   Code Status: CPR    Ht: 172.7 cm (68\")   Wt: 106 kg (233 lb 7.5 oz)    Admission Cmt: None   Principal Problem: Acute pancreatitis [K85.90]                   Active Insurance as of 5/12/2024       Primary Coverage       Payor Plan Insurance Group Employer/Plan Group    [employer] [place] [employer] [place]ICA[place]        Payor Plan Address Payor Plan Phone Number Payor Plan Fax Number Effective Dates    [address] [address] [phone]  12/2/2016 - None Entered    [facility] [id number]         Subscriber Name Subscriber Birth Date Member ID       AMINATA AL 1980 [id number]                     Emergency Contacts        (Rel.) Home Phone Work Phone Mobile Phone    Itzel Ness (Mother) -- -- [phone]    Tran Yee (Relative) [phone] -- --              Facility-Administered Medications as of 5/17/2024   Medication Dose Route Frequency Provider Last Rate Last Admin    acetaminophen (TYLENOL) tablet 650 mg  650 mg Oral Q6H PRN Rohan Yee MD   650 " mg at 05/17/24 0400    sennosides-docusate (PERICOLACE) 8.6-50 MG per tablet 2 tablet  2 tablet Oral BID PRN Rohan Yee MD        And    polyethylene glycol (MIRALAX) packet 17 g  17 g Oral Daily PRN Rohan Yee MD        And    bisacodyl (DULCOLAX) EC tablet 5 mg  5 mg Oral Daily PRN Rohan Yee MD        And    bisacodyl (DULCOLAX) suppository 10 mg  10 mg Rectal Daily PRN Rohan Yee MD        Cariprazine HCl (VRAYLAR) capsule capsule 3 mg  3 mg Oral Daily Rohan Yee MD   3 mg at 05/17/24 0848    [COMPLETED] cefTRIAXone (ROCEPHIN) 1,000 mg in sodium chloride 0.9 % 100 mL IVPB-VTB  1,000 mg Intravenous Once Florinda Torres PA-C   Stopped at 05/12/24 1923    cetirizine (zyrTEC) tablet 10 mg  10 mg Oral Daily Rohan Yee MD   10 mg at 05/17/24 0848    dextrose (D50W) (25 g/50 mL) IV injection 25 g  25 g Intravenous Q15 Min PRN Rohan Yee MD        dextrose (GLUTOSE) oral gel 15 g  15 g Oral Q15 Min PRN Rohan Yee MD        doxycycline (VIBRAMYCIN) 100 mg in sodium chloride 0.9 % 100 mL IVPB-VTB  100 mg Intravenous Q12H Rohan Yee MD   100 mg at 05/17/24 0133    [COMPLETED] fluconazole (DIFLUCAN) tablet 150 mg  150 mg Oral Once Brenton Alves PA-C   150 mg at 05/13/24 1201    FLUoxetine (PROzac) capsule 10 mg  10 mg Oral Daily Rohan Yee MD   10 mg at 05/17/24 0848    glucagon HCl (Diagnostic) injection 1 mg  1 mg Intramuscular Q15 Min PRN Rohan Yee MD        [COMPLETED] HYDROmorphone (DILAUDID) injection 0.25 mg  0.25 mg Intravenous Once Neeraj Keith MD   0.25 mg at 05/12/24 1927    insulin regular (humuLIN R,novoLIN R) injection 2-7 Units  2-7 Units Subcutaneous Q6H Rohan Yee MD   2 Units at 05/16/24 1105    [COMPLETED] iopamidol (ISOVUE-300) 61 % injection 100 mL  100 mL Intravenous Once in imaging Neeraj Keith MD   85 mL at 05/12/24 1737    [COMPLETED] iopamidol (ISOVUE-300) 61 % injection 100 mL  100 mL Intravenous Once in imaging  Rohan Yee MD   70 mL at 05/16/24 0822    ipratropium-albuterol (DUO-NEB) nebulizer solution 3 mL  3 mL Nebulization 4x Daily - RT Rohan Yee MD   3 mL at 05/17/24 0621    [COMPLETED] ketorolac (TORADOL) injection 30 mg  30 mg Intravenous Once Rohan Yee MD   30 mg at 05/15/24 0944    [COMPLETED] ketorolac (TORADOL) injection 30 mg  30 mg Intravenous Once Rohan Yee MD   30 mg at 05/17/24 0848    Magnesium Cardiology Dose Replacement - Follow Nurse / BPA Driven Protocol   Does not apply PRN Rohan Yee MD        [COMPLETED] magnesium sulfate 4g/100mL (PREMIX) infusion  4 g Intravenous Once Adin Sauceda MD   4 g at 05/12/24 2243    metoprolol succinate XL (TOPROL-XL) 24 hr tablet 200 mg  200 mg Oral Daily Rohan Yee MD   200 mg at 05/13/24 0810    [COMPLETED] morphine injection 4 mg  4 mg Intravenous Once Neeraj Keith MD   4 mg at 05/12/24 1644    mupirocin (BACTROBAN) 2 % nasal ointment 1 Application  1 application  Each Nare BID Rohan Yee MD   1 Application at 05/17/24 0854    nicotine (NICODERM CQ) 14 MG/24HR patch 1 patch  1 patch Transdermal Q24H Rohan Yee MD   1 patch at 05/17/24 0855    [COMPLETED] ondansetron (ZOFRAN) injection 4 mg  4 mg Intravenous Once Neeraj Keith MD   4 mg at 05/12/24 1643    ondansetron (ZOFRAN) injection 4 mg  4 mg Intravenous Q6H PRN Rohan Yee MD   4 mg at 05/17/24 0637    oxyCODONE (ROXICODONE) immediate release tablet 5 mg  5 mg Oral Q4H PRN Rohan Yee MD   5 mg at 05/16/24 2031    [COMPLETED] piperacillin-tazobactam (ZOSYN) IVPB 3.375 g IVPB in 100 mL NS (VTB)  3.375 g Intravenous Once Rohan Yee MD   3.375 g at 05/16/24 0833    piperacillin-tazobactam (ZOSYN) IVPB 3.375 g IVPB in 100 mL NS (VTB)  3.375 g Intravenous Q8H Rohan Yee MD   3.375 g at 05/17/24 0620    [COMPLETED] potassium chloride (KLOR-CON M20) CR tablet 40 mEq  40 mEq Oral Q4H Adin Sauceda MD   40 mEq at 05/13/24 0127     [COMPLETED] potassium chloride (KLOR-CON M20) CR tablet 40 mEq  40 mEq Oral Q4H Rohan Yee MD   40 mEq at 05/17/24 0623    Potassium Replacement - Follow Nurse / BPA Driven Protocol   Does not apply PRN Rohan Yee MD        [COMPLETED] sodium chloride 0.9 % bolus 1,000 mL  1,000 mL Intravenous Once Florinda Torres PA-C   Stopped at 05/12/24 1905    sodium chloride 0.9 % bolus 500 mL  500 mL Intravenous Once Rohan Yee  mL/hr at 05/16/24 0706 Currently Infusing at 05/16/24 0706    sodium chloride 0.9 % flush 10 mL  10 mL Intravenous PRN Rohan Yee MD        sodium chloride 0.9 % flush 10 mL  10 mL Intravenous Q12H Rohan Yee MD   10 mL at 05/17/24 0853    sodium chloride 0.9 % flush 10 mL  10 mL Intravenous PRN Rohan Yee MD        sodium chloride 0.9 % flush 10 mL  10 mL Intravenous Q12H Rohan Yee MD   10 mL at 05/17/24 0853    sodium chloride 0.9 % flush 10 mL  10 mL Intravenous PRN Rohan Yee MD   10 mL at 05/14/24 0737    sodium chloride 0.9 % flush 10 mL  10 mL Intravenous Q12H Rohan Yee MD   10 mL at 05/17/24 0851    sodium chloride 0.9 % flush 10 mL  10 mL Intravenous PRN Rohan Yee MD        sodium chloride 0.9 % infusion 40 mL  40 mL Intravenous PRN Rohan Yee MD        sodium chloride 0.9 % infusion 40 mL  40 mL Intravenous PRN Rohan Yee MD        sodium chloride 0.9 % infusion 40 mL  40 mL Intravenous PRN Rohan Yee MD        [COMPLETED] technetium albumin aggregated (MAA) solution 1 dose  1 dose Intravenous Once in imaging Rohan Yee MD   1 dose at 05/16/24 1405    [COMPLETED] technetium Tc 99m pentetate injection inhaler 1 dose  1 dose Inhalation Once in imaging Rohan Yee MD   1 dose at 05/16/24 1350    vancomycin (VANCOCIN) 1,000 mg in sodium chloride 0.9 % 250 mL IVPB-VTB  1,000 mg Intravenous Q12H Rohan Yee  mL/hr at 05/17/24 0858 1,000 mg at 05/17/24 0858    [COMPLETED] vancomycin IVPB 2000 mg in  "0.9% Sodium Chloride 500 mL  2,000 mg Intravenous Once Rohan Yee  mL/hr at 05/16/24 1100 2,000 mg at 05/16/24 1100     Orders (last 48 hrs)        Start     Ordered    05/17/24 1400  Basic Metabolic Panel  Once         05/17/24 0941    05/17/24 1104  Potassium  Timed         05/17/24 0203    05/17/24 0942  Diet: Regular/House; Fluid Consistency: Thin (IDDSI 0)  Diet Effective Now         05/17/24 0942    05/17/24 0900  ketorolac (TORADOL) injection 30 mg  Once         05/17/24 0808    05/17/24 0800  Vancomycin, Trough  Timed        Placed in \"And\" Linked Group    05/16/24 0723    05/17/24 0712  Lactate Dehydrogenase  Once         05/17/24 0711    05/17/24 0712  Haptoglobin  Once         05/17/24 0711    05/17/24 0711  Fibrinogen  Once         05/17/24 0711    05/17/24 0700  A vancomycin trough level has been ordered prior to the 0900 dose 5/17. Draw level at 0800 on 5/17.  Hold dose if trough level is greater than 20 mcg/mL.  Nursing Communication  Once        Comments: A vancomycin trough level has been ordered prior to the 0900 dose 5/17. Draw level at 0800 on 5/17.  Hold dose if trough level is greater than 20 mcg/mL.   Placed in \"And\" Linked Group    05/16/24 0723    05/17/24 0614  POC Glucose Once  PROCEDURE ONCE        Comments: Complete no more than 45 minutes prior to patient eating      05/17/24 0606    05/17/24 0600  Sedimentation Rate  Morning Draw         05/16/24 0945    05/17/24 0600  CBC & Differential  Morning Draw         05/16/24 1046    05/17/24 0600  Comprehensive Metabolic Panel  Morning Draw         05/16/24 1046    05/17/24 0600  Protime-INR  Morning Draw         05/16/24 1046    05/17/24 0600  CBC Auto Differential  PROCEDURE ONCE         05/16/24 2202    05/17/24 0300  potassium chloride (KLOR-CON M20) CR tablet 40 mEq  Every 4 Hours         05/17/24 0203    05/17/24 0145  Manual Differential  Once         05/17/24 0144    05/17/24 0110  Scan Slide  Once,   Status:  Canceled      "    05/17/24 0109    05/16/24 2359  POC Glucose Once  PROCEDURE ONCE        Comments: Complete no more than 45 minutes prior to patient eating      05/16/24 2352    05/16/24 2310  POC Glucose Once  PROCEDURE ONCE        Comments: Complete no more than 45 minutes prior to patient eating      05/16/24 1714    05/16/24 2100  vancomycin (VANCOCIN) 1,000 mg in sodium chloride 0.9 % 250 mL IVPB-VTB  Every 12 Hours         05/16/24 0718    05/16/24 2100  sodium chloride 0.9 % flush 10 mL  Every 12 Hours Scheduled         05/16/24 1604    05/16/24 2100  mupirocin (BACTROBAN) 2 % nasal ointment 1 Application  2 Times Daily         05/16/24 1604    05/16/24 1931  Blood Gas, Arterial With Co-Ox  PROCEDURE ONCE         05/16/24 1926    05/16/24 1900  ipratropium-albuterol (DUO-NEB) nebulizer solution 3 mL  4 Times Daily - RT         05/16/24 1844    05/16/24 1846  Blood Gas, Arterial -With Co-Ox Panel: Yes  STAT         05/16/24 1845    05/16/24 1845  Respiratory Panel PCR w/COVID-19(SARS-CoV-2) SYED/JHONATHAN/TRUMAN/PAD/COR/KETURAH In-House, NP Swab in UTM/VTM, 2 HR TAT - Swab, Nasopharynx  Once         05/16/24 1845    05/16/24 1845  XR Chest 1 View  1 Time Imaging         05/16/24 1845    05/16/24 1812  Hepatitis Panel, Acute  Once         05/16/24 1754    05/16/24 1703  D-dimer, Quantitative  STAT         05/16/24 1702    05/16/24 1634  Diet: Liquid; Full Liquid; Fluid Consistency: Thin (IDDSI 0)  Diet Effective Now,   Status:  Canceled         05/16/24 1634    05/16/24 1605  Connectors / Hubs Must Be Scrubbed 15 Seconds Using 70% Alcohol Before Access - Allow to Dry Before Accessing Line  Continuous         05/16/24 1604    05/16/24 1605  Change CHG Dressing or Transparent Dressing with CHG Disk, Needleless Connectors and Securement Device Every 7 Days  Weekly        Comments: Per [facility] Policy    05/16/24 1604    05/16/24 1605  Discontinue mupirocin for decolonization after 5 days or sooner if patient no longer has a central venous access  device, accessed port, hemodialysis catheter, or midline  Continuous         05/16/24 1604    05/16/24 1604  sodium chloride 0.9 % infusion 40 mL  As Needed         05/16/24 1604    05/16/24 1604  sodium chloride 0.9 % flush 10 mL  As Needed         05/16/24 1604    05/16/24 1500  technetium Tc 99m pentetate injection inhaler 1 dose  Once in Imaging         05/16/24 1400    05/16/24 1405  technetium albumin aggregated (MAA) solution 1 dose  Once in Imaging         05/16/24 1402    05/16/24 1400  piperacillin-tazobactam (ZOSYN) IVPB 3.375 g IVPB in 100 mL NS (VTB)  Every 8 Hours         05/16/24 0703    05/16/24 1326  Midline Consult  Once        Provider:  (Not yet assigned)    05/16/24 1325    05/16/24 1300  doxycycline (VIBRAMYCIN) 100 mg in sodium chloride 0.9 % 100 mL IVPB-VTB  Every 12 Hours         05/16/24 1223    05/16/24 1227  ANCA Panel  Once         05/16/24 1226    05/16/24 1227  Ferritin  Once         05/16/24 1226    05/16/24 1226  TSH  Once         05/16/24 1225    05/16/24 1226  Cortisol  Once         05/16/24 1225    05/16/24 1223  HIV-1 & HIV-2 Antibodies  Once         05/16/24 1222    05/16/24 1223  HIV-1 / O / 2 Ag / Antibody  PROCEDURE ONCE         05/16/24 1222    05/16/24 1222  Ehrlichia Profile DNA PCR  Once         05/16/24 1222    05/16/24 1222  Rickettsia Species DNA, Real-Time PCR  Once         05/16/24 1222    05/16/24 1222  Lyme Disease Total Antibody With Reflex to Immunoassay  Once         05/16/24 1222    05/16/24 1220  NM Lung Ventilation Perfusion  1 Time Imaging         05/16/24 1219    05/16/24 1215  nicotine (NICODERM CQ) 14 MG/24HR patch 1 patch  Every 24 Hours Scheduled         05/16/24 1128    05/16/24 1212  Hepatic Function Panel  Once         05/16/24 1211    05/16/24 1209  US Venous Doppler Lower Extremity Bilateral (duplex)  1 Time Imaging         05/16/24 1209    05/16/24 1209  CT Angiogram Chest Pulmonary Embolism  1 Time Imaging,   Status:  Canceled         05/16/24  1209    05/16/24 1140  D-dimer, Quantitative  Once,   Status:  Canceled         05/16/24 0945    05/16/24 1110  POC Glucose Once  PROCEDURE ONCE        Comments: Complete no more than 45 minutes prior to patient eating      05/16/24 1103    05/16/24 1024  Procalcitonin  Once         05/16/24 0945    05/16/24 1024  C-reactive Protein  Once         05/16/24 0945    05/16/24 0915  iopamidol (ISOVUE-300) 61 % injection 100 mL  Once in Imaging         05/16/24 0822    05/16/24 0900  vancomycin IVPB 2000 mg in 0.9% Sodium Chloride 500 mL  Once         05/16/24 0718    05/16/24 0800  sodium chloride 0.9 % bolus 500 mL  Once         05/16/24 0702    05/16/24 0800  piperacillin-tazobactam (ZOSYN) IVPB 3.375 g IVPB in 100 mL NS (VTB)  Once         05/16/24 0703    05/16/24 0738  CT Abdomen Pelvis With & Without Contrast  1 Time Imaging         05/16/24 0737    05/16/24 0704  Transfer Patient  Once         05/16/24 0703    05/16/24 0703  Lactic Acid, Plasma  STAT         05/16/24 0702    05/16/24 0702  Pharmacy to dose vancomycin  Continuous PRN,   Status:  Discontinued         05/16/24 0703    05/16/24 0629  POC Glucose Once  PROCEDURE ONCE        Comments: Complete no more than 45 minutes prior to patient eating      05/16/24 0623    05/16/24 0600  CBC & Differential  Morning Draw         05/15/24 1036    05/16/24 0600  Basic Metabolic Panel  Morning Draw         05/15/24 1036    05/16/24 0600  CBC Auto Differential  PROCEDURE ONCE         05/15/24 2202    05/16/24 0531  POC Glucose Once  PROCEDURE ONCE        Comments: Complete no more than 45 minutes prior to patient eating      05/16/24 0524    05/15/24 2339  POC Glucose Once  PROCEDURE ONCE        Comments: Complete no more than 45 minutes prior to patient eating      05/15/24 2329    05/15/24 1631  POC Glucose Once  PROCEDURE ONCE        Comments: Complete no more than 45 minutes prior to patient eating      05/15/24 1617    05/15/24 1114  POC Glucose Once  PROCEDURE  "ONCE        Comments: Complete no more than 45 minutes prior to patient eating      05/15/24 1058    05/15/24 1030  ketorolac (TORADOL) injection 30 mg  Once         05/15/24 0933    05/15/24 0739  acetaminophen (TYLENOL) tablet 650 mg  Every 6 Hours PRN         05/15/24 0740    05/14/24 1144  oxyCODONE (ROXICODONE) immediate release tablet 5 mg  Every 4 Hours PRN         05/14/24 1145    05/13/24 2100  sodium chloride 0.9 % flush 10 mL  Every 12 Hours Scheduled         05/13/24 1408    05/13/24 1408  sodium chloride 0.9 % flush 10 mL  As Needed         05/13/24 1408    05/13/24 1408  sodium chloride 0.9 % infusion 40 mL  As Needed         05/13/24 1408    05/13/24 0900  Cariprazine HCl (VRAYLAR) capsule capsule 3 mg  Daily         05/12/24 2152 05/13/24 0900  cetirizine (zyrTEC) tablet 10 mg  Daily         05/12/24 2152 05/13/24 0900  FLUoxetine (PROzac) capsule 10 mg  Daily         05/12/24 2152 05/13/24 0900  metoprolol succinate XL (TOPROL-XL) 24 hr tablet 200 mg  Daily         05/12/24 2152 05/13/24 0800  Oral Care  2 Times Daily       05/12/24 2048 05/12/24 2145  sodium chloride 0.9 % flush 10 mL  Every 12 Hours Scheduled         05/12/24 2048 05/12/24 2145  sodium chloride 0.9 % infusion  Continuous,   Status:  Discontinued         05/12/24 2048 05/12/24 2057  ondansetron (ZOFRAN) injection 4 mg  Every 6 Hours PRN         05/12/24 2057 05/12/24 2049  Daily Weights  Daily       05/12/24 2048 05/12/24 2048  sodium chloride 0.9 % flush 10 mL  As Needed         05/12/24 2048 05/12/24 2048  sodium chloride 0.9 % infusion 40 mL  As Needed         05/12/24 2048 05/12/24 2048  sennosides-docusate (PERICOLACE) 8.6-50 MG per tablet 2 tablet  2 Times Daily PRN        Placed in \"And\" Linked Group    05/12/24 2048 05/12/24 2048  polyethylene glycol (MIRALAX) packet 17 g  Daily PRN        Placed in \"And\" Linked Group    05/12/24 2048 05/12/24 2048  bisacodyl (DULCOLAX) EC tablet 5 mg  " "Daily PRN        Placed in \"And\" Linked Group    05/12/24 2048 05/12/24 2048  bisacodyl (DULCOLAX) suppository 10 mg  Daily PRN        Placed in \"And\" Linked Group    05/12/24 2048 05/12/24 2000  insulin regular (humuLIN R,novoLIN R) injection 2-7 Units  Every 6 Hours Scheduled         05/12/24 1932 05/12/24 1933  POC Glucose Q6H  Every 6 Hours      Comments: Complete no more than 45 minutes prior to patient eating      05/12/24 1932 05/12/24 1931  dextrose (GLUTOSE) oral gel 15 g  Every 15 Minutes PRN         05/12/24 1932    05/12/24 1931  dextrose (D50W) (25 g/50 mL) IV injection 25 g  Every 15 Minutes PRN         05/12/24 1932 05/12/24 1931  glucagon HCl (Diagnostic) injection 1 mg  Every 15 Minutes PRN         05/12/24 1932 05/12/24 1931  Potassium Replacement - Follow Nurse / BPA Driven Protocol  As Needed         05/12/24 1931 05/12/24 1931  Magnesium Cardiology Dose Replacement - Follow Nurse / BPA Driven Protocol  As Needed         05/12/24 1931 05/12/24 1619  sodium chloride 0.9 % flush 10 mL  As Needed        Placed in \"And\" Linked Group    05/12/24 1619    Unscheduled  Up With Assistance  As Needed       05/12/24 2048    Unscheduled  Follow Hypoglycemia Standing Orders For Blood Glucose <70 & Notify Provider of Treatment  As Needed      Comments: Follow Hypoglycemia Orders As Outlined in Process Instructions (Open Order Report to View Full Instructions)  Notify Provider Any Time Hypoglycemia Treatment is Administered    05/12/24 1932    Unscheduled  Extravasation Standing Orders - Encourage Active Range of Motion After 48 Hours  As Needed       05/12/24 2159    Unscheduled  Change Dressing to IV Site As Needed When Damp, Loose or Soiled  As Needed       05/13/24 1408    Unscheduled  Change Needleless Connectors  As Needed      Comments: Change Needleless Connectors When:  - Administration Set Changed  - Dressing Changed  - Removed For Any Reason  - Residual Blood or Debris Within " Connector  - Prior to Drawing Blood Cultures  - Contamination of Connector  - After Administration of Blood or Blood Components    05/13/24 1408    Unscheduled  Change Dressing to IV Site As Needed When Damp, Loose or Soiled  As Needed       05/16/24 1604    Unscheduled  Change Needleless Connectors  As Needed      Comments: Change Needleless Connectors When:  - Administration Set Changed  - Dressing Changed  - Removed For Any Reason  - Residual Blood or Debris Within Connector  - Prior to Drawing Blood Cultures  - Contamination of Connector  - After Administration of Blood or Blood Components    05/16/24 1604    --  chlorthalidone (HYGROTON) 25 MG tablet  Daily         05/12/24 2055    --  metoprolol succinate XL (Toprol XL) 200 MG 24 hr tablet  Daily         05/12/24 2055    --  FLUoxetine (PROzac) 10 MG capsule  Daily         05/12/24 2055    --  metFORMIN ER (GLUCOPHAGE-XR) 500 MG 24 hr tablet  2 Times Daily         05/12/24 2055    --  Cariprazine HCl (Vraylar) 3 MG capsule capsule  Daily         05/12/24 2055    --  fexofenadine (ALLEGRA) 180 MG tablet  Daily         05/12/24 2055    --  magnesium oxide (MAG-OX) 400 MG tablet  Daily         05/12/24 2055    Pending  chlorthalidone (HYGROTEN) tablet 25 mg  Daily         Pending    Pending  metoprolol succinate XL (TOPROL-XL) 24 hr tablet 200 mg  Daily         Pending    Pending  FLUoxetine (PROzac) capsule 10 mg  Daily         Pending    Pending  Cariprazine HCl (VRAYLAR) capsule capsule 3 mg  Daily         Pending    Pending  magnesium oxide (MAG-OX) tablet 400 mg  Daily         Pending    Pending  cetirizine (zyrTEC) tablet 10 mg  Daily         Pending                     Physician Progress Notes (last 48 hours)        Brenton Alves PA-C at 05/16/24 1626       Attestation signed by Rohan Yee MD at 05/16/24 0989    I have reviewed this documentation and agree.    Patient hypotensive this AM. Fevers again overnight. BP improved with bolus.  Patient transferred to PCU. Extensive w/u sent to investigate SIRS without source. Patient denies any neck pain, headache today. Repeat CT abdomen/pelvis showed no evidence of residual pancreas inflammation making that less likely. Since d-dimer was over 20, obtained V/Q and LE duplex that ruled out VTE. AST/ALT slightly elevated today. Will add hepatitis panel but suspect tick borne illness, in particular ehrlichiosis. Started BS abx this morning with vanc/zosyn but have since added doxy. Tick borne serologies sent. HIV negative.                   Patient Identification:  Name:  Manda Al  Age:  43 y.o.  Sex:  female  :  1980  MRN:  [id number]  Visit Number:  [id number]  Primary Care Provider:  Mi Rivera MD    Length of stay:  4    Subjective/Interval History/Consultants/Procedures     Chief Complaint:   Chief Complaint   Patient presents with    Flank Pain       Subjective/Interval History:    43 y.o. female who was admitted on 2024 with acute pancreatitis     PMH is significant for anxiety, schizoaffective disorder- bipolar type, HTN, Crohn's disease   For complete admission information, please see history and physical.     Consultations:      Procedures/Scans:  CT abdomen and pelvis w & w/o contrast x 2  CXR  VQ scan  Bilateral lower extremity venous Doppler US    Today, the patient was seen and examined in the PCU remains ill-appearing but was in no distress.  Reports some persisting mid upper back pain, no further abdominal pain.  No difficulty with urination or decreased urine output.  No diarrhea.  She denies shortness of breath or cough.  On further discussion patient did report that she removed a tick from her left hip about a week prior to admission.  No rash in the area but does have a healing scabbed over area.  Was again febrile overnight to 101.7 with no further fever today.    Room location at the time of evaluation was  203.    ----------------------------------------------------------------------------------------------------------------------  Current Hospital Meds:  Cariprazine HCl, 3 mg, Oral, Daily  cetirizine, 10 mg, Oral, Daily  doxycycline, 100 mg, Intravenous, Q12H  FLUoxetine, 10 mg, Oral, Daily  insulin regular, 2-7 Units, Subcutaneous, Q6H  metoprolol succinate XL, 200 mg, Oral, Daily  mupirocin, 1 application , Each Nare, BID  nicotine, 1 patch, Transdermal, Q24H  piperacillin-tazobactam, 3.375 g, Intravenous, Q8H  sodium chloride, 500 mL, Intravenous, Once  sodium chloride, 10 mL, Intravenous, Q12H  sodium chloride, 10 mL, Intravenous, Q12H  sodium chloride, 10 mL, Intravenous, Q12H  vancomycin, 1,000 mg, Intravenous, Q12H      sodium chloride, 125 mL/hr, Last Rate: 125 mL/hr (05/16/24 1001)      ----------------------------------------------------------------------------------------------------------------------      Objective     Vital Signs:  Temp:  [98.1 °F (36.7 °C)-101.7 °F (38.7 °C)] 98.1 °F (36.7 °C)  Heart Rate:  [70-96] 71  Resp:  [12-24] 24  BP: ()/(51-74) 109/66      05/14/24  0500 05/15/24  0500 05/16/24  0300   Weight: 99.6 kg (219 lb 9.6 oz) (Rn Aware.) 100 kg (220 lb 6.4 oz) 101 kg (221 lb 14.4 oz)     Body mass index is 33.74 kg/m².    Intake/Output Summary (Last 24 hours) at 5/16/2024 1626  Last data filed at 5/16/2024 1552  Gross per 24 hour   Intake 2340 ml   Output 480 ml   Net 1860 ml     I/O this shift:  In: 1080 [P.O.:480; IV Piggyback:600]  Out: 480 [Urine:480]  Diet: Liquid; Clear Liquid; Fluid Consistency: Thin (IDDSI 0)  ----------------------------------------------------------------------------------------------------------------------    Physical Exam  Vitals and nursing note reviewed.   Constitutional:       General: She is not in acute distress.     Appearance: She is obese.   HENT:      Head: Normocephalic and atraumatic.   Eyes:      Extraocular Movements: Extraocular movements  intact.   Cardiovascular:      Rate and Rhythm: Normal rate and regular rhythm.   Pulmonary:      Effort: Pulmonary effort is normal.      Breath sounds: Normal breath sounds.   Abdominal:      Palpations: Abdomen is soft.   Musculoskeletal:      Right lower leg: No edema.      Left lower leg: No edema.   Skin:     General: Skin is warm and dry.      Comments: Scabbed area without surrounding rash to left gluteus where patient states he removed a tick about 1 week ago   Neurological:      Mental Status: She is alert. Mental status is at baseline.   Psychiatric:         Mood and Affect: Mood normal.         Behavior: Behavior normal.                ----------------------------------------------------------------------------------------------------------------------  Tele:      ----------------------------------------------------------------------------------------------------------------------      Results from last 7 days   Lab Units 05/16/24  1034 05/16/24  0705 05/16/24  0221 05/15/24  0827 05/14/24  0559 05/13/24  0645 05/12/24  2021 05/12/24  1648   CRP mg/dL 9.82*  --   --   --   --   --   --  0.56*   LACTATE mmol/L  --  0.8  --   --   --   --  1.5  --    WBC 10*3/mm3  --   --  3.70 5.15 6.99   < >  --  8.89   HEMOGLOBIN g/dL  --   --  12.7 12.3 11.9*   < >  --  13.9   HEMATOCRIT %  --   --  36.2 35.4 35.0   < >  --  39.2   MCV fL  --   --  89.6 88.9 91.6   < >  --  87.1   MCHC g/dL  --   --  35.1 34.7 34.0   < >  --  35.5   PLATELETS 10*3/mm3  --   --  151 181 230   < >  --  308    < > = values in this interval not displayed.         Results from last 7 days   Lab Units 05/16/24  1034 05/16/24  0221 05/15/24  0827 05/14/24  0508 05/13/24  0645 05/13/24  0549 05/12/24  1648   SODIUM mmol/L  --  133* 131* 133* 137  --  138   POTASSIUM mmol/L  --  4.3 3.7 4.9 4.3  4.3  --  3.5   MAGNESIUM mg/dL  --   --   --   --   --  2.9* 1.7   CHLORIDE mmol/L  --  101 100 105 109*  --  105   CO2 mmol/L  --  21.5* 19.7* 18.6*  "21.1*  --  21.6*   BUN mg/dL  --  6 3* 5* 5*  --  5*   CREATININE mg/dL  --  1.15* 1.14* 1.06* 0.92  --  0.94   CALCIUM mg/dL  --  7.9* 8.2* 8.3* 8.3*  --  8.8   GLUCOSE mg/dL  --  133* 163* 105* 135*  --  278*   ALBUMIN g/dL 2.3*  --  3.0*  --  2.9*  --  3.4*   BILIRUBIN mg/dL 0.4  --  0.3  --  0.2  --  0.3   ALK PHOS U/L 57  --  69  --  62  --  74   AST (SGOT) U/L 93*  --  32  --  27  --  23   ALT (SGPT) U/L 40*  --  17  --  17  --  16   Estimated Creatinine Clearance: 79.2 mL/min (A) (by C-G formula based on SCr of 1.15 mg/dL (H)).  No results found for: \"AMMONIA\"      Blood Culture   Date Value Ref Range Status   05/15/2024 No growth at 24 hours  Preliminary   05/15/2024 No growth at 24 hours  Preliminary   05/12/2024 No growth at 3 days  Preliminary   05/12/2024 No growth at 3 days  Preliminary   05/12/2024 No growth at 3 days  Preliminary     Urine Culture   Date Value Ref Range Status   05/12/2024 No growth  Final     No results found for: \"WOUNDCX\"  No results found for: \"STOOLCX\"  ----------------------------------------------------------------------------------------------------------------------  Imaging Results (Last 24 Hours)       Procedure Component Value Units Date/Time    NM Lung Ventilation Perfusion [[id number]] Collected: 05/16/24 1440     Updated: 05/16/24 1443    Narrative:      EXAM:    NM Lung Perfusion and Ventilation Scan     EXAM DATE:    5/16/2024 1:46 PM     CLINICAL HISTORY:    r/o PE; K85.90-Acute pancreatitis without necrosis or infection,  unspecified     TECHNIQUE:    Nuclear Medicine ventilation and perfusion images of the lungs were  obtained in multiple projections following radiopharmaceutical  inhalation followed by injection of Tc99m MAA.     COMPARISON:    No relevant prior studies available.     FINDINGS:    Ventilation:  Unremarkable as visualized.  No ventilation defects.    Perfusion:  Unremarkable as visualized.  No perfusion defects.       Impression:      Low " probability of PE.        This report was finalized on 5/16/2024 2:41 PM by Dr. Juan Carlos Smith MD.       US Venous Doppler Lower Extremity Bilateral (duplex) [[id number]] Collected: 05/16/24 1249     Updated: 05/16/24 1252    Narrative:      EXAMINATION: US VENOUS DOPPLER LOWER EXTREMITY BILATERAL (DUPLEX)-      CLINICAL INDICATION:  r/o DVT; K85.90-Acute pancreatitis without  necrosis or infection, unspecified     TECHNIQUE: Multiplanar gray scale and Doppler vascular sonographic  imaging of the deep veins of  BILATERAL   lower extremity, without and  with compression.      COMPARISON: NONE      FINDINGS:   Deep Veins: The visualized deep veins demonstrate flow and are  compressible. No evidence of deep venous thrombosis.   Superficial veins: Unremarkable. Saphenofemoral junction is patent  without thrombus.  Soft tissues: Soft tissue edema is noted.       Impression:      No DVT.     This report was finalized on 5/16/2024 12:50 PM by Dr. Juan Carlos Smith MD.       CT Abdomen Pelvis With & Without Contrast [[id number]] Collected: 05/16/24 0839     Updated: 05/16/24 0903    Narrative:      EXAM:    CT Abdomen and Pelvis Without and With Intravenous Contrast     EXAM DATE:    5/16/2024 8:05 AM     CLINICAL HISTORY:    abdominal pain, SIRS, pancreatitis; K85.90-Acute pancreatitis without  necrosis or infection, unspecified     TECHNIQUE:    Axial computed tomography images of the abdomen and pelvis without and  with intravenous contrast.  Sagittal and coronal reformatted images were  created and reviewed.  This CT exam was performed using one or more of  the following dose reduction techniques:  automated exposure control,  adjustment of the mA and/or kV according to patient size, and/or use of  iterative reconstruction technique.     COMPARISON:    5/12/2024     FINDINGS:    Lung bases:  Unremarkable as visualized.  No mass.  No consolidation.      ABDOMEN:    Liver:  Diffuse fatty liver.    Gallbladder and bile ducts:   Cholecystectomy.  No ductal dilation.    Pancreas:  Previously described subtle inflammation near the head of  the pancreas has essentially resolved on CT.  No ductal dilation.  No  pancreatic pseudocyst.    Spleen:  Unremarkable as visualized.  No splenomegaly.    Adrenals:  Unremarkable as visualized.  No mass.    Kidneys and ureters:  Bilateral renal cortical scarring record of the  left kidneys.  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 No hydronephrosis.    Stomach and bowel:  Scattered air-fluid levels throughout nondilated  small and large intestine which is nonspecific. No bowel obstruction.   No mucosal thickening.      PELVIS:    Appendix:  No findings to suggest acute appendicitis.    Bladder:  Distended urinary bladder without wall thickening.  No  stones.    Reproductive:  Unremarkable as visualized.      ABDOMEN and PELVIS:    Intraperitoneal space:  Unremarkable as visualized.  No free air.  No  ascites or abscess.    Bones/joints:  Degenerative changes lower lumbar spine.  No acute  fracture.  No dislocation.    Soft tissues:  Tubulization clips noted.    Vasculature:  Unremarkable as visualized.  No abdominal aortic  aneurysm.    Lymph nodes:  Unremarkable as visualized.  No enlarged lymph nodes.       Impression:      1.  No inflammatory changes identified involving the pancreas on current  exam.  2.  No pancreatic pseudocyst.  3.  No ascites or free air.  4.  Fatty liver.  5.  Cholecystectomy.  6.  Nonspecific but nonobstructive bowel gas pattern.  7.  Mild volume overload changes with cardiomegaly, interstitial edema,  and trace effusions.  8.  Otherwise stable exam with other nonacute findings as detailed  above.        This report was finalized on 5/16/2024 9:01 AM by Dr. Juan Carlos Smith MD.             ----------------------------------------------------------------------------------------------------------------------   I have reviewed the above  laboratory values for 05/16/24    Assessment/Plan     Active Hospital Problems    Diagnosis  POA    **Acute pancreatitis [K85.90]  Yes         ASSESSMENT/PLAN:    Acute, recurrent,  pancreatitis  Continue supportive care measures- transitioned to PO oxycodone PRN.  Advanced diet to [facility] and tolerated OK- further advance to fulls.   Lipase repeated and downtrending.   Replace electrolytes as needed per protocol   Home chlorthalidone held on admission given association with pancreatitis.   Abdominal pain resolved, back pain resolving.   Continue supportive care measures  Repeat scan without inflammation involving the pancreas, no pseudocyst    SIRS, uncertain source  Transaminitis  Concern for tickborne illness  Patient with new fevers starting 5/14, later developed intermittent tachycardia and soft BP-Flu/Covid negative, CXR neg, UA negative. No leukocytosis on CBC. Repeat blood cultures pending- NGTD  CT abdomen repeated and unremarkable, resolved/resolving pancreatitis as above  BLE doppler US ruled out DVT, VQ scan low probability   Continue empiric vanc/zosyn- patient reports recent tick exposure and will cover with doxycycline as well. LFTs notable for AST 93, ALT 40  Tick panel sent   NS IVF continued Supportive care for fevers.     Abnormal UA  Recent UTI, s/p antibiotic course  Received rocephin in the ED and urine culture negative. Patient reports recently completed course of bactrim, flagyl in the outpatient setting as well.  Does report  itching consistent with previous yeast infections. Diflucan x1 dose and repeat in 72 hours if symptoms persist- reports improvement on follow up      Chronic:   Anxiety/depression  Schizoaffective disorder- bipolar type  HTN  Crohn's disease  Home meds resumed as indicated  Monitor VS  Chlorthalidone held as above      -----------  -DVT prophylaxis: SCDs  -Disposition plans/anticipated needs: Pending course and further work up         The patient is high risk due to the  following diagnoses/reasons:  Acute pancreatitis, SIRS        Brentonleon Alves PA-C  24  16:26 EDT     Electronically signed by Rohan Yee MD at 24 1755       Brenton Alves PA-C at 05/15/24 1026       Attestation signed by Rohan Yee MD at 05/15/24 1047    I have reviewed this documentation and agree.    Fever overnight up to 102.9. Trended down this AM after tylenol. Patient still reporting some abdominal pain and back pain but no worse than yesterday. She notes a headache behind her eyes she has had before. No neck stiffness, neck pain, or light sensitivity. Ordered infectious w/u including chest x-ray, UA, CMP, CBC, blood cultures. May be from pancreas inflammation. Will give trial of toradol for headache that has improved with tylenol. No confusion, focal deficits, or meningeal signs one exam. Continue to closely monitor. Will go back to [facility] in case this is coming from pancreatitis.                   Patient Identification:  Name:  Manda Al  Age:  43 y.o.  Sex:  female  :  1980  MRN:  [id number]  Visit Number:  [id number]  Primary Care Provider:  Mi Rivera MD    Length of stay:  3    Subjective/Interval History/Consultants/Procedures     Chief Complaint:   Chief Complaint   Patient presents with    Flank Pain       Subjective/Interval History:    43 y.o. female who was admitted on 2024 with acute pancreatitis     PMH is significant for anxiety, schizoaffective disorder- bipolar type, HTN, Crohn's disease   For complete admission information, please see history and physical.     Consultations:      Procedures/Scans:  CT abdomen and pelvis w & w/o contrast   CXR    Today, the patient was seen and examined on 3N. She appeared ill but in no discomfort. She reports headache this AM without neck stiffness or rigidity. Febrile overnight. No diarrhea, shortness of breath or cough. No dysuria or difficulty with urination. Flu/Covid negative. Stated she  tolerated diet well yesterday. Abdominal pain seems to be resolved at this point, some mid upper back pain persisting but she states substantially improved. Had some mild nausea following meals.     Room location at the time of evaluation was 340b.    ----------------------------------------------------------------------------------------------------------------------  Current Hospital Meds:  Cariprazine HCl, 3 mg, Oral, Daily  cetirizine, 10 mg, Oral, Daily  FLUoxetine, 10 mg, Oral, Daily  insulin regular, 2-7 Units, Subcutaneous, Q6H  metoprolol succinate XL, 200 mg, Oral, Daily  sodium chloride, 10 mL, Intravenous, Q12H  sodium chloride, 10 mL, Intravenous, Q12H      sodium chloride, 100 mL/hr, Last Rate: 100 mL/hr (05/15/24 0944)      ----------------------------------------------------------------------------------------------------------------------      Objective     Vital Signs:  Temp:  [98.2 °F (36.8 °C)-102.9 °F (39.4 °C)] 98.4 °F (36.9 °C)  Heart Rate:  [] 99  Resp:  [16-18] 18  BP: ()/(61-71) 107/68      05/12/24  2036 05/14/24  0500 05/15/24  0500   Weight: 96.4 kg (212 lb 8.4 oz) 99.6 kg (219 lb 9.6 oz) (Rn Aware.) 100 kg (220 lb 6.4 oz)     Body mass index is 33.51 kg/m².    Intake/Output Summary (Last 24 hours) at 5/15/2024 1026  Last data filed at 5/15/2024 0843  Gross per 24 hour   Intake 2716 ml   Output --   Net 2716 ml     I/O this shift:  In: 240 [P.O.:240]  Out: -   Diet: Liquid; Clear Liquid; Fluid Consistency: Thin (IDDSI 0)  ----------------------------------------------------------------------------------------------------------------------    Physical Exam  Vitals and nursing note reviewed.   Constitutional:       Appearance: She is obese. She is ill-appearing.   HENT:      Head: Normocephalic and atraumatic.   Eyes:      Extraocular Movements: Extraocular movements intact.      Conjunctiva/sclera: Conjunctivae normal.   Cardiovascular:      Rate and Rhythm: Normal rate and  regular rhythm.   Pulmonary:      Effort: Pulmonary effort is normal.      Breath sounds: Normal breath sounds.   Abdominal:      Palpations: Abdomen is soft.      Tenderness: There is no abdominal tenderness.   Musculoskeletal:      Right lower leg: No edema.      Left lower leg: No edema.   Skin:     General: Skin is warm and dry.   Neurological:      Mental Status: She is alert. Mental status is at baseline.   Psychiatric:         Mood and Affect: Mood normal.         Behavior: Behavior normal.                ----------------------------------------------------------------------------------------------------------------------  Tele:      ----------------------------------------------------------------------------------------------------------------------      Results from last 7 days   Lab Units 05/15/24  0827 05/14/24  0559 05/13/24  0645 05/12/24 2021 05/12/24  1648   CRP mg/dL  --   --   --   --  0.56*   LACTATE mmol/L  --   --   --  1.5  --    WBC 10*3/mm3 5.15 6.99 8.06  --  8.89   HEMOGLOBIN g/dL 12.3 11.9* 11.4*  --  13.9   HEMATOCRIT % 35.4 35.0 34.3  --  39.2   MCV fL 88.9 91.6 91.5  --  87.1   MCHC g/dL 34.7 34.0 33.2  --  35.5   PLATELETS 10*3/mm3 181 230 243  --  308         Results from last 7 days   Lab Units 05/15/24  0827 05/14/24  0508 05/13/24  0645 05/13/24  0549 05/12/24  1648   SODIUM mmol/L 131* 133* 137  --  138   POTASSIUM mmol/L 3.7 4.9 4.3  4.3  --  3.5   MAGNESIUM mg/dL  --   --   --  2.9* 1.7   CHLORIDE mmol/L 100 105 109*  --  105   CO2 mmol/L 19.7* 18.6* 21.1*  --  21.6*   BUN mg/dL 3* 5* 5*  --  5*   CREATININE mg/dL 1.14* 1.06* 0.92  --  0.94   CALCIUM mg/dL 8.2* 8.3* 8.3*  --  8.8   GLUCOSE mg/dL 163* 105* 135*  --  278*   ALBUMIN g/dL 3.0*  --  2.9*  --  3.4*   BILIRUBIN mg/dL 0.3  --  0.2  --  0.3   ALK PHOS U/L 69  --  62  --  74   AST (SGOT) U/L 32  --  27  --  23   ALT (SGPT) U/L 17  --  17  --  16   Estimated Creatinine Clearance: 79.5 mL/min (A) (by C-G formula based on  "SCr of 1.14 mg/dL (H)).  No results found for: \"AMMONIA\"      Blood Culture   Date Value Ref Range Status   05/12/2024 No growth at 2 days  Preliminary   05/12/2024 No growth at 2 days  Preliminary   05/12/2024 No growth at 2 days  Preliminary     Urine Culture   Date Value Ref Range Status   05/12/2024 No growth  Final     No results found for: \"WOUNDCX\"  No results found for: \"STOOLCX\"  ----------------------------------------------------------------------------------------------------------------------  Imaging Results (Last 24 Hours)       Procedure Component Value Units Date/Time    XR Chest 1 View [[id number]] Collected: 05/15/24 0941     Updated: 05/15/24 0943    Narrative:      EXAM:    XR Chest, 1 View     EXAM DATE:    5/15/2024 8:43 AM     CLINICAL HISTORY:    fever; K85.90-Acute pancreatitis without necrosis or infection,  unspecified     TECHNIQUE:    Frontal view of the chest.     COMPARISON:    No relevant prior studies available.     FINDINGS:    Lungs and pleural spaces:  Unremarkable as visualized.  No  consolidation.  No pneumothorax.    Heart:  Unremarkable as visualized.  No cardiomegaly.    Mediastinum:  Unremarkable as visualized.  Normal mediastinal contour.    Bones/joints:  Unremarkable as visualized.  No acute fracture.       Impression:        Unremarkable exam. No acute cardiopulmonary findings identified.        This report was finalized on 5/15/2024 9:41 AM by Dr. Juan Carlos Smith MD.             ----------------------------------------------------------------------------------------------------------------------   I have reviewed the above laboratory values for 05/15/24    Assessment/Plan     Active Hospital Problems    Diagnosis  POA    **Acute pancreatitis [K85.90]  Yes         ASSESSMENT/PLAN:    Acute, recurrent,  pancreatitis  Continue supportive care measures- transitioned to PO oxycodone PRN.  Advanced diet to [facility] and tolerated OK.   Lipase repeated and downtrending.   Replace " electrolytes as needed per protocol   Home chlorthalidone held on admission given association with pancreatitis.   Abdominal pain resolved, back pain resolving. Unfortunately with new fevers overnight, Tmax 102.9. Now mildly tachycardic as well. Flu/Covid negative, CXR neg, UA negative. No leukocytosis on CBC. Repeat blood cultures pending. Fever potentially related to inflammatory response in the setting of pancreatitis.   Creatinine trend up, increase NS rate to 100 mL/h. Supportive care for fevers.  Repeat CBC/BMP in the AM     Abnormal UA  Recent UTI, s/p antibiotic course  Received rocephin in the ED and urine culture negative. Patient reports recently completed course of bactrim, flagyl in the outpatient setting as well. Hold further Abx for now  Does report  itching consistent with previous yeast infections. Diflucan x1 dose and repeat in 72 hours if symptoms persist- reports improvement on follow up      Chronic:   Anxiety/depression  Schizoaffective disorder- bipolar type  HTN  Crohn's disease  Home meds resumed as indicated  Monitor VS  Chlorthalidone held as above       -----------  -DVT prophylaxis: SCDs  -Disposition plans/anticipated needs: pending course.         The patient is high risk due to the following diagnoses/reasons:  Acute pancreatitis         Brenton Alves PA-C  05/15/24  10:26 EDT     Electronically signed by Rohan Yee MD at 05/15/24 1047       Consult Notes (last 48 hours)  Notes from 05/15/24 1010 through 05/17/24 1010   No notes of this type exist for this encounter.

## 2024-05-17 NOTE — PROGRESS NOTES
Patient Identification:  Name:  Manda Al  Age:  43 y.o.  Sex:  female  :  1980  MRN:  [id number]  Visit Number:  [id number]  Primary Care Provider:  Mi Rivera MD    Length of stay:  5    Subjective/Interval History/Consultants/Procedures     Chief Complaint:   Chief Complaint   Patient presents with    Flank Pain       Subjective/Interval History:    43 y.o. female who was admitted on 2024 with acute pancreatitis     PMH is significant for anxiety, schizoaffective disorder- bipolar type, HTN, Crohn's disease   For complete admission information, please see history and physical.     Consultations:      Procedures/Scans:  CT abdomen and pelvis w & w/o contrast x 2  CXR  VQ scan  Bilateral lower extremity venous Doppler US    Today, the patient was seen and examined in the PCU. She is ill appearing, diaphoretic. She reports overall feeling about the same today. Continues to have headaches, improved with AM dose toradol. Back pain persists as well, no abdominal pain but denies much of an appetite. No fever overnight or this AM.  Became more short of breath last evening but fluids held and improved. No new complaints today.    Room location at the time of evaluation was 203.    ----------------------------------------------------------------------------------------------------------------------  Current Hospital Meds:  Cariprazine HCl, 3 mg, Oral, Daily  cetirizine, 10 mg, Oral, Daily  doxycycline, 100 mg, Intravenous, Q12H  FLUoxetine, 10 mg, Oral, Daily  insulin regular, 2-7 Units, Subcutaneous, Q6H  ipratropium-albuterol, 3 mL, Nebulization, 4x Daily - RT  metoprolol succinate XL, 200 mg, Oral, Daily  mupirocin, 1 application , Each Nare, BID  nicotine, 1 patch, Transdermal, Q24H  piperacillin-tazobactam, 3.375 g, Intravenous, Q8H  sodium chloride, 500 mL, Intravenous, Once  sodium chloride, 10 mL, Intravenous, Q12H  sodium chloride, 10 mL, Intravenous, Q12H  sodium chloride, 10 mL,  Intravenous, Q12H  vancomycin, 1,000 mg, Intravenous, Q12H         ----------------------------------------------------------------------------------------------------------------------      Objective     Vital Signs:  Temp:  [97.6 °F (36.4 °C)-98.4 °F (36.9 °C)] 97.6 °F (36.4 °C)  Heart Rate:  [] 101  Resp:  [12-31] 31  BP: ()/() 104/72      05/15/24  0500 05/16/24  0300 05/17/24  0400   Weight: 100 kg (220 lb 6.4 oz) 101 kg (221 lb 14.4 oz) 106 kg (233 lb 7.5 oz)     Body mass index is 35.5 kg/m².    Intake/Output Summary (Last 24 hours) at 5/17/2024 1146  Last data filed at 5/17/2024 0620  Gross per 24 hour   Intake 2530 ml   Output 480 ml   Net 2050 ml     No intake/output data recorded.  Diet: Regular/House; Fluid Consistency: Thin (IDDSI 0)  ----------------------------------------------------------------------------------------------------------------------    Physical Exam  Vitals and nursing note reviewed.   Constitutional:       Appearance: She is obese. She is ill-appearing and diaphoretic.   HENT:      Head: Normocephalic and atraumatic.   Eyes:      Extraocular Movements: Extraocular movements intact.   Cardiovascular:      Rate and Rhythm: Normal rate and regular rhythm.   Pulmonary:      Effort: Pulmonary effort is normal.      Breath sounds: Normal breath sounds.   Abdominal:      Palpations: Abdomen is soft.      Tenderness: There is no abdominal tenderness.   Musculoskeletal:      Right lower leg: No edema.      Left lower leg: No edema.   Skin:     General: Skin is warm.   Neurological:      Mental Status: She is alert. Mental status is at baseline.   Psychiatric:         Mood and Affect: Mood normal.         Behavior: Behavior normal.                ----------------------------------------------------------------------------------------------------------------------  Tele:       ----------------------------------------------------------------------------------------------------------------------      Results from last 7 days   Lab Units 05/17/24 0036 05/16/24  1034 05/16/24  0705 05/16/24  0221 05/15/24  0827 05/13/24 0645 05/12/24 2021 05/12/24  1648   CRP mg/dL  --  9.82*  --   --   --   --   --  0.56*   LACTATE mmol/L  --   --  0.8  --   --   --  1.5  --    WBC 10*3/mm3 3.52  --   --  3.70 5.15   < >  --  8.89   HEMOGLOBIN g/dL 11.1*  --   --  12.7 12.3   < >  --  13.9   HEMATOCRIT % 32.4*  --   --  36.2 35.4   < >  --  39.2   MCV fL 88.3  --   --  89.6 88.9   < >  --  87.1   MCHC g/dL 34.3  --   --  35.1 34.7   < >  --  35.5   PLATELETS 10*3/mm3 109*  --   --  151 181   < >  --  308   INR  1.26*  --   --   --   --   --   --   --     < > = values in this interval not displayed.     Results from last 7 days   Lab Units 05/16/24  1926   PH, ARTERIAL pH units 7.467*   PO2 ART mm Hg 61.5*   PCO2, ARTERIAL mm Hg 26.9*   HCO3 ART mmol/L 19.4*     Results from last 7 days   Lab Units 05/17/24  0036 05/16/24  1034 05/16/24  0221 05/15/24  0827 05/13/24  0645 05/13/24  0549 05/12/24  1648   SODIUM mmol/L 128*  --  133* 131*   < >  --  138   POTASSIUM mmol/L 3.4*  --  4.3 3.7   < >  --  3.5   MAGNESIUM mg/dL  --   --   --   --   --  2.9* 1.7   CHLORIDE mmol/L 99  --  101 100   < >  --  105   CO2 mmol/L 18.2*  --  21.5* 19.7*   < >  --  21.6*   BUN mg/dL 5*  --  6 3*   < >  --  5*   CREATININE mg/dL 1.00  --  1.15* 1.14*   < >  --  0.94   CALCIUM mg/dL 7.3*  --  7.9* 8.2*   < >  --  8.8   GLUCOSE mg/dL 116*  --  133* 163*   < >  --  278*   ALBUMIN g/dL 2.5* 2.3*  --  3.0*   < >  --  3.4*   BILIRUBIN mg/dL 0.5 0.4  --  0.3   < >  --  0.3   ALK PHOS U/L 73 57  --  69   < >  --  74   AST (SGOT) U/L 87* 93*  --  32   < >  --  23   ALT (SGPT) U/L 44* 40*  --  17   < >  --  16    < > = values in this interval not displayed.   Estimated Creatinine Clearance: 93.4 mL/min (by C-G formula based on SCr  "of 1 mg/dL).  No results found for: \"AMMONIA\"      Blood Culture   Date Value Ref Range Status   05/15/2024 No growth at 2 days  Preliminary   05/15/2024 No growth at 2 days  Preliminary   05/12/2024 No growth at 4 days  Preliminary   05/12/2024 No growth at 4 days  Preliminary   05/12/2024 No growth at 4 days  Preliminary     Urine Culture   Date Value Ref Range Status   05/12/2024 No growth  Final     No results found for: \"WOUNDCX\"  No results found for: \"STOOLCX\"  ----------------------------------------------------------------------------------------------------------------------  Imaging Results (Last 24 Hours)       Procedure Component Value Units Date/Time    XR Chest 1 View [[id number]] Collected: 05/16/24 2118     Updated: 05/16/24 2121    Narrative:      PROCEDURE: Portable chest x-ray examination performed on May 16, 2024.  Single view. Upright position.     HISTORY: Shortness of breath.     COMPARISON: None.     FINDINGS:     Normal heart size  Mild central pulmonary vascular congestion.  Small bands of scar versus atelectasis at the lung bases.  No lobar consolidation or edema.  No pleural effusion or pneumothorax.  Mild hypoinflated lungs.  Mild dextroconvex curve at the mid thoracic spine.       Impression:         1.  Normal heart size.  2.  Mild central pulmonary vascular congestion.  3.  Small bands of scar versus atelectasis at the lung bases.  4.  No edema or pneumonia. No pleural effusion or pneumothorax.     This report was finalized on 5/16/2024 9:19 PM by Khadar Marie MD.       NM Lung Ventilation Perfusion [[id number]] Collected: 05/16/24 1440     Updated: 05/16/24 1443    Narrative:      EXAM:    NM Lung Perfusion and Ventilation Scan     EXAM DATE:    5/16/2024 1:46 PM     CLINICAL HISTORY:    r/o PE; K85.90-Acute pancreatitis without necrosis or infection,  unspecified     TECHNIQUE:    Nuclear Medicine ventilation and perfusion images of the lungs were  obtained in multiple projections " following radiopharmaceutical  inhalation followed by injection of Tc99m MAA.     COMPARISON:    No relevant prior studies available.     FINDINGS:    Ventilation:  Unremarkable as visualized.  No ventilation defects.    Perfusion:  Unremarkable as visualized.  No perfusion defects.       Impression:      Low probability of PE.        This report was finalized on 5/16/2024 2:41 PM by Dr. Juan Carlos Smith MD.       US Venous Doppler Lower Extremity Bilateral (duplex) [[id number]] Collected: 05/16/24 1249     Updated: 05/16/24 1252    Narrative:      EXAMINATION: US VENOUS DOPPLER LOWER EXTREMITY BILATERAL (DUPLEX)-      CLINICAL INDICATION:  r/o DVT; K85.90-Acute pancreatitis without  necrosis or infection, unspecified     TECHNIQUE: Multiplanar gray scale and Doppler vascular sonographic  imaging of the deep veins of  BILATERAL   lower extremity, without and  with compression.      COMPARISON: NONE      FINDINGS:   Deep Veins: The visualized deep veins demonstrate flow and are  compressible. No evidence of deep venous thrombosis.   Superficial veins: Unremarkable. Saphenofemoral junction is patent  without thrombus.  Soft tissues: Soft tissue edema is noted.       Impression:      No DVT.     This report was finalized on 5/16/2024 12:50 PM by Dr. Juan Carlos Smith MD.             ----------------------------------------------------------------------------------------------------------------------   I have reviewed the above laboratory values for 05/17/24    Assessment/Plan     Active Hospital Problems    Diagnosis  POA    **Acute pancreatitis [K85.90]  Yes         ASSESSMENT/PLAN:    Acute, recurrent,  pancreatitis  Continue supportive care measures- transitioned to PO oxycodone PRN.  Trial regular diet today.  Lipase repeated and downtrending.   Replace electrolytes as needed per protocol   Home chlorthalidone held on admission given association with pancreatitis.   Abdominal pain resolved, back pain resolving.    Continue supportive care measures  Repeat scan without inflammation involving the pancreas, no pseudocyst     SIRS, uncertain source  Transaminitis  Thrombocytopenia   Concern for tickborne illness  Patient with new fevers starting 5/14, later developed intermittent tachycardia and soft BP-Flu/Covid negative, CXR neg, UA negative. No leukocytosis on CBC. Repeat blood cultures pending- NGTD  CT abdomen repeated and unremarkable, resolved/resolving pancreatitis as above  BLE doppler US ruled out DVT, VQ scan low probability   Continue empiric vanc/zosyn- patient reports recent tick exposure and will cover with doxycycline as well. LFTs notable for AST 93, ALT 40- remain elevated but stable today. Now with thrombocytopenia as well.  Tick panel sent   Supportive care for fevers.- No further fever overnight.     Hyponatremia  Further fluid replacement held  Repeat BMP this afternoon to trend     Abnormal UA  Recent UTI, s/p antibiotic course  Received rocephin in the ED and urine culture negative. Patient reports recently completed course of bactrim, flagyl in the outpatient setting as well.  Does report  itching consistent with previous yeast infections. Diflucan x1 dose and repeat in 72 hours if symptoms persist- reports improvement on follow up      Chronic:   Anxiety/depression  Schizoaffective disorder- bipolar type  HTN  Crohn's disease  Home meds resumed as indicated  Monitor VS  Chlorthalidone held as above    -----------  -DVT prophylaxis: SCDs  -Disposition plans/anticipated needs: pending course, potentially home in the next 48 hours if lab data and patient clinically improving        The patient is high risk due to the following diagnoses/reasons:  SIRS        Brenton Alves PA-C  05/17/24  11:46 EDT

## 2024-05-17 NOTE — PLAN OF CARE
Problem: Pain Acute  Goal: Acceptable Pain Control and Functional Ability  Outcome: Ongoing, Not Progressing  Intervention: Prevent or Manage Pain  Recent Flowsheet Documentation  Taken 5/17/2024 1500 by Yahaira Webb RN  Medication Review/Management: medications reviewed  Taken 5/17/2024 1405 by Yahaira Webb RN  Medication Review/Management: medications reviewed  Taken 5/17/2024 1300 by Yahaira Webb RN  Medication Review/Management: medications reviewed  Taken 5/17/2024 1100 by Yahaira Webb RN  Medication Review/Management: medications reviewed  Taken 5/17/2024 0900 by Yahaira Webb RN  Medication Review/Management: medications reviewed  Taken 5/17/2024 0848 by Yahaira Webb RN  Medication Review/Management: medications reviewed  Taken 5/17/2024 0700 by Yahaira Webb RN  Medication Review/Management: medications reviewed  Intervention: Optimize Psychosocial Wellbeing  Recent Flowsheet Documentation  Taken 5/17/2024 1405 by Yahaira Webb RN  Supportive Measures: active listening utilized  Diversional Activities:   television   smartphone  Taken 5/17/2024 0848 by Yahaira Webb RN  Supportive Measures: active listening utilized  Diversional Activities:   television   smartphone     Problem: Adult Inpatient Plan of Care  Goal: Plan of Care Review  Outcome: Ongoing, Not Progressing  Flowsheets (Taken 5/17/2024 1518)  Outcome Evaluation: Patient resting in bed. Hypotensive at times otherwise VSS. Room air. A&Ox4. Complains of headace. PRN medications administered. No needs noted at this time.  Goal: Patient-Specific Goal (Individualized)  Outcome: Ongoing, Not Progressing  Goal: Absence of Hospital-Acquired Illness or Injury  Outcome: Ongoing, Not Progressing  Intervention: Identify and Manage Fall Risk  Recent Flowsheet Documentation  Taken 5/17/2024 1500 by Yahaira Webb RN  Safety Promotion/Fall Prevention:   safety round/check completed   room organization consistent   fall prevention program  maintained   clutter free environment maintained   assistive device/personal items within reach   activity supervised  Taken 5/17/2024 1405 by Yahaira Webb RN  Safety Promotion/Fall Prevention: safety round/check completed  Taken 5/17/2024 1300 by Yahaira Webb RN  Safety Promotion/Fall Prevention:   safety round/check completed   room organization consistent   fall prevention program maintained   clutter free environment maintained   assistive device/personal items within reach   activity supervised  Taken 5/17/2024 1100 by Yahaira Webb RN  Safety Promotion/Fall Prevention:   safety round/check completed   room organization consistent   fall prevention program maintained   clutter free environment maintained   assistive device/personal items within reach   activity supervised  Taken 5/17/2024 0900 by Yahaira Webb RN  Safety Promotion/Fall Prevention:   safety round/check completed   room organization consistent   fall prevention program maintained   clutter free environment maintained   assistive device/personal items within reach   activity supervised  Taken 5/17/2024 0848 by Yahaira Webb RN  Safety Promotion/Fall Prevention: safety round/check completed  Taken 5/17/2024 0700 by Yahaira Webb RN  Safety Promotion/Fall Prevention:   safety round/check completed   room organization consistent   fall prevention program maintained   clutter free environment maintained   assistive device/personal items within reach   activity supervised  Intervention: Prevent Skin Injury  Recent Flowsheet Documentation  Taken 5/17/2024 1405 by Yahaira Webb RN  Skin Protection: adhesive use limited  Taken 5/17/2024 0848 by Yahaira Webb RN  Skin Protection: adhesive use limited  Intervention: Prevent and Manage VTE (Venous Thromboembolism) Risk  Recent Flowsheet Documentation  Taken 5/17/2024 1247 by Yahaira Webb RN  Activity Management: ambulated to bathroom  Taken 5/17/2024 1100 by Yahaira Webb RN  Activity  Management: ambulated to bathroom  Taken 5/17/2024 0848 by Yahaira Webb RN  Activity Management: activity encouraged  VTE Prevention/Management:   bilateral   sequential compression devices off   patient refused intervention  Intervention: Prevent Infection  Recent Flowsheet Documentation  Taken 5/17/2024 1500 by Yahaira Webb RN  Infection Prevention: rest/sleep promoted  Taken 5/17/2024 1405 by Yahaira Webb RN  Infection Prevention: rest/sleep promoted  Taken 5/17/2024 1300 by Yahaira Webb RN  Infection Prevention: rest/sleep promoted  Taken 5/17/2024 1100 by Yahaira Webb RN  Infection Prevention: rest/sleep promoted  Taken 5/17/2024 0900 by Yahaira Webb RN  Infection Prevention: rest/sleep promoted  Taken 5/17/2024 0848 by Yahaira Webb RN  Infection Prevention:   rest/sleep promoted   single patient room provided  Taken 5/17/2024 0700 by Yahaira Webb RN  Infection Prevention: rest/sleep promoted  Goal: Optimal Comfort and Wellbeing  Outcome: Ongoing, Not Progressing  Intervention: Provide Person-Centered Care  Recent Flowsheet Documentation  Taken 5/17/2024 1405 by Yahaira Webb RN  Trust Relationship/Rapport:   care explained   choices provided  Taken 5/17/2024 0848 by Yahaira Webb RN  Trust Relationship/Rapport:   care explained   choices provided  Goal: Readiness for Transition of Care  Outcome: Ongoing, Not Progressing     Problem: Infection  Goal: Absence of Infection Signs and Symptoms  Outcome: Ongoing, Not Progressing     Problem: Fluid Imbalance (Pancreatitis)  Goal: Fluid Balance  Outcome: Ongoing, Not Progressing     Problem: Infection (Pancreatitis)  Goal: Infection Symptom Resolution  Outcome: Ongoing, Not Progressing     Problem: Nutrition Impaired (Pancreatitis)  Goal: Optimal Nutrition Intake  Outcome: Ongoing, Not Progressing     Problem: Pain (Pancreatitis)  Goal: Acceptable Pain Control  Outcome: Ongoing, Not Progressing  Intervention: Monitor and Manage Pain  Recent Flowsheet  Documentation  Taken 5/17/2024 1405 by Yahaira Webb RN  Diversional Activities:   television   smartphone  Taken 5/17/2024 0848 by Yahaira Webb RN  Diversional Activities:   television   smartphone     Problem: Respiratory Compromise (Pancreatitis)  Goal: Effective Oxygenation and Ventilation  Outcome: Ongoing, Not Progressing  Intervention: Optimize Oxygenation and Ventilation  Recent Flowsheet Documentation  Taken 5/17/2024 1247 by Yahaira Webb RN  Activity Management: ambulated to bathroom  Taken 5/17/2024 1100 by Yahaira Webb RN  Activity Management: ambulated to bathroom  Taken 5/17/2024 0848 by Yahaira Webb RN  Activity Management: activity encouraged  Cough And Deep Breathing: done independently per patient     Problem: Fall Injury Risk  Goal: Absence of Fall and Fall-Related Injury  Outcome: Ongoing, Not Progressing  Intervention: Identify and Manage Contributors  Recent Flowsheet Documentation  Taken 5/17/2024 1500 by Yahaira Webb RN  Medication Review/Management: medications reviewed  Taken 5/17/2024 1405 by Yahaira Webb RN  Medication Review/Management: medications reviewed  Taken 5/17/2024 1300 by Yahaira Webb RN  Medication Review/Management: medications reviewed  Taken 5/17/2024 1100 by Yahaira Webb RN  Medication Review/Management: medications reviewed  Taken 5/17/2024 0900 by Yahaira Webb RN  Medication Review/Management: medications reviewed  Taken 5/17/2024 0848 by Yahaira Webb RN  Medication Review/Management: medications reviewed  Taken 5/17/2024 0700 by Yahaira Webb RN  Medication Review/Management: medications reviewed  Intervention: Promote Injury-Free Environment  Recent Flowsheet Documentation  Taken 5/17/2024 1500 by Yahaira Webb RN  Safety Promotion/Fall Prevention:   safety round/check completed   room organization consistent   fall prevention program maintained   clutter free environment maintained   assistive device/personal items within reach   activity  supervised  Taken 5/17/2024 1405 by Yahaira Webb RN  Safety Promotion/Fall Prevention: safety round/check completed  Taken 5/17/2024 1300 by Yahaira Webb RN  Safety Promotion/Fall Prevention:   safety round/check completed   room organization consistent   fall prevention program maintained   clutter free environment maintained   assistive device/personal items within reach   activity supervised  Taken 5/17/2024 1100 by Yahaira Webb RN  Safety Promotion/Fall Prevention:   safety round/check completed   room organization consistent   fall prevention program maintained   clutter free environment maintained   assistive device/personal items within reach   activity supervised  Taken 5/17/2024 0900 by Yahaira Webb RN  Safety Promotion/Fall Prevention:   safety round/check completed   room organization consistent   fall prevention program maintained   clutter free environment maintained   assistive device/personal items within reach   activity supervised  Taken 5/17/2024 0848 by Yahaira Webb RN  Safety Promotion/Fall Prevention: safety round/check completed  Taken 5/17/2024 0700 by Yahaira Webb RN  Safety Promotion/Fall Prevention:   safety round/check completed   room organization consistent   fall prevention program maintained   clutter free environment maintained   assistive device/personal items within reach   activity supervised   Goal Outcome Evaluation:              Outcome Evaluation: Patient resting in bed. Hypotensive at times otherwise VSS. Room air. A&Ox4. Complains of headace. PRN medications administered. No needs noted at this time.

## 2024-05-17 NOTE — PLAN OF CARE
Problem: Pain Acute  Goal: Acceptable Pain Control and Functional Ability  Outcome: Ongoing, Progressing  Intervention: Prevent or Manage Pain  Description: Evaluate pain level, effect of treatment and patient response at regular intervals.  Minimize painful stimuli; coordinate care and adjust environment (e.g., light, noise, unnecessary movement); promote sleep/rest.  Match pharmacologic analgesia to severity and type of pain mechanism (e.g., neuropathic, muscle, inflammatory); consider multimodal approach (e.g., nonopioid, opioid, adjuvant).  Provide medication at regular intervals; titrate to patient response; premedicate for painful procedures.  Manage breakthrough pain with additional doses; consider rotation or switching medication.  Monitor for signs of substance tolerance (increased dose to reach desired effect, decreased effect with same dose).  Manage medication-induced effects, such as constipation, nausea, pruritus, urinary retention, somnolence and dizziness.  Provide multimodal interventions, such as as physical activity, therapeutic exercise, yoga, TENS (transcutaneous electrical nerve stimulation) and manual therapy.  Train in functional activity modifications, such as body mechanics, posture, ergonomics, energy conservation and activity pacing.  Consider addition of complementary or alternative therapy, such as acupuncture, hypnosis or therapeutic touch.  Recent Flowsheet Documentation  Taken 5/16/2024 2031 by Lubna Snowden RN  Medication Review/Management: medications reviewed  Intervention: Develop Pain Management Plan  Description: Acknowledge patient as the expert in pain self-management.  Use a consistent, validated tool for pain assessment; include function and quality of life.  Evaluate risk for opioid use and dependence.  Set pain management goals; determine acceptable level of discomfort to allow for maximal functioning.  Determine [id number] pain management plan, including  both pharmacologic and nonpharmacologic measures; integrate management of chronic (persistent) pain.  Identify and integrate past successful treatment measures, if able.  Encourage patient and caregiver involvement in pain assessment, interventions and safety measures.  Re-evaluate plan regularly.  Recent Flowsheet Documentation  Taken 5/16/2024 2031 by Lubna Snowden RN  Pain Management Interventions: see MAR   Goal Outcome Evaluation:  Plan of Care Reviewed With: patient        Progress: no change

## 2024-05-17 NOTE — PROGRESS NOTES
Antimicrobial Length of Therapy:    Day 1 of 4 Doxycycline  Day 2 of 5 Zosyn  Completed 2 days of vancomycin    Jasper Eastman, Sarina  05/17/24  13:39 EDT

## 2024-05-18 ENCOUNTER — APPOINTMENT (OUTPATIENT)
Dept: CT IMAGING | Facility: HOSPITAL | Age: 44
DRG: 438 | End: 2024-05-18
Payer: COMMERCIAL

## 2024-05-18 ENCOUNTER — APPOINTMENT (OUTPATIENT)
Dept: CARDIOLOGY | Facility: HOSPITAL | Age: 44
DRG: 438 | End: 2024-05-18
Payer: COMMERCIAL

## 2024-05-18 ENCOUNTER — APPOINTMENT (OUTPATIENT)
Dept: GENERAL RADIOLOGY | Facility: HOSPITAL | Age: 44
DRG: 438 | End: 2024-05-18
Payer: COMMERCIAL

## 2024-05-18 LAB
ALBUMIN SERPL-MCNC: 2.3 G/DL (ref 3.5–5.2)
ALBUMIN/GLOB SERPL: 0.9 G/DL
ALP SERPL-CCNC: 73 U/L (ref 39–117)
ALT SERPL W P-5'-P-CCNC: 36 U/L (ref 1–33)
ANION GAP SERPL CALCULATED.3IONS-SCNC: 9.1 MMOL/L (ref 5–15)
AST SERPL-CCNC: 55 U/L (ref 1–32)
BASOPHILS # BLD AUTO: 0.04 10*3/MM3 (ref 0–0.2)
BASOPHILS NFR BLD AUTO: 1 % (ref 0–1.5)
BILIRUB SERPL-MCNC: 0.5 MG/DL (ref 0–1.2)
BUN SERPL-MCNC: 10 MG/DL (ref 6–20)
BUN/CREAT SERPL: 8.8 (ref 7–25)
CALCIUM SPEC-SCNC: 7.5 MG/DL (ref 8.6–10.5)
CHLORIDE SERPL-SCNC: 110 MMOL/L (ref 98–107)
CO2 SERPL-SCNC: 20.9 MMOL/L (ref 22–29)
CREAT SERPL-MCNC: 1.14 MG/DL (ref 0.57–1)
CRP SERPL-MCNC: 15.07 MG/DL (ref 0–0.5)
DEPRECATED RDW RBC AUTO: 45.1 FL (ref 37–54)
EGFRCR SERPLBLD CKD-EPI 2021: 61.8 ML/MIN/1.73
EOSINOPHIL # BLD AUTO: 0 10*3/MM3 (ref 0–0.4)
EOSINOPHIL NFR BLD AUTO: 0 % (ref 0.3–6.2)
ERYTHROCYTE [DISTWIDTH] IN BLOOD BY AUTOMATED COUNT: 13.5 % (ref 12.3–15.4)
ERYTHROCYTE [SEDIMENTATION RATE] IN BLOOD: 5 MM/HR (ref 0–20)
FERRITIN SERPL-MCNC: 446.4 NG/ML (ref 13–150)
GLOBULIN UR ELPH-MCNC: 2.5 GM/DL
GLUCOSE BLDC GLUCOMTR-MCNC: 162 MG/DL (ref 70–130)
GLUCOSE BLDC GLUCOMTR-MCNC: 182 MG/DL (ref 70–130)
GLUCOSE BLDC GLUCOMTR-MCNC: 257 MG/DL (ref 70–130)
GLUCOSE SERPL-MCNC: 159 MG/DL (ref 65–99)
HCT VFR BLD AUTO: 29.2 % (ref 34–46.6)
HGB BLD-MCNC: 9.8 G/DL (ref 12–15.9)
IMM GRANULOCYTES # BLD AUTO: 0.01 10*3/MM3 (ref 0–0.05)
IMM GRANULOCYTES NFR BLD AUTO: 0.2 % (ref 0–0.5)
LYMPHOCYTES # BLD AUTO: 1.58 10*3/MM3 (ref 0.7–3.1)
LYMPHOCYTES NFR BLD AUTO: 38.9 % (ref 19.6–45.3)
MCH RBC QN AUTO: 30.5 PG (ref 26.6–33)
MCHC RBC AUTO-ENTMCNC: 33.6 G/DL (ref 31.5–35.7)
MCV RBC AUTO: 91 FL (ref 79–97)
MONOCYTES # BLD AUTO: 0.21 10*3/MM3 (ref 0.1–0.9)
MONOCYTES NFR BLD AUTO: 5.2 % (ref 5–12)
MRSA DNA SPEC QL NAA+PROBE: NORMAL
NEUTROPHILS NFR BLD AUTO: 2.22 10*3/MM3 (ref 1.7–7)
NEUTROPHILS NFR BLD AUTO: 54.7 % (ref 42.7–76)
NRBC BLD AUTO-RTO: 0 /100 WBC (ref 0–0.2)
PLATELET # BLD AUTO: 103 10*3/MM3 (ref 140–450)
PMV BLD AUTO: 10.9 FL (ref 6–12)
POTASSIUM SERPL-SCNC: 3.5 MMOL/L (ref 3.5–5.2)
POTASSIUM SERPL-SCNC: 3.9 MMOL/L (ref 3.5–5.2)
PROT SERPL-MCNC: 4.8 G/DL (ref 6–8.5)
QT INTERVAL: 342 MS
QTC INTERVAL: 432 MS
RBC # BLD AUTO: 3.21 10*6/MM3 (ref 3.77–5.28)
SODIUM SERPL-SCNC: 140 MMOL/L (ref 136–145)
WBC NRBC COR # BLD AUTO: 4.06 10*3/MM3 (ref 3.4–10.8)

## 2024-05-18 PROCEDURE — 82948 REAGENT STRIP/BLOOD GLUCOSE: CPT

## 2024-05-18 PROCEDURE — 80053 COMPREHEN METABOLIC PANEL: CPT | Performed by: INTERNAL MEDICINE

## 2024-05-18 PROCEDURE — 99222 1ST HOSP IP/OBS MODERATE 55: CPT | Performed by: INTERNAL MEDICINE

## 2024-05-18 PROCEDURE — 85025 COMPLETE CBC W/AUTO DIFF WBC: CPT

## 2024-05-18 PROCEDURE — 25010000002 METOCLOPRAMIDE PER 10 MG: Performed by: INTERNAL MEDICINE

## 2024-05-18 PROCEDURE — 86606 ASPERGILLUS ANTIBODY: CPT | Performed by: NURSE PRACTITIONER

## 2024-05-18 PROCEDURE — 63710000001 PROMETHAZINE PER 12.5 MG: Performed by: INTERNAL MEDICINE

## 2024-05-18 PROCEDURE — 93010 ELECTROCARDIOGRAM REPORT: CPT | Performed by: INTERNAL MEDICINE

## 2024-05-18 PROCEDURE — 25010000002 MICAFUNGIN SODIUM 100 MG RECONSTITUTED SOLUTION 1 EACH VIAL: Performed by: INTERNAL MEDICINE

## 2024-05-18 PROCEDURE — 87040 BLOOD CULTURE FOR BACTERIA: CPT | Performed by: NURSE PRACTITIONER

## 2024-05-18 PROCEDURE — 25010000002 PIPERACILLIN SOD-TAZOBACTAM PER 1 G: Performed by: INTERNAL MEDICINE

## 2024-05-18 PROCEDURE — 86037 ANCA TITER EACH ANTIBODY: CPT | Performed by: INTERNAL MEDICINE

## 2024-05-18 PROCEDURE — 83516 IMMUNOASSAY NONANTIBODY: CPT | Performed by: INTERNAL MEDICINE

## 2024-05-18 PROCEDURE — 71045 X-RAY EXAM CHEST 1 VIEW: CPT

## 2024-05-18 PROCEDURE — 87449 NOS EACH ORGANISM AG IA: CPT | Performed by: NURSE PRACTITIONER

## 2024-05-18 PROCEDURE — 94799 UNLISTED PULMONARY SVC/PX: CPT

## 2024-05-18 PROCEDURE — 25010000002 ONDANSETRON PER 1 MG: Performed by: INTERNAL MEDICINE

## 2024-05-18 PROCEDURE — 94664 DEMO&/EVAL PT USE INHALER: CPT

## 2024-05-18 PROCEDURE — 94761 N-INVAS EAR/PLS OXIMETRY MLT: CPT

## 2024-05-18 PROCEDURE — 82728 ASSAY OF FERRITIN: CPT | Performed by: INTERNAL MEDICINE

## 2024-05-18 PROCEDURE — 85652 RBC SED RATE AUTOMATED: CPT | Performed by: INTERNAL MEDICINE

## 2024-05-18 PROCEDURE — 71275 CT ANGIOGRAPHY CHEST: CPT | Performed by: RADIOLOGY

## 2024-05-18 PROCEDURE — 86140 C-REACTIVE PROTEIN: CPT | Performed by: INTERNAL MEDICINE

## 2024-05-18 PROCEDURE — 87305 ASPERGILLUS AG IA: CPT | Performed by: NURSE PRACTITIONER

## 2024-05-18 PROCEDURE — 99222 1ST HOSP IP/OBS MODERATE 55: CPT | Performed by: NURSE PRACTITIONER

## 2024-05-18 PROCEDURE — 87641 MR-STAPH DNA AMP PROBE: CPT | Performed by: INTERNAL MEDICINE

## 2024-05-18 PROCEDURE — 71045 X-RAY EXAM CHEST 1 VIEW: CPT | Performed by: RADIOLOGY

## 2024-05-18 PROCEDURE — 25510000001 IOPAMIDOL PER 1 ML: Performed by: INTERNAL MEDICINE

## 2024-05-18 PROCEDURE — 86612 BLASTOMYCES ANTIBODY: CPT | Performed by: NURSE PRACTITIONER

## 2024-05-18 PROCEDURE — 84132 ASSAY OF SERUM POTASSIUM: CPT | Performed by: INTERNAL MEDICINE

## 2024-05-18 PROCEDURE — 25010000002 KETOROLAC TROMETHAMINE PER 15 MG: Performed by: INTERNAL MEDICINE

## 2024-05-18 PROCEDURE — 25010000002 METHYLPREDNISOLONE PER 40 MG: Performed by: INTERNAL MEDICINE

## 2024-05-18 PROCEDURE — 63710000001 INSULIN REGULAR HUMAN PER 5 UNITS: Performed by: INTERNAL MEDICINE

## 2024-05-18 PROCEDURE — 25010000002 MAGNESIUM SULFATE 2 GM/50ML SOLUTION: Performed by: INTERNAL MEDICINE

## 2024-05-18 PROCEDURE — 71275 CT ANGIOGRAPHY CHEST: CPT

## 2024-05-18 PROCEDURE — 93005 ELECTROCARDIOGRAM TRACING: CPT | Performed by: INTERNAL MEDICINE

## 2024-05-18 RX ORDER — METOCLOPRAMIDE HYDROCHLORIDE 5 MG/ML
5 INJECTION INTRAMUSCULAR; INTRAVENOUS ONCE
Status: COMPLETED | OUTPATIENT
Start: 2024-05-18 | End: 2024-05-18

## 2024-05-18 RX ORDER — MAGNESIUM SULFATE HEPTAHYDRATE 40 MG/ML
2 INJECTION, SOLUTION INTRAVENOUS ONCE
Qty: 50 ML | Refills: 0 | Status: COMPLETED | OUTPATIENT
Start: 2024-05-18 | End: 2024-05-18

## 2024-05-18 RX ORDER — PROMETHAZINE HYDROCHLORIDE 12.5 MG/1
12.5 TABLET ORAL EVERY 6 HOURS PRN
Status: DISCONTINUED | OUTPATIENT
Start: 2024-05-18 | End: 2024-05-24 | Stop reason: HOSPADM

## 2024-05-18 RX ORDER — BENZONATATE 100 MG/1
200 CAPSULE ORAL 3 TIMES DAILY PRN
Status: DISCONTINUED | OUTPATIENT
Start: 2024-05-18 | End: 2024-05-24 | Stop reason: HOSPADM

## 2024-05-18 RX ORDER — POTASSIUM CHLORIDE 20 MEQ/1
40 TABLET, EXTENDED RELEASE ORAL EVERY 4 HOURS
Status: COMPLETED | OUTPATIENT
Start: 2024-05-18 | End: 2024-05-18

## 2024-05-18 RX ORDER — ACETAMINOPHEN 500 MG
1000 TABLET ORAL ONCE
Status: COMPLETED | OUTPATIENT
Start: 2024-05-18 | End: 2024-05-18

## 2024-05-18 RX ORDER — KETOROLAC TROMETHAMINE 30 MG/ML
30 INJECTION, SOLUTION INTRAMUSCULAR; INTRAVENOUS ONCE
Status: COMPLETED | OUTPATIENT
Start: 2024-05-18 | End: 2024-05-18

## 2024-05-18 RX ORDER — METHYLPREDNISOLONE SODIUM SUCCINATE 40 MG/ML
40 INJECTION, POWDER, LYOPHILIZED, FOR SOLUTION INTRAMUSCULAR; INTRAVENOUS EVERY 12 HOURS
Status: DISCONTINUED | OUTPATIENT
Start: 2024-05-18 | End: 2024-05-19

## 2024-05-18 RX ORDER — GUAIFENESIN/DEXTROMETHORPHAN 100-10MG/5
10 SYRUP ORAL EVERY 4 HOURS PRN
Status: DISCONTINUED | OUTPATIENT
Start: 2024-05-18 | End: 2024-05-24 | Stop reason: HOSPADM

## 2024-05-18 RX ADMIN — POTASSIUM CHLORIDE 40 MEQ: 1500 TABLET, EXTENDED RELEASE ORAL at 06:15

## 2024-05-18 RX ADMIN — NICOTINE 1 PATCH: 14 PATCH, EXTENDED RELEASE TRANSDERMAL at 08:43

## 2024-05-18 RX ADMIN — BENZONATATE 200 MG: 100 CAPSULE ORAL at 04:39

## 2024-05-18 RX ADMIN — PIPERACILLIN SODIUM AND TAZOBACTAM SODIUM 3.38 G: 3; .375 INJECTION, POWDER, LYOPHILIZED, FOR SOLUTION INTRAVENOUS at 13:23

## 2024-05-18 RX ADMIN — IOPAMIDOL 80 ML: 755 INJECTION, SOLUTION INTRAVENOUS at 05:53

## 2024-05-18 RX ADMIN — Medication 10 ML: at 08:43

## 2024-05-18 RX ADMIN — PIPERACILLIN SODIUM AND TAZOBACTAM SODIUM 3.38 G: 3; .375 INJECTION, POWDER, LYOPHILIZED, FOR SOLUTION INTRAVENOUS at 06:15

## 2024-05-18 RX ADMIN — MUPIROCIN 1 APPLICATION: 20 OINTMENT TOPICAL at 21:15

## 2024-05-18 RX ADMIN — METOCLOPRAMIDE 5 MG: 5 INJECTION, SOLUTION INTRAMUSCULAR; INTRAVENOUS at 10:01

## 2024-05-18 RX ADMIN — DOXYCYCLINE 100 MG: 100 INJECTION, POWDER, LYOPHILIZED, FOR SOLUTION INTRAVENOUS at 01:55

## 2024-05-18 RX ADMIN — POTASSIUM CHLORIDE 40 MEQ: 1500 TABLET, EXTENDED RELEASE ORAL at 01:55

## 2024-05-18 RX ADMIN — PROMETHAZINE HYDROCHLORIDE 12.5 MG: 12.5 TABLET ORAL at 17:33

## 2024-05-18 RX ADMIN — ACETAMINOPHEN 1000 MG: 500 TABLET ORAL at 04:39

## 2024-05-18 RX ADMIN — BUTALBITAL, ACETAMINOPHEN AND CAFFEINE 2 TABLET: 325; 50; 40 TABLET ORAL at 02:34

## 2024-05-18 RX ADMIN — IPRATROPIUM BROMIDE AND ALBUTEROL SULFATE 3 ML: 2.5; .5 SOLUTION RESPIRATORY (INHALATION) at 18:27

## 2024-05-18 RX ADMIN — IPRATROPIUM BROMIDE AND ALBUTEROL SULFATE 3 ML: 2.5; .5 SOLUTION RESPIRATORY (INHALATION) at 00:10

## 2024-05-18 RX ADMIN — ONDANSETRON 4 MG: 2 INJECTION INTRAMUSCULAR; INTRAVENOUS at 06:23

## 2024-05-18 RX ADMIN — INSULIN HUMAN 2 UNITS: 100 INJECTION, SOLUTION PARENTERAL at 06:15

## 2024-05-18 RX ADMIN — CARIPRAZINE 3 MG: 3 CAPSULE, GELATIN COATED ORAL at 08:42

## 2024-05-18 RX ADMIN — Medication 10 ML: at 21:16

## 2024-05-18 RX ADMIN — FLUOXETINE HYDROCHLORIDE 10 MG: 10 CAPSULE ORAL at 08:42

## 2024-05-18 RX ADMIN — METHYLPREDNISOLONE SODIUM SUCCINATE 40 MG: 40 INJECTION, POWDER, FOR SOLUTION INTRAMUSCULAR; INTRAVENOUS at 15:50

## 2024-05-18 RX ADMIN — PIPERACILLIN SODIUM AND TAZOBACTAM SODIUM 3.38 G: 3; .375 INJECTION, POWDER, LYOPHILIZED, FOR SOLUTION INTRAVENOUS at 21:15

## 2024-05-18 RX ADMIN — PROMETHAZINE HYDROCHLORIDE 12.5 MG: 12.5 TABLET ORAL at 10:08

## 2024-05-18 RX ADMIN — INSULIN HUMAN 4 UNITS: 100 INJECTION, SOLUTION PARENTERAL at 16:24

## 2024-05-18 RX ADMIN — IPRATROPIUM BROMIDE AND ALBUTEROL SULFATE 3 ML: 2.5; .5 SOLUTION RESPIRATORY (INHALATION) at 06:32

## 2024-05-18 RX ADMIN — OXYCODONE HYDROCHLORIDE 5 MG: 10 TABLET ORAL at 19:35

## 2024-05-18 RX ADMIN — Medication 10 ML: at 08:42

## 2024-05-18 RX ADMIN — IPRATROPIUM BROMIDE AND ALBUTEROL SULFATE 3 ML: 2.5; .5 SOLUTION RESPIRATORY (INHALATION) at 12:51

## 2024-05-18 RX ADMIN — MAGNESIUM SULFATE HEPTAHYDRATE 2 G: 40 INJECTION, SOLUTION INTRAVENOUS at 10:01

## 2024-05-18 RX ADMIN — MICAFUNGIN SODIUM 100 MG: 100 INJECTION, POWDER, LYOPHILIZED, FOR SOLUTION INTRAVENOUS at 09:08

## 2024-05-18 RX ADMIN — KETOROLAC TROMETHAMINE 30 MG: 30 INJECTION, SOLUTION INTRAMUSCULAR; INTRAVENOUS at 10:01

## 2024-05-18 RX ADMIN — MUPIROCIN 1 APPLICATION: 20 OINTMENT TOPICAL at 08:42

## 2024-05-18 RX ADMIN — INSULIN HUMAN 2 UNITS: 100 INJECTION, SOLUTION PARENTERAL at 12:02

## 2024-05-18 RX ADMIN — CETIRIZINE HYDROCHLORIDE 10 MG: 10 TABLET, FILM COATED ORAL at 08:42

## 2024-05-18 RX ADMIN — DOXYCYCLINE 100 MG: 100 INJECTION, POWDER, LYOPHILIZED, FOR SOLUTION INTRAVENOUS at 12:05

## 2024-05-18 NOTE — CONSULTS
INFECTIOUS DISEASE CONSULTATION REPORT        Patient Identification:  Name:  Manda Al  Age:  43 y.o.  Sex:  female  :  1980  MRN:  [id number]   Visit Number:  [id number]  Primary Care Physician:  Mi Rivera MD        Referring Provider: Dr. Yee    Reason for consult: Recurrent high-grade fever       LOS: 6 days        Subjective       Subjective     History of present illness:      Thank you Dr. Yee for allowing us to participate in the care of your patient.  As you well know, Ms. Manda Al is a 43 y.o. female with past medical history significant for anxiety, depression, bipolar disorder, asthma, Crohn disease, HTN, schizoaffective disorder, recurrent episodes of pancreatitis, and type II DM, who presented to [facility] Emergency Department on 2024 for epigastric abdominal pain, nausea and vomiting x 1 week.  The patient reported 4 episodes of pancreatitis in the past, the first time due to gallstones, the second and third time due to medication and the fourth time no source was discovered.  The patient initially presented to the ED on  with above symptoms.  Had been recently diagnosed with UTI and started on Bactrim by PCP.  Concerned she may have pancreatitis again.  Lipase at that time was only 120 and CT reportedly showed mild enteritis and mesenteric adenitis without evidence of pancreatitis.  She received IV and oral potassium replacement and was discharged without any prescriptions.  The patient reported worsening epigastric abdominal pain as well as worsening nausea and vomiting since that time.  The patient reported the pain as gnawing and radiating to both sides of her upper abdomen as well as lower back.The patient reported urinary symptoms resolved since completion of antibiotic.    The patient became febrile on 5/15 and repeat blood cultures show no growth to date.  Upon further discussion the patient reported she found a tick to her left  bottom around a week prior to admission.  No rash in the area but did have a healing scabbed over area.  The patient was initiated on doxycycline and rickettsial serologies were sent.  On the night of 5/17 the patient spiked a fever of 102.9 °F along with intermittent tachycardia and hypoxia with O2 saturations in the mid to upper 80s on room air.  Chest x-ray reported development of infiltrates to the right lung.  New from prior.  Consistent with multifocal pneumonia.  CT angiogram chest reported no evidence of PE.  Extensive bilateral infiltrates.  Multifocal pneumonia versus pulmonary edema.    On arrival to ED CRP was 0.56.  Lactate 1.5.  Lipase 257.  CBC was unremarkable.  Urinalysis showed small leuk esterase, 6-10 RBC and 11-20 WBC with 2+ bacteria.  Did have 7-12 squamous epithelial cells.  Urine culture finalized with no growth.  Blood cultures finalized with no growth at 5 days.  COVID-19 and flu PCR was negative.  Repeat blood cultures from 5/15 report no growth at 3 days.  Respiratory panel PCR was negative.  MRSA screen was negative.  HIV panel and hepatitis panel negative.    This morning the patient is awake and alert, sitting up in bed comfortably.  On 2 L nasal cannula with no apparent distress.  Tmax 102.9 °F.  Denies diarrhea.  The patient complains of a headache but denied neck pain, nuchal rigidity, rash.  No confusion.  Lung exam is clear with diminished bases bilaterally to auscultation.  Abdomen soft and rounded, nontender with normoactive bowel sounds.  Left bottom examined, scabbed area noted with no surrounding erythema, drainage, or rash.  CRP is elevated at 15.07.  WBC normal at 4.06.  Platelet count 103,000.    Infectious Disease consultation was requested for antimicrobial management.      ---------------------------------------------------------------------------------------------------------------------     Review Of Systems:    Constitutional: no fever, chills and night sweats. No  appetite change or unexpected weight change.  Fatigue, general malaise, headache  Eyes: no eye drainage, itching or redness.  HEENT: no mouth sores, dysphagia or nose bleed.  Respiratory: no for shortness of breath, cough or production of sputum.  Cardiovascular: no chest pain, no palpitations, no orthopnea.  Gastrointestinal: no nausea, vomiting or diarrhea. No abdominal pain, hematemesis or rectal bleeding.  Genitourinary: no dysuria or polyuria.  Hematologic/lymphatic: no lymph node abnormalities, no easy bruising or easy bleeding.  Musculoskeletal: no muscle or joint pain.  Skin: No rash and no itching.  Tick bite to the left bottom  Neurological: no loss of consciousness, no seizure, no headache.  Behavioral/Psych: no depression or suicidal ideation.  Endocrine: no hot flashes.  Immunologic: negative.    ---------------------------------------------------------------------------------------------------------------------     Past Medical History    Past Medical History:   Diagnosis Date    Anxiety     Asthma     Bipolar disorder     Crohn disease     Depression     Hypertension     Schizoaffective disorder        Past Surgical History    Past Surgical History:   Procedure Laterality Date    APPENDECTOMY       SECTION      CHOLECYSTECTOMY      COLON SURGERY      COLONOSCOPY      MANDIBLE FRACTURE SURGERY      OVARIAN CYST SURGERY      TUBAL ABDOMINAL LIGATION         Family History    Family History   Problem Relation Age of Onset    Diabetes Mother     Anxiety disorder Mother     Depression Mother     Bipolar disorder Mother     COPD Father     Schizophrenia Father     Schizophrenia Paternal Grandfather     Breast cancer Neg Hx        Social History    Social History     Tobacco Use    Smoking status: Every Day     Current packs/day: 1.00     Average packs/day: 1 pack/day for 20.0 years (20.0 ttl pk-yrs)     Types: Cigarettes    Smokeless tobacco: Never   Vaping Use    Vaping status: Never Used    Substance Use Topics    Alcohol use: No     Comment: denies    Drug use: Yes     Types: Marijuana       Allergies    Imuran [azathioprine]  ---------------------------------------------------------------------------------------------------------------------     Home Medications:    Prior to Admission Medications       Prescriptions Last Dose Informant Patient Reported? Taking?    Cariprazine HCl (Vraylar) 3 MG capsule capsule 5/11/2024 Self Yes Yes    Take 1 capsule by mouth Daily.    chlorthalidone (HYGROTON) 25 MG tablet 5/11/2024 Self Yes Yes    Take 1 tablet by mouth Daily.    fexofenadine (ALLEGRA) 180 MG tablet 5/11/2024 Self Yes Yes    Take 1 tablet by mouth Daily.    FLUoxetine (PROzac) 10 MG capsule 5/11/2024 Self Yes Yes    Take 1 capsule by mouth Daily.    magnesium oxide (MAG-OX) 400 MG tablet 5/11/2024 Self Yes Yes    Take 1 tablet by mouth Daily.    metFORMIN ER (GLUCOPHAGE-XR) 500 MG 24 hr tablet 5/11/2024 Self Yes Yes    Take 2 tablets by mouth 2 (Two) Times a Day.    metoprolol succinate XL (Toprol XL) 200 MG 24 hr tablet 5/11/2024 Self Yes Yes    Take 1 tablet by mouth Daily.          ---------------------------------------------------------------------------------------------------------------------    Objective       Objective     Hospital Scheduled Meds:  Cariprazine HCl, 3 mg, Oral, Daily  cetirizine, 10 mg, Oral, Daily  doxycycline, 100 mg, Intravenous, Q12H  FLUoxetine, 10 mg, Oral, Daily  insulin regular, 2-7 Units, Subcutaneous, Q6H  ipratropium-albuterol, 3 mL, Nebulization, 4x Daily - RT  magnesium sulfate, 2 g, Intravenous, Once  micafungin (MYCAMINE) IV, 100 mg, Intravenous, Q24H  mupirocin, 1 application , Each Nare, BID  nicotine, 1 patch, Transdermal, Q24H  piperacillin-tazobactam, 3.375 g, Intravenous, Q8H  sodium chloride, 500 mL, Intravenous, Once  sodium chloride, 10 mL, Intravenous, Q12H  sodium chloride, 10 mL, Intravenous, Q12H  sodium chloride, 10 mL, Intravenous,  Q12H         ---------------------------------------------------------------------------------------------------------------------   Vital Signs:  Temp:  [97.5 °F (36.4 °C)-102.9 °F (39.4 °C)] 98 °F (36.7 °C)  Heart Rate:  [] 86  Resp:  [11-37] 20  BP: ()/(56-80) 101/73  Mean Arterial Pressure (Non-Invasive) for the past 24 hrs (Last 3 readings):   Noninvasive MAP (mmHg)   05/18/24 1100 80   05/18/24 1038 79   05/18/24 1000 75     SpO2 Percentage    05/18/24 1038 05/18/24 1045 05/18/24 1100   SpO2: 98% 98% 97%     SpO2:  [85 %-99 %] 97 %  on  Flow (L/min):  [2-5] 5;   Device (Oxygen Therapy): nasal cannula    Body mass index is 35.26 kg/m².  Wt Readings from Last 3 Encounters:   05/18/24 105 kg (231 lb 14.8 oz)   05/07/24 97.1 kg (214 lb)   03/19/22 113 kg (250 lb)     ---------------------------------------------------------------------------------------------------------------------     Physical Exam:    Constitutional:  Well-developed and well-nourished.  On 2 L nasal cannula with no respiratory distress.  Complains of headache.  Resting comfortably in bed.     HENT:  Head: Normocephalic and atraumatic.  Mouth:  Moist mucous membranes.    Eyes:  Conjunctivae and EOM are normal.  No scleral icterus.  Neck:  Neck supple.  No JVD present.    Cardiovascular:  Normal rate, regular rhythm and normal heart sounds with no murmur. No edema.  Pulmonary/Chest:  No respiratory distress, no wheezes, no crackles, with normal breath sounds and good air movement.  Abdominal:  Soft.  Bowel sounds are normal.  No distension and no tenderness.   Musculoskeletal:  No edema, no tenderness, and no deformity.  No swelling or redness of joints.  Neurological:  Alert and oriented to person, place, and time.  No facial droop.  No slurred speech.   Skin:  Skin is warm and dry.  No rash noted.  No pallor.  Left bottom with healing scabbed over area, no surrounding erythema or edema, no rash, no drainage  Psychiatric:  Normal  mood and affect.  Behavior is normal.    ---------------------------------------------------------------------------------------------------------------------            Results from last 7 days   Lab Units 05/16/24  1926   PH, ARTERIAL pH units 7.467*   PO2 ART mm Hg 61.5*   PCO2, ARTERIAL mm Hg 26.9*   HCO3 ART mmol/L 19.4*     Results from last 7 days   Lab Units 05/18/24 0021 05/17/24  0036 05/16/24  1034 05/16/24  0705 05/16/24  0221 05/13/24 0645 05/12/24 2021 05/12/24  1648   CRP mg/dL 15.07*  --  9.82*  --   --   --   --  0.56*   LACTATE mmol/L  --   --   --  0.8  --   --  1.5  --    WBC 10*3/mm3 4.06 3.52  --   --  3.70   < >  --  8.89   HEMOGLOBIN g/dL 9.8* 11.1*  --   --  12.7   < >  --  13.9   HEMATOCRIT % 29.2* 32.4*  --   --  36.2   < >  --  39.2   MCV fL 91.0 88.3  --   --  89.6   < >  --  87.1   MCHC g/dL 33.6 34.3  --   --  35.1   < >  --  35.5   PLATELETS 10*3/mm3 103* 109*  --   --  151   < >  --  308   INR   --  1.26*  --   --   --   --   --   --     < > = values in this interval not displayed.     Results from last 7 days   Lab Units 05/18/24  1036 05/18/24  0021 05/17/24  1221 05/17/24  0036 05/16/24 1034 05/13/24 0645 05/13/24  0549 05/12/24  1648   SODIUM mmol/L  --  140 135* 128*  --    < >  --  138   POTASSIUM mmol/L 3.9 3.5 4.0 3.4*  --    < >  --  3.5   MAGNESIUM mg/dL  --   --   --   --   --   --  2.9* 1.7   CHLORIDE mmol/L  --  110* 105 99  --    < >  --  105   CO2 mmol/L  --  20.9* 20.0* 18.2*  --    < >  --  21.6*   BUN mg/dL  --  10 6 5*  --    < >  --  5*   CREATININE mg/dL  --  1.14* 0.99 1.00  --    < >  --  0.94   CALCIUM mg/dL  --  7.5* 7.5* 7.3*  --    < >  --  8.8   GLUCOSE mg/dL  --  159* 152* 116*  --    < >  --  278*   ALBUMIN g/dL  --  2.3*  --  2.5* 2.3*   < >  --  3.4*   BILIRUBIN mg/dL  --  0.5  --  0.5 0.4   < >  --  0.3   ALK PHOS U/L  --  73  --  73 57   < >  --  74   AST (SGOT) U/L  --  55*  --  87* 93*   < >  --  23   ALT (SGPT) U/L  --  36*  --  44* 40*   < >  " --  16    < > = values in this interval not displayed.   Estimated Creatinine Clearance: 81.5 mL/min (A) (by C-G formula based on SCr of 1.14 mg/dL (H)).  No results found for: \"AMMONIA\"    Glucose   Date/Time Value Ref Range Status   05/18/2024 0601 162 (H) 70 - 130 mg/dL Final   05/17/2024 2319 200 (H) 70 - 130 mg/dL Final   05/17/2024 1736 211 (H) 70 - 130 mg/dL Final   05/17/2024 1105 154 (H) 70 - 130 mg/dL Final   05/17/2024 0606 142 (H) 70 - 130 mg/dL Final   05/16/2024 2352 109 70 - 130 mg/dL Final   05/16/2024 1714 138 (H) 70 - 130 mg/dL Final   05/16/2024 1103 173 (H) 70 - 130 mg/dL Final     Lab Results   Component Value Date    HGBA1C 6.90 (H) 05/12/2024     Lab Results   Component Value Date    TSH 1.840 05/16/2024       Blood Culture   Date Value Ref Range Status   05/15/2024 No growth at 3 days  Preliminary   05/15/2024 No growth at 3 days  Preliminary   05/12/2024 No growth at 5 days  Final   05/12/2024 No growth at 5 days  Final   05/12/2024 No growth at 5 days  Final     Urine Culture   Date Value Ref Range Status   05/12/2024 No growth  Final     No results found for: \"WOUNDCX\"  No results found for: \"STOOLCX\"  No results found for: \"RESPCX\"  Pain Management Panel  More data exists         Latest Ref Rng & Units 8/11/2020 5/9/2017   Pain Management Panel   Amphetamine, Urine Qual Negative Positive  Negative    Barbiturates Screen, Urine Negative Negative  Negative    Benzodiazepine Screen, Urine Negative Negative  Negative    Buprenorphine, Screen, Urine Negative Negative  Negative    Cocaine Screen, Urine Negative Negative  Negative    Methadone Screen , Urine Negative Negative  Negative      I have personally reviewed the above laboratory results.   ---------------------------------------------------------------------------------------------------------------------  Imaging Results (Last 7 Days)       Procedure Component Value Units Date/Time    CT Angiogram Chest Pulmonary Embolism [[id number]] " Collected: 05/18/24 0814     Updated: 05/18/24 0819    Narrative:      VERIFICATION OBSERVER NAME: Uday Hansen MD.     Technique: Axial images were obtained along with coronal and sagittal  reconstruction. Mip images were generated.   Patient was injected with contrast.   DLP in mGycm and contrast amount and type are reported in the EMR  record.. Dose lowering technique: Automated exposure control. Data  included in the medical records.      HISTORY/COMPARISON/FINDINGS:     Comparison: Same day chest x-ray.     History and Findings: Hypoxia and tachycardia.     No evidence of PE.  No evidence of aortic aneurysm.  Moderate bilateral pleural effusion.  Extensive bilateral infiltrates.  Visualized portion of upper abdomen  appeared unremarkable.       Impression:      No evidence of PE.  Extensive bilateral infiltrates.  Multifocal pneumonia versus pulmonary  edema.              [facility]     ZIP Code [id number].              This report was finalized on 5/18/2024 8:17 AM by Dr. Uday Hansen MD.       XR Chest 1 View [[id number]] Collected: 05/18/24 0810     Updated: 05/18/24 0814    Narrative:      VERIFICATION OBSERVER NAME: Uday Hansen MD.     TECHNIQUE, HISTORY, FINDINGS:      Comparison: 5/16/2024.     History and Findings: Hypoxia.     Single view revealed minimal cardiac enlargement.  Bilateral infiltrates  likely related to multifocal pneumonia.  This is a new finding from  previous study at least in the RIGHT lung.  No pleural effusion.  Central airways are patent.       Impression:      Development of infiltrates RIGHT lung.  New from prior.  Consistent with  multifocal pneumonia              [id number]     Zip code: [id number]                 This report was finalized on 5/18/2024 8:12 AM by Dr. Uday Hansen MD.       XR Chest 1 View [[id number]] Collected: 05/16/24 2118     Updated: 05/16/24 2121    Narrative:      PROCEDURE: Portable chest x-ray examination performed on May 16, 2024.  Single view. Upright  position.     HISTORY: Shortness of breath.     COMPARISON: None.     FINDINGS:     Normal heart size  Mild central pulmonary vascular congestion.  Small bands of scar versus atelectasis at the lung bases.  No lobar consolidation or edema.  No pleural effusion or pneumothorax.  Mild hypoinflated lungs.  Mild dextroconvex curve at the mid thoracic spine.       Impression:         1.  Normal heart size.  2.  Mild central pulmonary vascular congestion.  3.  Small bands of scar versus atelectasis at the lung bases.  4.  No edema or pneumonia. No pleural effusion or pneumothorax.     This report was finalized on 5/16/2024 9:19 PM by Khadar Marie MD.       NM Lung Ventilation Perfusion [[id number]] Collected: 05/16/24 1440     Updated: 05/16/24 1443    Narrative:      EXAM:    NM Lung Perfusion and Ventilation Scan     EXAM DATE:    5/16/2024 1:46 PM     CLINICAL HISTORY:    r/o PE; K85.90-Acute pancreatitis without necrosis or infection,  unspecified     TECHNIQUE:    Nuclear Medicine ventilation and perfusion images of the lungs were  obtained in multiple projections following radiopharmaceutical  inhalation followed by injection of Tc99m MAA.     COMPARISON:    No relevant prior studies available.     FINDINGS:    Ventilation:  Unremarkable as visualized.  No ventilation defects.    Perfusion:  Unremarkable as visualized.  No perfusion defects.       Impression:      Low probability of PE.        This report was finalized on 5/16/2024 2:41 PM by Dr. Juan Carlos Smith MD.       US Venous Doppler Lower Extremity Bilateral (duplex) [[id number]] Collected: 05/16/24 1249     Updated: 05/16/24 1252    Narrative:      EXAMINATION: US VENOUS DOPPLER LOWER EXTREMITY BILATERAL (DUPLEX)-      CLINICAL INDICATION:  r/o DVT; K85.90-Acute pancreatitis without  necrosis or infection, unspecified     TECHNIQUE: Multiplanar gray scale and Doppler vascular sonographic  imaging of the deep veins of  BILATERAL   lower extremity, without  and  with compression.      COMPARISON: NONE      FINDINGS:   Deep Veins: The visualized deep veins demonstrate flow and are  compressible. No evidence of deep venous thrombosis.   Superficial veins: Unremarkable. Saphenofemoral junction is patent  without thrombus.  Soft tissues: Soft tissue edema is noted.       Impression:      No DVT.     This report was finalized on 5/16/2024 12:50 PM by Dr. Juan Carlos Smith MD.       CT Abdomen Pelvis With & Without Contrast [[id number]] Collected: 05/16/24 0839     Updated: 05/16/24 0903    Narrative:      EXAM:    CT Abdomen and Pelvis Without and With Intravenous Contrast     EXAM DATE:    5/16/2024 8:05 AM     CLINICAL HISTORY:    abdominal pain, SIRS, pancreatitis; K85.90-Acute pancreatitis without  necrosis or infection, unspecified     TECHNIQUE:    Axial computed tomography images of the abdomen and pelvis without and  with intravenous contrast.  Sagittal and coronal reformatted images were  created and reviewed.  This CT exam was performed using one or more of  the following dose reduction techniques:  automated exposure control,  adjustment of the mA and/or kV according to patient size, and/or use of  iterative reconstruction technique.     COMPARISON:    5/12/2024     FINDINGS:    Lung bases:  Unremarkable as visualized.  No mass.  No consolidation.      ABDOMEN:    Liver:  Diffuse fatty liver.    Gallbladder and bile ducts:  Cholecystectomy.  No ductal dilation.    Pancreas:  Previously described subtle inflammation near the head of  the pancreas has essentially resolved on CT.  No ductal dilation.  No  pancreatic pseudocyst.    Spleen:  Unremarkable as visualized.  No splenomegaly.    Adrenals:  Unremarkable as visualized.  No mass.    Kidneys and ureters:  Bilateral renal cortical scarring record of the  left kidneys.  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 No hydronephrosis.    Stomach and bowel:  Scattered air-fluid  levels throughout nondilated  small and large intestine which is nonspecific. No bowel obstruction.   No mucosal thickening.      PELVIS:    Appendix:  No findings to suggest acute appendicitis.    Bladder:  Distended urinary bladder without wall thickening.  No  stones.    Reproductive:  Unremarkable as visualized.      ABDOMEN and PELVIS:    Intraperitoneal space:  Unremarkable as visualized.  No free air.  No  ascites or abscess.    Bones/joints:  Degenerative changes lower lumbar spine.  No acute  fracture.  No dislocation.    Soft tissues:  Tubulization clips noted.    Vasculature:  Unremarkable as visualized.  No abdominal aortic  aneurysm.    Lymph nodes:  Unremarkable as visualized.  No enlarged lymph nodes.       Impression:      1.  No inflammatory changes identified involving the pancreas on current  exam.  2.  No pancreatic pseudocyst.  3.  No ascites or free air.  4.  Fatty liver.  5.  Cholecystectomy.  6.  Nonspecific but nonobstructive bowel gas pattern.  7.  Mild volume overload changes with cardiomegaly, interstitial edema,  and trace effusions.  8.  Otherwise stable exam with other nonacute findings as detailed  above.        This report was finalized on 5/16/2024 9:01 AM by Dr. Juan Carlos Smith MD.       XR Chest 1 View [[id number]] Collected: 05/15/24 0941     Updated: 05/15/24 0943    Narrative:      EXAM:    XR Chest, 1 View     EXAM DATE:    5/15/2024 8:43 AM     CLINICAL HISTORY:    fever; K85.90-Acute pancreatitis without necrosis or infection,  unspecified     TECHNIQUE:    Frontal view of the chest.     COMPARISON:    No relevant prior studies available.     FINDINGS:    Lungs and pleural spaces:  Unremarkable as visualized.  No  consolidation.  No pneumothorax.    Heart:  Unremarkable as visualized.  No cardiomegaly.    Mediastinum:  Unremarkable as visualized.  Normal mediastinal contour.    Bones/joints:  Unremarkable as visualized.  No acute fracture.       Impression:        Unremarkable  exam. No acute cardiopulmonary findings identified.        This report was finalized on 5/15/2024 9:41 AM by Dr. Juan Carlos Smith MD.       CT Abdomen Pelvis With & Without Contrast [[id number]] Collected: 05/12/24 1811     Updated: 05/12/24 1816    Narrative:      Procedure: Helical CT Abdomen and Pelvis examination performed with  axial sections prior to and after administration of non-ionic IV  contrast. Coronal and sagittal 3 mm thick reformats also acquired. CT  scan performed according to ALARA(as low as reasonably achievable)dose  protocol.     Comparison: None available.     History: flank pain; LLQ     Date: 5/12/2024 5:17 PM     Findings:     Liver: Normal liver and size.  No focal lesion.  Gallbladder: Surgically absent.   Spleen: Normal in size.  Pancreas: Atrophy. Mild surrounding edema.  Adrenal glands: No nodules.  Kidneys: Symmetric bilateral renal enhancement is present. No  hydronephrosis.  Peritoneum: There is no free air or abscess.  Bowel: No obstruction. No evidence of inflamed bowel loops. Appendix is  normal.  Mesentery: No adenopathy.  Retroperitoneum: Aorta and inferior vena cava unremarkable.  Pelvis: Bladder is unremarkable.  Bones: No acute fracture.  Lung bases: Visualized lung bases are negative. Heart is normal in size.       Impression:         Findings may represent acute pancreatitis. Correlation with lab values.      This report was finalized on 5/12/2024 6:14 PM by Titus Martinez MD.             I have personally reviewed the above radiology results.   ---------------------------------------------------------------------------------------------------------------------      Pertinent Infectious Disease Results          Assessment & Plan      Assessment      Recurrent high-grade fever  Pneumonia  Suspicion for tickborne illness        Plan      Ms. Manda Al is a 43 y.o. female with past medical history significant for anxiety, depression, bipolar disorder, asthma, Crohn  disease, HTN, schizoaffective disorder, recurrent episodes of pancreatitis, and type II DM, who presented to [facility] Emergency Department on 5/12/2024 for epigastric abdominal pain, nausea and vomiting x 1 week.  The patient reported 4 episodes of pancreatitis in the past, the first time due to gallstones, the second and third time due to medication and the fourth time no source was discovered.  The patient initially presented to the ED on 5/7 with above symptoms.  Had been recently diagnosed with UTI and started on Bactrim by PCP.  Concerned she may have pancreatitis again.  Lipase at that time was only 120 and CT reportedly showed mild enteritis and mesenteric adenitis without evidence of pancreatitis.  She received IV and oral potassium replacement and was discharged without any prescriptions.  The patient reported worsening epigastric abdominal pain as well as worsening nausea and vomiting since that time.  The patient reported the pain as gnawing and radiating to both sides of her upper abdomen as well as lower back.The patient reported urinary symptoms resolved since completion of antibiotic.    The patient became febrile on 5/15 and repeat blood cultures show no growth to date.  Upon further discussion the patient reported she found a tick to her left bottom around a week prior to admission.  No rash in the area but did have a healing scabbed over area.  The patient was initiated on doxycycline and rickettsial serologies were sent.  On the night of 5/17 the patient spiked a fever of 102.9 °F along with intermittent tachycardia and hypoxia with O2 saturations in the mid to upper 80s on room air.  Chest x-ray reported development of infiltrates to the right lung.  New from prior.  Consistent with multifocal pneumonia.  CT angiogram chest reported no evidence of PE.  Extensive bilateral infiltrates.  Multifocal pneumonia versus pulmonary edema.    On arrival to ED CRP was 0.56.  Lactate 1.5.   Lipase 257.  CBC was unremarkable.  Urinalysis showed small leuk esterase, 6-10 RBC and 11-20 WBC with 2+ bacteria.  Did have 7-12 squamous epithelial cells.  Urine culture finalized with no growth.  Blood cultures finalized with no growth at 5 days.  COVID-19 and flu PCR was negative.  Repeat blood cultures from 5/15 report no growth at 3 days.  Respiratory panel PCR was negative.  MRSA screen was negative.  HIV panel and hepatitis panel negative.    This morning the patient is awake and alert, sitting up in bed comfortably.  On 2 L nasal cannula with no apparent distress.  Tmax 102.9 °F.  Denies diarrhea.  The patient complains of a headache but denied neck pain, nuchal rigidity, rash.  No confusion.  Lung exam is clear with diminished bases bilaterally to auscultation.  Abdomen soft and rounded, nontender with normoactive bowel sounds.  Left bottom examined, scabbed area noted with no surrounding erythema, drainage, or rash.  CRP is elevated at 15.07.  WBC normal at 4.06.  Platelet count 103,000.    Case discussed with primary hospitalist, the patient continues with Zosyn and doxycycline in the setting of sepsis and possible tickborne illness.  Micafungin was added empirically in the setting of new onset pneumonia.  Rickettsial serologies are pending.  ANCA is pending.  We will add Aspergillus galactomannan, fungal serologies and Fungitell.  Blood cultures x 2.  We will continue to monitor closely and adjust antibiotic coverage as appropriate.    ANTIMICROBIAL THERAPY    doxycycline (VIBRAMYCIN) 100 mg IVPB in 100 mL NS (VTB)  micafungin (MYCAMINE) IVPB  piperacillin-tazobactam (ZOSYN) IVPB 3.375 g IVPB in 100 mL NS (VTB)       Again, thank you Dr. Yee for allowing us to participate in the care of your patient and please feel free to call for any questions you may have.        Code Status:     Code Status and Medical Interventions:   Ordered at: 05/12/24 1930     Code Status (Patient has no pulse and is not  breathing):    CPR (Attempt to Resuscitate)     Medical Interventions (Patient has pulse or is breathing):    Full Support         Gregoria Gamble, DIEGO  05/18/24  11:42 EDT

## 2024-05-18 NOTE — PLAN OF CARE
Problem: Pain Acute  Goal: Acceptable Pain Control and Functional Ability  Outcome: Ongoing, Progressing  Intervention: Develop Pain Management Plan  Description: Acknowledge patient as the expert in pain self-management.  Use a consistent, validated tool for pain assessment; include function and quality of life.  Evaluate risk for opioid use and dependence.  Set pain management goals; determine acceptable level of discomfort to allow for maximal functioning.  Determine eiivpnrr-[id number]-agvs pain management plan, including both pharmacologic and nonpharmacologic measures; integrate management of chronic (persistent) pain.  Identify and integrate past successful treatment measures, if able.  Encourage patient and caregiver involvement in pain assessment, interventions and safety measures.  Re-evaluate plan regularly.  Recent Flowsheet Documentation  Taken 5/17/2024 2005 by Lubna Snowden, RN  Pain Management Interventions:   see MAR   quiet environment facilitated   Goal Outcome Evaluation:  Plan of Care Reviewed With: patient        Progress: improving

## 2024-05-18 NOTE — NURSING NOTE
Patient reintubated after 20mg of etomidate followed by 60 mg rocuronium. Reintubated by Dr. Vidales x one attempt with 7.5 ett at 25cm. Chest x-ray, and KUB ordered and sedation resumed.  End tidal, breath sounds, equal rise of chest and condensation in tube all positive. At bedside during procedure: Dr.s Vidales and Derian, Sean Dietrich [facility], Haley Matta Crt, Lillie Jimenez and nurses Geeta Lipscomb, Laurel Hargrove, Alvarez Wall.

## 2024-05-18 NOTE — PROGRESS NOTES
Nursing staff contacted me regarding new onset hypoxia with O2 saturations in the mid to upper 80s on room air at rest.  Patient also with progressive, intermittent tachycardia and fever of Tmax 102.9 °F tonight.    Patient seen and examined at bedside.  She states that she has been having increasing cough that is mostly nonproductive and she thinks could possibly be due to her breathing treatments.  She denies chest pains.  Patient states that she did have new onset dizziness tonight while ambulating to the restroom.  Patient currently denies dyspnea.  Patient was briefly increased from 2 L/min nasal cannula to 4 but has since been decreased back to 2 L/min nasal cannula and was saturating in the low 90s. Patient did have tachypnea earlier with RR in the upper 30s.    Patient does not exhibit any tachypnea or accessory muscle use.  Physical exam reveals a tachycardic rate with regular rhythm.  On operatory exam, patient with right upper lobe rhonchi and decreased bibasilar breath sounds bilaterally.    I will obtain a CT chest with PE protocol to r/o PE and CT imaging can also help to better differentiate between pneumonia versus alveolar edema.     Explained differential diagnoses/concerns to patient and she is agreeable and has no questions or requests at this time. Case also d/w CEDRIC Mcginnis.

## 2024-05-18 NOTE — PROGRESS NOTES
[facility] HOSPITALIST PROGRESS NOTE     Patient Identification:  Name:  Manda Al  Age:  43 y.o.  Sex:  female  :  1980  MRN:  [id number]  Visit Number:  [id number]  ROOM: [place] [address]     Primary Care Provider:  Mi Rivera MD    Length of stay in inpatient status:  6    Subjective     Chief Compliant:    Chief Complaint   Patient presents with    Flank Pain       History of Presenting Illness:    Overnight patient had recurrent fevers up to 102.9. She also became hypoxic. Currently on 2L NC. She does not appear to be in distress and reports during the days she has been feeling better.     ROS:  Otherwise 10 point ROS negative other than documented above in HPI.     Objective     Current Hospital Meds:Cariprazine HCl, 3 mg, Oral, Daily  cetirizine, 10 mg, Oral, Daily  doxycycline, 100 mg, Intravenous, Q12H  FLUoxetine, 10 mg, Oral, Daily  insulin regular, 2-7 Units, Subcutaneous, Q6H  ipratropium-albuterol, 3 mL, Nebulization, 4x Daily - RT  ketorolac, 30 mg, Intravenous, Once  magnesium sulfate, 2 g, Intravenous, Once  metoclopramide, 5 mg, Intravenous, Once  metoprolol succinate XL, 200 mg, Oral, Daily  micafungin (MYCAMINE) IV, 100 mg, Intravenous, Q24H  mupirocin, 1 application , Each Nare, BID  nicotine, 1 patch, Transdermal, Q24H  piperacillin-tazobactam, 3.375 g, Intravenous, Q8H  sodium chloride, 500 mL, Intravenous, Once  sodium chloride, 10 mL, Intravenous, Q12H  sodium chloride, 10 mL, Intravenous, Q12H  sodium chloride, 10 mL, Intravenous, Q12H         Current Antimicrobial Therapy:  Anti-Infectives (From admission, onward)      Ordered     Dose/Rate Route Frequency Start Stop    24 0702  micafungin sodium (MYCAMINE) 100 mg in sodium chloride 0.9 % 100 mL IVPB        Ordering Provider: Rohan Yee MD    100 mg  over 60 Minutes Intravenous Every 24 Hours 24 0900 24 0859    24 0703  piperacillin-tazobactam (ZOSYN) IVPB 3.375 g IVPB in 100 mL NS  (VTB)        Ordering Provider: Rohan Yee MD    3.375 g  over 4 Hours Intravenous Every 8 Hours 05/16/24 1400 05/21/24 1359    05/16/24 1223  doxycycline (VIBRAMYCIN) 100 mg in sodium chloride 0.9 % 100 mL IVPB-VTB        Ordering Provider: Rohan Yee MD    100 mg  over 60 Minutes Intravenous Every 12 Hours 05/16/24 1300 05/21/24 1259    05/16/24 0718  vancomycin IVPB 2000 mg in 0.9% Sodium Chloride 500 mL        Ordering Provider: Rohan Yee MD    2,000 mg  250 mL/hr over 120 Minutes Intravenous Once 05/16/24 0900 05/16/24 1300    05/16/24 0703  piperacillin-tazobactam (ZOSYN) IVPB 3.375 g IVPB in 100 mL NS (VTB)        Ordering Provider: Rohan Yee MD    3.375 g  over 30 Minutes Intravenous Once 05/16/24 0800 05/16/24 0903    05/13/24 1104  fluconazole (DIFLUCAN) tablet 150 mg        Ordering Provider: Brenton Alves PA-C    150 mg Oral Once 05/13/24 1200 05/13/24 1201    05/12/24 1834  cefTRIAXone (ROCEPHIN) 1,000 mg in sodium chloride 0.9 % 100 mL IVPB-VTB        Ordering Provider: Florinda Torres PA-C    1,000 mg  200 mL/hr over 30 Minutes Intravenous Once 05/12/24 1850 05/12/24 1923          Current Diuretic Therapy:  Diuretics (From admission, onward)      None          ----------------------------------------------------------------------------------------------------------------------  Vital Signs:  Temp:  [97.5 °F (36.4 °C)-102.9 °F (39.4 °C)] 101.9 °F (38.8 °C)  Heart Rate:  [] 102  Resp:  [14-37] 18  BP: ()/(45-80) 90/62  SpO2:  [85 %-99 %] 96 %  on  Flow (L/min):  [2-4] 2;   Device (Oxygen Therapy): nasal cannula  Body mass index is 35.26 kg/m².    Wt Readings from Last 3 Encounters:   05/18/24 105 kg (231 lb 14.8 oz)   05/07/24 97.1 kg (214 lb)   03/19/22 113 kg (250 lb)     Intake & Output (last 3 days)         05/15 0701  05/16 0700 05/16 0701  05/17 0700 05/17 0701  05/18 0700 05/18 0701  05/19 0700    P.O. 1410 1980 1376     I.V. (mL/kg) 900 (8.9) 450  (4.2) 896.3 (8.5)     IV Piggyback  600      Total Intake(mL/kg) 2310 (22.9) 3030 (28.6) 2272.3 (21.6)     Urine (mL/kg/hr)  480 (0.2) 700 (0.3)     Stool  0      Total Output  480 700     Net +2310 +2550 +1572.3             Urine Unmeasured Occurrence 5 x 6 x 2 x     Stool Unmeasured Occurrence 1 x 1 x            Diet: Regular/House; Fluid Consistency: Thin (IDDSI 0)  ----------------------------------------------------------------------------------------------------------------------  Physical exam:  Constitutional:  Well-developed and well-nourished.  No respiratory distress.      HENT:  Head:  Normocephalic and atraumatic.  Mouth:  Moist mucous membranes.    Eyes:  Conjunctivae and EOM are normal. No scleral icterus.    Neck:  Neck supple.  No JVD present.    Cardiovascular:  Normal rate, regular rhythm and normal heart sounds with no murmur.  Pulmonary/Chest:  No respiratory distress, no wheezes, no crackles, with normal breath sounds and good air movement.  Abdominal:  Soft.  Bowel sounds are normal.  No distension and no tenderness.   Musculoskeletal:  No edema, no tenderness, and no deformity.  No red or swollen joints anywhere.    Neurological:  Alert and oriented to person, place, and time.  No cranial nerve deficit.  No tongue deviation.  No facial droop.  No slurred speech.   Skin:  Skin is warm and dry. No rash noted. No pallor.   Peripheral vascular:  Pulses in all 4 extremities with no clubbing, no cyanosis, no edema.  ----------------------------------------------------------------------------------------------------------------------  Tele:    ----------------------------------------------------------------------------------------------------------------------  Results from last 7 days   Lab Units 05/18/24  0021 05/17/24  0036 05/16/24  1034 05/16/24  0705 05/16/24  0221 05/13/24  0645 05/12/24  2021 05/12/24  1648   CRP mg/dL 15.07*  --  9.82*  --   --   --   --  0.56*   LACTATE mmol/L  --   --    "--  0.8  --   --  1.5  --    WBC 10*3/mm3 4.06 3.52  --   --  3.70   < >  --  8.89   HEMOGLOBIN g/dL 9.8* 11.1*  --   --  12.7   < >  --  13.9   HEMATOCRIT % 29.2* 32.4*  --   --  36.2   < >  --  39.2   MCV fL 91.0 88.3  --   --  89.6   < >  --  87.1   MCHC g/dL 33.6 34.3  --   --  35.1   < >  --  35.5   PLATELETS 10*3/mm3 103* 109*  --   --  151   < >  --  308   INR   --  1.26*  --   --   --   --   --   --     < > = values in this interval not displayed.     Results from last 7 days   Lab Units 05/16/24  1926   PH, ARTERIAL pH units 7.467*   PO2 ART mm Hg 61.5*   PCO2, ARTERIAL mm Hg 26.9*   HCO3 ART mmol/L 19.4*     Results from last 7 days   Lab Units 05/18/24  0021 05/17/24  1221 05/17/24  0036 05/16/24  1034 05/13/24  0645 05/13/24  0549 05/12/24  1648   SODIUM mmol/L 140 135* 128*  --    < >  --  138   POTASSIUM mmol/L 3.5 4.0 3.4*  --    < >  --  3.5   MAGNESIUM mg/dL  --   --   --   --   --  2.9* 1.7   CHLORIDE mmol/L 110* 105 99  --    < >  --  105   CO2 mmol/L 20.9* 20.0* 18.2*  --    < >  --  21.6*   BUN mg/dL 10 6 5*  --    < >  --  5*   CREATININE mg/dL 1.14* 0.99 1.00  --    < >  --  0.94   CALCIUM mg/dL 7.5* 7.5* 7.3*  --    < >  --  8.8   GLUCOSE mg/dL 159* 152* 116*  --    < >  --  278*   ALBUMIN g/dL 2.3*  --  2.5* 2.3*   < >  --  3.4*   BILIRUBIN mg/dL 0.5  --  0.5 0.4   < >  --  0.3   ALK PHOS U/L 73  --  73 57   < >  --  74   AST (SGOT) U/L 55*  --  87* 93*   < >  --  23   ALT (SGPT) U/L 36*  --  44* 40*   < >  --  16    < > = values in this interval not displayed.   Estimated Creatinine Clearance: 81.5 mL/min (A) (by C-G formula based on SCr of 1.14 mg/dL (H)).  No results found for: \"AMMONIA\"              Glucose   Date/Time Value Ref Range Status   05/18/2024 0601 162 (H) 70 - 130 mg/dL Final   05/17/2024 2319 200 (H) 70 - 130 mg/dL Final   05/17/2024 1736 211 (H) 70 - 130 mg/dL Final   05/17/2024 1105 154 (H) 70 - 130 mg/dL Final   05/17/2024 0606 142 (H) 70 - 130 mg/dL Final   05/16/2024 " "2352 109 70 - 130 mg/dL Final   05/16/2024 1714 138 (H) 70 - 130 mg/dL Final   05/16/2024 1103 173 (H) 70 - 130 mg/dL Final     Lab Results   Component Value Date    TSH 1.840 05/16/2024     Lab Results   Component Value Date    PREGTESTUR Negative 05/09/2017     Pain Management Panel  More data exists         Latest Ref Rng & Units 8/11/2020 5/9/2017   Pain Management Panel   Amphetamine, Urine Qual Negative Positive  Negative    Barbiturates Screen, Urine Negative Negative  Negative    Benzodiazepine Screen, Urine Negative Negative  Negative    Buprenorphine, Screen, Urine Negative Negative  Negative    Cocaine Screen, Urine Negative Negative  Negative    Methadone Screen , Urine Negative Negative  Negative      Brief Urine Lab Results  (Last result in the past 365 days)        Color   Clarity   Blood   Leuk Est   Nitrite   Protein   CREAT   Urine HCG        05/15/24 0823 Yellow   Clear   Negative   Negative   Negative   Negative                 Blood Culture   Date Value Ref Range Status   05/15/2024 No growth at 3 days  Preliminary   05/15/2024 No growth at 3 days  Preliminary   05/12/2024 No growth at 5 days  Final   05/12/2024 No growth at 5 days  Final   05/12/2024 No growth at 5 days  Final     Urine Culture   Date Value Ref Range Status   05/12/2024 No growth  Final     No results found for: \"WOUNDCX\"  No results found for: \"STOOLCX\"  No results found for: \"RESPCX\"  No results found for: \"AFBCX\"  Results from last 7 days   Lab Units 05/18/24  0021 05/17/24  0036 05/16/24  1034 05/16/24  0705 05/12/24 2021 05/12/24  1648   PROCALCITONIN ng/mL  --   --  0.76*  --   --   --    LACTATE mmol/L  --   --   --  0.8 1.5  --    SED RATE mm/hr  --  11  --   --   --   --    CRP mg/dL 15.07*  --  9.82*  --   --  0.56*       I have personally looked at the labs and they are summarized above.  ----------------------------------------------------------------------------------------------------------------------  Detailed " radiology reports for the last 24 hours:    Imaging Results (Last 24 Hours)       Procedure Component Value Units Date/Time    CT Angiogram Chest Pulmonary Embolism [[id number]] Collected: 05/18/24 0814     Updated: 05/18/24 0819    Narrative:      VERIFICATION OBSERVER NAME: Uday Hansen MD.     Technique: Axial images were obtained along with coronal and sagittal  reconstruction. Mip images were generated.   Patient was injected with contrast.   DLP in mGycm and contrast amount and type are reported in the EMR  record.. Dose lowering technique: Automated exposure control. Data  included in the medical records.      HISTORY/COMPARISON/FINDINGS:     Comparison: Same day chest x-ray.     History and Findings: Hypoxia and tachycardia.     No evidence of PE.  No evidence of aortic aneurysm.  Moderate bilateral pleural effusion.  Extensive bilateral infiltrates.  Visualized portion of upper abdomen  appeared unremarkable.       Impression:      No evidence of PE.  Extensive bilateral infiltrates.  Multifocal pneumonia versus pulmonary  edema.              [id number]     ZIP Code [address].              This report was finalized on 5/18/2024 8:17 AM by Dr. Uday Hansen MD.       XR Chest 1 View [[id number]] Collected: 05/18/24 0810     Updated: 05/18/24 0814    Narrative:      VERIFICATION OBSERVER NAME: Uday Hansen MD.     TECHNIQUE, HISTORY, FINDINGS:      Comparison: 5/16/2024.     History and Findings: Hypoxia.     Single view revealed minimal cardiac enlargement.  Bilateral infiltrates  likely related to multifocal pneumonia.  This is a new finding from  previous study at least in the RIGHT lung.  No pleural effusion.  Central airways are patent.       Impression:      Development of infiltrates RIGHT lung.  New from prior.  Consistent with  multifocal pneumonia              [id number]     Zip code: [id number]                 This report was finalized on 5/18/2024 8:12 AM by Dr. Uday Hansen MD.             Assessment & Plan       #Recurrent high grade fevers  #Suspect sepsis 2/2 tick born illness, epidemiologically most likely ehrlichiosis  - Patient with resolving pancreatitis symptoms started having significant recurrent fevers on 5/14, seem to have a nocturnal predominance    - Patient also had drop in blood pressure and BS abx initiated on 5/15 with Vanc/zosyn. CT abdomen/pelvis showed essentially resolved pancreatitis, no evidence of necrosis or cyst.   - Patient with headache, no neck pain or stiffness, no confusion. Continued to have fevers even without abx.   - Discovered patient had found tick on her that likely had been present for a while the week before presentation. Started IV doxycyline on 5/16 and stopped IV vancomycin as that was likely etiology. Tick serologies sent.   - Other infectious w/u had been negative including chest x-ray, UA. Blood cultures also obtained on 5/15 were no growth. D-dimer over 20 but VTE w/u negative. Hepatitis panel and HIV negative.   - Patient developed continued fevers and hypoxia night of 5/17. Continued headache. No rash, neck stiffness, neck pain. No confusion.   - Will send ANCA panel and repeat inflammatory markers. Getting echo. CTPE repeated overnight appeared more like ARDS or central edema on my evaluation. Multifocal pneumonia possible Added micafungin while we await ID recs.   - Consulted ID and pulmonology. Appreciate recs.     #Acute hypoxic respiratory failure   - CT Pe repeated overnight with development of central airway disease. Differential includes edema, pneumonia, ARDS.   - Antimicrobials as above   - ANCA pending   - Given relative hypotension will hold diuresis for now.   - Consulted pulmonology   - Will get echo   - Given concern for above infection, benefit of steroids does not clearly outweigh benefit.     #Acute recurrent pancreatitis   - Symptoms and imaging findings essentially resolved. Tolerating diet.     #Mild hepatocellular transaminitis   #Mild  thrombocytopenia   - Fibrinogen wnl. Do not suspect DIC. LFTs and platelet level stable this AM.   - Suspect from tick borne illness as above     #Hyponatremia   - Resolved     #Recent UTI  -Repeat urine culture no growth on admission     Chronic medical problems:  Anxiety/depression   Schizoaffective disorder   HTN  Crohn's disease- No change in chronic diarrhea     Code status: Full     Dispo: Given hypoxia and decline overnight, CCU orders placed. If improves may be able to get out of the ICU soon.     VTE Prophylaxis:   Mechanical Order History:        Ordered        05/12/24 2048  Place Sequential Compression Device  Once            05/12/24 2048  Maintain Sequential Compression Device  Continuous                          Pharmalogical Order History:       None                Rohan Yee MD  [facility] Hospitalist  05/18/24  08:47 EDT

## 2024-05-18 NOTE — PLAN OF CARE
Goal Outcome Evaluation:              Outcome Evaluation: Patient resting in bed watching HGTV, daughter at bedside. VSS, afebrile, denies pain, scuds in use.

## 2024-05-19 ENCOUNTER — APPOINTMENT (OUTPATIENT)
Dept: CARDIOLOGY | Facility: HOSPITAL | Age: 44
DRG: 438 | End: 2024-05-19
Payer: COMMERCIAL

## 2024-05-19 LAB
ALBUMIN SERPL-MCNC: 2.5 G/DL (ref 3.5–5.2)
ALBUMIN/GLOB SERPL: 0.9 G/DL
ALP SERPL-CCNC: 85 U/L (ref 39–117)
ALT SERPL W P-5'-P-CCNC: 30 U/L (ref 1–33)
ANION GAP SERPL CALCULATED.3IONS-SCNC: 10.3 MMOL/L (ref 5–15)
AST SERPL-CCNC: 30 U/L (ref 1–32)
ATMOSPHERIC PRESS: 724 MMHG
BASE EXCESS BLDV CALC-SCNC: -5.4 MMOL/L (ref 0–2)
BASOPHILS # BLD AUTO: 0.01 10*3/MM3 (ref 0–0.2)
BASOPHILS NFR BLD AUTO: 0.2 % (ref 0–1.5)
BDY SITE: ABNORMAL
BILIRUB SERPL-MCNC: 0.5 MG/DL (ref 0–1.2)
BUN SERPL-MCNC: 12 MG/DL (ref 6–20)
BUN/CREAT SERPL: 10.7 (ref 7–25)
CALCIUM SPEC-SCNC: 7.9 MG/DL (ref 8.6–10.5)
CHLORIDE SERPL-SCNC: 110 MMOL/L (ref 98–107)
CO2 BLDA-SCNC: 20.6 MMOL/L (ref 22–33)
CO2 SERPL-SCNC: 17.7 MMOL/L (ref 22–29)
COHGB MFR BLD: 1.7 % (ref 0–5)
CREAT SERPL-MCNC: 1.12 MG/DL (ref 0.57–1)
D-LACTATE SERPL-SCNC: 2.8 MMOL/L (ref 0.5–2)
D-LACTATE SERPL-SCNC: 3.2 MMOL/L (ref 0.5–2)
D-LACTATE SERPL-SCNC: 3.2 MMOL/L (ref 0.5–2)
D-LACTATE SERPL-SCNC: 4.4 MMOL/L (ref 0.5–2)
DEPRECATED RDW RBC AUTO: 47 FL (ref 37–54)
EGFRCR SERPLBLD CKD-EPI 2021: 63.1 ML/MIN/1.73
EOSINOPHIL # BLD AUTO: 0 10*3/MM3 (ref 0–0.4)
EOSINOPHIL NFR BLD AUTO: 0 % (ref 0.3–6.2)
ERYTHROCYTE [DISTWIDTH] IN BLOOD BY AUTOMATED COUNT: 14 % (ref 12.3–15.4)
GAS FLOW AIRWAY: 3 LPM
GLOBULIN UR ELPH-MCNC: 2.9 GM/DL
GLUCOSE BLDC GLUCOMTR-MCNC: 243 MG/DL (ref 70–130)
GLUCOSE BLDC GLUCOMTR-MCNC: 277 MG/DL (ref 70–130)
GLUCOSE BLDC GLUCOMTR-MCNC: 305 MG/DL (ref 70–130)
GLUCOSE BLDC GLUCOMTR-MCNC: 308 MG/DL (ref 70–130)
GLUCOSE BLDC GLUCOMTR-MCNC: 336 MG/DL (ref 70–130)
GLUCOSE BLDC GLUCOMTR-MCNC: 337 MG/DL (ref 70–130)
GLUCOSE SERPL-MCNC: 294 MG/DL (ref 65–99)
HCO3 BLDV-SCNC: 19.5 MMOL/L (ref 22–28)
HCT VFR BLD AUTO: 28.5 % (ref 34–46.6)
HGB BLD-MCNC: 9.4 G/DL (ref 12–15.9)
HGB BLDA-MCNC: 9.4 G/DL (ref 13.5–17.5)
IMM GRANULOCYTES # BLD AUTO: 0.03 10*3/MM3 (ref 0–0.05)
IMM GRANULOCYTES NFR BLD AUTO: 0.7 % (ref 0–0.5)
INHALED O2 CONCENTRATION: 32 %
LYMPHOCYTES # BLD AUTO: 0.71 10*3/MM3 (ref 0.7–3.1)
LYMPHOCYTES NFR BLD AUTO: 17.7 % (ref 19.6–45.3)
Lab: ABNORMAL
MCH RBC QN AUTO: 30.4 PG (ref 26.6–33)
MCHC RBC AUTO-ENTMCNC: 33 G/DL (ref 31.5–35.7)
MCV RBC AUTO: 92.2 FL (ref 79–97)
METHGB BLD QL: 0.3 % (ref 0–3)
MODALITY: ABNORMAL
MONOCYTES # BLD AUTO: 0.17 10*3/MM3 (ref 0.1–0.9)
MONOCYTES NFR BLD AUTO: 4.2 % (ref 5–12)
NEUTROPHILS NFR BLD AUTO: 3.1 10*3/MM3 (ref 1.7–7)
NEUTROPHILS NFR BLD AUTO: 77.2 % (ref 42.7–76)
NRBC BLD AUTO-RTO: 0 /100 WBC (ref 0–0.2)
OXYHGB MFR BLDV: 87.6 % (ref 45–75)
PCO2 BLDV: 34.7 MM HG (ref 41–51)
PH BLDV: 7.36 PH UNITS (ref 7.32–7.42)
PLATELET # BLD AUTO: 111 10*3/MM3 (ref 140–450)
PMV BLD AUTO: 11.4 FL (ref 6–12)
PO2 BLDV: 54.7 MM HG (ref 27–53)
POTASSIUM SERPL-SCNC: 4.1 MMOL/L (ref 3.5–5.2)
PROT SERPL-MCNC: 5.4 G/DL (ref 6–8.5)
RBC # BLD AUTO: 3.09 10*6/MM3 (ref 3.77–5.28)
SAO2 % BLDCOV: 89.4 % (ref 45–75)
SODIUM SERPL-SCNC: 138 MMOL/L (ref 136–145)
VENTILATOR MODE: ABNORMAL
WBC NRBC COR # BLD AUTO: 4.02 10*3/MM3 (ref 3.4–10.8)

## 2024-05-19 PROCEDURE — 82820 HEMOGLOBIN-OXYGEN AFFINITY: CPT

## 2024-05-19 PROCEDURE — 63710000001 PROMETHAZINE PER 12.5 MG: Performed by: INTERNAL MEDICINE

## 2024-05-19 PROCEDURE — 94640 AIRWAY INHALATION TREATMENT: CPT

## 2024-05-19 PROCEDURE — 25010000002 PIPERACILLIN SOD-TAZOBACTAM PER 1 G: Performed by: INTERNAL MEDICINE

## 2024-05-19 PROCEDURE — 63710000001 INSULIN LISPRO (HUMAN) PER 5 UNITS: Performed by: INTERNAL MEDICINE

## 2024-05-19 PROCEDURE — 63710000001 INSULIN REGULAR HUMAN PER 5 UNITS: Performed by: INTERNAL MEDICINE

## 2024-05-19 PROCEDURE — 93306 TTE W/DOPPLER COMPLETE: CPT | Performed by: INTERNAL MEDICINE

## 2024-05-19 PROCEDURE — 94664 DEMO&/EVAL PT USE INHALER: CPT

## 2024-05-19 PROCEDURE — 85025 COMPLETE CBC W/AUTO DIFF WBC: CPT | Performed by: INTERNAL MEDICINE

## 2024-05-19 PROCEDURE — 82948 REAGENT STRIP/BLOOD GLUCOSE: CPT

## 2024-05-19 PROCEDURE — 83605 ASSAY OF LACTIC ACID: CPT | Performed by: INTERNAL MEDICINE

## 2024-05-19 PROCEDURE — 25010000002 METHYLPREDNISOLONE PER 40 MG: Performed by: INTERNAL MEDICINE

## 2024-05-19 PROCEDURE — 82805 BLOOD GASES W/O2 SATURATION: CPT

## 2024-05-19 PROCEDURE — 99232 SBSQ HOSP IP/OBS MODERATE 35: CPT | Performed by: NURSE PRACTITIONER

## 2024-05-19 PROCEDURE — 25810000003 SODIUM CHLORIDE 0.9 % SOLUTION: Performed by: INTERNAL MEDICINE

## 2024-05-19 PROCEDURE — 63710000001 INSULIN GLARGINE PER 5 UNITS: Performed by: NURSE PRACTITIONER

## 2024-05-19 PROCEDURE — 25010000002 MICAFUNGIN SODIUM 100 MG RECONSTITUTED SOLUTION 1 EACH VIAL: Performed by: INTERNAL MEDICINE

## 2024-05-19 PROCEDURE — 99232 SBSQ HOSP IP/OBS MODERATE 35: CPT | Performed by: INTERNAL MEDICINE

## 2024-05-19 PROCEDURE — 93306 TTE W/DOPPLER COMPLETE: CPT

## 2024-05-19 PROCEDURE — 94799 UNLISTED PULMONARY SVC/PX: CPT

## 2024-05-19 PROCEDURE — 94761 N-INVAS EAR/PLS OXIMETRY MLT: CPT

## 2024-05-19 PROCEDURE — 80053 COMPREHEN METABOLIC PANEL: CPT | Performed by: INTERNAL MEDICINE

## 2024-05-19 RX ORDER — INSULIN LISPRO 100 [IU]/ML
2-9 INJECTION, SOLUTION INTRAVENOUS; SUBCUTANEOUS
Status: DISCONTINUED | OUTPATIENT
Start: 2024-05-19 | End: 2024-05-24 | Stop reason: HOSPADM

## 2024-05-19 RX ORDER — DEXTROSE MONOHYDRATE 25 G/50ML
25 INJECTION, SOLUTION INTRAVENOUS
Status: DISCONTINUED | OUTPATIENT
Start: 2024-05-19 | End: 2024-05-24 | Stop reason: HOSPADM

## 2024-05-19 RX ORDER — SODIUM CHLORIDE 9 MG/ML
100 INJECTION, SOLUTION INTRAVENOUS CONTINUOUS
Status: DISCONTINUED | OUTPATIENT
Start: 2024-05-19 | End: 2024-05-21

## 2024-05-19 RX ORDER — NICOTINE POLACRILEX 4 MG
15 LOZENGE BUCCAL
Status: DISCONTINUED | OUTPATIENT
Start: 2024-05-19 | End: 2024-05-24 | Stop reason: HOSPADM

## 2024-05-19 RX ORDER — METHYLPREDNISOLONE SODIUM SUCCINATE 40 MG/ML
20 INJECTION, POWDER, LYOPHILIZED, FOR SOLUTION INTRAMUSCULAR; INTRAVENOUS EVERY 12 HOURS
Status: DISCONTINUED | OUTPATIENT
Start: 2024-05-19 | End: 2024-05-21

## 2024-05-19 RX ORDER — GLUCAGON 1 MG/ML
1 KIT INJECTION
Status: DISCONTINUED | OUTPATIENT
Start: 2024-05-19 | End: 2024-05-24 | Stop reason: HOSPADM

## 2024-05-19 RX ADMIN — OXYCODONE HYDROCHLORIDE 5 MG: 10 TABLET ORAL at 20:01

## 2024-05-19 RX ADMIN — IPRATROPIUM BROMIDE AND ALBUTEROL SULFATE 3 ML: 2.5; .5 SOLUTION RESPIRATORY (INHALATION) at 18:48

## 2024-05-19 RX ADMIN — DOXYCYCLINE 100 MG: 100 INJECTION, POWDER, LYOPHILIZED, FOR SOLUTION INTRAVENOUS at 00:35

## 2024-05-19 RX ADMIN — CARIPRAZINE 3 MG: 3 CAPSULE, GELATIN COATED ORAL at 08:21

## 2024-05-19 RX ADMIN — IPRATROPIUM BROMIDE AND ALBUTEROL SULFATE 3 ML: 2.5; .5 SOLUTION RESPIRATORY (INHALATION) at 00:37

## 2024-05-19 RX ADMIN — Medication 10 ML: at 21:52

## 2024-05-19 RX ADMIN — METHYLPREDNISOLONE SODIUM SUCCINATE 20 MG: 40 INJECTION, POWDER, FOR SOLUTION INTRAMUSCULAR; INTRAVENOUS at 15:31

## 2024-05-19 RX ADMIN — INSULIN HUMAN 3 UNITS: 100 INJECTION, SOLUTION PARENTERAL at 06:44

## 2024-05-19 RX ADMIN — Medication 10 ML: at 08:22

## 2024-05-19 RX ADMIN — PIPERACILLIN SODIUM AND TAZOBACTAM SODIUM 3.38 G: 3; .375 INJECTION, POWDER, LYOPHILIZED, FOR SOLUTION INTRAVENOUS at 06:44

## 2024-05-19 RX ADMIN — PROMETHAZINE HYDROCHLORIDE 12.5 MG: 12.5 TABLET ORAL at 21:51

## 2024-05-19 RX ADMIN — DOXYCYCLINE 100 MG: 100 INJECTION, POWDER, LYOPHILIZED, FOR SOLUTION INTRAVENOUS at 13:21

## 2024-05-19 RX ADMIN — IPRATROPIUM BROMIDE AND ALBUTEROL SULFATE 3 ML: 2.5; .5 SOLUTION RESPIRATORY (INHALATION) at 06:25

## 2024-05-19 RX ADMIN — MICAFUNGIN SODIUM 100 MG: 100 INJECTION, POWDER, LYOPHILIZED, FOR SOLUTION INTRAVENOUS at 08:21

## 2024-05-19 RX ADMIN — METHYLPREDNISOLONE SODIUM SUCCINATE 40 MG: 40 INJECTION, POWDER, FOR SOLUTION INTRAMUSCULAR; INTRAVENOUS at 04:18

## 2024-05-19 RX ADMIN — PIPERACILLIN SODIUM AND TAZOBACTAM SODIUM 3.38 G: 3; .375 INJECTION, POWDER, LYOPHILIZED, FOR SOLUTION INTRAVENOUS at 15:31

## 2024-05-19 RX ADMIN — INSULIN HUMAN 6 UNITS: 100 INJECTION, SOLUTION PARENTERAL at 11:11

## 2024-05-19 RX ADMIN — PIPERACILLIN SODIUM AND TAZOBACTAM SODIUM 3.38 G: 3; .375 INJECTION, POWDER, LYOPHILIZED, FOR SOLUTION INTRAVENOUS at 21:51

## 2024-05-19 RX ADMIN — INSULIN HUMAN 5 UNITS: 100 INJECTION, SOLUTION PARENTERAL at 00:35

## 2024-05-19 RX ADMIN — MUPIROCIN 1 APPLICATION: 20 OINTMENT TOPICAL at 08:21

## 2024-05-19 RX ADMIN — INSULIN LISPRO 8 UNITS: 100 INJECTION, SOLUTION INTRAVENOUS; SUBCUTANEOUS at 16:32

## 2024-05-19 RX ADMIN — INSULIN LISPRO 6 UNITS: 100 INJECTION, SOLUTION INTRAVENOUS; SUBCUTANEOUS at 21:51

## 2024-05-19 RX ADMIN — FLUOXETINE HYDROCHLORIDE 10 MG: 10 CAPSULE ORAL at 08:21

## 2024-05-19 RX ADMIN — NICOTINE 1 PATCH: 14 PATCH, EXTENDED RELEASE TRANSDERMAL at 08:21

## 2024-05-19 RX ADMIN — OXYCODONE HYDROCHLORIDE 5 MG: 10 TABLET ORAL at 13:38

## 2024-05-19 RX ADMIN — INSULIN GLARGINE 10 UNITS: 100 INJECTION, SOLUTION SUBCUTANEOUS at 04:18

## 2024-05-19 RX ADMIN — SODIUM CHLORIDE 100 ML/HR: 9 INJECTION, SOLUTION INTRAVENOUS at 15:31

## 2024-05-19 RX ADMIN — IPRATROPIUM BROMIDE AND ALBUTEROL SULFATE 3 ML: 2.5; .5 SOLUTION RESPIRATORY (INHALATION) at 12:45

## 2024-05-19 RX ADMIN — PROMETHAZINE HYDROCHLORIDE 12.5 MG: 12.5 TABLET ORAL at 07:13

## 2024-05-19 RX ADMIN — CETIRIZINE HYDROCHLORIDE 10 MG: 10 TABLET, FILM COATED ORAL at 08:21

## 2024-05-19 NOTE — PLAN OF CARE
Goal Outcome Evaluation:  Plan of Care Reviewed With: patient        Progress: no change         Problem: Pain Acute  Goal: Acceptable Pain Control and Functional Ability  Outcome: Ongoing, Progressing  Intervention: Prevent or Manage Pain  Recent Flowsheet Documentation  Taken 5/19/2024 0200 by Norma Russell RN  Medication Review/Management: medications reviewed  Taken 5/19/2024 0100 by Norma Russell RN  Medication Review/Management: medications reviewed  Taken 5/19/2024 0000 by Norma Russell RN  Medication Review/Management: medications reviewed  Taken 5/18/2024 2300 by Norma Russell RN  Medication Review/Management: medications reviewed  Taken 5/18/2024 2200 by Norma Russell RN  Medication Review/Management: medications reviewed  Taken 5/18/2024 2100 by Norma Russell RN  Medication Review/Management: medications reviewed  Taken 5/18/2024 2000 by Norma Russell RN  Medication Review/Management: medications reviewed  Taken 5/18/2024 1900 by Norma Russell RN  Medication Review/Management: medications reviewed  Intervention: Optimize Psychosocial Wellbeing  Recent Flowsheet Documentation  Taken 5/19/2024 0200 by Norma Russell RN  Diversional Activities:   smartphone   television  Taken 5/18/2024 2000 by Norma Russell RN  Diversional Activities:   smartphone   television

## 2024-05-19 NOTE — PROGRESS NOTES
[facility] HOSPITALIST PROGRESS NOTE     Patient Identification:  Name:  Manda Al  Age:  43 y.o.  Sex:  female  :  1980  MRN:  [id number]  Visit Number:  [id number]  ROOM: [address]     Primary Care Provider:  Mi Rivera MD    Length of stay in inpatient status:  7    Subjective     Chief Compliant:    Chief Complaint   Patient presents with    Flank Pain       History of Presenting Illness:    Patient reports feeling better. No fever overnight. O2 stable. Headache improved.     ROS:  Otherwise 10 point ROS negative other than documented above in HPI.     Objective     Current Hospital Meds:Cariprazine HCl, 3 mg, Oral, Daily  cetirizine, 10 mg, Oral, Daily  doxycycline, 100 mg, Intravenous, Q12H  FLUoxetine, 10 mg, Oral, Daily  insulin glargine, 10 Units, Subcutaneous, Daily  insulin regular, 2-7 Units, Subcutaneous, Q6H  ipratropium-albuterol, 3 mL, Nebulization, 4x Daily - RT  methylPREDNISolone sodium succinate, 40 mg, Intravenous, Q12H  micafungin (MYCAMINE) IV, 100 mg, Intravenous, Q24H  mupirocin, 1 application , Each Nare, BID  nicotine, 1 patch, Transdermal, Q24H  piperacillin-tazobactam, 3.375 g, Intravenous, Q8H  sodium chloride, 500 mL, Intravenous, Once  sodium chloride, 10 mL, Intravenous, Q12H  sodium chloride, 10 mL, Intravenous, Q12H  sodium chloride, 10 mL, Intravenous, Q12H         Current Antimicrobial Therapy:  Anti-Infectives (From admission, onward)      Ordered     Dose/Rate Route Frequency Start Stop    24 0702  micafungin sodium (MYCAMINE) 100 mg in sodium chloride 0.9 % 100 mL IVPB        Ordering Provider: Rohan Yee MD    100 mg  over 60 Minutes Intravenous Every 24 Hours 24 0900 24 0859    24 0703  piperacillin-tazobactam (ZOSYN) IVPB 3.375 g IVPB in 100 mL NS (VTB)        Ordering Provider: Edmundo Zapata MD    3.375 g  over 4 Hours Intravenous Every 8 Hours 24 1400 24 0755    24 1223  doxycycline  (VIBRAMYCIN) 100 mg in sodium chloride 0.9 % 100 mL IVPB-VTB        Ordering Provider: Rohan Yee MD    100 mg  over 60 Minutes Intravenous Every 12 Hours 05/16/24 1300 05/21/24 1259    05/16/24 0718  vancomycin IVPB 2000 mg in 0.9% Sodium Chloride 500 mL        Ordering Provider: Rohan Yee MD    2,000 mg  250 mL/hr over 120 Minutes Intravenous Once 05/16/24 0900 05/16/24 1300    05/16/24 0703  piperacillin-tazobactam (ZOSYN) IVPB 3.375 g IVPB in 100 mL NS (VTB)        Ordering Provider: Rohan Yee MD    3.375 g  over 30 Minutes Intravenous Once 05/16/24 0800 05/16/24 0903    05/13/24 1104  fluconazole (DIFLUCAN) tablet 150 mg        Ordering Provider: Brenton Alves PA-C    150 mg Oral Once 05/13/24 1200 05/13/24 1201    05/12/24 1834  cefTRIAXone (ROCEPHIN) 1,000 mg in sodium chloride 0.9 % 100 mL IVPB-VTB        Ordering Provider: Florinda Torres PA-C    1,000 mg  200 mL/hr over 30 Minutes Intravenous Once 05/12/24 1850 05/12/24 1923          Current Diuretic Therapy:  Diuretics (From admission, onward)      None          ----------------------------------------------------------------------------------------------------------------------  Vital Signs:  Temp:  [97.8 °F (36.6 °C)-98.6 °F (37 °C)] 97.8 °F (36.6 °C)  Heart Rate:  [] 78  Resp:  [13-24] 16  BP: ()/(48-97) 117/74  SpO2:  [86 %-100 %] 100 %  on  Flow (L/min):  [2-5] 3;   Device (Oxygen Therapy): humidified;nasal cannula  Body mass index is 34.97 kg/m².    Wt Readings from Last 3 Encounters:   05/19/24 104 kg (230 lb)   05/07/24 97.1 kg (214 lb)   03/19/22 113 kg (250 lb)     Intake & Output (last 3 days)         05/16 0701 05/17 0700 05/17 0701 05/18 0700 05/18 0701 05/19 0700 05/19 0701 05/20 0700    P.O. 1980 1376 700     I.V. (mL/kg) 450 (4.2) 896.3 (8.5)      IV Piggyback 600  500     Total Intake(mL/kg) 3030 (28.6) 2272.3 (21.6) 1200 (11.5)     Urine (mL/kg/hr) 480 (0.2) 700 (0.3)      Stool 0        Total Output 480 700      Net +2550 +1572.3 +1200             Urine Unmeasured Occurrence 6 x 2 x 3 x     Stool Unmeasured Occurrence 1 x             Diet: Regular/House; Fluid Consistency: Thin (IDDSI 0)  ----------------------------------------------------------------------------------------------------------------------  Physical exam:  Constitutional:  Well-developed and well-nourished.  No respiratory distress.      HENT:  Head:  Normocephalic and atraumatic.  Mouth:  Moist mucous membranes.    Eyes:  Conjunctivae and EOM are normal. No scleral icterus.    Neck:  Neck supple.  No JVD present.    Cardiovascular:  Normal rate, regular rhythm and normal heart sounds with no murmur.  Pulmonary/Chest:  No respiratory distress, no wheezes, no crackles, with normal breath sounds and good air movement.  Abdominal:  Soft.  Bowel sounds are normal.  No distension and no tenderness.   Musculoskeletal:  No edema, no tenderness, and no deformity.  No red or swollen joints anywhere.    Neurological:  Alert and oriented to person, place, and time.  No cranial nerve deficit.  No tongue deviation.  No facial droop.  No slurred speech.   Skin:  Skin is warm and dry. No rash noted. No pallor.   Peripheral vascular:  Pulses in all 4 extremities with no clubbing, no cyanosis, no edema.  ----------------------------------------------------------------------------------------------------------------------  Tele:    ----------------------------------------------------------------------------------------------------------------------  Results from last 7 days   Lab Units 05/19/24  0730 05/19/24  0031 05/18/24  0021 05/17/24  0036 05/16/24  1034 05/16/24  0705 05/13/24  0645 05/12/24 2021 05/12/24  1648   CRP mg/dL  --   --  15.07*  --  9.82*  --   --   --  0.56*   LACTATE mmol/L 2.8*  --   --   --   --  0.8  --  1.5  --    WBC 10*3/mm3  --  4.02 4.06 3.52  --   --    < >  --  8.89   HEMOGLOBIN g/dL  --  9.4* 9.8* 11.1*  --   --    < >  " --  13.9   HEMATOCRIT %  --  28.5* 29.2* 32.4*  --   --    < >  --  39.2   MCV fL  --  92.2 91.0 88.3  --   --    < >  --  87.1   MCHC g/dL  --  33.0 33.6 34.3  --   --    < >  --  35.5   PLATELETS 10*3/mm3  --  111* 103* 109*  --   --    < >  --  308   INR   --   --   --  1.26*  --   --   --   --   --     < > = values in this interval not displayed.     Results from last 7 days   Lab Units 05/16/24  1926   PH, ARTERIAL pH units 7.467*   PO2 ART mm Hg 61.5*   PCO2, ARTERIAL mm Hg 26.9*   HCO3 ART mmol/L 19.4*     Results from last 7 days   Lab Units 05/19/24  0031 05/18/24  1036 05/18/24  0021 05/17/24  1221 05/17/24  0036 05/13/24  0645 05/13/24  0549 05/12/24  1648   SODIUM mmol/L 138  --  140 135* 128*   < >  --  138   POTASSIUM mmol/L 4.1 3.9 3.5 4.0 3.4*   < >  --  3.5   MAGNESIUM mg/dL  --   --   --   --   --   --  2.9* 1.7   CHLORIDE mmol/L 110*  --  110* 105 99   < >  --  105   CO2 mmol/L 17.7*  --  20.9* 20.0* 18.2*   < >  --  21.6*   BUN mg/dL 12  --  10 6 5*   < >  --  5*   CREATININE mg/dL 1.12*  --  1.14* 0.99 1.00   < >  --  0.94   CALCIUM mg/dL 7.9*  --  7.5* 7.5* 7.3*   < >  --  8.8   GLUCOSE mg/dL 294*  --  159* 152* 116*   < >  --  278*   ALBUMIN g/dL 2.5*  --  2.3*  --  2.5*   < >  --  3.4*   BILIRUBIN mg/dL 0.5  --  0.5  --  0.5   < >  --  0.3   ALK PHOS U/L 85  --  73  --  73   < >  --  74   AST (SGOT) U/L 30  --  55*  --  87*   < >  --  23   ALT (SGPT) U/L 30  --  36*  --  44*   < >  --  16    < > = values in this interval not displayed.   Estimated Creatinine Clearance: 82.5 mL/min (A) (by C-G formula based on SCr of 1.12 mg/dL (H)).  No results found for: \"AMMONIA\"              Glucose   Date/Time Value Ref Range Status   05/19/2024 0641 243 (H) 70 - 130 mg/dL Final   05/19/2024 0423 305 (H) 70 - 130 mg/dL Final   05/19/2024 0031 308 (H) 70 - 130 mg/dL Final   05/18/2024 1619 257 (H) 70 - 130 mg/dL Final   05/18/2024 1157 182 (H) 70 - 130 mg/dL Final   05/18/2024 0601 162 (H) 70 - 130 mg/dL " "Final   05/17/2024 2319 200 (H) 70 - 130 mg/dL Final   05/17/2024 1736 211 (H) 70 - 130 mg/dL Final     Lab Results   Component Value Date    TSH 1.840 05/16/2024     Lab Results   Component Value Date    PREGTESTUR Negative 05/09/2017     Pain Management Panel  More data exists         Latest Ref Rng & Units 8/11/2020 5/9/2017   Pain Management Panel   Amphetamine, Urine Qual Negative Positive  Negative    Barbiturates Screen, Urine Negative Negative  Negative    Benzodiazepine Screen, Urine Negative Negative  Negative    Buprenorphine, Screen, Urine Negative Negative  Negative    Cocaine Screen, Urine Negative Negative  Negative    Methadone Screen , Urine Negative Negative  Negative      Brief Urine Lab Results  (Last result in the past 365 days)        Color   Clarity   Blood   Leuk Est   Nitrite   Protein   CREAT   Urine HCG        05/15/24 0823 Yellow   Clear   Negative   Negative   Negative   Negative                 Blood Culture   Date Value Ref Range Status   05/15/2024 No growth at 3 days  Preliminary   05/15/2024 No growth at 3 days  Preliminary   05/12/2024 No growth at 5 days  Final   05/12/2024 No growth at 5 days  Final   05/12/2024 No growth at 5 days  Final     Urine Culture   Date Value Ref Range Status   05/12/2024 No growth  Final     No results found for: \"WOUNDCX\"  No results found for: \"STOOLCX\"  No results found for: \"RESPCX\"  No results found for: \"AFBCX\"  Results from last 7 days   Lab Units 05/19/24  0730 05/18/24  0021 05/17/24  0036 05/16/24  1034 05/16/24  0705 05/12/24 2021 05/12/24  1648   PROCALCITONIN ng/mL  --   --   --  0.76*  --   --   --    LACTATE mmol/L 2.8*  --   --   --  0.8 1.5  --    SED RATE mm/hr  --  5 11  --   --   --   --    CRP mg/dL  --  15.07*  --  9.82*  --   --  0.56*       I have personally looked at the labs and they are summarized " above.  ----------------------------------------------------------------------------------------------------------------------  Detailed radiology reports for the last 24 hours:    Imaging Results (Last 24 Hours)       ** No results found for the last 24 hours. **          Assessment & Plan    #Recurrent high grade fevers  #Suspect sepsis 2/2 tick born illness, epidemiologically most likely ehrlichiosis  - Patient with resolving pancreatitis symptoms started having significant recurrent fevers on 5/14, seem to have a nocturnal predominance    - Patient also had drop in blood pressure and BS abx initiated on 5/15 with Vanc/zosyn. CT abdomen/pelvis showed essentially resolved pancreatitis, no evidence of necrosis or cyst.   - Patient with headache, no neck pain or stiffness, no confusion. Continued to have fevers even without abx.   - Discovered patient had found tick on her that likely had been present for a while the week before presentation. Started IV doxycyline on 5/16 and stopped IV vancomycin as that was likely etiology. Tick serologies sent.   - Other infectious w/u had been negative including chest x-ray, UA. Blood cultures also obtained on 5/15 were no growth. D-dimer over 20 but VTE w/u negative. Hepatitis panel and HIV negative.   - Patient developed continued fevers and hypoxia night of 5/17. Continued headache. No rash, neck stiffness, neck pain. No confusion.   - Will send ANCA panel and repeat inflammatory markers. Getting echo. CTPE repeated overnight appeared more like ARDS or central edema on my evaluation. Multifocal pneumonia possible Added micafungin while we await ID ordered fungal serologies and recommendations.   - Afebrile overnight. Patient appears to be improving.   - Continue Doxycyline, zosyn, micafungin. Hopeful to stop micafungin soon.      #Acute hypoxic respiratory failure   - CT PE repeated overnight with development of central airway disease. Differential includes edema, pneumonia,  ARDS.   - Antimicrobials as above   - ANCA pending   - Given relative hypotension in setting of stable hypoxia will hold diuresis for now.   - Consulted pulmonology. Appreciate recs.   - Will get echo   - Given concern for above infection, benefit of steroids does not clearly outweigh benefit.      #Acute recurrent pancreatitis   - Symptoms and imaging findings essentially resolved. Tolerating diet.      #Mild hepatocellular transaminitis   #Mild thrombocytopenia   - Fibrinogen wnl. Do not suspect DIC. LFTs normalized and platelet level stable this AM.   - Suspect from tick borne illness as above      #Hyponatremia   - Resolved      #Recent UTI  -Repeat urine culture no growth on admission     #Uncontrolled DM II  - Increased SSI     Chronic medical problems:  Anxiety/depression   Schizoaffective disorder   HTN  Crohn's disease- No change in chronic diarrhea      Code status: Full      Dispo: Given improvement, will place PCU orders.         VTE Prophylaxis:   Mechanical Order History:        Ordered        05/12/24 2048  Place Sequential Compression Device  Once            05/12/24 2048  Maintain Sequential Compression Device  Continuous                          Pharmalogical Order History:       None              Rohan Yee MD  [facility] Hospitalist  05/19/24  08:24 EDT

## 2024-05-19 NOTE — PROGRESS NOTES
PROGRESS NOTE         Patient Identification:  Name:  Manda Al  Age:  43 y.o.  Sex:  female  :  1980  MRN:  [id number]  Visit Number:  [id number]  Primary Care Provider:  Mi Rivera MD         LOS: 7 days       ----------------------------------------------------------------------------------------------------------------------  Subjective       Chief Complaints:    Flank Pain        Interval History:      Patient is awake and alert, sitting up comfortably in bed.  On 2 L nasal cannula with no apparent distress.  Afebrile.  Denies diarrhea.  The patient reported she overall had a much better night and feels better today.  Did report some mild nausea.  Lung exam is clear to auscultation bilaterally.  Abdomen soft and nontender with normoactive bowel sounds.  No skin rashes or other issues.  WBC normal at 4.02.  Platelet count 111.  Blood cultures pending.  Fungal serologies pending.  Rickettsial serologies pending.    Review of Systems:    Constitutional: no fever, chills and night sweats.  Generalized fatigue.  Eyes: no eye drainage, itching or redness.  HEENT: no mouth sores, dysphagia or nose bleed.  Respiratory: no for shortness of breath, cough or production of sputum.  Cardiovascular: no chest pain, no palpitations, no orthopnea.  Gastrointestinal: + nausea, no vomiting or diarrhea. No abdominal pain, hematemesis or rectal bleeding.  Genitourinary: no dysuria or polyuria.  Hematologic/lymphatic: no lymph node abnormalities, no easy bruising or easy bleeding.  Musculoskeletal: no muscle or joint pain.  Skin: No rash and no itching.  Neurological: no loss of consciousness, no seizure, no headache.  Behavioral/Psych: no depression or suicidal ideation.  Endocrine: no hot flashes.  Immunologic: negative.    ----------------------------------------------------------------------------------------------------------------------      Objective       Current Hospital Meds:  Cariprazine  HCl, 3 mg, Oral, Daily  cetirizine, 10 mg, Oral, Daily  doxycycline, 100 mg, Intravenous, Q12H  FLUoxetine, 10 mg, Oral, Daily  insulin glargine, 10 Units, Subcutaneous, Daily  insulin regular, 2-7 Units, Subcutaneous, Q6H  ipratropium-albuterol, 3 mL, Nebulization, 4x Daily - RT  methylPREDNISolone sodium succinate, 40 mg, Intravenous, Q12H  micafungin (MYCAMINE) IV, 100 mg, Intravenous, Q24H  mupirocin, 1 application , Each Nare, BID  nicotine, 1 patch, Transdermal, Q24H  piperacillin-tazobactam, 3.375 g, Intravenous, Q8H  sodium chloride, 500 mL, Intravenous, Once  sodium chloride, 10 mL, Intravenous, Q12H  sodium chloride, 10 mL, Intravenous, Q12H  sodium chloride, 10 mL, Intravenous, Q12H         ----------------------------------------------------------------------------------------------------------------------    Vital Signs:  Temp:  [97.8 °F (36.6 °C)-98.6 °F (37 °C)] 97.8 °F (36.6 °C)  Heart Rate:  [63-96] 86  Resp:  [14-22] 20  BP: ()/(48-97) 105/65  Mean Arterial Pressure (Non-Invasive) for the past 24 hrs (Last 3 readings):   Noninvasive MAP (mmHg)   05/19/24 1000 73   05/19/24 0900 73   05/19/24 0800 80     SpO2 Percentage    05/19/24 0800 05/19/24 0900 05/19/24 1000   SpO2: 90% 95% 95%     SpO2:  [86 %-100 %] 95 %  on  Flow (L/min):  [2-5] 3;   Device (Oxygen Therapy): humidified;nasal cannula    Body mass index is 34.97 kg/m².  Wt Readings from Last 3 Encounters:   05/19/24 104 kg (230 lb)   05/07/24 97.1 kg (214 lb)   03/19/22 113 kg (250 lb)        Intake/Output Summary (Last 24 hours) at 5/19/2024 1042  Last data filed at 5/19/2024 0644  Gross per 24 hour   Intake 1082 ml   Output --   Net 1082 ml     Diet: Regular/House; Fluid Consistency: Thin (IDDSI 0)  ----------------------------------------------------------------------------------------------------------------------      Physical Exam:    Constitutional:  Well-developed and well-nourished.  On 2 L nasal cannula with no respiratory  distress.  Reported some mild nausea but no vomiting.  Overall feels better today.  HENT:  Head: Normocephalic and atraumatic.  Mouth:  Moist mucous membranes.    Eyes:  Conjunctivae and EOM are normal.  No scleral icterus.  Neck:  Neck supple.  No JVD present.    Cardiovascular:  Normal rate, regular rhythm and normal heart sounds with no murmur. No edema.  Pulmonary/Chest:  No respiratory distress, no wheezes, no crackles, with normal breath sounds and good air movement.  Abdominal:  Soft.  Bowel sounds are normal.  No distension and no tenderness.   Musculoskeletal:  No edema, no tenderness, and no deformity.  No swelling or redness of joints.  Neurological:  Alert and oriented to person, place, and time.  No facial droop.  No slurred speech.   Skin:  Skin is warm and dry.  No rash noted.  No pallor.  Scabbed area on the left bottom from tick bite.  No surrounding erythema.  No drainage.  Psychiatric:  Normal mood and affect.  Behavior is normal.        ----------------------------------------------------------------------------------------------------------------------          Results from last 7 days   Lab Units 05/16/24  1926   PH, ARTERIAL pH units 7.467*   PO2 ART mm Hg 61.5*   PCO2, ARTERIAL mm Hg 26.9*   HCO3 ART mmol/L 19.4*     Results from last 7 days   Lab Units 05/19/24  0730 05/19/24  0031 05/18/24  0021 05/17/24  0036 05/16/24  1034 05/16/24  0705 05/13/24  0645 05/12/24  2021 05/12/24  1648   CRP mg/dL  --   --  15.07*  --  9.82*  --   --   --  0.56*   LACTATE mmol/L 2.8*  --   --   --   --  0.8  --  1.5  --    WBC 10*3/mm3  --  4.02 4.06 3.52  --   --    < >  --  8.89   HEMOGLOBIN g/dL  --  9.4* 9.8* 11.1*  --   --    < >  --  13.9   HEMATOCRIT %  --  28.5* 29.2* 32.4*  --   --    < >  --  39.2   MCV fL  --  92.2 91.0 88.3  --   --    < >  --  87.1   MCHC g/dL  --  33.0 33.6 34.3  --   --    < >  --  35.5   PLATELETS 10*3/mm3  --  111* 103* 109*  --   --    < >  --  308   INR   --   --   --   "1.26*  --   --   --   --   --     < > = values in this interval not displayed.     Results from last 7 days   Lab Units 05/19/24  0031 05/18/24  1036 05/18/24  0021 05/17/24  1221 05/17/24  0036 05/13/24  0645 05/13/24  0549 05/12/24  1648   SODIUM mmol/L 138  --  140 135* 128*   < >  --  138   POTASSIUM mmol/L 4.1 3.9 3.5 4.0 3.4*   < >  --  3.5   MAGNESIUM mg/dL  --   --   --   --   --   --  2.9* 1.7   CHLORIDE mmol/L 110*  --  110* 105 99   < >  --  105   CO2 mmol/L 17.7*  --  20.9* 20.0* 18.2*   < >  --  21.6*   BUN mg/dL 12  --  10 6 5*   < >  --  5*   CREATININE mg/dL 1.12*  --  1.14* 0.99 1.00   < >  --  0.94   CALCIUM mg/dL 7.9*  --  7.5* 7.5* 7.3*   < >  --  8.8   GLUCOSE mg/dL 294*  --  159* 152* 116*   < >  --  278*   ALBUMIN g/dL 2.5*  --  2.3*  --  2.5*   < >  --  3.4*   BILIRUBIN mg/dL 0.5  --  0.5  --  0.5   < >  --  0.3   ALK PHOS U/L 85  --  73  --  73   < >  --  74   AST (SGOT) U/L 30  --  55*  --  87*   < >  --  23   ALT (SGPT) U/L 30  --  36*  --  44*   < >  --  16    < > = values in this interval not displayed.   Estimated Creatinine Clearance: 82.5 mL/min (A) (by C-G formula based on SCr of 1.12 mg/dL (H)).  No results found for: \"AMMONIA\"    Glucose   Date/Time Value Ref Range Status   05/19/2024 0641 243 (H) 70 - 130 mg/dL Final   05/19/2024 0423 305 (H) 70 - 130 mg/dL Final   05/19/2024 0031 308 (H) 70 - 130 mg/dL Final   05/18/2024 1619 257 (H) 70 - 130 mg/dL Final   05/18/2024 1157 182 (H) 70 - 130 mg/dL Final   05/18/2024 0601 162 (H) 70 - 130 mg/dL Final   05/17/2024 2319 200 (H) 70 - 130 mg/dL Final   05/17/2024 1736 211 (H) 70 - 130 mg/dL Final     Lab Results   Component Value Date    HGBA1C 6.90 (H) 05/12/2024     Lab Results   Component Value Date    TSH 1.840 05/16/2024       Blood Culture   Date Value Ref Range Status   05/15/2024 No growth at 4 days  Preliminary   05/15/2024 No growth at 4 days  Preliminary   05/12/2024 No growth at 5 days  Final   05/12/2024 No growth at 5 days  " "Final   05/12/2024 No growth at 5 days  Final     Urine Culture   Date Value Ref Range Status   05/12/2024 No growth  Final     No results found for: \"WOUNDCX\"  No results found for: \"STOOLCX\"  No results found for: \"RESPCX\"  Pain Management Panel  More data exists         Latest Ref Rng & Units 8/11/2020 5/9/2017   Pain Management Panel   Amphetamine, Urine Qual Negative Positive  Negative    Barbiturates Screen, Urine Negative Negative  Negative    Benzodiazepine Screen, Urine Negative Negative  Negative    Buprenorphine, Screen, Urine Negative Negative  Negative    Cocaine Screen, Urine Negative Negative  Negative    Methadone Screen , Urine Negative Negative  Negative          ----------------------------------------------------------------------------------------------------------------------  Imaging Results (Last 24 Hours)       ** No results found for the last 24 hours. **            ----------------------------------------------------------------------------------------------------------------------    Pertinent Infectious Disease Results                Assessment/Plan       Assessment     Recurrent high-grade fever  Pneumonia  Suspicion of tickborne illness        Plan      Patient is awake and alert, sitting up comfortably in bed.  On 2 L nasal cannula with no apparent distress.  Afebrile.  Denies diarrhea.  The patient reported she overall had a much better night and feels better today.  Did report some mild nausea.  Lung exam is clear to auscultation bilaterally.  Abdomen soft and nontender with normoactive bowel sounds.  No skin rashes or other issues.  WBC normal at 4.02.  Platelet count 111.  Blood cultures pending.  Fungal serologies pending.  Rickettsial serologies pending.    For now would continue with Zosyn, doxycycline and micafungin empirically for pneumonia and tickborne illness.  Awaiting fungal and rickettsial serologies.  We will continue to monitor closely and adjust antibiotic coverage " as appropriate.      ANTIMICROBIAL THERAPY    doxycycline (VIBRAMYCIN) 100 mg IVPB in 100 mL NS (VTB)  micafungin (MYCAMINE) IVPB  piperacillin-tazobactam (ZOSYN) IVPB 3.375 g IVPB in 100 mL NS (VTB)     Code Status:   Code Status and Medical Interventions:   Ordered at: 05/12/24 1930     Code Status (Patient has no pulse and is not breathing):    CPR (Attempt to Resuscitate)     Medical Interventions (Patient has pulse or is breathing):    Full Support       Gregoria Gamble, APRN  05/19/24  10:42 EDT

## 2024-05-19 NOTE — CONSULTS
Pulmonary and critical care consultation note  Referring Provider:   Reason for Consultation:       Chief complaint shortness of breath-      History of present illness: Patient is a 41-year-old female with past medical history significant for asthma, bipolar disorder, depression, Crohn's disease, hypertension, seizure affective disorder recurrent episodes of pancreatitis along with diabetes type 2 patient presented with epigastric abdominal pain nausea and vomiting for 5 to 6 days.  Patient has had multiple episodes of pancreatitis in the past mostly due to gallstones.    Pulmonary critical care consulted for abnormal CT chest and respiratory failure.    On my assessment patient was resting in bed-in ICU.  Nasal cannula oxygen  Blood pressure was borderline.  Review of Systems  History obtained from chart review and the patient  General ROS: negative for - chills, fatigue or fever  Psychological ROS: negative for - anxiety or depression  ENT ROS: negative for - headaches, visual changes or vocal changes  Respiratory ROS: positive for - cough and shortness of breath  Cardiovascular ROS: no chest pain or dyspnea on exertion  Gastrointestinal ROS: no abdominal pain, change in bowel habits, or black or bloody stools  Musculoskeletal ROS: negative for - joint pain, joint stiffness or joint swelling  Neurological ROS: no TIA or stroke symptoms  Hematological: no bleeding  Skin: no bruises, no rash        History  Past Medical History:   Diagnosis Date    Anxiety     Asthma     Bipolar disorder     Crohn disease     Depression     Hypertension     Schizoaffective disorder    ,   Past Surgical History:   Procedure Laterality Date    APPENDECTOMY       SECTION      CHOLECYSTECTOMY      COLON SURGERY      COLONOSCOPY      MANDIBLE FRACTURE SURGERY      OVARIAN CYST SURGERY      TUBAL ABDOMINAL LIGATION     ,   Family History   Problem Relation Age of Onset    Diabetes Mother     Anxiety disorder Mother      Depression Mother     Bipolar disorder Mother     COPD Father     Schizophrenia Father     Schizophrenia Paternal Grandfather     Breast cancer Neg Hx    ,   Social History     Tobacco Use    Smoking status: Every Day     Current packs/day: 1.00     Average packs/day: 1 pack/day for 20.0 years (20.0 ttl pk-yrs)     Types: Cigarettes    Smokeless tobacco: Never   Vaping Use    Vaping status: Never Used   Substance Use Topics    Alcohol use: No     Comment: denies    Drug use: Yes     Types: Marijuana   ,   Medications Prior to Admission   Medication Sig Dispense Refill Last Dose    Cariprazine HCl (Vraylar) 3 MG capsule capsule Take 1 capsule by mouth Daily.   5/11/2024    chlorthalidone (HYGROTON) 25 MG tablet Take 1 tablet by mouth Daily.   5/11/2024    fexofenadine (ALLEGRA) 180 MG tablet Take 1 tablet by mouth Daily.   5/11/2024    FLUoxetine (PROzac) 10 MG capsule Take 1 capsule by mouth Daily.   5/11/2024    magnesium oxide (MAG-OX) 400 MG tablet Take 1 tablet by mouth Daily.   5/11/2024    metFORMIN ER (GLUCOPHAGE-XR) 500 MG 24 hr tablet Take 2 tablets by mouth 2 (Two) Times a Day.   5/11/2024    metoprolol succinate XL (Toprol XL) 200 MG 24 hr tablet Take 1 tablet by mouth Daily.   5/11/2024   , Scheduled Meds:  Cariprazine HCl, 3 mg, Oral, Daily  cetirizine, 10 mg, Oral, Daily  doxycycline, 100 mg, Intravenous, Q12H  FLUoxetine, 10 mg, Oral, Daily  insulin glargine, 10 Units, Subcutaneous, Daily  insulin regular, 2-7 Units, Subcutaneous, Q6H  ipratropium-albuterol, 3 mL, Nebulization, 4x Daily - RT  methylPREDNISolone sodium succinate, 40 mg, Intravenous, Q12H  micafungin (MYCAMINE) IV, 100 mg, Intravenous, Q24H  mupirocin, 1 application , Each Nare, BID  nicotine, 1 patch, Transdermal, Q24H  piperacillin-tazobactam, 3.375 g, Intravenous, Q8H  sodium chloride, 500 mL, Intravenous, Once  sodium chloride, 10 mL, Intravenous, Q12H  sodium chloride, 10 mL, Intravenous, Q12H  sodium chloride, 10 mL, Intravenous,  Q12H    , Continuous Infusions:    and Allergies:  Imuran [azathioprine]    Objective     Vital Signs   Temp:  [97.7 °F (36.5 °C)-98.6 °F (37 °C)] 97.8 °F (36.6 °C)  Heart Rate:  [] 78  Resp:  [11-24] 16  BP: ()/(48-97) 117/74    Physical Exam:             General-wearing nasal cannula oxygen not in any distress    HEENT-atraumatic  Neck-supple    Respiratory-wearing nasal cannula oxygen not in any distress  Cardiovascular-NSR  GI-grossly normal    CNS-nonfocal    Musculoskeletal -no edema  Extremities- no obvious deformity noticed     Psychiatric-, alert awake oriented  Skin- no visible rash                                                                   Results Review:    LABS:    Lab Results   Component Value Date    GLUCOSE 294 (H) 05/19/2024    BUN 12 05/19/2024    CREATININE 1.12 (H) 05/19/2024    EGFRIFNONA 93 08/17/2020    BCR 10.7 05/19/2024    CO2 17.7 (L) 05/19/2024    CALCIUM 7.9 (L) 05/19/2024    ALBUMIN 2.5 (L) 05/19/2024    LABIL2 1.4 (L) 07/06/2015    AST 30 05/19/2024    ALT 30 05/19/2024    WBC 4.02 05/19/2024    HGB 9.4 (L) 05/19/2024    HCT 28.5 (L) 05/19/2024    MCV 92.2 05/19/2024     (L) 05/19/2024     05/19/2024    K 4.1 05/19/2024     (H) 05/19/2024    ANIONGAP 10.3 05/19/2024       Lab Results   Component Value Date    INR 1.26 (H) 05/17/2024    PROTIME 15.9 (H) 05/17/2024       Results from last 7 days   Lab Units 05/17/24  0036   INR  1.26*          I reviewed the patient's new clinical results.  I reviewed the patient's new imaging results and agree with the interpretation.    Procalitonin Results:      Lab 05/16/24  1034   PROCALCITONIN 0.76*       Latest Reference Range & Units 05/16/24 19:26   pH, Arterial 7.350 - 7.450 pH units 7.467 (H)   pCO2, Arterial 35.0 - 45.0 mm Hg 26.9 (L)   pO2, Arterial 83.0 - 108.0 mm Hg 61.5 (L)   HCO3, Arterial 20.0 - 26.0 mmol/L 19.4 (L)   Base Excess 0.0 - 2.0 mmol/L -3.2 (L)   O2 Saturation, Arterial 94.0 - 99.0 % 94.6    CO2 Content 22 - 33 mmol/L 20.3 (L)   A-a DO2 0.0 - 300.0 mmHg 50.1   Carboxyhemoglobin 0 - 5 % 1.8   Methemoglobin 0.00 - 3.00 % <-0.10 (L)   Oxyhemoglobin 94 - 99 % 93.3 (L)   Hematocrit, Blood Gas 38.0 - 51.0 % 34.4 (L)   Hemoglobin, Blood Gas 13.5 - 17.5 g/dL 11.2 (L)   Site  Right Radial   Jose's Test  N/A   Modality  Room Air   FIO2 % 21   Ventilator Mode  NA   Barometric Pressure for Blood Gas mmHg 723   (H): Data is abnormally high  (L): Data is abnormally low  Microbiology Results (last 10 days)       Procedure Component Value - Date/Time    MRSA Screen, PCR (Inpatient) - Swab, Nares [[id number]]  (Normal) Collected: 05/18/24 0847    Lab Status: Final result Specimen: Swab from Nares Updated: 05/18/24 1010     MRSA PCR No MRSA Detected    Narrative:      The negative predictive value of this diagnostic test is high and should only be used to consider de-escalating anti-MRSA therapy. A positive result may indicate colonization with MRSA and must be correlated clinically.    Respiratory Panel PCR w/COVID-19(SARS-CoV-2) SYED/JHONATHAN/TRUMAN/PAD/COR/KETURAH In-House, NP Swab in UTM/VTM, 2 HR TAT - Swab, Nasopharynx [[id number]]  (Normal) Collected: 05/16/24 1855    Lab Status: Final result Specimen: Swab from Nasopharynx Updated: 05/16/24 1947     ADENOVIRUS, PCR Not Detected     Coronavirus 229E Not Detected     Coronavirus HKU1 Not Detected     Coronavirus NL63 Not Detected     Coronavirus OC43 Not Detected     COVID19 Not Detected     Human Metapneumovirus Not Detected     Human Rhinovirus/Enterovirus Not Detected     Influenza A PCR Not Detected     Influenza B PCR Not Detected     Parainfluenza Virus 1 Not Detected     Parainfluenza Virus 2 Not Detected     Parainfluenza Virus 3 Not Detected     Parainfluenza Virus 4 Not Detected     RSV, PCR Not Detected     Bordetella pertussis pcr Not Detected     Bordetella parapertussis PCR Not Detected     Chlamydophila pneumoniae PCR Not Detected     Mycoplasma pneumo by PCR  Not Detected    Narrative:      In the setting of a positive respiratory panel with a viral infection PLUS a negative procalcitonin without other underlying concern for bacterial infection, consider observing off antibiotics or discontinuation of antibiotics and continue supportive care. If the respiratory panel is positive for atypical bacterial infection (Bordetella pertussis, Chlamydophila pneumoniae, or Mycoplasma pneumoniae), consider antibiotic de-escalation to target atypical bacterial infection.    Blood Culture - Blood, Hand, Right [[id number]]  (Normal) Collected: 05/15/24 0827    Lab Status: Preliminary result Specimen: Blood from Hand, Right Updated: 05/18/24 0846     Blood Culture No growth at 3 days    Blood Culture - Blood, Arm, Left [[id number]]  (Normal) Collected: 05/15/24 0811    Lab Status: Preliminary result Specimen: Blood from Arm, Left Updated: 05/18/24 0846     Blood Culture No growth at 3 days    COVID PRE-OP / PRE-PROCEDURE SCREENING ORDER (NO ISOLATION) - Swab, Nasopharynx [[id number]]  (Normal) Collected: 05/15/24 0755    Lab Status: Final result Specimen: Swab from Nasopharynx Updated: 05/15/24 0909    Narrative:      The following orders were created for panel order COVID PRE-OP / PRE-PROCEDURE SCREENING ORDER (NO ISOLATION) - Swab, Nasopharynx.  Procedure                               Abnormality         Status                     ---------                               -----------         ------                     COVID-19 and FLU A/B PCR...[[id number]]  Normal              Final result                 Please view results for these tests on the individual orders.    COVID-19 and FLU A/B PCR, 1 HR TAT - Swab, Nasopharynx [[id number]]  (Normal) Collected: 05/15/24 0755    Lab Status: Final result Specimen: Swab from Nasopharynx Updated: 05/15/24 0909     COVID19 Not Detected     Influenza A PCR Not Detected     Influenza B PCR Not Detected    Narrative:      Fact sheet for providers:  https://www.fda.gov/media/880329/download    Fact sheet for patients: https://www.fda.gov/media/177932/download    Test performed by PCR.    Blood Culture - Blood, Arm, Right [[id number]]  (Normal) Collected: 05/12/24 2326    Lab Status: Final result Specimen: Blood from Arm, Right Updated: 05/17/24 2345     Blood Culture No growth at 5 days    Blood Culture - Blood, Hand, Left [[id number]]  (Normal) Collected: 05/12/24 2325    Lab Status: Final result Specimen: Blood from Hand, Left Updated: 05/17/24 2345     Blood Culture No growth at 5 days    Blood Culture - Blood, Arm, Left [[id number]]  (Normal) Collected: 05/12/24 2021    Lab Status: Final result Specimen: Blood from Arm, Left Updated: 05/17/24 2030     Blood Culture No growth at 5 days    Urine Culture - Urine, Urine, Clean Catch [[id number]]  (Normal) Collected: 05/12/24 1648    Lab Status: Final result Specimen: Urine, Clean Catch Updated: 05/14/24 1033     Urine Culture No growth           Assessment & Plan     Neurology-alert awake oriented no active issues.  Continue to monitor mental status.      Respiratory- Acute hypoxic respiratory failure-FiO2 requirement reviewed and adjusted to maintain saturation 88 to 92%.  CT chest reviewed-shows bilateral pulmonary infiltrates likely due to    Aspiration pneumonia and/or pulmonary edema.  Patient's procalcitonin level is elevated and patient also has bilateral pleural effusions.  Blood pressure is on the lower side cannot give diuretics.    Will start on normal use of positive pressure ventilation to help with patient's work of breathing and oxygenation.  Will also help in diuresis.    Will get venous blood gas in the morning.  Continue current antibiotics.  Patient does have a history of asthma.    Will start on Solu-Medrol.  Continue DuoNebs      Cardiology- hemodynamically -stable but blood pressure is borderline low.  Continue to monitor.  If blood pressure goes lower consider giving albumin.  Continue to  monitor HR- rate and rhythm, BP     Nephrology- Cr and BUN stable  I/O-reviewed    GI- PPI prophylaxis  Aspiration precautions    Hematology- CBC  Hb transfuse for hemoglobin less than 7  platelet  WBC    ID  Culture  And Antibiotics    Endrocrinology- Maintain Blood sugar 140 -180  TSH Results:      Lab 05/16/24  1034   TSH 1.840        Electrolytes-   Mag and phos       DVT prophylaxis-    Bedside rounds were done with RT and patient's nurse. All the lab and clinical findings were discussed with them and plan was also discussed in great detail.          Echo-  Results for orders placed during the hospital encounter of 08/11/20    Adult Transthoracic Echo Limited W/ Cont if Necessary Per Protocol    Interpretation Summary  · Left ventricular systolic function is low normal, EF 51-50%.  · Right ventricular cavity is mildly dilated.  · Left atrial cavity size is dilated.  · Moderate-to-severe mitral valve stenosis is present with an estimated mitral valve area by pressure half-time of 1 cm² with a mean gradient of 3 mmHg and peak gradient of 11 mmHg.  · Mild-to-moderate mitral valve regurgitation is present  · Mild tricuspid valve regurgitation is present.           Acute pancreatitis    Case d/w nurse and team   This service is provided via audio video tele medicine   I am in [place] and patient is in [place] Zapata MD  05/19/24  07:40 EDT

## 2024-05-19 NOTE — PLAN OF CARE
Problem: Pain Acute  Goal: Acceptable Pain Control and Functional Ability  Outcome: Ongoing, Progressing  Intervention: Prevent or Manage Pain  Recent Flowsheet Documentation  Taken 5/19/2024 1300 by Madie Stinson RN  Medication Review/Management: medications reviewed  Taken 5/19/2024 1100 by Madie Stinson RN  Medication Review/Management: medications reviewed  Intervention: Optimize Psychosocial Wellbeing  Recent Flowsheet Documentation  Taken 5/19/2024 1100 by Madie Stinson RN  Diversional Activities:   smartphone   television     Problem: Adult Inpatient Plan of Care  Goal: Plan of Care Review  Outcome: Ongoing, Progressing  Goal: Patient-Specific Goal (Individualized)  Outcome: Ongoing, Progressing  Goal: Absence of Hospital-Acquired Illness or Injury  Outcome: Ongoing, Progressing  Intervention: Identify and Manage Fall Risk  Recent Flowsheet Documentation  Taken 5/19/2024 1300 by Madie Stinson RN  Safety Promotion/Fall Prevention:   activity supervised   assistive device/personal items within reach   clutter free environment maintained   fall prevention program maintained   nonskid shoes/slippers when out of bed   room organization consistent   safety round/check completed  Taken 5/19/2024 1100 by Madie Stinson RN  Safety Promotion/Fall Prevention:   activity supervised   assistive device/personal items within reach   clutter free environment maintained   fall prevention program maintained   nonskid shoes/slippers when out of bed   room organization consistent   safety round/check completed  Intervention: Prevent and Manage VTE (Venous Thromboembolism) Risk  Recent Flowsheet Documentation  Taken 5/19/2024 1300 by Madie Stinson RN  Activity Management: activity encouraged  Intervention: Prevent Infection  Recent Flowsheet Documentation  Taken 5/19/2024 1300 by Madie Stinson RN  Infection Prevention:   rest/sleep promoted   single patient room provided  Taken 5/19/2024 1100 by Madie Stinson  RN  Infection Prevention:   rest/sleep promoted   single patient room provided  Goal: Optimal Comfort and Wellbeing  Outcome: Ongoing, Progressing  Intervention: Provide Person-Centered Care  Recent Flowsheet Documentation  Taken 5/19/2024 1100 by Madie Stinson RN  Trust Relationship/Rapport:   choices provided   care explained   emotional support provided   empathic listening provided   questions encouraged   questions answered   reassurance provided   thoughts/feelings acknowledged  Goal: Readiness for Transition of Care  Outcome: Ongoing, Progressing     Problem: Infection  Goal: Absence of Infection Signs and Symptoms  Outcome: Ongoing, Progressing     Problem: Fluid Imbalance (Pancreatitis)  Goal: Fluid Balance  Outcome: Ongoing, Progressing     Problem: Infection (Pancreatitis)  Goal: Infection Symptom Resolution  Outcome: Ongoing, Progressing     Problem: Nutrition Impaired (Pancreatitis)  Goal: Optimal Nutrition Intake  Outcome: Ongoing, Progressing     Problem: Pain (Pancreatitis)  Goal: Acceptable Pain Control  Outcome: Ongoing, Progressing  Intervention: Monitor and Manage Pain  Recent Flowsheet Documentation  Taken 5/19/2024 1100 by Madie Stinson RN  Diversional Activities:   smartphone   television     Problem: Respiratory Compromise (Pancreatitis)  Goal: Effective Oxygenation and Ventilation  Outcome: Ongoing, Progressing  Intervention: Optimize Oxygenation and Ventilation  Recent Flowsheet Documentation  Taken 5/19/2024 1300 by Madie Stinson RN  Activity Management: activity encouraged     Problem: Fall Injury Risk  Goal: Absence of Fall and Fall-Related Injury  Outcome: Ongoing, Progressing  Intervention: Identify and Manage Contributors  Recent Flowsheet Documentation  Taken 5/19/2024 1300 by Madie Stinson RN  Medication Review/Management: medications reviewed  Taken 5/19/2024 1100 by Madie Stinson RN  Medication Review/Management: medications reviewed  Intervention: Promote Injury-Free  Environment  Recent Flowsheet Documentation  Taken 5/19/2024 1300 by Madie Stinson RN  Safety Promotion/Fall Prevention:   activity supervised   assistive device/personal items within reach   clutter free environment maintained   fall prevention program maintained   nonskid shoes/slippers when out of bed   room organization consistent   safety round/check completed  Taken 5/19/2024 1100 by Madie Stinson RN  Safety Promotion/Fall Prevention:   activity supervised   assistive device/personal items within reach   clutter free environment maintained   fall prevention program maintained   nonskid shoes/slippers when out of bed   room organization consistent   safety round/check completed   Goal Outcome Evaluation:   Pt welcomed to PCU. VSS and afebrile since being on the floor. Pt on room air. Pt complained of back pain, PRN meds given per order. Bed is locked in low position with call light in reach.

## 2024-05-19 NOTE — PROGRESS NOTES
Pulmonary and critical care consultation note  Referring Provider:   Reason for Consultation:       Chief complaint shortness of breath-      Sub-   Overnight events reviewed.  All the labs medications ins and outs and vitals reviewed.  Patient now in progressive care unit    Review of Systems  Positive for respiratory otherwise negative        History  Past Medical History:   Diagnosis Date    Anxiety     Asthma     Bipolar disorder     Crohn disease     Depression     Hypertension     Schizoaffective disorder    ,   Past Surgical History:   Procedure Laterality Date    APPENDECTOMY       SECTION      CHOLECYSTECTOMY      COLON SURGERY      COLONOSCOPY      MANDIBLE FRACTURE SURGERY      OVARIAN CYST SURGERY      TUBAL ABDOMINAL LIGATION     ,   Family History   Problem Relation Age of Onset    Diabetes Mother     Anxiety disorder Mother     Depression Mother     Bipolar disorder Mother     COPD Father     Schizophrenia Father     Schizophrenia Paternal Grandfather     Breast cancer Neg Hx    ,   Social History     Tobacco Use    Smoking status: Every Day     Current packs/day: 1.00     Average packs/day: 1 pack/day for 20.0 years (20.0 ttl pk-yrs)     Types: Cigarettes    Smokeless tobacco: Never   Vaping Use    Vaping status: Never Used   Substance Use Topics    Alcohol use: No     Comment: denies    Drug use: Yes     Types: Marijuana   ,   Medications Prior to Admission   Medication Sig Dispense Refill Last Dose    Cariprazine HCl (Vraylar) 3 MG capsule capsule Take 1 capsule by mouth Daily.   2024    chlorthalidone (HYGROTON) 25 MG tablet Take 1 tablet by mouth Daily.   2024    fexofenadine (ALLEGRA) 180 MG tablet Take 1 tablet by mouth Daily.   2024    FLUoxetine (PROzac) 10 MG capsule Take 1 capsule by mouth Daily.   2024    magnesium oxide (MAG-OX) 400 MG tablet Take 1 tablet by mouth Daily.   2024    metFORMIN ER (GLUCOPHAGE-XR) 500 MG 24 hr tablet Take 2 tablets by mouth  2 (Two) Times a Day.   5/11/2024    metoprolol succinate XL (Toprol XL) 200 MG 24 hr tablet Take 1 tablet by mouth Daily.   5/11/2024   , Scheduled Meds:  Cariprazine HCl, 3 mg, Oral, Daily  cetirizine, 10 mg, Oral, Daily  doxycycline, 100 mg, Intravenous, Q12H  FLUoxetine, 10 mg, Oral, Daily  insulin glargine, 10 Units, Subcutaneous, Daily  insulin regular, 2-7 Units, Subcutaneous, Q6H  ipratropium-albuterol, 3 mL, Nebulization, 4x Daily - RT  methylPREDNISolone sodium succinate, 40 mg, Intravenous, Q12H  micafungin (MYCAMINE) IV, 100 mg, Intravenous, Q24H  mupirocin, 1 application , Each Nare, BID  nicotine, 1 patch, Transdermal, Q24H  piperacillin-tazobactam, 3.375 g, Intravenous, Q8H  sodium chloride, 500 mL, Intravenous, Once  sodium chloride, 10 mL, Intravenous, Q12H  sodium chloride, 10 mL, Intravenous, Q12H  sodium chloride, 10 mL, Intravenous, Q12H    , Continuous Infusions:    and Allergies:  Imuran [azathioprine]    Objective     Vital Signs   Temp:  [97.8 °F (36.6 °C)-98.6 °F (37 °C)] 97.8 °F (36.6 °C)  Heart Rate:  [] 78  Resp:  [13-24] 16  BP: ()/(48-97) 117/74    Physical Exam:             General-not in any distress    HEENT-atraumatic  Neck-supple    Respiratory-\not in any respiratory distress    Cardiovascular-NSR  GI-grossly normal    CNS-nonfocal    Musculoskeletal -no edema  Extremities- no obvious deformity noticed     Psychiatric-, alert awake oriented  Skin- no visible rash                                                                   Results Review:    LABS:    Lab Results   Component Value Date    GLUCOSE 294 (H) 05/19/2024    BUN 12 05/19/2024    CREATININE 1.12 (H) 05/19/2024    EGFRIFNONA 93 08/17/2020    BCR 10.7 05/19/2024    CO2 17.7 (L) 05/19/2024    CALCIUM 7.9 (L) 05/19/2024    ALBUMIN 2.5 (L) 05/19/2024    LABIL2 1.4 (L) 07/06/2015    AST 30 05/19/2024    ALT 30 05/19/2024    WBC 4.02 05/19/2024    HGB 9.4 (L) 05/19/2024    HCT 28.5 (L) 05/19/2024    MCV 92.2  05/19/2024     (L) 05/19/2024     05/19/2024    K 4.1 05/19/2024     (H) 05/19/2024    ANIONGAP 10.3 05/19/2024       Lab Results   Component Value Date    INR 1.26 (H) 05/17/2024    PROTIME 15.9 (H) 05/17/2024       Results from last 7 days   Lab Units 05/17/24  0036   INR  1.26*          I reviewed the patient's new clinical results.  I reviewed the patient's new imaging results and agree with the interpretation.    Procalitonin Results:      Lab 05/16/24  1034   PROCALCITONIN 0.76*       Latest Reference Range & Units 05/16/24 19:26   pH, Arterial 7.350 - 7.450 pH units 7.467 (H)   pCO2, Arterial 35.0 - 45.0 mm Hg 26.9 (L)   pO2, Arterial 83.0 - 108.0 mm Hg 61.5 (L)   HCO3, Arterial 20.0 - 26.0 mmol/L 19.4 (L)   Base Excess 0.0 - 2.0 mmol/L -3.2 (L)   O2 Saturation, Arterial 94.0 - 99.0 % 94.6   CO2 Content 22 - 33 mmol/L 20.3 (L)   A-a DO2 0.0 - 300.0 mmHg 50.1   Carboxyhemoglobin 0 - 5 % 1.8   Methemoglobin 0.00 - 3.00 % <-0.10 (L)   Oxyhemoglobin 94 - 99 % 93.3 (L)   Hematocrit, Blood Gas 38.0 - 51.0 % 34.4 (L)   Hemoglobin, Blood Gas 13.5 - 17.5 g/dL 11.2 (L)   Site  Right Radial   Jose's Test  N/A   Modality  Room Air   FIO2 % 21   Ventilator Mode  NA   Barometric Pressure for Blood Gas mmHg 723   (H): Data is abnormally high  (L): Data is abnormally low  Microbiology Results (last 10 days)       Procedure Component Value - Date/Time    MRSA Screen, PCR (Inpatient) - Swab, Nares [[id number]]  (Normal) Collected: 05/18/24 0847    Lab Status: Final result Specimen: Swab from Nares Updated: 05/18/24 1010     MRSA PCR No MRSA Detected    Narrative:      The negative predictive value of this diagnostic test is high and should only be used to consider de-escalating anti-MRSA therapy. A positive result may indicate colonization with MRSA and must be correlated clinically.    Respiratory Panel PCR w/COVID-19(SARS-CoV-2) SYED/JHONATHAN/TRUMAN/PAD/COR/KETURAH In-House, NP Swab in UTM/VTM, 2 HR TAT - Swab,  Nasopharynx [[id number]]  (Normal) Collected: 05/16/24 1855    Lab Status: Final result Specimen: Swab from Nasopharynx Updated: 05/16/24 1947     ADENOVIRUS, PCR Not Detected     Coronavirus 229E Not Detected     Coronavirus HKU1 Not Detected     Coronavirus NL63 Not Detected     Coronavirus OC43 Not Detected     COVID19 Not Detected     Human Metapneumovirus Not Detected     Human Rhinovirus/Enterovirus Not Detected     Influenza A PCR Not Detected     Influenza B PCR Not Detected     Parainfluenza Virus 1 Not Detected     Parainfluenza Virus 2 Not Detected     Parainfluenza Virus 3 Not Detected     Parainfluenza Virus 4 Not Detected     RSV, PCR Not Detected     Bordetella pertussis pcr Not Detected     Bordetella parapertussis PCR Not Detected     Chlamydophila pneumoniae PCR Not Detected     Mycoplasma pneumo by PCR Not Detected    Narrative:      In the setting of a positive respiratory panel with a viral infection PLUS a negative procalcitonin without other underlying concern for bacterial infection, consider observing off antibiotics or discontinuation of antibiotics and continue supportive care. If the respiratory panel is positive for atypical bacterial infection (Bordetella pertussis, Chlamydophila pneumoniae, or Mycoplasma pneumoniae), consider antibiotic de-escalation to target atypical bacterial infection.    Blood Culture - Blood, Hand, Right [[id number]]  (Normal) Collected: 05/15/24 0827    Lab Status: Preliminary result Specimen: Blood from Hand, Right Updated: 05/18/24 0846     Blood Culture No growth at 3 days    Blood Culture - Blood, Arm, Left [[id number]]  (Normal) Collected: 05/15/24 0811    Lab Status: Preliminary result Specimen: Blood from Arm, Left Updated: 05/18/24 0846     Blood Culture No growth at 3 days    COVID PRE-OP / PRE-PROCEDURE SCREENING ORDER (NO ISOLATION) - Swab, Nasopharynx [[id number]]  (Normal) Collected: 05/15/24 0755    Lab Status: Final result Specimen: Swab from  Nasopharynx Updated: 05/15/24 0909    Narrative:      The following orders were created for panel order COVID PRE-OP / PRE-PROCEDURE SCREENING ORDER (NO ISOLATION) - Swab, Nasopharynx.  Procedure                               Abnormality         Status                     ---------                               -----------         ------                     COVID-19 and FLU A/B PCR...[[id number]]  Normal              Final result                 Please view results for these tests on the individual orders.    COVID-19 and FLU A/B PCR, 1 HR TAT - Swab, Nasopharynx [[id number]]  (Normal) Collected: 05/15/24 0755    Lab Status: Final result Specimen: Swab from Nasopharynx Updated: 05/15/24 0909     COVID19 Not Detected     Influenza A PCR Not Detected     Influenza B PCR Not Detected    Narrative:      Fact sheet for providers: https://www.fda.gov/media/621161/download    Fact sheet for patients: https://www.fda.gov/media/797865/download    Test performed by PCR.    Blood Culture - Blood, Arm, Right [[id number]]  (Normal) Collected: 05/12/24 2326    Lab Status: Final result Specimen: Blood from Arm, Right Updated: 05/17/24 2345     Blood Culture No growth at 5 days    Blood Culture - Blood, Hand, Left [[id number]]  (Normal) Collected: 05/12/24 2325    Lab Status: Final result Specimen: Blood from Hand, Left Updated: 05/17/24 2345     Blood Culture No growth at 5 days    Blood Culture - Blood, Arm, Left [[id number]]  (Normal) Collected: 05/12/24 2021    Lab Status: Final result Specimen: Blood from Arm, Left Updated: 05/17/24 2030     Blood Culture No growth at 5 days    Urine Culture - Urine, Urine, Clean Catch [[id number]]  (Normal) Collected: 05/12/24 1648    Lab Status: Final result Specimen: Urine, Clean Catch Updated: 05/14/24 1033     Urine Culture No growth           Assessment & Plan     Neurology-alert awake oriented no active issues.  Continue to monitor mental status.      Respiratory- Acute hypoxic respiratory  failure-FiO2 requirement reviewed and adjusted to maintain saturation 88 to 92%.      CT chest reviewed-shows bilateral pulmonary infiltrates likely due to  Aspiration pneumonia and/or pulmonary edema.      Patient's procalcitonin level is elevated and patient also has bilateral pleural effusions.      VBG reviewed.  Stable.  Continue current antibiotics.    Patient does have a history of asthma.    Continue Solu-Medrol.  Doses reviewed and adjusted  Continue DuoNebs  Diuretics to improve lung compliance and diffusion capacity.  Will repeat chest x-ray in the morning    Cardiology- hemodynamically -stable but blood pressure is borderline low.  Continue to monitor.  If blood pressure goes lower consider giving albumin.  Continue to monitor HR- rate and rhythm, BP     Nephrology- Cr and BUN stable  I/O-reviewed    GI- PPI prophylaxis  Aspiration precautions    Hematology- CBC  Hb transfuse for hemoglobin less than 7  platelet  WBC    ID  Culture  And Antibiotics    Endrocrinology- Maintain Blood sugar 140 -180  TSH Results:      Lab 05/16/24  1034   TSH 1.840        Electrolytes-   Mag and phos       DVT prophylaxis-    Bedside rounds were done with RT and patient's nurse. All the lab and clinical findings were discussed with them and plan was also discussed in great detail.        Echo-  Results for orders placed during the hospital encounter of 08/11/20    Adult Transthoracic Echo Limited W/ Cont if Necessary Per Protocol    Interpretation Summary  · Left ventricular systolic function is low normal, EF 51-50%.  · Right ventricular cavity is mildly dilated.  · Left atrial cavity size is dilated.  · Moderate-to-severe mitral valve stenosis is present with an estimated mitral valve area by pressure half-time of 1 cm² with a mean gradient of 3 mmHg and peak gradient of 11 mmHg.  · Mild-to-moderate mitral valve regurgitation is present  · Mild tricuspid valve regurgitation is present.           Acute pancreatitis    Case  d/w nurse and team   This service is provided via audio video tele medicine   I am in [place] and patient is in [place] BENNY Zapata MD  05/19/24  08:00 EDT

## 2024-05-19 NOTE — PROGRESS NOTES
Nurse Practitioner - Brief Progress Note  PERMANENT  05/19/2024 02:22    [facility] [place]    AMINATA KUMAR    Date of Service 05/19/2024 02:22    HPI/Events of Note Contacted by bedside with ongoing hyperglycemia, will start Lantus, 1st dose tonight, then 10 units daily.    KENA Palma-BC      Interventions Intermediate-Hyperglycemia - evaluation and treatment        Electronically Signed by: Mk Alexis (NP) on 05/19/2024 02:23

## 2024-05-20 ENCOUNTER — APPOINTMENT (OUTPATIENT)
Dept: GENERAL RADIOLOGY | Facility: HOSPITAL | Age: 44
DRG: 438 | End: 2024-05-20
Payer: COMMERCIAL

## 2024-05-20 LAB
ALBUMIN SERPL-MCNC: 2.7 G/DL (ref 3.5–5.2)
ALBUMIN/GLOB SERPL: 0.9 G/DL
ALP SERPL-CCNC: 79 U/L (ref 39–117)
ALT SERPL W P-5'-P-CCNC: 24 U/L (ref 1–33)
ANION GAP SERPL CALCULATED.3IONS-SCNC: 10.8 MMOL/L (ref 5–15)
AST SERPL-CCNC: 20 U/L (ref 1–32)
B BURGDOR IGG+IGM SER QL IA: NORMAL
BACTERIA SPEC AEROBE CULT: NORMAL
BACTERIA SPEC AEROBE CULT: NORMAL
BASOPHILS # BLD AUTO: 0.01 10*3/MM3 (ref 0–0.2)
BASOPHILS NFR BLD AUTO: 0.2 % (ref 0–1.5)
BH CV ECHO MEAS - AO MAX PG: 10.5 MMHG
BH CV ECHO MEAS - AO MEAN PG: 5 MMHG
BH CV ECHO MEAS - AO ROOT DIAM: 2.6 CM
BH CV ECHO MEAS - AO V2 MAX: 162 CM/SEC
BH CV ECHO MEAS - AO V2 VTI: 30.8 CM
BH CV ECHO MEAS - AVA(I,D): 2.45 CM2
BH CV ECHO MEAS - EDV(CUBED): 120.6 ML
BH CV ECHO MEAS - EDV(MOD-SP4): 47.4 ML
BH CV ECHO MEAS - ESV(CUBED): 65.5 ML
BH CV ECHO MEAS - FS: 18.4 %
BH CV ECHO MEAS - IVS/LVPW: 0.98 CM
BH CV ECHO MEAS - IVSD: 0.69 CM
BH CV ECHO MEAS - LAT PEAK E' VEL: 4.8 CM/SEC
BH CV ECHO MEAS - LV DIASTOLIC VOL/BSA (35-75): 21.9 CM2
BH CV ECHO MEAS - LV MASS(C)D: 112 GRAMS
BH CV ECHO MEAS - LV MAX PG: 6.3 MMHG
BH CV ECHO MEAS - LV MEAN PG: 3 MMHG
BH CV ECHO MEAS - LV V1 MAX: 125 CM/SEC
BH CV ECHO MEAS - LV V1 VTI: 26.6 CM
BH CV ECHO MEAS - LVIDD: 4.9 CM
BH CV ECHO MEAS - LVIDS: 4 CM
BH CV ECHO MEAS - LVOT AREA: 2.8 CM2
BH CV ECHO MEAS - LVOT DIAM: 1.9 CM
BH CV ECHO MEAS - LVPWD: 0.71 CM
BH CV ECHO MEAS - MED PEAK E' VEL: 6 CM/SEC
BH CV ECHO MEAS - MV A MAX VEL: 241 CM/SEC
BH CV ECHO MEAS - MV DEC SLOPE: 687 CM/SEC2
BH CV ECHO MEAS - MV DEC TIME: 0.39 SEC
BH CV ECHO MEAS - MV E MAX VEL: 270 CM/SEC
BH CV ECHO MEAS - MV E/A: 1.12
BH CV ECHO MEAS - MV MAX PG: 22.3 MMHG
BH CV ECHO MEAS - MV MEAN PG: 14 MMHG
BH CV ECHO MEAS - MV V2 VTI: 69.8 CM
BH CV ECHO MEAS - MVA(VTI): 1.08 CM2
BH CV ECHO MEAS - PA ACC TIME: 0.14 SEC
BH CV ECHO MEAS - SV(LVOT): 75.4 ML
BH CV ECHO MEAS - SVI(LVOT): 34.8 ML/M2
BH CV ECHO MEAS - TAPSE (>1.6): 3 CM
BH CV ECHO MEAS - TR MAX PG: 69.2 MMHG
BH CV ECHO MEAS - TR MAX VEL: 416 CM/SEC
BH CV ECHO MEASUREMENTS AVERAGE E/E' RATIO: 50
BILIRUB SERPL-MCNC: 0.4 MG/DL (ref 0–1.2)
BUN SERPL-MCNC: 15 MG/DL (ref 6–20)
BUN/CREAT SERPL: 13.4 (ref 7–25)
CALCIUM SPEC-SCNC: 8.3 MG/DL (ref 8.6–10.5)
CHLORIDE SERPL-SCNC: 111 MMOL/L (ref 98–107)
CO2 SERPL-SCNC: 18.2 MMOL/L (ref 22–29)
CREAT SERPL-MCNC: 1.12 MG/DL (ref 0.57–1)
DEPRECATED RDW RBC AUTO: 48.4 FL (ref 37–54)
EGFRCR SERPLBLD CKD-EPI 2021: 63.1 ML/MIN/1.73
EOSINOPHIL # BLD AUTO: 0 10*3/MM3 (ref 0–0.4)
EOSINOPHIL NFR BLD AUTO: 0 % (ref 0.3–6.2)
ERYTHROCYTE [DISTWIDTH] IN BLOOD BY AUTOMATED COUNT: 14.4 % (ref 12.3–15.4)
GLOBULIN UR ELPH-MCNC: 2.9 GM/DL
GLUCOSE BLDC GLUCOMTR-MCNC: 273 MG/DL (ref 70–130)
GLUCOSE BLDC GLUCOMTR-MCNC: 280 MG/DL (ref 70–130)
GLUCOSE BLDC GLUCOMTR-MCNC: 312 MG/DL (ref 70–130)
GLUCOSE BLDC GLUCOMTR-MCNC: 334 MG/DL (ref 70–130)
GLUCOSE SERPL-MCNC: 320 MG/DL (ref 65–99)
HCT VFR BLD AUTO: 28.8 % (ref 34–46.6)
HGB BLD-MCNC: 9.6 G/DL (ref 12–15.9)
IMM GRANULOCYTES # BLD AUTO: 0.02 10*3/MM3 (ref 0–0.05)
IMM GRANULOCYTES NFR BLD AUTO: 0.5 % (ref 0–0.5)
LEFT ATRIUM VOLUME INDEX: 40.3 ML/M2
LYMPHOCYTES # BLD AUTO: 0.93 10*3/MM3 (ref 0.7–3.1)
LYMPHOCYTES NFR BLD AUTO: 21 % (ref 19.6–45.3)
MCH RBC QN AUTO: 30.5 PG (ref 26.6–33)
MCHC RBC AUTO-ENTMCNC: 33.3 G/DL (ref 31.5–35.7)
MCV RBC AUTO: 91.4 FL (ref 79–97)
MONOCYTES # BLD AUTO: 0.23 10*3/MM3 (ref 0.1–0.9)
MONOCYTES NFR BLD AUTO: 5.2 % (ref 5–12)
NEUTROPHILS NFR BLD AUTO: 3.23 10*3/MM3 (ref 1.7–7)
NEUTROPHILS NFR BLD AUTO: 73.1 % (ref 42.7–76)
NRBC BLD AUTO-RTO: 0 /100 WBC (ref 0–0.2)
PLATELET # BLD AUTO: 169 10*3/MM3 (ref 140–450)
PMV BLD AUTO: 11.4 FL (ref 6–12)
POTASSIUM SERPL-SCNC: 4.2 MMOL/L (ref 3.5–5.2)
PROT SERPL-MCNC: 5.6 G/DL (ref 6–8.5)
RBC # BLD AUTO: 3.15 10*6/MM3 (ref 3.77–5.28)
SODIUM SERPL-SCNC: 140 MMOL/L (ref 136–145)
WBC NRBC COR # BLD AUTO: 4.42 10*3/MM3 (ref 3.4–10.8)

## 2024-05-20 PROCEDURE — 71045 X-RAY EXAM CHEST 1 VIEW: CPT

## 2024-05-20 PROCEDURE — 25810000003 SODIUM CHLORIDE 0.9 % SOLUTION: Performed by: INTERNAL MEDICINE

## 2024-05-20 PROCEDURE — 94799 UNLISTED PULMONARY SVC/PX: CPT

## 2024-05-20 PROCEDURE — 94761 N-INVAS EAR/PLS OXIMETRY MLT: CPT

## 2024-05-20 PROCEDURE — 71045 X-RAY EXAM CHEST 1 VIEW: CPT | Performed by: RADIOLOGY

## 2024-05-20 PROCEDURE — 25010000002 METHYLPREDNISOLONE PER 40 MG: Performed by: INTERNAL MEDICINE

## 2024-05-20 PROCEDURE — 63710000001 INSULIN GLARGINE PER 5 UNITS: Performed by: NURSE PRACTITIONER

## 2024-05-20 PROCEDURE — 99232 SBSQ HOSP IP/OBS MODERATE 35: CPT | Performed by: INTERNAL MEDICINE

## 2024-05-20 PROCEDURE — 82948 REAGENT STRIP/BLOOD GLUCOSE: CPT

## 2024-05-20 PROCEDURE — 94664 DEMO&/EVAL PT USE INHALER: CPT

## 2024-05-20 PROCEDURE — 80053 COMPREHEN METABOLIC PANEL: CPT | Performed by: INTERNAL MEDICINE

## 2024-05-20 PROCEDURE — 99232 SBSQ HOSP IP/OBS MODERATE 35: CPT | Performed by: NURSE PRACTITIONER

## 2024-05-20 PROCEDURE — 25010000002 PIPERACILLIN SOD-TAZOBACTAM PER 1 G: Performed by: INTERNAL MEDICINE

## 2024-05-20 PROCEDURE — 25010000002 MICAFUNGIN SODIUM 100 MG RECONSTITUTED SOLUTION 1 EACH VIAL: Performed by: INTERNAL MEDICINE

## 2024-05-20 PROCEDURE — 63710000001 INSULIN LISPRO (HUMAN) PER 5 UNITS: Performed by: INTERNAL MEDICINE

## 2024-05-20 PROCEDURE — 85025 COMPLETE CBC W/AUTO DIFF WBC: CPT | Performed by: INTERNAL MEDICINE

## 2024-05-20 RX ADMIN — Medication 10 ML: at 20:47

## 2024-05-20 RX ADMIN — CARIPRAZINE 3 MG: 3 CAPSULE, GELATIN COATED ORAL at 08:50

## 2024-05-20 RX ADMIN — IPRATROPIUM BROMIDE AND ALBUTEROL SULFATE 3 ML: 2.5; .5 SOLUTION RESPIRATORY (INHALATION) at 19:29

## 2024-05-20 RX ADMIN — DOXYCYCLINE 100 MG: 100 INJECTION, POWDER, LYOPHILIZED, FOR SOLUTION INTRAVENOUS at 01:10

## 2024-05-20 RX ADMIN — SODIUM CHLORIDE 100 ML/HR: 9 INJECTION, SOLUTION INTRAVENOUS at 05:57

## 2024-05-20 RX ADMIN — MICAFUNGIN SODIUM 100 MG: 100 INJECTION, POWDER, LYOPHILIZED, FOR SOLUTION INTRAVENOUS at 08:50

## 2024-05-20 RX ADMIN — SODIUM CHLORIDE 100 ML/HR: 9 INJECTION, SOLUTION INTRAVENOUS at 20:47

## 2024-05-20 RX ADMIN — PIPERACILLIN SODIUM AND TAZOBACTAM SODIUM 3.38 G: 3; .375 INJECTION, POWDER, LYOPHILIZED, FOR SOLUTION INTRAVENOUS at 05:57

## 2024-05-20 RX ADMIN — IPRATROPIUM BROMIDE AND ALBUTEROL SULFATE 3 ML: 2.5; .5 SOLUTION RESPIRATORY (INHALATION) at 06:56

## 2024-05-20 RX ADMIN — MUPIROCIN 1 APPLICATION: 20 OINTMENT TOPICAL at 20:47

## 2024-05-20 RX ADMIN — CETIRIZINE HYDROCHLORIDE 10 MG: 10 TABLET, FILM COATED ORAL at 08:50

## 2024-05-20 RX ADMIN — Medication 10 ML: at 08:50

## 2024-05-20 RX ADMIN — DOXYCYCLINE 100 MG: 100 INJECTION, POWDER, LYOPHILIZED, FOR SOLUTION INTRAVENOUS at 13:20

## 2024-05-20 RX ADMIN — MUPIROCIN 1 APPLICATION: 20 OINTMENT TOPICAL at 08:50

## 2024-05-20 RX ADMIN — IPRATROPIUM BROMIDE AND ALBUTEROL SULFATE 3 ML: 2.5; .5 SOLUTION RESPIRATORY (INHALATION) at 00:33

## 2024-05-20 RX ADMIN — INSULIN LISPRO 2 UNITS: 100 INJECTION, SOLUTION INTRAVENOUS; SUBCUTANEOUS at 11:26

## 2024-05-20 RX ADMIN — INSULIN LISPRO 7 UNITS: 100 INJECTION, SOLUTION INTRAVENOUS; SUBCUTANEOUS at 21:46

## 2024-05-20 RX ADMIN — FLUOXETINE HYDROCHLORIDE 10 MG: 10 CAPSULE ORAL at 08:50

## 2024-05-20 RX ADMIN — NICOTINE 1 PATCH: 14 PATCH, EXTENDED RELEASE TRANSDERMAL at 08:51

## 2024-05-20 RX ADMIN — PIPERACILLIN SODIUM AND TAZOBACTAM SODIUM 3.38 G: 3; .375 INJECTION, POWDER, LYOPHILIZED, FOR SOLUTION INTRAVENOUS at 21:47

## 2024-05-20 RX ADMIN — METHYLPREDNISOLONE SODIUM SUCCINATE 20 MG: 40 INJECTION, POWDER, FOR SOLUTION INTRAMUSCULAR; INTRAVENOUS at 15:23

## 2024-05-20 RX ADMIN — INSULIN GLARGINE 10 UNITS: 100 INJECTION, SOLUTION SUBCUTANEOUS at 08:50

## 2024-05-20 RX ADMIN — INSULIN LISPRO 6 UNITS: 100 INJECTION, SOLUTION INTRAVENOUS; SUBCUTANEOUS at 16:53

## 2024-05-20 RX ADMIN — IPRATROPIUM BROMIDE AND ALBUTEROL SULFATE 3 ML: 2.5; .5 SOLUTION RESPIRATORY (INHALATION) at 12:48

## 2024-05-20 RX ADMIN — PIPERACILLIN SODIUM AND TAZOBACTAM SODIUM 3.38 G: 3; .375 INJECTION, POWDER, LYOPHILIZED, FOR SOLUTION INTRAVENOUS at 15:21

## 2024-05-20 RX ADMIN — OXYCODONE HYDROCHLORIDE 5 MG: 10 TABLET ORAL at 13:33

## 2024-05-20 RX ADMIN — INSULIN LISPRO 7 UNITS: 100 INJECTION, SOLUTION INTRAVENOUS; SUBCUTANEOUS at 06:41

## 2024-05-20 RX ADMIN — OXYCODONE HYDROCHLORIDE 5 MG: 10 TABLET ORAL at 21:46

## 2024-05-20 RX ADMIN — METHYLPREDNISOLONE SODIUM SUCCINATE 20 MG: 40 INJECTION, POWDER, FOR SOLUTION INTRAMUSCULAR; INTRAVENOUS at 03:16

## 2024-05-20 NOTE — PROGRESS NOTES
PROGRESS NOTE         Patient Identification:  Name:  Manda Al  Age:  43 y.o.  Sex:  female  :  1980  MRN:  [id number]  Visit Number:  [id number]  Primary Care Provider:  Mi Rivera MD         LOS: 8 days       ----------------------------------------------------------------------------------------------------------------------  Subjective       Chief Complaints:    Flank Pain        Interval History:      The patient is awake and alert, sitting up comfortably in bed.  On 2 L nasal cannula no apparent distress.  Afebrile.  Denies diarrhea.  The patient reports intermittent nausea but no vomiting.  She reports that she is overall feeling much better.  The patient also states that she remembered she had been exposed to black mold in her home approximately 2 months ago.  The patient reported after the black mold was removed, she developed a respiratory illness that lasted for several weeks and eventually cleared.  Lung exam is clear to auscultation bilaterally.  Abdomen soft and nontender with normoactive bowel sounds.  The tick bite to the left bottom is scabbed, no surrounding erythema or edema.  No new skin rashes.  WBC normal at 4.42.  Chest x-ray from this morning reported trace left pleural effusion, perhaps slightly more prominent than prior.  Transthoracic echocardiogram pending.  Blood cultures from  preliminarily report no growth at 24 hours.      Review of Systems:    Constitutional: no fever, chills and night sweats.  Generalized fatigue.  Eyes: no eye drainage, itching or redness.  HEENT: no mouth sores, dysphagia or nose bleed.  Respiratory: no for shortness of breath, cough or production of sputum.  Cardiovascular: no chest pain, no palpitations, no orthopnea.  Gastrointestinal: + nausea, no vomiting or diarrhea. No abdominal pain, hematemesis or rectal bleeding.  Genitourinary: no dysuria or polyuria.  Hematologic/lymphatic: no lymph node abnormalities, no easy  bruising or easy bleeding.  Musculoskeletal: no muscle or joint pain.  Skin: No rash and no itching.  Neurological: no loss of consciousness, no seizure, no headache.  Behavioral/Psych: no depression or suicidal ideation.  Endocrine: no hot flashes.  Immunologic: negative.    ----------------------------------------------------------------------------------------------------------------------      Objective       Current Hospital Meds:  Cariprazine HCl, 3 mg, Oral, Daily  cetirizine, 10 mg, Oral, Daily  doxycycline, 100 mg, Intravenous, Q12H  FLUoxetine, 10 mg, Oral, Daily  insulin glargine, 10 Units, Subcutaneous, Daily  insulin lispro, 2-9 Units, Subcutaneous, 4x Daily AC & at Bedtime  ipratropium-albuterol, 3 mL, Nebulization, 4x Daily - RT  methylPREDNISolone sodium succinate, 20 mg, Intravenous, Q12H  micafungin (MYCAMINE) IV, 100 mg, Intravenous, Q24H  mupirocin, 1 application , Each Nare, BID  nicotine, 1 patch, Transdermal, Q24H  piperacillin-tazobactam, 3.375 g, Intravenous, Q8H  sodium chloride, 500 mL, Intravenous, Once  sodium chloride, 10 mL, Intravenous, Q12H  sodium chloride, 10 mL, Intravenous, Q12H  sodium chloride, 10 mL, Intravenous, Q12H      sodium chloride, 100 mL/hr, Last Rate: 100 mL/hr (05/20/24 0557)      ----------------------------------------------------------------------------------------------------------------------    Vital Signs:  Temp:  [97.7 °F (36.5 °C)-98.7 °F (37.1 °C)] 98.2 °F (36.8 °C)  Heart Rate:  [67-98] 79  Resp:  [14-26] 20  BP: (104-140)/(63-85) 110/74  Mean Arterial Pressure (Non-Invasive) for the past 24 hrs (Last 3 readings):   Noninvasive MAP (mmHg)   05/20/24 1000 85   05/20/24 0900 89   05/20/24 0700 83     SpO2 Percentage    05/20/24 0700 05/20/24 0900 05/20/24 1000   SpO2: 100% 98% 97%     SpO2:  [92 %-100 %] 97 %  on   ;   Device (Oxygen Therapy): room air    Body mass index is 35.2 kg/m².  Wt Readings from Last 3 Encounters:   05/20/24 105 kg (231 lb 7.7 oz)    05/07/24 97.1 kg (214 lb)   03/19/22 113 kg (250 lb)        Intake/Output Summary (Last 24 hours) at 5/20/2024 1135  Last data filed at 5/20/2024 0800  Gross per 24 hour   Intake 2542.1 ml   Output --   Net 2542.1 ml     Diet: Regular/House; Fluid Consistency: Thin (IDDSI 0)  ----------------------------------------------------------------------------------------------------------------------      Physical Exam:    Constitutional:  Well-developed and well-nourished.  On 2 L nasal cannula with no apparent distress.  Reported some intermittent nausea but no vomiting or diarrhea.  Overall feeling much better.  HENT:  Head: Normocephalic and atraumatic.  Mouth:  Moist mucous membranes.    Eyes:  Conjunctivae and EOM are normal.  No scleral icterus.  Neck:  Neck supple.  No JVD present.    Cardiovascular:  Normal rate, regular rhythm and normal heart sounds with no murmur. No edema.  Pulmonary/Chest:  No respiratory distress, no wheezes, no crackles, with normal breath sounds and good air movement.  Abdominal:  Soft.  Bowel sounds are normal.  No distension and no tenderness.   Musculoskeletal:  No edema, no tenderness, and no deformity.  No swelling or redness of joints.  Neurological:  Alert and oriented to person, place, and time.  No facial droop.  No slurred speech.   Skin:  Skin is warm and dry.  No rash noted.  No pallor.  Scabbed area on the left bottom from tick bite.  No surrounding erythema.  No drainage.  Psychiatric:  Normal mood and affect.  Behavior is normal.        ----------------------------------------------------------------------------------------------------------------------          Results from last 7 days   Lab Units 05/16/24  1926   PH, ARTERIAL pH units 7.467*   PO2 ART mm Hg 61.5*   PCO2, ARTERIAL mm Hg 26.9*   HCO3 ART mmol/L 19.4*     Results from last 7 days   Lab Units 05/20/24  0209 05/19/24  1613 05/19/24  1321 05/19/24  1015 05/19/24  0730 05/19/24  0031 05/18/24  0021  "05/17/24  0036 05/16/24  1034   CRP mg/dL  --   --   --   --   --   --  15.07*  --  9.82*   LACTATE mmol/L  --  3.2* 4.4* 3.2*   < >  --   --   --   --    WBC 10*3/mm3 4.42  --   --   --   --  4.02 4.06 3.52  --    HEMOGLOBIN g/dL 9.6*  --   --   --   --  9.4* 9.8* 11.1*  --    HEMATOCRIT % 28.8*  --   --   --   --  28.5* 29.2* 32.4*  --    MCV fL 91.4  --   --   --   --  92.2 91.0 88.3  --    MCHC g/dL 33.3  --   --   --   --  33.0 33.6 34.3  --    PLATELETS 10*3/mm3 169  --   --   --   --  111* 103* 109*  --    INR   --   --   --   --   --   --   --  1.26*  --     < > = values in this interval not displayed.     Results from last 7 days   Lab Units 05/20/24  0209 05/19/24  0031 05/18/24  1036 05/18/24  0021   SODIUM mmol/L 140 138  --  140   POTASSIUM mmol/L 4.2 4.1 3.9 3.5   CHLORIDE mmol/L 111* 110*  --  110*   CO2 mmol/L 18.2* 17.7*  --  20.9*   BUN mg/dL 15 12  --  10   CREATININE mg/dL 1.12* 1.12*  --  1.14*   CALCIUM mg/dL 8.3* 7.9*  --  7.5*   GLUCOSE mg/dL 320* 294*  --  159*   ALBUMIN g/dL 2.7* 2.5*  --  2.3*   BILIRUBIN mg/dL 0.4 0.5  --  0.5   ALK PHOS U/L 79 85  --  73   AST (SGOT) U/L 20 30  --  55*   ALT (SGPT) U/L 24 30  --  36*   Estimated Creatinine Clearance: 82.9 mL/min (A) (by C-G formula based on SCr of 1.12 mg/dL (H)).  No results found for: \"AMMONIA\"    Glucose   Date/Time Value Ref Range Status   05/20/2024 0636 312 (H) 70 - 130 mg/dL Final   05/19/2024 2147 277 (H) 70 - 130 mg/dL Final   05/19/2024 1616 337 (H) 70 - 130 mg/dL Final   05/19/2024 1059 336 (H) 70 - 130 mg/dL Final   05/19/2024 0641 243 (H) 70 - 130 mg/dL Final   05/19/2024 0423 305 (H) 70 - 130 mg/dL Final   05/19/2024 0031 308 (H) 70 - 130 mg/dL Final   05/18/2024 1619 257 (H) 70 - 130 mg/dL Final     Lab Results   Component Value Date    HGBA1C 6.90 (H) 05/12/2024     Lab Results   Component Value Date    TSH 1.840 05/16/2024       Blood Culture   Date Value Ref Range Status   05/15/2024 No growth at 4 days  Preliminary " "  05/15/2024 No growth at 4 days  Preliminary   05/12/2024 No growth at 5 days  Final   05/12/2024 No growth at 5 days  Final   05/12/2024 No growth at 5 days  Final     Urine Culture   Date Value Ref Range Status   05/12/2024 No growth  Final     No results found for: \"WOUNDCX\"  No results found for: \"STOOLCX\"  No results found for: \"RESPCX\"  Pain Management Panel  More data exists         Latest Ref Rng & Units 8/11/2020 5/9/2017   Pain Management Panel   Amphetamine, Urine Qual Negative Positive  Negative    Barbiturates Screen, Urine Negative Negative  Negative    Benzodiazepine Screen, Urine Negative Negative  Negative    Buprenorphine, Screen, Urine Negative Negative  Negative    Cocaine Screen, Urine Negative Negative  Negative    Methadone Screen , Urine Negative Negative  Negative          ----------------------------------------------------------------------------------------------------------------------  Imaging Results (Last 24 Hours)       Procedure Component Value Units Date/Time    XR Chest 1 View [[id number]] Collected: 05/20/24 0831     Updated: 05/20/24 0834    Narrative:      VERIFICATION OBSERVER NAME: Uday Hansen MD.     TECHNIQUE, HISTORY, FINDINGS:      Comparison: 5/18/2024.     History and Findings: Pleural effusion.     Bilateral interstitial infiltrates, slightly diminished on the RIGHT,  unchanged on the LEFT.  Trace LEFT pleural effusion.  No RIGHT pleural effusion.  Minimal cardiac enlargement.  Central airways are patent.       Impression:      Trace LEFT pleural effusion, perhaps slightly more prominent than prior.              [id number]     Zip code: [id number]                 This report was finalized on 5/20/2024 8:31 AM by Dr. Uday Hansen MD.               ----------------------------------------------------------------------------------------------------------------------    Pertinent Infectious Disease Results                Assessment/Plan       Assessment     Recurrent " high-grade fever  Pneumonia  Suspicion of tickborne illness        Plan      The patient is awake and alert, sitting up comfortably in bed.  On 2 L nasal cannula no apparent distress.  Afebrile.  Denies diarrhea.  The patient reports intermittent nausea but no vomiting.  She reports that she is overall feeling much better.  The patient also states that she remembered she had been exposed to black mold in her home approximately 2 months ago.  The patient reported after the black mold was removed, she developed a respiratory illness that lasted for several weeks and eventually cleared.  Lung exam is clear to auscultation bilaterally.  Abdomen soft and nontender with normoactive bowel sounds.  The tick bite to the left bottom is scabbed, no surrounding erythema or edema.  No new skin rashes.  WBC normal at 4.42.  Chest x-ray from this morning reported trace left pleural effusion, perhaps slightly more prominent than prior.  Transthoracic echocardiogram pending.  Blood cultures from 5/18 preliminarily report no growth at 24 hours.    For now would continue with Zosyn, doxycycline, micafungin courses empirically for pneumonia and tickborne illness.  Awaiting fungal and rickettsial serologies.  We will continue to monitor closely.      ANTIMICROBIAL THERAPY    doxycycline (VIBRAMYCIN) 100 mg IVPB in 100 mL NS (VTB)  micafungin (MYCAMINE) IVPB  piperacillin-tazobactam (ZOSYN) IVPB 3.375 g IVPB in 100 mL NS (VTB)     Code Status:   Code Status and Medical Interventions:   Ordered at: 05/12/24 1930     Code Status (Patient has no pulse and is not breathing):    CPR (Attempt to Resuscitate)     Medical Interventions (Patient has pulse or is breathing):    Full Support       Gregoria Gamble, DIEGO  05/20/24  11:35 EDT

## 2024-05-20 NOTE — CASE MANAGEMENT/SOCIAL WORK
Case Management/Social Work    Patient Name:  Manda Al  YOB: 1980  MRN: [id number]  Admit Date:  5/12/2024        Pt lives at home with her mom Itzel and plans to return home at discharge her mom provides her transportation. Pcp is Dr Rivera, she uses [employer] and has [facility]. Pt is independent with adl's and does not use any dme, home health or home o2.       Electronically signed by:  Lyn Church RN  05/20/24 16:18 EDT

## 2024-05-20 NOTE — PROGRESS NOTES
Patient Identification:  Name:  Manda Al  Age:  43 y.o.  Sex:  female  :  1980  MRN:  [id number]  Visit Number:  [id number]  Primary Care Provider:  Mi Rivera MD    Length of stay:  8    Subjective/Interval History/Consultants/Procedures     Chief Complaint:   Chief Complaint   Patient presents with    Flank Pain       Subjective/Interval History:    43 y.o. female who was admitted on 2024 with acute pancreatitis     PMH is significant for anxiety, schizoaffective disorder- bipolar type, HTN, Crohn's disease   For complete admission information, please see history and physical.     Consultations:  Pulmonology   Infectious disease       Procedures/Scans:  CT abdomen and pelvis w & w/o contrast x 2  CXR x 4  VQ scan  Bilateral lower extremity venous Doppler US  CT PE protocol   TTE    Today, the patient was seen and examined in the PCU resting comfortably on RA without increased work of breathing. She reported feeling much improved today with no dyspnea. She denied abdominal pain, constipation/diarrhea, difficulty with urination. No new complaints today. She did report she thought it pertinent to mention that she had a water leak in her home about a month ago and was exposed to black mold- was sick with SOB and a cough for several days afterward but had resolved prior to this admission.      Room location at the time of evaluation was 219.    ----------------------------------------------------------------------------------------------------------------------  Current Hospital Meds:  Cariprazine HCl, 3 mg, Oral, Daily  cetirizine, 10 mg, Oral, Daily  doxycycline, 100 mg, Intravenous, Q12H  FLUoxetine, 10 mg, Oral, Daily  insulin glargine, 10 Units, Subcutaneous, Daily  insulin lispro, 2-9 Units, Subcutaneous, 4x Daily AC & at Bedtime  ipratropium-albuterol, 3 mL, Nebulization, 4x Daily - RT  methylPREDNISolone sodium succinate, 20 mg, Intravenous, Q12H  micafungin (MYCAMINE) IV, 100 mg,  Intravenous, Q24H  mupirocin, 1 application , Each Nare, BID  nicotine, 1 patch, Transdermal, Q24H  piperacillin-tazobactam, 3.375 g, Intravenous, Q8H  sodium chloride, 500 mL, Intravenous, Once  sodium chloride, 10 mL, Intravenous, Q12H  sodium chloride, 10 mL, Intravenous, Q12H  sodium chloride, 10 mL, Intravenous, Q12H      sodium chloride, 100 mL/hr, Last Rate: 100 mL/hr (05/20/24 0557)      ----------------------------------------------------------------------------------------------------------------------      Objective     Vital Signs:  Temp:  [97.7 °F (36.5 °C)-98.7 °F (37.1 °C)] 98.2 °F (36.8 °C)  Heart Rate:  [67-98] 79  Resp:  [14-26] 20  BP: (104-140)/(63-85) 110/74      05/18/24  0400 05/19/24  0600 05/20/24  0400   Weight: 105 kg (231 lb 14.8 oz) 104 kg (230 lb) 105 kg (231 lb 7.7 oz)     Body mass index is 35.2 kg/m².    Intake/Output Summary (Last 24 hours) at 5/20/2024 1225  Last data filed at 5/20/2024 0800  Gross per 24 hour   Intake 2542.1 ml   Output --   Net 2542.1 ml     I/O this shift:  In: 360 [P.O.:360]  Out: -   Diet: Regular/House; Fluid Consistency: Thin (IDDSI 0)  ----------------------------------------------------------------------------------------------------------------------    Physical Exam  Vitals and nursing note reviewed.   Constitutional:       General: She is not in acute distress.     Appearance: She is obese.   HENT:      Head: Atraumatic.   Eyes:      Extraocular Movements: Extraocular movements intact.   Cardiovascular:      Rate and Rhythm: Normal rate and regular rhythm.   Pulmonary:      Effort: Pulmonary effort is normal.   Abdominal:      Palpations: Abdomen is soft.   Musculoskeletal:      Right lower leg: No edema.      Left lower leg: No edema.   Skin:     General: Skin is warm and dry.   Neurological:      Mental Status: She is alert. Mental status is at baseline.   Psychiatric:         Mood and Affect: Mood normal.         Behavior: Behavior normal.  "               ----------------------------------------------------------------------------------------------------------------------  Tele:        ----------------------------------------------------------------------------------------------------------------------      Results from last 7 days   Lab Units 05/20/24  0209 05/19/24  1613 05/19/24  1321 05/19/24  1015 05/19/24  0730 05/19/24  0031 05/18/24  0021 05/17/24  0036 05/16/24  1034   CRP mg/dL  --   --   --   --   --   --  15.07*  --  9.82*   LACTATE mmol/L  --  3.2* 4.4* 3.2*   < >  --   --   --   --    WBC 10*3/mm3 4.42  --   --   --   --  4.02 4.06 3.52  --    HEMOGLOBIN g/dL 9.6*  --   --   --   --  9.4* 9.8* 11.1*  --    HEMATOCRIT % 28.8*  --   --   --   --  28.5* 29.2* 32.4*  --    MCV fL 91.4  --   --   --   --  92.2 91.0 88.3  --    MCHC g/dL 33.3  --   --   --   --  33.0 33.6 34.3  --    PLATELETS 10*3/mm3 169  --   --   --   --  111* 103* 109*  --    INR   --   --   --   --   --   --   --  1.26*  --     < > = values in this interval not displayed.     Results from last 7 days   Lab Units 05/16/24  1926   PH, ARTERIAL pH units 7.467*   PO2 ART mm Hg 61.5*   PCO2, ARTERIAL mm Hg 26.9*   HCO3 ART mmol/L 19.4*     Results from last 7 days   Lab Units 05/20/24  0209 05/19/24  0031 05/18/24  1036 05/18/24  0021   SODIUM mmol/L 140 138  --  140   POTASSIUM mmol/L 4.2 4.1 3.9 3.5   CHLORIDE mmol/L 111* 110*  --  110*   CO2 mmol/L 18.2* 17.7*  --  20.9*   BUN mg/dL 15 12  --  10   CREATININE mg/dL 1.12* 1.12*  --  1.14*   CALCIUM mg/dL 8.3* 7.9*  --  7.5*   GLUCOSE mg/dL 320* 294*  --  159*   ALBUMIN g/dL 2.7* 2.5*  --  2.3*   BILIRUBIN mg/dL 0.4 0.5  --  0.5   ALK PHOS U/L 79 85  --  73   AST (SGOT) U/L 20 30  --  55*   ALT (SGPT) U/L 24 30  --  36*   Estimated Creatinine Clearance: 82.9 mL/min (A) (by C-G formula based on SCr of 1.12 mg/dL (H)).  No results found for: \"AMMONIA\"      Blood Culture   Date Value Ref Range Status   05/18/2024 No growth at 24 " "hours  Preliminary   05/18/2024 No growth at 24 hours  Preliminary   05/15/2024 No growth at 5 days  Final   05/15/2024 No growth at 5 days  Final     No results found for: \"URINECX\"  No results found for: \"WOUNDCX\"  No results found for: \"STOOLCX\"  ----------------------------------------------------------------------------------------------------------------------  Imaging Results (Last 24 Hours)       Procedure Component Value Units Date/Time    XR Chest 1 View [[id number]] Collected: 05/20/24 0831     Updated: 05/20/24 0834    Narrative:      VERIFICATION OBSERVER NAME: Uday Hansen MD.     TECHNIQUE, HISTORY, FINDINGS:      Comparison: 5/18/2024.     History and Findings: Pleural effusion.     Bilateral interstitial infiltrates, slightly diminished on the RIGHT,  unchanged on the LEFT.  Trace LEFT pleural effusion.  No RIGHT pleural effusion.  Minimal cardiac enlargement.  Central airways are patent.       Impression:      Trace LEFT pleural effusion, perhaps slightly more prominent than prior.              [id number]     Zip code: [id number]                 This report was finalized on 5/20/2024 8:31 AM by Dr. Uday Hansen MD.             ----------------------------------------------------------------------------------------------------------------------   I have reviewed the above laboratory values for 05/20/24    Assessment/Plan     Active Hospital Problems    Diagnosis  POA    **Acute pancreatitis [K85.90]  Yes         ASSESSMENT/PLAN:    Recurrent fevers  Suspect sepsis secondary to tickborne illness  Later in admission patient had onset of recurrent fevers starting on 5/14.  Later developed tachycardia, soft BP as well and was started on broad-spectrum antibiotics on 515 with vancomycin, Zosyn.  CT of the abdomen and pelvis on that date showed resolved pancreatitis with no necrosis or cyst.  Patient did report recently with tick on her person which she had removed about a week before presentation.  IV " doxycycline was started on 5/16 and vancomycin was discontinued.  Tick serologies sent  Other infectious workup including CXR, UA, blood cultures were negative.  D-dimer was elevated, greater than 20 though VTE workup negative.  Hepatitis and HIV panels negative.  ANCA panel was sent and remains pending as well.  TTE planned.  CT PE protocol showed edema versus multifocal pneumonia.  Patient was started on micafungin as well.  Infectious disease consulted and following, assistance appreciated.  For now recommending continued to Zosyn, doxycycline, micafungin pending further test results.  Overall patient appears to be improving, vital signs stable, remaining afebrile for over 24 hours.    Acute hypoxic respiratory failure, now resolved, suspect due to pulmonary edema versus multifocal pneumonia  Overnight 5/17 patient developed new hypoxia was briefly titrated up to 4 L per nasal cannula then weaned to 2 L.  CT PE at that time as above showed concern for edema versus pneumonia.  TTE pending as above.  Antimicrobials were continued as above  Diuresis held given BP soft at the time  Pulmonology was consulted and following, assistance appreciated.  Recommended continue current antibiotics and started Solu-Medrol  Patient has since been weaned to room air and denied further shortness of breath on exam today.    Acute, recurrent pancreatitis  Resolved and tolerating diet    Mild hepatocellular transaminitis, resolved  Mild thrombocytopenia, resolved  Suspect secondary to tickborne illness.  LFTs, platelet count WNL today.    Hyponatremia, resolved    Recent UTI  culture without growth this admission    T2DM with expected hyperglycemia due to steroids  Continue Lantus, moderate dose correction insulin.  Titrating as needed.    Chronic:  Anxiety/depression  Schizoaffective disorder  HTN  Crohn's disease  -----------  -DVT prophylaxis: SCDs  -Disposition plans/anticipated needs: Pending course and completion of workup.   Potentially home thereafter if remaining clinically stable.        The patient is high risk due to the following diagnoses/reasons: Suspected sepsis, suspected tickborne illness        Brenton Alves PA-C  05/20/24  12:25 EDT

## 2024-05-20 NOTE — PLAN OF CARE
Problem: Pain Acute  Goal: Acceptable Pain Control and Functional Ability  Outcome: Ongoing, Progressing  Intervention: Prevent or Manage Pain  Recent Flowsheet Documentation  Taken 5/20/2024 1500 by Madie Stinson RN  Medication Review/Management: medications reviewed  Taken 5/20/2024 1300 by Madie Stinson RN  Medication Review/Management: medications reviewed  Taken 5/20/2024 1100 by Madie Stinson RN  Medication Review/Management: medications reviewed  Taken 5/20/2024 0900 by Madie Stinson RN  Medication Review/Management: medications reviewed  Taken 5/20/2024 0845 by Madie Stinson RN  Medication Review/Management: medications reviewed  Taken 5/20/2024 0700 by Madie Stinson RN  Medication Review/Management: medications reviewed  Intervention: Optimize Psychosocial Wellbeing  Recent Flowsheet Documentation  Taken 5/20/2024 0845 by Madie Stinson RN  Diversional Activities: television     Problem: Adult Inpatient Plan of Care  Goal: Plan of Care Review  Outcome: Ongoing, Progressing  Goal: Patient-Specific Goal (Individualized)  Outcome: Ongoing, Progressing  Goal: Absence of Hospital-Acquired Illness or Injury  Outcome: Ongoing, Progressing  Intervention: Identify and Manage Fall Risk  Recent Flowsheet Documentation  Taken 5/20/2024 1500 by Madie Stinson RN  Safety Promotion/Fall Prevention:   activity supervised   assistive device/personal items within reach   clutter free environment maintained   fall prevention program maintained   nonskid shoes/slippers when out of bed   room organization consistent   safety round/check completed  Taken 5/20/2024 1300 by Madie Stinson RN  Safety Promotion/Fall Prevention:   activity supervised   assistive device/personal items within reach   clutter free environment maintained   fall prevention program maintained   nonskid shoes/slippers when out of bed   room organization consistent   safety round/check completed  Taken 5/20/2024 1100 by Madie Stinson  RN  Safety Promotion/Fall Prevention:   activity supervised   assistive device/personal items within reach   clutter free environment maintained   fall prevention program maintained   nonskid shoes/slippers when out of bed   room organization consistent   safety round/check completed  Taken 5/20/2024 0900 by Madie Stinson RN  Safety Promotion/Fall Prevention:   activity supervised   assistive device/personal items within reach   clutter free environment maintained   fall prevention program maintained   nonskid shoes/slippers when out of bed   room organization consistent   safety round/check completed  Taken 5/20/2024 0845 by Madie Stinsno RN  Safety Promotion/Fall Prevention:   activity supervised   assistive device/personal items within reach   clutter free environment maintained   fall prevention program maintained   nonskid shoes/slippers when out of bed   room organization consistent   safety round/check completed  Taken 5/20/2024 0700 by Madie Stinson RN  Safety Promotion/Fall Prevention:   activity supervised   assistive device/personal items within reach   clutter free environment maintained   fall prevention program maintained   nonskid shoes/slippers when out of bed   room organization consistent   safety round/check completed  Intervention: Prevent and Manage VTE (Venous Thromboembolism) Risk  Recent Flowsheet Documentation  Taken 5/20/2024 0900 by Madie Stinson RN  Activity Management: activity encouraged  Taken 5/20/2024 0845 by Madie Stinson RN  Activity Management: activity encouraged  Intervention: Prevent Infection  Recent Flowsheet Documentation  Taken 5/20/2024 1500 by Madie Stinson RN  Infection Prevention:   rest/sleep promoted   single patient room provided  Taken 5/20/2024 1300 by Madie Stinson RN  Infection Prevention:   rest/sleep promoted   single patient room provided  Taken 5/20/2024 1100 by Madie Stinson RN  Infection Prevention:   rest/sleep promoted   single patient  room provided  Taken 5/20/2024 0900 by Madie Stinson RN  Infection Prevention:   rest/sleep promoted   single patient room provided  Taken 5/20/2024 0845 by Madie Stinson RN  Infection Prevention:   rest/sleep promoted   single patient room provided  Taken 5/20/2024 0700 by Madie Stinson RN  Infection Prevention:   rest/sleep promoted   single patient room provided  Goal: Optimal Comfort and Wellbeing  Outcome: Ongoing, Progressing  Intervention: Provide Person-Centered Care  Recent Flowsheet Documentation  Taken 5/20/2024 0845 by Madie Stinson RN  Trust Relationship/Rapport:   care explained   choices provided   emotional support provided   empathic listening provided   questions answered   questions encouraged   reassurance provided   thoughts/feelings acknowledged  Goal: Readiness for Transition of Care  Outcome: Ongoing, Progressing     Problem: Infection  Goal: Absence of Infection Signs and Symptoms  Outcome: Ongoing, Progressing     Problem: Fluid Imbalance (Pancreatitis)  Goal: Fluid Balance  Outcome: Ongoing, Progressing     Problem: Infection (Pancreatitis)  Goal: Infection Symptom Resolution  Outcome: Ongoing, Progressing     Problem: Nutrition Impaired (Pancreatitis)  Goal: Optimal Nutrition Intake  Outcome: Ongoing, Progressing     Problem: Pain (Pancreatitis)  Goal: Acceptable Pain Control  Outcome: Ongoing, Progressing  Intervention: Monitor and Manage Pain  Recent Flowsheet Documentation  Taken 5/20/2024 0845 by Madie Stinson RN  Diversional Activities: television     Problem: Respiratory Compromise (Pancreatitis)  Goal: Effective Oxygenation and Ventilation  Outcome: Ongoing, Progressing  Intervention: Optimize Oxygenation and Ventilation  Recent Flowsheet Documentation  Taken 5/20/2024 0900 by Madie Stinson RN  Activity Management: activity encouraged  Taken 5/20/2024 0845 by Madie Stinson RN  Activity Management: activity encouraged     Problem: Fall Injury Risk  Goal: Absence of  Fall and Fall-Related Injury  Outcome: Ongoing, Progressing  Intervention: Identify and Manage Contributors  Recent Flowsheet Documentation  Taken 5/20/2024 1500 by Madie Stinson RN  Medication Review/Management: medications reviewed  Taken 5/20/2024 1300 by Madie Stinson RN  Medication Review/Management: medications reviewed  Taken 5/20/2024 1100 by Madie Stinson RN  Medication Review/Management: medications reviewed  Taken 5/20/2024 0900 by Madie Stinson RN  Medication Review/Management: medications reviewed  Taken 5/20/2024 0845 by Madie Stinson RN  Medication Review/Management: medications reviewed  Taken 5/20/2024 0700 by Madie Stinson RN  Medication Review/Management: medications reviewed  Intervention: Promote Injury-Free Environment  Recent Flowsheet Documentation  Taken 5/20/2024 1500 by Madie Stinson RN  Safety Promotion/Fall Prevention:   activity supervised   assistive device/personal items within reach   clutter free environment maintained   fall prevention program maintained   nonskid shoes/slippers when out of bed   room organization consistent   safety round/check completed  Taken 5/20/2024 1300 by Madie Stinson RN  Safety Promotion/Fall Prevention:   activity supervised   assistive device/personal items within reach   clutter free environment maintained   fall prevention program maintained   nonskid shoes/slippers when out of bed   room organization consistent   safety round/check completed  Taken 5/20/2024 1100 by Madie Stinson RN  Safety Promotion/Fall Prevention:   activity supervised   assistive device/personal items within reach   clutter free environment maintained   fall prevention program maintained   nonskid shoes/slippers when out of bed   room organization consistent   safety round/check completed  Taken 5/20/2024 0900 by Madie Stinson RN  Safety Promotion/Fall Prevention:   activity supervised   assistive device/personal items within reach   clutter free environment  maintained   fall prevention program maintained   nonskid shoes/slippers when out of bed   room organization consistent   safety round/check completed  Taken 5/20/2024 0845 by Madie Stinson RN  Safety Promotion/Fall Prevention:   activity supervised   assistive device/personal items within reach   clutter free environment maintained   fall prevention program maintained   nonskid shoes/slippers when out of bed   room organization consistent   safety round/check completed  Taken 5/20/2024 0700 by Madie Stinson RN  Safety Promotion/Fall Prevention:   activity supervised   assistive device/personal items within reach   clutter free environment maintained   fall prevention program maintained   nonskid shoes/slippers when out of bed   room organization consistent   safety round/check completed   Goal Outcome Evaluation:   Pt alert and oriented x 4. VSS and afebrile this shift. No complaints at this time. Pt resting in bed. Safety maintained.

## 2024-05-20 NOTE — PLAN OF CARE
Goal Outcome Evaluation:              Outcome Evaluation: Pt A/Ox4. RA. NSR on monitor. PRN Oxycodone and Phenergan given x1 this shift. Afebrile. Adequate UOP. VSS. No complaints throughout shift. Bed in lowest position with wheels locked. Bed alarm set and call light within reach. Plan of care continued.

## 2024-05-21 LAB
ANION GAP SERPL CALCULATED.3IONS-SCNC: 13.1 MMOL/L (ref 5–15)
BUN SERPL-MCNC: 17 MG/DL (ref 6–20)
BUN/CREAT SERPL: 15.3 (ref 7–25)
C-ANCA TITR SER IF: NORMAL TITER
C-ANCA TITR SER IF: NORMAL TITER
CALCIUM SPEC-SCNC: 8.2 MG/DL (ref 8.6–10.5)
CHLORIDE SERPL-SCNC: 113 MMOL/L (ref 98–107)
CO2 SERPL-SCNC: 15.9 MMOL/L (ref 22–29)
CREAT SERPL-MCNC: 1.11 MG/DL (ref 0.57–1)
EGFRCR SERPLBLD CKD-EPI 2021: 63.8 ML/MIN/1.73
GLUCOSE BLDC GLUCOMTR-MCNC: 152 MG/DL (ref 70–130)
GLUCOSE BLDC GLUCOMTR-MCNC: 168 MG/DL (ref 70–130)
GLUCOSE BLDC GLUCOMTR-MCNC: 222 MG/DL (ref 70–130)
GLUCOSE BLDC GLUCOMTR-MCNC: 225 MG/DL (ref 70–130)
GLUCOSE SERPL-MCNC: 205 MG/DL (ref 65–99)
MYELOPEROXIDASE AB SER IA-ACNC: <0.2 UNITS (ref 0–0.9)
MYELOPEROXIDASE AB SER IA-ACNC: <0.2 UNITS (ref 0–0.9)
NT-PROBNP SERPL-MCNC: 4838 PG/ML (ref 0–450)
P-ANCA ATYPICAL TITR SER IF: NORMAL TITER
P-ANCA ATYPICAL TITR SER IF: NORMAL TITER
P-ANCA TITR SER IF: NORMAL TITER
P-ANCA TITR SER IF: NORMAL TITER
POTASSIUM SERPL-SCNC: 4 MMOL/L (ref 3.5–5.2)
PROTEINASE3 AB SER IA-ACNC: <0.2 UNITS (ref 0–0.9)
PROTEINASE3 AB SER IA-ACNC: <0.2 UNITS (ref 0–0.9)
SODIUM SERPL-SCNC: 142 MMOL/L (ref 136–145)
TROPONIN T SERPL HS-MCNC: 6 NG/L

## 2024-05-21 PROCEDURE — 84484 ASSAY OF TROPONIN QUANT: CPT | Performed by: INTERNAL MEDICINE

## 2024-05-21 PROCEDURE — 25810000003 SODIUM CHLORIDE 0.9 % SOLUTION: Performed by: INTERNAL MEDICINE

## 2024-05-21 PROCEDURE — 94799 UNLISTED PULMONARY SVC/PX: CPT

## 2024-05-21 PROCEDURE — 93010 ELECTROCARDIOGRAM REPORT: CPT | Performed by: INTERNAL MEDICINE

## 2024-05-21 PROCEDURE — 63710000001 INSULIN LISPRO (HUMAN) PER 5 UNITS: Performed by: INTERNAL MEDICINE

## 2024-05-21 PROCEDURE — 99232 SBSQ HOSP IP/OBS MODERATE 35: CPT | Performed by: NURSE PRACTITIONER

## 2024-05-21 PROCEDURE — 25010000002 METHYLPREDNISOLONE PER 40 MG: Performed by: INTERNAL MEDICINE

## 2024-05-21 PROCEDURE — 25010000002 MICAFUNGIN SODIUM 100 MG RECONSTITUTED SOLUTION 1 EACH VIAL: Performed by: INTERNAL MEDICINE

## 2024-05-21 PROCEDURE — 83880 ASSAY OF NATRIURETIC PEPTIDE: CPT | Performed by: INTERNAL MEDICINE

## 2024-05-21 PROCEDURE — 93005 ELECTROCARDIOGRAM TRACING: CPT | Performed by: INTERNAL MEDICINE

## 2024-05-21 PROCEDURE — 82948 REAGENT STRIP/BLOOD GLUCOSE: CPT

## 2024-05-21 PROCEDURE — 99222 1ST HOSP IP/OBS MODERATE 55: CPT | Performed by: INTERNAL MEDICINE

## 2024-05-21 PROCEDURE — 63710000001 INSULIN GLARGINE PER 5 UNITS

## 2024-05-21 PROCEDURE — 94761 N-INVAS EAR/PLS OXIMETRY MLT: CPT

## 2024-05-21 PROCEDURE — 94664 DEMO&/EVAL PT USE INHALER: CPT

## 2024-05-21 PROCEDURE — 25010000002 PIPERACILLIN SOD-TAZOBACTAM PER 1 G: Performed by: INTERNAL MEDICINE

## 2024-05-21 PROCEDURE — 99232 SBSQ HOSP IP/OBS MODERATE 35: CPT | Performed by: INTERNAL MEDICINE

## 2024-05-21 PROCEDURE — 25010000002 FUROSEMIDE PER 20 MG: Performed by: INTERNAL MEDICINE

## 2024-05-21 PROCEDURE — 80048 BASIC METABOLIC PNL TOTAL CA: CPT | Performed by: INTERNAL MEDICINE

## 2024-05-21 RX ORDER — PREDNISONE 20 MG/1
20 TABLET ORAL
Status: DISCONTINUED | OUTPATIENT
Start: 2024-05-22 | End: 2024-05-24 | Stop reason: HOSPADM

## 2024-05-21 RX ORDER — METOPROLOL SUCCINATE 25 MG/1
25 TABLET, EXTENDED RELEASE ORAL
Status: DISCONTINUED | OUTPATIENT
Start: 2024-05-21 | End: 2024-05-22

## 2024-05-21 RX ORDER — FUROSEMIDE 10 MG/ML
20 INJECTION INTRAMUSCULAR; INTRAVENOUS ONCE
Status: COMPLETED | OUTPATIENT
Start: 2024-05-21 | End: 2024-05-21

## 2024-05-21 RX ORDER — FUROSEMIDE 10 MG/ML
40 INJECTION INTRAMUSCULAR; INTRAVENOUS ONCE
Status: COMPLETED | OUTPATIENT
Start: 2024-05-21 | End: 2024-05-21

## 2024-05-21 RX ADMIN — INSULIN LISPRO 4 UNITS: 100 INJECTION, SOLUTION INTRAVENOUS; SUBCUTANEOUS at 06:38

## 2024-05-21 RX ADMIN — CARIPRAZINE 3 MG: 3 CAPSULE, GELATIN COATED ORAL at 08:24

## 2024-05-21 RX ADMIN — METOPROLOL SUCCINATE 25 MG: 25 TABLET, EXTENDED RELEASE ORAL at 21:45

## 2024-05-21 RX ADMIN — MUPIROCIN 1 APPLICATION: 20 OINTMENT TOPICAL at 08:24

## 2024-05-21 RX ADMIN — INSULIN LISPRO 2 UNITS: 100 INJECTION, SOLUTION INTRAVENOUS; SUBCUTANEOUS at 11:22

## 2024-05-21 RX ADMIN — PIPERACILLIN SODIUM AND TAZOBACTAM SODIUM 3.38 G: 3; .375 INJECTION, POWDER, LYOPHILIZED, FOR SOLUTION INTRAVENOUS at 05:44

## 2024-05-21 RX ADMIN — OXYCODONE HYDROCHLORIDE 5 MG: 10 TABLET ORAL at 16:09

## 2024-05-21 RX ADMIN — Medication 10 ML: at 21:45

## 2024-05-21 RX ADMIN — DOXYCYCLINE 100 MG: 100 INJECTION, POWDER, LYOPHILIZED, FOR SOLUTION INTRAVENOUS at 13:27

## 2024-05-21 RX ADMIN — NICOTINE 1 PATCH: 14 PATCH, EXTENDED RELEASE TRANSDERMAL at 08:25

## 2024-05-21 RX ADMIN — IPRATROPIUM BROMIDE AND ALBUTEROL SULFATE 3 ML: 2.5; .5 SOLUTION RESPIRATORY (INHALATION) at 20:02

## 2024-05-21 RX ADMIN — INSULIN LISPRO 2 UNITS: 100 INJECTION, SOLUTION INTRAVENOUS; SUBCUTANEOUS at 21:44

## 2024-05-21 RX ADMIN — MICAFUNGIN SODIUM 100 MG: 100 INJECTION, POWDER, LYOPHILIZED, FOR SOLUTION INTRAVENOUS at 08:24

## 2024-05-21 RX ADMIN — METHYLPREDNISOLONE SODIUM SUCCINATE 20 MG: 40 INJECTION, POWDER, FOR SOLUTION INTRAMUSCULAR; INTRAVENOUS at 04:01

## 2024-05-21 RX ADMIN — Medication 10 ML: at 08:25

## 2024-05-21 RX ADMIN — PIPERACILLIN SODIUM AND TAZOBACTAM SODIUM 3.38 G: 3; .375 INJECTION, POWDER, LYOPHILIZED, FOR SOLUTION INTRAVENOUS at 21:45

## 2024-05-21 RX ADMIN — INSULIN GLARGINE 20 UNITS: 100 INJECTION, SOLUTION SUBCUTANEOUS at 08:24

## 2024-05-21 RX ADMIN — FLUOXETINE HYDROCHLORIDE 10 MG: 10 CAPSULE ORAL at 08:24

## 2024-05-21 RX ADMIN — SODIUM CHLORIDE 100 ML/HR: 9 INJECTION, SOLUTION INTRAVENOUS at 06:38

## 2024-05-21 RX ADMIN — OXYCODONE HYDROCHLORIDE 5 MG: 10 TABLET ORAL at 09:21

## 2024-05-21 RX ADMIN — PIPERACILLIN SODIUM AND TAZOBACTAM SODIUM 3.38 G: 3; .375 INJECTION, POWDER, LYOPHILIZED, FOR SOLUTION INTRAVENOUS at 13:27

## 2024-05-21 RX ADMIN — IPRATROPIUM BROMIDE AND ALBUTEROL SULFATE 3 ML: 2.5; .5 SOLUTION RESPIRATORY (INHALATION) at 07:54

## 2024-05-21 RX ADMIN — INSULIN LISPRO 4 UNITS: 100 INJECTION, SOLUTION INTRAVENOUS; SUBCUTANEOUS at 18:15

## 2024-05-21 RX ADMIN — CETIRIZINE HYDROCHLORIDE 10 MG: 10 TABLET, FILM COATED ORAL at 08:24

## 2024-05-21 RX ADMIN — FUROSEMIDE 40 MG: 10 INJECTION, SOLUTION INTRAMUSCULAR; INTRAVENOUS at 21:45

## 2024-05-21 RX ADMIN — FUROSEMIDE 20 MG: 10 INJECTION, SOLUTION INTRAMUSCULAR; INTRAVENOUS at 15:51

## 2024-05-21 RX ADMIN — DOXYCYCLINE 100 MG: 100 INJECTION, POWDER, LYOPHILIZED, FOR SOLUTION INTRAVENOUS at 01:16

## 2024-05-21 RX ADMIN — IPRATROPIUM BROMIDE AND ALBUTEROL SULFATE 3 ML: 2.5; .5 SOLUTION RESPIRATORY (INHALATION) at 13:14

## 2024-05-21 RX ADMIN — Medication 10 ML: at 08:24

## 2024-05-21 NOTE — PROGRESS NOTES
Patient Identification:  Name:  Manda Al  Age:  43 y.o.  Sex:  female  :  1980  MRN:  [id number]  Visit Number:  [id number]  Primary Care Provider:  Mi Rivera MD    Length of stay:  9    Subjective/Interval History/Consultants/Procedures     Chief Complaint:   Chief Complaint   Patient presents with    Flank Pain       Subjective/Interval History:    43 y.o. female who was admitted on 2024 with acute pancreatitis     PMH is significant for   For complete admission information, please see history and physical.     Consultations:  Pulmonology   Infectious disease   Cardiology    Procedures/Scans:  CT abdomen and pelvis w & w/o contrast x 2  CXR x 4  VQ scan  Bilateral lower extremity venous Doppler US  CT PE protocol   TTE    Today, the patient was seen and examined with RN at bedside. Patient had some increased shortness of breath and chest tightness this AM- Trop negative. Repeat EKG without concerning change. Also with some tightness in her hands- fluids stopped. Respiratory status currently at baseline. Did have significant valvular disease on echo which patient states was previously known, cardiology consulted for further recommendations. No abdominal pain, eating well. Complaining of some lower back pain which she thinks related to the bed. RN noted has been ambulating around the unit.      Room location at the time of evaluation was 219.    ----------------------------------------------------------------------------------------------------------------------  Current Hospital Meds:  Cariprazine HCl, 3 mg, Oral, Daily  cetirizine, 10 mg, Oral, Daily  doxycycline, 100 mg, Intravenous, Q12H  FLUoxetine, 10 mg, Oral, Daily  insulin glargine, 20 Units, Subcutaneous, Daily  insulin lispro, 2-9 Units, Subcutaneous, 4x Daily AC & at Bedtime  ipratropium-albuterol, 3 mL, Nebulization, 4x Daily - RT  methylPREDNISolone sodium succinate, 20 mg, Intravenous, Q12H  micafungin (MYCAMINE) IV, 100  mg, Intravenous, Q24H  mupirocin, 1 application , Each Nare, BID  nicotine, 1 patch, Transdermal, Q24H  piperacillin-tazobactam, 3.375 g, Intravenous, Q8H  sodium chloride, 500 mL, Intravenous, Once  sodium chloride, 10 mL, Intravenous, Q12H  sodium chloride, 10 mL, Intravenous, Q12H  sodium chloride, 10 mL, Intravenous, Q12H         ----------------------------------------------------------------------------------------------------------------------      Objective     Vital Signs:  Temp:  [97.5 °F (36.4 °C)-98.5 °F (36.9 °C)] 98.1 °F (36.7 °C)  Heart Rate:  [] 89  Resp:  [10-31] 18  BP: (109-149)/(65-97) 130/84      05/19/24  0600 05/20/24  0400 05/21/24  0400   Weight: 104 kg (230 lb) 105 kg (231 lb 7.7 oz) 106 kg (233 lb 7.5 oz)     Body mass index is 35.5 kg/m².    Intake/Output Summary (Last 24 hours) at 5/21/2024 1336  Last data filed at 5/21/2024 0800  Gross per 24 hour   Intake 3108 ml   Output --   Net 3108 ml     I/O this shift:  In: 240 [P.O.:240]  Out: -   Diet: Regular/House; Fluid Consistency: Thin (IDDSI 0)  ----------------------------------------------------------------------------------------------------------------------    Physical Exam  Vitals and nursing note reviewed.   Constitutional:       General: She is not in acute distress.     Appearance: She is obese.   HENT:      Head: Normocephalic and atraumatic.   Eyes:      Extraocular Movements: Extraocular movements intact.   Cardiovascular:      Rate and Rhythm: Normal rate and regular rhythm.   Pulmonary:      Effort: Pulmonary effort is normal.      Breath sounds: Normal breath sounds.   Abdominal:      Palpations: Abdomen is soft.   Musculoskeletal:      Right lower leg: No edema.      Left lower leg: No edema.   Skin:     General: Skin is warm and dry.   Neurological:      Mental Status: She is alert. Mental status is at baseline.   Psychiatric:         Mood and Affect: Mood normal.         Behavior: Behavior normal.               "  ----------------------------------------------------------------------------------------------------------------------  Tele:      ----------------------------------------------------------------------------------------------------------------------  Results from last 7 days   Lab Units 05/21/24  0844   HSTROP T ng/L 6     Results from last 7 days   Lab Units 05/20/24  0209 05/19/24  1613 05/19/24  1321 05/19/24  1015 05/19/24  0730 05/19/24  0031 05/18/24  0021 05/17/24  0036 05/16/24  1034   CRP mg/dL  --   --   --   --   --   --  15.07*  --  9.82*   LACTATE mmol/L  --  3.2* 4.4* 3.2*   < >  --   --   --   --    WBC 10*3/mm3 4.42  --   --   --   --  4.02 4.06 3.52  --    HEMOGLOBIN g/dL 9.6*  --   --   --   --  9.4* 9.8* 11.1*  --    HEMATOCRIT % 28.8*  --   --   --   --  28.5* 29.2* 32.4*  --    MCV fL 91.4  --   --   --   --  92.2 91.0 88.3  --    MCHC g/dL 33.3  --   --   --   --  33.0 33.6 34.3  --    PLATELETS 10*3/mm3 169  --   --   --   --  111* 103* 109*  --    INR   --   --   --   --   --   --   --  1.26*  --     < > = values in this interval not displayed.     Results from last 7 days   Lab Units 05/16/24  1926   PH, ARTERIAL pH units 7.467*   PO2 ART mm Hg 61.5*   PCO2, ARTERIAL mm Hg 26.9*   HCO3 ART mmol/L 19.4*     Results from last 7 days   Lab Units 05/20/24  0209 05/19/24  0031 05/18/24  1036 05/18/24  0021   SODIUM mmol/L 140 138  --  140   POTASSIUM mmol/L 4.2 4.1 3.9 3.5   CHLORIDE mmol/L 111* 110*  --  110*   CO2 mmol/L 18.2* 17.7*  --  20.9*   BUN mg/dL 15 12  --  10   CREATININE mg/dL 1.12* 1.12*  --  1.14*   CALCIUM mg/dL 8.3* 7.9*  --  7.5*   GLUCOSE mg/dL 320* 294*  --  159*   ALBUMIN g/dL 2.7* 2.5*  --  2.3*   BILIRUBIN mg/dL 0.4 0.5  --  0.5   ALK PHOS U/L 79 85  --  73   AST (SGOT) U/L 20 30  --  55*   ALT (SGPT) U/L 24 30  --  36*   Estimated Creatinine Clearance: 83.4 mL/min (A) (by C-G formula based on SCr of 1.12 mg/dL (H)).  No results found for: \"AMMONIA\"      Blood Culture " "  Date Value Ref Range Status   05/18/2024 No growth at 2 days  Preliminary   05/18/2024 No growth at 2 days  Preliminary   05/15/2024 No growth at 5 days  Final   05/15/2024 No growth at 5 days  Final     No results found for: \"URINECX\"  No results found for: \"WOUNDCX\"  No results found for: \"STOOLCX\"  ----------------------------------------------------------------------------------------------------------------------  Imaging Results (Last 24 Hours)       ** No results found for the last 24 hours. **          ----------------------------------------------------------------------------------------------------------------------   I have reviewed the above laboratory values for 05/21/24    Assessment/Plan     Active Hospital Problems    Diagnosis  POA    **Acute pancreatitis [K85.90]  Yes         ASSESSMENT/PLAN:    Recurrent fevers  Suspect sepsis secondary to tickborne illness  Later in admission patient had onset of recurrent fevers starting on 5/14.  Later developed tachycardia, soft BP as well and was started on broad-spectrum antibiotics on 515 with vancomycin, Zosyn.  CT of the abdomen and pelvis on that date showed resolved pancreatitis with no necrosis or cyst.  Patient did report recently with tick on her person which she had removed about a week before presentation.  IV doxycycline was started on 5/16 and vancomycin was discontinued.  Tick serologies sent  Other infectious workup including CXR, UA, blood cultures were negative.  D-dimer was elevated, greater than 20 though VTE workup negative.  Hepatitis and HIV panels negative.  ANCA panel was sent and remains pending as well.  TTE planned.  CT PE protocol showed edema versus multifocal pneumonia.  Patient was started on micafungin as well.  Infectious disease consulted and following, assistance appreciated.  For now recommending continued to Zosyn, doxycycline, micafungin pending further test results.  Overall patient appears to be improving, vital " signs stable, remaining afebrile for over 48 hours.     Acute hypoxic respiratory failure, now resolved, suspect due to pulmonary edema versus multifocal pneumonia  Overnight 5/17 patient developed new hypoxia was briefly titrated up to 4 L per nasal cannula then weaned to 2 L.  CT PE at that time as above showed concern for edema versus pneumonia.  Antimicrobials were continued as above  Diuresis held given BP soft at the time  Pulmonology was consulted and following, assistance appreciated.  Recommended continue current antibiotics and started Solu-Medrol- now tapering with prednisone.   Fluids discontinued today given worsening shortness of breath. 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  Cardiology consulted for further assistance and recommendations, appreciated.   Report recommended consideration of referral for possible mitral valvuloplasty      Acute, recurrent pancreatitis  Resolved and tolerating diet     Mild hepatocellular transaminitis, resolved  Mild thrombocytopenia, resolved  Suspect secondary to tickborne illness.  LFTs, platelet count WNL today.     Hyponatremia, resolved     Recent UTI  culture without growth this admission     T2DM with expected hyperglycemia due to steroids  Continue Lantus, moderate dose correction insulin.  Titrating as needed.     Chronic:  Anxiety/depression  Schizoaffective disorder  HTN  Crohn's disease    -----------  -DVT prophylaxis: SCDs  -Disposition plans/anticipated needs: pending course and completion of work up.        The patient is high risk due to the following diagnoses/reasons:  concern for tickborne illness, mod to severe mitral stenosis        Brenton Alves PA-C  05/21/24  13:36 EDT

## 2024-05-21 NOTE — PLAN OF CARE
Problem: Pain Acute  Goal: Acceptable Pain Control and Functional Ability  Outcome: Ongoing, Progressing  Intervention: Prevent or Manage Pain  Recent Flowsheet Documentation  Taken 5/21/2024 1420 by Madie Stinson RN  Medication Review/Management: medications reviewed  Taken 5/21/2024 1300 by Madie Stinson RN  Medication Review/Management: medications reviewed  Taken 5/21/2024 1100 by Madie Stinson RN  Medication Review/Management: medications reviewed  Taken 5/21/2024 0900 by Madie Stinson RN  Medication Review/Management: medications reviewed  Taken 5/21/2024 0700 by Madie Stinson RN  Medication Review/Management: medications reviewed  Intervention: Optimize Psychosocial Wellbeing  Recent Flowsheet Documentation  Taken 5/21/2024 0800 by Madie Stinson RN  Diversional Activities: television     Problem: Adult Inpatient Plan of Care  Goal: Plan of Care Review  Outcome: Ongoing, Progressing  Goal: Patient-Specific Goal (Individualized)  Outcome: Ongoing, Progressing  Goal: Absence of Hospital-Acquired Illness or Injury  Outcome: Ongoing, Progressing  Intervention: Identify and Manage Fall Risk  Recent Flowsheet Documentation  Taken 5/21/2024 1420 by Madie Stinson RN  Safety Promotion/Fall Prevention:   activity supervised   assistive device/personal items within reach   clutter free environment maintained   fall prevention program maintained   nonskid shoes/slippers when out of bed   room organization consistent   safety round/check completed  Taken 5/21/2024 1300 by Madie Stinson RN  Safety Promotion/Fall Prevention:   activity supervised   assistive device/personal items within reach   clutter free environment maintained   fall prevention program maintained   nonskid shoes/slippers when out of bed   room organization consistent   safety round/check completed  Taken 5/21/2024 1100 by Madie Stinson RN  Safety Promotion/Fall Prevention:   activity supervised   assistive device/personal items within  reach   clutter free environment maintained   fall prevention program maintained   nonskid shoes/slippers when out of bed   room organization consistent   safety round/check completed  Taken 5/21/2024 0900 by Madie Stinson RN  Safety Promotion/Fall Prevention:   activity supervised   assistive device/personal items within reach   clutter free environment maintained   fall prevention program maintained   nonskid shoes/slippers when out of bed   room organization consistent   safety round/check completed  Taken 5/21/2024 0700 by Madie Stinson RN  Safety Promotion/Fall Prevention:   activity supervised   assistive device/personal items within reach   clutter free environment maintained   fall prevention program maintained   nonskid shoes/slippers when out of bed   room organization consistent   safety round/check completed  Intervention: Prevent and Manage VTE (Venous Thromboembolism) Risk  Recent Flowsheet Documentation  Taken 5/21/2024 1420 by Madie Stinson RN  Activity Management: activity encouraged  Taken 5/21/2024 1300 by Madie Stinson RN  Activity Management: activity encouraged  Taken 5/21/2024 1100 by Madie Stinson RN  Activity Management: activity encouraged  Taken 5/21/2024 0900 by Madie Stinson RN  Activity Management: activity encouraged  Taken 5/21/2024 0700 by Madie Stinson RN  Activity Management: activity encouraged  Intervention: Prevent Infection  Recent Flowsheet Documentation  Taken 5/21/2024 1420 by Madie Stinson RN  Infection Prevention:   rest/sleep promoted   single patient room provided  Taken 5/21/2024 1300 by Madie Stinson RN  Infection Prevention:   rest/sleep promoted   single patient room provided  Taken 5/21/2024 1100 by Madie Stinson RN  Infection Prevention:   rest/sleep promoted   single patient room provided  Taken 5/21/2024 0900 by Madie Stinson RN  Infection Prevention:   rest/sleep promoted   single patient room provided  Taken 5/21/2024 0700 by Milad  CEDRIC Ramachandran  Infection Prevention:   rest/sleep promoted   single patient room provided  Goal: Optimal Comfort and Wellbeing  Outcome: Ongoing, Progressing  Intervention: Provide Person-Centered Care  Recent Flowsheet Documentation  Taken 5/21/2024 0800 by Madie Stinson RN  Trust Relationship/Rapport:   care explained   choices provided   emotional support provided   empathic listening provided   questions answered   questions encouraged   reassurance provided   thoughts/feelings acknowledged  Goal: Readiness for Transition of Care  Outcome: Ongoing, Progressing     Problem: Infection  Goal: Absence of Infection Signs and Symptoms  Outcome: Ongoing, Progressing     Problem: Fluid Imbalance (Pancreatitis)  Goal: Fluid Balance  Outcome: Ongoing, Progressing     Problem: Infection (Pancreatitis)  Goal: Infection Symptom Resolution  Outcome: Ongoing, Progressing     Problem: Nutrition Impaired (Pancreatitis)  Goal: Optimal Nutrition Intake  Outcome: Ongoing, Progressing     Problem: Pain (Pancreatitis)  Goal: Acceptable Pain Control  Outcome: Ongoing, Progressing  Intervention: Monitor and Manage Pain  Recent Flowsheet Documentation  Taken 5/21/2024 0800 by Madie Stinson RN  Diversional Activities: television     Problem: Respiratory Compromise (Pancreatitis)  Goal: Effective Oxygenation and Ventilation  Outcome: Ongoing, Progressing  Intervention: Optimize Oxygenation and Ventilation  Recent Flowsheet Documentation  Taken 5/21/2024 1420 by Madie Stinson RN  Activity Management: activity encouraged  Taken 5/21/2024 1300 by Madie Stinson RN  Activity Management: activity encouraged  Taken 5/21/2024 1100 by Madie Stinson RN  Activity Management: activity encouraged  Taken 5/21/2024 0900 by Madie Stinson RN  Activity Management: activity encouraged  Taken 5/21/2024 0700 by Madie Stinson RN  Activity Management: activity encouraged     Problem: Fall Injury Risk  Goal: Absence of Fall and Fall-Related  Injury  Outcome: Ongoing, Progressing  Intervention: Identify and Manage Contributors  Recent Flowsheet Documentation  Taken 5/21/2024 1420 by Madie Stinson RN  Medication Review/Management: medications reviewed  Taken 5/21/2024 1300 by Madie Stinson RN  Medication Review/Management: medications reviewed  Taken 5/21/2024 1100 by Madie Stinson RN  Medication Review/Management: medications reviewed  Taken 5/21/2024 0900 by Madie Stinson RN  Medication Review/Management: medications reviewed  Taken 5/21/2024 0700 by Madie Stinson RN  Medication Review/Management: medications reviewed  Intervention: Promote Injury-Free Environment  Recent Flowsheet Documentation  Taken 5/21/2024 1420 by Madie Stinson RN  Safety Promotion/Fall Prevention:   activity supervised   assistive device/personal items within reach   clutter free environment maintained   fall prevention program maintained   nonskid shoes/slippers when out of bed   room organization consistent   safety round/check completed  Taken 5/21/2024 1300 by Madie Stinson RN  Safety Promotion/Fall Prevention:   activity supervised   assistive device/personal items within reach   clutter free environment maintained   fall prevention program maintained   nonskid shoes/slippers when out of bed   room organization consistent   safety round/check completed  Taken 5/21/2024 1100 by Madie Stinson RN  Safety Promotion/Fall Prevention:   activity supervised   assistive device/personal items within reach   clutter free environment maintained   fall prevention program maintained   nonskid shoes/slippers when out of bed   room organization consistent   safety round/check completed  Taken 5/21/2024 0900 by Madie Stinson RN  Safety Promotion/Fall Prevention:   activity supervised   assistive device/personal items within reach   clutter free environment maintained   fall prevention program maintained   nonskid shoes/slippers when out of bed   room organization  consistent   safety round/check completed  Taken 5/21/2024 0700 by Madie Stinson RN  Safety Promotion/Fall Prevention:   activity supervised   assistive device/personal items within reach   clutter free environment maintained   fall prevention program maintained   nonskid shoes/slippers when out of bed   room organization consistent   safety round/check completed   Goal Outcome Evaluation:   Pt alert and oriented x 4. VSS and afebrile at this time. Pt on room air. Pt complained of chest tightness and swelling in her hands this am. MD made aware. NS was stopped per order. Pt resting at this time. Bed is locked in low position with call light in reach.

## 2024-05-21 NOTE — PROGRESS NOTES
Pulmonary and critical care consultation note  Referring Provider:   Reason for Consultation:       Chief complaint shortness of breath-      Sub-     Overnight events reviewed.  All the labs medications ins and outs and vitals reviewed.  Patient resting in bed.    Latest chest x-ray reviewed and discussed with patient  Review of Systems  Positive for respiratory otherwise negative        History  Past Medical History:   Diagnosis Date    Anxiety     Asthma     Bipolar disorder     Crohn disease     Depression     Hypertension     Schizoaffective disorder    ,   Past Surgical History:   Procedure Laterality Date    APPENDECTOMY       SECTION      CHOLECYSTECTOMY      COLON SURGERY      COLONOSCOPY      MANDIBLE FRACTURE SURGERY      OVARIAN CYST SURGERY      TUBAL ABDOMINAL LIGATION     ,   Family History   Problem Relation Age of Onset    Diabetes Mother     Anxiety disorder Mother     Depression Mother     Bipolar disorder Mother     COPD Father     Schizophrenia Father     Schizophrenia Paternal Grandfather     Breast cancer Neg Hx    ,   Social History     Tobacco Use    Smoking status: Every Day     Current packs/day: 1.00     Average packs/day: 1 pack/day for 20.0 years (20.0 ttl pk-yrs)     Types: Cigarettes    Smokeless tobacco: Never   Vaping Use    Vaping status: Never Used   Substance Use Topics    Alcohol use: No     Comment: denies    Drug use: Yes     Types: Marijuana   ,   Medications Prior to Admission   Medication Sig Dispense Refill Last Dose    Cariprazine HCl (Vraylar) 3 MG capsule capsule Take 1 capsule by mouth Daily.   2024    chlorthalidone (HYGROTON) 25 MG tablet Take 1 tablet by mouth Daily.   2024    fexofenadine (ALLEGRA) 180 MG tablet Take 1 tablet by mouth Daily.   2024    FLUoxetine (PROzac) 10 MG capsule Take 1 capsule by mouth Daily.   2024    magnesium oxide (MAG-OX) 400 MG tablet Take 1 tablet by mouth Daily.   2024    metFORMIN ER (GLUCOPHAGE-XR)  500 MG 24 hr tablet Take 2 tablets by mouth 2 (Two) Times a Day.   5/11/2024    metoprolol succinate XL (Toprol XL) 200 MG 24 hr tablet Take 1 tablet by mouth Daily.   5/11/2024   , Scheduled Meds:  Cariprazine HCl, 3 mg, Oral, Daily  cetirizine, 10 mg, Oral, Daily  doxycycline, 100 mg, Intravenous, Q12H  FLUoxetine, 10 mg, Oral, Daily  insulin glargine, 20 Units, Subcutaneous, Daily  insulin lispro, 2-9 Units, Subcutaneous, 4x Daily AC & at Bedtime  ipratropium-albuterol, 3 mL, Nebulization, 4x Daily - RT  methylPREDNISolone sodium succinate, 20 mg, Intravenous, Q12H  micafungin (MYCAMINE) IV, 100 mg, Intravenous, Q24H  mupirocin, 1 application , Each Nare, BID  nicotine, 1 patch, Transdermal, Q24H  piperacillin-tazobactam, 3.375 g, Intravenous, Q8H  sodium chloride, 500 mL, Intravenous, Once  sodium chloride, 10 mL, Intravenous, Q12H  sodium chloride, 10 mL, Intravenous, Q12H  sodium chloride, 10 mL, Intravenous, Q12H    , Continuous Infusions:      and Allergies:  Imuran [azathioprine]    Objective     Vital Signs   Temp:  [97.5 °F (36.4 °C)-98.5 °F (36.9 °C)] 98.1 °F (36.7 °C)  Heart Rate:  [] 89  Resp:  [10-31] 18  BP: (109-149)/(65-97) 130/84    Physical Exam:             General-not in any distress    HEENT-atraumatic  Neck-supple    Respiratory-\not in any respiratory distress    Cardiovascular-NSR  GI-grossly normal    CNS-nonfocal    Musculoskeletal -no edema  Extremities- no obvious deformity noticed     Psychiatric-, alert awake oriented  Skin- no visible rash                                                                   Results Review:    LABS:    Lab Results   Component Value Date    GLUCOSE 320 (H) 05/20/2024    BUN 15 05/20/2024    CREATININE 1.12 (H) 05/20/2024    EGFRIFNONA 93 08/17/2020    BCR 13.4 05/20/2024    CO2 18.2 (L) 05/20/2024    CALCIUM 8.3 (L) 05/20/2024    ALBUMIN 2.7 (L) 05/20/2024    LABIL2 1.4 (L) 07/06/2015    AST 20 05/20/2024    ALT 24 05/20/2024    WBC 4.42 05/20/2024     HGB 9.6 (L) 05/20/2024    HCT 28.8 (L) 05/20/2024    MCV 91.4 05/20/2024     05/20/2024     05/20/2024    K 4.2 05/20/2024     (H) 05/20/2024    ANIONGAP 10.8 05/20/2024       Lab Results   Component Value Date    INR 1.26 (H) 05/17/2024    PROTIME 15.9 (H) 05/17/2024       Results from last 7 days   Lab Units 05/17/24  0036   INR  1.26*          I reviewed the patient's new clinical results.  I reviewed the patient's new imaging results and agree with the interpretation.    Procalitonin Results:      Lab 05/16/24  1034   PROCALCITONIN 0.76*       Latest Reference Range & Units 05/16/24 19:26   pH, Arterial 7.350 - 7.450 pH units 7.467 (H)   pCO2, Arterial 35.0 - 45.0 mm Hg 26.9 (L)   pO2, Arterial 83.0 - 108.0 mm Hg 61.5 (L)   HCO3, Arterial 20.0 - 26.0 mmol/L 19.4 (L)   Base Excess 0.0 - 2.0 mmol/L -3.2 (L)   O2 Saturation, Arterial 94.0 - 99.0 % 94.6   CO2 Content 22 - 33 mmol/L 20.3 (L)   A-a DO2 0.0 - 300.0 mmHg 50.1   Carboxyhemoglobin 0 - 5 % 1.8   Methemoglobin 0.00 - 3.00 % <-0.10 (L)   Oxyhemoglobin 94 - 99 % 93.3 (L)   Hematocrit, Blood Gas 38.0 - 51.0 % 34.4 (L)   Hemoglobin, Blood Gas 13.5 - 17.5 g/dL 11.2 (L)   Site  Right Radial   Jose's Test  N/A   Modality  Room Air   FIO2 % 21   Ventilator Mode  NA   Barometric Pressure for Blood Gas mmHg 723   (H): Data is abnormally high  (L): Data is abnormally low  Microbiology Results (last 10 days)       Procedure Component Value - Date/Time    Blood Culture - Blood, Arm, Left [[id number]]  (Normal) Collected: 05/18/24 1333    Lab Status: Preliminary result Specimen: Blood from Arm, Left Updated: 05/20/24 1346     Blood Culture No growth at 2 days    Blood Culture - Blood, Hand, Right [[id number]]  (Normal) Collected: 05/18/24 1257    Lab Status: Preliminary result Specimen: Blood from Hand, Right Updated: 05/20/24 1346     Blood Culture No growth at 2 days    MRSA Screen, PCR (Inpatient) - Swab, Nares [[id number]]  (Normal) Collected:  05/18/24 0847    Lab Status: Final result Specimen: Swab from Nares Updated: 05/18/24 1010     MRSA PCR No MRSA Detected    Narrative:      The negative predictive value of this diagnostic test is high and should only be used to consider de-escalating anti-MRSA therapy. A positive result may indicate colonization with MRSA and must be correlated clinically.    Respiratory Panel PCR w/COVID-19(SARS-CoV-2) SYED/JHONATHAN/TRUMAN/PAD/COR/KETURAH In-House, NP Swab in UTM/VTM, 2 HR TAT - Swab, Nasopharynx [[id number]]  (Normal) Collected: 05/16/24 1855    Lab Status: Final result Specimen: Swab from Nasopharynx Updated: 05/16/24 1947     ADENOVIRUS, PCR Not Detected     Coronavirus 229E Not Detected     Coronavirus HKU1 Not Detected     Coronavirus NL63 Not Detected     Coronavirus OC43 Not Detected     COVID19 Not Detected     Human Metapneumovirus Not Detected     Human Rhinovirus/Enterovirus Not Detected     Influenza A PCR Not Detected     Influenza B PCR Not Detected     Parainfluenza Virus 1 Not Detected     Parainfluenza Virus 2 Not Detected     Parainfluenza Virus 3 Not Detected     Parainfluenza Virus 4 Not Detected     RSV, PCR Not Detected     Bordetella pertussis pcr Not Detected     Bordetella parapertussis PCR Not Detected     Chlamydophila pneumoniae PCR Not Detected     Mycoplasma pneumo by PCR Not Detected    Narrative:      In the setting of a positive respiratory panel with a viral infection PLUS a negative procalcitonin without other underlying concern for bacterial infection, consider observing off antibiotics or discontinuation of antibiotics and continue supportive care. If the respiratory panel is positive for atypical bacterial infection (Bordetella pertussis, Chlamydophila pneumoniae, or Mycoplasma pneumoniae), consider antibiotic de-escalation to target atypical bacterial infection.    Blood Culture - Blood, Hand, Right [[id number]]  (Normal) Collected: 05/15/24 0827    Lab Status: Final result Specimen: Blood  from Hand, Right Updated: 05/20/24 0845     Blood Culture No growth at 5 days    Blood Culture - Blood, Arm, Left [[id number]]  (Normal) Collected: 05/15/24 0811    Lab Status: Final result Specimen: Blood from Arm, Left Updated: 05/20/24 0845     Blood Culture No growth at 5 days    COVID PRE-OP / PRE-PROCEDURE SCREENING ORDER (NO ISOLATION) - Swab, Nasopharynx [[id number]]  (Normal) Collected: 05/15/24 0755    Lab Status: Final result Specimen: Swab from Nasopharynx Updated: 05/15/24 0909    Narrative:      The following orders were created for panel order COVID PRE-OP / PRE-PROCEDURE SCREENING ORDER (NO ISOLATION) - Swab, Nasopharynx.  Procedure                               Abnormality         Status                     ---------                               -----------         ------                     COVID-19 and FLU A/B PCR...[[id number]]  Normal              Final result                 Please view results for these tests on the individual orders.    COVID-19 and FLU A/B PCR, 1 HR TAT - Swab, Nasopharynx [[id number]]  (Normal) Collected: 05/15/24 0755    Lab Status: Final result Specimen: Swab from Nasopharynx Updated: 05/15/24 0909     COVID19 Not Detected     Influenza A PCR Not Detected     Influenza B PCR Not Detected    Narrative:      Fact sheet for providers: https://www.fda.gov/media/819253/download    Fact sheet for patients: https://www.fda.gov/media/834573/download    Test performed by PCR.    Blood Culture - Blood, Arm, Right [[id number]]  (Normal) Collected: 05/12/24 2326    Lab Status: Final result Specimen: Blood from Arm, Right Updated: 05/17/24 2345     Blood Culture No growth at 5 days    Blood Culture - Blood, Hand, Left [[id number]]  (Normal) Collected: 05/12/24 2325    Lab Status: Final result Specimen: Blood from Hand, Left Updated: 05/17/24 2345     Blood Culture No growth at 5 days    Blood Culture - Blood, Arm, Left [[id number]]  (Normal) Collected: 05/12/24 2021    Lab Status: Final  result Specimen: Blood from Arm, Left Updated: 05/17/24 2030     Blood Culture No growth at 5 days    Urine Culture - Urine, Urine, Clean Catch [[id number]]  (Normal) Collected: 05/12/24 1648    Lab Status: Final result Specimen: Urine, Clean Catch Updated: 05/14/24 1033     Urine Culture No growth           Assessment & Plan     Neurology-alert awake oriented no active issues.  Continue to monitor mental status.      Respiratory- Acute hypoxic respiratory failure-FiO2 requirement reviewed and adjusted to maintain saturation 88 to 92%.      CT chest reviewed-shows bilateral pulmonary infiltrates likely due to  Aspiration pneumonia and/or pulmonary edema.     Latest-chest x-ray  showed left-sided pleural effusion.  Overall   pulmonary opacities seems to be improving.  Will give low-dose diuretics.  Fluids discontinued.  Creatinine stable.  Will get BMP today and tomorrow.       Will decrease steroids further.    VBG reviewed.  Stable.  Continue current antibiotics.    Patient does have a history of asthma-and responded well to steroids    Continue Solu-Medrol.  Doses reviewed and adjusted  Continue DuoNebs    Creatinine stable-if continues to remain stable will discontinue fluids tomorrow.    Cardiology- hemodynamically -stable but blood pressure is borderline low.  Continue to monitor.  If blood pressure goes lower consider giving albumin.  Continue to monitor HR- rate and rhythm, BP     Nephrology- Cr and BUN stable  I/O-reviewed    GI- PPI prophylaxis  Aspiration precautions    Hematology- CBC  Hb transfuse for hemoglobin less than 7  platelet  WBC    ID  Culture  And Antibiotics    Endrocrinology- Maintain Blood sugar 140 -180  TSH Results:      Lab 05/16/24  1034   TSH 1.840        Electrolytes-   Mag and phos       DVT prophylaxis-    Bedside rounds were done with RT and patient's nurse. All the lab and clinical findings were discussed with them and plan was also discussed in great detail.        Echo-  Results  for orders placed during the hospital encounter of 05/12/24    Adult Transthoracic Echo Complete W/ Cont if Necessary Per Protocol    Interpretation Summary  Images from the original result were not included.      Normal left ventricular cavity size and wall thickness noted. All left ventricular wall segments contract normally.    Left ventricular ejection fraction appears to be 61 - 65%.    Left ventricular diastolic function is consistent with (grade II w/high LAP) pseudonormalization.    There is tethering of both anterior and posterior mitral leaflets with reverse hockey-stick appearance of the anterior mitral leaflet suspicious for rheumatic mitral valve disease.    There is moderate, bileaflet mitral valve thickening present at the tip of the leaflets (commissure).  Tiera score of 8    MV max PG 22.3 mmHg MV mean PG 14 mmHg MV V2 VTI 69.8 cm MVA(VTI) 1.08 cm2 MV dec slope 687 cm/sec2 MV dec time 0.39 sec at a heart rate of about 95 bpm.    Moderate to severe mitral valve stenosis is present. The calculated mitral valve Broderick' score is 7.    Moderate mitral valve regurgitation is present with a centrally-directed jet noted. The calculated mitral valve morphology score is 7.    The aortic valve was poorly visualized but appears trileaflet. Mild aortic valve regurgitation is present. No aortic valve stenosis is present.    Tricuspid valve not well visualized. Mild to moderate tricuspid valve regurgitation is present. Estimated right ventricular systolic pressure from tricuspid regurgitation is markedly elevated (>55 mmHg).    Severe pulmonary hypertension is present.    The IVC is mildly dilated with partial collapse indicator of right atrial pressures of about 15 mmHg.    There is no evidence of pericardial effusion. .    Comments: The peak and mean gradients across the mitral valve seem to be about the same as on the transthoracic echo back in August of 2020 given the heart rate in the 90s although the  mitral valve stenosis seem to have improved on the DONNA done at that time with improvement in the heart rate.  The degree of mitral regurgitation seem to have gotten worse.  Hence would consider repeating the study with focus on the mitral valve after improving the heart rate.  If patient still has significantly elevated gradients across the mitral valve, may have to consider referral to [facility] for possible mitral valvuloplasty.           Acute pancreatitis          Edmundo Zapata MD  05/21/24  13:40 EDT

## 2024-05-21 NOTE — PLAN OF CARE
Goal Outcome Evaluation:              Outcome Evaluation: Pt A/Ox4. RA. NSR on monitor. PRN Oxycodone x1 this shfit. Afebrile. Adequate UOP. VSS. No complaints throughout shift. Bed in lowest position with wheels locked. Bed alarm set and call light within reach. Plan of care continued.

## 2024-05-21 NOTE — CONSULTS
Date of Admit: 5/12/2024  Date of Consult: 05/21/24  No ref. 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  Increasing dyspnea, rule out acute HFpEF  Severe pulmonary hypertension, probably from mitral valvular disease.  Recurrent pancreatitis.    Recommendations     With regards to her mitral stenosis, will reevaluate the mitral valve area after her heart rate is controlled adequately  Check proBNP and consider IV diuretics as needed  Add a beta-blocker to control her heart rate.  If her mitral valve gradient is still significant with adequate heart rate control then we will have to consider referral to [facility] services for consideration of mitral valvuloplasty versus mitral valve replacement.    Reason for consultation: Mitral valve stenosis and pulmonary hypertension    Subjective       Subjective     History of Present Illness    Manda Al is a 43-year-old female with a past medical history significant for anxiety, depression, bipolar disorder, asthma, Crohn's disease, hypertension, schizoaffective disorder.  Patient initially presented to [facility] on 5/12/2024 with complaints of epigastric abdominal pain, nausea and vomiting.  She was found to have acute pancreatitis.  Cardiology was consulted for abnormal echocardiogram moderate to.  Moderate to severe mitral valve stenosis, moderate mitral valve regurgitation and severe pulmonary hypertension.  Patient states she had rheumatic fever as a child.  Did have echocardiogram in 2020 which showed moderate mitral valve stenosis.  States has been having episodes of recurrent pancreatitis.  On admission she was noted to have fever but this has since resolved.  Reports shortness of breath today and is mildly tachycardic.  Did have tick bite recently and is being treated for this.  She states that when she does not have episodes of  pancreatitis she feels overall well and her breathing is stable.  Denies any chest pain.  Has been smoking 1 pack of cigarettes a day for the last 30 years.  Recent echocardiogram showed normal LV function.  States she is feeling more short of breath today.    Cardiac risk factors:diabetes mellitus, smoking, and Obesity    Last Echo: 5/2024    Normal left ventricular cavity size and wall thickness noted. All left ventricular wall segments contract normally.    Left ventricular ejection fraction appears to be 61 - 65%.    Left ventricular diastolic function is consistent with (grade II w/high LAP) pseudonormalization.    There is tethering of both anterior and posterior mitral leaflets with reverse hockey-stick appearance of the anterior mitral leaflet suspicious for rheumatic mitral valve disease.    There is moderate, bileaflet mitral valve thickening present at the tip of the leaflets (commissure).  Tiera score of 8    MV max PG 22.3 mmHg MV mean PG 14 mmHg MV V2 VTI 69.8 cm MVA(VTI) 1.08 cm2 MV dec slope 687 cm/sec2 MV dec time 0.39 sec at a heart rate of about 95 bpm.    Moderate to severe mitral valve stenosis is present. The calculated mitral valve Broderick' score is 7.    Moderate mitral valve regurgitation is present with a centrally-directed jet noted. The calculated mitral valve morphology score is 7.    The aortic valve was poorly visualized but appears trileaflet. Mild aortic valve regurgitation is present. No aortic valve stenosis is present.    Tricuspid valve not well visualized. Mild to moderate tricuspid valve regurgitation is present. Estimated right ventricular systolic pressure from tricuspid regurgitation is markedly elevated (>55 mmHg).    Severe pulmonary hypertension is present.    The IVC is mildly dilated with partial collapse indicator of right atrial pressures of about 15 mmHg.    There is no evidence of pericardial effusion. .    Comments: The peak and mean gradients across the mitral  valve seem to be about the same as on the transthoracic echo back in 2020 given the heart rate in the 90s although the mitral valve stenosis seem to have improved on the DONNA done at that time with improvement in the heart rate.  The degree of mitral regurgitation seem to have gotten worse.  Hence would consider repeating the study with focus on the mitral valve after improving the heart rate.  If patient still has significantly elevated gradients across the mitral valve, may have to consider referral to [facility] for possible mitral valvuloplasty.    Past Medical History:   Diagnosis Date    Anxiety     Asthma     Bipolar disorder     Crohn disease     Depression     Hypertension     Schizoaffective disorder      Past Surgical History:   Procedure Laterality Date    APPENDECTOMY       SECTION      CHOLECYSTECTOMY      COLON SURGERY      COLONOSCOPY      MANDIBLE FRACTURE SURGERY      OVARIAN CYST SURGERY      TUBAL ABDOMINAL LIGATION       Family History   Problem Relation Age of Onset    Diabetes Mother     Anxiety disorder Mother     Depression Mother     Bipolar disorder Mother     COPD Father     Schizophrenia Father     Schizophrenia Paternal Grandfather     Breast cancer Neg Hx      Social History     Tobacco Use    Smoking status: Every Day     Current packs/day: 1.00     Average packs/day: 1 pack/day for 20.0 years (20.0 ttl pk-yrs)     Types: Cigarettes    Smokeless tobacco: Never   Vaping Use    Vaping status: Never Used   Substance Use Topics    Alcohol use: No     Comment: denies    Drug use: Yes     Types: Marijuana     Medications Prior to Admission   Medication Sig Dispense Refill Last Dose    Cariprazine HCl (Vraylar) 3 MG capsule capsule Take 1 capsule by mouth Daily.   2024    chlorthalidone (HYGROTON) 25 MG tablet Take 1 tablet by mouth Daily.   2024    fexofenadine (ALLEGRA) 180 MG tablet Take 1 tablet by mouth Daily.   2024    FLUoxetine (PROzac) 10 MG  capsule Take 1 capsule by mouth Daily.   5/11/2024    magnesium oxide (MAG-OX) 400 MG tablet Take 1 tablet by mouth Daily.   5/11/2024    metFORMIN ER (GLUCOPHAGE-XR) 500 MG 24 hr tablet Take 2 tablets by mouth 2 (Two) Times a Day.   5/11/2024    metoprolol succinate XL (Toprol XL) 200 MG 24 hr tablet Take 1 tablet by mouth Daily.   5/11/2024     Allergies:  Imuran [azathioprine]    Review of Systems   Constitutional:  Positive for fever. Negative for fatigue.   HENT:  Negative for congestion and sinus pressure.    Eyes:  Negative for photophobia and visual disturbance.   Respiratory:  Positive for shortness of breath. Negative for chest tightness.    Cardiovascular:  Negative for chest pain.   Gastrointestinal:  Negative for abdominal pain and nausea.   Endocrine: Negative for polyphagia and polyuria.   Genitourinary:  Negative for hematuria.   Musculoskeletal:  Negative for back pain and neck pain.   Skin:  Negative for rash and wound.   Allergic/Immunologic: Negative for food allergies and immunocompromised state.   Neurological:  Negative for dizziness and light-headedness.   Hematological:  Negative for adenopathy. Does not bruise/bleed easily.   Psychiatric/Behavioral:  Negative for confusion.        Objective       Objective      Vital Signs  Temp:  [97.5 °F (36.4 °C)-98.5 °F (36.9 °C)] 97.8 °F (36.6 °C)  Heart Rate:  [] 72  Resp:  [10-31] 20  BP: (105-141)/(41-92) 126/86  Vital Signs (last 72 hrs)         05/18 0700  05/19 0659 05/19 0700  05/20 0659 05/20 0700  05/21 0659 05/21 0700  05/21 0848   Most Recent      Temp (°F) 97.7 -  98.6    97.7 -  98.7    97.5 -  98.5      97.8     97.8 (36.6) 05/21 0800    Heart Rate 63 -  108    67 -  98    74 -  102      72     72 05/21 0754    Resp 11 -  24    14 -  26    10 -  31      20     20 05/21 0754    BP 76/48 -  117/97    97/66 -  140/82    105/41 -  141/90       126/86 05/21 0600    SpO2 (%) 86 -  100    90 -  100    94 -  100      97     97 05/21 0754     Flow (L/min) 2 -  5      3         3 05/19 0800          Body mass index is 35.5 kg/m².  Documented weights    05/12/24 1556 05/12/24 2036 05/14/24 0500 05/15/24 0500   Weight: 97.1 kg (214 lb) 96.4 kg (212 lb 8.4 oz) 99.6 kg (219 lb 9.6 oz) 100 kg (220 lb 6.4 oz)    05/16/24 0300 05/17/24 0400 05/18/24 0400 05/19/24 0600   Weight: 101 kg (221 lb 14.4 oz) 106 kg (233 lb 7.5 oz) 105 kg (231 lb 14.8 oz) 104 kg (230 lb)    05/20/24 0400 05/21/24 0400   Weight: 105 kg (231 lb 7.7 oz) 106 kg (233 lb 7.5 oz)            Intake/Output Summary (Last 24 hours) at 5/21/2024 0848  Last data filed at 5/21/2024 0800  Gross per 24 hour   Intake 3468 ml   Output --   Net 3468 ml     Physical Exam  Constitutional:       General: She is not in acute distress.     Appearance: Normal appearance. She is well-developed and normal weight.   HENT:      Head: Normocephalic and atraumatic.   Eyes:      General: Lids are normal.      Conjunctiva/sclera: Conjunctivae normal.      Pupils: Pupils are equal, round, and reactive to light.   Neck:      Vascular: No carotid bruit or JVD.   Cardiovascular:      Rate and Rhythm: Normal rate and regular rhythm.      Pulses: Normal pulses.           Dorsalis pedis pulses are 1+ on the right side and 1+ on the left side.        Posterior tibial pulses are 1+ on the right side and 1+ on the left side.      Heart sounds: S1 normal and S2 normal. No murmur (Mid diastolic murmur noted and grade 3/6 holosystolic murmur at LV apex noted) heard.     No diastolic murmur is present.      Comments: Rule out for started on an pulmonary competent of the second heart sound noted  Pulmonary:      Effort: Pulmonary effort is normal. No respiratory distress.      Breath sounds: Rales (right base) present. No wheezing.   Abdominal:      General: Bowel sounds are normal. There is no distension.      Palpations: Abdomen is soft. There is no hepatomegaly or splenomegaly.      Tenderness: There is no abdominal tenderness.    Musculoskeletal:         General: No swelling. Normal range of motion.      Cervical back: Normal range of motion and neck supple.      Right lower leg: Edema (minimal) present.      Left lower leg: Edema (minimal) present.   Skin:     General: Skin is warm and dry.      Coloration: Skin is not jaundiced.      Findings: No rash.   Neurological:      General: No focal deficit present.      Mental Status: She is alert and oriented to person, place, and time. Mental status is at baseline.   Psychiatric:         Mood and Affect: Mood normal.         Speech: Speech normal.         Behavior: Behavior normal.         Thought Content: Thought content normal.         Judgment: Judgment normal.         Results review     Results Review:    I reviewed the patient's new clinical results.      Results from last 7 days   Lab Units 05/20/24  0209 05/19/24  0031 05/18/24  0021 05/17/24  0036 05/16/24  0221 05/15/24  0827   WBC 10*3/mm3 4.42 4.02 4.06 3.52 3.70 5.15   HEMOGLOBIN g/dL 9.6* 9.4* 9.8* 11.1* 12.7 12.3   PLATELETS 10*3/mm3 169 111* 103* 109* 151 181     Results from last 7 days   Lab Units 05/20/24  0209 05/19/24  0031 05/18/24  1036 05/18/24  0021 05/17/24  1221 05/17/24  0036 05/16/24  1034 05/16/24  0221 05/15/24  0827   SODIUM mmol/L 140 138  --  140 135* 128*  --  133* 131*   POTASSIUM mmol/L 4.2 4.1 3.9 3.5 4.0 3.4*  --  4.3 3.7   CHLORIDE mmol/L 111* 110*  --  110* 105 99  --  101 100   CO2 mmol/L 18.2* 17.7*  --  20.9* 20.0* 18.2*  --  21.5* 19.7*   BUN mg/dL 15 12  --  10 6 5*  --  6 3*   CREATININE mg/dL 1.12* 1.12*  --  1.14* 0.99 1.00  --  1.15* 1.14*   CALCIUM mg/dL 8.3* 7.9*  --  7.5* 7.5* 7.3*  --  7.9* 8.2*   GLUCOSE mg/dL 320* 294*  --  159* 152* 116*  --  133* 163*   ALT (SGPT) U/L 24 30  --  36*  --  44* 40*  --  17   AST (SGOT) U/L 20 30  --  55*  --  87* 93*  --  32     Lab Results   Component Value Date    INR 1.26 (H) 05/17/2024     Lab Results   Component Value Date    MG 2.9 (H) 05/13/2024     MG 1.7 05/12/2024    MG 1.7 05/07/2024     Lab Results   Component Value Date    TSH 1.840 05/16/2024      Lab Results   Component Value Date    PROBNP 3,619.0 (H) 08/14/2020        Narrative:      Images from the original result were not included.      Normal left ventricular cavity size and wall thickness noted. All left   ventricular wall segments contract normally.    Left ventricular ejection fraction appears to be 61 - 65%.    Left ventricular diastolic function is consistent with (grade II w/high   LAP) pseudonormalization.    There is tethering of both anterior and posterior mitral leaflets with   reverse hockey-stick appearance of the anterior mitral leaflet suspicious   for rheumatic mitral valve disease.    There is moderate, bileaflet mitral valve thickening present at the tip   of the leaflets (commissure).  Tiera score of 8    MV max PG 22.3 mmHg MV mean PG 14 mmHg MV V2 VTI 69.8 cm MVA(VTI) 1.08   cm2 MV dec slope 687 cm/sec2 MV dec time 0.39 sec at a heart rate of about   95 bpm.    Moderate to severe mitral valve stenosis is present. The calculated   mitral valve Broderick' score is 7.    Moderate mitral valve regurgitation is present with a   centrally-directed jet noted. The calculated mitral valve morphology score   is 7.    The aortic valve was poorly visualized but appears trileaflet. Mild   aortic valve regurgitation is present. No aortic valve stenosis is   present.    Tricuspid valve not well visualized. Mild to moderate tricuspid valve   regurgitation is present. Estimated right ventricular systolic pressure   from tricuspid regurgitation is markedly elevated (>55 mmHg).    Severe pulmonary hypertension is present.    The IVC is mildly dilated with partial collapse indicator of right   atrial pressures of about 15 mmHg.    There is no evidence of pericardial effusion. .    Comments: The peak and mean gradients across the mitral valve seem to   be about the same as on the transthoracic echo  back in August of 2020   given the heart rate in the 90s although the mitral valve stenosis seem to   have improved on the DONNA done at that time with improvement in the heart   rate.  The degree of mitral regurgitation seem to have gotten worse.    Hence would consider repeating the study with focus on the mitral valve   after improving the heart rate.  If patient still has significantly   elevated gradients across the mitral valve, may have to consider referral   to [facility] for possible mitral valvuloplasty.                    Telemetry Scan [[id number]] Resulted: 05/12/24     Updated: 05/20/24 1612    Telemetry Scan [[id number]] Resulted: 05/12/24     Updated: 05/20/24 1918    Telemetry Scan [[id number]] Resulted: 05/12/24     Updated: 05/20/24 1918    Telemetry Scan [[id number]] Resulted: 05/12/24     Updated: 05/20/24 2103    Telemetry Scan [[id number]] Resulted: 05/12/24     Updated: 05/21/24 0205            Imaging Results (Last 72 Hours)       Procedure Component Value Units Date/Time    XR Chest 1 View [[id number]] Collected: 05/20/24 0831     Updated: 05/20/24 0834    Narrative:      VERIFICATION OBSERVER NAME: Uday Hansen MD.     TECHNIQUE, HISTORY, FINDINGS:      Comparison: 5/18/2024.     History and Findings: Pleural effusion.     Bilateral interstitial infiltrates, slightly diminished on the RIGHT,  unchanged on the LEFT.  Trace LEFT pleural effusion.  No RIGHT pleural effusion.  Minimal cardiac enlargement.  Central airways are patent.       Impression:      Trace LEFT pleural effusion, perhaps slightly more prominent than prior.              [id number]     Zip code: [id number]                 This report was finalized on 5/20/2024 8:31 AM by Dr. Uday Hansen MD.               I have discussed my impression and recommendations with the patient and family.    Thank you very much for asking us to be involved in this patient's care.  We will follow along with you.      Electronically signed by Gabriella  DIEGO Osorio, 05/21/24, 8:48 AM EDT.     Please note that portions of this note were completed with a voice recognition program.

## 2024-05-21 NOTE — PROGRESS NOTES
Pulmonary and critical care consultation note  Referring Provider:   Reason for Consultation:       Chief complaint shortness of breath-      Sub-   Overnight events reviewed.  All the labs medications ins and outs and vitals reviewed.  Patient resting in bed comfortably not in any distress.  Chest x-ray from today morning reviewed  Review of Systems  Positive for respiratory otherwise negative        History  Past Medical History:   Diagnosis Date    Anxiety     Asthma     Bipolar disorder     Crohn disease     Depression     Hypertension     Schizoaffective disorder    ,   Past Surgical History:   Procedure Laterality Date    APPENDECTOMY       SECTION      CHOLECYSTECTOMY      COLON SURGERY      COLONOSCOPY      MANDIBLE FRACTURE SURGERY      OVARIAN CYST SURGERY      TUBAL ABDOMINAL LIGATION     ,   Family History   Problem Relation Age of Onset    Diabetes Mother     Anxiety disorder Mother     Depression Mother     Bipolar disorder Mother     COPD Father     Schizophrenia Father     Schizophrenia Paternal Grandfather     Breast cancer Neg Hx    ,   Social History     Tobacco Use    Smoking status: Every Day     Current packs/day: 1.00     Average packs/day: 1 pack/day for 20.0 years (20.0 ttl pk-yrs)     Types: Cigarettes    Smokeless tobacco: Never   Vaping Use    Vaping status: Never Used   Substance Use Topics    Alcohol use: No     Comment: denies    Drug use: Yes     Types: Marijuana   ,   Medications Prior to Admission   Medication Sig Dispense Refill Last Dose    Cariprazine HCl (Vraylar) 3 MG capsule capsule Take 1 capsule by mouth Daily.   2024    chlorthalidone (HYGROTON) 25 MG tablet Take 1 tablet by mouth Daily.   2024    fexofenadine (ALLEGRA) 180 MG tablet Take 1 tablet by mouth Daily.   2024    FLUoxetine (PROzac) 10 MG capsule Take 1 capsule by mouth Daily.   2024    magnesium oxide (MAG-OX) 400 MG tablet Take 1 tablet by mouth Daily.   2024    metFORMIN ER  (GLUCOPHAGE-XR) 500 MG 24 hr tablet Take 2 tablets by mouth 2 (Two) Times a Day.   5/11/2024    metoprolol succinate XL (Toprol XL) 200 MG 24 hr tablet Take 1 tablet by mouth Daily.   5/11/2024   , Scheduled Meds:  Cariprazine HCl, 3 mg, Oral, Daily  cetirizine, 10 mg, Oral, Daily  doxycycline, 100 mg, Intravenous, Q12H  FLUoxetine, 10 mg, Oral, Daily  [START ON 5/21/2024] insulin glargine, 20 Units, Subcutaneous, Daily  insulin lispro, 2-9 Units, Subcutaneous, 4x Daily AC & at Bedtime  ipratropium-albuterol, 3 mL, Nebulization, 4x Daily - RT  methylPREDNISolone sodium succinate, 20 mg, Intravenous, Q12H  micafungin (MYCAMINE) IV, 100 mg, Intravenous, Q24H  mupirocin, 1 application , Each Nare, BID  nicotine, 1 patch, Transdermal, Q24H  piperacillin-tazobactam, 3.375 g, Intravenous, Q8H  sodium chloride, 500 mL, Intravenous, Once  sodium chloride, 10 mL, Intravenous, Q12H  sodium chloride, 10 mL, Intravenous, Q12H  sodium chloride, 10 mL, Intravenous, Q12H    , Continuous Infusions:  sodium chloride, 100 mL/hr, Last Rate: 100 mL/hr (05/20/24 2047)     and Allergies:  Imuran [azathioprine]    Objective     Vital Signs   Temp:  [97.5 °F (36.4 °C)-98.7 °F (37.1 °C)] 97.9 °F (36.6 °C)  Heart Rate:  [] 96  Resp:  [10-27] 22  BP: (104-133)/(41-92) 119/71    Physical Exam:             General-not in any distress    HEENT-atraumatic  Neck-supple    Respiratory-\not in any respiratory distress    Cardiovascular-NSR  GI-grossly normal    CNS-nonfocal    Musculoskeletal -no edema  Extremities- no obvious deformity noticed     Psychiatric-, alert awake oriented  Skin- no visible rash                                                                   Results Review:    LABS:    Lab Results   Component Value Date    GLUCOSE 320 (H) 05/20/2024    BUN 15 05/20/2024    CREATININE 1.12 (H) 05/20/2024    EGFRIFNONA 93 08/17/2020    BCR 13.4 05/20/2024    CO2 18.2 (L) 05/20/2024    CALCIUM 8.3 (L) 05/20/2024    ALBUMIN 2.7 (L)  05/20/2024    LABIL2 1.4 (L) 07/06/2015    AST 20 05/20/2024    ALT 24 05/20/2024    WBC 4.42 05/20/2024    HGB 9.6 (L) 05/20/2024    HCT 28.8 (L) 05/20/2024    MCV 91.4 05/20/2024     05/20/2024     05/20/2024    K 4.2 05/20/2024     (H) 05/20/2024    ANIONGAP 10.8 05/20/2024       Lab Results   Component Value Date    INR 1.26 (H) 05/17/2024    PROTIME 15.9 (H) 05/17/2024       Results from last 7 days   Lab Units 05/17/24  0036   INR  1.26*          I reviewed the patient's new clinical results.  I reviewed the patient's new imaging results and agree with the interpretation.    Procalitonin Results:      Lab 05/16/24  1034   PROCALCITONIN 0.76*       Latest Reference Range & Units 05/16/24 19:26   pH, Arterial 7.350 - 7.450 pH units 7.467 (H)   pCO2, Arterial 35.0 - 45.0 mm Hg 26.9 (L)   pO2, Arterial 83.0 - 108.0 mm Hg 61.5 (L)   HCO3, Arterial 20.0 - 26.0 mmol/L 19.4 (L)   Base Excess 0.0 - 2.0 mmol/L -3.2 (L)   O2 Saturation, Arterial 94.0 - 99.0 % 94.6   CO2 Content 22 - 33 mmol/L 20.3 (L)   A-a DO2 0.0 - 300.0 mmHg 50.1   Carboxyhemoglobin 0 - 5 % 1.8   Methemoglobin 0.00 - 3.00 % <-0.10 (L)   Oxyhemoglobin 94 - 99 % 93.3 (L)   Hematocrit, Blood Gas 38.0 - 51.0 % 34.4 (L)   Hemoglobin, Blood Gas 13.5 - 17.5 g/dL 11.2 (L)   Site  Right Radial   Jose's Test  N/A   Modality  Room Air   FIO2 % 21   Ventilator Mode  NA   Barometric Pressure for Blood Gas mmHg 723   (H): Data is abnormally high  (L): Data is abnormally low  Microbiology Results (last 10 days)       Procedure Component Value - Date/Time    Blood Culture - Blood, Arm, Left [[id number]]  (Normal) Collected: 05/18/24 1333    Lab Status: Preliminary result Specimen: Blood from Arm, Left Updated: 05/20/24 1346     Blood Culture No growth at 2 days    Blood Culture - Blood, Hand, Right [[id number]]  (Normal) Collected: 05/18/24 1257    Lab Status: Preliminary result Specimen: Blood from Hand, Right Updated: 05/20/24 1346     Blood  Culture No growth at 2 days    MRSA Screen, PCR (Inpatient) - Swab, Nares [[id number]]  (Normal) Collected: 05/18/24 0847    Lab Status: Final result Specimen: Swab from Nares Updated: 05/18/24 1010     MRSA PCR No MRSA Detected    Narrative:      The negative predictive value of this diagnostic test is high and should only be used to consider de-escalating anti-MRSA therapy. A positive result may indicate colonization with MRSA and must be correlated clinically.    Respiratory Panel PCR w/COVID-19(SARS-CoV-2) SYED/JHONATHAN/TRUMAN/PAD/COR/KETURAH In-House, NP Swab in UTM/VTM, 2 HR TAT - Swab, Nasopharynx [[id number]]  (Normal) Collected: 05/16/24 1855    Lab Status: Final result Specimen: Swab from Nasopharynx Updated: 05/16/24 1947     ADENOVIRUS, PCR Not Detected     Coronavirus 229E Not Detected     Coronavirus HKU1 Not Detected     Coronavirus NL63 Not Detected     Coronavirus OC43 Not Detected     COVID19 Not Detected     Human Metapneumovirus Not Detected     Human Rhinovirus/Enterovirus Not Detected     Influenza A PCR Not Detected     Influenza B PCR Not Detected     Parainfluenza Virus 1 Not Detected     Parainfluenza Virus 2 Not Detected     Parainfluenza Virus 3 Not Detected     Parainfluenza Virus 4 Not Detected     RSV, PCR Not Detected     Bordetella pertussis pcr Not Detected     Bordetella parapertussis PCR Not Detected     Chlamydophila pneumoniae PCR Not Detected     Mycoplasma pneumo by PCR Not Detected    Narrative:      In the setting of a positive respiratory panel with a viral infection PLUS a negative procalcitonin without other underlying concern for bacterial infection, consider observing off antibiotics or discontinuation of antibiotics and continue supportive care. If the respiratory panel is positive for atypical bacterial infection (Bordetella pertussis, Chlamydophila pneumoniae, or Mycoplasma pneumoniae), consider antibiotic de-escalation to target atypical bacterial infection.    Blood Culture -  Blood, Hand, Right [[id number]]  (Normal) Collected: 05/15/24 0827    Lab Status: Final result Specimen: Blood from Hand, Right Updated: 05/20/24 0845     Blood Culture No growth at 5 days    Blood Culture - Blood, Arm, Left [[id number]]  (Normal) Collected: 05/15/24 0811    Lab Status: Final result Specimen: Blood from Arm, Left Updated: 05/20/24 0845     Blood Culture No growth at 5 days    COVID PRE-OP / PRE-PROCEDURE SCREENING ORDER (NO ISOLATION) - Swab, Nasopharynx [[id number]]  (Normal) Collected: 05/15/24 0755    Lab Status: Final result Specimen: Swab from Nasopharynx Updated: 05/15/24 0909    Narrative:      The following orders were created for panel order COVID PRE-OP / PRE-PROCEDURE SCREENING ORDER (NO ISOLATION) - Swab, Nasopharynx.  Procedure                               Abnormality         Status                     ---------                               -----------         ------                     COVID-19 and FLU A/B PCR...[[id number]]  Normal              Final result                 Please view results for these tests on the individual orders.    COVID-19 and FLU A/B PCR, 1 HR TAT - Swab, Nasopharynx [[id number]]  (Normal) Collected: 05/15/24 0755    Lab Status: Final result Specimen: Swab from Nasopharynx Updated: 05/15/24 0909     COVID19 Not Detected     Influenza A PCR Not Detected     Influenza B PCR Not Detected    Narrative:      Fact sheet for providers: https://www.fda.gov/media/112197/download    Fact sheet for patients: https://www.fda.gov/media/383706/download    Test performed by PCR.    Blood Culture - Blood, Arm, Right [[id number]]  (Normal) Collected: 05/12/24 2326    Lab Status: Final result Specimen: Blood from Arm, Right Updated: 05/17/24 2345     Blood Culture No growth at 5 days    Blood Culture - Blood, Hand, Left [[id number]]  (Normal) Collected: 05/12/24 2325    Lab Status: Final result Specimen: Blood from Hand, Left Updated: 05/17/24 2345     Blood Culture No growth at 5  days    Blood Culture - Blood, Arm, Left [[id number]]  (Normal) Collected: 05/12/24 2021    Lab Status: Final result Specimen: Blood from Arm, Left Updated: 05/17/24 2030     Blood Culture No growth at 5 days    Urine Culture - Urine, Urine, Clean Catch [[id number]]  (Normal) Collected: 05/12/24 1648    Lab Status: Final result Specimen: Urine, Clean Catch Updated: 05/14/24 1033     Urine Culture No growth           Assessment & Plan     Neurology-alert awake oriented no active issues.  Continue to monitor mental status.      Respiratory- Acute hypoxic respiratory failure-FiO2 requirement reviewed and adjusted to maintain saturation 88 to 92%.      CT chest reviewed-shows bilateral pulmonary infiltrates likely due to  Aspiration pneumonia and/or pulmonary edema.   Chest x-ray from today morning showed left-sided pleural effusion.  Overall pulmonary opacities seems to be improving.         Will decrease steroids further from tomorrow    VBG reviewed.  Stable.  Continue current antibiotics.    Patient does have a history of asthma-and responded well to steroids    Continue Solu-Medrol.  Doses reviewed and adjusted  Continue DuoNebs    Creatinine stable-if continues to remain stable will discontinue fluids tomorrow.    Cardiology- hemodynamically -stable but blood pressure is borderline low.  Continue to monitor.  If blood pressure goes lower consider giving albumin.  Continue to monitor HR- rate and rhythm, BP     Nephrology- Cr and BUN stable  I/O-reviewed    GI- PPI prophylaxis  Aspiration precautions    Hematology- CBC  Hb transfuse for hemoglobin less than 7  platelet  WBC    ID  Culture  And Antibiotics    Endrocrinology- Maintain Blood sugar 140 -180  TSH Results:      Lab 05/16/24  1034   TSH 1.840        Electrolytes-   Mag and phos       DVT prophylaxis-    Bedside rounds were done with RT and patient's nurse. All the lab and clinical findings were discussed with them and plan was also discussed in great  detail.        Echo-  Results for orders placed during the hospital encounter of 05/12/24    Adult Transthoracic Echo Complete W/ Cont if Necessary Per Protocol    Interpretation Summary  Images from the original result were not included.      Normal left ventricular cavity size and wall thickness noted. All left ventricular wall segments contract normally.    Left ventricular ejection fraction appears to be 61 - 65%.    Left ventricular diastolic function is consistent with (grade II w/high LAP) pseudonormalization.    There is tethering of both anterior and posterior mitral leaflets with reverse hockey-stick appearance of the anterior mitral leaflet suspicious for rheumatic mitral valve disease.    There is moderate, bileaflet mitral valve thickening present at the tip of the leaflets (commissure).  Tiera score of 8    MV max PG 22.3 mmHg MV mean PG 14 mmHg MV V2 VTI 69.8 cm MVA(VTI) 1.08 cm2 MV dec slope 687 cm/sec2 MV dec time 0.39 sec at a heart rate of about 95 bpm.    Moderate to severe mitral valve stenosis is present. The calculated mitral valve Broderick' score is 7.    Moderate mitral valve regurgitation is present with a centrally-directed jet noted. The calculated mitral valve morphology score is 7.    The aortic valve was poorly visualized but appears trileaflet. Mild aortic valve regurgitation is present. No aortic valve stenosis is present.    Tricuspid valve not well visualized. Mild to moderate tricuspid valve regurgitation is present. Estimated right ventricular systolic pressure from tricuspid regurgitation is markedly elevated (>55 mmHg).    Severe pulmonary hypertension is present.    The IVC is mildly dilated with partial collapse indicator of right atrial pressures of about 15 mmHg.    There is no evidence of pericardial effusion. .    Comments: The peak and mean gradients across the mitral valve seem to be about the same as on the transthoracic echo back in August of 2020 given the heart rate  in the 90s although the mitral valve stenosis seem to have improved on the DONNA done at that time with improvement in the heart rate.  The degree of mitral regurgitation seem to have gotten worse.  Hence would consider repeating the study with focus on the mitral valve after improving the heart rate.  If patient still has significantly elevated gradients across the mitral valve, may have to consider referral to [facility] for possible mitral valvuloplasty.           Acute pancreatitis          Edmudno Zapata MD  05/20/24  21:27 EDT

## 2024-05-21 NOTE — PROGRESS NOTES
PROGRESS NOTE         Patient Identification:  Name:  Manda Al  Age:  43 y.o.  Sex:  female  :  1980  MRN:  [id number]  Visit Number:  [id number]  Primary Care Provider:  Mi Rivera MD         LOS: 9 days       ----------------------------------------------------------------------------------------------------------------------  Subjective       Chief Complaints:    Flank Pain        Interval History:      The patient is awake and alert, sitting up comfortably in bed.  On room air but complaining of some chest tightness and shortness of breath.  Primary RN made aware.  Afebrile.  No reports of diarrhea.  Denies any nausea or vomiting.  Denies any skin rashes.  The patient states she is overall feeling better.  Lung exam is clear to auscultation bilaterally.  Abdomen soft and rounded, nontender with normoactive bowel sounds.  Midline to the right upper extremity with no evidence of infection or phlebitis.  Blood cultures from  preliminarily reported growth at 2 days.  Transthoracic echo from  does not report any evidence of vegetation but did report appearance of the anterior mitral leaflet suspicious for rheumatic mitral valve disease.  Cardiology is following.  Rickettsial and fungal serologies pending.      Review of Systems:    Constitutional: no fever, chills and night sweats.  Generalized fatigue.  Eyes: no eye drainage, itching or redness.  HEENT: no mouth sores, dysphagia or nose bleed.  Respiratory: no for shortness of breath, cough or production of sputum.  Cardiovascular: no chest pain, no palpitations, no orthopnea.  Gastrointestinal: + nausea, no vomiting or diarrhea. No abdominal pain, hematemesis or rectal bleeding.  Genitourinary: no dysuria or polyuria.  Hematologic/lymphatic: no lymph node abnormalities, no easy bruising or easy bleeding.  Musculoskeletal: no muscle or joint pain.  Skin: No rash and no itching.  Neurological: no loss of consciousness, no  seizure, no headache.  Behavioral/Psych: no depression or suicidal ideation.  Endocrine: no hot flashes.  Immunologic: negative.    ----------------------------------------------------------------------------------------------------------------------      Objective       Current Hospital Meds:  Cariprazine HCl, 3 mg, Oral, Daily  cetirizine, 10 mg, Oral, Daily  doxycycline, 100 mg, Intravenous, Q12H  FLUoxetine, 10 mg, Oral, Daily  insulin glargine, 20 Units, Subcutaneous, Daily  insulin lispro, 2-9 Units, Subcutaneous, 4x Daily AC & at Bedtime  ipratropium-albuterol, 3 mL, Nebulization, 4x Daily - RT  methylPREDNISolone sodium succinate, 20 mg, Intravenous, Q12H  micafungin (MYCAMINE) IV, 100 mg, Intravenous, Q24H  mupirocin, 1 application , Each Nare, BID  nicotine, 1 patch, Transdermal, Q24H  piperacillin-tazobactam, 3.375 g, Intravenous, Q8H  sodium chloride, 500 mL, Intravenous, Once  sodium chloride, 10 mL, Intravenous, Q12H  sodium chloride, 10 mL, Intravenous, Q12H  sodium chloride, 10 mL, Intravenous, Q12H           ----------------------------------------------------------------------------------------------------------------------    Vital Signs:  Temp:  [97.5 °F (36.4 °C)-98.5 °F (36.9 °C)] 98.1 °F (36.7 °C)  Heart Rate:  [] 89  Resp:  [10-31] 18  BP: (109-149)/(65-97) 130/84  Mean Arterial Pressure (Non-Invasive) for the past 24 hrs (Last 3 readings):   Noninvasive MAP (mmHg)   05/21/24 1300 97   05/21/24 1200 111   05/21/24 1100 110     SpO2 Percentage    05/21/24 1200 05/21/24 1300 05/21/24 1314   SpO2: 96% 96% 95%     SpO2:  [94 %-100 %] 95 %  on   ;   Device (Oxygen Therapy): room air    Body mass index is 35.5 kg/m².  Wt Readings from Last 3 Encounters:   05/21/24 106 kg (233 lb 7.5 oz)   05/07/24 97.1 kg (214 lb)   03/19/22 113 kg (250 lb)        Intake/Output Summary (Last 24 hours) at 5/21/2024 1339  Last data filed at 5/21/2024 0800  Gross per 24 hour   Intake 3108 ml   Output --   Net  3108 ml     Diet: Regular/House; Fluid Consistency: Thin (IDDSI 0)  ----------------------------------------------------------------------------------------------------------------------      Physical Exam:    Constitutional:  Well-developed and well-nourished.  On room air, reporting chest tightness and shortness of breath.  RN made aware.  Denies further complaints.  HENT:  Head: Normocephalic and atraumatic.  Mouth:  Moist mucous membranes.    Eyes:  Conjunctivae and EOM are normal.  No scleral icterus.  Neck:  Neck supple.  No JVD present.    Cardiovascular:  Normal rate, regular rhythm and normal heart sounds with no murmur. No edema.  Pulmonary/Chest:  No respiratory distress, no wheezes, no crackles, with normal breath sounds and good air movement.  Abdominal:  Soft.  Bowel sounds are normal.  No distension and no tenderness.   Musculoskeletal:  No edema, no tenderness, and no deformity.  No swelling or redness of joints.  Neurological:  Alert and oriented to person, place, and time.  No facial droop.  No slurred speech.   Skin:  Skin is warm and dry.  No rash noted.  No pallor.  Scabbed area on the left bottom from tick bite.  No surrounding erythema.  No drainage.  Psychiatric:  Normal mood and affect.  Behavior is normal.        ----------------------------------------------------------------------------------------------------------------------  Results from last 7 days   Lab Units 05/21/24  0844   HSTROP T ng/L 6         Results from last 7 days   Lab Units 05/16/24  1926   PH, ARTERIAL pH units 7.467*   PO2 ART mm Hg 61.5*   PCO2, ARTERIAL mm Hg 26.9*   HCO3 ART mmol/L 19.4*     Results from last 7 days   Lab Units 05/20/24  0209 05/19/24  1613 05/19/24  1321 05/19/24  1015 05/19/24  0730 05/19/24  0031 05/18/24  0021 05/17/24  0036 05/16/24  1034   CRP mg/dL  --   --   --   --   --   --  15.07*  --  9.82*   LACTATE mmol/L  --  3.2* 4.4* 3.2*   < >  --   --   --   --    WBC 10*3/mm3 4.42  --   --   --    "--  4.02 4.06 3.52  --    HEMOGLOBIN g/dL 9.6*  --   --   --   --  9.4* 9.8* 11.1*  --    HEMATOCRIT % 28.8*  --   --   --   --  28.5* 29.2* 32.4*  --    MCV fL 91.4  --   --   --   --  92.2 91.0 88.3  --    MCHC g/dL 33.3  --   --   --   --  33.0 33.6 34.3  --    PLATELETS 10*3/mm3 169  --   --   --   --  111* 103* 109*  --    INR   --   --   --   --   --   --   --  1.26*  --     < > = values in this interval not displayed.     Results from last 7 days   Lab Units 05/20/24  0209 05/19/24  0031 05/18/24  1036 05/18/24  0021   SODIUM mmol/L 140 138  --  140   POTASSIUM mmol/L 4.2 4.1 3.9 3.5   CHLORIDE mmol/L 111* 110*  --  110*   CO2 mmol/L 18.2* 17.7*  --  20.9*   BUN mg/dL 15 12  --  10   CREATININE mg/dL 1.12* 1.12*  --  1.14*   CALCIUM mg/dL 8.3* 7.9*  --  7.5*   GLUCOSE mg/dL 320* 294*  --  159*   ALBUMIN g/dL 2.7* 2.5*  --  2.3*   BILIRUBIN mg/dL 0.4 0.5  --  0.5   ALK PHOS U/L 79 85  --  73   AST (SGOT) U/L 20 30  --  55*   ALT (SGPT) U/L 24 30  --  36*   Estimated Creatinine Clearance: 83.4 mL/min (A) (by C-G formula based on SCr of 1.12 mg/dL (H)).  No results found for: \"AMMONIA\"    Glucose   Date/Time Value Ref Range Status   05/21/2024 1117 168 (H) 70 - 130 mg/dL Final   05/21/2024 0634 222 (H) 70 - 130 mg/dL Final   05/20/2024 2146 334 (H) 70 - 130 mg/dL Final   05/20/2024 1653 280 (H) 70 - 130 mg/dL Final   05/20/2024 1119 273 (H) 70 - 130 mg/dL Final   05/20/2024 0636 312 (H) 70 - 130 mg/dL Final   05/19/2024 2147 277 (H) 70 - 130 mg/dL Final   05/19/2024 1616 337 (H) 70 - 130 mg/dL Final     Lab Results   Component Value Date    HGBA1C 6.90 (H) 05/12/2024     Lab Results   Component Value Date    TSH 1.840 05/16/2024       Blood Culture   Date Value Ref Range Status   05/15/2024 No growth at 4 days  Preliminary   05/15/2024 No growth at 4 days  Preliminary   05/12/2024 No growth at 5 days  Final   05/12/2024 No growth at 5 days  Final   05/12/2024 No growth at 5 days  Final     Urine Culture   Date " "Value Ref Range Status   05/12/2024 No growth  Final     No results found for: \"WOUNDCX\"  No results found for: \"STOOLCX\"  No results found for: \"RESPCX\"  Pain Management Panel  More data exists         Latest Ref Rng & Units 8/11/2020 5/9/2017   Pain Management Panel   Amphetamine, Urine Qual Negative Positive  Negative    Barbiturates Screen, Urine Negative Negative  Negative    Benzodiazepine Screen, Urine Negative Negative  Negative    Buprenorphine, Screen, Urine Negative Negative  Negative    Cocaine Screen, Urine Negative Negative  Negative    Methadone Screen , Urine Negative Negative  Negative          ----------------------------------------------------------------------------------------------------------------------  Imaging Results (Last 24 Hours)       ** No results found for the last 24 hours. **            ----------------------------------------------------------------------------------------------------------------------    Pertinent Infectious Disease Results                Assessment/Plan       Assessment     Recurrent high-grade fever  Pneumonia  Suspicion of tickborne illness        Plan      The patient is awake and alert, sitting up comfortably in bed.  On room air but complaining of some chest tightness and shortness of breath.  Primary RN made aware.  Afebrile.  No reports of diarrhea.  Denies any nausea or vomiting.  Denies any skin rashes.  The patient states she is overall feeling better.  Lung exam is clear to auscultation bilaterally.  Abdomen soft and rounded, nontender with normoactive bowel sounds.  Midline to the right upper extremity with no evidence of infection or phlebitis.  Blood cultures from 5/18 preliminarily reported growth at 2 days.  Transthoracic echo from 5/19 does not report any evidence of vegetation but did report appearance of the anterior mitral leaflet suspicious for rheumatic mitral valve disease.  Cardiology is following.  Rickettsial and fungal serologies " pending.    For now plan to continue with Zosyn, doxycycline, micafungin courses empirically for pneumonia and tickborne illness along with possible mold exposure.  Awaiting fungal and rickettsial serologies.  We will continue to monitor.      ANTIMICROBIAL THERAPY    doxycycline (VIBRAMYCIN) 100 mg IVPB in 100 mL NS (VTB)  micafungin (MYCAMINE) IVPB  piperacillin-tazobactam (ZOSYN) IVPB 3.375 g IVPB in 100 mL NS (VTB)     Code Status:   Code Status and Medical Interventions:   Ordered at: 05/12/24 1930     Code Status (Patient has no pulse and is not breathing):    CPR (Attempt to Resuscitate)     Medical Interventions (Patient has pulse or is breathing):    Full Support       Gregoria Gamble, APRN  05/21/24  13:39 EDT

## 2024-05-21 NOTE — PROGRESS NOTES
Antimicrobial Length of Therapy:    Day 5 of 7 Doxycycline  Day 6 of 7 Zosyn  Day 4 of 5 Micafungin    Jasper Eastman PharmD  05/21/24  11:07 EDT

## 2024-05-22 ENCOUNTER — APPOINTMENT (OUTPATIENT)
Dept: GENERAL RADIOLOGY | Facility: HOSPITAL | Age: 44
DRG: 438 | End: 2024-05-22
Payer: COMMERCIAL

## 2024-05-22 LAB
A PHAGOCYTOPH DNA BLD QL NAA+PROBE: NEGATIVE
ANION GAP SERPL CALCULATED.3IONS-SCNC: 11 MMOL/L (ref 5–15)
BASOPHILS # BLD AUTO: 0.06 10*3/MM3 (ref 0–0.2)
BASOPHILS NFR BLD AUTO: 0.6 % (ref 0–1.5)
BUN SERPL-MCNC: 16 MG/DL (ref 6–20)
BUN/CREAT SERPL: 13.2 (ref 7–25)
CALCIUM SPEC-SCNC: 8.5 MG/DL (ref 8.6–10.5)
CHLORIDE SERPL-SCNC: 104 MMOL/L (ref 98–107)
CO2 SERPL-SCNC: 24 MMOL/L (ref 22–29)
CREAT SERPL-MCNC: 1.21 MG/DL (ref 0.57–1)
DEPRECATED RDW RBC AUTO: 47.4 FL (ref 37–54)
DPYD GENE MUT ANL BLD/T: NEGATIVE
E CHAFFEENSIS DNA BLD QL NAA+PROBE: NEGATIVE
EGFRCR SERPLBLD CKD-EPI 2021: 57.5 ML/MIN/1.73
EOSINOPHIL # BLD AUTO: 0.07 10*3/MM3 (ref 0–0.4)
EOSINOPHIL NFR BLD AUTO: 0.7 % (ref 0.3–6.2)
ERYTHROCYTE [DISTWIDTH] IN BLOOD BY AUTOMATED COUNT: 14.2 % (ref 12.3–15.4)
FUNGITELL VALUE: <31.25 PG/ML
GALACTOMANNAN AG SPEC IA-ACNC: 0.05 INDEX (ref 0–0.49)
GLUCOSE BLDC GLUCOMTR-MCNC: 108 MG/DL (ref 70–130)
GLUCOSE BLDC GLUCOMTR-MCNC: 125 MG/DL (ref 70–130)
GLUCOSE BLDC GLUCOMTR-MCNC: 245 MG/DL (ref 70–130)
GLUCOSE BLDC GLUCOMTR-MCNC: 260 MG/DL (ref 70–130)
GLUCOSE BLDC GLUCOMTR-MCNC: 290 MG/DL (ref 70–130)
GLUCOSE SERPL-MCNC: 85 MG/DL (ref 65–99)
HCT VFR BLD AUTO: 33.2 % (ref 34–46.6)
HGB BLD-MCNC: 11 G/DL (ref 12–15.9)
IMM GRANULOCYTES # BLD AUTO: 0.04 10*3/MM3 (ref 0–0.05)
IMM GRANULOCYTES NFR BLD AUTO: 0.4 % (ref 0–0.5)
IMP & REVIEW OF LAB RESULTS: NORMAL
LYMPHOCYTES # BLD AUTO: 3.55 10*3/MM3 (ref 0.7–3.1)
LYMPHOCYTES NFR BLD AUTO: 37.2 % (ref 19.6–45.3)
Lab: NORMAL
Lab: NORMAL
MCH RBC QN AUTO: 30.1 PG (ref 26.6–33)
MCHC RBC AUTO-ENTMCNC: 33.1 G/DL (ref 31.5–35.7)
MCV RBC AUTO: 91 FL (ref 79–97)
MONOCYTES # BLD AUTO: 0.95 10*3/MM3 (ref 0.1–0.9)
MONOCYTES NFR BLD AUTO: 9.9 % (ref 5–12)
NEUTROPHILS NFR BLD AUTO: 4.88 10*3/MM3 (ref 1.7–7)
NEUTROPHILS NFR BLD AUTO: 51.2 % (ref 42.7–76)
NRBC BLD AUTO-RTO: 0 /100 WBC (ref 0–0.2)
PATHOLOGIST NAME: NORMAL
PLATELET # BLD AUTO: 391 10*3/MM3 (ref 140–450)
PMV BLD AUTO: 9.5 FL (ref 6–12)
POTASSIUM SERPL-SCNC: 3.2 MMOL/L (ref 3.5–5.2)
POTASSIUM SERPL-SCNC: 3.3 MMOL/L (ref 3.5–5.2)
POTASSIUM SERPL-SCNC: 4.1 MMOL/L (ref 3.5–5.2)
QT INTERVAL: 358 MS
QTC INTERVAL: 454 MS
RBC # BLD AUTO: 3.65 10*6/MM3 (ref 3.77–5.28)
REFERENCE VALUE: NORMAL
SODIUM SERPL-SCNC: 139 MMOL/L (ref 136–145)
WBC NRBC COR # BLD AUTO: 9.55 10*3/MM3 (ref 3.4–10.8)

## 2024-05-22 PROCEDURE — 25010000002 PIPERACILLIN SOD-TAZOBACTAM PER 1 G: Performed by: INTERNAL MEDICINE

## 2024-05-22 PROCEDURE — 94761 N-INVAS EAR/PLS OXIMETRY MLT: CPT

## 2024-05-22 PROCEDURE — 94664 DEMO&/EVAL PT USE INHALER: CPT

## 2024-05-22 PROCEDURE — 63710000001 PREDNISONE PER 1 MG: Performed by: INTERNAL MEDICINE

## 2024-05-22 PROCEDURE — 85025 COMPLETE CBC W/AUTO DIFF WBC: CPT

## 2024-05-22 PROCEDURE — 82948 REAGENT STRIP/BLOOD GLUCOSE: CPT

## 2024-05-22 PROCEDURE — 25010000002 MICAFUNGIN SODIUM 100 MG RECONSTITUTED SOLUTION 1 EACH VIAL: Performed by: INTERNAL MEDICINE

## 2024-05-22 PROCEDURE — 71045 X-RAY EXAM CHEST 1 VIEW: CPT

## 2024-05-22 PROCEDURE — 63710000001 INSULIN LISPRO (HUMAN) PER 5 UNITS: Performed by: INTERNAL MEDICINE

## 2024-05-22 PROCEDURE — 94799 UNLISTED PULMONARY SVC/PX: CPT

## 2024-05-22 PROCEDURE — 99232 SBSQ HOSP IP/OBS MODERATE 35: CPT | Performed by: SPECIALIST

## 2024-05-22 PROCEDURE — 25010000002 FUROSEMIDE PER 20 MG: Performed by: SPECIALIST

## 2024-05-22 PROCEDURE — 84132 ASSAY OF SERUM POTASSIUM: CPT | Performed by: INTERNAL MEDICINE

## 2024-05-22 PROCEDURE — 99232 SBSQ HOSP IP/OBS MODERATE 35: CPT | Performed by: NURSE PRACTITIONER

## 2024-05-22 PROCEDURE — 71045 X-RAY EXAM CHEST 1 VIEW: CPT | Performed by: RADIOLOGY

## 2024-05-22 PROCEDURE — 80048 BASIC METABOLIC PNL TOTAL CA: CPT | Performed by: INTERNAL MEDICINE

## 2024-05-22 PROCEDURE — 63710000001 INSULIN GLARGINE PER 5 UNITS

## 2024-05-22 PROCEDURE — 84132 ASSAY OF SERUM POTASSIUM: CPT | Performed by: HOSPITALIST

## 2024-05-22 PROCEDURE — 99232 SBSQ HOSP IP/OBS MODERATE 35: CPT | Performed by: INTERNAL MEDICINE

## 2024-05-22 RX ORDER — POTASSIUM CHLORIDE 20 MEQ/1
40 TABLET, EXTENDED RELEASE ORAL EVERY 4 HOURS
Status: COMPLETED | OUTPATIENT
Start: 2024-05-22 | End: 2024-05-22

## 2024-05-22 RX ORDER — METOPROLOL SUCCINATE 50 MG/1
50 TABLET, EXTENDED RELEASE ORAL
Status: DISCONTINUED | OUTPATIENT
Start: 2024-05-23 | End: 2024-05-23

## 2024-05-22 RX ORDER — FUROSEMIDE 10 MG/ML
20 INJECTION INTRAMUSCULAR; INTRAVENOUS ONCE
Status: COMPLETED | OUTPATIENT
Start: 2024-05-22 | End: 2024-05-22

## 2024-05-22 RX ADMIN — INSULIN GLARGINE 20 UNITS: 100 INJECTION, SOLUTION SUBCUTANEOUS at 08:24

## 2024-05-22 RX ADMIN — IPRATROPIUM BROMIDE AND ALBUTEROL SULFATE 3 ML: 2.5; .5 SOLUTION RESPIRATORY (INHALATION) at 18:25

## 2024-05-22 RX ADMIN — IPRATROPIUM BROMIDE AND ALBUTEROL SULFATE 3 ML: 2.5; .5 SOLUTION RESPIRATORY (INHALATION) at 00:52

## 2024-05-22 RX ADMIN — METOPROLOL SUCCINATE 25 MG: 25 TABLET, EXTENDED RELEASE ORAL at 08:25

## 2024-05-22 RX ADMIN — POTASSIUM CHLORIDE 40 MEQ: 1500 TABLET, EXTENDED RELEASE ORAL at 12:22

## 2024-05-22 RX ADMIN — CARIPRAZINE 3 MG: 3 CAPSULE, GELATIN COATED ORAL at 08:25

## 2024-05-22 RX ADMIN — DOXYCYCLINE 100 MG: 100 INJECTION, POWDER, LYOPHILIZED, FOR SOLUTION INTRAVENOUS at 00:04

## 2024-05-22 RX ADMIN — Medication 10 ML: at 08:26

## 2024-05-22 RX ADMIN — INSULIN LISPRO 6 UNITS: 100 INJECTION, SOLUTION INTRAVENOUS; SUBCUTANEOUS at 21:45

## 2024-05-22 RX ADMIN — POTASSIUM CHLORIDE 40 MEQ: 1500 TABLET, EXTENDED RELEASE ORAL at 05:36

## 2024-05-22 RX ADMIN — Medication 10 ML: at 21:46

## 2024-05-22 RX ADMIN — OXYCODONE HYDROCHLORIDE 5 MG: 10 TABLET ORAL at 16:33

## 2024-05-22 RX ADMIN — IPRATROPIUM BROMIDE AND ALBUTEROL SULFATE 3 ML: 2.5; .5 SOLUTION RESPIRATORY (INHALATION) at 12:48

## 2024-05-22 RX ADMIN — DOXYCYCLINE 100 MG: 100 INJECTION, POWDER, LYOPHILIZED, FOR SOLUTION INTRAVENOUS at 12:22

## 2024-05-22 RX ADMIN — PIPERACILLIN SODIUM AND TAZOBACTAM SODIUM 3.38 G: 3; .375 INJECTION, POWDER, LYOPHILIZED, FOR SOLUTION INTRAVENOUS at 05:36

## 2024-05-22 RX ADMIN — IPRATROPIUM BROMIDE AND ALBUTEROL SULFATE 3 ML: 2.5; .5 SOLUTION RESPIRATORY (INHALATION) at 06:52

## 2024-05-22 RX ADMIN — INSULIN LISPRO 4 UNITS: 100 INJECTION, SOLUTION INTRAVENOUS; SUBCUTANEOUS at 16:32

## 2024-05-22 RX ADMIN — OXYCODONE HYDROCHLORIDE 5 MG: 10 TABLET ORAL at 00:04

## 2024-05-22 RX ADMIN — FLUOXETINE HYDROCHLORIDE 10 MG: 10 CAPSULE ORAL at 08:25

## 2024-05-22 RX ADMIN — FUROSEMIDE 20 MG: 10 INJECTION, SOLUTION INTRAMUSCULAR; INTRAVENOUS at 12:22

## 2024-05-22 RX ADMIN — OXYCODONE HYDROCHLORIDE 5 MG: 10 TABLET ORAL at 08:24

## 2024-05-22 RX ADMIN — POTASSIUM CHLORIDE 40 MEQ: 1500 TABLET, EXTENDED RELEASE ORAL at 16:33

## 2024-05-22 RX ADMIN — CETIRIZINE HYDROCHLORIDE 10 MG: 10 TABLET, FILM COATED ORAL at 08:25

## 2024-05-22 RX ADMIN — PREDNISONE 20 MG: 20 TABLET ORAL at 08:33

## 2024-05-22 RX ADMIN — NICOTINE 1 PATCH: 14 PATCH, EXTENDED RELEASE TRANSDERMAL at 08:26

## 2024-05-22 RX ADMIN — POTASSIUM CHLORIDE 40 MEQ: 1500 TABLET, EXTENDED RELEASE ORAL at 02:31

## 2024-05-22 RX ADMIN — MICAFUNGIN SODIUM 100 MG: 100 INJECTION, POWDER, LYOPHILIZED, FOR SOLUTION INTRAVENOUS at 08:25

## 2024-05-22 NOTE — PLAN OF CARE
Goal Outcome Evaluation:              Outcome Evaluation: Pt A/Ox4. RA. NSR on monitor. PRN Oxycodone given x1 this shift. Lasix given this shift. Adequate UOP. Afebrile. VSS. No complaints throughout shift. Bed in lowest position with wheels locked. Bed alarm refused and call light within reach. Plan of care continued.

## 2024-05-22 NOTE — PROGRESS NOTES
LOS: 10 days     Name: Manda Al  Age/Sex: 43 y.o. female  :  1980        PCP: Mi Rivera MD  REF: No ref. provider found    Principal Problem:    Acute pancreatitis      Reason for follow-up: Mitral valve stenosis and pulmonary hypertension    Subjective       Subjective   Manda Al is a 43-year-old female with a past medical history significant for anxiety, depression, bipolar disorder, asthma, Crohn's disease, hypertension, schizoaffective disorder.  Patient initially presented to [facility] on 2024 with complaints of epigastric abdominal pain, nausea and vomiting.  She was found to have acute pancreatitis.  Cardiology was consulted for abnormal echocardiogram moderate to.  Moderate to severe mitral valve stenosis, moderate mitral valve regurgitation and severe pulmonary hypertension.  Patient states she had rheumatic fever as a child.  Did have echocardiogram in  which showed moderate mitral valve stenosis.  States has been having episodes of recurrent pancreatitis.  On admission she was noted to have fever but this has since resolved.  Reports shortness of breath today and is mildly tachycardic.  Did have tick bite recently and is being treated for this.  She states that when she does not have episodes of pancreatitis she feels overall well and her breathing is stable.  Denies any chest pain.  Has been smoking 1 pack of cigarettes a day for the last 30 years.  Recent echocardiogram showed normal LV function.  States she is feeling more short of breath today.     Interval History: Patient is resting in bed today.  proBNP yesterday was 4838, that she received a total of Lasix 60 mg IV.  -5799 net cc output noted. Creatinine is 1.21. Metoprolol was initiated yesterday, she has been in sinus rhythm in the 80s to 90s.    ROS    Vital Signs  Temp:  [97.6 °F (36.4 °C)-98.3 °F (36.8 °C)] 98.1 °F (36.7 °C)  Heart Rate:  [] 90  Resp:  [10-32] 10  BP:  "(108-149)/(67-97) 120/83  Vital Signs (last 72 hrs)         05/19 0700 05/20 0659 05/20 0700 05/21 0659 05/21 0700 05/22 0659 05/22 0700 05/22 1149   Most Recent      Temp (°F) 97.7 -  98.7    97.5 -  98.5    97.6 -  98.3      98.1     98.1 (36.7) 05/22 0800    Heart Rate 67 -  98    74 -  102    72 -  101    85 -  96     90 05/22 1100    Resp 14 -  26    10 -  31    15 -  32    10 -  26     10 05/22 1100    BP 97/66 -  140/82    105/41 -  141/90    114/75 -  149/97    108/71 -  120/83     120/83 05/22 1100    SpO2 (%) 90 -  100    94 -  100    93 -  100    93 -  100     100 05/22 1100    Flow (L/min)   3                      Documented weights    05/12/24 1556 05/12/24 2036 05/14/24 0500 05/15/24 0500   Weight: 97.1 kg (214 lb) 96.4 kg (212 lb 8.4 oz) 99.6 kg (219 lb 9.6 oz) 100 kg (220 lb 6.4 oz)    05/16/24 0300 05/17/24 0400 05/18/24 0400 05/19/24 0600   Weight: 101 kg (221 lb 14.4 oz) 106 kg (233 lb 7.5 oz) 105 kg (231 lb 14.8 oz) 104 kg (230 lb)    05/20/24 0400 05/21/24 0400 05/22/24 0500   Weight: 105 kg (231 lb 7.7 oz) 106 kg (233 lb 7.5 oz) 106 kg (234 lb 9.1 oz)      Body mass index is 35.67 kg/m².    Intake/Output Summary (Last 24 hours) at 5/22/2024 1149  Last data filed at 5/22/2024 0800  Gross per 24 hour   Intake 1901 ml   Output 7700 ml   Net -5799 ml     Objective:  Vital signs: (most recent): Blood pressure 120/83, pulse 90, temperature 98.1 °F (36.7 °C), temperature source Oral, resp. rate 10, height 172.7 cm (68\"), weight 106 kg (234 lb 9.1 oz), last menstrual period 04/30/2024, SpO2 100%.    Lungs:  Normal effort.    Heart: Normal rate.  Regular rhythm.  Positive for murmur (1/6 mid diastolic rumbling murmur noted at the apex).    Abdomen: Abdomen is soft.  Bowel sounds are normal.     Neurological: Patient is alert.    Skin:  Warm and dry.                Objective       Physical Exam:  .  Physical Exam  Constitutional:       Appearance: Normal appearance. She is well-developed.   HENT:     "  Head: Normocephalic and atraumatic.   Cardiovascular:      Rate and Rhythm: Normal rate and regular rhythm.      Heart sounds: Murmur (1/6 mid diastolic rumbling murmur noted at the apex) heard.   Pulmonary:      Effort: Pulmonary effort is normal.      Breath sounds: Normal breath sounds.   Abdominal:      General: Bowel sounds are normal.      Palpations: Abdomen is soft.   Skin:     General: Skin is warm and dry.   Neurological:      General: No focal deficit present.      Mental Status: She is alert and oriented to person, place, and time.   Psychiatric:         Mood and Affect: Mood normal.         Behavior: Behavior normal.               Procedures    Results review       Results Review:   Results from last 7 days   Lab Units 05/22/24  0054 05/20/24  0209 05/19/24  0031 05/18/24  0021 05/17/24  0036 05/16/24  0221   WBC 10*3/mm3 9.55 4.42 4.02 4.06 3.52 3.70   HEMOGLOBIN g/dL 11.0* 9.6* 9.4* 9.8* 11.1* 12.7   PLATELETS 10*3/mm3 391 169 111* 103* 109* 151     Results from last 7 days   Lab Units 05/22/24  1012 05/22/24  0054 05/21/24  0844 05/20/24  0209 05/19/24  0031 05/18/24  1036 05/18/24  0021 05/17/24  1221 05/17/24  0036 05/16/24  1034   SODIUM mmol/L  --  139 142 140 138  --  140 135* 128*  --    POTASSIUM mmol/L 3.3* 3.2* 4.0 4.2 4.1 3.9 3.5 4.0 3.4*  --    CHLORIDE mmol/L  --  104 113* 111* 110*  --  110* 105 99  --    CO2 mmol/L  --  24.0 15.9* 18.2* 17.7*  --  20.9* 20.0* 18.2*  --    BUN mg/dL  --  16 17 15 12  --  10 6 5*  --    CREATININE mg/dL  --  1.21* 1.11* 1.12* 1.12*  --  1.14* 0.99 1.00  --    CALCIUM mg/dL  --  8.5* 8.2* 8.3* 7.9*  --  7.5* 7.5* 7.3*  --    GLUCOSE mg/dL  --  85 205* 320* 294*  --  159* 152* 116*  --    ALT (SGPT) U/L  --   --   --  24 30  --  36*  --  44* 40*   AST (SGOT) U/L  --   --   --  20 30  --  55*  --  87* 93*     Results from last 7 days   Lab Units 05/21/24  0844   HSTROP T ng/L 6     Lab Results   Component Value Date    INR 1.26 (H) 05/17/2024     Lab  "Results   Component Value Date    MG 2.9 (H) 05/13/2024    MG 1.7 05/12/2024    MG 1.7 05/07/2024     Lab Results   Component Value Date    TSH 1.840 05/16/2024      Imaging Results (Last 48 Hours)       ** No results found for the last 48 hours. **          No results found for: \"BNP\"               Echo   Results for orders placed during the hospital encounter of 05/12/24    Adult Transthoracic Echo Complete W/ Cont if Necessary Per Protocol    Interpretation Summary  Images from the original result were not included.      Normal left ventricular cavity size and wall thickness noted. All left ventricular wall segments contract normally.    Left ventricular ejection fraction appears to be 61 - 65%.    Left ventricular diastolic function is consistent with (grade II w/high LAP) pseudonormalization.    There is tethering of both anterior and posterior mitral leaflets with reverse hockey-stick appearance of the anterior mitral leaflet suspicious for rheumatic mitral valve disease.    There is moderate, bileaflet mitral valve thickening present at the tip of the leaflets (commissure).  Tiera score of 8    MV max PG 22.3 mmHg MV mean PG 14 mmHg MV V2 VTI 69.8 cm MVA(VTI) 1.08 cm2 MV dec slope 687 cm/sec2 MV dec time 0.39 sec at a heart rate of about 95 bpm.    Moderate to severe mitral valve stenosis is present. The calculated mitral valve Broderick' score is 7.    Moderate mitral valve regurgitation is present with a centrally-directed jet noted. The calculated mitral valve morphology score is 7.    The aortic valve was poorly visualized but appears trileaflet. Mild aortic valve regurgitation is present. No aortic valve stenosis is present.    Tricuspid valve not well visualized. Mild to moderate tricuspid valve regurgitation is present. Estimated right ventricular systolic pressure from tricuspid regurgitation is markedly elevated (>55 mmHg).    Severe pulmonary hypertension is present.    The IVC is mildly dilated with " partial collapse indicator of right atrial pressures of about 15 mmHg.    There is no evidence of pericardial effusion. .    Comments: The peak and mean gradients across the mitral valve seem to be about the same as on the transthoracic echo back in August of 2020 given the heart rate in the 90s although the mitral valve stenosis seem to have improved on the DONNA done at that time with improvement in the heart rate.  The degree of mitral regurgitation seem to have gotten worse.  Hence would consider repeating the study with focus on the mitral valve after improving the heart rate.  If patient still has significantly elevated gradients across the mitral valve, may have to consider referral to [facility] for possible mitral valvuloplasty.           I reviewed the patient's new clinical results.    Telemetry: Normal sinus rhythm in the 80s to 90s       Medication Review:   Cariprazine HCl, 3 mg, Oral, Daily  cetirizine, 10 mg, Oral, Daily  doxycycline, 100 mg, Intravenous, Q12H  FLUoxetine, 10 mg, Oral, Daily  insulin glargine, 20 Units, Subcutaneous, Daily  insulin lispro, 2-9 Units, Subcutaneous, 4x Daily AC & at Bedtime  ipratropium-albuterol, 3 mL, Nebulization, 4x Daily - RT  metoprolol succinate XL, 25 mg, Oral, Q24H  nicotine, 1 patch, Transdermal, Q24H  potassium chloride ER, 40 mEq, Oral, Q4H  predniSONE, 20 mg, Oral, Daily With Breakfast  sodium chloride, 500 mL, Intravenous, Once  sodium chloride, 10 mL, Intravenous, Q12H  sodium chloride, 10 mL, Intravenous, Q12H  sodium chloride, 10 mL, Intravenous, Q12H             Assessment      Assessment:  Moderate to severe mitral valve stenosis, rheumatic with moderate mitral regurgitation  Severe pulm hypertension secondary to mitral stenosis  Recurrent pancreatitis  Acute HFpEF secondary to mitral stenosis        Plan     Recommendations:  Will diurese to try to keep the patient negative by about a liter per day as elevated BNP  Will repeat the  echocardiogram once heart rate is 70 or less for further evaluation of the mitral valve however likely the patient will need mitral valve intervention    I discussed the patient's findings and my recommendations with patient and family    Electronically signed by DIEGO Quinn, 05/22/24, 11:52 AM EDT.  Electronically signed by Bess Montgomery MD, 05/22/24, 11:59 AM EDT.        Please note that portions of this note were completed with a voice recognition program.

## 2024-05-22 NOTE — PLAN OF CARE
Goal Outcome Evaluation:  Pt received from PCU this shift. VSS. IV access patent. Pt showing no s/s of distress. Call light in reach. No requests at this time. Plan of care ongoing.

## 2024-05-22 NOTE — PROGRESS NOTES
PROGRESS NOTE         Patient Identification:  Name:  Manda Al  Age:  43 y.o.  Sex:  female  :  1980  MRN:  [id number]  Visit Number:  [id number]  Primary Care Provider:  Mi Rivera MD         LOS: 10 days       ----------------------------------------------------------------------------------------------------------------------  Subjective       Chief Complaints:    Flank Pain        Interval History:      The patient is awake and alert, sitting up comfortably in bed eating breakfast.  On room air with no apparent distress.  Reports the chest tightness and shortness of breath resolved after receiving Lasix.  Nurse at the bedside reported she had over 7 L urine output overnight.  Lung exam is clear to auscultation bilaterally.  Abdomen soft and nontender with normoactive bowel sounds.  The patient denies any nausea/vomiting, skin rashes, urinary issues.  Aspergillus galactomannan antigen is negative.  Fungitell negative.  ANCA panel is negative.  Ehrlichia DNA PCR is negative.  Rickettsia PCR and fungal antibodies pending.      Review of Systems:    Constitutional: no fever, chills and night sweats.  Generalized fatigue.  Eyes: no eye drainage, itching or redness.  HEENT: no mouth sores, dysphagia or nose bleed.  Respiratory: no for shortness of breath, cough or production of sputum.  Cardiovascular: no chest pain, no palpitations, no orthopnea.  Gastrointestinal: no nausea, no vomiting or diarrhea. No abdominal pain, hematemesis or rectal bleeding.  Genitourinary: no dysuria or polyuria.  Hematologic/lymphatic: no lymph node abnormalities, no easy bruising or easy bleeding.  Musculoskeletal: no muscle or joint pain.  Skin: No rash and no itching.  Neurological: no loss of consciousness, no seizure, no headache.  Behavioral/Psych: no depression or suicidal ideation.  Endocrine: no hot flashes.  Immunologic:  negative.    ----------------------------------------------------------------------------------------------------------------------      Objective       Current Hospital Meds:  Cariprazine HCl, 3 mg, Oral, Daily  cetirizine, 10 mg, Oral, Daily  doxycycline, 100 mg, Intravenous, Q12H  FLUoxetine, 10 mg, Oral, Daily  insulin glargine, 20 Units, Subcutaneous, Daily  insulin lispro, 2-9 Units, Subcutaneous, 4x Daily AC & at Bedtime  ipratropium-albuterol, 3 mL, Nebulization, 4x Daily - RT  [START ON 5/23/2024] metoprolol succinate XL, 50 mg, Oral, Q24H  nicotine, 1 patch, Transdermal, Q24H  potassium chloride ER, 40 mEq, Oral, Q4H  predniSONE, 20 mg, Oral, Daily With Breakfast  sodium chloride, 500 mL, Intravenous, Once  sodium chloride, 10 mL, Intravenous, Q12H  sodium chloride, 10 mL, Intravenous, Q12H  sodium chloride, 10 mL, Intravenous, Q12H           ----------------------------------------------------------------------------------------------------------------------    Vital Signs:  Temp:  [97.6 °F (36.4 °C)-98.3 °F (36.8 °C)] 97.8 °F (36.6 °C)  Heart Rate:  [] 86  Resp:  [10-32] 18  BP: (108-149)/(67-97) 112/82  Mean Arterial Pressure (Non-Invasive) for the past 24 hrs (Last 3 readings):   Noninvasive MAP (mmHg)   05/22/24 1222 94   05/22/24 1200 104   05/22/24 1100 97     SpO2 Percentage    05/22/24 1100 05/22/24 1200 05/22/24 1222   SpO2: 100% 97% 95%     SpO2:  [93 %-100 %] 95 %  on   ;   Device (Oxygen Therapy): room air    Body mass index is 35.67 kg/m².  Wt Readings from Last 3 Encounters:   05/22/24 106 kg (234 lb 9.1 oz)   05/07/24 97.1 kg (214 lb)   03/19/22 113 kg (250 lb)        Intake/Output Summary (Last 24 hours) at 5/22/2024 1300  Last data filed at 5/22/2024 1200  Gross per 24 hour   Intake 1501 ml   Output 7700 ml   Net -6199 ml     Diet: Regular/House; Fluid Consistency: Thin (IDDSI  0)  ----------------------------------------------------------------------------------------------------------------------      Physical Exam:    Constitutional:  Well-developed and well-nourished.  On room air without any respiratory distress.  Denies any complaints or issues.  HENT:  Head: Normocephalic and atraumatic.  Mouth:  Moist mucous membranes.    Eyes:  Conjunctivae and EOM are normal.  No scleral icterus.  Neck:  Neck supple.  No JVD present.    Cardiovascular:  Normal rate, regular rhythm and normal heart sounds with no murmur. No edema.  Pulmonary/Chest:  No respiratory distress, no wheezes, no crackles, with normal breath sounds and good air movement.  Abdominal:  Soft.  Bowel sounds are normal.  No distension and no tenderness.   Musculoskeletal:  No edema, no tenderness, and no deformity.  No swelling or redness of joints.  Neurological:  Alert and oriented to person, place, and time.  No facial droop.  No slurred speech.   Skin:  Skin is warm and dry.  No rash noted.  No pallor.  Scabbed area on the left bottom from tick bite.  No surrounding erythema.  No drainage.  Psychiatric:  Normal mood and affect.  Behavior is normal.        ----------------------------------------------------------------------------------------------------------------------  Results from last 7 days   Lab Units 05/21/24  0844   HSTROP T ng/L 6     Results from last 7 days   Lab Units 05/21/24  0844   PROBNP pg/mL 4,838.0*       Results from last 7 days   Lab Units 05/16/24  1926   PH, ARTERIAL pH units 7.467*   PO2 ART mm Hg 61.5*   PCO2, ARTERIAL mm Hg 26.9*   HCO3 ART mmol/L 19.4*     Results from last 7 days   Lab Units 05/22/24  0054 05/20/24  0209 05/19/24  1613 05/19/24  1321 05/19/24  1015 05/19/24  0730 05/19/24  0031 05/18/24  0021 05/17/24  0036 05/16/24  1034   CRP mg/dL  --   --   --   --   --   --   --  15.07*  --  9.82*   LACTATE mmol/L  --   --  3.2* 4.4* 3.2*   < >  --   --   --   --    WBC 10*3/mm3 9.55 4.42  " --   --   --   --  4.02 4.06 3.52  --    HEMOGLOBIN g/dL 11.0* 9.6*  --   --   --   --  9.4* 9.8* 11.1*  --    HEMATOCRIT % 33.2* 28.8*  --   --   --   --  28.5* 29.2* 32.4*  --    MCV fL 91.0 91.4  --   --   --   --  92.2 91.0 88.3  --    MCHC g/dL 33.1 33.3  --   --   --   --  33.0 33.6 34.3  --    PLATELETS 10*3/mm3 391 169  --   --   --   --  111* 103* 109*  --    INR   --   --   --   --   --   --   --   --  1.26*  --     < > = values in this interval not displayed.     Results from last 7 days   Lab Units 05/22/24  1012 05/22/24  0054 05/21/24  0844 05/20/24  0209 05/19/24  0031 05/18/24  1036 05/18/24  0021   SODIUM mmol/L  --  139 142 140 138  --  140   POTASSIUM mmol/L 3.3* 3.2* 4.0 4.2 4.1   < > 3.5   CHLORIDE mmol/L  --  104 113* 111* 110*  --  110*   CO2 mmol/L  --  24.0 15.9* 18.2* 17.7*  --  20.9*   BUN mg/dL  --  16 17 15 12  --  10   CREATININE mg/dL  --  1.21* 1.11* 1.12* 1.12*  --  1.14*   CALCIUM mg/dL  --  8.5* 8.2* 8.3* 7.9*  --  7.5*   GLUCOSE mg/dL  --  85 205* 320* 294*  --  159*   ALBUMIN g/dL  --   --   --  2.7* 2.5*  --  2.3*   BILIRUBIN mg/dL  --   --   --  0.4 0.5  --  0.5   ALK PHOS U/L  --   --   --  79 85  --  73   AST (SGOT) U/L  --   --   --  20 30  --  55*   ALT (SGPT) U/L  --   --   --  24 30  --  36*    < > = values in this interval not displayed.   Estimated Creatinine Clearance: 77.2 mL/min (A) (by C-G formula based on SCr of 1.21 mg/dL (H)).  No results found for: \"AMMONIA\"    Glucose   Date/Time Value Ref Range Status   05/22/2024 1130 125 70 - 130 mg/dL Final   05/22/2024 0633 108 70 - 130 mg/dL Final   05/21/2024 2144 152 (H) 70 - 130 mg/dL Final   05/21/2024 1706 225 (H) 70 - 130 mg/dL Final   05/21/2024 1117 168 (H) 70 - 130 mg/dL Final   05/21/2024 0634 222 (H) 70 - 130 mg/dL Final   05/20/2024 2146 334 (H) 70 - 130 mg/dL Final   05/20/2024 1653 280 (H) 70 - 130 mg/dL Final     Lab Results   Component Value Date    HGBA1C 6.90 (H) 05/12/2024     Lab Results   Component " "Value Date    TSH 1.840 05/16/2024       Blood Culture   Date Value Ref Range Status   05/15/2024 No growth at 4 days  Preliminary   05/15/2024 No growth at 4 days  Preliminary   05/12/2024 No growth at 5 days  Final   05/12/2024 No growth at 5 days  Final   05/12/2024 No growth at 5 days  Final     Urine Culture   Date Value Ref Range Status   05/12/2024 No growth  Final     No results found for: \"WOUNDCX\"  No results found for: \"STOOLCX\"  No results found for: \"RESPCX\"  Pain Management Panel  More data exists         Latest Ref Rng & Units 8/11/2020 5/9/2017   Pain Management Panel   Amphetamine, Urine Qual Negative Positive  Negative    Barbiturates Screen, Urine Negative Negative  Negative    Benzodiazepine Screen, Urine Negative Negative  Negative    Buprenorphine, Screen, Urine Negative Negative  Negative    Cocaine Screen, Urine Negative Negative  Negative    Methadone Screen , Urine Negative Negative  Negative          ----------------------------------------------------------------------------------------------------------------------  Imaging Results (Last 24 Hours)       ** No results found for the last 24 hours. **            ----------------------------------------------------------------------------------------------------------------------    Pertinent Infectious Disease Results                Assessment/Plan       Assessment     Recurrent high-grade fever  Pneumonia  Suspicion of tickborne illness        Plan      The patient is awake and alert, sitting up comfortably in bed eating breakfast.  On room air with no apparent distress.  Reports the chest tightness and shortness of breath resolved after receiving Lasix.  Nurse at the bedside reported she had over 7 L urine output overnight.  Lung exam is clear to auscultation bilaterally.  Abdomen soft and nontender with normoactive bowel sounds.  The patient denies any nausea/vomiting, skin rashes, urinary issues.  Aspergillus galactomannan antigen is " negative.  Fungitell negative.  ANCA panel is negative.  Ehrlichia DNA PCR is negative.  Rickettsia PCR and fungal antibodies pending.    The patient has made significant clinical improvement with current course of Zosyn, doxycycline, micafungin for pneumonia and tickborne illness treatment.  Continue current course for now.  Awaiting fungal and rickettsial serologies.  We will continue to monitor closely.      ANTIMICROBIAL THERAPY    doxycycline (VIBRAMYCIN) 100 mg IVPB in 100 mL NS (VTB)     Code Status:   Code Status and Medical Interventions:   Ordered at: 05/12/24 1930     Code Status (Patient has no pulse and is not breathing):    CPR (Attempt to Resuscitate)     Medical Interventions (Patient has pulse or is breathing):    Full Support       Gregoria Gamble, DIEGO  05/22/24  13:00 EDT

## 2024-05-22 NOTE — PROGRESS NOTES
Pulmonary and critical care consultation note  Referring Provider:   Reason for Consultation:       Chief complaint shortness of breath-      Sub-   Overnight events reviewed.  All the labs medications ins and outs and vitals reviewed.  Review of Systems  No changes        History  Past Medical History:   Diagnosis Date    Anxiety     Asthma     Bipolar disorder     Crohn disease     Depression     Hypertension     Schizoaffective disorder    ,   Past Surgical History:   Procedure Laterality Date    APPENDECTOMY       SECTION      CHOLECYSTECTOMY      COLON SURGERY      COLONOSCOPY      MANDIBLE FRACTURE SURGERY      OVARIAN CYST SURGERY      TUBAL ABDOMINAL LIGATION     ,   Family History   Problem Relation Age of Onset    Diabetes Mother     Anxiety disorder Mother     Depression Mother     Bipolar disorder Mother     COPD Father     Schizophrenia Father     Schizophrenia Paternal Grandfather     Breast cancer Neg Hx    ,   Social History     Tobacco Use    Smoking status: Every Day     Current packs/day: 1.00     Average packs/day: 1 pack/day for 20.0 years (20.0 ttl pk-yrs)     Types: Cigarettes    Smokeless tobacco: Never   Vaping Use    Vaping status: Never Used   Substance Use Topics    Alcohol use: No     Comment: denies    Drug use: Yes     Types: Marijuana   ,   Medications Prior to Admission   Medication Sig Dispense Refill Last Dose    Cariprazine HCl (Vraylar) 3 MG capsule capsule Take 1 capsule by mouth Daily.   2024    chlorthalidone (HYGROTON) 25 MG tablet Take 1 tablet by mouth Daily.   2024    fexofenadine (ALLEGRA) 180 MG tablet Take 1 tablet by mouth Daily.   2024    FLUoxetine (PROzac) 10 MG capsule Take 1 capsule by mouth Daily.   2024    magnesium oxide (MAG-OX) 400 MG tablet Take 1 tablet by mouth Daily.   2024    metFORMIN ER (GLUCOPHAGE-XR) 500 MG 24 hr tablet Take 2 tablets by mouth 2 (Two) Times a Day.   2024    metoprolol succinate XL (Toprol XL)  200 MG 24 hr tablet Take 1 tablet by mouth Daily.   5/11/2024   , Scheduled Meds:  Cariprazine HCl, 3 mg, Oral, Daily  cetirizine, 10 mg, Oral, Daily  doxycycline, 100 mg, Intravenous, Q12H  FLUoxetine, 10 mg, Oral, Daily  insulin glargine, 20 Units, Subcutaneous, Daily  insulin lispro, 2-9 Units, Subcutaneous, 4x Daily AC & at Bedtime  ipratropium-albuterol, 3 mL, Nebulization, 4x Daily - RT  [START ON 5/23/2024] metoprolol succinate XL, 50 mg, Oral, Q24H  nicotine, 1 patch, Transdermal, Q24H  potassium chloride ER, 40 mEq, Oral, Q4H  predniSONE, 20 mg, Oral, Daily With Breakfast  sodium chloride, 500 mL, Intravenous, Once  sodium chloride, 10 mL, Intravenous, Q12H  sodium chloride, 10 mL, Intravenous, Q12H  sodium chloride, 10 mL, Intravenous, Q12H    , Continuous Infusions:      and Allergies:  Imuran [azathioprine]    Objective     Vital Signs   Temp:  [97.6 °F (36.4 °C)-98.3 °F (36.8 °C)] 97.8 °F (36.6 °C)  Heart Rate:  [] 86  Resp:  [10-32] 15  BP: (108-149)/(67-97) 112/82    Physical Exam:             General-not in any distress    HEENT-atraumatic  Neck-supple    Respiratory-\not in any respiratory distress    Cardiovascular-NSR  GI-grossly normal    CNS-nonfocal    Musculoskeletal -no edema  Extremities- no obvious deformity noticed     Psychiatric-, alert awake oriented  Skin- no visible rash                                                                   Results Review:    LABS:    Lab Results   Component Value Date    GLUCOSE 85 05/22/2024    BUN 16 05/22/2024    CREATININE 1.21 (H) 05/22/2024    EGFRIFNONA 93 08/17/2020    BCR 13.2 05/22/2024    CO2 24.0 05/22/2024    CALCIUM 8.5 (L) 05/22/2024    ALBUMIN 2.7 (L) 05/20/2024    LABIL2 1.4 (L) 07/06/2015    AST 20 05/20/2024    ALT 24 05/20/2024    WBC 9.55 05/22/2024    HGB 11.0 (L) 05/22/2024    HCT 33.2 (L) 05/22/2024    MCV 91.0 05/22/2024     05/22/2024     05/22/2024    K 3.3 (L) 05/22/2024     05/22/2024    ANIONGAP 11.0  05/22/2024       Lab Results   Component Value Date    INR 1.26 (H) 05/17/2024    PROTIME 15.9 (H) 05/17/2024       Results from last 7 days   Lab Units 05/17/24  0036   INR  1.26*          I reviewed the patient's new clinical results.  I reviewed the patient's new imaging results and agree with the interpretation.    Procalitonin Results:      Lab 05/16/24  1034   PROCALCITONIN 0.76*       Latest Reference Range & Units 05/16/24 19:26   pH, Arterial 7.350 - 7.450 pH units 7.467 (H)   pCO2, Arterial 35.0 - 45.0 mm Hg 26.9 (L)   pO2, Arterial 83.0 - 108.0 mm Hg 61.5 (L)   HCO3, Arterial 20.0 - 26.0 mmol/L 19.4 (L)   Base Excess 0.0 - 2.0 mmol/L -3.2 (L)   O2 Saturation, Arterial 94.0 - 99.0 % 94.6   CO2 Content 22 - 33 mmol/L 20.3 (L)   A-a DO2 0.0 - 300.0 mmHg 50.1   Carboxyhemoglobin 0 - 5 % 1.8   Methemoglobin 0.00 - 3.00 % <-0.10 (L)   Oxyhemoglobin 94 - 99 % 93.3 (L)   Hematocrit, Blood Gas 38.0 - 51.0 % 34.4 (L)   Hemoglobin, Blood Gas 13.5 - 17.5 g/dL 11.2 (L)   Site  Right Radial   Jose's Test  N/A   Modality  Room Air   FIO2 % 21   Ventilator Mode  NA   Barometric Pressure for Blood Gas mmHg 723   (H): Data is abnormally high  (L): Data is abnormally low  Microbiology Results (last 10 days)       Procedure Component Value - Date/Time    Blood Culture - Blood, Arm, Left [[id number]]  (Normal) Collected: 05/18/24 1333    Lab Status: Preliminary result Specimen: Blood from Arm, Left Updated: 05/21/24 1345     Blood Culture No growth at 3 days    Aspergillus Galactomannan Antigen - Blood, Arm, Left [[id number]] Collected: 05/18/24 1257    Lab Status: Final result Specimen: Blood from Arm, Left Updated: 05/22/24 0209     Aspergillus Ag, BAL/Serum 0.05 Index     Narrative:      Performed at:  01 - Lab[address]  [id number]  : Chetna Gordon MD, Phone:  [phone]  [facility]  [facility]  [address], [place]  [id number]  : Gio  [facility], Phone:  [phone]    Blood Culture - Blood, Hand, Right [[id number]]  (Normal) Collected: 05/18/24 1257    Lab Status: Preliminary result Specimen: Blood from Hand, Right Updated: 05/21/24 1345     Blood Culture No growth at 3 days    MRSA Screen, PCR (Inpatient) - Swab, Nares [[id number]]  (Normal) Collected: 05/18/24 4824    Lab Status: Final result Specimen: Swab from Nares Updated: 05/18/24 1010     MRSA PCR No MRSA Detected    Narrative:      The negative predictive value of this diagnostic test is high and should only be used to consider de-escalating anti-MRSA therapy. A positive result may indicate colonization with MRSA and must be correlated clinically.    Respiratory Panel PCR w/COVID-19(SARS-CoV-2) SYED/JHONATHAN/TRUMAN/PAD/COR/KETURAH In-House, NP Swab in UTM/VTM, 2 HR TAT - Swab, Nasopharynx [[id number]]  (Normal) Collected: 05/16/24 7163    Lab Status: Final result Specimen: Swab from Nasopharynx Updated: 05/16/24 1947     ADENOVIRUS, PCR Not Detected     Coronavirus 229E Not Detected     Coronavirus HKU1 Not Detected     Coronavirus NL63 Not Detected     Coronavirus OC43 Not Detected     COVID19 Not Detected     Human Metapneumovirus Not Detected     Human Rhinovirus/Enterovirus Not Detected     Influenza A PCR Not Detected     Influenza B PCR Not Detected     Parainfluenza Virus 1 Not Detected     Parainfluenza Virus 2 Not Detected     Parainfluenza Virus 3 Not Detected     Parainfluenza Virus 4 Not Detected     RSV, PCR Not Detected     Bordetella pertussis pcr Not Detected     Bordetella parapertussis PCR Not Detected     Chlamydophila pneumoniae PCR Not Detected     Mycoplasma pneumo by PCR Not Detected    Narrative:      In the setting of a positive respiratory panel with a viral infection PLUS a negative procalcitonin without other underlying concern for bacterial infection, consider observing off antibiotics or discontinuation of antibiotics and continue supportive care. If the respiratory panel  is positive for atypical bacterial infection (Bordetella pertussis, Chlamydophila pneumoniae, or Mycoplasma pneumoniae), consider antibiotic de-escalation to target atypical bacterial infection.    Blood Culture - Blood, Hand, Right [[id number]]  (Normal) Collected: 05/15/24 0827    Lab Status: Final result Specimen: Blood from Hand, Right Updated: 05/20/24 0845     Blood Culture No growth at 5 days    Blood Culture - Blood, Arm, Left [[id number]]  (Normal) Collected: 05/15/24 0811    Lab Status: Final result Specimen: Blood from Arm, Left Updated: 05/20/24 0845     Blood Culture No growth at 5 days    COVID PRE-OP / PRE-PROCEDURE SCREENING ORDER (NO ISOLATION) - Swab, Nasopharynx [[id number]]  (Normal) Collected: 05/15/24 0755    Lab Status: Final result Specimen: Swab from Nasopharynx Updated: 05/15/24 0909    Narrative:      The following orders were created for panel order COVID PRE-OP / PRE-PROCEDURE SCREENING ORDER (NO ISOLATION) - Swab, Nasopharynx.  Procedure                               Abnormality         Status                     ---------                               -----------         ------                     COVID-19 and FLU A/B PCR...[[id number]]  Normal              Final result                 Please view results for these tests on the individual orders.    COVID-19 and FLU A/B PCR, 1 HR TAT - Swab, Nasopharynx [[id number]]  (Normal) Collected: 05/15/24 0755    Lab Status: Final result Specimen: Swab from Nasopharynx Updated: 05/15/24 0909     COVID19 Not Detected     Influenza A PCR Not Detected     Influenza B PCR Not Detected    Narrative:      Fact sheet for providers: https://www.fda.gov/media/864367/download    Fact sheet for patients: https://www.fda.gov/media/818326/download    Test performed by PCR.    Blood Culture - Blood, Arm, Right [[id number]]  (Normal) Collected: 05/12/24 2326    Lab Status: Final result Specimen: Blood from Arm, Right Updated: 05/17/24 2345     Blood Culture No  growth at 5 days    Blood Culture - Blood, Hand, Left [[id number]]  (Normal) Collected: 05/12/24 2325    Lab Status: Final result Specimen: Blood from Hand, Left Updated: 05/17/24 2345     Blood Culture No growth at 5 days    Blood Culture - Blood, Arm, Left [[id number]]  (Normal) Collected: 05/12/24 2021    Lab Status: Final result Specimen: Blood from Arm, Left Updated: 05/17/24 2030     Blood Culture No growth at 5 days    Urine Culture - Urine, Urine, Clean Catch [[id number]]  (Normal) Collected: 05/12/24 1648    Lab Status: Final result Specimen: Urine, Clean Catch Updated: 05/14/24 1033     Urine Culture No growth           Assessment & Plan     Neurology-alert awake oriented no active issues.  Continue to monitor mental status.      Respiratory- Acute hypoxic respiratory failure-FiO2 requirement reviewed and adjusted to maintain saturation 88 to 92%.      CT chest reviewed-shows bilateral pulmonary infiltrates likely due to  Aspiration pneumonia and/or pulmonary edema.     Latest-chest x-ray  showed left-sided pleural effusion.  Overall   pulmonary opacities seems to be improving.    Chest x-ray for today ordered.      VBG reviewed.  Stable.  Continue current antibiotics.    Patient does have a history of asthma-and responded well to steroids  Tapering now    Continue DuoNebs        Cardiology- hemodynamically -stable     Continue to monitor HR- rate and rhythm, BP     Nephrology- Cr and BUN stable  I/O-reviewed    GI- PPI prophylaxis  Aspiration precautions    Hematology- CBC  Hb transfuse for hemoglobin less than 7  platelet  WBC    ID  Culture  And Antibiotics    Endrocrinology- Maintain Blood sugar 140 -180  TSH Results:      Lab 05/16/24  1034   TSH 1.840        Electrolytes-   Mag and phos       DVT prophylaxis-    Bedside rounds were done with RT and patient's nurse. All the lab and clinical findings were discussed with them and plan was also discussed in great detail.        Echo-  Results for orders  placed during the hospital encounter of 05/12/24    Adult Transthoracic Echo Complete W/ Cont if Necessary Per Protocol    Interpretation Summary  Images from the original result were not included.      Normal left ventricular cavity size and wall thickness noted. All left ventricular wall segments contract normally.    Left ventricular ejection fraction appears to be 61 - 65%.    Left ventricular diastolic function is consistent with (grade II w/high LAP) pseudonormalization.    There is tethering of both anterior and posterior mitral leaflets with reverse hockey-stick appearance of the anterior mitral leaflet suspicious for rheumatic mitral valve disease.    There is moderate, bileaflet mitral valve thickening present at the tip of the leaflets (commissure).  Tiera score of 8    MV max PG 22.3 mmHg MV mean PG 14 mmHg MV V2 VTI 69.8 cm MVA(VTI) 1.08 cm2 MV dec slope 687 cm/sec2 MV dec time 0.39 sec at a heart rate of about 95 bpm.    Moderate to severe mitral valve stenosis is present. The calculated mitral valve Broderick' score is 7.    Moderate mitral valve regurgitation is present with a centrally-directed jet noted. The calculated mitral valve morphology score is 7.    The aortic valve was poorly visualized but appears trileaflet. Mild aortic valve regurgitation is present. No aortic valve stenosis is present.    Tricuspid valve not well visualized. Mild to moderate tricuspid valve regurgitation is present. Estimated right ventricular systolic pressure from tricuspid regurgitation is markedly elevated (>55 mmHg).    Severe pulmonary hypertension is present.    The IVC is mildly dilated with partial collapse indicator of right atrial pressures of about 15 mmHg.    There is no evidence of pericardial effusion. .    Comments: The peak and mean gradients across the mitral valve seem to be about the same as on the transthoracic echo back in August of 2020 given the heart rate in the 90s although the mitral valve  stenosis seem to have improved on the DONNA done at that time with improvement in the heart rate.  The degree of mitral regurgitation seem to have gotten worse.  Hence would consider repeating the study with focus on the mitral valve after improving the heart rate.  If patient still has significantly elevated gradients across the mitral valve, may have to consider referral to [facility] for possible mitral valvuloplasty.           Acute pancreatitis          Edmundo Zapata MD  05/22/24  12:40 EDT

## 2024-05-22 NOTE — PROGRESS NOTES
Patient Identification:  Name:  Manda Al  Age:  43 y.o.  Sex:  female  :  1980  MRN:  [id number]  Visit Number:  [id number]  Primary Care Provider:  Mi Rivera MD    Length of stay:  10    Subjective/Interval History/Consultants/Procedures     Chief Complaint:   Chief Complaint   Patient presents with    Flank Pain       Subjective/Interval History:    43 y.o. female who was admitted on 2024 with acute pancreatitis     PMH is significant for anxiety, schizoaffective disorder- bipolar type, HTN, Crohn's disease   For complete admission information, please see history and physical.     Consultations:  Pulmonology   Infectious disease   Cardiology    Procedures/Scans:  CT abdomen and pelvis w & w/o contrast x 2  CXR x 4  VQ scan  Bilateral lower extremity venous Doppler US  CT PE protocol   TTE    Today, the patient was seen and examined, resting comfortably. She reports again feeling improved today compared to last encounter. Reported good diuresis overnight with lasix dosed yesterday. No chest pain, no shortness of breath or cough. No constipation, no difficulty with urination.  Step down to telemetry today.    Room location at the time of evaluation was 219.    ----------------------------------------------------------------------------------------------------------------------  Current Hospital Meds:  Cariprazine HCl, 3 mg, Oral, Daily  cetirizine, 10 mg, Oral, Daily  doxycycline, 100 mg, Intravenous, Q12H  FLUoxetine, 10 mg, Oral, Daily  insulin glargine, 20 Units, Subcutaneous, Daily  insulin lispro, 2-9 Units, Subcutaneous, 4x Daily AC & at Bedtime  ipratropium-albuterol, 3 mL, Nebulization, 4x Daily - RT  [START ON 2024] metoprolol succinate XL, 50 mg, Oral, Q24H  nicotine, 1 patch, Transdermal, Q24H  potassium chloride ER, 40 mEq, Oral, Q4H  predniSONE, 20 mg, Oral, Daily With Breakfast  sodium chloride, 500 mL, Intravenous, Once  sodium chloride, 10 mL, Intravenous,  Q12H  sodium chloride, 10 mL, Intravenous, Q12H  sodium chloride, 10 mL, Intravenous, Q12H         ----------------------------------------------------------------------------------------------------------------------      Objective     Vital Signs:  Temp:  [97.6 °F (36.4 °C)-98.3 °F (36.8 °C)] 97.8 °F (36.6 °C)  Heart Rate:  [] 86  Resp:  [10-32] 15  BP: (108-149)/(67-97) 112/82      05/20/24  0400 05/21/24  0400 05/22/24  0500   Weight: 105 kg (231 lb 7.7 oz) 106 kg (233 lb 7.5 oz) 106 kg (234 lb 9.1 oz)     Body mass index is 35.67 kg/m².    Intake/Output Summary (Last 24 hours) at 5/22/2024 1236  Last data filed at 5/22/2024 1200  Gross per 24 hour   Intake 1501 ml   Output 7700 ml   Net -6199 ml     I/O this shift:  In: 200 [P.O.:200]  Out: -   Diet: Regular/House; Fluid Consistency: Thin (IDDSI 0)  ----------------------------------------------------------------------------------------------------------------------    Physical Exam  Vitals and nursing note reviewed.   Constitutional:       General: She is not in acute distress.  HENT:      Head: Normocephalic and atraumatic.   Eyes:      Extraocular Movements: Extraocular movements intact.   Cardiovascular:      Rate and Rhythm: Regular rhythm. Tachycardia present.   Pulmonary:      Effort: Pulmonary effort is normal.      Breath sounds: Normal breath sounds.   Abdominal:      Palpations: Abdomen is soft.   Musculoskeletal:      Right lower leg: No edema.      Left lower leg: No edema.   Skin:     General: Skin is warm and dry.   Neurological:      Mental Status: She is alert. Mental status is at baseline.   Psychiatric:         Mood and Affect: Mood normal.         Behavior: Behavior normal.                ----------------------------------------------------------------------------------------------------------------------  Tele:         ----------------------------------------------------------------------------------------------------------------------  Results from last 7 days   Lab Units 05/21/24  0844   HSTROP T ng/L 6   PROBNP pg/mL 4,838.0*     Results from last 7 days   Lab Units 05/22/24  0054 05/20/24  0209 05/19/24  1613 05/19/24  1321 05/19/24  1015 05/19/24  0730 05/19/24  0031 05/18/24  0021 05/17/24  0036 05/16/24  1034   CRP mg/dL  --   --   --   --   --   --   --  15.07*  --  9.82*   LACTATE mmol/L  --   --  3.2* 4.4* 3.2*   < >  --   --   --   --    WBC 10*3/mm3 9.55 4.42  --   --   --   --  4.02 4.06 3.52  --    HEMOGLOBIN g/dL 11.0* 9.6*  --   --   --   --  9.4* 9.8* 11.1*  --    HEMATOCRIT % 33.2* 28.8*  --   --   --   --  28.5* 29.2* 32.4*  --    MCV fL 91.0 91.4  --   --   --   --  92.2 91.0 88.3  --    MCHC g/dL 33.1 33.3  --   --   --   --  33.0 33.6 34.3  --    PLATELETS 10*3/mm3 391 169  --   --   --   --  111* 103* 109*  --    INR   --   --   --   --   --   --   --   --  1.26*  --     < > = values in this interval not displayed.     Results from last 7 days   Lab Units 05/16/24  1926   PH, ARTERIAL pH units 7.467*   PO2 ART mm Hg 61.5*   PCO2, ARTERIAL mm Hg 26.9*   HCO3 ART mmol/L 19.4*     Results from last 7 days   Lab Units 05/22/24  1012 05/22/24  0054 05/21/24  0844 05/20/24  0209 05/19/24  0031 05/18/24  1036 05/18/24  0021   SODIUM mmol/L  --  139 142 140 138  --  140   POTASSIUM mmol/L 3.3* 3.2* 4.0 4.2 4.1   < > 3.5   CHLORIDE mmol/L  --  104 113* 111* 110*  --  110*   CO2 mmol/L  --  24.0 15.9* 18.2* 17.7*  --  20.9*   BUN mg/dL  --  16 17 15 12  --  10   CREATININE mg/dL  --  1.21* 1.11* 1.12* 1.12*  --  1.14*   CALCIUM mg/dL  --  8.5* 8.2* 8.3* 7.9*  --  7.5*   GLUCOSE mg/dL  --  85 205* 320* 294*  --  159*   ALBUMIN g/dL  --   --   --  2.7* 2.5*  --  2.3*   BILIRUBIN mg/dL  --   --   --  0.4 0.5  --  0.5   ALK PHOS U/L  --   --   --  79 85  --  73   AST (SGOT) U/L  --   --   --  20 30  --  55*   ALT  "(SGPT) U/L  --   --   --  24 30  --  36*    < > = values in this interval not displayed.   Estimated Creatinine Clearance: 77.2 mL/min (A) (by C-G formula based on SCr of 1.21 mg/dL (H)).  No results found for: \"AMMONIA\"      Blood Culture   Date Value Ref Range Status   05/18/2024 No growth at 3 days  Preliminary   05/18/2024 No growth at 3 days  Preliminary     No results found for: \"URINECX\"  No results found for: \"WOUNDCX\"  No results found for: \"STOOLCX\"  ----------------------------------------------------------------------------------------------------------------------  Imaging Results (Last 24 Hours)       ** No results found for the last 24 hours. **          ----------------------------------------------------------------------------------------------------------------------   I have reviewed the above laboratory values for 05/22/24    Assessment/Plan     Active Hospital Problems    Diagnosis  POA    **Acute pancreatitis [K85.90]  Yes         ASSESSMENT/PLAN:    Recurrent fevers  Suspect sepsis secondary to tickborne illness  Later in admission patient had onset of recurrent fevers starting on 5/14.  Later developed tachycardia, soft BP as well and was started on broad-spectrum antibiotics on 515 with vancomycin, Zosyn.  CT of the abdomen and pelvis on that date showed resolved pancreatitis with no necrosis or cyst.  Patient did report recently with tick on her person which she had removed about a week before presentation.  IV doxycycline was started on 5/16 and vancomycin was discontinued.  Tick serologies sent- lyme total antibody not performed due to hemolyzed specimen. Ehrlichia negative. Rickettsia DNA pend.   Other infectious workup including CXR, UA, blood cultures were negative.  D-dimer was elevated, greater than 20 though VTE workup negative.  Hepatitis and HIV panels negative.  ANCA panel was sent and negative.  CT PE protocol showed edema versus multifocal pneumonia.  Patient was started on " micafungin as well. Fungitell negative. Fungal antibodies pend.   Infectious disease consulted and following, assistance appreciated.  For now recommending continued to Zosyn, doxycycline, micafungin pending further test results.  Overall patient appears to be improving, vital signs stable, remaining afebrile for over 48 hours.     Acute hypoxic respiratory failure, now resolved, suspect due to pulmonary edema vs  multifocal pneumonia  Overnight 5/17 patient developed new hypoxia was briefly titrated up to 4 L per nasal cannula then weaned to 2 L.  CT PE at that time as above showed concern for edema versus pneumonia.  Antimicrobials were continued as above  Diuresis held given BP soft at the time  Pulmonology was consulted and following, assistance appreciated.  Recommended continue current antibiotics and started Solu-Medrol- now tapering with prednisone.   Fluids discontinued today given worsening shortness of breath. Continuing diuresis as below.  Repeat CXR ordered for today.     Moderate to severe mitral valve stenosis   Severe pulmonary hypertension, suspect 2/2 above  Acute HFpEF due to mitral stenosis   TTE ordered given respiratory failure, concern for volume overload showed significant mitral stenosis and patient reports hx rheumatic fever.  Cardiology consulted for further assistance and recommendations, appreciated.   Plan to reassess mitral valve once HR at goal. Cardiology adjusting metoprolol.   Continuing to diurese as well given BNP elevated at 4838.      Acute, recurrent pancreatitis  Resolved and tolerating diet     Mild hepatocellular transaminitis, resolved  Mild thrombocytopenia, resolved  Suspect secondary to tickborne illness.  LFTs, platelet count WNL today.     Hyponatremia, resolved  Hypokalemia  Replace per protocol      Recent UTI  culture without growth this admission     T2DM with expected hyperglycemia due to steroids  Continue Lantus, moderate dose correction insulin.  Titrating as  needed.  Trend improved     Chronic:  Anxiety/depression  Schizoaffective disorder  HTN  Crohn's disease    -----------  -DVT prophylaxis: SCDs  -Disposition plans/anticipated needs: step down to tele today. Nearing stability for discharge home once work up complete.        The patient is high risk due to the following diagnoses/reasons:  acute HFpEF, severe mitral stenosis, concern for tickborne illness         Brenton Alves PA-C  05/22/24  12:36 EDT

## 2024-05-23 LAB
A FLAVUS AB SER QL ID: NEGATIVE
A FUMIGATUS AB SER QL ID: NEGATIVE
A NIGER AB SER QL ID: NEGATIVE
ANION GAP SERPL CALCULATED.3IONS-SCNC: 11.2 MMOL/L (ref 5–15)
B DERMAT AB TITR SER: NEGATIVE {TITER}
BACTERIA SPEC AEROBE CULT: NORMAL
BACTERIA SPEC AEROBE CULT: NORMAL
BUN SERPL-MCNC: 17 MG/DL (ref 6–20)
BUN/CREAT SERPL: 17.7 (ref 7–25)
CALCIUM SPEC-SCNC: 8.2 MG/DL (ref 8.6–10.5)
CHLORIDE SERPL-SCNC: 104 MMOL/L (ref 98–107)
CO2 SERPL-SCNC: 24.8 MMOL/L (ref 22–29)
CREAT SERPL-MCNC: 0.96 MG/DL (ref 0.57–1)
EGFRCR SERPLBLD CKD-EPI 2021: 75.9 ML/MIN/1.73
GLUCOSE BLDC GLUCOMTR-MCNC: 229 MG/DL (ref 70–130)
GLUCOSE BLDC GLUCOMTR-MCNC: 281 MG/DL (ref 70–130)
GLUCOSE BLDC GLUCOMTR-MCNC: 98 MG/DL (ref 70–130)
GLUCOSE SERPL-MCNC: 177 MG/DL (ref 65–99)
POTASSIUM SERPL-SCNC: 3.8 MMOL/L (ref 3.5–5.2)
RICKETTSIA RICKETTSII DNA, RT: NOT DETECTED
SODIUM SERPL-SCNC: 140 MMOL/L (ref 136–145)

## 2024-05-23 PROCEDURE — 63710000001 PREDNISONE PER 1 MG: Performed by: INTERNAL MEDICINE

## 2024-05-23 PROCEDURE — 99232 SBSQ HOSP IP/OBS MODERATE 35: CPT | Performed by: NURSE PRACTITIONER

## 2024-05-23 PROCEDURE — 63710000001 INSULIN LISPRO (HUMAN) PER 5 UNITS: Performed by: INTERNAL MEDICINE

## 2024-05-23 PROCEDURE — 99232 SBSQ HOSP IP/OBS MODERATE 35: CPT | Performed by: INTERNAL MEDICINE

## 2024-05-23 PROCEDURE — 94664 DEMO&/EVAL PT USE INHALER: CPT

## 2024-05-23 PROCEDURE — 25010000002 PIPERACILLIN SOD-TAZOBACTAM PER 1 G: Performed by: NURSE PRACTITIONER

## 2024-05-23 PROCEDURE — 25010000002 FUROSEMIDE PER 20 MG: Performed by: INTERNAL MEDICINE

## 2024-05-23 PROCEDURE — 94799 UNLISTED PULMONARY SVC/PX: CPT

## 2024-05-23 PROCEDURE — 80048 BASIC METABOLIC PNL TOTAL CA: CPT

## 2024-05-23 PROCEDURE — 99233 SBSQ HOSP IP/OBS HIGH 50: CPT | Performed by: NURSE PRACTITIONER

## 2024-05-23 PROCEDURE — 82948 REAGENT STRIP/BLOOD GLUCOSE: CPT

## 2024-05-23 PROCEDURE — 63710000001 INSULIN GLARGINE PER 5 UNITS

## 2024-05-23 PROCEDURE — 94761 N-INVAS EAR/PLS OXIMETRY MLT: CPT

## 2024-05-23 RX ORDER — FUROSEMIDE 40 MG/1
40 TABLET ORAL DAILY
Status: DISCONTINUED | OUTPATIENT
Start: 2024-05-24 | End: 2024-05-24 | Stop reason: HOSPADM

## 2024-05-23 RX ORDER — IPRATROPIUM BROMIDE AND ALBUTEROL SULFATE 2.5; .5 MG/3ML; MG/3ML
3 SOLUTION RESPIRATORY (INHALATION) EVERY 6 HOURS PRN
Status: DISCONTINUED | OUTPATIENT
Start: 2024-05-23 | End: 2024-05-24 | Stop reason: HOSPADM

## 2024-05-23 RX ORDER — DOXYCYCLINE 100 MG/1
100 CAPSULE ORAL EVERY 12 HOURS SCHEDULED
Qty: 14 CAPSULE | Refills: 0 | Status: DISCONTINUED | OUTPATIENT
Start: 2024-05-23 | End: 2024-05-24 | Stop reason: HOSPADM

## 2024-05-23 RX ORDER — AMOXICILLIN AND CLAVULANATE POTASSIUM 875; 125 MG/1; MG/1
1 TABLET, FILM COATED ORAL EVERY 12 HOURS SCHEDULED
Qty: 14 TABLET | Refills: 0 | Status: DISCONTINUED | OUTPATIENT
Start: 2024-05-23 | End: 2024-05-24 | Stop reason: HOSPADM

## 2024-05-23 RX ORDER — FUROSEMIDE 10 MG/ML
20 INJECTION INTRAMUSCULAR; INTRAVENOUS ONCE
Status: COMPLETED | OUTPATIENT
Start: 2024-05-23 | End: 2024-05-23

## 2024-05-23 RX ORDER — METOPROLOL SUCCINATE 50 MG/1
100 TABLET, EXTENDED RELEASE ORAL
Status: DISCONTINUED | OUTPATIENT
Start: 2024-05-23 | End: 2024-05-24

## 2024-05-23 RX ORDER — DOXYCYCLINE 100 MG/1
100 CAPSULE ORAL EVERY 12 HOURS SCHEDULED
Qty: 14 CAPSULE | Refills: 0 | Status: SHIPPED | OUTPATIENT
Start: 2024-05-23 | End: 2024-05-30

## 2024-05-23 RX ORDER — AMOXICILLIN AND CLAVULANATE POTASSIUM 875; 125 MG/1; MG/1
1 TABLET, FILM COATED ORAL EVERY 12 HOURS SCHEDULED
Qty: 14 TABLET | Refills: 0 | Status: SHIPPED | OUTPATIENT
Start: 2024-05-23 | End: 2024-05-30

## 2024-05-23 RX ORDER — POTASSIUM CHLORIDE 1.5 G/1.58G
40 POWDER, FOR SOLUTION ORAL 2 TIMES DAILY
Status: COMPLETED | OUTPATIENT
Start: 2024-05-23 | End: 2024-05-23

## 2024-05-23 RX ADMIN — PREDNISONE 20 MG: 20 TABLET ORAL at 09:09

## 2024-05-23 RX ADMIN — Medication 10 ML: at 21:14

## 2024-05-23 RX ADMIN — OXYCODONE HYDROCHLORIDE 5 MG: 10 TABLET ORAL at 11:17

## 2024-05-23 RX ADMIN — METOPROLOL SUCCINATE 100 MG: 50 TABLET, EXTENDED RELEASE ORAL at 09:09

## 2024-05-23 RX ADMIN — OXYCODONE HYDROCHLORIDE 5 MG: 10 TABLET ORAL at 21:13

## 2024-05-23 RX ADMIN — POTASSIUM CHLORIDE 40 MEQ: 1.5 POWDER, FOR SOLUTION ORAL at 21:13

## 2024-05-23 RX ADMIN — INSULIN GLARGINE 20 UNITS: 100 INJECTION, SOLUTION SUBCUTANEOUS at 09:10

## 2024-05-23 RX ADMIN — DOXYCYCLINE 100 MG: 100 CAPSULE ORAL at 13:19

## 2024-05-23 RX ADMIN — DOXYCYCLINE 100 MG: 100 INJECTION, POWDER, LYOPHILIZED, FOR SOLUTION INTRAVENOUS at 00:11

## 2024-05-23 RX ADMIN — Medication 10 ML: at 09:10

## 2024-05-23 RX ADMIN — AMOXICILLIN AND CLAVULANATE POTASSIUM 1 TABLET: 875; 125 TABLET, FILM COATED ORAL at 13:19

## 2024-05-23 RX ADMIN — POTASSIUM CHLORIDE 40 MEQ: 1.5 POWDER, FOR SOLUTION ORAL at 11:11

## 2024-05-23 RX ADMIN — DOXYCYCLINE 100 MG: 100 CAPSULE ORAL at 21:13

## 2024-05-23 RX ADMIN — INSULIN LISPRO 4 UNITS: 100 INJECTION, SOLUTION INTRAVENOUS; SUBCUTANEOUS at 21:31

## 2024-05-23 RX ADMIN — CARIPRAZINE 3 MG: 3 CAPSULE, GELATIN COATED ORAL at 09:09

## 2024-05-23 RX ADMIN — NICOTINE 1 PATCH: 14 PATCH, EXTENDED RELEASE TRANSDERMAL at 09:10

## 2024-05-23 RX ADMIN — CETIRIZINE HYDROCHLORIDE 10 MG: 10 TABLET, FILM COATED ORAL at 09:09

## 2024-05-23 RX ADMIN — PIPERACILLIN SODIUM AND TAZOBACTAM SODIUM 3.38 G: 3; .375 INJECTION, POWDER, LYOPHILIZED, FOR SOLUTION INTRAVENOUS at 09:09

## 2024-05-23 RX ADMIN — FUROSEMIDE 20 MG: 10 INJECTION, SOLUTION INTRAMUSCULAR; INTRAVENOUS at 11:11

## 2024-05-23 RX ADMIN — AMOXICILLIN AND CLAVULANATE POTASSIUM 1 TABLET: 875; 125 TABLET, FILM COATED ORAL at 21:13

## 2024-05-23 RX ADMIN — INSULIN LISPRO 5 UNITS: 100 INJECTION, SOLUTION INTRAVENOUS; SUBCUTANEOUS at 17:30

## 2024-05-23 RX ADMIN — IPRATROPIUM BROMIDE AND ALBUTEROL SULFATE 3 ML: 2.5; .5 SOLUTION RESPIRATORY (INHALATION) at 06:25

## 2024-05-23 RX ADMIN — FLUOXETINE HYDROCHLORIDE 10 MG: 10 CAPSULE ORAL at 09:09

## 2024-05-23 NOTE — PLAN OF CARE
Goal Outcome Evaluation:  Pt resting in bed with no s/s of distress. VSS. IV access maintained. Call light in reach. No requests at this time. Plan of care ongoing.

## 2024-05-23 NOTE — PAYOR COMM NOTE
"CONTACT: AGATA ROA RN  UTILIZATION MANAGEMENT DEPT.  [facility]  [address] [place] [address]  PHONE: [phone]  FAX: [phone]        CLINICAL UPDATE    REF#[id number]       Aminata Al (43 y.o. Female)       Date of Birth   1980    Social Security Number       Address   [address] [place], [place] [address]    Home Phone   [phone]    MRN   [id number]       Restoration   None    Marital Status                               Admission Date   5/12/24    Admission Type   Emergency    Admitting Provider   Adin Sauceda MD    Attending Provider   Kevin Woodard MD    Department, Room/Bed   [facility] 3 [facility], 3304/2S       Discharge Date       Discharge Disposition       Discharge Destination                                 Attending Provider: Kevin Woodard MD    Allergies: Imuran [Azathioprine]    Isolation: None   Infection: None   Code Status: CPR    Ht: 172.7 cm (68\")   Wt: 106 kg (233 lb 9.6 oz)    Admission Cmt: None   Principal Problem: Acute pancreatitis [K85.90]                   Active Insurance as of 5/12/2024       Primary Coverage       Payor Plan Insurance Group Employer/Plan Group    [employer] [place] [employer] [place]ICA[place]        Payor Plan Address Payor Plan Phone Number Payor Plan Fax Number Effective Dates    [address] [address] [phone]  12/2/2016 - None Entered    [facility] [id number]         Subscriber Name Subscriber Birth Date Member ID       AMINATA AL 1980 [id number]                     Emergency Contacts        (Rel.) Home Phone Work Phone Mobile Phone    Itzel Ness (Mother) -- -- [phone]    Tran Yee (Relative) [phone] -- --              Current Facility-Administered Medications   Medication Dose Route Frequency Provider Last Rate Last Admin    benzonatate (TESSALON) capsule 200 mg  200 mg Oral TID PRN Rohan Yee MD   200 mg at 05/18/24 0439    sennosides-docusate (PERICOLACE) " 8.6-50 MG per tablet 2 tablet  2 tablet Oral BID PRN Rohan Yee MD        And    polyethylene glycol (MIRALAX) packet 17 g  17 g Oral Daily PRN Rohan Yee MD        And    bisacodyl (DULCOLAX) EC tablet 5 mg  5 mg Oral Daily PRN Rohan Yee MD        And    bisacodyl (DULCOLAX) suppository 10 mg  10 mg Rectal Daily PRN Rohan Yee MD        butalbital-acetaminophen-caffeine (FIORICET, ESGIC) -40 MG per tablet 2 tablet  2 tablet Oral Q4H PRN Rohan Yee MD   2 tablet at 05/18/24 0234    Cariprazine HCl (VRAYLAR) capsule capsule 3 mg  3 mg Oral Daily Rohan Yee MD   3 mg at 05/22/24 0825    cetirizine (zyrTEC) tablet 10 mg  10 mg Oral Daily Rohan Yee MD   10 mg at 05/22/24 0825    dextrose (D50W) (25 g/50 mL) IV injection 25 g  25 g Intravenous Q15 Min PRN Rohan Yee MD        dextrose (GLUTOSE) oral gel 15 g  15 g Oral Q15 Min PRN Rohan Yee MD        doxycycline (VIBRAMYCIN) 100 mg in sodium chloride 0.9 % 100 mL IVPB-VTB  100 mg Intravenous Q12H Rohan Yee MD   100 mg at 05/23/24 0011    FLUoxetine (PROzac) capsule 10 mg  10 mg Oral Daily Rohan Yee MD   10 mg at 05/22/24 0825    glucagon HCl (Diagnostic) injection 1 mg  1 mg Intramuscular Q15 Min PRN Rohan Yee MD        guaiFENesin-dextromethorphan (ROBITUSSIN DM) 100-10 MG/5ML syrup 10 mL  10 mL Oral Q4H PRN Rohan Yee MD        insulin glargine (LANTUS, SEMGLEE) injection 20 Units  20 Units Subcutaneous Daily Brenton Alves PA-C   20 Units at 05/22/24 0824    Insulin Lispro (humaLOG) injection 2-9 Units  2-9 Units Subcutaneous 4x Daily AC & at Bedtime Rohan Yee MD   6 Units at 05/22/24 2145    ipratropium-albuterol (DUO-NEB) nebulizer solution 3 mL  3 mL Nebulization 4x Daily - RT Rohan Yee MD   3 mL at 05/23/24 0625    Magnesium Cardiology Dose Replacement - Follow Nurse / BPA Driven Protocol   Does not apply PRN Rohan Yee MD        metoprolol succinate XL  (TOPROL-XL) 24 hr tablet 50 mg  50 mg Oral Q24H Bess Montgomery MD        nicotine (NICODERM CQ) 14 MG/24HR patch 1 patch  1 patch Transdermal Q24H Rohan Yee MD   1 patch at 05/22/24 0826    oxyCODONE (ROXICODONE) immediate release tablet 5 mg  5 mg Oral Q4H PRN Rohan Yee MD   5 mg at 05/22/24 1633    Potassium Replacement - Follow Nurse / BPA Driven Protocol   Does not apply PRN Rohan Yee MD        predniSONE (DELTASONE) tablet 20 mg  20 mg Oral Daily With Breakfast Edmundo Zapata MD   20 mg at 05/22/24 0833    promethazine (PHENERGAN) tablet 12.5 mg  12.5 mg Oral Q6H PRN Rohan Yee MD   12.5 mg at 05/19/24 2151    sodium chloride 0.9 % bolus 500 mL  500 mL Intravenous Once Rohan Yee  mL/hr at 05/16/24 0706 Currently Infusing at 05/16/24 0706    sodium chloride 0.9 % flush 10 mL  10 mL Intravenous PRN Rohan Yee MD        sodium chloride 0.9 % flush 10 mL  10 mL Intravenous Q12H Rohan Yee MD   10 mL at 05/22/24 2146    sodium chloride 0.9 % flush 10 mL  10 mL Intravenous PRN Rohan Yee MD        sodium chloride 0.9 % flush 10 mL  10 mL Intravenous Q12H Rohan Yee MD   10 mL at 05/22/24 2146    sodium chloride 0.9 % flush 10 mL  10 mL Intravenous PRN Rohan Yee MD   10 mL at 05/14/24 0737    sodium chloride 0.9 % flush 10 mL  10 mL Intravenous Q12H Rohan Yee MD   10 mL at 05/22/24 2146    sodium chloride 0.9 % flush 10 mL  10 mL Intravenous PRN Rohan Yee MD        sodium chloride 0.9 % infusion 40 mL  40 mL Intravenous PRN Rohan Yee MD        sodium chloride 0.9 % infusion 40 mL  40 mL Intravenous PRN Rohan Yee MD        sodium chloride 0.9 % infusion 40 mL  40 mL Intravenous PRN Rohan Yee MD         Orders (last 48 hrs)        Start     Ordered    05/23/24 0900  metoprolol succinate XL (TOPROL-XL) 24 hr tablet 50 mg  Every 24 Hours Scheduled         05/22/24 1202    05/23/24 0600  Basic Metabolic Panel  Morning Draw          05/22/24 1256    05/22/24 2034  Potassium  Timed         05/22/24 1133    05/22/24 1915  POC Glucose Once  PROCEDURE ONCE        Comments: Complete no more than 45 minutes prior to patient eating      05/22/24 1908    05/22/24 1733  POC Glucose Once  PROCEDURE ONCE        Comments: Complete no more than 45 minutes prior to patient eating      05/22/24 1726    05/22/24 1636  POC Glucose Once  PROCEDURE ONCE        Comments: Complete no more than 45 minutes prior to patient eating      05/22/24 1628    05/22/24 1300  furosemide (LASIX) injection 20 mg  Once         05/22/24 1202    05/22/24 1243  XR Chest 1 View  1 Time Imaging         05/22/24 1243    05/22/24 1230  potassium chloride (KLOR-CON M20) CR tablet 40 mEq  Every 4 Hours         05/22/24 1133    05/22/24 1138  POC Glucose Once  PROCEDURE ONCE        Comments: Complete no more than 45 minutes prior to patient eating      05/22/24 1130    05/22/24 1038  Potassium  Timed         05/22/24 0137    05/22/24 0846  Transfer Patient  Once         05/22/24 0845    05/22/24 0846  Cardiac Monitoring  Continuous        Comments: Follow Standing Orders As Outlined in Process Instructions (Open Order Report to View Full Instructions)    05/22/24 0845    05/22/24 0800  predniSONE (DELTASONE) tablet 20 mg  Daily With Breakfast         05/21/24 1344    05/22/24 0641  POC Glucose Once  PROCEDURE ONCE        Comments: Complete no more than 45 minutes prior to patient eating      05/22/24 0633    05/22/24 0600  Basic Metabolic Panel  Morning Draw         05/21/24 1342    05/22/24 0600  CBC & Differential  Morning Draw         05/21/24 1349    05/22/24 0600  CBC Auto Differential  PROCEDURE ONCE         05/21/24 2202    05/22/24 0230  potassium chloride (KLOR-CON M20) CR tablet 40 mEq  Every 4 Hours         05/22/24 0137    05/21/24 2151  POC Glucose Once  PROCEDURE ONCE        Comments: Complete no more than 45 minutes prior to patient eating      05/21/24 2144    05/21/24 2030   furosemide (LASIX) injection 40 mg  Once         05/21/24 1940 05/21/24 2015  metoprolol succinate XL (TOPROL-XL) 24 hr tablet 25 mg  Every 24 Hours Scheduled,   Status:  Discontinued         05/21/24 1917 05/21/24 1919  Please call the results of proBNP that I just ordered.  Thanks  Nursing Communication  Once        Comments: Please call the results of proBNP that I just ordered.  Thanks    05/21/24 1918 05/21/24 1917  BNP  Once         05/21/24 1917 05/21/24 1715  POC Glucose Once  PROCEDURE ONCE        Comments: Complete no more than 45 minutes prior to patient eating      05/21/24 1706    05/21/24 1430  furosemide (LASIX) injection 20 mg  Once         05/21/24 1342    05/21/24 1343  Basic Metabolic Panel  Once         05/21/24 1342    05/21/24 1125  POC Glucose Once  PROCEDURE ONCE        Comments: Complete no more than 45 minutes prior to patient eating      05/21/24 1117    05/21/24 1044  High Sensitivity Troponin T 2Hr  PROCEDURE ONCE,   Status:  Canceled         05/21/24 0925    05/21/24 0900  insulin glargine (LANTUS, SEMGLEE) injection 20 Units  Daily         05/20/24 1252    05/21/24 0826  ECG 12 Lead Chest Pain  STAT         05/21/24 0825    05/21/24 0826  High Sensitivity Troponin T  STAT         05/21/24 0825    05/21/24 0728  Inpatient Cardiology Consult  Once        Specialty:  Cardiology  Provider:  Bess Montgomery MD    05/21/24 0728    05/21/24 0641  POC Glucose Once  PROCEDURE ONCE        Comments: Complete no more than 45 minutes prior to patient eating      05/21/24 0634    05/19/24 1700  POC Glucose 4x Daily Before Meals & at Bedtime  4 Times Daily Before Meals & at Bedtime      Comments: Complete no more than 45 minutes prior to patient eating      05/19/24 1125    05/19/24 1600  methylPREDNISolone sodium succinate (SOLU-Medrol) injection 20 mg  Every 12 Hours,   Status:  Discontinued         05/19/24 1251    05/19/24 1545  sodium chloride 0.9 % infusion  Continuous,   Status:   Discontinued         05/19/24 1448    05/19/24 1215  Insulin Lispro (humaLOG) injection 2-9 Units  4 Times Daily Before Meals & Nightly         05/19/24 1125    05/19/24 1125  dextrose (GLUTOSE) oral gel 15 g  Every 15 Minutes PRN         05/19/24 1125    05/19/24 1125  dextrose (D50W) (25 g/50 mL) IV injection 25 g  Every 15 Minutes PRN         05/19/24 1125    05/19/24 1125  glucagon HCl (Diagnostic) injection 1 mg  Every 15 Minutes PRN         05/19/24 1125    05/18/24 0900  micafungin sodium (MYCAMINE) 100 mg in sodium chloride 0.9 % 100 mL IVPB  Every 24 Hours         05/18/24 0702    05/18/24 0846  promethazine (PHENERGAN) tablet 12.5 mg  Every 6 Hours PRN         05/18/24 0846    05/18/24 0350  guaiFENesin-dextromethorphan (ROBITUSSIN DM) 100-10 MG/5ML syrup 10 mL  Every 4 Hours PRN         05/18/24 0350    05/18/24 0350  benzonatate (TESSALON) capsule 200 mg  3 Times Daily PRN         05/18/24 0350    05/17/24 1742  butalbital-acetaminophen-caffeine (FIORICET, ESGIC) -40 MG per tablet 2 tablet  Every 4 Hours PRN         05/17/24 1743    05/16/24 2100  sodium chloride 0.9 % flush 10 mL  Every 12 Hours Scheduled         05/16/24 1604    05/16/24 2100  mupirocin (BACTROBAN) 2 % nasal ointment 1 Application  2 Times Daily         05/16/24 1604    05/16/24 1900  ipratropium-albuterol (DUO-NEB) nebulizer solution 3 mL  4 Times Daily - RT         05/16/24 1844    05/16/24 1604  sodium chloride 0.9 % infusion 40 mL  As Needed         05/16/24 1604    05/16/24 1604  sodium chloride 0.9 % flush 10 mL  As Needed         05/16/24 1604    05/16/24 1400  piperacillin-tazobactam (ZOSYN) IVPB 3.375 g IVPB in 100 mL NS (VTB)  Every 8 Hours         05/16/24 0703    05/16/24 1300  doxycycline (VIBRAMYCIN) 100 mg in sodium chloride 0.9 % 100 mL IVPB-VTB  Every 12 Hours         05/16/24 1223    05/16/24 1215  nicotine (NICODERM CQ) 14 MG/24HR patch 1 patch  Every 24 Hours Scheduled         05/16/24 1128    05/16/24 0800   "sodium chloride 0.9 % bolus 500 mL  Once         05/16/24 0702    05/14/24 1144  oxyCODONE (ROXICODONE) immediate release tablet 5 mg  Every 4 Hours PRN         05/14/24 1145    05/13/24 2100  sodium chloride 0.9 % flush 10 mL  Every 12 Hours Scheduled         05/13/24 1408    05/13/24 1408  sodium chloride 0.9 % flush 10 mL  As Needed         05/13/24 1408    05/13/24 1408  sodium chloride 0.9 % infusion 40 mL  As Needed         05/13/24 1408    05/13/24 0900  Cariprazine HCl (VRAYLAR) capsule capsule 3 mg  Daily         05/12/24 2152 05/13/24 0900  cetirizine (zyrTEC) tablet 10 mg  Daily         05/12/24 2152 05/13/24 0900  FLUoxetine (PROzac) capsule 10 mg  Daily         05/12/24 2152 05/13/24 0800  Oral Care  2 Times Daily       05/12/24 2048 05/12/24 2145  sodium chloride 0.9 % flush 10 mL  Every 12 Hours Scheduled         05/12/24 2048 05/12/24 2049  Daily Weights  Daily       05/12/24 2048 05/12/24 2048  sodium chloride 0.9 % flush 10 mL  As Needed         05/12/24 2048 05/12/24 2048  sodium chloride 0.9 % infusion 40 mL  As Needed         05/12/24 2048 05/12/24 2048  sennosides-docusate (PERICOLACE) 8.6-50 MG per tablet 2 tablet  2 Times Daily PRN        Placed in \"And\" Linked Group    05/12/24 2048 05/12/24 2048  polyethylene glycol (MIRALAX) packet 17 g  Daily PRN        Placed in \"And\" Linked Group    05/12/24 2048 05/12/24 2048  bisacodyl (DULCOLAX) EC tablet 5 mg  Daily PRN        Placed in \"And\" Linked Group    05/12/24 2048 05/12/24 2048  bisacodyl (DULCOLAX) suppository 10 mg  Daily PRN        Placed in \"And\" Linked Group    05/12/24 2048 05/12/24 1931  Potassium Replacement - Follow Nurse / BPA Driven Protocol  As Needed         05/12/24 1931    05/12/24 1931  Magnesium Cardiology Dose Replacement - Follow Nurse / BPA Driven Protocol  As Needed         05/12/24 1931    05/12/24 1619  sodium chloride 0.9 % flush 10 mL  As Needed        Placed in \"And\" Linked Group "    05/12/24 1619    Unscheduled  Up With Assistance  As Needed       05/12/24 2048    Unscheduled  Extravasation Standing Orders - Encourage Active Range of Motion After 48 Hours  As Needed       05/12/24 2159    Unscheduled  Change Dressing to IV Site As Needed When Damp, Loose or Soiled  As Needed       05/13/24 1408    Unscheduled  Change Needleless Connectors  As Needed      Comments: Change Needleless Connectors When:  - Administration Set Changed  - Dressing Changed  - Removed For Any Reason  - Residual Blood or Debris Within Connector  - Prior to Drawing Blood Cultures  - Contamination of Connector  - After Administration of Blood or Blood Components    05/13/24 1408    Unscheduled  Change Dressing to IV Site As Needed When Damp, Loose or Soiled  As Needed       05/16/24 1604    Unscheduled  Change Needleless Connectors  As Needed      Comments: Change Needleless Connectors When:  - Administration Set Changed  - Dressing Changed  - Removed For Any Reason  - Residual Blood or Debris Within Connector  - Prior to Drawing Blood Cultures  - Contamination of Connector  - After Administration of Blood or Blood Components    05/16/24 1604    Unscheduled  Oxygen Therapy- Nasal Cannula; Titrate 1-6 LPM Per SpO2; 90 - 95%  Continuous PRN       05/18/24 0347    Unscheduled  Follow Hypoglycemia Standing Orders For Blood Glucose <70 & Notify Provider of Treatment  As Needed      Comments: Follow Hypoglycemia Orders As Outlined in Process Instructions (Open Order Report to View Full Instructions)  Notify Provider Any Time Hypoglycemia Treatment is Administered    05/19/24 1125    --  chlorthalidone (HYGROTON) 25 MG tablet  Daily         05/12/24 2055    --  metoprolol succinate XL (Toprol XL) 200 MG 24 hr tablet  Daily         05/12/24 2055    --  FLUoxetine (PROzac) 10 MG capsule  Daily         05/12/24 2055    --  metFORMIN ER (GLUCOPHAGE-XR) 500 MG 24 hr tablet  2 Times Daily         05/12/24 2055    --  Cariprazine HCl  (Vraylar) 3 MG capsule capsule  Daily         24    --  fexofenadine (ALLEGRA) 180 MG tablet  Daily         24    --  magnesium oxide (MAG-OX) 400 MG tablet  Daily         24    Pending  chlorthalidone (HYGROTEN) tablet 25 mg  Daily         Pending    Pending  metoprolol succinate XL (TOPROL-XL) 24 hr tablet 200 mg  Daily         Pending    Pending  FLUoxetine (PROzac) capsule 10 mg  Daily         Pending    Pending  Cariprazine HCl (VRAYLAR) capsule capsule 3 mg  Daily         Pending    Pending  magnesium oxide (MAG-OX) tablet 400 mg  Daily         Pending    Pending  cetirizine (zyrTEC) tablet 10 mg  Daily         Pending                     Physician Progress Notes (last 48 hours)        Gregoria Gamble APRN at 24 1300                     PROGRESS NOTE         Patient Identification:  Name:  Manda Al  Age:  43 y.o.  Sex:  female  :  1980  MRN:  [id number]  Visit Number:  [id number]  Primary Care Provider:  Mi Rivera MD         LOS: 10 days       ----------------------------------------------------------------------------------------------------------------------  Subjective       Chief Complaints:    Flank Pain        Interval History:      The patient is awake and alert, sitting up comfortably in bed eating breakfast.  On room air with no apparent distress.  Reports the chest tightness and shortness of breath resolved after receiving Lasix.  Nurse at the bedside reported she had over 7 L urine output overnight.  Lung exam is clear to auscultation bilaterally.  Abdomen soft and nontender with normoactive bowel sounds.  The patient denies any nausea/vomiting, skin rashes, urinary issues.  Aspergillus galactomannan antigen is negative.  Fungitell negative.  ANCA panel is negative.  Ehrlichia DNA PCR is negative.  Rickettsia PCR and fungal antibodies pending.      Review of Systems:    Constitutional: no fever, chills and night sweats.  Generalized  fatigue.  Eyes: no eye drainage, itching or redness.  HEENT: no mouth sores, dysphagia or nose bleed.  Respiratory: no for shortness of breath, cough or production of sputum.  Cardiovascular: no chest pain, no palpitations, no orthopnea.  Gastrointestinal: no nausea, no vomiting or diarrhea. No abdominal pain, hematemesis or rectal bleeding.  Genitourinary: no dysuria or polyuria.  Hematologic/lymphatic: no lymph node abnormalities, no easy bruising or easy bleeding.  Musculoskeletal: no muscle or joint pain.  Skin: No rash and no itching.  Neurological: no loss of consciousness, no seizure, no headache.  Behavioral/Psych: no depression or suicidal ideation.  Endocrine: no hot flashes.  Immunologic: negative.    ----------------------------------------------------------------------------------------------------------------------      Objective       Current [facility] Meds:  Cariprazine HCl, 3 mg, Oral, Daily  cetirizine, 10 mg, Oral, Daily  doxycycline, 100 mg, Intravenous, Q12H  FLUoxetine, 10 mg, Oral, Daily  insulin glargine, 20 Units, Subcutaneous, Daily  insulin lispro, 2-9 Units, Subcutaneous, 4x Daily AC & at Bedtime  ipratropium-albuterol, 3 mL, Nebulization, 4x Daily - RT  [START ON 5/23/2024] metoprolol succinate XL, 50 mg, Oral, Q24H  nicotine, 1 patch, Transdermal, Q24H  potassium chloride ER, 40 mEq, Oral, Q4H  predniSONE, 20 mg, Oral, Daily With Breakfast  sodium chloride, 500 mL, Intravenous, Once  sodium chloride, 10 mL, Intravenous, Q12H  sodium chloride, 10 mL, Intravenous, Q12H  sodium chloride, 10 mL, Intravenous, Q12H           ----------------------------------------------------------------------------------------------------------------------    Vital Signs:  Temp:  [97.6 °F (36.4 °C)-98.3 °F (36.8 °C)] 97.8 °F (36.6 °C)  Heart Rate:  [] 86  Resp:  [10-32] 18  BP: (108-149)/(67-97) 112/82  Mean Arterial Pressure (Non-Invasive) for the past 24 hrs (Last 3 readings):   Noninvasive MAP  (mmHg)   05/22/24 1222 94   05/22/24 1200 104   05/22/24 1100 97     SpO2 Percentage    05/22/24 1100 05/22/24 1200 05/22/24 1222   SpO2: 100% 97% 95%     SpO2:  [93 %-100 %] 95 %  on   ;   Device (Oxygen Therapy): room air    Body mass index is 35.67 kg/m².  Wt Readings from Last 3 Encounters:   05/22/24 106 kg (234 lb 9.1 oz)   05/07/24 97.1 kg (214 lb)   03/19/22 113 kg (250 lb)        Intake/Output Summary (Last 24 hours) at 5/22/2024 1300  Last data filed at 5/22/2024 1200  Gross per 24 hour   Intake 1501 ml   Output 7700 ml   Net -6199 ml     Diet: Regular/House; Fluid Consistency: Thin (IDDSI 0)  ----------------------------------------------------------------------------------------------------------------------      Physical Exam:    Constitutional:  Well-developed and well-nourished.  On room air without any respiratory distress.  Denies any complaints or issues.  HENT:  Head: Normocephalic and atraumatic.  Mouth:  Moist mucous membranes.    Eyes:  Conjunctivae and EOM are normal.  No scleral icterus.  Neck:  Neck supple.  No JVD present.    Cardiovascular:  Normal rate, regular rhythm and normal heart sounds with no murmur. No edema.  Pulmonary/Chest:  No respiratory distress, no wheezes, no crackles, with normal breath sounds and good air movement.  Abdominal:  Soft.  Bowel sounds are normal.  No distension and no tenderness.   Musculoskeletal:  No edema, no tenderness, and no deformity.  No swelling or redness of joints.  Neurological:  Alert and oriented to person, place, and time.  No facial droop.  No slurred speech.   Skin:  Skin is warm and dry.  No rash noted.  No pallor.  Scabbed area on the left bottom from tick bite.  No surrounding erythema.  No drainage.  Psychiatric:  Normal mood and affect.  Behavior is normal.        ----------------------------------------------------------------------------------------------------------------------  Results from last 7 days   Lab Units 05/21/24  7175    HSTROP T ng/L 6     Results from last 7 days   Lab Units 05/21/24  0844   PROBNP pg/mL 4,838.0*       Results from last 7 days   Lab Units 05/16/24  1926   PH, ARTERIAL pH units 7.467*   PO2 ART mm Hg 61.5*   PCO2, ARTERIAL mm Hg 26.9*   HCO3 ART mmol/L 19.4*     Results from last 7 days   Lab Units 05/22/24  0054 05/20/24  0209 05/19/24  1613 05/19/24  1321 05/19/24  1015 05/19/24  0730 05/19/24  0031 05/18/24  0021 05/17/24  0036 05/16/24  1034   CRP mg/dL  --   --   --   --   --   --   --  15.07*  --  9.82*   LACTATE mmol/L  --   --  3.2* 4.4* 3.2*   < >  --   --   --   --    WBC 10*3/mm3 9.55 4.42  --   --   --   --  4.02 4.06 3.52  --    HEMOGLOBIN g/dL 11.0* 9.6*  --   --   --   --  9.4* 9.8* 11.1*  --    HEMATOCRIT % 33.2* 28.8*  --   --   --   --  28.5* 29.2* 32.4*  --    MCV fL 91.0 91.4  --   --   --   --  92.2 91.0 88.3  --    MCHC g/dL 33.1 33.3  --   --   --   --  33.0 33.6 34.3  --    PLATELETS 10*3/mm3 391 169  --   --   --   --  111* 103* 109*  --    INR   --   --   --   --   --   --   --   --  1.26*  --     < > = values in this interval not displayed.     Results from last 7 days   Lab Units 05/22/24  1012 05/22/24  0054 05/21/24  0844 05/20/24  0209 05/19/24  0031 05/18/24  1036 05/18/24  0021   SODIUM mmol/L  --  139 142 140 138  --  140   POTASSIUM mmol/L 3.3* 3.2* 4.0 4.2 4.1   < > 3.5   CHLORIDE mmol/L  --  104 113* 111* 110*  --  110*   CO2 mmol/L  --  24.0 15.9* 18.2* 17.7*  --  20.9*   BUN mg/dL  --  16 17 15 12  --  10   CREATININE mg/dL  --  1.21* 1.11* 1.12* 1.12*  --  1.14*   CALCIUM mg/dL  --  8.5* 8.2* 8.3* 7.9*  --  7.5*   GLUCOSE mg/dL  --  85 205* 320* 294*  --  159*   ALBUMIN g/dL  --   --   --  2.7* 2.5*  --  2.3*   BILIRUBIN mg/dL  --   --   --  0.4 0.5  --  0.5   ALK PHOS U/L  --   --   --  79 85  --  73   AST (SGOT) U/L  --   --   --  20 30  --  55*   ALT (SGPT) U/L  --   --   --  24 30  --  36*    < > = values in this interval not displayed.   Estimated Creatinine Clearance:  "77.2 mL/min (A) (by C-G formula based on SCr of 1.21 mg/dL (H)).  No results found for: \"AMMONIA\"    Glucose   Date/Time Value Ref Range Status   05/22/2024 1130 125 70 - 130 mg/dL Final   05/22/2024 0633 108 70 - 130 mg/dL Final   05/21/2024 2144 152 (H) 70 - 130 mg/dL Final   05/21/2024 1706 225 (H) 70 - 130 mg/dL Final   05/21/2024 1117 168 (H) 70 - 130 mg/dL Final   05/21/2024 0634 222 (H) 70 - 130 mg/dL Final   05/20/2024 2146 334 (H) 70 - 130 mg/dL Final   05/20/2024 1653 280 (H) 70 - 130 mg/dL Final     Lab Results   Component Value Date    HGBA1C 6.90 (H) 05/12/2024     Lab Results   Component Value Date    TSH 1.840 05/16/2024       Blood Culture   Date Value Ref Range Status   05/15/2024 No growth at 4 days  Preliminary   05/15/2024 No growth at 4 days  Preliminary   05/12/2024 No growth at 5 days  Final   05/12/2024 No growth at 5 days  Final   05/12/2024 No growth at 5 days  Final     Urine Culture   Date Value Ref Range Status   05/12/2024 No growth  Final     No results found for: \"WOUNDCX\"  No results found for: \"STOOLCX\"  No results found for: \"RESPCX\"  Pain Management Panel  More data exists         Latest Ref Rng & Units 8/11/2020 5/9/2017   Pain Management Panel   Amphetamine, Urine Qual Negative Positive  Negative    Barbiturates Screen, Urine Negative Negative  Negative    Benzodiazepine Screen, Urine Negative Negative  Negative    Buprenorphine, Screen, Urine Negative Negative  Negative    Cocaine Screen, Urine Negative Negative  Negative    Methadone Screen , Urine Negative Negative  Negative          ----------------------------------------------------------------------------------------------------------------------  Imaging Results (Last 24 Hours)       ** No results found for the last 24 hours. **            ----------------------------------------------------------------------------------------------------------------------    Pertinent Infectious Disease " Results                Assessment/Plan       Assessment     Recurrent high-grade fever  Pneumonia  Suspicion of tickborne illness        Plan      The patient is awake and alert, sitting up comfortably in bed eating breakfast.  On room air with no apparent distress.  Reports the chest tightness and shortness of breath resolved after receiving Lasix.  Nurse at the bedside reported she had over 7 L urine output overnight.  Lung exam is clear to auscultation bilaterally.  Abdomen soft and nontender with normoactive bowel sounds.  The patient denies any nausea/vomiting, skin rashes, urinary issues.  Aspergillus galactomannan antigen is negative.  Fungitell negative.  ANCA panel is negative.  Ehrlichia DNA PCR is negative.  Rickettsia PCR and fungal antibodies pending.    The patient has made significant clinical improvement with current course of Zosyn, doxycycline, micafungin for pneumonia and tickborne illness treatment.  Continue current course for now.  Awaiting fungal and rickettsial serologies.  We will continue to monitor closely.      ANTIMICROBIAL THERAPY    doxycycline (VIBRAMYCIN) 100 mg IVPB in 100 mL NS (VTB)     Code Status:   Code Status and Medical Interventions:   Ordered at: 05/12/24 1930     Code Status (Patient has no pulse and is not breathing):    CPR (Attempt to Resuscitate)     Medical Interventions (Patient has pulse or is breathing):    Full Support       DIEGO Smith  05/22/24  13:00 EDT      Electronically signed by Gregoria Gamble APRN at 05/22/24 1311       Edmundo Zapata MD at 05/22/24 1240            Pulmonary and critical care consultation note  Referring Provider:   Reason for Consultation:       Chief complaint shortness of breath-      Sub-   Overnight events reviewed.  All the labs medications ins and outs and vitals reviewed.  Review of Systems  No changes        History  Past Medical History:   Diagnosis Date    Anxiety     Asthma     Bipolar disorder     Crohn disease      Depression     Hypertension     Schizoaffective disorder    ,   Past Surgical History:   Procedure Laterality Date    APPENDECTOMY       SECTION      CHOLECYSTECTOMY      COLON SURGERY      COLONOSCOPY      MANDIBLE FRACTURE SURGERY      OVARIAN CYST SURGERY      TUBAL ABDOMINAL LIGATION     ,   Family History   Problem Relation Age of Onset    Diabetes Mother     Anxiety disorder Mother     Depression Mother     Bipolar disorder Mother     COPD Father     Schizophrenia Father     Schizophrenia Paternal Grandfather     Breast cancer Neg Hx    ,   Social History     Tobacco Use    Smoking status: Every Day     Current packs/day: 1.00     Average packs/day: 1 pack/day for 20.0 years (20.0 ttl pk-yrs)     Types: Cigarettes    Smokeless tobacco: Never   Vaping Use    Vaping status: Never Used   Substance Use Topics    Alcohol use: No     Comment: denies    Drug use: Yes     Types: Marijuana   ,   Medications Prior to Admission   Medication Sig Dispense Refill Last Dose    Cariprazine HCl (Vraylar) 3 MG capsule capsule Take 1 capsule by mouth Daily.   2024    chlorthalidone (HYGROTON) 25 MG tablet Take 1 tablet by mouth Daily.   2024    fexofenadine (ALLEGRA) 180 MG tablet Take 1 tablet by mouth Daily.   2024    FLUoxetine (PROzac) 10 MG capsule Take 1 capsule by mouth Daily.   2024    magnesium oxide (MAG-OX) 400 MG tablet Take 1 tablet by mouth Daily.   2024    metFORMIN ER (GLUCOPHAGE-XR) 500 MG 24 hr tablet Take 2 tablets by mouth 2 (Two) Times a Day.   2024    metoprolol succinate XL (Toprol XL) 200 MG 24 hr tablet Take 1 tablet by mouth Daily.   2024   , Scheduled Meds:  Cariprazine HCl, 3 mg, Oral, Daily  cetirizine, 10 mg, Oral, Daily  doxycycline, 100 mg, Intravenous, Q12H  FLUoxetine, 10 mg, Oral, Daily  insulin glargine, 20 Units, Subcutaneous, Daily  insulin lispro, 2-9 Units, Subcutaneous, 4x Daily AC & at Bedtime  ipratropium-albuterol, 3 mL, Nebulization, 4x  Daily - RT  [START ON 5/23/2024] metoprolol succinate XL, 50 mg, Oral, Q24H  nicotine, 1 patch, Transdermal, Q24H  potassium chloride ER, 40 mEq, Oral, Q4H  predniSONE, 20 mg, Oral, Daily With Breakfast  sodium chloride, 500 mL, Intravenous, Once  sodium chloride, 10 mL, Intravenous, Q12H  sodium chloride, 10 mL, Intravenous, Q12H  sodium chloride, 10 mL, Intravenous, Q12H    , Continuous Infusions:      and Allergies:  Imuran [azathioprine]    Objective     Vital Signs   Temp:  [97.6 °F (36.4 °C)-98.3 °F (36.8 °C)] 97.8 °F (36.6 °C)  Heart Rate:  [] 86  Resp:  [10-32] 15  BP: (108-149)/(67-97) 112/82    Physical Exam:             General-not in any distress    HEENT-atraumatic  Neck-supple    Respiratory-\not in any respiratory distress    Cardiovascular-NSR  GI-grossly normal    CNS-nonfocal    Musculoskeletal -no edema  Extremities- no obvious deformity noticed     Psychiatric-, alert awake oriented  Skin- no visible rash                                                                   Results Review:    LABS:    Lab Results   Component Value Date    GLUCOSE 85 05/22/2024    BUN 16 05/22/2024    CREATININE 1.21 (H) 05/22/2024    EGFRIFNONA 93 08/17/2020    BCR 13.2 05/22/2024    CO2 24.0 05/22/2024    CALCIUM 8.5 (L) 05/22/2024    ALBUMIN 2.7 (L) 05/20/2024    LABIL2 1.4 (L) 07/06/2015    AST 20 05/20/2024    ALT 24 05/20/2024    WBC 9.55 05/22/2024    HGB 11.0 (L) 05/22/2024    HCT 33.2 (L) 05/22/2024    MCV 91.0 05/22/2024     05/22/2024     05/22/2024    K 3.3 (L) 05/22/2024     05/22/2024    ANIONGAP 11.0 05/22/2024       Lab Results   Component Value Date    INR 1.26 (H) 05/17/2024    PROTIME 15.9 (H) 05/17/2024       Results from last 7 days   Lab Units 05/17/24  0036   INR  1.26*          I reviewed the patient's new clinical results.  I reviewed the patient's new imaging results and agree with the interpretation.    Procalitonin Results:      Lab 05/16/24  1034   PROCALCITONIN  0.76*       Latest Reference Range & Units 05/16/24 19:26   pH, Arterial 7.350 - 7.450 pH units 7.467 (H)   pCO2, Arterial 35.0 - 45.0 mm Hg 26.9 (L)   pO2, Arterial 83.0 - 108.0 mm Hg 61.5 (L)   HCO3, Arterial 20.0 - 26.0 mmol/L 19.4 (L)   Base Excess 0.0 - 2.0 mmol/L -3.2 (L)   O2 Saturation, Arterial 94.0 - 99.0 % 94.6   CO2 Content 22 - 33 mmol/L 20.3 (L)   A-a DO2 0.0 - 300.0 mmHg 50.1   Carboxyhemoglobin 0 - 5 % 1.8   Methemoglobin 0.00 - 3.00 % <-0.10 (L)   Oxyhemoglobin 94 - 99 % 93.3 (L)   Hematocrit, Blood Gas 38.0 - 51.0 % 34.4 (L)   Hemoglobin, Blood Gas 13.5 - 17.5 g/dL 11.2 (L)   Site  Right Radial   Jose's Test  N/A   Modality  Room Air   FIO2 % 21   Ventilator Mode  NA   Barometric Pressure for Blood Gas mmHg 723   (H): Data is abnormally high  (L): Data is abnormally low  Microbiology Results (last 10 days)       Procedure Component Value - Date/Time    Blood Culture - Blood, Arm, Left [[id number]]  (Normal) Collected: 05/18/24 1333    Lab Status: Preliminary result Specimen: Blood from Arm, Left Updated: 05/21/24 1345     Blood Culture No growth at 3 days    Aspergillus Galactomannan Antigen - Blood, Arm, Left [[id number]] Collected: 05/18/24 1257    Lab Status: Final result Specimen: Blood from Arm, Left Updated: 05/22/24 0209     Aspergillus Ag, BAL/Serum 0.05 Index     Narrative:      Performed at:  01 - [facility]  [address], [place]  [id number]  : Chetna Gordon MD, Phone:  [phone]  Performed at:  02 - [facility]  [address], [place]  [id number]  : Gio Alaniz [facility], Phone:  [phone]    Blood Culture - Blood, Hand, Right [[id number]]  (Normal) Collected: 05/18/24 1257    Lab Status: Preliminary result Specimen: Blood from Hand, Right Updated: 05/21/24 1345     Blood Culture No growth at 3 days    MRSA Screen, PCR (Inpatient) - Swab, Nares [[id number]]  (Normal) Collected: 05/18/24 0847    Lab Status: Final result Specimen: Swab from  Nares Updated: 05/18/24 1010     MRSA PCR No MRSA Detected    Narrative:      The negative predictive value of this diagnostic test is high and should only be used to consider de-escalating anti-MRSA therapy. A positive result may indicate colonization with MRSA and must be correlated clinically.    Respiratory Panel PCR w/COVID-19(SARS-CoV-2) SYED/JHONATHAN/TRUMAN/PAD/COR/KETURAH In-House, NP Swab in UTM/VTM, 2 HR TAT - Swab, Nasopharynx [[id number]]  (Normal) Collected: 05/16/24 1855    Lab Status: Final result Specimen: Swab from Nasopharynx Updated: 05/16/24 1947     ADENOVIRUS, PCR Not Detected     Coronavirus 229E Not Detected     Coronavirus HKU1 Not Detected     Coronavirus NL63 Not Detected     Coronavirus OC43 Not Detected     COVID19 Not Detected     Human Metapneumovirus Not Detected     Human Rhinovirus/Enterovirus Not Detected     Influenza A PCR Not Detected     Influenza B PCR Not Detected     Parainfluenza Virus 1 Not Detected     Parainfluenza Virus 2 Not Detected     Parainfluenza Virus 3 Not Detected     Parainfluenza Virus 4 Not Detected     RSV, PCR Not Detected     Bordetella pertussis pcr Not Detected     Bordetella parapertussis PCR Not Detected     Chlamydophila pneumoniae PCR Not Detected     Mycoplasma pneumo by PCR Not Detected    Narrative:      In the setting of a positive respiratory panel with a viral infection PLUS a negative procalcitonin without other underlying concern for bacterial infection, consider observing off antibiotics or discontinuation of antibiotics and continue supportive care. If the respiratory panel is positive for atypical bacterial infection (Bordetella pertussis, Chlamydophila pneumoniae, or Mycoplasma pneumoniae), consider antibiotic de-escalation to target atypical bacterial infection.    Blood Culture - Blood, Hand, Right [[id number]]  (Normal) Collected: 05/15/24 0831    Lab Status: Final result Specimen: Blood from Hand, Right Updated: 05/20/24 0859     Blood Culture No  growth at 5 days    Blood Culture - Blood, Arm, Left [[id number]]  (Normal) Collected: 05/15/24 0811    Lab Status: Final result Specimen: Blood from Arm, Left Updated: 05/20/24 0845     Blood Culture No growth at 5 days    COVID PRE-OP / PRE-PROCEDURE SCREENING ORDER (NO ISOLATION) - Swab, Nasopharynx [[id number]]  (Normal) Collected: 05/15/24 0755    Lab Status: Final result Specimen: Swab from Nasopharynx Updated: 05/15/24 0909    Narrative:      The following orders were created for panel order COVID PRE-OP / PRE-PROCEDURE SCREENING ORDER (NO ISOLATION) - Swab, Nasopharynx.  Procedure                               Abnormality         Status                     ---------                               -----------         ------                     COVID-19 and FLU A/B PCR...[[id number]]  Normal              Final result                 Please view results for these tests on the individual orders.    COVID-19 and FLU A/B PCR, 1 HR TAT - Swab, Nasopharynx [[id number]]  (Normal) Collected: 05/15/24 0755    Lab Status: Final result Specimen: Swab from Nasopharynx Updated: 05/15/24 0909     COVID19 Not Detected     Influenza A PCR Not Detected     Influenza B PCR Not Detected    Narrative:      Fact sheet for providers: https://www.fda.gov/media/255649/download    Fact sheet for patients: https://www.fda.gov/media/545238/download    Test performed by PCR.    Blood Culture - Blood, Arm, Right [[id number]]  (Normal) Collected: 05/12/24 2326    Lab Status: Final result Specimen: Blood from Arm, Right Updated: 05/17/24 2345     Blood Culture No growth at 5 days    Blood Culture - Blood, Hand, Left [[id number]]  (Normal) Collected: 05/12/24 2325    Lab Status: Final result Specimen: Blood from Hand, Left Updated: 05/17/24 2345     Blood Culture No growth at 5 days    Blood Culture - Blood, Arm, Left [[id number]]  (Normal) Collected: 05/12/24 2021    Lab Status: Final result Specimen: Blood from Arm, Left Updated: 05/17/24 2030      Blood Culture No growth at 5 days    Urine Culture - Urine, Urine, Clean Catch [[id number]]  (Normal) Collected: 05/12/24 1648    Lab Status: Final result Specimen: Urine, Clean Catch Updated: 05/14/24 1033     Urine Culture No growth           Assessment & Plan     Neurology-alert awake oriented no active issues.  Continue to monitor mental status.      Respiratory- Acute hypoxic respiratory failure-FiO2 requirement reviewed and adjusted to maintain saturation 88 to 92%.      CT chest reviewed-shows bilateral pulmonary infiltrates likely due to  Aspiration pneumonia and/or pulmonary edema.     Latest-chest x-ray  showed left-sided pleural effusion.  Overall   pulmonary opacities seems to be improving.    Chest x-ray for today ordered.      VBG reviewed.  Stable.  Continue current antibiotics.    Patient does have a history of asthma-and responded well to steroids  Tapering now    Continue DuoNebs        Cardiology- hemodynamically -stable     Continue to monitor HR- rate and rhythm, BP     Nephrology- Cr and BUN stable  I/O-reviewed    GI- PPI prophylaxis  Aspiration precautions    Hematology- CBC  Hb transfuse for hemoglobin less than 7  platelet  WBC    ID  Culture  And Antibiotics    Endrocrinology- Maintain Blood sugar 140 -180  TSH Results:      Lab 05/16/24  1034   TSH 1.840        Electrolytes-   Mag and phos       DVT prophylaxis-    Bedside rounds were done with RT and patient's nurse. All the lab and clinical findings were discussed with them and plan was also discussed in great detail.        Echo-  Results for orders placed during the hospital encounter of 05/12/24    Adult Transthoracic Echo Complete W/ Cont if Necessary Per Protocol    Interpretation Summary  Images from the original result were not included.      Normal left ventricular cavity size and wall thickness noted. All left ventricular wall segments contract normally.    Left ventricular ejection fraction appears to be 61 - 65%.    Left  ventricular diastolic function is consistent with (grade II w/high LAP) pseudonormalization.    There is tethering of both anterior and posterior mitral leaflets with reverse hockey-stick appearance of the anterior mitral leaflet suspicious for rheumatic mitral valve disease.    There is moderate, bileaflet mitral valve thickening present at the tip of the leaflets (commissure).  Tiera score of 8    MV max PG 22.3 mmHg MV mean PG 14 mmHg MV V2 VTI 69.8 cm MVA(VTI) 1.08 cm2 MV dec slope 687 cm/sec2 MV dec time 0.39 sec at a heart rate of about 95 bpm.    Moderate to severe mitral valve stenosis is present. The calculated mitral valve Broderick' score is 7.    Moderate mitral valve regurgitation is present with a centrally-directed jet noted. The calculated mitral valve morphology score is 7.    The aortic valve was poorly visualized but appears trileaflet. Mild aortic valve regurgitation is present. No aortic valve stenosis is present.    Tricuspid valve not well visualized. Mild to moderate tricuspid valve regurgitation is present. Estimated right ventricular systolic pressure from tricuspid regurgitation is markedly elevated (>55 mmHg).    Severe pulmonary hypertension is present.    The IVC is mildly dilated with partial collapse indicator of right atrial pressures of about 15 mmHg.    There is no evidence of pericardial effusion. .    Comments: The peak and mean gradients across the mitral valve seem to be about the same as on the transthoracic echo back in August of 2020 given the heart rate in the 90s although the mitral valve stenosis seem to have improved on the DONNA done at that time with improvement in the heart rate.  The degree of mitral regurgitation seem to have gotten worse.  Hence would consider repeating the study with focus on the mitral valve after improving the heart rate.  If patient still has significantly elevated gradients across the mitral valve, may have to consider referral to [facility] for possible mitral valvuloplasty.           Acute pancreatitis          Edmundo Zapata MD  24  12:40 EDT         Electronically signed by Edmundo Zapata MD at 24 1245       Brenton Alves PA-C at 24 1236       Attestation signed by Kevin Woodard MD at 24 2332    I have reviewed this documentation and agree.  Continue antibiotics, likely to complete soon, follow up Cardiology recommendations on repeating echocardiogram and need to be evaluated at [facility] for valvular repair.                  Patient Identification:  Name:  Manda Al  Age:  43 y.o.  Sex:  female  :  1980  MRN:  [id number]  Visit Number:  [id number]  Primary Care Provider:  Mi Rivera MD    Length of stay:  10    Subjective/Interval History/Consultants/Procedures     Chief Complaint:   Chief Complaint   Patient presents with    Flank Pain       Subjective/Interval History:    43 y.o. female who was admitted on 2024 with acute pancreatitis     PMH is significant for anxiety, schizoaffective disorder- bipolar type, HTN, Crohn's disease   For complete admission information, please see history and physical.     Consultations:  Pulmonology   Infectious disease   Cardiology    Procedures/Scans:  CT abdomen and pelvis w & w/o contrast x 2  CXR x 4  VQ scan  Bilateral lower extremity venous Doppler US  CT PE protocol   TTE    Today, the patient was seen and examined, resting comfortably. She reports again feeling improved today compared to last encounter. Reported good diuresis overnight with lasix dosed yesterday. No chest pain, no shortness of breath or cough. No constipation, no difficulty with urination.  Step down to telemetry today.    Room location at the time of evaluation was 219.    ----------------------------------------------------------------------------------------------------------------------  Current Hospital Meds:  Cariprazine HCl, 3 mg, Oral, Daily  cetirizine, 10 mg,  Oral, Daily  doxycycline, 100 mg, Intravenous, Q12H  FLUoxetine, 10 mg, Oral, Daily  insulin glargine, 20 Units, Subcutaneous, Daily  insulin lispro, 2-9 Units, Subcutaneous, 4x Daily AC & at Bedtime  ipratropium-albuterol, 3 mL, Nebulization, 4x Daily - RT  [START ON 5/23/2024] metoprolol succinate XL, 50 mg, Oral, Q24H  nicotine, 1 patch, Transdermal, Q24H  potassium chloride ER, 40 mEq, Oral, Q4H  predniSONE, 20 mg, Oral, Daily With Breakfast  sodium chloride, 500 mL, Intravenous, Once  sodium chloride, 10 mL, Intravenous, Q12H  sodium chloride, 10 mL, Intravenous, Q12H  sodium chloride, 10 mL, Intravenous, Q12H         ----------------------------------------------------------------------------------------------------------------------      Objective     Vital Signs:  Temp:  [97.6 °F (36.4 °C)-98.3 °F (36.8 °C)] 97.8 °F (36.6 °C)  Heart Rate:  [] 86  Resp:  [10-32] 15  BP: (108-149)/(67-97) 112/82      05/20/24  0400 05/21/24  0400 05/22/24  0500   Weight: 105 kg (231 lb 7.7 oz) 106 kg (233 lb 7.5 oz) 106 kg (234 lb 9.1 oz)     Body mass index is 35.67 kg/m².    Intake/Output Summary (Last 24 hours) at 5/22/2024 1236  Last data filed at 5/22/2024 1200  Gross per 24 hour   Intake 1501 ml   Output 7700 ml   Net -6199 ml     I/O this shift:  In: 200 [P.O.:200]  Out: -   Diet: Regular/House; Fluid Consistency: Thin (IDDSI 0)  ----------------------------------------------------------------------------------------------------------------------    Physical Exam  Vitals and nursing note reviewed.   Constitutional:       General: She is not in acute distress.  HENT:      Head: Normocephalic and atraumatic.   Eyes:      Extraocular Movements: Extraocular movements intact.   Cardiovascular:      Rate and Rhythm: Regular rhythm. Tachycardia present.   Pulmonary:      Effort: Pulmonary effort is normal.      Breath sounds: Normal breath sounds.   Abdominal:      Palpations: Abdomen is soft.   Musculoskeletal:       Right lower leg: No edema.      Left lower leg: No edema.   Skin:     General: Skin is warm and dry.   Neurological:      Mental Status: She is alert. Mental status is at baseline.   Psychiatric:         Mood and Affect: Mood normal.         Behavior: Behavior normal.                ----------------------------------------------------------------------------------------------------------------------  Tele:        ----------------------------------------------------------------------------------------------------------------------  Results from last 7 days   Lab Units 05/21/24  0844   HSTROP T ng/L 6   PROBNP pg/mL 4,838.0*     Results from last 7 days   Lab Units 05/22/24  0054 05/20/24  0209 05/19/24  1613 05/19/24  1321 05/19/24  1015 05/19/24  0730 05/19/24  0031 05/18/24  0021 05/17/24  0036 05/16/24  1034   CRP mg/dL  --   --   --   --   --   --   --  15.07*  --  9.82*   LACTATE mmol/L  --   --  3.2* 4.4* 3.2*   < >  --   --   --   --    WBC 10*3/mm3 9.55 4.42  --   --   --   --  4.02 4.06 3.52  --    HEMOGLOBIN g/dL 11.0* 9.6*  --   --   --   --  9.4* 9.8* 11.1*  --    HEMATOCRIT % 33.2* 28.8*  --   --   --   --  28.5* 29.2* 32.4*  --    MCV fL 91.0 91.4  --   --   --   --  92.2 91.0 88.3  --    MCHC g/dL 33.1 33.3  --   --   --   --  33.0 33.6 34.3  --    PLATELETS 10*3/mm3 391 169  --   --   --   --  111* 103* 109*  --    INR   --   --   --   --   --   --   --   --  1.26*  --     < > = values in this interval not displayed.     Results from last 7 days   Lab Units 05/16/24  1926   PH, ARTERIAL pH units 7.467*   PO2 ART mm Hg 61.5*   PCO2, ARTERIAL mm Hg 26.9*   HCO3 ART mmol/L 19.4*     Results from last 7 days   Lab Units 05/22/24  1012 05/22/24  0054 05/21/24  0844 05/20/24  0209 05/19/24  0031 05/18/24  1036 05/18/24  0021   SODIUM mmol/L  --  139 142 140 138  --  140   POTASSIUM mmol/L 3.3* 3.2* 4.0 4.2 4.1   < > 3.5   CHLORIDE mmol/L  --  104 113* 111* 110*  --  110*   CO2 mmol/L  --  24.0 15.9* 18.2*  "17.7*  --  20.9*   BUN mg/dL  --  16 17 15 12  --  10   CREATININE mg/dL  --  1.21* 1.11* 1.12* 1.12*  --  1.14*   CALCIUM mg/dL  --  8.5* 8.2* 8.3* 7.9*  --  7.5*   GLUCOSE mg/dL  --  85 205* 320* 294*  --  159*   ALBUMIN g/dL  --   --   --  2.7* 2.5*  --  2.3*   BILIRUBIN mg/dL  --   --   --  0.4 0.5  --  0.5   ALK PHOS U/L  --   --   --  79 85  --  73   AST (SGOT) U/L  --   --   --  20 30  --  55*   ALT (SGPT) U/L  --   --   --  24 30  --  36*    < > = values in this interval not displayed.   Estimated Creatinine Clearance: 77.2 mL/min (A) (by C-G formula based on SCr of 1.21 mg/dL (H)).  No results found for: \"AMMONIA\"      Blood Culture   Date Value Ref Range Status   05/18/2024 No growth at 3 days  Preliminary   05/18/2024 No growth at 3 days  Preliminary     No results found for: \"URINECX\"  No results found for: \"WOUNDCX\"  No results found for: \"STOOLCX\"  ----------------------------------------------------------------------------------------------------------------------  Imaging Results (Last 24 Hours)       ** No results found for the last 24 hours. **          ----------------------------------------------------------------------------------------------------------------------   I have reviewed the above laboratory values for 05/22/24    Assessment/Plan     Active Hospital Problems    Diagnosis  POA    **Acute pancreatitis [K85.90]  Yes         ASSESSMENT/PLAN:    Recurrent fevers  Suspect sepsis secondary to tickborne illness  Later in admission patient had onset of recurrent fevers starting on 5/14.  Later developed tachycardia, soft BP as well and was started on broad-spectrum antibiotics on 515 with vancomycin, Zosyn.  CT of the abdomen and pelvis on that date showed resolved pancreatitis with no necrosis or cyst.  Patient did report recently with tick on her person which she had removed about a week before presentation.  IV doxycycline was started on 5/16 and vancomycin was discontinued.  Tick " serologies sent- lyme total antibody not performed due to hemolyzed specimen. Ehrlichia negative. Rickettsia DNA pend.   Other infectious workup including CXR, UA, blood cultures were negative.  D-dimer was elevated, greater than 20 though VTE workup negative.  Hepatitis and HIV panels negative.  ANCA panel was sent and negative.  CT PE protocol showed edema versus multifocal pneumonia.  Patient was started on micafungin as well. Fungitell negative. Fungal antibodies pend.   Infectious disease consulted and following, assistance appreciated.  For now recommending continued to Zosyn, doxycycline, micafungin pending further test results.  Overall patient appears to be improving, vital signs stable, remaining afebrile for over 48 hours.     Acute hypoxic respiratory failure, now resolved, suspect due to pulmonary edema vs  multifocal pneumonia  Overnight 5/17 patient developed new hypoxia was briefly titrated up to 4 L per nasal cannula then weaned to 2 L.  CT PE at that time as above showed concern for edema versus pneumonia.  Antimicrobials were continued as above  Diuresis held given BP soft at the time  Pulmonology was consulted and following, assistance appreciated.  Recommended continue current antibiotics and started Solu-Medrol- now tapering with prednisone.   Fluids discontinued today given worsening shortness of breath. Continuing diuresis as below.  Repeat CXR ordered for today.     Moderate to severe mitral valve stenosis   Severe pulmonary hypertension, suspect 2/2 above  Acute HFpEF due to mitral stenosis   TTE ordered given respiratory failure, concern for volume overload showed significant mitral stenosis and patient reports hx rheumatic fever.  Cardiology consulted for further assistance and recommendations, appreciated.   Plan to reassess mitral valve once HR at goal. Cardiology adjusting metoprolol.   Continuing to diurese as well given BNP elevated at 4838.      Acute, recurrent  pancreatitis  Resolved and tolerating diet     Mild hepatocellular transaminitis, resolved  Mild thrombocytopenia, resolved  Suspect secondary to tickborne illness.  LFTs, platelet count WNL today.     Hyponatremia, resolved  Hypokalemia  Replace per protocol      Recent UTI  culture without growth this admission     T2DM with expected hyperglycemia due to steroids  Continue Lantus, moderate dose correction insulin.  Titrating as needed.  Trend improved     Chronic:  Anxiety/depression  Schizoaffective disorder  HTN  Crohn's disease    -----------  -DVT prophylaxis: SCDs  -Disposition plans/anticipated needs: step down to tele today. Nearing stability for discharge home once work up complete.        The patient is high risk due to the following diagnoses/reasons:  acute HFpEF, severe mitral stenosis, concern for tickborne illness         Brenton Alves PA-C  24  12:36 EDT     Electronically signed by Kevin Woodard MD at 24 1532       Bess Montgomery MD at 24 1149               LOS: 10 days     Name: Manda Al  Age/Sex: 43 y.o. female  :  1980        PCP: Mi Rivera MD  REF: No ref. provider found    Principal Problem:    Acute pancreatitis      Reason for follow-up: Mitral valve stenosis and pulmonary hypertension    Subjective       Subjective   Manda Al is a 43-year-old female with a past medical history significant for anxiety, depression, bipolar disorder, asthma, Crohn's disease, hypertension, schizoaffective disorder.  [facility] with complaints of epigastric abdominal pain, nausea and vomiting.  She was found to have acute pancreatitis.  Cardiology was consulted for abnormal echocardiogram moderate to.  Moderate to severe mitral valve stenosis, moderate mitral valve regurgitation and severe pulmonary hypertension.  Patient states she had rheumatic fever as a child.  Did have echocardiogram in  which  showed moderate mitral valve stenosis.  States has been having episodes of recurrent pancreatitis.  On admission she was noted to have fever but this has since resolved.  Reports shortness of breath today and is mildly tachycardic.  Did have tick bite recently and is being treated for this.  She states that when she does not have episodes of pancreatitis she feels overall well and her breathing is stable.  Denies any chest pain.  Has been smoking 1 pack of cigarettes a day for the last 30 years.  Recent echocardiogram showed normal LV function.  States she is feeling more short of breath today.     Interval History: Patient is resting in bed today.  proBNP yesterday was 4838, that she received a total of Lasix 60 mg IV.  -5799 net cc output noted. Creatinine is 1.21. Metoprolol was initiated yesterday, she has been in sinus rhythm in the 80s to 90s.    ROS    Vital Signs  Temp:  [97.6 °F (36.4 °C)-98.3 °F (36.8 °C)] 98.1 °F (36.7 °C)  Heart Rate:  [] 90  Resp:  [10-32] 10  BP: (108-149)/(67-97) 120/83  Vital Signs (last 72 hrs)         05/19 0700  05/20 0659 05/20 0700  05/21 0659 05/21 0700  05/22 0659 05/22 0700  05/22 1149   Most Recent      Temp (°F) 97.7 -  98.7    97.5 -  98.5    97.6 -  98.3      98.1     98.1 (36.7) 05/22 0800    Heart Rate 67 -  98    74 -  102    72 -  101    85 -  96     90 05/22 1100    Resp 14 -  26    10 - 31    15 -  32    10 -  26     10 05/22 1100    BP 97/66 -  140/82    105/41 -  141/90    114/75 -  149/97    108/71 -  120/83     120/83 05/22 1100    SpO2 (%) 90 -  100    94 -  100    93 -  100    93 -  100     100 05/22 1100    Flow (L/min)   3                      Documented weights    05/12/24 1556 05/12/24 2036 05/14/24 0500 05/15/24 0500   Weight: 97.1 kg (214 lb) 96.4 kg (212 lb 8.4 oz) 99.6 kg (219 lb 9.6 oz) 100 kg (220 lb 6.4 oz)    05/16/24 0300 05/17/24 0400 05/18/24 0400 05/19/24 0600   Weight: 101 kg (221 lb 14.4 oz) 106 kg (233 lb 7.5 oz) 105 kg (231 lb 14.8  "oz) 104 kg (230 lb)    05/20/24 0400 05/21/24 0400 05/22/24 0500   Weight: 105 kg (231 lb 7.7 oz) 106 kg (233 lb 7.5 oz) 106 kg (234 lb 9.1 oz)      Body mass index is 35.67 kg/m².    Intake/Output Summary (Last 24 hours) at 5/22/2024 1149  Last data filed at 5/22/2024 0800  Gross per 24 hour   Intake 1901 ml   Output 7700 ml   Net -5799 ml     Objective:  Vital signs: (most recent): Blood pressure 120/83, pulse 90, temperature 98.1 °F (36.7 °C), temperature source Oral, resp. rate 10, height 172.7 cm (68\"), weight 106 kg (234 lb 9.1 oz), last menstrual period 04/30/2024, SpO2 100%.    Lungs:  Normal effort.    Heart: Normal rate.  Regular rhythm.  Positive for murmur (1/6 mid diastolic rumbling murmur noted at the apex).    Abdomen: Abdomen is soft.  Bowel sounds are normal.     Neurological: Patient is alert.    Skin:  Warm and dry.                Objective       Physical Exam:  .  Physical Exam  Constitutional:       Appearance: Normal appearance. She is well-developed.   HENT:      Head: Normocephalic and atraumatic.   Cardiovascular:      Rate and Rhythm: Normal rate and regular rhythm.      Heart sounds: Murmur (1/6 mid diastolic rumbling murmur noted at the apex) heard.   Pulmonary:      Effort: Pulmonary effort is normal.      Breath sounds: Normal breath sounds.   Abdominal:      General: Bowel sounds are normal.      Palpations: Abdomen is soft.   Skin:     General: Skin is warm and dry.   Neurological:      General: No focal deficit present.      Mental Status: She is alert and oriented to person, place, and time.   Psychiatric:         Mood and Affect: Mood normal.         Behavior: Behavior normal.               Procedures    Results review       Results Review:   Results from last 7 days   Lab Units 05/22/24  0054 05/20/24  0209 05/19/24  0031 05/18/24  0021 05/17/24  0036 05/16/24  0221   WBC 10*3/mm3 9.55 4.42 4.02 4.06 3.52 3.70   HEMOGLOBIN g/dL 11.0* 9.6* 9.4* 9.8* 11.1* 12.7   PLATELETS 10*3/mm3 " "391 169 111* 103* 109* 151     Results from last 7 days   Lab Units 05/22/24  1012 05/22/24  0054 05/21/24  0844 05/20/24  0209 05/19/24  0031 05/18/24  1036 05/18/24  0021 05/17/24  1221 05/17/24  0036 05/16/24  1034   SODIUM mmol/L  --  139 142 140 138  --  140 135* 128*  --    POTASSIUM mmol/L 3.3* 3.2* 4.0 4.2 4.1 3.9 3.5 4.0 3.4*  --    CHLORIDE mmol/L  --  104 113* 111* 110*  --  110* 105 99  --    CO2 mmol/L  --  24.0 15.9* 18.2* 17.7*  --  20.9* 20.0* 18.2*  --    BUN mg/dL  --  16 17 15 12  --  10 6 5*  --    CREATININE mg/dL  --  1.21* 1.11* 1.12* 1.12*  --  1.14* 0.99 1.00  --    CALCIUM mg/dL  --  8.5* 8.2* 8.3* 7.9*  --  7.5* 7.5* 7.3*  --    GLUCOSE mg/dL  --  85 205* 320* 294*  --  159* 152* 116*  --    ALT (SGPT) U/L  --   --   --  24 30  --  36*  --  44* 40*   AST (SGOT) U/L  --   --   --  20 30  --  55*  --  87* 93*     Results from last 7 days   Lab Units 05/21/24  0844   HSTROP T ng/L 6     Lab Results   Component Value Date    INR 1.26 (H) 05/17/2024     Lab Results   Component Value Date    MG 2.9 (H) 05/13/2024    MG 1.7 05/12/2024    MG 1.7 05/07/2024     Lab Results   Component Value Date    TSH 1.840 05/16/2024      Imaging Results (Last 48 Hours)       ** No results found for the last 48 hours. **          No results found for: \"BNP\"               Echo   Results for orders placed during the hospital encounter of 05/12/24    Adult Transthoracic Echo Complete W/ Cont if Necessary Per Protocol    Interpretation Summary  Images from the original result were not included.      Normal left ventricular cavity size and wall thickness noted. All left ventricular wall segments contract normally.    Left ventricular ejection fraction appears to be 61 - 65%.    Left ventricular diastolic function is consistent with (grade II w/high LAP) pseudonormalization.    There is tethering of both anterior and posterior mitral leaflets with reverse hockey-stick appearance of the anterior mitral leaflet " suspicious for rheumatic mitral valve disease.    There is moderate, bileaflet mitral valve thickening present at the tip of the leaflets (commissure).  Tiera score of 8    MV max PG 22.3 mmHg MV mean PG 14 mmHg MV V2 VTI 69.8 cm MVA(VTI) 1.08 cm2 MV dec slope 687 cm/sec2 MV dec time 0.39 sec at a heart rate of about 95 bpm.    Moderate to severe mitral valve stenosis is present. The calculated mitral valve Broderick' score is 7.    Moderate mitral valve regurgitation is present with a centrally-directed jet noted. The calculated mitral valve morphology score is 7.    The aortic valve was poorly visualized but appears trileaflet. Mild aortic valve regurgitation is present. No aortic valve stenosis is present.    Tricuspid valve not well visualized. Mild to moderate tricuspid valve regurgitation is present. Estimated right ventricular systolic pressure from tricuspid regurgitation is markedly elevated (>55 mmHg).    Severe pulmonary hypertension is present.    The IVC is mildly dilated with partial collapse indicator of right atrial pressures of about 15 mmHg.    There is no evidence of pericardial effusion. .    Comments: The peak and mean gradients across the mitral valve seem to be about the same as on the transthoracic echo back in August of 2020 given the heart rate in the 90s although the mitral valve stenosis seem to have improved on the DONNA done at that time with improvement in the heart rate.  The degree of mitral regurgitation seem to have gotten worse.  Hence would consider repeating the study with focus on the mitral valve after improving the heart rate.  If patient still has significantly elevated gradients across the mitral valve, may have to consider referral to [facility] for possible mitral valvuloplasty.           I reviewed the patient's new clinical results.    Telemetry: Normal sinus rhythm in the 80s to 90s       Medication Review:   Cariprazine HCl, 3 mg, Oral, Daily  cetirizine, 10 mg,  Oral, Daily  doxycycline, 100 mg, Intravenous, Q12H  FLUoxetine, 10 mg, Oral, Daily  insulin glargine, 20 Units, Subcutaneous, Daily  insulin lispro, 2-9 Units, Subcutaneous, 4x Daily AC & at Bedtime  ipratropium-albuterol, 3 mL, Nebulization, 4x Daily - RT  metoprolol succinate XL, 25 mg, Oral, Q24H  nicotine, 1 patch, Transdermal, Q24H  potassium chloride ER, 40 mEq, Oral, Q4H  predniSONE, 20 mg, Oral, Daily With Breakfast  sodium chloride, 500 mL, Intravenous, Once  sodium chloride, 10 mL, Intravenous, Q12H  sodium chloride, 10 mL, Intravenous, Q12H  sodium chloride, 10 mL, Intravenous, Q12H             Assessment      Assessment:  Moderate to severe mitral valve stenosis, rheumatic with moderate mitral regurgitation  Severe pulm hypertension secondary to mitral stenosis  Recurrent pancreatitis  Acute HFpEF secondary to mitral stenosis        Plan     Recommendations:  Will diurese to try to keep the patient negative by about a liter per day as elevated BNP  Will repeat the echocardiogram once heart rate is 70 or less for further evaluation of the mitral valve however likely the patient will need mitral valve intervention    I discussed the patient's findings and my recommendations with patient and family    Electronically signed by DIEGO Quinn, 05/22/24, 11:52 AM EDT.  Electronically signed by Bess Montgomery MD, 05/22/24, 11:59 AM EDT.        Please note that portions of this note were completed with a voice recognition program.      Electronically signed by Bess Montgomery MD at 05/22/24 1200       Edmundo Zapata MD at 05/21/24 1340            Pulmonary and critical care consultation note  Referring Provider:   Reason for Consultation:       Chief complaint shortness of breath-      Sub-     Overnight events reviewed.  All the labs medications ins and outs and vitals reviewed.  Patient resting in bed.    Latest chest x-ray reviewed and discussed with patient  Review of Systems  Positive for  respiratory otherwise negative        History  Past Medical History:   Diagnosis Date    Anxiety     Asthma     Bipolar disorder     Crohn disease     Depression     Hypertension     Schizoaffective disorder    ,   Past Surgical History:   Procedure Laterality Date    APPENDECTOMY       SECTION      CHOLECYSTECTOMY      COLON SURGERY      COLONOSCOPY      MANDIBLE FRACTURE SURGERY      OVARIAN CYST SURGERY      TUBAL ABDOMINAL LIGATION     ,   Family History   Problem Relation Age of Onset    Diabetes Mother     Anxiety disorder Mother     Depression Mother     Bipolar disorder Mother     COPD Father     Schizophrenia Father     Schizophrenia Paternal Grandfather     Breast cancer Neg Hx    ,   Social History     Tobacco Use    Smoking status: Every Day     Current packs/day: 1.00     Average packs/day: 1 pack/day for 20.0 years (20.0 ttl pk-yrs)     Types: Cigarettes    Smokeless tobacco: Never   Vaping Use    Vaping status: Never Used   Substance Use Topics    Alcohol use: No     Comment: denies    Drug use: Yes     Types: Marijuana   ,   Medications Prior to Admission   Medication Sig Dispense Refill Last Dose    Cariprazine HCl (Vraylar) 3 MG capsule capsule Take 1 capsule by mouth Daily.   2024    chlorthalidone (HYGROTON) 25 MG tablet Take 1 tablet by mouth Daily.   2024    fexofenadine (ALLEGRA) 180 MG tablet Take 1 tablet by mouth Daily.   2024    FLUoxetine (PROzac) 10 MG capsule Take 1 capsule by mouth Daily.   2024    magnesium oxide (MAG-OX) 400 MG tablet Take 1 tablet by mouth Daily.   2024    metFORMIN ER (GLUCOPHAGE-XR) 500 MG 24 hr tablet Take 2 tablets by mouth 2 (Two) Times a Day.   2024    metoprolol succinate XL (Toprol XL) 200 MG 24 hr tablet Take 1 tablet by mouth Daily.   2024   , Scheduled Meds:  Cariprazine HCl, 3 mg, Oral, Daily  cetirizine, 10 mg, Oral, Daily  doxycycline, 100 mg, Intravenous, Q12H  FLUoxetine, 10 mg, Oral, Daily  insulin  glargine, 20 Units, Subcutaneous, Daily  insulin lispro, 2-9 Units, Subcutaneous, 4x Daily AC & at Bedtime  ipratropium-albuterol, 3 mL, Nebulization, 4x Daily - RT  methylPREDNISolone sodium succinate, 20 mg, Intravenous, Q12H  micafungin (MYCAMINE) IV, 100 mg, Intravenous, Q24H  mupirocin, 1 application , Each Nare, BID  nicotine, 1 patch, Transdermal, Q24H  piperacillin-tazobactam, 3.375 g, Intravenous, Q8H  sodium chloride, 500 mL, Intravenous, Once  sodium chloride, 10 mL, Intravenous, Q12H  sodium chloride, 10 mL, Intravenous, Q12H  sodium chloride, 10 mL, Intravenous, Q12H    , Continuous Infusions:      and Allergies:  Imuran [azathioprine]    Objective     Vital Signs   Temp:  [97.5 °F (36.4 °C)-98.5 °F (36.9 °C)] 98.1 °F (36.7 °C)  Heart Rate:  [] 89  Resp:  [10-31] 18  BP: (109-149)/(65-97) 130/84    Physical Exam:             General-not in any distress    HEENT-atraumatic  Neck-supple    Respiratory-\not in any respiratory distress    Cardiovascular-NSR  GI-grossly normal    CNS-nonfocal    Musculoskeletal -no edema  Extremities- no obvious deformity noticed     Psychiatric-, alert awake oriented  Skin- no visible rash                                                                   Results Review:    LABS:    Lab Results   Component Value Date    GLUCOSE 320 (H) 05/20/2024    BUN 15 05/20/2024    CREATININE 1.12 (H) 05/20/2024    EGFRIFNONA 93 08/17/2020    BCR 13.4 05/20/2024    CO2 18.2 (L) 05/20/2024    CALCIUM 8.3 (L) 05/20/2024    ALBUMIN 2.7 (L) 05/20/2024    LABIL2 1.4 (L) 07/06/2015    AST 20 05/20/2024    ALT 24 05/20/2024    WBC 4.42 05/20/2024    HGB 9.6 (L) 05/20/2024    HCT 28.8 (L) 05/20/2024    MCV 91.4 05/20/2024     05/20/2024     05/20/2024    K 4.2 05/20/2024     (H) 05/20/2024    ANIONGAP 10.8 05/20/2024       Lab Results   Component Value Date    INR 1.26 (H) 05/17/2024    PROTIME 15.9 (H) 05/17/2024       Results from last 7 days   Lab Units  05/17/24  0036   INR  1.26*          I reviewed the patient's new clinical results.  I reviewed the patient's new imaging results and agree with the interpretation.    Procalitonin Results:      Lab 05/16/24  1034   PROCALCITONIN 0.76*       Latest Reference Range & Units 05/16/24 19:26   pH, Arterial 7.350 - 7.450 pH units 7.467 (H)   pCO2, Arterial 35.0 - 45.0 mm Hg 26.9 (L)   pO2, Arterial 83.0 - 108.0 mm Hg 61.5 (L)   HCO3, Arterial 20.0 - 26.0 mmol/L 19.4 (L)   Base Excess 0.0 - 2.0 mmol/L -3.2 (L)   O2 Saturation, Arterial 94.0 - 99.0 % 94.6   CO2 Content 22 - 33 mmol/L 20.3 (L)   A-a DO2 0.0 - 300.0 mmHg 50.1   Carboxyhemoglobin 0 - 5 % 1.8   Methemoglobin 0.00 - 3.00 % <-0.10 (L)   Oxyhemoglobin 94 - 99 % 93.3 (L)   Hematocrit, Blood Gas 38.0 - 51.0 % 34.4 (L)   Hemoglobin, Blood Gas 13.5 - 17.5 g/dL 11.2 (L)   Site  Right Radial   Jose's Test  N/A   Modality  Room Air   FIO2 % 21   Ventilator Mode  NA   Barometric Pressure for Blood Gas mmHg 723   (H): Data is abnormally high  (L): Data is abnormally low  Microbiology Results (last 10 days)       Procedure Component Value - Date/Time    Blood Culture - Blood, Arm, Left [[id number]]  (Normal) Collected: 05/18/24 1333    Lab Status: Preliminary result Specimen: Blood from Arm, Left Updated: 05/20/24 1346     Blood Culture No growth at 2 days    Blood Culture - Blood, Hand, Right [[id number]]  (Normal) Collected: 05/18/24 1257    Lab Status: Preliminary result Specimen: Blood from Hand, Right Updated: 05/20/24 1346     Blood Culture No growth at 2 days    MRSA Screen, PCR (Inpatient) - Swab, Nares [[id number]]  (Normal) Collected: 05/18/24 0847    Lab Status: Final result Specimen: Swab from Nares Updated: 05/18/24 1010     MRSA PCR No MRSA Detected    Narrative:      The negative predictive value of this diagnostic test is high and should only be used to consider de-escalating anti-MRSA therapy. A positive result may indicate colonization with MRSA and must be  correlated clinically.    Respiratory Panel PCR w/COVID-19(SARS-CoV-2) SYED/JHONATHAN/TRUMAN/PAD/COR/KETURAH In-House, NP Swab in UTM/VTM, 2 HR TAT - Swab, Nasopharynx [[id number]]  (Normal) Collected: 05/16/24 1855    Lab Status: Final result Specimen: Swab from Nasopharynx Updated: 05/16/24 1947     ADENOVIRUS, PCR Not Detected     Coronavirus 229E Not Detected     Coronavirus HKU1 Not Detected     Coronavirus NL63 Not Detected     Coronavirus OC43 Not Detected     COVID19 Not Detected     Human Metapneumovirus Not Detected     Human Rhinovirus/Enterovirus Not Detected     Influenza A PCR Not Detected     Influenza B PCR Not Detected     Parainfluenza Virus 1 Not Detected     Parainfluenza Virus 2 Not Detected     Parainfluenza Virus 3 Not Detected     Parainfluenza Virus 4 Not Detected     RSV, PCR Not Detected     Bordetella pertussis pcr Not Detected     Bordetella parapertussis PCR Not Detected     Chlamydophila pneumoniae PCR Not Detected     Mycoplasma pneumo by PCR Not Detected    Narrative:      In the setting of a positive respiratory panel with a viral infection PLUS a negative procalcitonin without other underlying concern for bacterial infection, consider observing off antibiotics or discontinuation of antibiotics and continue supportive care. If the respiratory panel is positive for atypical bacterial infection (Bordetella pertussis, Chlamydophila pneumoniae, or Mycoplasma pneumoniae), consider antibiotic de-escalation to target atypical bacterial infection.    Blood Culture - Blood, Hand, Right [[id number]]  (Normal) Collected: 05/15/24 0827    Lab Status: Final result Specimen: Blood from Hand, Right Updated: 05/20/24 0845     Blood Culture No growth at 5 days    Blood Culture - Blood, Arm, Left [[id number]]  (Normal) Collected: 05/15/24 0811    Lab Status: Final result Specimen: Blood from Arm, Left Updated: 05/20/24 0845     Blood Culture No growth at 5 days    COVID PRE-OP / PRE-PROCEDURE SCREENING ORDER (NO  ISOLATION) - Swab, Nasopharynx [[id number]]  (Normal) Collected: 05/15/24 0755    Lab Status: Final result Specimen: Swab from Nasopharynx Updated: 05/15/24 0909    Narrative:      The following orders were created for panel order COVID PRE-OP / PRE-PROCEDURE SCREENING ORDER (NO ISOLATION) - Swab, Nasopharynx.  Procedure                               Abnormality         Status                     ---------                               -----------         ------                     COVID-19 and FLU A/B PCR...[[id number]]  Normal              Final result                 Please view results for these tests on the individual orders.    COVID-19 and FLU A/B PCR, 1 HR TAT - Swab, Nasopharynx [[id number]]  (Normal) Collected: 05/15/24 0755    Lab Status: Final result Specimen: Swab from Nasopharynx Updated: 05/15/24 0909     COVID19 Not Detected     Influenza A PCR Not Detected     Influenza B PCR Not Detected    Narrative:      Fact sheet for providers: https://www.fda.gov/media/618407/download    Fact sheet for patients: https://www.fda.gov/media/496484/download    Test performed by PCR.    Blood Culture - Blood, Arm, Right [[id number]]  (Normal) Collected: 05/12/24 2326    Lab Status: Final result Specimen: Blood from Arm, Right Updated: 05/17/24 2345     Blood Culture No growth at 5 days    Blood Culture - Blood, Hand, Left [[id number]]  (Normal) Collected: 05/12/24 2325    Lab Status: Final result Specimen: Blood from Hand, Left Updated: 05/17/24 2345     Blood Culture No growth at 5 days    Blood Culture - Blood, Arm, Left [[id number]]  (Normal) Collected: 05/12/24 2021    Lab Status: Final result Specimen: Blood from Arm, Left Updated: 05/17/24 2030     Blood Culture No growth at 5 days    Urine Culture - Urine, Urine, Clean Catch [[id number]]  (Normal) Collected: 05/12/24 1648    Lab Status: Final result Specimen: Urine, Clean Catch Updated: 05/14/24 1033     Urine Culture No growth           Assessment & Plan      Neurology-alert awake oriented no active issues.  Continue to monitor mental status.      Respiratory- Acute hypoxic respiratory failure-FiO2 requirement reviewed and adjusted to maintain saturation 88 to 92%.      CT chest reviewed-shows bilateral pulmonary infiltrates likely due to  Aspiration pneumonia and/or pulmonary edema.     Latest-chest x-ray  showed left-sided pleural effusion.  Overall   pulmonary opacities seems to be improving.  Will give low-dose diuretics.  Fluids discontinued.  Creatinine stable.  Will get BMP today and tomorrow.       Will decrease steroids further.    VBG reviewed.  Stable.  Continue current antibiotics.    Patient does have a history of asthma-and responded well to steroids    Continue Solu-Medrol.  Doses reviewed and adjusted  Continue DuoNebs    Creatinine stable-if continues to remain stable will discontinue fluids tomorrow.    Cardiology- hemodynamically -stable but blood pressure is borderline low.  Continue to monitor.  If blood pressure goes lower consider giving albumin.  Continue to monitor HR- rate and rhythm, BP     Nephrology- Cr and BUN stable  I/O-reviewed    GI- PPI prophylaxis  Aspiration precautions    Hematology- CBC  Hb transfuse for hemoglobin less than 7  platelet  WBC    ID  Culture  And Antibiotics    Endrocrinology- Maintain Blood sugar 140 -180  TSH Results:      Lab 05/16/24  1034   TSH 1.840        Electrolytes-   Mag and phos       DVT prophylaxis-    Bedside rounds were done with RT and patient's nurse. All the lab and clinical findings were discussed with them and plan was also discussed in great detail.        Echo-  Results for orders placed during the hospital encounter of 05/12/24    Adult Transthoracic Echo Complete W/ Cont if Necessary Per Protocol    Interpretation Summary  Images from the original result were not included.      Normal left ventricular cavity size and wall thickness noted. All left ventricular wall segments contract  normally.    Left ventricular ejection fraction appears to be 61 - 65%.    Left ventricular diastolic function is consistent with (grade II w/high LAP) pseudonormalization.    There is tethering of both anterior and posterior mitral leaflets with reverse hockey-stick appearance of the anterior mitral leaflet suspicious for rheumatic mitral valve disease.    There is moderate, bileaflet mitral valve thickening present at the tip of the leaflets (commissure).  Tiera score of 8    MV max PG 22.3 mmHg MV mean PG 14 mmHg MV V2 VTI 69.8 cm MVA(VTI) 1.08 cm2 MV dec slope 687 cm/sec2 MV dec time 0.39 sec at a heart rate of about 95 bpm.    Moderate to severe mitral valve stenosis is present. The calculated mitral valve Broderick' score is 7.    Moderate mitral valve regurgitation is present with a centrally-directed jet noted. The calculated mitral valve morphology score is 7.    The aortic valve was poorly visualized but appears trileaflet. Mild aortic valve regurgitation is present. No aortic valve stenosis is present.    Tricuspid valve not well visualized. Mild to moderate tricuspid valve regurgitation is present. Estimated right ventricular systolic pressure from tricuspid regurgitation is markedly elevated (>55 mmHg).    Severe pulmonary hypertension is present.    The IVC is mildly dilated with partial collapse indicator of right atrial pressures of about 15 mmHg.    There is no evidence of pericardial effusion. .    Comments: The peak and mean gradients across the mitral valve seem to be about the same as on the transthoracic echo back in August of 2020 given the heart rate in the 90s although the mitral valve stenosis seem to have improved on the DONNA done at that time with improvement in the heart rate.  The degree of mitral regurgitation seem to have gotten worse.  Hence would consider repeating the study with focus on the mitral valve after improving the heart rate.  If patient still has significantly elevated  gradients across the mitral valve, may have to consider referral to [facility] for possible mitral valvuloplasty.           Acute pancreatitis          Edmundo Zapata MD  24  13:40 EDT         Electronically signed by Edmundo Zapata MD at 24 1344       Gregoria Gamble APRN at 24 [id number]                     PROGRESS NOTE         Patient Identification:  Name:  Manda Al  Age:  43 y.o.  Sex:  female  :  1980  MRN:  [id number]  Visit Number:  [id number]  Primary Care Provider:  Mi Rivera MD         LOS: 9 days       ----------------------------------------------------------------------------------------------------------------------  Subjective       Chief Complaints:    Flank Pain        Interval History:      The patient is awake and alert, sitting up comfortably in bed.  On room air but complaining of some chest tightness and shortness of breath.  Primary RN made aware.  Afebrile.  No reports of diarrhea.  Denies any nausea or vomiting.  Denies any skin rashes.  The patient states she is overall feeling better.  Lung exam is clear to auscultation bilaterally.  Abdomen soft and rounded, nontender with normoactive bowel sounds.  Midline to the right upper extremity with no evidence of infection or phlebitis.  Blood cultures from  preliminarily reported growth at 2 days.  Transthoracic echo from  does not report any evidence of vegetation but did report appearance of the anterior mitral leaflet suspicious for rheumatic mitral valve disease.  Cardiology is following.  Rickettsial and fungal serologies pending.      Review of Systems:    Constitutional: no fever, chills and night sweats.  Generalized fatigue.  Eyes: no eye drainage, itching or redness.  HEENT: no mouth sores, dysphagia or nose bleed.  Respiratory: no for shortness of breath, cough or production of sputum.  Cardiovascular: no chest pain, no palpitations, no orthopnea.  Gastrointestinal: + nausea, no  vomiting or diarrhea. No abdominal pain, hematemesis or rectal bleeding.  Genitourinary: no dysuria or polyuria.  Hematologic/lymphatic: no lymph node abnormalities, no easy bruising or easy bleeding.  Musculoskeletal: no muscle or joint pain.  Skin: No rash and no itching.  Neurological: no loss of consciousness, no seizure, no headache.  Behavioral/Psych: no depression or suicidal ideation.  Endocrine: no hot flashes.  Immunologic: negative.    ----------------------------------------------------------------------------------------------------------------------      Objective       Current [facility] Meds:  Cariprazine HCl, 3 mg, Oral, Daily  cetirizine, 10 mg, Oral, Daily  doxycycline, 100 mg, Intravenous, Q12H  FLUoxetine, 10 mg, Oral, Daily  insulin glargine, 20 Units, Subcutaneous, Daily  insulin lispro, 2-9 Units, Subcutaneous, 4x Daily AC & at Bedtime  ipratropium-albuterol, 3 mL, Nebulization, 4x Daily - RT  methylPREDNISolone sodium succinate, 20 mg, Intravenous, Q12H  micafungin (MYCAMINE) IV, 100 mg, Intravenous, Q24H  mupirocin, 1 application , Each Nare, BID  nicotine, 1 patch, Transdermal, Q24H  piperacillin-tazobactam, 3.375 g, Intravenous, Q8H  sodium chloride, 500 mL, Intravenous, Once  sodium chloride, 10 mL, Intravenous, Q12H  sodium chloride, 10 mL, Intravenous, Q12H  sodium chloride, 10 mL, Intravenous, Q12H           ----------------------------------------------------------------------------------------------------------------------    Vital Signs:  Temp:  [97.5 °F (36.4 °C)-98.5 °F (36.9 °C)] 98.1 °F (36.7 °C)  Heart Rate:  [] 89  Resp:  [10-31] 18  BP: (109-149)/(65-97) 130/84  Mean Arterial Pressure (Non-Invasive) for the past 24 hrs (Last 3 readings):   Noninvasive MAP (mmHg)   05/21/24 1300 97   05/21/24 1200 111   05/21/24 1100 110     SpO2 Percentage    05/21/24 1200 05/21/24 1300 05/21/24 1314   SpO2: 96% 96% 95%     SpO2:  [94 %-100 %] 95 %  on   ;   Device (Oxygen Therapy): room  air    Body mass index is 35.5 kg/m².  Wt Readings from Last 3 Encounters:   05/21/24 106 kg (233 lb 7.5 oz)   05/07/24 97.1 kg (214 lb)   03/19/22 113 kg (250 lb)        Intake/Output Summary (Last 24 hours) at 5/21/2024 1339  Last data filed at 5/21/2024 0800  Gross per 24 hour   Intake 3108 ml   Output --   Net 3108 ml     Diet: Regular/House; Fluid Consistency: Thin (IDDSI 0)  ----------------------------------------------------------------------------------------------------------------------      Physical Exam:    Constitutional:  Well-developed and well-nourished.  On room air, reporting chest tightness and shortness of breath.  RN made aware.  Denies further complaints.  HENT:  Head: Normocephalic and atraumatic.  Mouth:  Moist mucous membranes.    Eyes:  Conjunctivae and EOM are normal.  No scleral icterus.  Neck:  Neck supple.  No JVD present.    Cardiovascular:  Normal rate, regular rhythm and normal heart sounds with no murmur. No edema.  Pulmonary/Chest:  No respiratory distress, no wheezes, no crackles, with normal breath sounds and good air movement.  Abdominal:  Soft.  Bowel sounds are normal.  No distension and no tenderness.   Musculoskeletal:  No edema, no tenderness, and no deformity.  No swelling or redness of joints.  Neurological:  Alert and oriented to person, place, and time.  No facial droop.  No slurred speech.   Skin:  Skin is warm and dry.  No rash noted.  No pallor.  Scabbed area on the left bottom from tick bite.  No surrounding erythema.  No drainage.  Psychiatric:  Normal mood and affect.  Behavior is normal.        ----------------------------------------------------------------------------------------------------------------------  Results from last 7 days   Lab Units 05/21/24  0844   HSTROP T ng/L 6         Results from last 7 days   Lab Units 05/16/24  1926   PH, ARTERIAL pH units 7.467*   PO2 ART mm Hg 61.5*   PCO2, ARTERIAL mm Hg 26.9*   HCO3 ART mmol/L 19.4*     Results from  "last 7 days   Lab Units 05/20/24  0209 05/19/24  1613 05/19/24  1321 05/19/24  1015 05/19/24  0730 05/19/24  0031 05/18/24  0021 05/17/24  0036 05/16/24  1034   CRP mg/dL  --   --   --   --   --   --  15.07*  --  9.82*   LACTATE mmol/L  --  3.2* 4.4* 3.2*   < >  --   --   --   --    WBC 10*3/mm3 4.42  --   --   --   --  4.02 4.06 3.52  --    HEMOGLOBIN g/dL 9.6*  --   --   --   --  9.4* 9.8* 11.1*  --    HEMATOCRIT % 28.8*  --   --   --   --  28.5* 29.2* 32.4*  --    MCV fL 91.4  --   --   --   --  92.2 91.0 88.3  --    MCHC g/dL 33.3  --   --   --   --  33.0 33.6 34.3  --    PLATELETS 10*3/mm3 169  --   --   --   --  111* 103* 109*  --    INR   --   --   --   --   --   --   --  1.26*  --     < > = values in this interval not displayed.     Results from last 7 days   Lab Units 05/20/24  0209 05/19/24  0031 05/18/24  1036 05/18/24  0021   SODIUM mmol/L 140 138  --  140   POTASSIUM mmol/L 4.2 4.1 3.9 3.5   CHLORIDE mmol/L 111* 110*  --  110*   CO2 mmol/L 18.2* 17.7*  --  20.9*   BUN mg/dL 15 12  --  10   CREATININE mg/dL 1.12* 1.12*  --  1.14*   CALCIUM mg/dL 8.3* 7.9*  --  7.5*   GLUCOSE mg/dL 320* 294*  --  159*   ALBUMIN g/dL 2.7* 2.5*  --  2.3*   BILIRUBIN mg/dL 0.4 0.5  --  0.5   ALK PHOS U/L 79 85  --  73   AST (SGOT) U/L 20 30  --  55*   ALT (SGPT) U/L 24 30  --  36*   Estimated Creatinine Clearance: 83.4 mL/min (A) (by C-G formula based on SCr of 1.12 mg/dL (H)).  No results found for: \"AMMONIA\"    Glucose   Date/Time Value Ref Range Status   05/21/2024 1117 168 (H) 70 - 130 mg/dL Final   05/21/2024 0634 222 (H) 70 - 130 mg/dL Final   05/20/2024 2146 334 (H) 70 - 130 mg/dL Final   05/20/2024 1653 280 (H) 70 - 130 mg/dL Final   05/20/2024 1119 273 (H) 70 - 130 mg/dL Final   05/20/2024 0636 312 (H) 70 - 130 mg/dL Final   05/19/2024 2147 277 (H) 70 - 130 mg/dL Final   05/19/2024 1616 337 (H) 70 - 130 mg/dL Final     Lab Results   Component Value Date    HGBA1C 6.90 (H) 05/12/2024     Lab Results   Component Value " "Date    TSH 1.840 05/16/2024       Blood Culture   Date Value Ref Range Status   05/15/2024 No growth at 4 days  Preliminary   05/15/2024 No growth at 4 days  Preliminary   05/12/2024 No growth at 5 days  Final   05/12/2024 No growth at 5 days  Final   05/12/2024 No growth at 5 days  Final     Urine Culture   Date Value Ref Range Status   05/12/2024 No growth  Final     No results found for: \"WOUNDCX\"  No results found for: \"STOOLCX\"  No results found for: \"RESPCX\"  Pain Management Panel  More data exists         Latest Ref Rng & Units 8/11/2020 5/9/2017   Pain Management Panel   Amphetamine, Urine Qual Negative Positive  Negative    Barbiturates Screen, Urine Negative Negative  Negative    Benzodiazepine Screen, Urine Negative Negative  Negative    Buprenorphine, Screen, Urine Negative Negative  Negative    Cocaine Screen, Urine Negative Negative  Negative    Methadone Screen , Urine Negative Negative  Negative          ----------------------------------------------------------------------------------------------------------------------  Imaging Results (Last 24 Hours)       ** No results found for the last 24 hours. **            ----------------------------------------------------------------------------------------------------------------------    Pertinent Infectious Disease Results                Assessment/Plan       Assessment     Recurrent high-grade fever  Pneumonia  Suspicion of tickborne illness        Plan      The patient is awake and alert, sitting up comfortably in bed.  On room air but complaining of some chest tightness and shortness of breath.  Primary RN made aware.  Afebrile.  No reports of diarrhea.  Denies any nausea or vomiting.  Denies any skin rashes.  The patient states she is overall feeling better.  Lung exam is clear to auscultation bilaterally.  Abdomen soft and rounded, nontender with normoactive bowel sounds.  Midline to the right upper extremity with no evidence of infection or " phlebitis.  Blood cultures from  preliminarily reported growth at 2 days.  Transthoracic echo from  does not report any evidence of vegetation but did report appearance of the anterior mitral leaflet suspicious for rheumatic mitral valve disease.  Cardiology is following.  Rickettsial and fungal serologies pending.    For now plan to continue with Zosyn, doxycycline, micafungin courses empirically for pneumonia and tickborne illness along with possible mold exposure.  Awaiting fungal and rickettsial serologies.  We will continue to monitor.      ANTIMICROBIAL THERAPY    doxycycline (VIBRAMYCIN) 100 mg IVPB in 100 mL NS (VTB)  micafungin (MYCAMINE) IVPB  piperacillin-tazobactam (ZOSYN) IVPB 3.375 g IVPB in 100 mL NS (VTB)     Code Status:   Code Status and Medical Interventions:   Ordered at: 24 1930     Code Status (Patient has no pulse and is not breathing):    CPR (Attempt to Resuscitate)     Medical Interventions (Patient has pulse or is breathing):    Full Support       DIEGO Smith  24  13:39 EDT      Electronically signed by Gregoria Gamble APRN at 24 1343       Brenton Alves PA-C at 24 1336       Attestation signed by Kevin Woodard MD at 24 1356    I have reviewed this documentation and agree.  Continue antibiotics, micafungin, hold IVFs, status post lasix.                 Patient Identification:  Name:  Manda Al  Age:  43 y.o.  Sex:  female  :  1980  MRN:  [id number]  Visit Number:  [id number]  Primary Care Provider:  Mi Rivera MD    Length of stay:  9    Subjective/Interval History/Consultants/Procedures     Chief Complaint:   Chief Complaint   Patient presents with    Flank Pain       Subjective/Interval History:    43 y.o. female who was admitted on 2024 with acute pancreatitis     PMH is significant for   For complete admission information, please see history and physical.     Consultations:  Pulmonology   Infectious  disease   Cardiology    Procedures/Scans:  CT abdomen and pelvis w & w/o contrast x 2  CXR x 4  VQ scan  Bilateral lower extremity venous Doppler US  CT PE protocol   TTE    Today, the patient was seen and examined with RN at bedside. Patient had some increased shortness of breath and chest tightness this AM- Trop negative. Repeat EKG without concerning change. Also with some tightness in her hands- fluids stopped. Respiratory status currently at baseline. Did have significant valvular disease on echo which patient states was previously known, cardiology consulted for further recommendations. No abdominal pain, eating well. Complaining of some lower back pain which she thinks related to the bed. RN noted has been ambulating around the unit.      Room location at the time of evaluation was 219.    ----------------------------------------------------------------------------------------------------------------------  Current Hospital Meds:  Cariprazine HCl, 3 mg, Oral, Daily  cetirizine, 10 mg, Oral, Daily  doxycycline, 100 mg, Intravenous, Q12H  FLUoxetine, 10 mg, Oral, Daily  insulin glargine, 20 Units, Subcutaneous, Daily  insulin lispro, 2-9 Units, Subcutaneous, 4x Daily AC & at Bedtime  ipratropium-albuterol, 3 mL, Nebulization, 4x Daily - RT  methylPREDNISolone sodium succinate, 20 mg, Intravenous, Q12H  micafungin (MYCAMINE) IV, 100 mg, Intravenous, Q24H  mupirocin, 1 application , Each Nare, BID  nicotine, 1 patch, Transdermal, Q24H  piperacillin-tazobactam, 3.375 g, Intravenous, Q8H  sodium chloride, 500 mL, Intravenous, Once  sodium chloride, 10 mL, Intravenous, Q12H  sodium chloride, 10 mL, Intravenous, Q12H  sodium chloride, 10 mL, Intravenous, Q12H         ----------------------------------------------------------------------------------------------------------------------      Objective     Vital Signs:  Temp:  [97.5 °F (36.4 °C)-98.5 °F (36.9 °C)] 98.1 °F (36.7 °C)  Heart Rate:  [] 89  Resp:   [10-31] 18  BP: (109-149)/(65-97) 130/84      05/19/24  0600 05/20/24  0400 05/21/24  0400   Weight: 104 kg (230 lb) 105 kg (231 lb 7.7 oz) 106 kg (233 lb 7.5 oz)     Body mass index is 35.5 kg/m².    Intake/Output Summary (Last 24 hours) at 5/21/2024 1336  Last data filed at 5/21/2024 0800  Gross per 24 hour   Intake 3108 ml   Output --   Net 3108 ml     I/O this shift:  In: 240 [P.O.:240]  Out: -   Diet: Regular/House; Fluid Consistency: Thin (IDDSI 0)  ----------------------------------------------------------------------------------------------------------------------    Physical Exam  Vitals and nursing note reviewed.   Constitutional:       General: She is not in acute distress.     Appearance: She is obese.   HENT:      Head: Normocephalic and atraumatic.   Eyes:      Extraocular Movements: Extraocular movements intact.   Cardiovascular:      Rate and Rhythm: Normal rate and regular rhythm.   Pulmonary:      Effort: Pulmonary effort is normal.      Breath sounds: Normal breath sounds.   Abdominal:      Palpations: Abdomen is soft.   Musculoskeletal:      Right lower leg: No edema.      Left lower leg: No edema.   Skin:     General: Skin is warm and dry.   Neurological:      Mental Status: She is alert. Mental status is at baseline.   Psychiatric:         Mood and Affect: Mood normal.         Behavior: Behavior normal.                ----------------------------------------------------------------------------------------------------------------------  Tele:      ----------------------------------------------------------------------------------------------------------------------  Results from last 7 days   Lab Units 05/21/24  0844   HSTROP T ng/L 6     Results from last 7 days   Lab Units 05/20/24  0209 05/19/24  1613 05/19/24  1321 05/19/24  1015 05/19/24  0730 05/19/24  0031 05/18/24  0021 05/17/24  0036 05/16/24  1034   CRP mg/dL  --   --   --   --   --   --  15.07*  --  9.82*   LACTATE mmol/L  --  3.2*  "4.4* 3.2*   < >  --   --   --   --    WBC 10*3/mm3 4.42  --   --   --   --  4.02 4.06 3.52  --    HEMOGLOBIN g/dL 9.6*  --   --   --   --  9.4* 9.8* 11.1*  --    HEMATOCRIT % 28.8*  --   --   --   --  28.5* 29.2* 32.4*  --    MCV fL 91.4  --   --   --   --  92.2 91.0 88.3  --    MCHC g/dL 33.3  --   --   --   --  33.0 33.6 34.3  --    PLATELETS 10*3/mm3 169  --   --   --   --  111* 103* 109*  --    INR   --   --   --   --   --   --   --  1.26*  --     < > = values in this interval not displayed.     Results from last 7 days   Lab Units 05/16/24  1926   PH, ARTERIAL pH units 7.467*   PO2 ART mm Hg 61.5*   PCO2, ARTERIAL mm Hg 26.9*   HCO3 ART mmol/L 19.4*     Results from last 7 days   Lab Units 05/20/24  0209 05/19/24  0031 05/18/24  1036 05/18/24  0021   SODIUM mmol/L 140 138  --  140   POTASSIUM mmol/L 4.2 4.1 3.9 3.5   CHLORIDE mmol/L 111* 110*  --  110*   CO2 mmol/L 18.2* 17.7*  --  20.9*   BUN mg/dL 15 12  --  10   CREATININE mg/dL 1.12* 1.12*  --  1.14*   CALCIUM mg/dL 8.3* 7.9*  --  7.5*   GLUCOSE mg/dL 320* 294*  --  159*   ALBUMIN g/dL 2.7* 2.5*  --  2.3*   BILIRUBIN mg/dL 0.4 0.5  --  0.5   ALK PHOS U/L 79 85  --  73   AST (SGOT) U/L 20 30  --  55*   ALT (SGPT) U/L 24 30  --  36*   Estimated Creatinine Clearance: 83.4 mL/min (A) (by C-G formula based on SCr of 1.12 mg/dL (H)).  No results found for: \"AMMONIA\"      Blood Culture   Date Value Ref Range Status   05/18/2024 No growth at 2 days  Preliminary   05/18/2024 No growth at 2 days  Preliminary   05/15/2024 No growth at 5 days  Final   05/15/2024 No growth at 5 days  Final     No results found for: \"URINECX\"  No results found for: \"WOUNDCX\"  No results found for: \"STOOLCX\"  ----------------------------------------------------------------------------------------------------------------------  Imaging Results (Last 24 Hours)       ** No results found for the last 24 hours. **      "     ----------------------------------------------------------------------------------------------------------------------   I have reviewed the above laboratory values for 05/21/24    Assessment/Plan     Active Hospital Problems    Diagnosis  POA    **Acute pancreatitis [K85.90]  Yes         ASSESSMENT/PLAN:    Recurrent fevers  Suspect sepsis secondary to tickborne illness  Later in admission patient had onset of recurrent fevers starting on 5/14.  Later developed tachycardia, soft BP as well and was started on broad-spectrum antibiotics on 515 with vancomycin, Zosyn.  CT of the abdomen and pelvis on that date showed resolved pancreatitis with no necrosis or cyst.  Patient did report recently with tick on her person which she had removed about a week before presentation.  IV doxycycline was started on 5/16 and vancomycin was discontinued.  Tick serologies sent  Other infectious workup including CXR, UA, blood cultures were negative.  D-dimer was elevated, greater than 20 though VTE workup negative.  Hepatitis and HIV panels negative.  ANCA panel was sent and remains pending as well.  TTE planned.  CT PE protocol showed edema versus multifocal pneumonia.  Patient was started on micafungin as well.  Infectious disease consulted and following, assistance appreciated.  For now recommending continued to Zosyn, doxycycline, micafungin pending further test results.  Overall patient appears to be improving, vital signs stable, remaining afebrile for over 48 hours.     Acute hypoxic respiratory failure, now resolved, suspect due to pulmonary edema versus multifocal pneumonia  Overnight 5/17 patient developed new hypoxia was briefly titrated up to 4 L per nasal cannula then weaned to 2 L.  CT PE at that time as above showed concern for edema versus pneumonia.  Antimicrobials were continued as above  Diuresis held given BP soft at the time  Pulmonology was consulted and following, assistance appreciated.  Recommended  continue current antibiotics and started Solu-Medrol- now tapering with prednisone.   Fluids discontinued today given worsening shortness of breath. 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  Cardiology consulted for further assistance and recommendations, appreciated.   Report recommended consideration of referral for possible mitral valvuloplasty      Acute, recurrent pancreatitis  Resolved and tolerating diet     Mild hepatocellular transaminitis, resolved  Mild thrombocytopenia, resolved  Suspect secondary to tickborne illness.  LFTs, platelet count WNL today.     Hyponatremia, resolved     Recent UTI  culture without growth this admission     T2DM with expected hyperglycemia due to steroids  Continue Lantus, moderate dose correction insulin.  Titrating as needed.     Chronic:  Anxiety/depression  Schizoaffective disorder  HTN  Crohn's disease    -----------  -DVT prophylaxis: SCDs  -Disposition plans/anticipated needs: pending course and completion of work up.        The patient is high risk due to the following diagnoses/reasons:  concern for tickborne illness, mod to severe mitral stenosis        Brenton Alves PA-C  05/21/24  13:36 EDT     Electronically signed by Kevin Woodard MD at 05/21/24 1356          Consult Notes (last 48 hours)        Earl Cox MD at 05/21/24 0848        Consult Orders    1. Inpatient Cardiology Consult [[id number]] ordered by Kevin Woodard MD at 05/21/24 0728                 Date of Admit: 5/12/2024  Date of Consult: 05/21/24  No ref. 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  Increasing dyspnea, rule out acute HFpEF  Severe  pulmonary hypertension, probably from mitral valvular disease.  Recurrent pancreatitis.    Recommendations     With regards to her mitral stenosis, will reevaluate the mitral valve area after her heart rate is controlled adequately  Check proBNP and consider IV diuretics as needed  Add a beta-blocker to control her heart rate.  If her mitral valve gradient is still significant with adequate heart rate control then we will have to consider referral to [facility] for consideration of mitral valvuloplasty versus mitral valve replacement.    Reason for consultation: Mitral valve stenosis and pulmonary hypertension    Subjective       Subjective     History of Present Illness    Manda Al is a 43-year-old female with a past medical history significant for anxiety, depression, bipolar disorder, asthma, Crohn's disease, hypertension, schizoaffective disorder.  Patient initially presented to [facility] with complaints of epigastric abdominal pain, nausea and vomiting.  She was found to have acute pancreatitis.  Cardiology was consulted for abnormal echocardiogram moderate to.  Moderate to severe mitral valve stenosis, moderate mitral valve regurgitation and severe pulmonary hypertension.  Patient states she had rheumatic fever as a child.  Did have echocardiogram in 2020 which showed moderate mitral valve stenosis.  States has been having episodes of recurrent pancreatitis.  On admission she was noted to have fever but this has since resolved.  Reports shortness of breath today and is mildly tachycardic.  Did have tick bite recently and is being treated for this.  She states that when she does not have episodes of pancreatitis she feels overall well and her breathing is stable.  Denies any chest pain.  Has been smoking 1 pack of cigarettes a day for the last 30 years.  Recent echocardiogram showed normal LV function.  States she is feeling more short of breath today.    Cardiac risk  factors:diabetes mellitus, smoking, and Obesity    Last Echo: 5/2024    Normal left ventricular cavity size and wall thickness noted. All left ventricular wall segments contract normally.    Left ventricular ejection fraction appears to be 61 - 65%.    Left ventricular diastolic function is consistent with (grade II w/high LAP) pseudonormalization.    There is tethering of both anterior and posterior mitral leaflets with reverse hockey-stick appearance of the anterior mitral leaflet suspicious for rheumatic mitral valve disease.    There is moderate, bileaflet mitral valve thickening present at the tip of the leaflets (commissure).  Tiera score of 8    MV max PG 22.3 mmHg MV mean PG 14 mmHg MV V2 VTI 69.8 cm MVA(VTI) 1.08 cm2 MV dec slope 687 cm/sec2 MV dec time 0.39 sec at a heart rate of about 95 bpm.    Moderate to severe mitral valve stenosis is present. The calculated mitral valve Broderick' score is 7.    Moderate mitral valve regurgitation is present with a centrally-directed jet noted. The calculated mitral valve morphology score is 7.    The aortic valve was poorly visualized but appears trileaflet. Mild aortic valve regurgitation is present. No aortic valve stenosis is present.    Tricuspid valve not well visualized. Mild to moderate tricuspid valve regurgitation is present. Estimated right ventricular systolic pressure from tricuspid regurgitation is markedly elevated (>55 mmHg).    Severe pulmonary hypertension is present.    The IVC is mildly dilated with partial collapse indicator of right atrial pressures of about 15 mmHg.    There is no evidence of pericardial effusion. .    Comments: The peak and mean gradients across the mitral valve seem to be about the same as on the transthoracic echo back in August of 2020 given the heart rate in the 90s although the mitral valve stenosis seem to have improved on the DONNA done at that time with improvement in the heart rate.  The degree of mitral regurgitation  seem to have gotten worse.  Hence would consider repeating the study with focus on the mitral valve after improving the heart rate.  If patient still has significantly elevated gradients across the mitral valve, may have to consider referral to [facility] for possible mitral valvuloplasty.    Past Medical History:   Diagnosis Date    Anxiety     Asthma     Bipolar disorder     Crohn disease     Depression     Hypertension     Schizoaffective disorder      Past Surgical History:   Procedure Laterality Date    APPENDECTOMY       SECTION      CHOLECYSTECTOMY      COLON SURGERY      COLONOSCOPY      MANDIBLE FRACTURE SURGERY      OVARIAN CYST SURGERY      TUBAL ABDOMINAL LIGATION       Family History   Problem Relation Age of Onset    Diabetes Mother     Anxiety disorder Mother     Depression Mother     Bipolar disorder Mother     COPD Father     Schizophrenia Father     Schizophrenia Paternal Grandfather     Breast cancer Neg Hx      Social History     Tobacco Use    Smoking status: Every Day     Current packs/day: 1.00     Average packs/day: 1 pack/day for 20.0 years (20.0 ttl pk-yrs)     Types: Cigarettes    Smokeless tobacco: Never   Vaping Use    Vaping status: Never Used   Substance Use Topics    Alcohol use: No     Comment: denies    Drug use: Yes     Types: Marijuana     Medications Prior to Admission   Medication Sig Dispense Refill Last Dose    Cariprazine HCl (Vraylar) 3 MG capsule capsule Take 1 capsule by mouth Daily.   2024    chlorthalidone (HYGROTON) 25 MG tablet Take 1 tablet by mouth Daily.   2024    fexofenadine (ALLEGRA) 180 MG tablet Take 1 tablet by mouth Daily.   2024    FLUoxetine (PROzac) 10 MG capsule Take 1 capsule by mouth Daily.   2024    magnesium oxide (MAG-OX) 400 MG tablet Take 1 tablet by mouth Daily.   2024    metFORMIN ER (GLUCOPHAGE-XR) 500 MG 24 hr tablet Take 2 tablets by mouth 2 (Two) Times a Day.   2024    metoprolol succinate XL  (Toprol XL) 200 MG 24 hr tablet Take 1 tablet by mouth Daily.   5/11/2024     Allergies:  Imuran [azathioprine]    Review of Systems   Constitutional:  Positive for fever. Negative for fatigue.   HENT:  Negative for congestion and sinus pressure.    Eyes:  Negative for photophobia and visual disturbance.   Respiratory:  Positive for shortness of breath. Negative for chest tightness.    Cardiovascular:  Negative for chest pain.   Gastrointestinal:  Negative for abdominal pain and nausea.   Endocrine: Negative for polyphagia and polyuria.   Genitourinary:  Negative for hematuria.   Musculoskeletal:  Negative for back pain and neck pain.   Skin:  Negative for rash and wound.   Allergic/Immunologic: Negative for food allergies and immunocompromised state.   Neurological:  Negative for dizziness and light-headedness.   Hematological:  Negative for adenopathy. Does not bruise/bleed easily.   Psychiatric/Behavioral:  Negative for confusion.        Objective       Objective      Vital Signs  Temp:  [97.5 °F (36.4 °C)-98.5 °F (36.9 °C)] 97.8 °F (36.6 °C)  Heart Rate:  [] 72  Resp:  [10-31] 20  BP: (105-141)/(41-92) 126/86  Vital Signs (last 72 hrs)         05/18 0700  05/19 0659 05/19 0700  05/20 0659 05/20 0700  05/21 0659 05/21 0700  05/21 0848   Most Recent      Temp (°F) 97.7 -  98.6    97.7 -  98.7    97.5 -  98.5      97.8     97.8 (36.6) 05/21 0800    Heart Rate 63 -  108    67 -  98    74 -  102      72     72 05/21 0754    Resp 11 - 24    14 -  26    10 -  31      20     20 05/21 0754    BP 76/48 -  117/97    97/66 -  140/82    105/41 -  141/90       126/86 05/21 0600    SpO2 (%) 86 -  100    90 -  100    94 -  100      97     97 05/21 0754    Flow (L/min) 2 -  5      3         3 05/19 0800          Body mass index is 35.5 kg/m².  Documented weights    05/12/24 1556 05/12/24 2036 05/14/24 0500 05/15/24 0500   Weight: 97.1 kg (214 lb) 96.4 kg (212 lb 8.4 oz) 99.6 kg (219 lb 9.6 oz) 100 kg (220 lb 6.4 oz)     05/16/24 0300 05/17/24 0400 05/18/24 0400 05/19/24 0600   Weight: 101 kg (221 lb 14.4 oz) 106 kg (233 lb 7.5 oz) 105 kg (231 lb 14.8 oz) 104 kg (230 lb)    05/20/24 0400 05/21/24 0400   Weight: 105 kg (231 lb 7.7 oz) 106 kg (233 lb 7.5 oz)            Intake/Output Summary (Last 24 hours) at 5/21/2024 0848  Last data filed at 5/21/2024 0800  Gross per 24 hour   Intake 3468 ml   Output --   Net 3468 ml     Physical Exam  Constitutional:       General: She is not in acute distress.     Appearance: Normal appearance. She is well-developed and normal weight.   HENT:      Head: Normocephalic and atraumatic.   Eyes:      General: Lids are normal.      Conjunctiva/sclera: Conjunctivae normal.      Pupils: Pupils are equal, round, and reactive to light.   Neck:      Vascular: No carotid bruit or JVD.   Cardiovascular:      Rate and Rhythm: Normal rate and regular rhythm.      Pulses: Normal pulses.           Dorsalis pedis pulses are 1+ on the right side and 1+ on the left side.        Posterior tibial pulses are 1+ on the right side and 1+ on the left side.      Heart sounds: S1 normal and S2 normal. No murmur (Mid diastolic murmur noted and grade 3/6 holosystolic murmur at LV apex noted) heard.     No diastolic murmur is present.      Comments: Rule out for started on an pulmonary competent of the second heart sound noted  Pulmonary:      Effort: Pulmonary effort is normal. No respiratory distress.      Breath sounds: Rales (right base) present. No wheezing.   Abdominal:      General: Bowel sounds are normal. There is no distension.      Palpations: Abdomen is soft. There is no hepatomegaly or splenomegaly.      Tenderness: There is no abdominal tenderness.   Musculoskeletal:         General: No swelling. Normal range of motion.      Cervical back: Normal range of motion and neck supple.      Right lower leg: Edema (minimal) present.      Left lower leg: Edema (minimal) present.   Skin:     General: Skin is warm and dry.       Coloration: Skin is not jaundiced.      Findings: No rash.   Neurological:      General: No focal deficit present.      Mental Status: She is alert and oriented to person, place, and time. Mental status is at baseline.   Psychiatric:         Mood and Affect: Mood normal.         Speech: Speech normal.         Behavior: Behavior normal.         Thought Content: Thought content normal.         Judgment: Judgment normal.         Results review     Results Review:    I reviewed the patient's new clinical results.      Results from last 7 days   Lab Units 05/20/24  0209 05/19/24  0031 05/18/24  0021 05/17/24  0036 05/16/24  0221 05/15/24  0827   WBC 10*3/mm3 4.42 4.02 4.06 3.52 3.70 5.15   HEMOGLOBIN g/dL 9.6* 9.4* 9.8* 11.1* 12.7 12.3   PLATELETS 10*3/mm3 169 111* 103* 109* 151 181     Results from last 7 days   Lab Units 05/20/24  0209 05/19/24  0031 05/18/24  1036 05/18/24  0021 05/17/24  1221 05/17/24  0036 05/16/24  1034 05/16/24  0221 05/15/24  0827   SODIUM mmol/L 140 138  --  140 135* 128*  --  133* 131*   POTASSIUM mmol/L 4.2 4.1 3.9 3.5 4.0 3.4*  --  4.3 3.7   CHLORIDE mmol/L 111* 110*  --  110* 105 99  --  101 100   CO2 mmol/L 18.2* 17.7*  --  20.9* 20.0* 18.2*  --  21.5* 19.7*   BUN mg/dL 15 12  --  10 6 5*  --  6 3*   CREATININE mg/dL 1.12* 1.12*  --  1.14* 0.99 1.00  --  1.15* 1.14*   CALCIUM mg/dL 8.3* 7.9*  --  7.5* 7.5* 7.3*  --  7.9* 8.2*   GLUCOSE mg/dL 320* 294*  --  159* 152* 116*  --  133* 163*   ALT (SGPT) U/L 24 30  --  36*  --  44* 40*  --  17   AST (SGOT) U/L 20 30  --  55*  --  87* 93*  --  32     Lab Results   Component Value Date    INR 1.26 (H) 05/17/2024     Lab Results   Component Value Date    MG 2.9 (H) 05/13/2024    MG 1.7 05/12/2024    MG 1.7 05/07/2024     Lab Results   Component Value Date    TSH 1.840 05/16/2024      Lab Results   Component Value Date    PROBNP 3,619.0 (H) 08/14/2020        Narrative:      Images from the original result were not included.      Normal left  ventricular cavity size and wall thickness noted. All left   ventricular wall segments contract normally.    Left ventricular ejection fraction appears to be 61 - 65%.    Left ventricular diastolic function is consistent with (grade II w/high   LAP) pseudonormalization.    There is tethering of both anterior and posterior mitral leaflets with   reverse hockey-stick appearance of the anterior mitral leaflet suspicious   for rheumatic mitral valve disease.    There is moderate, bileaflet mitral valve thickening present at the tip   of the leaflets (commissure).  Tiera score of 8    MV max PG 22.3 mmHg MV mean PG 14 mmHg MV V2 VTI 69.8 cm MVA(VTI) 1.08   cm2 MV dec slope 687 cm/sec2 MV dec time 0.39 sec at a heart rate of about   95 bpm.    Moderate to severe mitral valve stenosis is present. The calculated   mitral valve Broderick' score is 7.    Moderate mitral valve regurgitation is present with a   centrally-directed jet noted. The calculated mitral valve morphology score   is 7.    The aortic valve was poorly visualized but appears trileaflet. Mild   aortic valve regurgitation is present. No aortic valve stenosis is   present.    Tricuspid valve not well visualized. Mild to moderate tricuspid valve   regurgitation is present. Estimated right ventricular systolic pressure   from tricuspid regurgitation is markedly elevated (>55 mmHg).    Severe pulmonary hypertension is present.    The IVC is mildly dilated with partial collapse indicator of right   atrial pressures of about 15 mmHg.    There is no evidence of pericardial effusion. .    Comments: The peak and mean gradients across the mitral valve seem to   be about the same as on the transthoracic echo back in August of 2020   given the heart rate in the 90s although the mitral valve stenosis seem to   have improved on the DONNA done at that time with improvement in the heart   rate.  The degree of mitral regurgitation seem to have gotten worse.    Hence would  consider repeating the study with focus on the mitral valve   after improving the heart rate.  If patient still has significantly   elevated gradients across the mitral valve, may have to consider referral   to [facility] for possible mitral valvuloplasty.                    Telemetry Scan [[id number]] Resulted: 05/12/24     Updated: 05/20/24 1612    Telemetry Scan [[id number]] Resulted: 05/12/24     Updated: 05/20/24 1918    Telemetry Scan [[id number]] Resulted: 05/12/24     Updated: 05/20/24 1918    Telemetry Scan [[id number]] Resulted: 05/12/24     Updated: 05/20/24 2103    Telemetry Scan [[id number]] Resulted: 05/12/24     Updated: 05/21/24 0205            Imaging Results (Last 72 Hours)       Procedure Component Value Units Date/Time    XR Chest 1 View [[id number]] Collected: 05/20/24 0831     Updated: 05/20/24 0834    Narrative:      VERIFICATION OBSERVER NAME: Uday Hansen MD.     TECHNIQUE, HISTORY, FINDINGS:      Comparison: 5/18/2024.     History and Findings: Pleural effusion.     Bilateral interstitial infiltrates, slightly diminished on the RIGHT,  unchanged on the LEFT.  Trace LEFT pleural effusion.  No RIGHT pleural effusion.  Minimal cardiac enlargement.  Central airways are patent.       Impression:      Trace LEFT pleural effusion, perhaps slightly more prominent than prior.              [id number]     Zip code: [id number]                 This report was finalized on 5/20/2024 8:31 AM by Dr. Uday Hansen MD.               I have discussed my impression and recommendations with the patient and family.    Thank you very much for asking us to be involved in this patient's care.  We will follow along with you.      Electronically signed by DIEGO Washington, 05/21/24, 8:48 AM EDT.     Please note that portions of this note were completed with a voice recognition program.           Electronically signed by Earl Cox MD at 05/21/24 5181

## 2024-05-23 NOTE — PLAN OF CARE
Goal Outcome Evaluation:  Pt resting in bed this shift. No signs or symptoms of acute distress noted. Call light and personal belongings within reach. Plan of care ongoing.

## 2024-05-23 NOTE — PROGRESS NOTES
"    [facility]  PROGRESS NOTE    Patient information:  Name: Manda Al  Age/Sex: 43 y.o. female  :  1980        PCP: Mi Rivera MD  Attending: Adin Sauceda MD  MRN:  [id number]  Visit Number:  [id number]    LOS: 11  CODE STATUS:    Code Status and Medical Interventions:   Ordered at: 24 1930     Code Status (Patient has no pulse and is not breathing):    CPR (Attempt to Resuscitate)     Medical Interventions (Patient has pulse or is breathing):    Full Support       PROBLEM LIST:Principal Problem:    Acute pancreatitis      Reason for Cardiology follow-up: Mitral valve stenosis and pulmonary hypertension     Subjective   ADMISSION INFORMATION:  Chief Complaint   Patient presents with    Flank Pain       DATE:2024    HPI:  Manda Al is a 43-year-old female with a past medical history significant for anxiety, depression, bipolar disorder, asthma, Crohn's disease, hypertension, schizoaffective disorder.     Patient initially presented to [facility] on 2024 with complaints of epigastric abdominal pain, nausea and vomiting. She was found to have acute pancreatitis. Cardiology was consulted for abnormal echocardiogram moderate to severe mitral valve stenosis, moderate mitral valve regurgitation and severe pulmonary hypertension.     Interval History:   Telemetry reveals SR 80s. According to patient's I & O flow chart she diuresed a -1258 mL with x 4 unmeasured urine occurrences also noted. AM labs reveal potassium is 3.8 and creatinine 0.96.     Patient was in room 304 B when she was seen and examined.  Patient is lying in bed resting quietly.  No acute distress noted at this time. Patient reports, \"I am feeling a lot better\".     ORDERS:   Limited ECHO to be done on  to evaluate MV Stenosis     EVENT TIMELINE:   : ECHO w EF 61-65 %. Moderate to severe mitral valve stenosis, moderate mitral valve " regurgitation, & severe pulmonary hypertension is present.  Please see full report attached below.  05/23: Metoprolol succinate XL increased to 100 mg PO Daily per Dr. Woodard, scheduled Limited ECHO for MV Stenosis on Friday 05/24.     Objective     Vital Signs  Temp:  [97.6 °F (36.4 °C)-98.5 °F (36.9 °C)] 98.2 °F (36.8 °C)  Heart Rate:  [71-93] 83  Resp:  [12-24] 20  BP: ()/(53-91) 103/64  Device (Oxygen Therapy): room air  Vital Signs (last 72 hrs)         05/20 0700 05/21 0659 05/21 0700 05/22 0659 05/22 0700 05/23 0659 05/23 0700 05/23 0746   Most Recent      Temp (°F) 97.5 -  98.5    97.6 -  98.3    97.6 -  98.5       97.6 (36.4) 05/23 0634    Heart Rate 74 -  102    72 -  101    71 -  96       92 05/23 0634    Resp 10 -  31    15 -  32    10 -  26       18 05/23 0634    /41 -  141/90    114/75 -  149/97    95/53 -  134/91       116/72 05/23 0634    SpO2 (%) 94 -  100    93 -  100    90 -  100       96 05/23 0634          BMI:Body mass index is 35.52 kg/m².    WEIGHT:      05/21/24  0400 05/22/24  0500 05/23/24  0500   Weight: 106 kg (233 lb 7.5 oz) 106 kg (234 lb 9.1 oz) 106 kg (233 lb 9.6 oz)       DIET:Diet: Regular/House; Fluid Consistency: Thin (IDDSI 0)    I&O:  Intake & Output (last 3 days)         05/20 0701 05/21 0700 05/21 0701 05/22 0700 05/22 0701 05/23 0700 05/23 0701 05/24 0700    P.O. 1080 1280 542     I.V. (mL/kg) 2208 (20.8) 661 (6.2)      IV Piggyback 300       Total Intake(mL/kg) 3588 (33.8) 1941 (18.3) 542 (5.1)     Urine (mL/kg/hr)  7700 (3) 1800 (0.7)     Stool   0     Total Output  7700 1800     Net +5558 -0723 -1258             Urine Unmeasured Occurrence 4 x 2 x 4 x     Stool Unmeasured Occurrence 1 x 2 x 1 x           I have seen and examined Ms. Al today.  PHYSICAL EXAMINATION:    General Appearance:    Alert, cooperative, in no acute distress.   Head:    Normocephalic, without obvious abnormality, atraumatic   Eyes:            Conjunctivae and sclerae normal, no  icterus, no pallor, corneas clear.   Neck:   No adenopathy, supple, trachea midline, no thyromegaly, no  carotid bruit, no JVD   Lungs:     Clear to auscultation,respirations regular, even and               unlabored    Heart:    Regular rhythm and normal rate, S1 is loud and loud P2 component of S2.  2/6 mid diastolic rumbling murmur noted at the apex along with grade 3/6 holosystolic murmur at LV apex, no gallop, no rub, no click   Chest Wall:    No abnormalities observed   Abdomen:     Normal bowel sounds, no masses, no organomegaly, soft,     non-tender, nondistended, no guarding, no rebound       tenderness   Extremities:   Moves all extremities well, no edema, no cyanosis, no       redness   Pulses:   Pulses palpable and equal bilaterally   Skin:   No bleeding, bruising or rash.         Results review     Results from last 7 days   Lab Units 05/21/24  0844   HSTROP T ng/L 6     Lab Results   Component Value Date    PROBNP 4,838.0 (H) 05/21/2024    PROBNP 3,619.0 (H) 08/14/2020     Results from last 7 days   Lab Units 05/22/24  0054 05/20/24  0209 05/19/24  0031 05/18/24  0021 05/17/24  0036   WBC 10*3/mm3 9.55 4.42 4.02 4.06 3.52   HEMOGLOBIN g/dL 11.0* 9.6* 9.4* 9.8* 11.1*   PLATELETS 10*3/mm3 391 169 111* 103* 109*     Results from last 7 days   Lab Units 05/23/24  0023 05/22/24 2014 05/22/24  1012 05/22/24  0054 05/21/24  0844 05/20/24  0209 05/19/24  0031 05/18/24  1036 05/18/24  0021 05/17/24  1221 05/17/24  0036   SODIUM mmol/L 140  --   --  139 142 140 138  --  140 135* 128*   POTASSIUM mmol/L 3.8 4.1 3.3* 3.2* 4.0 4.2 4.1   < > 3.5 4.0 3.4*   CHLORIDE mmol/L 104  --   --  104 113* 111* 110*  --  110* 105 99   CO2 mmol/L 24.8  --   --  24.0 15.9* 18.2* 17.7*  --  20.9* 20.0* 18.2*   BUN mg/dL 17  --   --  16 17 15 12  --  10 6 5*   CREATININE mg/dL 0.96  --   --  1.21* 1.11* 1.12* 1.12*  --  1.14* 0.99 1.00   CALCIUM mg/dL 8.2*  --   --  8.5* 8.2* 8.3* 7.9*  --  7.5* 7.5* 7.3*   GLUCOSE mg/dL 177*  --   " --  85 205* 320* 294*  --  159* 152* 116*   ALT (SGPT) U/L  --   --   --   --   --  24 30  --  36*  --  44*   AST (SGOT) U/L  --   --   --   --   --  20 30  --  55*  --  87*    < > = values in this interval not displayed.     Lab Results   Component Value Date    MG 2.9 (H) 05/13/2024    MG 1.7 05/12/2024    MG 1.7 05/07/2024     Estimated Creatinine Clearance: 97.3 mL/min (by C-G formula based on SCr of 0.96 mg/dL).    Lab Results   Component Value Date    HGBA1C 6.90 (H) 05/12/2024     No results found for: \"CHOL\", \"TRIG\", \"LDL\", \"HDL\"     Lab Results   Component Value Date    INR 1.26 (H) 05/17/2024     No results found for: \"LABHEPA\"    Lab Results   Component Value Date    TSH 1.840 05/16/2024      Pain Management Panel  More data exists         Latest Ref Rng & Units 8/11/2020 5/9/2017   Pain Management Panel   Amphetamine, Urine Qual Negative Positive  Negative    Barbiturates Screen, Urine Negative Negative  Negative    Benzodiazepine Screen, Urine Negative Negative  Negative    Buprenorphine, Screen, Urine Negative Negative  Negative    Cocaine Screen, Urine Negative Negative  Negative    Methadone Screen , Urine Negative Negative  Negative      Microbiology Results (last 10 days)       Procedure Component Value - Date/Time    Blood Culture - Blood, Arm, Left [[id number]]  (Normal) Collected: 05/18/24 1333    Lab Status: Preliminary result Specimen: Blood from Arm, Left Updated: 05/22/24 1345     Blood Culture No growth at 4 days    Aspergillus Galactomannan Antigen - Blood, Arm, Left [[id number]] Collected: 05/18/24 1257    Lab Status: Final result Specimen: Blood from Arm, Left Updated: 05/22/24 0209     Aspergillus Ag, BAL/Serum 0.05 Index     Narrative:      Performed at:  [address]  [id number]  : Chetna Gordon MD, Phone:  [phone]  Performed at:  [address] Located within [facility]  [address] [place], [place]  [id number]  : Gio Alaniz [facility], " Phone:  [phone]    Blood Culture - Blood, Hand, Right [[id number]]  (Normal) Collected: 05/18/24 1257    Lab Status: Preliminary result Specimen: Blood from Hand, Right Updated: 05/22/24 1345     Blood Culture No growth at 4 days    MRSA Screen, PCR (Inpatient) - Swab, Nares [[id number]]  (Normal) Collected: 05/18/24 0805    Lab Status: Final result Specimen: Swab from Nares Updated: 05/18/24 1010     MRSA PCR No MRSA Detected    Narrative:      The negative predictive value of this diagnostic test is high and should only be used to consider de-escalating anti-MRSA therapy. A positive result may indicate colonization with MRSA and must be correlated clinically.    Respiratory Panel PCR w/COVID-19(SARS-CoV-2) SYED/JHONATHAN/TRUMAN/PAD/COR/KETURAH In-House, NP Swab in UTM/VTM, 2 HR TAT - Swab, Nasopharynx [[id number]]  (Normal) Collected: 05/16/24 9966    Lab Status: Final result Specimen: Swab from Nasopharynx Updated: 05/16/24 1947     ADENOVIRUS, PCR Not Detected     Coronavirus 229E Not Detected     Coronavirus HKU1 Not Detected     Coronavirus NL63 Not Detected     Coronavirus OC43 Not Detected     COVID19 Not Detected     Human Metapneumovirus Not Detected     Human Rhinovirus/Enterovirus Not Detected     Influenza A PCR Not Detected     Influenza B PCR Not Detected     Parainfluenza Virus 1 Not Detected     Parainfluenza Virus 2 Not Detected     Parainfluenza Virus 3 Not Detected     Parainfluenza Virus 4 Not Detected     RSV, PCR Not Detected     Bordetella pertussis pcr Not Detected     Bordetella parapertussis PCR Not Detected     Chlamydophila pneumoniae PCR Not Detected     Mycoplasma pneumo by PCR Not Detected    Narrative:      In the setting of a positive respiratory panel with a viral infection PLUS a negative procalcitonin without other underlying concern for bacterial infection, consider observing off antibiotics or discontinuation of antibiotics and continue supportive care. If the respiratory panel is positive  for atypical bacterial infection (Bordetella pertussis, Chlamydophila pneumoniae, or Mycoplasma pneumoniae), consider antibiotic de-escalation to target atypical bacterial infection.    Blood Culture - Blood, Hand, Right [[id number]]  (Normal) Collected: 05/15/24 0827    Lab Status: Final result Specimen: Blood from Hand, Right Updated: 05/20/24 0845     Blood Culture No growth at 5 days    Blood Culture - Blood, Arm, Left [[id number]]  (Normal) Collected: 05/15/24 0811    Lab Status: Final result Specimen: Blood from Arm, Left Updated: 05/20/24 0845     Blood Culture No growth at 5 days    COVID PRE-OP / PRE-PROCEDURE SCREENING ORDER (NO ISOLATION) - Swab, Nasopharynx [[id number]]  (Normal) Collected: 05/15/24 0755    Lab Status: Final result Specimen: Swab from Nasopharynx Updated: 05/15/24 0909    Narrative:      The following orders were created for panel order COVID PRE-OP / PRE-PROCEDURE SCREENING ORDER (NO ISOLATION) - Swab, Nasopharynx.  Procedure                               Abnormality         Status                     ---------                               -----------         ------                     COVID-19 and FLU A/B PCR...[[id number]]  Normal              Final result                 Please view results for these tests on the individual orders.    COVID-19 and FLU A/B PCR, 1 HR TAT - Swab, Nasopharynx [[id number]]  (Normal) Collected: 05/15/24 0755    Lab Status: Final result Specimen: Swab from Nasopharynx Updated: 05/15/24 0909     COVID19 Not Detected     Influenza A PCR Not Detected     Influenza B PCR Not Detected    Narrative:      Fact sheet for providers: https://www.fda.gov/media/918549/download    Fact sheet for patients: https://www.fda.gov/media/551840/download    Test performed by PCR.           Imaging Results (Last 24 Hours)       Procedure Component Value Units Date/Time    XR Chest 1 View [[id number]] Collected: 05/22/24 1343     Updated: 05/22/24 1346    Narrative:      PROCEDURE: XR  CHEST 1 VW-       HISTORY: sob; K85.90-Acute pancreatitis without necrosis or infection,  unspecified     COMPARISON: 5/20/2024.     FINDINGS: The heart is normal in size. The mediastinum is unremarkable.  There are low lung volumes with patchy bilateral airspace disease. There  is blunting of the costophrenic angles consistent with small effusions.  There is no pneumothorax. There are no acute osseous abnormalities.       Impression:      Patchy bilateral airspace disease and small effusions  similar in appearance to the prior exam.        This report was finalized on 5/22/2024 1:44 PM by Dmitriy Sam M.D..             ECHO:  Results for orders placed during the hospital encounter of 05/12/24    Adult Transthoracic Echo Complete W/ Cont if Necessary Per Protocol    Interpretation Summary  Images from the original result were not included.      Normal left ventricular cavity size and wall thickness noted. All left ventricular wall segments contract normally.    Left ventricular ejection fraction appears to be 61 - 65%.    Left ventricular diastolic function is consistent with (grade II w/high LAP) pseudonormalization.    There is tethering of both anterior and posterior mitral leaflets with reverse hockey-stick appearance of the anterior mitral leaflet suspicious for rheumatic mitral valve disease.    There is moderate, bileaflet mitral valve thickening present at the tip of the leaflets (commissure).  Tiera score of 8    MV max PG 22.3 mmHg MV mean PG 14 mmHg MV V2 VTI 69.8 cm MVA(VTI) 1.08 cm2 MV dec slope 687 cm/sec2 MV dec time 0.39 sec at a heart rate of about 95 bpm.    Moderate to severe mitral valve stenosis is present. The calculated mitral valve Broderick' score is 7.    Moderate mitral valve regurgitation is present with a centrally-directed jet noted. The calculated mitral valve morphology score is 7.    The aortic valve was poorly visualized but appears trileaflet. Mild aortic valve regurgitation  is present. No aortic valve stenosis is present.    Tricuspid valve not well visualized. Mild to moderate tricuspid valve regurgitation is present. Estimated right ventricular systolic pressure from tricuspid regurgitation is markedly elevated (>55 mmHg).    Severe pulmonary hypertension is present.    The IVC is mildly dilated with partial collapse indicator of right atrial pressures of about 15 mmHg.    There is no evidence of pericardial effusion. .    Comments: The peak and mean gradients across the mitral valve seem to be about the same as on the transthoracic echo back in August of 2020 given the heart rate in the 90s although the mitral valve stenosis seem to have improved on the DONNA done at that time with improvement in the heart rate.  The degree of mitral regurgitation seem to have gotten worse.  Hence would consider repeating the study with focus on the mitral valve after improving the heart rate.  If patient still has significantly elevated gradients across the mitral valve, may have to consider referral to [facility] for possible mitral valvuloplasty.      STRESS TEST:  No results found for this or any previous visit.      HEART CATH:  No results found for this or any previous visit.      TELEMETRY:      SR 80s        I reviewed the patient's new clinical results.    ALLERGIES: Imuran [azathioprine]    Medication Review:   Current list of medications may not reflect those currently placed in orders that are not signed or are being held.     amoxicillin-clavulanate, 1 tablet, Oral, Q12H  Cariprazine HCl, 3 mg, Oral, Daily  cetirizine, 10 mg, Oral, Daily  doxycycline, 100 mg, Oral, Q12H  FLUoxetine, 10 mg, Oral, Daily  furosemide, 20 mg, Intravenous, Once  [START ON 5/24/2024] insulin glargine, 15 Units, Subcutaneous, Daily  insulin lispro, 2-9 Units, Subcutaneous, 4x Daily AC & at Bedtime  ipratropium-albuterol, 3 mL, Nebulization, 4x Daily - RT  metoprolol succinate XL, 100 mg, Oral,  Q24H  nicotine, 1 patch, Transdermal, Q24H  potassium chloride, 40 mEq, Oral, BID  predniSONE, 20 mg, Oral, Daily With Breakfast  sodium chloride, 500 mL, Intravenous, Once  sodium chloride, 10 mL, Intravenous, Q12H  sodium chloride, 10 mL, Intravenous, Q12H  sodium chloride, 10 mL, Intravenous, Q12H           benzonatate    senna-docusate sodium **AND** polyethylene glycol **AND** bisacodyl **AND** bisacodyl    butalbital-acetaminophen-caffeine    dextrose    dextrose    glucagon (human recombinant)    guaiFENesin-dextromethorphan    Magnesium Cardiology Dose Replacement - Follow Nurse / BPA Driven Protocol    oxyCODONE    Potassium Replacement - Follow Nurse / BPA Driven Protocol    promethazine    [COMPLETED] Insert Peripheral IV **AND** sodium chloride    sodium chloride    sodium chloride    sodium chloride    sodium chloride    sodium chloride    sodium chloride    Assessment      Moderate to severe mitral stenosis with moderate mitral regurgitation (rheumatic) noted on recent echo Doppler study in the setting of mild tachycardia with a heart rate in the 90s.  Severe pulm hypertension which could be due to mitral stenosis and some degree of HFpEF  Acute HFpEF secondary to mitral stenosis.  Recurrent pancreatitis.      Plan     Switch to p.o. diuretics  Agree with adding beta-blocker which she was taking prior to admission for adequate heart rate control in the setting of mitral stenosis.  Would like to keep her heart rate between 50 and 70 if possible  Will reevaluate the degree of her mitral stenosis with heart rate better controlled to see if she would need referral for mitral valvuloplasty or mitral valve replacement.      I have discussed the patients findings and recommendations with patient.    Thank you very much for asking us to be involved in this patient's care.  We will follow along with you.    Electronically signed by DIEGO Kang, 05/23/24, 11:00 AM EDT.     Electronically signed by  Earl Cox MD, 05/23/24, 11:04 AM EDT.            Please note that portions of this note were completed with a voice recognition program.    Please note that portions of this note were copied and has been reviewed and is accurate as of 5/23/2024 .

## 2024-05-23 NOTE — PROGRESS NOTES
Pulmonary and critical care consultation note  Referring Provider:   Reason for Consultation:       Chief complaint shortness of breath-      Sub-   Overnight events reviewed.  All the labs medications ins and outs and vitals reviewed.  Will replace potassium  Review of Systems  No changes        History  Past Medical History:   Diagnosis Date    Anxiety     Asthma     Bipolar disorder     Crohn disease     Depression     Hypertension     Schizoaffective disorder    ,   Past Surgical History:   Procedure Laterality Date    APPENDECTOMY       SECTION      CHOLECYSTECTOMY      COLON SURGERY      COLONOSCOPY      MANDIBLE FRACTURE SURGERY      OVARIAN CYST SURGERY      TUBAL ABDOMINAL LIGATION     ,   Family History   Problem Relation Age of Onset    Diabetes Mother     Anxiety disorder Mother     Depression Mother     Bipolar disorder Mother     COPD Father     Schizophrenia Father     Schizophrenia Paternal Grandfather     Breast cancer Neg Hx    ,   Social History     Tobacco Use    Smoking status: Every Day     Current packs/day: 1.00     Average packs/day: 1 pack/day for 20.0 years (20.0 ttl pk-yrs)     Types: Cigarettes    Smokeless tobacco: Never   Vaping Use    Vaping status: Never Used   Substance Use Topics    Alcohol use: No     Comment: denies    Drug use: Yes     Types: Marijuana   ,   Medications Prior to Admission   Medication Sig Dispense Refill Last Dose    Cariprazine HCl (Vraylar) 3 MG capsule capsule Take 1 capsule by mouth Daily.   2024    chlorthalidone (HYGROTON) 25 MG tablet Take 1 tablet by mouth Daily.   2024    fexofenadine (ALLEGRA) 180 MG tablet Take 1 tablet by mouth Daily.   2024    FLUoxetine (PROzac) 10 MG capsule Take 1 capsule by mouth Daily.   2024    magnesium oxide (MAG-OX) 400 MG tablet Take 1 tablet by mouth Daily.   2024    metFORMIN ER (GLUCOPHAGE-XR) 500 MG 24 hr tablet Take 2 tablets by mouth 2 (Two) Times a Day.   2024    metoprolol  succinate XL (Toprol XL) 200 MG 24 hr tablet Take 1 tablet by mouth Daily.   5/11/2024   , Scheduled Meds:  amoxicillin-clavulanate, 1 tablet, Oral, Q12H  Cariprazine HCl, 3 mg, Oral, Daily  cetirizine, 10 mg, Oral, Daily  doxycycline, 100 mg, Oral, Q12H  FLUoxetine, 10 mg, Oral, Daily  [START ON 5/24/2024] insulin glargine, 15 Units, Subcutaneous, Daily  insulin lispro, 2-9 Units, Subcutaneous, 4x Daily AC & at Bedtime  ipratropium-albuterol, 3 mL, Nebulization, 4x Daily - RT  metoprolol succinate XL, 100 mg, Oral, Q24H  nicotine, 1 patch, Transdermal, Q24H  predniSONE, 20 mg, Oral, Daily With Breakfast  sodium chloride, 500 mL, Intravenous, Once  sodium chloride, 10 mL, Intravenous, Q12H  sodium chloride, 10 mL, Intravenous, Q12H  sodium chloride, 10 mL, Intravenous, Q12H    , Continuous Infusions:      and Allergies:  Imuran [azathioprine]    Objective     Vital Signs   Temp:  [97.6 °F (36.4 °C)-98.5 °F (36.9 °C)] 97.6 °F (36.4 °C)  Heart Rate:  [71-93] 92  Resp:  [10-24] 18  BP: ()/(53-91) 116/72    Physical Exam:             General-not in any distress    HEENT-atraumatic  Neck-supple    Respiratory-\not in any respiratory distress    Cardiovascular-NSR  GI-grossly normal    CNS-nonfocal    Musculoskeletal -no edema  Extremities- no obvious deformity noticed     Psychiatric-, alert awake oriented  Skin- no visible rash                                                                   Results Review:    LABS:    Lab Results   Component Value Date    GLUCOSE 177 (H) 05/23/2024    BUN 17 05/23/2024    CREATININE 0.96 05/23/2024    EGFRIFNONA 93 08/17/2020    BCR 17.7 05/23/2024    CO2 24.8 05/23/2024    CALCIUM 8.2 (L) 05/23/2024    ALBUMIN 2.7 (L) 05/20/2024    LABIL2 1.4 (L) 07/06/2015    AST 20 05/20/2024    ALT 24 05/20/2024    WBC 9.55 05/22/2024    HGB 11.0 (L) 05/22/2024    HCT 33.2 (L) 05/22/2024    MCV 91.0 05/22/2024     05/22/2024     05/23/2024    K 3.8 05/23/2024      05/23/2024    ANIONGAP 11.2 05/23/2024       Lab Results   Component Value Date    INR 1.26 (H) 05/17/2024    PROTIME 15.9 (H) 05/17/2024       Results from last 7 days   Lab Units 05/17/24  0036   INR  1.26*          I reviewed the patient's new clinical results.  I reviewed the patient's new imaging results and agree with the interpretation.    Procalitonin Results:      Lab 05/16/24  1034   PROCALCITONIN 0.76*       Latest Reference Range & Units 05/16/24 19:26   pH, Arterial 7.350 - 7.450 pH units 7.467 (H)   pCO2, Arterial 35.0 - 45.0 mm Hg 26.9 (L)   pO2, Arterial 83.0 - 108.0 mm Hg 61.5 (L)   HCO3, Arterial 20.0 - 26.0 mmol/L 19.4 (L)   Base Excess 0.0 - 2.0 mmol/L -3.2 (L)   O2 Saturation, Arterial 94.0 - 99.0 % 94.6   CO2 Content 22 - 33 mmol/L 20.3 (L)   A-a DO2 0.0 - 300.0 mmHg 50.1   Carboxyhemoglobin 0 - 5 % 1.8   Methemoglobin 0.00 - 3.00 % <-0.10 (L)   Oxyhemoglobin 94 - 99 % 93.3 (L)   Hematocrit, Blood Gas 38.0 - 51.0 % 34.4 (L)   Hemoglobin, Blood Gas 13.5 - 17.5 g/dL 11.2 (L)   Site  Right Radial   Jose's Test  N/A   Modality  Room Air   FIO2 % 21   Ventilator Mode  NA   Barometric Pressure for Blood Gas mmHg 723   (H): Data is abnormally high  (L): Data is abnormally low  Microbiology Results (last 10 days)       Procedure Component Value - Date/Time    Blood Culture - Blood, Arm, Left [[id number]]  (Normal) Collected: 05/18/24 1333    Lab Status: Preliminary result Specimen: Blood from Arm, Left Updated: 05/22/24 1345     Blood Culture No growth at 4 days    Aspergillus Galactomannan Antigen - Blood, Arm, Left [[id number]] Collected: 05/18/24 1257    Lab Status: Final result Specimen: Blood from Arm, Left Updated: 05/22/24 0209     Aspergillus Ag, BAL/Serum 0.05 Index     Narrative:      Performed at:  01 - Lab[address]  [id number]  : Chetna Gordon MD, Phone:  [phone]  [facility]formed [facility]  [facility]  [address] [place], [place]   [id number]  : Gio Alaniz [facility], Phone:  [phone]    Blood Culture - Blood, Hand, Right [[id number]]  (Normal) Collected: 05/18/24 1257    Lab Status: Preliminary result Specimen: Blood from Hand, Right Updated: 05/22/24 1345     Blood Culture No growth at 4 days    MRSA Screen, PCR (Inpatient) - Swab, Nares [[id number]]  (Normal) Collected: 05/18/24 0866    Lab Status: Final result Specimen: Swab from Nares Updated: 05/18/24 1010     MRSA PCR No MRSA Detected    Narrative:      The negative predictive value of this diagnostic test is high and should only be used to consider de-escalating anti-MRSA therapy. A positive result may indicate colonization with MRSA and must be correlated clinically.    Respiratory Panel PCR w/COVID-19(SARS-CoV-2) SYED/JHONATHAN/TRUMAN/PAD/COR/KETURAH In-House, NP Swab in UTM/VTM, 2 HR TAT - Swab, Nasopharynx [[id number]]  (Normal) Collected: 05/16/24 3089    Lab Status: Final result Specimen: Swab from Nasopharynx Updated: 05/16/24 1947     ADENOVIRUS, PCR Not Detected     Coronavirus 229E Not Detected     Coronavirus HKU1 Not Detected     Coronavirus NL63 Not Detected     Coronavirus OC43 Not Detected     COVID19 Not Detected     Human Metapneumovirus Not Detected     Human Rhinovirus/Enterovirus Not Detected     Influenza A PCR Not Detected     Influenza B PCR Not Detected     Parainfluenza Virus 1 Not Detected     Parainfluenza Virus 2 Not Detected     Parainfluenza Virus 3 Not Detected     Parainfluenza Virus 4 Not Detected     RSV, PCR Not Detected     Bordetella pertussis pcr Not Detected     Bordetella parapertussis PCR Not Detected     Chlamydophila pneumoniae PCR Not Detected     Mycoplasma pneumo by PCR Not Detected    Narrative:      In the setting of a positive respiratory panel with a viral infection PLUS a negative procalcitonin without other underlying concern for bacterial infection, consider observing off antibiotics or discontinuation of antibiotics and continue supportive  care. If the respiratory panel is positive for atypical bacterial infection (Bordetella pertussis, Chlamydophila pneumoniae, or Mycoplasma pneumoniae), consider antibiotic de-escalation to target atypical bacterial infection.    Blood Culture - Blood, Hand, Right [[id number]]  (Normal) Collected: 05/15/24 0827    Lab Status: Final result Specimen: Blood from Hand, Right Updated: 05/20/24 0845     Blood Culture No growth at 5 days    Blood Culture - Blood, Arm, Left [[id number]]  (Normal) Collected: 05/15/24 0811    Lab Status: Final result Specimen: Blood from Arm, Left Updated: 05/20/24 0845     Blood Culture No growth at 5 days    COVID PRE-OP / PRE-PROCEDURE SCREENING ORDER (NO ISOLATION) - Swab, Nasopharynx [[id number]]  (Normal) Collected: 05/15/24 0755    Lab Status: Final result Specimen: Swab from Nasopharynx Updated: 05/15/24 0909    Narrative:      The following orders were created for panel order COVID PRE-OP / PRE-PROCEDURE SCREENING ORDER (NO ISOLATION) - Swab, Nasopharynx.  Procedure                               Abnormality         Status                     ---------                               -----------         ------                     COVID-19 and FLU A/B PCR...[[id number]]  Normal              Final result                 Please view results for these tests on the individual orders.    COVID-19 and FLU A/B PCR, 1 HR TAT - Swab, Nasopharynx [[id number]]  (Normal) Collected: 05/15/24 0755    Lab Status: Final result Specimen: Swab from Nasopharynx Updated: 05/15/24 0909     COVID19 Not Detected     Influenza A PCR Not Detected     Influenza B PCR Not Detected    Narrative:      Fact sheet for providers: https://www.fda.gov/media/373848/download    Fact sheet for patients: https://www.fda.gov/media/003617/download    Test performed by PCR.           Assessment & Plan     Neurology-alert awake oriented no active issues.  Continue to monitor mental status.      Respiratory- Acute hypoxic respiratory  failure-FiO2 requirement reviewed and adjusted to maintain saturation 88 to 92%.      CT chest reviewed-shows bilateral pulmonary infiltrates likely due to  Aspiration pneumonia and/or pulmonary edema.     Latest-chest x-ray  showed left-sided pleural effusion.  Overall   pulmonary opacities seems to be improving.    Will repeat diuretics.      Repeat CT chest in 4 to 6 weeks time.  If still present will consider bronchoscopy accordingly.  Follow-up with pulmonary on the outpatient basis.    VBG reviewed.  Stable.  Continue current antibiotics.    Patient does have a history of asthma-and responded well to steroids  Tapering now    Continue [facility]        Cardiology- hemodynamically -stable     Continue to monitor HR- rate and rhythm, BP     Nephrology- Cr and BUN stable  I/O-reviewed    GI- PPI prophylaxis  Aspiration precautions    Hematology- CBC  Hb transfuse for hemoglobin less than 7  platelet  WBC    ID  Culture  And Antibiotics    Endrocrinology- Maintain Blood sugar 140 -180  TSH Results:      Lab 05/16/24  1034   TSH 1.840        Electrolytes-   Mag and phos       DVT prophylaxis-    Bedside rounds were done with RT and patient's nurse. All the lab and clinical findings were discussed with them and plan was also discussed in great detail.        Echo-  Results for orders placed during the hospital encounter of 05/12/24    Adult Transthoracic Echo Complete W/ Cont if Necessary Per Protocol    Interpretation Summary  Images from the original result were not included.      Normal left ventricular cavity size and wall thickness noted. All left ventricular wall segments contract normally.    Left ventricular ejection fraction appears to be 61 - 65%.    Left ventricular diastolic function is consistent with (grade II w/high LAP) pseudonormalization.    There is tethering of both anterior and posterior mitral leaflets with reverse hockey-stick appearance of the anterior mitral leaflet suspicious for rheumatic  mitral valve disease.    There is moderate, bileaflet mitral valve thickening present at the tip of the leaflets (commissure).  Tiera score of 8    MV max PG 22.3 mmHg MV mean PG 14 mmHg MV V2 VTI 69.8 cm MVA(VTI) 1.08 cm2 MV dec slope 687 cm/sec2 MV dec time 0.39 sec at a heart rate of about 95 bpm.    Moderate to severe mitral valve stenosis is present. The calculated mitral valve Broderick' score is 7.    Moderate mitral valve regurgitation is present with a centrally-directed jet noted. The calculated mitral valve morphology score is 7.    The aortic valve was poorly visualized but appears trileaflet. Mild aortic valve regurgitation is present. No aortic valve stenosis is present.    Tricuspid valve not well visualized. Mild to moderate tricuspid valve regurgitation is present. Estimated right ventricular systolic pressure from tricuspid regurgitation is markedly elevated (>55 mmHg).    Severe pulmonary hypertension is present.    The IVC is mildly dilated with partial collapse indicator of right atrial pressures of about 15 mmHg.    There is no evidence of pericardial effusion. .    Comments: The peak and mean gradients across the mitral valve seem to be about the same as on the transthoracic echo back in August of 2020 given the heart rate in the 90s although the mitral valve stenosis seem to have improved on the DONNA done at that time with improvement in the heart rate.  The degree of mitral regurgitation seem to have gotten worse.  Hence would consider repeating the study with focus on the mitral valve after improving the heart rate.  If patient still has significantly elevated gradients across the mitral valve, may have to consider referral to [facility] for possible mitral valvuloplasty.     Will SIGN off   Please feel free to call us with any questions.    Thank you for consulting us      Acute pancreatitis          Edmundo Zapata MD  05/23/24  10:16 EDT

## 2024-05-23 NOTE — PROGRESS NOTES
PROGRESS NOTE         Patient Identification:  Name:  Manda Al  Age:  43 y.o.  Sex:  female  :  1980  MRN:  [id number]  Visit Number:  [id number]  Primary Care Provider:  Mi Rivera MD         LOS: 11 days       ----------------------------------------------------------------------------------------------------------------------  Subjective       Chief Complaints:    Flank Pain        Interval History:      Patient resting comfortably in bed this morning.  Currently on room air with no apparent distress.  Lungs clear to auscultation bilaterally.  Abdomen soft, nontender.  Tolerating oral diet well.  Ambulating around room throughout the day with no difficulty.  Afebrile, denies diarrhea.  Aspergillus/galactomannan normal at 0.05.  Fungitell negative at less than 31.25.  Fungal antibodies remain in process. Ehrlichia PCR negative.  Rickettsial PCR remains in process.      Review of Systems:    Constitutional: no fever, chills and night sweats.    Eyes: no eye drainage, itching or redness.  HEENT: no mouth sores, dysphagia or nose bleed.  Respiratory: no for shortness of breath, cough or production of sputum.  Cardiovascular: no chest pain, no palpitations, no orthopnea.  Gastrointestinal: no nausea, no vomiting or diarrhea. No abdominal pain, hematemesis or rectal bleeding.  Genitourinary: no dysuria or polyuria.  Hematologic/lymphatic: no lymph node abnormalities, no easy bruising or easy bleeding.  Musculoskeletal: no muscle or joint pain.  Skin: No rash and no itching.  Neurological: no loss of consciousness, no seizure, no headache.  Behavioral/Psych: no depression or suicidal ideation.  Endocrine: no hot flashes.  Immunologic: negative.    ----------------------------------------------------------------------------------------------------------------------      Objective       Current [facility] Meds:  amoxicillin-clavulanate, 1 tablet, Oral, Q12H  Cariprazine HCl, 3 mg, Oral,  Daily  cetirizine, 10 mg, Oral, Daily  doxycycline, 100 mg, Oral, Q12H  FLUoxetine, 10 mg, Oral, Daily  furosemide, 20 mg, Intravenous, Once  [START ON 5/24/2024] insulin glargine, 15 Units, Subcutaneous, Daily  insulin lispro, 2-9 Units, Subcutaneous, 4x Daily AC & at Bedtime  ipratropium-albuterol, 3 mL, Nebulization, 4x Daily - RT  metoprolol succinate XL, 100 mg, Oral, Q24H  nicotine, 1 patch, Transdermal, Q24H  potassium chloride, 40 mEq, Oral, BID  predniSONE, 20 mg, Oral, Daily With Breakfast  sodium chloride, 500 mL, Intravenous, Once  sodium chloride, 10 mL, Intravenous, Q12H  sodium chloride, 10 mL, Intravenous, Q12H  sodium chloride, 10 mL, Intravenous, Q12H           ----------------------------------------------------------------------------------------------------------------------    Vital Signs:  Temp:  [97.6 °F (36.4 °C)-98.5 °F (36.9 °C)] 98.2 °F (36.8 °C)  Heart Rate:  [71-93] 83  Resp:  [10-24] 20  BP: ()/(53-91) 103/64  Mean Arterial Pressure (Non-Invasive) for the past 24 hrs (Last 3 readings):   Noninvasive MAP (mmHg)   05/23/24 1039 76   05/23/24 0634 91   05/23/24 0313 80     SpO2 Percentage    05/23/24 0625 05/23/24 0634 05/23/24 1039   SpO2: 97% 96% 92%     SpO2:  [90 %-100 %] 92 %  on   ;   Device (Oxygen Therapy): room air    Body mass index is 35.52 kg/m².  Wt Readings from Last 3 Encounters:   05/23/24 106 kg (233 lb 9.6 oz)   05/07/24 97.1 kg (214 lb)   03/19/22 113 kg (250 lb)        Intake/Output Summary (Last 24 hours) at 5/23/2024 1057  Last data filed at 5/23/2024 0900  Gross per 24 hour   Intake 822 ml   Output 1800 ml   Net -978 ml     Diet: Regular/House; Fluid Consistency: Thin (IDDSI 0)  ----------------------------------------------------------------------------------------------------------------------      Physical Exam:    Constitutional:  Well-developed and well-nourished.  On room air without any respiratory distress.  Resting comfortably in bed.  HENT:  Head:  Normocephalic and atraumatic.  Mouth:  Moist mucous membranes.    Eyes:  Conjunctivae and EOM are normal.  No scleral icterus.  Neck:  Neck supple.  No JVD present.    Cardiovascular:  Normal rate, regular rhythm and normal heart sounds with no murmur. No edema.  Pulmonary/Chest:  No respiratory distress, no wheezes, no crackles, with normal breath sounds and good air movement.  Abdominal:  Soft.  Bowel sounds are normal.  No distension and no tenderness.   Musculoskeletal:  No edema, no tenderness, and no deformity.  No swelling or redness of joints.  Neurological:  Alert and oriented to person, place, and time.  No facial droop.  No slurred speech.   Skin:  Skin is warm and dry.  No rash noted.  No pallor.  Scabbed area on the left bottom from tick bite.  No surrounding erythema.  No drainage.  Psychiatric:  Normal mood and affect.  Behavior is normal.        ----------------------------------------------------------------------------------------------------------------------  Results from last 7 days   Lab Units 05/21/24  0844   HSTROP T ng/L 6     Results from last 7 days   Lab Units 05/21/24  0844   PROBNP pg/mL 4,838.0*       Results from last 7 days   Lab Units 05/16/24  1926   PH, ARTERIAL pH units 7.467*   PO2 ART mm Hg 61.5*   PCO2, ARTERIAL mm Hg 26.9*   HCO3 ART mmol/L 19.4*     Results from last 7 days   Lab Units 05/22/24  0054 05/20/24  0209 05/19/24  1613 05/19/24  1321 05/19/24  1015 05/19/24  0730 05/19/24  0031 05/18/24  0021 05/17/24  0036   CRP mg/dL  --   --   --   --   --   --   --  15.07*  --    LACTATE mmol/L  --   --  3.2* 4.4* 3.2*   < >  --   --   --    WBC 10*3/mm3 9.55 4.42  --   --   --   --  4.02 4.06 3.52   HEMOGLOBIN g/dL 11.0* 9.6*  --   --   --   --  9.4* 9.8* 11.1*   HEMATOCRIT % 33.2* 28.8*  --   --   --   --  28.5* 29.2* 32.4*   MCV fL 91.0 91.4  --   --   --   --  92.2 91.0 88.3   MCHC g/dL 33.1 33.3  --   --   --   --  33.0 33.6 34.3   PLATELETS 10*3/mm3 391 169  --   --   --  "  --  111* 103* 109*   INR   --   --   --   --   --   --   --   --  1.26*    < > = values in this interval not displayed.     Results from last 7 days   Lab Units 05/23/24  0023 05/22/24 2014 05/22/24  1012 05/22/24  0054 05/21/24  0844 05/20/24  0209 05/19/24  0031 05/18/24  1036 05/18/24  0021   SODIUM mmol/L 140  --   --  139 142 140 138  --  140   POTASSIUM mmol/L 3.8 4.1 3.3* 3.2* 4.0 4.2 4.1   < > 3.5   CHLORIDE mmol/L 104  --   --  104 113* 111* 110*  --  110*   CO2 mmol/L 24.8  --   --  24.0 15.9* 18.2* 17.7*  --  20.9*   BUN mg/dL 17  --   --  16 17 15 12  --  10   CREATININE mg/dL 0.96  --   --  1.21* 1.11* 1.12* 1.12*  --  1.14*   CALCIUM mg/dL 8.2*  --   --  8.5* 8.2* 8.3* 7.9*  --  7.5*   GLUCOSE mg/dL 177*  --   --  85 205* 320* 294*  --  159*   ALBUMIN g/dL  --   --   --   --   --  2.7* 2.5*  --  2.3*   BILIRUBIN mg/dL  --   --   --   --   --  0.4 0.5  --  0.5   ALK PHOS U/L  --   --   --   --   --  79 85  --  73   AST (SGOT) U/L  --   --   --   --   --  20 30  --  55*   ALT (SGPT) U/L  --   --   --   --   --  24 30  --  36*    < > = values in this interval not displayed.   Estimated Creatinine Clearance: 97.3 mL/min (by C-G formula based on SCr of 0.96 mg/dL).  No results found for: \"AMMONIA\"    Glucose   Date/Time Value Ref Range Status   05/23/2024 0645 98 70 - 130 mg/dL Final   05/22/2024 1908 290 (H) 70 - 130 mg/dL Final   05/22/2024 1726 260 (H) 70 - 130 mg/dL Final   05/22/2024 1628 245 (H) 70 - 130 mg/dL Final   05/22/2024 1130 125 70 - 130 mg/dL Final   05/22/2024 0633 108 70 - 130 mg/dL Final   05/21/2024 2144 152 (H) 70 - 130 mg/dL Final   05/21/2024 1706 225 (H) 70 - 130 mg/dL Final     Lab Results   Component Value Date    HGBA1C 6.90 (H) 05/12/2024     Lab Results   Component Value Date    TSH 1.840 05/16/2024       Blood Culture   Date Value Ref Range Status   05/15/2024 No growth at 4 days  Preliminary   05/15/2024 No growth at 4 days  Preliminary   05/12/2024 No growth at 5 days  " "Final   05/12/2024 No growth at 5 days  Final   05/12/2024 No growth at 5 days  Final     Urine Culture   Date Value Ref Range Status   05/12/2024 No growth  Final     No results found for: \"WOUNDCX\"  No results found for: \"STOOLCX\"  No results found for: \"RESPCX\"  Pain Management Panel  More data exists         Latest Ref Rng & Units 8/11/2020 5/9/2017   Pain Management Panel   Amphetamine, Urine Qual Negative Positive  Negative    Barbiturates Screen, Urine Negative Negative  Negative    Benzodiazepine Screen, Urine Negative Negative  Negative    Buprenorphine, Screen, Urine Negative Negative  Negative    Cocaine Screen, Urine Negative Negative  Negative    Methadone Screen , Urine Negative Negative  Negative          ----------------------------------------------------------------------------------------------------------------------  Imaging Results (Last 24 Hours)       Procedure Component Value Units Date/Time    XR Chest 1 View [[id number]] Collected: 05/22/24 1343     Updated: 05/22/24 1346    Narrative:      PROCEDURE: XR CHEST 1 VW-       HISTORY: sob; K85.90-Acute pancreatitis without necrosis or infection,  unspecified     COMPARISON: 5/20/2024.     FINDINGS: The heart is normal in size. The mediastinum is unremarkable.  There are low lung volumes with patchy bilateral airspace disease. There  is blunting of the costophrenic angles consistent with small effusions.  There is no pneumothorax. There are no acute osseous abnormalities.       Impression:      Patchy bilateral airspace disease and small effusions  similar in appearance to the prior exam.        This report was finalized on 5/22/2024 1:44 PM by Dmitriy Sam M.D..               ----------------------------------------------------------------------------------------------------------------------    Pertinent Infectious Disease Results                Assessment/Plan       Assessment     Recurrent high-grade fever  Pneumonia  Suspicion of " tickborne illness        Plan      Patient resting comfortably in bed this morning.  Currently on room air with no apparent distress.  Lungs clear to auscultation bilaterally.  Abdomen soft, nontender.  Tolerating oral diet well.  Ambulating around room throughout the day with no difficulty.  Afebrile, denies diarrhea.  Aspergillus/galactomannan normal at 0.05.  Fungitell negative at less than 31.25.  Fungal antibodies remain in process. Ehrlichia PCR negative.  Rickettsial PCR remains in process.    In the setting of overall clinical and laboratory improvement piperacillin/tazobactam and IV doxycycline were de-escalated to Augmentin and oral doxycycline to continue x 7 more days.  5/30/2024.  Micafungin was discontinued in the setting of negative fungal serologies thus far.      ANTIMICROBIAL THERAPY    amoxicillin-clavulanate - 875-125 MG, 875-125 MG  doxycycline - 100 MG, 100 MG     Code Status:   Code Status and Medical Interventions:   Ordered at: 05/12/24 1930     Code Status (Patient has no pulse and is not breathing):    CPR (Attempt to Resuscitate)     Medical Interventions (Patient has pulse or is breathing):    Full Support       DIEGO Hernandez  05/23/24  10:57 EDT

## 2024-05-23 NOTE — PROGRESS NOTES
Patient Identification:  Name:  Manda Al  Age:  43 y.o.  Sex:  female  :  1980  MRN:  [id number]  Visit Number:  [id number]  Primary Care Provider:  Mi Rivera MD    Length of stay:  11    Subjective/Interval History/Consultants/Procedures     Chief Complaint:   Chief Complaint   Patient presents with    Flank Pain       Subjective/Interval History:    43 y.o. female who was admitted on 2024 with acute pancreatitis     PMH is significant for anxiety, schizoaffective disorder- bipolar type, HTN, Crohn's disease   For complete admission information, please see history and physical.     Consultations:  Pulmonology   Infectious disease   Cardiology    Procedures/Scans:  CT abdomen and pelvis w & w/o contrast x 2  CXR x 4  VQ scan  Bilateral lower extremity venous Doppler US  CT PE protocol   TTE    Today, the patient was seen and examined on 3S resting comfortably. Reports she feels well. No cough, no subjective fever, no abdominal pain. Hopeful to go home soon. Discussed case with ID. Transitioning to PO antibiotics, holding further micafungin.      Room location at the time of evaluation was 304b.    ----------------------------------------------------------------------------------------------------------------------  Current Hospital Meds:  amoxicillin-clavulanate, 1 tablet, Oral, Q12H  Cariprazine HCl, 3 mg, Oral, Daily  cetirizine, 10 mg, Oral, Daily  doxycycline, 100 mg, Oral, Q12H  FLUoxetine, 10 mg, Oral, Daily  [START ON 2024] furosemide, 40 mg, Oral, Daily  [START ON 2024] insulin glargine, 15 Units, Subcutaneous, Daily  insulin lispro, 2-9 Units, Subcutaneous, 4x Daily AC & at Bedtime  ipratropium-albuterol, 3 mL, Nebulization, 4x Daily - RT  metoprolol succinate XL, 100 mg, Oral, Q24H  nicotine, 1 patch, Transdermal, Q24H  potassium chloride, 40 mEq, Oral, BID  predniSONE, 20 mg, Oral, Daily With Breakfast  sodium chloride, 500 mL, Intravenous, Once  sodium chloride, 10  mL, Intravenous, Q12H  sodium chloride, 10 mL, Intravenous, Q12H  sodium chloride, 10 mL, Intravenous, Q12H         ----------------------------------------------------------------------------------------------------------------------      Objective     Vital Signs:  Temp:  [97.6 °F (36.4 °C)-98.5 °F (36.9 °C)] 98.2 °F (36.8 °C)  Heart Rate:  [71-93] 83  Resp:  [12-24] 20  BP: ()/(53-91) 103/64      05/21/24  0400 05/22/24  0500 05/23/24  0500   Weight: 106 kg (233 lb 7.5 oz) 106 kg (234 lb 9.1 oz) 106 kg (233 lb 9.6 oz)     Body mass index is 35.52 kg/m².    Intake/Output Summary (Last 24 hours) at 5/23/2024 1122  Last data filed at 5/23/2024 0900  Gross per 24 hour   Intake 822 ml   Output 1800 ml   Net -978 ml     I/O this shift:  In: 480 [P.O.:480]  Out: -   Diet: Regular/House; Fluid Consistency: Thin (IDDSI 0)  ----------------------------------------------------------------------------------------------------------------------    Physical Exam  Vitals and nursing note reviewed.   Constitutional:       General: She is not in acute distress.     Appearance: She is obese.   HENT:      Head: Normocephalic and atraumatic.   Eyes:      Extraocular Movements: Extraocular movements intact.   Cardiovascular:      Rate and Rhythm: Normal rate and regular rhythm.   Pulmonary:      Effort: Pulmonary effort is normal.      Breath sounds: Normal breath sounds.   Abdominal:      Palpations: Abdomen is soft.   Musculoskeletal:      Right lower leg: No edema.      Left lower leg: No edema.   Skin:     General: Skin is warm and dry.   Neurological:      Mental Status: She is alert. Mental status is at baseline.   Psychiatric:         Mood and Affect: Mood normal.         Behavior: Behavior normal.                ----------------------------------------------------------------------------------------------------------------------  Tele:       ----------------------------------------------------------------------------------------------------------------------  Results from last 7 days   Lab Units 05/21/24  0844   HSTROP T ng/L 6   PROBNP pg/mL 4,838.0*     Results from last 7 days   Lab Units 05/22/24  0054 05/20/24  0209 05/19/24  1613 05/19/24  1321 05/19/24  1015 05/19/24  0730 05/19/24  0031 05/18/24  0021 05/17/24  0036   CRP mg/dL  --   --   --   --   --   --   --  15.07*  --    LACTATE mmol/L  --   --  3.2* 4.4* 3.2*   < >  --   --   --    WBC 10*3/mm3 9.55 4.42  --   --   --   --  4.02 4.06 3.52   HEMOGLOBIN g/dL 11.0* 9.6*  --   --   --   --  9.4* 9.8* 11.1*   HEMATOCRIT % 33.2* 28.8*  --   --   --   --  28.5* 29.2* 32.4*   MCV fL 91.0 91.4  --   --   --   --  92.2 91.0 88.3   MCHC g/dL 33.1 33.3  --   --   --   --  33.0 33.6 34.3   PLATELETS 10*3/mm3 391 169  --   --   --   --  111* 103* 109*   INR   --   --   --   --   --   --   --   --  1.26*    < > = values in this interval not displayed.     Results from last 7 days   Lab Units 05/16/24  1926   PH, ARTERIAL pH units 7.467*   PO2 ART mm Hg 61.5*   PCO2, ARTERIAL mm Hg 26.9*   HCO3 ART mmol/L 19.4*     Results from last 7 days   Lab Units 05/23/24  0023 05/22/24 2014 05/22/24  1012 05/22/24  0054 05/21/24  0844 05/20/24  0209 05/19/24  0031 05/18/24  1036 05/18/24  0021   SODIUM mmol/L 140  --   --  139 142 140 138  --  140   POTASSIUM mmol/L 3.8 4.1 3.3* 3.2* 4.0 4.2 4.1   < > 3.5   CHLORIDE mmol/L 104  --   --  104 113* 111* 110*  --  110*   CO2 mmol/L 24.8  --   --  24.0 15.9* 18.2* 17.7*  --  20.9*   BUN mg/dL 17  --   --  16 17 15 12  --  10   CREATININE mg/dL 0.96  --   --  1.21* 1.11* 1.12* 1.12*  --  1.14*   CALCIUM mg/dL 8.2*  --   --  8.5* 8.2* 8.3* 7.9*  --  7.5*   GLUCOSE mg/dL 177*  --   --  85 205* 320* 294*  --  159*   ALBUMIN g/dL  --   --   --   --   --  2.7* 2.5*  --  2.3*   BILIRUBIN mg/dL  --   --   --   --   --  0.4 0.5  --  0.5   ALK PHOS U/L  --   --   --   --   --  " 79 85  --  73   AST (SGOT) U/L  --   --   --   --   --  20 30  --  55*   ALT (SGPT) U/L  --   --   --   --   --  24 30  --  36*    < > = values in this interval not displayed.   Estimated Creatinine Clearance: 97.3 mL/min (by C-G formula based on SCr of 0.96 mg/dL).  No results found for: \"AMMONIA\"      Blood Culture   Date Value Ref Range Status   05/18/2024 No growth at 4 days  Preliminary   05/18/2024 No growth at 4 days  Preliminary     No results found for: \"URINECX\"  No results found for: \"WOUNDCX\"  No results found for: \"STOOLCX\"  ----------------------------------------------------------------------------------------------------------------------  Imaging Results (Last 24 Hours)       Procedure Component Value Units Date/Time    XR Chest 1 View [[id number]] Collected: 05/22/24 1343     Updated: 05/22/24 1346    Narrative:      PROCEDURE: XR CHEST 1 VW-       HISTORY: sob; K85.90-Acute pancreatitis without necrosis or infection,  unspecified     COMPARISON: 5/20/2024.     FINDINGS: The heart is normal in size. The mediastinum is unremarkable.  There are low lung volumes with patchy bilateral airspace disease. There  is blunting of the costophrenic angles consistent with small effusions.  There is no pneumothorax. There are no acute osseous abnormalities.       Impression:      Patchy bilateral airspace disease and small effusions  similar in appearance to the prior exam.        This report was finalized on 5/22/2024 1:44 PM by Dmitriy Sam M.D..             ----------------------------------------------------------------------------------------------------------------------   I have reviewed the above laboratory values for 05/23/24    Assessment/Plan     Active Hospital Problems    Diagnosis  POA    **Acute pancreatitis [K85.90]  Yes         ASSESSMENT/PLAN:    Recurrent fevers, resolved  Suspect sepsis secondary to tickborne illness, sepsis now resolved  Later in admission patient had onset of " recurrent fevers starting on 5/14.  Later developed tachycardia, soft BP as well and was started on broad-spectrum antibiotics on 515 with vancomycin, Zosyn.  CT of the abdomen and pelvis on that date showed resolved pancreatitis with no necrosis or cyst.  Patient did report recently with tick on her person which she had removed about a week before presentation.  IV doxycycline was started on 5/16 and vancomycin was discontinued.  Tick serologies sent- lyme total antibody not performed due to hemolyzed specimen. Ehrlichia negative. Rickettsia DNA pend.   Other infectious workup including CXR, UA, blood cultures were negative.  D-dimer was elevated, greater than 20 though VTE workup negative.  Hepatitis and HIV panels negative.  ANCA panel was sent and negative.  CT PE protocol showed edema versus multifocal pneumonia.  Patient was started on micafungin as well. Fungitell negative. Fungal antibodies pend.   Infectious disease consulted and following, assistance appreciated. De-escalating to PO doxy, augmentin today. Hold further antifungal. Will need 1 week follow up with ID post discharge.  Overall patient appears to be improving, vital signs stable, remaining afebrile.     Acute hypoxic respiratory failure, now resolved, suspect due to pulmonary edema and/or multifocal pneumonia  Overnight 5/17 patient developed new hypoxia was briefly titrated up to 4 L per nasal cannula then weaned to 2 L.  CT PE at that time as above showed concern for edema versus pneumonia.  Antimicrobials were continued as above. ID deescalating to PO doxy, augmentin today. Hold further antifungal.  Diuresing per cardiology, see below.  Pulmonology was consulted and following, assistance appreciated.  Recommended continue current antibiotics and started Solu-Medrol- now tapering with prednisone.   CXR repeated 5/22- overall improving per pulmonology. Recommended outpatient follow up and repeat CT chest 4-6 weeks.     Moderate to severe mitral  valve stenosis   Severe pulmonary hypertension, suspect 2/2 above  Acute HFpEF due to mitral stenosis   TTE ordered given respiratory failure, concern for volume overload showed significant mitral stenosis and patient reports hx rheumatic fever.  Cardiology consulted for further assistance and recommendations, appreciated.   Plan to reassess mitral valve once HR at goal. Continuing to titrate metoprolol with goal HR 50-70.   May need further evaluation at [facility] for valvuloplasty or valve replacement.  Continuing to diurese as well given BNP elevated at 4838. Transitioned to PO diuretic today. Renal function is stable, at baseline.      Acute, recurrent pancreatitis  Resolved and tolerating diet     Mild hepatocellular transaminitis, resolved  Mild thrombocytopenia, resolved  Suspect secondary to tickborne illness.  LFTs, platelet count now normalized.     Hyponatremia, resolved  Hypokalemia, resolved  Replace per protocol       Recent UTI  culture without growth this admission     T2DM with expected hyperglycemia due to steroids  Continue Lantus, moderate dose correction insulin.  Titrating as needed.  Trend improved     Chronic:  Anxiety/depression  Schizoaffective disorder  HTN  Crohn's disease      -----------  -DVT prophylaxis: SCDs  -Disposition plans/anticipated needs: Pending course and TTE result once repeated. Anticipate return home        The patient is high risk due to the following diagnoses/reasons:  Acute HFpEF, suspected tickborne illness, severe mitral stenosis         Brenton Alves PA-C  05/23/24  11:22 EDT

## 2024-05-24 ENCOUNTER — READMISSION MANAGEMENT (OUTPATIENT)
Dept: CALL CENTER | Facility: HOSPITAL | Age: 44
End: 2024-05-24
Payer: COMMERCIAL

## 2024-05-24 ENCOUNTER — APPOINTMENT (OUTPATIENT)
Dept: CARDIOLOGY | Facility: HOSPITAL | Age: 44
DRG: 438 | End: 2024-05-24
Payer: COMMERCIAL

## 2024-05-24 VITALS
RESPIRATION RATE: 18 BRPM | DIASTOLIC BLOOD PRESSURE: 67 MMHG | BODY MASS INDEX: 34.8 KG/M2 | HEIGHT: 68 IN | SYSTOLIC BLOOD PRESSURE: 104 MMHG | OXYGEN SATURATION: 96 % | HEART RATE: 70 BPM | WEIGHT: 229.6 LBS | TEMPERATURE: 97.1 F

## 2024-05-24 PROBLEM — K85.90 ACUTE PANCREATITIS: Status: RESOLVED | Noted: 2024-05-12 | Resolved: 2024-05-24

## 2024-05-24 LAB
BH CV ECHO MEAS - MV A MAX VEL: 139 CM/SEC
BH CV ECHO MEAS - MV MAX PG: 12.8 MMHG
BH CV ECHO MEAS - MV MEAN PG: 7 MMHG
BH CV ECHO MEAS - MV V2 VTI: 69.1 CM
BH CV ECHO MEAS - MVA(P1/2T): 1.33 CM2
GLUCOSE BLDC GLUCOMTR-MCNC: 174 MG/DL (ref 70–130)
GLUCOSE BLDC GLUCOMTR-MCNC: 85 MG/DL (ref 70–130)

## 2024-05-24 PROCEDURE — 63710000001 INSULIN GLARGINE PER 5 UNITS

## 2024-05-24 PROCEDURE — 94761 N-INVAS EAR/PLS OXIMETRY MLT: CPT

## 2024-05-24 PROCEDURE — 99232 SBSQ HOSP IP/OBS MODERATE 35: CPT | Performed by: SPECIALIST

## 2024-05-24 PROCEDURE — 93321 DOPPLER ECHO F-UP/LMTD STD: CPT

## 2024-05-24 PROCEDURE — 99232 SBSQ HOSP IP/OBS MODERATE 35: CPT | Performed by: NURSE PRACTITIONER

## 2024-05-24 PROCEDURE — 93325 DOPPLER ECHO COLOR FLOW MAPG: CPT

## 2024-05-24 PROCEDURE — 93321 DOPPLER ECHO F-UP/LMTD STD: CPT | Performed by: SPECIALIST

## 2024-05-24 PROCEDURE — 63710000001 PREDNISONE PER 1 MG: Performed by: INTERNAL MEDICINE

## 2024-05-24 PROCEDURE — 63710000001 INSULIN LISPRO (HUMAN) PER 5 UNITS: Performed by: INTERNAL MEDICINE

## 2024-05-24 PROCEDURE — 94799 UNLISTED PULMONARY SVC/PX: CPT

## 2024-05-24 PROCEDURE — 93308 TTE F-UP OR LMTD: CPT | Performed by: SPECIALIST

## 2024-05-24 PROCEDURE — 82948 REAGENT STRIP/BLOOD GLUCOSE: CPT

## 2024-05-24 PROCEDURE — 93308 TTE F-UP OR LMTD: CPT

## 2024-05-24 PROCEDURE — 93325 DOPPLER ECHO COLOR FLOW MAPG: CPT | Performed by: SPECIALIST

## 2024-05-24 RX ORDER — INSULIN GLARGINE 100 [IU]/ML
15 INJECTION, SOLUTION SUBCUTANEOUS NIGHTLY
Qty: 15 ML | Refills: 0 | Status: SHIPPED | OUTPATIENT
Start: 2024-05-24

## 2024-05-24 RX ORDER — METOPROLOL SUCCINATE 50 MG/1
200 TABLET, EXTENDED RELEASE ORAL
Status: DISCONTINUED | OUTPATIENT
Start: 2024-05-25 | End: 2024-05-24 | Stop reason: HOSPADM

## 2024-05-24 RX ORDER — INSULIN LISPRO 100 [IU]/ML
2-9 INJECTION, SOLUTION INTRAVENOUS; SUBCUTANEOUS
Qty: 1080 UNITS | Refills: 0 | Status: SHIPPED | OUTPATIENT
Start: 2024-05-24

## 2024-05-24 RX ORDER — METOPROLOL TARTRATE 100 MG/1
100 TABLET ORAL ONCE
Status: DISCONTINUED | OUTPATIENT
Start: 2024-05-24 | End: 2024-05-24

## 2024-05-24 RX ORDER — FUROSEMIDE 40 MG/1
40 TABLET ORAL DAILY
Qty: 30 TABLET | Refills: 0 | Status: SHIPPED | OUTPATIENT
Start: 2024-05-25 | End: 2024-06-24

## 2024-05-24 RX ORDER — METOPROLOL SUCCINATE 50 MG/1
100 TABLET, EXTENDED RELEASE ORAL ONCE
Qty: 2 TABLET | Refills: 0 | Status: COMPLETED | OUTPATIENT
Start: 2024-05-24 | End: 2024-05-24

## 2024-05-24 RX ORDER — BLOOD SUGAR DIAGNOSTIC
STRIP MISCELLANEOUS
Qty: 100 EACH | Refills: 12 | Status: SHIPPED | OUTPATIENT
Start: 2024-05-24

## 2024-05-24 RX ORDER — PEN NEEDLE, DIABETIC 30 GX3/16"
1 NEEDLE, DISPOSABLE MISCELLANEOUS DAILY
Qty: 100 EACH | Refills: 0 | Status: SHIPPED | OUTPATIENT
Start: 2024-05-24

## 2024-05-24 RX ORDER — PREDNISONE 20 MG/1
20 TABLET ORAL
Qty: 2 TABLET | Refills: 0 | Status: SHIPPED | OUTPATIENT
Start: 2024-05-25 | End: 2024-05-27

## 2024-05-24 RX ORDER — BLOOD-GLUCOSE METER
KIT MISCELLANEOUS
Qty: 1 EACH | Refills: 0 | Status: SHIPPED | OUTPATIENT
Start: 2024-05-24

## 2024-05-24 RX ORDER — HYDROCODONE BITARTRATE AND ACETAMINOPHEN 5; 325 MG/1; MG/1
1 TABLET ORAL ONCE AS NEEDED
Status: COMPLETED | OUTPATIENT
Start: 2024-05-24 | End: 2024-05-24

## 2024-05-24 RX ORDER — LANCETS 30 GAUGE
EACH MISCELLANEOUS
Qty: 100 EACH | Refills: 0 | Status: SHIPPED | OUTPATIENT
Start: 2024-05-24

## 2024-05-24 RX ADMIN — AMOXICILLIN AND CLAVULANATE POTASSIUM 1 TABLET: 875; 125 TABLET, FILM COATED ORAL at 08:25

## 2024-05-24 RX ADMIN — FLUOXETINE HYDROCHLORIDE 10 MG: 10 CAPSULE ORAL at 08:25

## 2024-05-24 RX ADMIN — METOPROLOL SUCCINATE 100 MG: 50 TABLET, EXTENDED RELEASE ORAL at 10:07

## 2024-05-24 RX ADMIN — CETIRIZINE HYDROCHLORIDE 10 MG: 10 TABLET, FILM COATED ORAL at 08:25

## 2024-05-24 RX ADMIN — Medication 10 ML: at 08:25

## 2024-05-24 RX ADMIN — OXYCODONE HYDROCHLORIDE 5 MG: 10 TABLET ORAL at 08:25

## 2024-05-24 RX ADMIN — NICOTINE 1 PATCH: 14 PATCH, EXTENDED RELEASE TRANSDERMAL at 08:25

## 2024-05-24 RX ADMIN — INSULIN LISPRO 2 UNITS: 100 INJECTION, SOLUTION INTRAVENOUS; SUBCUTANEOUS at 13:29

## 2024-05-24 RX ADMIN — METOPROLOL SUCCINATE 100 MG: 50 TABLET, EXTENDED RELEASE ORAL at 08:25

## 2024-05-24 RX ADMIN — CARIPRAZINE 3 MG: 3 CAPSULE, GELATIN COATED ORAL at 08:25

## 2024-05-24 RX ADMIN — FUROSEMIDE 40 MG: 40 TABLET ORAL at 08:25

## 2024-05-24 RX ADMIN — HYDROCODONE BITARTRATE AND ACETAMINOPHEN 1 TABLET: 5; 325 TABLET ORAL at 15:28

## 2024-05-24 RX ADMIN — INSULIN GLARGINE 15 UNITS: 100 INJECTION, SOLUTION SUBCUTANEOUS at 08:29

## 2024-05-24 RX ADMIN — DOXYCYCLINE 100 MG: 100 CAPSULE ORAL at 08:25

## 2024-05-24 RX ADMIN — PREDNISONE 20 MG: 20 TABLET ORAL at 08:25

## 2024-05-24 NOTE — DISCHARGE SUMMARY
[facility] HOSPITALIST DISCHARGE SUMMARY    Patient Identification:  Name:  Manda Al  Age:  43 y.o.  Sex:  female  :  1980  MRN:  [id number]  Visit Number:  [id number]    Date of Admission: 2024  Date of Discharge:  24     PCP: Mi Rivera MD    Discharging Provider: Zach Alves PA-C / Dr. Woodard    Discharge Diagnoses     Discharge Diagnoses:  Recurrent fevers, now resolved  Acute HFpEF due to mitral stenosis, now clinically stable  Moderate mitral valve stenosis  Severe pulmonary hypertension, suspect due to above  Moderate mitral valve regurgitation   History of rheumatic fever  Acute respiratory failure with hypoxia, resolved  Concern for multifocal pneumonia, treated for bacterial  Suspected tickborne illness  Acute, recurrent pancreatitis, resolved  Mild hepatocellular transaminitis and thrombocytopenia suspected due to tickborne illness, resolved  T2DM with expected hyperglycemia due to steroids    Secondary Diagnoses:  Anxiety/depression  Schizoaffective disorder  HTN  Crohn's disease    Needs on follow up:  PCP 1 week, ID 1 week, cardiology 2 to 4 weeks, pulmonology 4 weeks, [facility] CT surgery, Dr. Roberts for further evaluation of mitral valve stenosis for possible repair or valvuloplasty   Consults/Procedures     Consults:   Consults       Date and Time Order Name Status Description    2024  7:28 AM Inpatient Cardiology Consult Completed     2024  7:02 AM Inpatient Infectious Diseases Consult Completed     2024  7:02 AM Inpatient Pulmonology Consult      2024  7:30 PM Hospitalist (on-call MD unless specified)              Procedures/Scans Performed:  CT abdomen and pelvis w & w/o contrast x 2  CXR x 5  VQ scan  Bilateral lower extremity venous Doppler US  CT PE protocol   TTE x 2       History of Presenting Illness     Chief Complaint   Patient presents with    Flank Pain       Patient is a 43 y.o. female who presented to [facility]  complaining of abdominal pain, nausea, vomiting.  Please see the admitting history and physical for further details.      Hospital Course     Patient was admitted to [facility] on 5/12/2024 complaining of epigastric abdominal pain with associated nausea and vomiting for the past several days.  She reported a history significant for 4 prior episodes of pancreatitis-previously felt due to gallstones and later episodes due to medications.  Workup in the ED revealed lipase 257 and CT abdomen and pelvis consistent with acute pancreatitis.  She was admitted to [facility] for further management-prescribed bowel rest, analgesics, antiemetics, and IV fluid replacement.    From pancreatitis standpoint patient improved symptomatically day over day as expected.  Medications adjusted and tapered as clinically indicated.  Diet resumed and advanced as tolerated without further issue.    Unfortunately on 5/14 patient developed high-grade fever followed by persistent tachycardia and BP AM downtrending.  She was started empirically on vancomycin and Zosyn.  CT of the abdomen and pelvis was repeated and showed resolved pancreatitis with no necrosis or cyst.  Infectious workup was ordered and notable for negative CXR, negative UA, negative blood cultures.  D-dimer was significantly elevated, greater than 20 however VTE workup was unrevealing.  Hepatitis panel, HIV panel, ANCA panel all negative.  On further discussion with the patient she did report she had removed a tick from her person about 1 week prior to admission to the hospital.  As such she was started on doxycycline on 5/16.  Further vancomycin held at that time and tick panel was sent.  Ehrlichia and Rickettsia DNA test were negative.  Unfortunately Lyme total antibody test could not be run due to hemolyzed sample.      Overnight on 5/17 patient developed new hypoxia requiring brief titration up to 4 L per nasal cannula.  CT PE protocol at that time was negative for PE though did  concerning for multifocal pneumonia and/or pulmonary edema.  Further fluid replacement was held.  Zosyn and doxycycline were continued as above.  Pulmonology was consulted and agreed with the current antibiotic regimen and added Solu-Medrol as well as one-time dose of Lasix.  Patient had also reported recent black mold exposure and was started on micafungin empirically which was discontinued after fungal panel was negative.  Ultimately with the above regimen patient's respiratory status did quickly improve and has remained stable thereafter.  Serial CXRs performed per pulmonology and improving per their read.  Pulmonology did recommend a follow-up CT chest in 4 to 6 weeks and outpatient follow-up. Continue short course steroids per pulmonology recommendation at discharge.     Infectious disease was consulted and followed for assistance with antimicrobial management.  Once patient clinically improving antibiotic regimen transition to p.o. Augmentin and oral doxycycline to be continued through 5/30/2024.  Patient will follow-up with infectious disease in 1 week.    Given concern for pulmonary edema TTE was obtained to further evaluate and showed significant mitral stenosis-moderate to severe with severe pulmonary hypertension.  proBNP was checked and elevated at nearly 5000.  Cardiology was consulted for further assistance and recommendations.  [place] exam and data consistent with acute HFpEF and began IV diuresis.  Given the degree of severity of mitral valve stenosis cardiology concerned patient would need referral for consideration of valvuloplasty or valve replacement.  As such have titrated patient's metoprolol to target heart rate 50 to 70 bpm and repeated TTE to better evaluate the valve- showed moderately dilated LA cavity, mild bileaflet MV thickening, diastolic doming of the valve leaflets, and rheumatic changes. Estimated mean gradient was 7 mmHg.Will refer the patient for follow up with Dr. Roberts, [facility] CT  surgery for further evaluation for possible repair or valvuloplasty.  Overall with rate control and diuresis patient improved and remaining clinically euvolemic. She was started on PO diuretics and remained stable. Will continue current regimen at [place] and have the patient follow up with cardiology in clinic in 2-4 weeks.     Given patient clinically improved and remaining stable on p.o. antibiotic regimen with no further workup planned it was felt she had reached the maximum benefit of the current hospitalization. Of note patient had had expected hyperglycemia during admission due to steroid use. She was advised to resume metformin on DC and call PCP if persistently hyperglycemic. BP has also been well controlled while admitted and will hold chlorthalidone at discharge. Case was discussed with attending physician and agree she is stable for discharge home on this date.  She will be scheduled to follow-up with her PCP in 1 week.  The above discharge and follow-up plan as well as medication changes were discussed with the patient and she expressed agreement understanding.    Discharge Vitals/Physical Examination     Vital Signs:  Temp:  [97.1 °F (36.2 °C)-98.6 °F (37 °C)] 97.1 °F (36.2 °C)  Heart Rate:  [68-85] 70  Resp:  [18-20] 18  BP: ()/(57-77) 101/57  Mean Arterial Pressure (Non-Invasive) for the past 24 hrs (Last 3 readings):   Noninvasive MAP (mmHg)   05/24/24 0702 74   05/24/24 0331 76   05/23/24 2309 90     SpO2 Percentage    05/24/24 0331 05/24/24 0651 05/24/24 0702   SpO2: 94% 98% 96%     SpO2:  [94 %-98 %] 96 %  on   ;   Device (Oxygen Therapy): room air    Body mass index is 34.91 kg/m².  Wt Readings from Last 3 Encounters:   05/24/24 104 kg (229 lb 9.6 oz)   05/07/24 97.1 kg (214 lb)   03/19/22 113 kg (250 lb)         Physical Exam:  Physical Exam  Vitals and nursing note reviewed.   Constitutional:       General: She is not in acute distress.     Appearance: She is obese.   HENT:      Head:  Normocephalic and atraumatic.   Eyes:      Extraocular Movements: Extraocular movements intact.   Cardiovascular:      Rate and Rhythm: Normal rate and regular rhythm.   Pulmonary:      Effort: Pulmonary effort is normal.      Breath sounds: Normal breath sounds.   Abdominal:      Palpations: Abdomen is soft.   Musculoskeletal:      Right lower leg: No edema.      Left lower leg: No edema.   Skin:     General: Skin is warm and dry.   Neurological:      Mental Status: She is alert. Mental status is at baseline.   Psychiatric:         Mood and Affect: Mood normal.         Behavior: Behavior normal.         Pertinent Laboratory/Radiology Results     Pertinent Laboratory Results:  Results from last 7 days   Lab Units 05/21/24  0844   HSTROP T ng/L 6     Results from last 7 days   Lab Units 05/21/24  0844   PROBNP pg/mL 4,838.0*         Results from last 7 days   Lab Units 05/22/24  0054 05/20/24  0209 05/19/24  1613 05/19/24  1321 05/19/24  1015 05/19/24  0730 05/19/24  0031 05/18/24  0021   CRP mg/dL  --   --   --   --   --   --   --  15.07*   LACTATE mmol/L  --   --  3.2* 4.4* 3.2*   < >  --   --    WBC 10*3/mm3 9.55 4.42  --   --   --   --  4.02 4.06   HEMOGLOBIN g/dL 11.0* 9.6*  --   --   --   --  9.4* 9.8*   HEMATOCRIT % 33.2* 28.8*  --   --   --   --  28.5* 29.2*   MCV fL 91.0 91.4  --   --   --   --  92.2 91.0   MCHC g/dL 33.1 33.3  --   --   --   --  33.0 33.6   PLATELETS 10*3/mm3 391 169  --   --   --   --  111* 103*    < > = values in this interval not displayed.     Results from last 7 days   Lab Units 05/23/24  0023 05/22/24  2014 05/22/24  1012 05/22/24  0054 05/21/24  0844 05/20/24  0209 05/19/24  0031 05/18/24  1036 05/18/24  0021   SODIUM mmol/L 140  --   --  139 142 140 138  --  140   POTASSIUM mmol/L 3.8 4.1 3.3* 3.2* 4.0 4.2 4.1   < > 3.5   CHLORIDE mmol/L 104  --   --  104 113* 111* 110*  --  110*   CO2 mmol/L 24.8  --   --  24.0 15.9* 18.2* 17.7*  --  20.9*   BUN mg/dL 17  --   --  16 17 15 12  --   "10   CREATININE mg/dL 0.96  --   --  1.21* 1.11* 1.12* 1.12*  --  1.14*   CALCIUM mg/dL 8.2*  --   --  8.5* 8.2* 8.3* 7.9*  --  7.5*   GLUCOSE mg/dL 177*  --   --  85 205* 320* 294*  --  159*   ALBUMIN g/dL  --   --   --   --   --  2.7* 2.5*  --  2.3*   BILIRUBIN mg/dL  --   --   --   --   --  0.4 0.5  --  0.5   ALK PHOS U/L  --   --   --   --   --  79 85  --  73   AST (SGOT) U/L  --   --   --   --   --  20 30  --  55*   ALT (SGPT) U/L  --   --   --   --   --  24 30  --  36*    < > = values in this interval not displayed.   Estimated Creatinine Clearance: 96.3 mL/min (by C-G formula based on SCr of 0.96 mg/dL).  No results found for: \"AMMONIA\"    Glucose   Date/Time Value Ref Range Status   05/24/2024 1133 174 (H) 70 - 130 mg/dL Final   05/24/2024 0727 85 70 - 130 mg/dL Final   05/23/2024 2116 229 (H) 70 - 130 mg/dL Final   05/23/2024 1604 281 (H) 70 - 130 mg/dL Final   05/23/2024 0645 98 70 - 130 mg/dL Final   05/22/2024 1908 290 (H) 70 - 130 mg/dL Final   05/22/2024 1726 260 (H) 70 - 130 mg/dL Final   05/22/2024 1628 245 (H) 70 - 130 mg/dL Final     Lab Results   Component Value Date    HGBA1C 6.90 (H) 05/12/2024     Lab Results   Component Value Date    TSH 1.840 05/16/2024       Blood Culture   Date Value Ref Range Status   05/18/2024 No growth at 5 days  Final   05/18/2024 No growth at 5 days  Final     No results found for: \"URINECX\"  No results found for: \"WOUNDCX\"  No results found for: \"STOOLCX\"  No results found for: \"RESPCX\"  Pain Management Panel  More data exists         Latest Ref Rng & Units 8/11/2020 5/9/2017   Pain Management Panel   Amphetamine, Urine Qual Negative Positive  Negative    Barbiturates Screen, Urine Negative Negative  Negative    Benzodiazepine Screen, Urine Negative Negative  Negative    Buprenorphine, Screen, Urine Negative Negative  Negative    Cocaine Screen, Urine Negative Negative  Negative    Methadone Screen , Urine Negative Negative  Negative        Pertinent Radiology " Results:  Imaging Results (All)       Procedure Component Value Units Date/Time    XR Chest 1 View [[id number]] Collected: 05/22/24 1343     Updated: 05/22/24 1346    Narrative:      PROCEDURE: XR CHEST 1 VW-       HISTORY: sob; K85.90-Acute pancreatitis without necrosis or infection,  unspecified     COMPARISON: 5/20/2024.     FINDINGS: The heart is normal in size. The mediastinum is unremarkable.  There are low lung volumes with patchy bilateral airspace disease. There  is blunting of the costophrenic angles consistent with small effusions.  There is no pneumothorax. There are no acute osseous abnormalities.       Impression:      Patchy bilateral airspace disease and small effusions  similar in appearance to the prior exam.        This report was finalized on 5/22/2024 1:44 PM by Dmitriy Sam M.D..       XR Chest 1 View [[id number]] Collected: 05/20/24 0831     Updated: 05/20/24 0834    Narrative:      VERIFICATION OBSERVER NAME: Uday Hansen MD.     TECHNIQUE, HISTORY, FINDINGS:      Comparison: 5/18/2024.     History and Findings: Pleural effusion.     Bilateral interstitial infiltrates, slightly diminished on the RIGHT,  unchanged on the LEFT.  Trace LEFT pleural effusion.  No RIGHT pleural effusion.  Minimal cardiac enlargement.  Central airways are patent.       Impression:      Trace LEFT pleural effusion, perhaps slightly more prominent than prior.              [id number]     Zip code: [id number]                 This report was finalized on 5/20/2024 8:31 AM by Dr. Uday Hansen MD.       CT Angiogram Chest Pulmonary Embolism [[id number]] Collected: 05/18/24 0814     Updated: 05/18/24 0819    Narrative:      VERIFICATION OBSERVER NAME: Uday Hansen MD.     Technique: Axial images were obtained along with coronal and sagittal  reconstruction. Mip images were generated.   Patient was injected with contrast.   DLP in mGycm and contrast amount and type are reported in the EMR  record.. Dose lowering technique:  Automated exposure control. Data  included in the medical records.      HISTORY/COMPARISON/FINDINGS:     Comparison: Same day chest x-ray.     History and Findings: Hypoxia and tachycardia.     No evidence of PE.  No evidence of aortic aneurysm.  Moderate bilateral pleural effusion.  Extensive bilateral infiltrates.  Visualized portion of upper abdomen  appeared unremarkable.       Impression:      No evidence of PE.  Extensive bilateral infiltrates.  Multifocal pneumonia versus pulmonary  edema.              [id number]     ZIP Code [id number].              This report was finalized on 5/18/2024 8:17 AM by Dr. Uday Hansen MD.       XR Chest 1 View [[id number]] Collected: 05/18/24 0810     Updated: 05/18/24 0814    Narrative:      VERIFICATION OBSERVER NAME: Uday Hansen MD.     TECHNIQUE, HISTORY, FINDINGS:      Comparison: 5/16/2024.     History and Findings: Hypoxia.     Single view revealed minimal cardiac enlargement.  Bilateral infiltrates  likely related to multifocal pneumonia.  This is a new finding from  previous study at least in the RIGHT lung.  No pleural effusion.  Central airways are patent.       Impression:      Development of infiltrates RIGHT lung.  New from prior.  Consistent with  multifocal pneumonia              [facility]     Zip code: [id number]                 This report was finalized on 5/18/2024 8:12 AM by Dr. Uday Hansen MD.       XR Chest 1 View [[id number]] Collected: 05/16/24 2118     Updated: 05/16/24 2121    Narrative:      PROCEDURE: Portable chest x-ray examination performed on May 16, 2024.  Single view. Upright position.     HISTORY: Shortness of breath.     COMPARISON: None.     FINDINGS:     Normal heart size  Mild central pulmonary vascular congestion.  Small bands of scar versus atelectasis at the lung bases.  No lobar consolidation or edema.  No pleural effusion or pneumothorax.  Mild hypoinflated lungs.  Mild dextroconvex curve at the mid thoracic spine.       Impression:         1.   Normal heart size.  2.  Mild central pulmonary vascular congestion.  3.  Small bands of scar versus atelectasis at the lung bases.  4.  No edema or pneumonia. No pleural effusion or pneumothorax.     This report was finalized on 5/16/2024 9:19 PM by Khadar Marie MD.       NM Lung Ventilation Perfusion [[id number]] Collected: 05/16/24 1440     Updated: 05/16/24 1443    Narrative:      EXAM:    NM Lung Perfusion and Ventilation Scan     EXAM DATE:    5/16/2024 1:46 PM     CLINICAL HISTORY:    r/o PE; K85.90-Acute pancreatitis without necrosis or infection,  unspecified     TECHNIQUE:    Nuclear Medicine ventilation and perfusion images of the lungs were  obtained in multiple projections following radiopharmaceutical  inhalation followed by injection of Tc99m MAA.     COMPARISON:    No relevant prior studies available.     FINDINGS:    Ventilation:  Unremarkable as visualized.  No ventilation defects.    Perfusion:  Unremarkable as visualized.  No perfusion defects.       Impression:      Low probability of PE.        This report was finalized on 5/16/2024 2:41 PM by Dr. Juan Carlos Smith MD.       US Venous Doppler Lower Extremity Bilateral (duplex) [[id number]] Collected: 05/16/24 1249     Updated: 05/16/24 1252    Narrative:      EXAMINATION: US VENOUS DOPPLER LOWER EXTREMITY BILATERAL (DUPLEX)-      CLINICAL INDICATION:  r/o DVT; K85.90-Acute pancreatitis without  necrosis or infection, unspecified     TECHNIQUE: Multiplanar gray scale and Doppler vascular sonographic  imaging of the deep veins of  BILATERAL   lower extremity, without and  with compression.      COMPARISON: NONE      FINDINGS:   Deep Veins: The visualized deep veins demonstrate flow and are  compressible. No evidence of deep venous thrombosis.   Superficial veins: Unremarkable. Saphenofemoral junction is patent  without thrombus.  Soft tissues: Soft tissue edema is noted.       Impression:      No DVT.     This report was finalized on 5/16/2024 12:50  PM by Dr. Juan Carlos Smith MD.       CT Abdomen Pelvis With & Without Contrast [[id number]] Collected: 05/16/24 0839     Updated: 05/16/24 0903    Narrative:      EXAM:    CT Abdomen and Pelvis Without and With Intravenous Contrast     EXAM DATE:    5/16/2024 8:05 AM     CLINICAL HISTORY:    abdominal pain, SIRS, pancreatitis; K85.90-Acute pancreatitis without  necrosis or infection, unspecified     TECHNIQUE:    Axial computed tomography images of the abdomen and pelvis without and  with intravenous contrast.  Sagittal and coronal reformatted images were  created and reviewed.  This CT exam was performed using one or more of  the following dose reduction techniques:  automated exposure control,  adjustment of the mA and/or kV according to patient size, and/or use of  iterative reconstruction technique.     COMPARISON:    5/12/2024     FINDINGS:    Lung bases:  Unremarkable as visualized.  No mass.  No consolidation.      ABDOMEN:    Liver:  Diffuse fatty liver.    Gallbladder and bile ducts:  Cholecystectomy.  No ductal dilation.    Pancreas:  Previously described subtle inflammation near the head of  the pancreas has essentially resolved on CT.  No ductal dilation.  No  pancreatic pseudocyst.    Spleen:  Unremarkable as visualized.  No splenomegaly.    Adrenals:  Unremarkable as visualized.  No mass.    Kidneys and ureters:  Bilateral renal cortical scarring record of the  left kidneys.  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 No hydronephrosis.    Stomach and bowel:  Scattered air-fluid levels throughout nondilated  small and large intestine which is nonspecific. No bowel obstruction.   No mucosal thickening.      PELVIS:    Appendix:  No findings to suggest acute appendicitis.    Bladder:  Distended urinary bladder without wall thickening.  No  stones.    Reproductive:  Unremarkable as visualized.      ABDOMEN and PELVIS:    Intraperitoneal space:  Unremarkable as  visualized.  No free air.  No  ascites or abscess.    Bones/joints:  Degenerative changes lower lumbar spine.  No acute  fracture.  No dislocation.    Soft tissues:  Tubulization clips noted.    Vasculature:  Unremarkable as visualized.  No abdominal aortic  aneurysm.    Lymph nodes:  Unremarkable as visualized.  No enlarged lymph nodes.       Impression:      1.  No inflammatory changes identified involving the pancreas on current  exam.  2.  No pancreatic pseudocyst.  3.  No ascites or free air.  4.  Fatty liver.  5.  Cholecystectomy.  6.  Nonspecific but nonobstructive bowel gas pattern.  7.  Mild volume overload changes with cardiomegaly, interstitial edema,  and trace effusions.  8.  Otherwise stable exam with other nonacute findings as detailed  above.        This report was finalized on 5/16/2024 9:01 AM by Dr. Juan Carlos Smith MD.       XR Chest 1 View [[id number]] Collected: 05/15/24 0941     Updated: 05/15/24 0943    Narrative:      EXAM:    XR Chest, 1 View     EXAM DATE:    5/15/2024 8:43 AM     CLINICAL HISTORY:    fever; K85.90-Acute pancreatitis without necrosis or infection,  unspecified     TECHNIQUE:    Frontal view of the chest.     COMPARISON:    No relevant prior studies available.     FINDINGS:    Lungs and pleural spaces:  Unremarkable as visualized.  No  consolidation.  No pneumothorax.    Heart:  Unremarkable as visualized.  No cardiomegaly.    Mediastinum:  Unremarkable as visualized.  Normal mediastinal contour.    Bones/joints:  Unremarkable as visualized.  No acute fracture.       Impression:        Unremarkable exam. No acute cardiopulmonary findings identified.        This report was finalized on 5/15/2024 9:41 AM by Dr. Juan Carlos Smith MD.       CT Abdomen Pelvis With & Without Contrast [[id number]] Collected: 05/12/24 1811     Updated: 05/12/24 1816    Narrative:      Procedure: Helical CT Abdomen and Pelvis examination performed with  axial sections prior to and after administration of  non-ionic IV  contrast. Coronal and sagittal 3 mm thick reformats also acquired. CT  scan performed according to ALARA(as low as reasonably achievable)dose  protocol.     Comparison: None available.     History: flank pain; LLQ     Date: 5/12/2024 5:17 PM     Findings:     Liver: Normal liver and size.  No focal lesion.  Gallbladder: Surgically absent.   Spleen: Normal in size.  Pancreas: Atrophy. Mild surrounding edema.  Adrenal glands: No nodules.  Kidneys: Symmetric bilateral renal enhancement is present. No  hydronephrosis.  Peritoneum: There is no free air or abscess.  Bowel: No obstruction. No evidence of inflamed bowel loops. Appendix is  normal.  Mesentery: No adenopathy.  Retroperitoneum: Aorta and inferior vena cava unremarkable.  Pelvis: Bladder is unremarkable.  Bones: No acute fracture.  Lung bases: Visualized lung bases are negative. Heart is normal in size.       Impression:         Findings may represent acute pancreatitis. Correlation with lab values.      This report was finalized on 5/12/2024 6:14 PM by Titus Martinez MD.               Discharge Disposition/Discharge Medications/Discharge Appointments     Discharge Disposition:   Home or Self Care    Condition at Discharge:  Stable    Discharge Medications:       Your medication list        START taking these medications        Instructions Last Dose Given Next Dose Due   amoxicillin-clavulanate 875-125 MG per tablet  Commonly known as: AUGMENTIN      Take 1 tablet by mouth Every 12 (Twelve) Hours for 14 doses. Indications: Pneumonia       doxycycline 100 MG capsule  Commonly known as: MONODOX      Take 1 capsule by mouth Every 12 (Twelve) Hours for 14 doses. Indications: Pneumonia, Tickborne illness       furosemide 40 MG tablet  Commonly known as: LASIX  Start taking on: May 25, 2024      Take 1 tablet by mouth Daily for 30 days.       predniSONE 20 MG tablet  Commonly known as: DELTASONE  Start taking on: May 25, 2024      Take 1 tablet by  mouth Daily With Breakfast for 2 doses.              CONTINUE taking these medications        Instructions Last Dose Given Next Dose Due   fexofenadine 180 MG tablet  Commonly known as: ALLEGRA      Take 1 tablet by mouth Daily.       FLUoxetine 10 MG capsule  Commonly known as: PROzac      Take 1 capsule by mouth Daily.       magnesium oxide 400 MG tablet  Commonly known as: MAG-OX      Take 1 tablet by mouth Daily.       metFORMIN  MG 24 hr tablet  Commonly known as: GLUCOPHAGE-XR      Take 2 tablets by mouth 2 (Two) Times a Day.       Toprol  MG 24 hr tablet  Generic drug: metoprolol succinate XL      Take 1 tablet by mouth Daily.       Vraylar 3 MG capsule capsule  Generic drug: Cariprazine HCl      Take 1 capsule by mouth Daily.              STOP taking these medications      chlorthalidone 25 MG tablet  Commonly known as: HYGROTON                  Where to Get Your Medications        These medications were sent to [facility] [address] - [phone] [facility] [phone] [facility] [address] [facility] [address]      Phone: [phone]   amoxicillin-clavulanate 875-125 MG per tablet  doxycycline 100 MG capsule  furosemide 40 MG tablet  predniSONE 20 MG tablet          Discharge Diet:  ad boris    Discharge Activity:  ad boris      Time spent on this discharge exceeded 30 minutes.

## 2024-05-24 NOTE — CASE MANAGEMENT/SOCIAL WORK
Continued Stay Note  LLOYD Aguirre     Patient Name: Manda Al  MRN: [id number]  Today's Date: 5/24/2024    Admit Date: 5/12/2024    Plan: This CM met with pt at bedside to verify discharge plan.  Discharge plan remains unchanged, return home with Mom; denies needs; Mom to provide transport once medically stable.  Winifred Pope RN

## 2024-05-24 NOTE — PLAN OF CARE
Problem: Adult Inpatient Plan of Care  Goal: Plan of Care Review  Outcome: Ongoing, Progressing   Goal Outcome Evaluation:  Plan of Care Reviewed With: patient   Patient resting in bed. Complaint of abdominal pain at beginning of shift that has since resolved. PRN medication provided. IV patent. Patient has frequently ambulated this shift to bathroom. No acute events overnight. Plan to return home on discharge. Will continue to follow with plan of care.

## 2024-05-24 NOTE — PROGRESS NOTES
PROGRESS NOTE         Patient Identification:  Name:  Manda Al  Age:  43 y.o.  Sex:  female  :  1980  MRN:  [id number]  Visit Number:  [id number]  Primary Care Provider:  Mi Rivera MD         LOS: 12 days       ----------------------------------------------------------------------------------------------------------------------  Subjective       Chief Complaints:    Flank Pain        Interval History:      Patient sitting up in bed this morning.  Overall feels well today.  On room air with no apparent distress.  Lungs clear to auscultation bilaterally.  Abdomen soft, nontender.  Afebrile, denies diarrhea.  Fungal antibodies negative.      Review of Systems:    Constitutional: no fever, chills and night sweats.    Eyes: no eye drainage, itching or redness.  HEENT: no mouth sores, dysphagia or nose bleed.  Respiratory: no for shortness of breath, cough or production of sputum.  Cardiovascular: no chest pain, no palpitations, no orthopnea.  Gastrointestinal: no nausea, no vomiting or diarrhea. No abdominal pain, hematemesis or rectal bleeding.  Genitourinary: no dysuria or polyuria.  Hematologic/lymphatic: no lymph node abnormalities, no easy bruising or easy bleeding.  Musculoskeletal: no muscle or joint pain.  Skin: No rash and no itching.  Neurological: no loss of consciousness, no seizure, no headache.  Behavioral/Psych: no depression or suicidal ideation.  Endocrine: no hot flashes.  Immunologic: negative.    ----------------------------------------------------------------------------------------------------------------------      Objective       Current [facility] Meds:  amoxicillin-clavulanate, 1 tablet, Oral, Q12H  Cariprazine HCl, 3 mg, Oral, Daily  cetirizine, 10 mg, Oral, Daily  doxycycline, 100 mg, Oral, Q12H  FLUoxetine, 10 mg, Oral, Daily  furosemide, 40 mg, Oral, Daily  insulin glargine, 15 Units, Subcutaneous, Daily  insulin lispro, 2-9 Units, Subcutaneous, 4x Daily AC &  at Bedtime  metoprolol succinate XL, 100 mg, Oral, Once  [START ON 5/25/2024] metoprolol succinate XL, 200 mg, Oral, Q24H  nicotine, 1 patch, Transdermal, Q24H  predniSONE, 20 mg, Oral, Daily With Breakfast  sodium chloride, 500 mL, Intravenous, Once  sodium chloride, 10 mL, Intravenous, Q12H  sodium chloride, 10 mL, Intravenous, Q12H  sodium chloride, 10 mL, Intravenous, Q12H           ----------------------------------------------------------------------------------------------------------------------    Vital Signs:  Temp:  [97.1 °F (36.2 °C)-98.6 °F (37 °C)] 97.1 °F (36.2 °C)  Heart Rate:  [68-83] 70  Resp:  [18-20] 18  BP: ()/(57-77) 101/57  Mean Arterial Pressure (Non-Invasive) for the past 24 hrs (Last 3 readings):   Noninvasive MAP (mmHg)   05/24/24 0702 74   05/24/24 0331 76   05/23/24 2309 90     SpO2 Percentage    05/23/24 2309 05/24/24 0331 05/24/24 0702   SpO2: 96% 94% 96%     SpO2:  [92 %-98 %] 96 %  on   ;   Device (Oxygen Therapy): room air    Body mass index is 34.91 kg/m².  Wt Readings from Last 3 Encounters:   05/24/24 104 kg (229 lb 9.6 oz)   05/07/24 97.1 kg (214 lb)   03/19/22 113 kg (250 lb)        Intake/Output Summary (Last 24 hours) at 5/24/2024 1007  Last data filed at 5/23/2024 2113  Gross per 24 hour   Intake 480 ml   Output --   Net 480 ml     Diet: Regular/House; Fluid Consistency: Thin (IDDSI 0)  ----------------------------------------------------------------------------------------------------------------------      Physical Exam:    Constitutional:  Well-developed and well-nourished.  Resting comfortably in bed this morning.  No issues or complaints.  HENT:  Head: Normocephalic and atraumatic.  Mouth:  Moist mucous membranes.    Eyes:  Conjunctivae and EOM are normal.  No scleral icterus.  Neck:  Neck supple.  No JVD present.    Cardiovascular:  Normal rate, regular rhythm and normal heart sounds with no murmur. No edema.  Pulmonary/Chest:  No respiratory distress, no  wheezes, no crackles, with normal breath sounds and good air movement.  Abdominal:  Soft.  Bowel sounds are normal.  No distension and no tenderness.   Musculoskeletal:  No edema, no tenderness, and no deformity.  No swelling or redness of joints.  Neurological:  Alert and oriented to person, place, and time.  No facial droop.  No slurred speech.   Skin:  Skin is warm and dry.  No rash noted.  No pallor.  Scabbed area on the left bottom from tick bite.  No surrounding erythema.  No drainage.  Psychiatric:  Normal mood and affect.  Behavior is normal.        ----------------------------------------------------------------------------------------------------------------------  Results from last 7 days   Lab Units 05/21/24  0844   HSTROP T ng/L 6     Results from last 7 days   Lab Units 05/21/24  0844   PROBNP pg/mL 4,838.0*             Results from last 7 days   Lab Units 05/22/24  0054 05/20/24  0209 05/19/24  1613 05/19/24  1321 05/19/24  1015 05/19/24  0730 05/19/24  0031 05/18/24  0021   CRP mg/dL  --   --   --   --   --   --   --  15.07*   LACTATE mmol/L  --   --  3.2* 4.4* 3.2*   < >  --   --    WBC 10*3/mm3 9.55 4.42  --   --   --   --  4.02 4.06   HEMOGLOBIN g/dL 11.0* 9.6*  --   --   --   --  9.4* 9.8*   HEMATOCRIT % 33.2* 28.8*  --   --   --   --  28.5* 29.2*   MCV fL 91.0 91.4  --   --   --   --  92.2 91.0   MCHC g/dL 33.1 33.3  --   --   --   --  33.0 33.6   PLATELETS 10*3/mm3 391 169  --   --   --   --  111* 103*    < > = values in this interval not displayed.     Results from last 7 days   Lab Units 05/23/24  0023 05/22/24 2014 05/22/24  1012 05/22/24  0054 05/21/24  0844 05/20/24  0209 05/19/24  0031 05/18/24  1036 05/18/24  0021   SODIUM mmol/L 140  --   --  139 142 140 138  --  140   POTASSIUM mmol/L 3.8 4.1 3.3* 3.2* 4.0 4.2 4.1   < > 3.5   CHLORIDE mmol/L 104  --   --  104 113* 111* 110*  --  110*   CO2 mmol/L 24.8  --   --  24.0 15.9* 18.2* 17.7*  --  20.9*   BUN mg/dL 17  --   --  16 17 15 12  --  " 10   CREATININE mg/dL 0.96  --   --  1.21* 1.11* 1.12* 1.12*  --  1.14*   CALCIUM mg/dL 8.2*  --   --  8.5* 8.2* 8.3* 7.9*  --  7.5*   GLUCOSE mg/dL 177*  --   --  85 205* 320* 294*  --  159*   ALBUMIN g/dL  --   --   --   --   --  2.7* 2.5*  --  2.3*   BILIRUBIN mg/dL  --   --   --   --   --  0.4 0.5  --  0.5   ALK PHOS U/L  --   --   --   --   --  79 85  --  73   AST (SGOT) U/L  --   --   --   --   --  20 30  --  55*   ALT (SGPT) U/L  --   --   --   --   --  24 30  --  36*    < > = values in this interval not displayed.   Estimated Creatinine Clearance: 96.3 mL/min (by C-G formula based on SCr of 0.96 mg/dL).  No results found for: \"AMMONIA\"    Glucose   Date/Time Value Ref Range Status   05/24/2024 0727 85 70 - 130 mg/dL Final   05/23/2024 2116 229 (H) 70 - 130 mg/dL Final   05/23/2024 1604 281 (H) 70 - 130 mg/dL Final   05/23/2024 0645 98 70 - 130 mg/dL Final   05/22/2024 1908 290 (H) 70 - 130 mg/dL Final   05/22/2024 1726 260 (H) 70 - 130 mg/dL Final   05/22/2024 1628 245 (H) 70 - 130 mg/dL Final   05/22/2024 1130 125 70 - 130 mg/dL Final     Lab Results   Component Value Date    HGBA1C 6.90 (H) 05/12/2024     Lab Results   Component Value Date    TSH 1.840 05/16/2024       Blood Culture   Date Value Ref Range Status   05/15/2024 No growth at 4 days  Preliminary   05/15/2024 No growth at 4 days  Preliminary   05/12/2024 No growth at 5 days  Final   05/12/2024 No growth at 5 days  Final   05/12/2024 No growth at 5 days  Final     Urine Culture   Date Value Ref Range Status   05/12/2024 No growth  Final     No results found for: \"WOUNDCX\"  No results found for: \"STOOLCX\"  No results found for: \"RESPCX\"  Pain Management Panel  More data exists         Latest Ref Rng & Units 8/11/2020 5/9/2017   Pain Management Panel   Amphetamine, Urine Qual Negative Positive  Negative    Barbiturates Screen, Urine Negative Negative  Negative    Benzodiazepine Screen, Urine Negative Negative  Negative    Buprenorphine, Screen, " Urine Negative Negative  Negative    Cocaine Screen, Urine Negative Negative  Negative    Methadone Screen , Urine Negative Negative  Negative          ----------------------------------------------------------------------------------------------------------------------  Imaging Results (Last 24 Hours)       ** No results found for the last 24 hours. **            ----------------------------------------------------------------------------------------------------------------------    Pertinent Infectious Disease Results                Assessment/Plan       Assessment     Recurrent high-grade fever  Pneumonia  Suspicion of tickborne illness        Plan        Patient sitting up in bed this morning.  Overall feels well today.  On room air with no apparent distress.  Lungs clear to auscultation bilaterally.  Abdomen soft, nontender.  Afebrile, denies diarrhea.  Fungal antibodies negative.    Aspergillus/galactomannan normal at 0.05.  Fungitell negative at less than 31.25.     Ehrlichia PCR negative.  Rickettsial PCR negative.    Recommend to continue Augmentin and oral doxycycline through 5/30/2024.  Okay to discharge from ID standpoint.  Patient overall stable from ID standpoint.      ANTIMICROBIAL THERAPY    amoxicillin-clavulanate - 875-125 MG, 875-125 MG  doxycycline - 100 MG, 100 MG     Code Status:   Code Status and Medical Interventions:   Ordered at: 05/12/24 1930     Code Status (Patient has no pulse and is not breathing):    CPR (Attempt to Resuscitate)     Medical Interventions (Patient has pulse or is breathing):    Full Support       Luisa Avila, APRN  05/24/24  10:07 EDT

## 2024-05-24 NOTE — PROGRESS NOTES
[facility]  PROGRESS NOTE    Patient information:  Name: Manda Al  Age/Sex: 43 y.o. female  :  1980        PCP: Mi Rivera MD  Attending: Adin Sauceda MD  MRN:  [id number]  Visit Number:  [id number]    LOS: 12  CODE STATUS:    Code Status and Medical Interventions:   Ordered at: 24     Code Status (Patient has no pulse and is not breathing):    CPR (Attempt to Resuscitate)     Medical Interventions (Patient has pulse or is breathing):    Full Support       PROBLEM LIST:Principal Problem:    Acute pancreatitis      Reason for Cardiology follow-up:  Mitral valve stenosis and pulmonary hypertension     Subjective   ADMISSION INFORMATION:  Chief Complaint   Patient presents with    Flank Pain       DATE:2024    HPI:  Manda Al is a 43-year-old female with a past medical history significant for anxiety, depression, bipolar disorder, asthma, Crohn's disease, hypertension, schizoaffective disorder.      Patient initially presented to [facility] on 2024 with complaints of epigastric abdominal pain, nausea and vomiting. She was found to have acute pancreatitis. Cardiology was consulted for abnormal echocardiogram moderate to severe mitral valve stenosis, moderate mitral valve regurgitation and severe pulmonary hypertension.     Interval History:   Telemetry reveals SR 70s. According to patient's I & O flow chart reflects x 7 unmeasured urine occurrences.     Patient was in room 304 B when she was seen and examined.  Patient is sitting up on the edge of her bed resting quietly.  No acute distress noted at this time.  Patient has no acute complaints.    EVENT TIMELINE:   : ECHO w EF 61-65 %. Moderate to severe mitral valve stenosis, moderate mitral valve regurgitation, & severe pulmonary hypertension is present.  Please see full report attached below.  : Metoprolol succinate XL increased to 100 mg PO  Daily per Dr. Woodard, scheduled Limited ECHO for MV Stenosis on Friday 05/24.  05/24: Limited ECHO results still pending.    Objective     Vital Signs  Temp:  [97.1 °F (36.2 °C)-98.6 °F (37 °C)] 97.1 °F (36.2 °C)  Heart Rate:  [68-85] 70  Resp:  [18-20] 18  BP: ()/(57-77) 101/57  Device (Oxygen Therapy): room air  Vital Signs (last 72 hrs)         05/21 0700 05/22 0659 05/22 0700 05/23 0659 05/23 0700 05/24 0659 05/24 0700 05/24 0809   Most Recent      Temp (°F) 97.6 -  98.3    97.6 -  98.5    98 -  98.6      97.1     97.1 (36.2) 05/24 0702    Heart Rate 72 -  101    71 -  96    68 -  83      70     70 05/24 0702    Resp 15 -  32    10 -  26    18 -  20      18     18 05/24 0702    /75 -  149/97    95/53 -  134/91    96/63 -  112/77      101/57     101/57 05/24 0702    SpO2 (%) 93 -  100    90 -  100    92 -  98      96     96 05/24 0702          BMI:Body mass index is 34.91 kg/m².    WEIGHT:      05/22/24  0500 05/23/24  0500 05/24/24  0500   Weight: 106 kg (234 lb 9.1 oz) 106 kg (233 lb 9.6 oz) 104 kg (229 lb 9.6 oz)       DIET:Diet: Regular/House; Fluid Consistency: Thin (IDDSI 0)    I&O:  Intake & Output (last 3 days)         05/21 0701 05/22 0700 05/22 0701 05/23 0700 05/23 0701 05/24 0700 05/24 0701 05/25 0700    P.O. 1280 542 960     I.V. (mL/kg) 661 (6.2)       IV Piggyback        Total Intake(mL/kg) 1941 (18.3) 542 (5.1) 960 (9.2)     Urine (mL/kg/hr) 7700 (3) 1800 (0.7)      Stool  0      Total Output 7700 1800      Net -5790 -9477 +960             Urine Unmeasured Occurrence 2 x 4 x 7 x     Stool Unmeasured Occurrence 2 x 1 x 2 x              PHYSICAL EXAMINATION:    General Appearance:    Alert, cooperative, in no acute distress.   Head:    Normocephalic, without obvious abnormality, atraumatic   Eyes:            Conjunctivae and sclerae normal, no icterus, no pallor, corneas clear.   Neck:   No adenopathy, supple, trachea midline, no thyromegaly, no  carotid bruit, no JVD   Lungs:      Clear to auscultation,respirations regular, even and               unlabored    Heart:    Regular rhythm and normal rate, normal S1 and S2, 1/6 mid diastolic rumbling murmur noted at the apex along with grade 3/6 holosystolic murmur at LV apex, no gallop, no rub, no click   Chest Wall:    No abnormalities observed   Abdomen:     Normal bowel sounds, no masses, no organomegaly, soft,     non-tender, nondistended, no guarding, no rebound       tenderness   Extremities:   Moves all extremities well, no edema, no cyanosis, no       redness   Pulses:   Pulses palpable and equal bilaterally   Skin:   No bleeding, bruising or rash.             Results review     Results from last 7 days   Lab Units 05/21/24  0844   HSTROP T ng/L 6     Lab Results   Component Value Date    PROBNP 4,838.0 (H) 05/21/2024    PROBNP 3,619.0 (H) 08/14/2020     Results from last 7 days   Lab Units 05/22/24  0054 05/20/24  0209 05/19/24  0031 05/18/24  0021   WBC 10*3/mm3 9.55 4.42 4.02 4.06   HEMOGLOBIN g/dL 11.0* 9.6* 9.4* 9.8*   PLATELETS 10*3/mm3 391 169 111* 103*     Results from last 7 days   Lab Units 05/23/24  0023 05/22/24 2014 05/22/24  1012 05/22/24  0054 05/21/24  0844 05/20/24  0209 05/19/24  0031 05/18/24  1036 05/18/24  0021 05/17/24  1221   SODIUM mmol/L 140  --   --  139 142 140 138  --  140 135*   POTASSIUM mmol/L 3.8 4.1 3.3* 3.2* 4.0 4.2 4.1   < > 3.5 4.0   CHLORIDE mmol/L 104  --   --  104 113* 111* 110*  --  110* 105   CO2 mmol/L 24.8  --   --  24.0 15.9* 18.2* 17.7*  --  20.9* 20.0*   BUN mg/dL 17  --   --  16 17 15 12  --  10 6   CREATININE mg/dL 0.96  --   --  1.21* 1.11* 1.12* 1.12*  --  1.14* 0.99   CALCIUM mg/dL 8.2*  --   --  8.5* 8.2* 8.3* 7.9*  --  7.5* 7.5*   GLUCOSE mg/dL 177*  --   --  85 205* 320* 294*  --  159* 152*   ALT (SGPT) U/L  --   --   --   --   --  24 30  --  36*  --    AST (SGOT) U/L  --   --   --   --   --  20 30  --  55*  --     < > = values in this interval not displayed.     Lab Results  "  Component Value Date    MG 2.9 (H) 05/13/2024    MG 1.7 05/12/2024    MG 1.7 05/07/2024     Estimated Creatinine Clearance: 96.3 mL/min (by C-G formula based on SCr of 0.96 mg/dL).    Lab Results   Component Value Date    HGBA1C 6.90 (H) 05/12/2024     No results found for: \"CHOL\", \"TRIG\", \"LDL\", \"HDL\"     Lab Results   Component Value Date    INR 1.26 (H) 05/17/2024     No results found for: \"LABHEPA\"    Lab Results   Component Value Date    TSH 1.840 05/16/2024      Pain Management Panel  More data exists         Latest Ref Rng & Units 8/11/2020 5/9/2017   Pain Management Panel   Amphetamine, Urine Qual Negative Positive  Negative    Barbiturates Screen, Urine Negative Negative  Negative    Benzodiazepine Screen, Urine Negative Negative  Negative    Buprenorphine, Screen, Urine Negative Negative  Negative    Cocaine Screen, Urine Negative Negative  Negative    Methadone Screen , Urine Negative Negative  Negative      Microbiology Results (last 10 days)       Procedure Component Value - Date/Time    Blood Culture - Blood, Arm, Left [[id number]]  (Normal) Collected: 05/18/24 1333    Lab Status: Final result Specimen: Blood from Arm, Left Updated: 05/23/24 1346     Blood Culture No growth at 5 days    Aspergillus Galactomannan Antigen - Blood, Arm, Left [[id number]] Collected: 05/18/24 1257    Lab Status: Final result Specimen: Blood from Arm, Left Updated: 05/22/24 0209     Aspergillus Ag, BAL/Serum 0.05 Index     Narrative:      Performed at:  01 - [address] [place], [place]  [id number]  : Chetna Gordon MD, Phone:  [phone]  Performed at:  02 - [facility]  [address] [place], [place]  [id number]  : Gio Alaniz [facility], Phone:  [phone]    Blood Culture - Blood, Hand, Right [[id number]]  (Normal) Collected: 05/18/24 1257    Lab Status: Final result Specimen: Blood from Hand, Right Updated: 05/23/24 1346     Blood Culture No growth at 5 days    MRSA Screen, PCR " (Inpatient) - Swab, Nares [[id number]]  (Normal) Collected: 05/18/24 0847    Lab Status: Final result Specimen: Swab from Nares Updated: 05/18/24 1010     MRSA PCR No MRSA Detected    Narrative:      The negative predictive value of this diagnostic test is high and should only be used to consider de-escalating anti-MRSA therapy. A positive result may indicate colonization with MRSA and must be correlated clinically.    Respiratory Panel PCR w/COVID-19(SARS-CoV-2) SYED/JHONATHAN/TRUMAN/PAD/COR/KETURAH In-House, NP Swab in UTM/VTM, 2 HR TAT - Swab, Nasopharynx [[id number]]  (Normal) Collected: 05/16/24 1855    Lab Status: Final result Specimen: Swab from Nasopharynx Updated: 05/16/24 1947     ADENOVIRUS, PCR Not Detected     Coronavirus 229E Not Detected     Coronavirus HKU1 Not Detected     Coronavirus NL63 Not Detected     Coronavirus OC43 Not Detected     COVID19 Not Detected     Human Metapneumovirus Not Detected     Human Rhinovirus/Enterovirus Not Detected     Influenza A PCR Not Detected     Influenza B PCR Not Detected     Parainfluenza Virus 1 Not Detected     Parainfluenza Virus 2 Not Detected     Parainfluenza Virus 3 Not Detected     Parainfluenza Virus 4 Not Detected     RSV, PCR Not Detected     Bordetella pertussis pcr Not Detected     Bordetella parapertussis PCR Not Detected     Chlamydophila pneumoniae PCR Not Detected     Mycoplasma pneumo by PCR Not Detected    Narrative:      In the setting of a positive respiratory panel with a viral infection PLUS a negative procalcitonin without other underlying concern for bacterial infection, consider observing off antibiotics or discontinuation of antibiotics and continue supportive care. If the respiratory panel is positive for atypical bacterial infection (Bordetella pertussis, Chlamydophila pneumoniae, or Mycoplasma pneumoniae), consider antibiotic de-escalation to target atypical bacterial infection.    Blood Culture - Blood, Hand, Right [[id number]]  (Normal) Collected:  05/15/24 0827    Lab Status: Final result Specimen: Blood from Hand, Right Updated: 05/20/24 0845     Blood Culture No growth at 5 days    Blood Culture - Blood, Arm, Left [[id number]]  (Normal) Collected: 05/15/24 0811    Lab Status: Final result Specimen: Blood from Arm, Left Updated: 05/20/24 0845     Blood Culture No growth at 5 days    COVID PRE-OP / PRE-PROCEDURE SCREENING ORDER (NO ISOLATION) - Swab, Nasopharynx [[id number]]  (Normal) Collected: 05/15/24 0755    Lab Status: Final result Specimen: Swab from Nasopharynx Updated: 05/15/24 0909    Narrative:      The following orders were created for panel order COVID PRE-OP / PRE-PROCEDURE SCREENING ORDER (NO ISOLATION) - Swab, Nasopharynx.  Procedure                               Abnormality         Status                     ---------                               -----------         ------                     COVID-19 and FLU A/B PCR...[[id number]]  Normal              Final result                 Please view results for these tests on the individual orders.    COVID-19 and FLU A/B PCR, 1 HR TAT - Swab, Nasopharynx [[id number]]  (Normal) Collected: 05/15/24 0755    Lab Status: Final result Specimen: Swab from Nasopharynx Updated: 05/15/24 0909     COVID19 Not Detected     Influenza A PCR Not Detected     Influenza B PCR Not Detected    Narrative:      Fact sheet for providers: https://www.fda.gov/media/017594/download    Fact sheet for patients: https://www.fda.gov/media/570729/download    Test performed by PCR.           Imaging Results (Last 24 Hours)       ** No results found for the last 24 hours. **          ECHO:  Results for orders placed during the hospital encounter of 05/12/24    Adult Transthoracic Echo Complete W/ Cont if Necessary Per Protocol    Interpretation Summary  Images from the original result were not included.      Normal left ventricular cavity size and wall thickness noted. All left ventricular wall segments contract normally.    Left  ventricular ejection fraction appears to be 61 - 65%.    Left ventricular diastolic function is consistent with (grade II w/high LAP) pseudonormalization.    There is tethering of both anterior and posterior mitral leaflets with reverse hockey-stick appearance of the anterior mitral leaflet suspicious for rheumatic mitral valve disease.    There is moderate, bileaflet mitral valve thickening present at the tip of the leaflets (commissure).  Tiera score of 8    MV max PG 22.3 mmHg MV mean PG 14 mmHg MV V2 VTI 69.8 cm MVA(VTI) 1.08 cm2 MV dec slope 687 cm/sec2 MV dec time 0.39 sec at a heart rate of about 95 bpm.    Moderate to severe mitral valve stenosis is present. The calculated mitral valve Broderick' score is 7.    Moderate mitral valve regurgitation is present with a centrally-directed jet noted. The calculated mitral valve morphology score is 7.    The aortic valve was poorly visualized but appears trileaflet. Mild aortic valve regurgitation is present. No aortic valve stenosis is present.    Tricuspid valve not well visualized. Mild to moderate tricuspid valve regurgitation is present. Estimated right ventricular systolic pressure from tricuspid regurgitation is markedly elevated (>55 mmHg).    Severe pulmonary hypertension is present.    The IVC is mildly dilated with partial collapse indicator of right atrial pressures of about 15 mmHg.    There is no evidence of pericardial effusion. .    Comments: The peak and mean gradients across the mitral valve seem to be about the same as on the transthoracic echo back in August of 2020 given the heart rate in the 90s although the mitral valve stenosis seem to have improved on the DONNA done at that time with improvement in the heart rate.  The degree of mitral regurgitation seem to have gotten worse.  Hence would consider repeating the study with focus on the mitral valve after improving the heart rate.  If patient still has significantly elevated gradients across the  mitral valve, may have to consider referral to [facility] for possible mitral valvuloplasty.      STRESS TEST:  No results found for this or any previous visit.       HEART CATH:  No results found for this or any previous visit.      TELEMETRY:     SR 70s         I reviewed the patient's new clinical results.    ALLERGIES: Imuran [azathioprine]    Medication Review:   Current list of medications may not reflect those currently placed in orders that are not signed or are being held.     amoxicillin-clavulanate, 1 tablet, Oral, Q12H  Cariprazine HCl, 3 mg, Oral, Daily  cetirizine, 10 mg, Oral, Daily  doxycycline, 100 mg, Oral, Q12H  FLUoxetine, 10 mg, Oral, Daily  furosemide, 40 mg, Oral, Daily  insulin glargine, 15 Units, Subcutaneous, Daily  insulin lispro, 2-9 Units, Subcutaneous, 4x Daily AC & at Bedtime  [START ON 5/25/2024] metoprolol succinate XL, 200 mg, Oral, Q24H  nicotine, 1 patch, Transdermal, Q24H  predniSONE, 20 mg, Oral, Daily With Breakfast  sodium chloride, 500 mL, Intravenous, Once  sodium chloride, 10 mL, Intravenous, Q12H  sodium chloride, 10 mL, Intravenous, Q12H  sodium chloride, 10 mL, Intravenous, Q12H           benzonatate    senna-docusate sodium **AND** polyethylene glycol **AND** bisacodyl **AND** bisacodyl    butalbital-acetaminophen-caffeine    dextrose    dextrose    glucagon (human recombinant)    guaiFENesin-dextromethorphan    ipratropium-albuterol    Magnesium Cardiology Dose Replacement - Follow Nurse / BPA Driven Protocol    Potassium Replacement - Follow Nurse / BPA Driven Protocol    promethazine    [COMPLETED] Insert Peripheral IV **AND** sodium chloride    sodium chloride    sodium chloride    sodium chloride    sodium chloride    sodium chloride    sodium chloride    Assessment    Moderate to severe mitral valve stenosis, rheumatic with moderate mitral regurgitation  Severe pulm hypertension secondary to mitral stenosis  Recurrent pancreatitis  Acute HFpEF secondary to  mitral stenosis            Plan   1.  Echocardiogram with moderate to severe mitral valve stenosis, patient can be followed up as an outpatient was referred to CT surgery as an outpatient as she also has significant mitral valve regurgitation she may need mitral valve replacement  2.  No clinical CHF continue current management  3.  Possible discharge home today if okay with hospitalist service and follow-up with cardiology office in 2 to 4 weeks.          I have discussed the patients findings and recommendations with patient.    Thank you very much for asking us to be involved in this patient's care.  We will follow along with you.    Electronically signed by DIEGO Kang, 05/24/24, 11:26 AM EDT.   Electronically signed by Bess Montgomery MD, 05/24/24, 11:46 AM EDT.                      Please note that portions of this note were completed with a voice recognition program.    Please note that portions of this note were copied and has been reviewed and is accurate as of 5/24/2024 .

## 2024-05-24 NOTE — OUTREACH NOTE
Prep Survey      Flowsheet Row Responses   [facility]   Is LACE score < 7 ? No   Eligibility Readm Mgmt   Discharge diagnosis Acute pancreatitis   Does the patient have one of the following disease processes/diagnoses(primary or secondary)? Other   Does the patient have Home health ordered? No   Is there a DME ordered? No   Prep survey completed? Yes            Julieta CHOI - Registered Nurse

## 2024-05-28 ENCOUNTER — TELEPHONE (OUTPATIENT)
Dept: CARDIAC SURGERY | Facility: CLINIC | Age: 44
End: 2024-05-28
Payer: COMMERCIAL

## 2024-05-28 NOTE — PAYOR COMM NOTE
"DISCHARGE NOTIFICATION.    REFER TO [facility] # [id number]       Aminata Al (43 y.o. Female)       Date of Birth   1980    Social Security Number       Address   [address] [place] [address]    Home Phone   [phone]    MRN   [id number]       Synagogue   None    Marital Status                               Admission Date   5/12/24    Admission Type   Emergency    Admitting Provider   Adin Sauceda MD    Attending Provider       Department, Room/Bed   [address], 3304/2S       Discharge Date   5/24/2024    Discharge Disposition   Home or Self Care    Discharge Destination                                 Attending Provider: (none)   Allergies: Imuran [Azathioprine]    Isolation: None   Infection: None   Code Status: Prior    Ht: 172.7 cm (68\")   Wt: 104 kg (229 lb 9.6 oz)    Admission Cmt: None   Principal Problem: Acute pancreatitis [K85.90]                   Active Insurance as of 5/12/2024       Primary Coverage       Payor Plan Insurance Group Employer/Plan Group    [employer] [place] [employer] [place]ICA[place]        Payor Plan Address Payor Plan Phone Number Payor Plan Fax Number Effective Dates    [address] [address] [phone]  12/2/2016 - None Entered    [facility] [id number]         Subscriber Name Subscriber Birth Date Member ID       AMINATA AL 1980 [id number]                     Emergency Contacts        (Rel.) Home Phone Work Phone Mobile Phone    Itzel Ness (Mother) -- -- [phone]    Tran Yee (Relative) [phone] -- --                 Discharge Summary        Brenton Alves PA-C at 05/24/24 1117       Attestation signed by Kevin Woodard MD at 05/24/24 1636    I have reviewed this documentation and agree.  Continue antibiotics per Infectious Disease, so far serologic workup unrevealing, follow up Infectious Disease outpatient, continue steroids oral, follow up Pulmonary outpatient, discussed with Cardiology " today and will refer to CT surgery [facility] to consider valvuloplasty for mitral valve disease, follow up Cardiology outpatient.                       [facility] HOSPITALIST DISCHARGE SUMMARY    Patient Identification:  Name:  Manda Al  Age:  43 y.o.  Sex:  female  :  1980  MRN:  [id number]  Visit Number:  [id number]    Date of Admission: 2024  Date of Discharge:  24     PCP: Mi Rivera MD    Discharging Provider: Zach Alves PA-C / Dr. Woodard    Discharge Diagnoses     Discharge Diagnoses:  Recurrent fevers, now resolved  Acute HFpEF due to mitral stenosis, now clinically stable  Moderate mitral valve stenosis  Severe pulmonary hypertension, suspect due to above  Moderate mitral valve regurgitation   History of rheumatic fever  Acute respiratory failure with hypoxia, resolved  Concern for multifocal pneumonia, treated for bacterial  Suspected tickborne illness  Acute, recurrent pancreatitis, resolved  Mild hepatocellular transaminitis and thrombocytopenia suspected due to tickborne illness, resolved  T2DM with expected hyperglycemia due to steroids    Secondary Diagnoses:  Anxiety/depression  Schizoaffective disorder  HTN  Crohn's disease    Needs on follow up:  PCP 1 week, ID 1 week, cardiology 2 to 4 weeks, pulmonology 4 weeks, [facility] CT surgery, Dr. Roberts for further evaluation of mitral valve stenosis for possible repair or valvuloplasty   Consults/Procedures     Consults:   Consults       Date and Time Order Name Status Description    2024  7:28 AM Inpatient Cardiology Consult Completed     2024  7:02 AM Inpatient Infectious Diseases Consult Completed     2024  7:02 AM Inpatient Pulmonology Consult      2024  7:30 PM Hospitalist (on-call MD unless specified)              Procedures/Scans Performed:  CT abdomen and pelvis w & w/o contrast x 2  CXR x 5  VQ scan  Bilateral lower extremity venous Doppler US  CT PE protocol   TTE x 2        History of Presenting Illness     Chief Complaint   Patient presents with    Flank Pain       Patient is a 43 y.o. female who presented to [facility] complaining of abdominal pain, nausea, vomiting.  Please see the admitting history and physical for further details.      Hospital Course     Patient was admitted to [facility] on 5/12/2024 complaining of epigastric abdominal pain with associated nausea and vomiting for the past several days.  She reported a history significant for 4 prior episodes of pancreatitis-previously felt due to gallstones and later episodes due to medications.  Workup in the ED revealed lipase 257 and CT abdomen and pelvis consistent with acute pancreatitis.  She was admitted to [facility] for further management-prescribed bowel rest, analgesics, antiemetics, and IV fluid replacement.    From pancreatitis standpoint patient improved symptomatically day over day as expected.  Medications adjusted and tapered as clinically indicated.  Diet resumed and advanced as tolerated without further issue.    Unfortunately on 5/14 patient developed high-grade fever followed by persistent tachycardia and BP AM downtrending.  She was started empirically on vancomycin and Zosyn.  CT of the abdomen and pelvis was repeated and showed resolved pancreatitis with no necrosis or cyst.  Infectious workup was ordered and notable for negative CXR, negative UA, negative blood cultures.  D-dimer was significantly elevated, greater than 20 however VTE workup was unrevealing.  Hepatitis panel, HIV panel, ANCA panel all negative.  On further discussion with the patient she did report she had removed a tick from her person about 1 week prior to admission to the hospital.  As such she was started on doxycycline on 5/16.  Further vancomycin held at that time and tick panel was sent.  Ehrlichia and Rickettsia DNA test were negative.  Unfortunately Lyme total antibody test could not be run due to hemolyzed sample.       Overnight on 5/17 patient developed new hypoxia requiring brief titration up to 4 L per nasal cannula.  CT PE protocol at that time was negative for PE though did concerning for multifocal pneumonia and/or pulmonary edema.  Further fluid replacement was held.  Zosyn and doxycycline were continued as above.  Pulmonology was consulted and agreed with the current antibiotic regimen and added Solu-Medrol as well as one-time dose of Lasix.  Patient had also reported recent black mold exposure and was started on micafungin empirically which was discontinued after fungal panel was negative.  Ultimately with the above regimen patient's respiratory status did quickly improve and has remained stable thereafter.  Serial CXRs performed per pulmonology and improving per their read.  Pulmonology did recommend a follow-up CT chest in 4 to 6 weeks and outpatient follow-up. Continue short course steroids per pulmonology recommendation at discharge.     Infectious disease was consulted and followed for assistance with antimicrobial management.  Once patient clinically improving antibiotic regimen transition to p.o. Augmentin and oral doxycycline to be continued through 5/30/2024.  Patient will follow-up with infectious disease in 1 week.    Given concern for pulmonary edema TTE was obtained to further evaluate and showed significant mitral stenosis-moderate to severe with severe pulmonary hypertension.  proBNP was checked and elevated at nearly 5000.  Cardiology was consulted for further assistance and recommendations.  Berry exam and data consistent with acute HFpEF and began IV diuresis.  Given the degree of severity of mitral valve stenosis cardiology concerned patient would need referral for consideration of valvuloplasty or valve replacement.  As such have titrated patient's metoprolol to target heart rate 50 to 70 bpm and repeated TTE to better evaluate the valve- showed moderately dilated LA cavity, mild bileaflet MV  thickening, diastolic doming of the valve leaflets, and rheumatic changes. Estimated mean gradient was 7 mmHg.Will refer the patient for follow up with Dr. Roberts, [facility] CT surgery for further evaluation for possible repair or valvuloplasty.  Overall with rate control and diuresis patient improved and remaining clinically euvolemic. She was started on PO diuretics and remained stable. Will continue current regimen at DC and have the patient follow up with cardiology in clinic in 2-4 weeks.     Given patient clinically improved and remaining stable on p.o. antibiotic regimen with no further workup planned it was felt she had reached the maximum benefit of the current hospitalization. Of note patient had had expected hyperglycemia during admission due to steroid use. She was advised to resume metformin on DC and call PCP if persistently hyperglycemic. BP has also been well controlled while admitted and will hold chlorthalidone at discharge. Case was discussed with attending physician and agree she is stable for discharge home on this date.  She will be scheduled to follow-up with her PCP in 1 week.  The above discharge and follow-up plan as well as medication changes were discussed with the patient and she expressed agreement understanding.    Discharge Vitals/Physical Examination     Vital Signs:  Temp:  [97.1 °F (36.2 °C)-98.6 °F (37 °C)] 97.1 °F (36.2 °C)  Heart Rate:  [68-85] 70  Resp:  [18-20] 18  BP: ()/(57-77) 101/57  Mean Arterial Pressure (Non-Invasive) for the past 24 hrs (Last 3 readings):   Noninvasive MAP (mmHg)   05/24/24 0702 74   05/24/24 0331 76   05/23/24 2309 90     SpO2 Percentage    05/24/24 0331 05/24/24 0651 05/24/24 0702   SpO2: 94% 98% 96%     SpO2:  [94 %-98 %] 96 %  on   ;   Device (Oxygen Therapy): room air    Body mass index is 34.91 kg/m².  Wt Readings from Last 3 Encounters:   05/24/24 104 kg (229 lb 9.6 oz)   05/07/24 97.1 kg (214 lb)   03/19/22 113 kg (250 lb)         Physical  Exam:  Physical Exam  Vitals and nursing note reviewed.   Constitutional:       General: She is not in acute distress.     Appearance: She is obese.   HENT:      Head: Normocephalic and atraumatic.   Eyes:      Extraocular Movements: Extraocular movements intact.   Cardiovascular:      Rate and Rhythm: Normal rate and regular rhythm.   Pulmonary:      Effort: Pulmonary effort is normal.      Breath sounds: Normal breath sounds.   Abdominal:      Palpations: Abdomen is soft.   Musculoskeletal:      Right lower leg: No edema.      Left lower leg: No edema.   Skin:     General: Skin is warm and dry.   Neurological:      Mental Status: She is alert. Mental status is at baseline.   Psychiatric:         Mood and Affect: Mood normal.         Behavior: Behavior normal.         Pertinent Laboratory/Radiology Results     Pertinent Laboratory Results:  Results from last 7 days   Lab Units 05/21/24  0844   HSTROP T ng/L 6     Results from last 7 days   Lab Units 05/21/24  0844   PROBNP pg/mL 4,838.0*         Results from last 7 days   Lab Units 05/22/24  0054 05/20/24  0209 05/19/24  1613 05/19/24  1321 05/19/24  1015 05/19/24  0730 05/19/24  0031 05/18/24  0021   CRP mg/dL  --   --   --   --   --   --   --  15.07*   LACTATE mmol/L  --   --  3.2* 4.4* 3.2*   < >  --   --    WBC 10*3/mm3 9.55 4.42  --   --   --   --  4.02 4.06   HEMOGLOBIN g/dL 11.0* 9.6*  --   --   --   --  9.4* 9.8*   HEMATOCRIT % 33.2* 28.8*  --   --   --   --  28.5* 29.2*   MCV fL 91.0 91.4  --   --   --   --  92.2 91.0   MCHC g/dL 33.1 33.3  --   --   --   --  33.0 33.6   PLATELETS 10*3/mm3 391 169  --   --   --   --  111* 103*    < > = values in this interval not displayed.     Results from last 7 days   Lab Units 05/23/24  0023 05/22/24  2014 05/22/24  1012 05/22/24  0054 05/21/24  0844 05/20/24  0209 05/19/24  0031 05/18/24  1036 05/18/24  0021   SODIUM mmol/L 140  --   --  139 142 140 138  --  140   POTASSIUM mmol/L 3.8 4.1 3.3* 3.2* 4.0 4.2 4.1   < >  "3.5   CHLORIDE mmol/L 104  --   --  104 113* 111* 110*  --  110*   CO2 mmol/L 24.8  --   --  24.0 15.9* 18.2* 17.7*  --  20.9*   BUN mg/dL 17  --   --  16 17 15 12  --  10   CREATININE mg/dL 0.96  --   --  1.21* 1.11* 1.12* 1.12*  --  1.14*   CALCIUM mg/dL 8.2*  --   --  8.5* 8.2* 8.3* 7.9*  --  7.5*   GLUCOSE mg/dL 177*  --   --  85 205* 320* 294*  --  159*   ALBUMIN g/dL  --   --   --   --   --  2.7* 2.5*  --  2.3*   BILIRUBIN mg/dL  --   --   --   --   --  0.4 0.5  --  0.5   ALK PHOS U/L  --   --   --   --   --  79 85  --  73   AST (SGOT) U/L  --   --   --   --   --  20 30  --  55*   ALT (SGPT) U/L  --   --   --   --   --  24 30  --  36*    < > = values in this interval not displayed.   Estimated Creatinine Clearance: 96.3 mL/min (by C-G formula based on SCr of 0.96 mg/dL).  No results found for: \"AMMONIA\"    Glucose   Date/Time Value Ref Range Status   05/24/2024 1133 174 (H) 70 - 130 mg/dL Final   05/24/2024 0727 85 70 - 130 mg/dL Final   05/23/2024 2116 229 (H) 70 - 130 mg/dL Final   05/23/2024 1604 281 (H) 70 - 130 mg/dL Final   05/23/2024 0645 98 70 - 130 mg/dL Final   05/22/2024 1908 290 (H) 70 - 130 mg/dL Final   05/22/2024 1726 260 (H) 70 - 130 mg/dL Final   05/22/2024 1628 245 (H) 70 - 130 mg/dL Final     Lab Results   Component Value Date    HGBA1C 6.90 (H) 05/12/2024     Lab Results   Component Value Date    TSH 1.840 05/16/2024       Blood Culture   Date Value Ref Range Status   05/18/2024 No growth at 5 days  Final   05/18/2024 No growth at 5 days  Final     No results found for: \"URINECX\"  No results found for: \"WOUNDCX\"  No results found for: \"STOOLCX\"  No results found for: \"RESPCX\"  Pain Management Panel  More data exists         Latest Ref Rng & Units 8/11/2020 5/9/2017   Pain Management Panel   Amphetamine, Urine Qual Negative Positive  Negative    Barbiturates Screen, Urine Negative Negative  Negative    Benzodiazepine Screen, Urine Negative Negative  Negative    Buprenorphine, Screen, Urine " Negative Negative  Negative    Cocaine Screen, Urine Negative Negative  Negative    Methadone Screen , Urine Negative Negative  Negative        Pertinent Radiology Results:  Imaging Results (All)       Procedure Component Value Units Date/Time    XR Chest 1 View [[id number]] Collected: 05/22/24 1343     Updated: 05/22/24 1346    Narrative:      PROCEDURE: XR CHEST 1 VW-       HISTORY: sob; K85.90-Acute pancreatitis without necrosis or infection,  unspecified     COMPARISON: 5/20/2024.     FINDINGS: The heart is normal in size. The mediastinum is unremarkable.  There are low lung volumes with patchy bilateral airspace disease. There  is blunting of the costophrenic angles consistent with small effusions.  There is no pneumothorax. There are no acute osseous abnormalities.       Impression:      Patchy bilateral airspace disease and small effusions  similar in appearance to the prior exam.        This report was finalized on 5/22/2024 1:44 PM by Dmitriy Sam M.D..       XR Chest 1 View [[id number]] Collected: 05/20/24 0831     Updated: 05/20/24 0834    Narrative:      VERIFICATION OBSERVER NAME: Uday Hansen MD.     TECHNIQUE, HISTORY, FINDINGS:      Comparison: 5/18/2024.     History and Findings: Pleural effusion.     Bilateral interstitial infiltrates, slightly diminished on the RIGHT,  unchanged on the LEFT.  Trace LEFT pleural effusion.  No RIGHT pleural effusion.  Minimal cardiac enlargement.  Central airways are patent.       Impression:      Trace LEFT pleural effusion, perhaps slightly more prominent than prior.              [id number]     Zip code: [id number]                 This report was finalized on 5/20/2024 8:31 AM by Dr. Uday Hansen MD.       CT Angiogram Chest Pulmonary Embolism [[id number]] Collected: 05/18/24 0814     Updated: 05/18/24 0819    Narrative:      VERIFICATION OBSERVER NAME: Uday Hansen MD.     Technique: Axial images were obtained along with coronal and sagittal  reconstruction. Mip  images were generated.   Patient was injected with contrast.   DLP in mGycm and contrast amount and type are reported in the EMR  record.. Dose lowering technique: Automated exposure control. Data  included in the medical records.      HISTORY/COMPARISON/FINDINGS:     Comparison: Same day chest x-ray.     History and Findings: Hypoxia and tachycardia.     No evidence of PE.  No evidence of aortic aneurysm.  Moderate bilateral pleural effusion.  Extensive bilateral infiltrates.  Visualized portion of upper abdomen  appeared unremarkable.       Impression:      No evidence of PE.  Extensive bilateral infiltrates.  Multifocal pneumonia versus pulmonary  edema.              [email or web address]     ZIP Code [id number].              This report was finalized on 5/18/2024 8:17 AM by Dr. Uday Hansen MD.       XR Chest 1 View [[id number]] Collected: 05/18/24 0810     Updated: 05/18/24 0814    Narrative:      VERIFICATION OBSERVER NAME: Uday Hansen MD.     TECHNIQUE, HISTORY, FINDINGS:      Comparison: 5/16/2024.     History and Findings: Hypoxia.     Single view revealed minimal cardiac enlargement.  Bilateral infiltrates  likely related to multifocal pneumonia.  This is a new finding from  previous study at least in the RIGHT lung.  No pleural effusion.  Central airways are patent.       Impression:      Development of infiltrates RIGHT lung.  New from prior.  Consistent with  multifocal pneumonia              [facility]     Zip code: [id number]                 This report was finalized on 5/18/2024 8:12 AM by Dr. Uday Hansen MD.       XR Chest 1 View [[id number]] Collected: 05/16/24 2118     Updated: 05/16/24 2121    Narrative:      PROCEDURE: Portable chest x-ray examination performed on May 16, 2024.  Single view. Upright position.     HISTORY: Shortness of breath.     COMPARISON: None.     FINDINGS:     Normal heart size  Mild central pulmonary vascular congestion.  Small bands of scar versus atelectasis at the lung bases.  No lobar  consolidation or edema.  No pleural effusion or pneumothorax.  Mild hypoinflated lungs.  Mild dextroconvex curve at the mid thoracic spine.       Impression:         1.  Normal heart size.  2.  Mild central pulmonary vascular congestion.  3.  Small bands of scar versus atelectasis at the lung bases.  4.  No edema or pneumonia. No pleural effusion or pneumothorax.     This report was finalized on 5/16/2024 9:19 PM by Khadar Marie MD.       NM Lung Ventilation Perfusion [[id number]] Collected: 05/16/24 1440     Updated: 05/16/24 1443    Narrative:      EXAM:    NM Lung Perfusion and Ventilation Scan     EXAM DATE:    5/16/2024 1:46 PM     CLINICAL HISTORY:    r/o PE; K85.90-Acute pancreatitis without necrosis or infection,  unspecified     TECHNIQUE:    Nuclear Medicine ventilation and perfusion images of the lungs were  obtained in multiple projections following radiopharmaceutical  inhalation followed by injection of Tc99m MAA.     COMPARISON:    No relevant prior studies available.     FINDINGS:    Ventilation:  Unremarkable as visualized.  No ventilation defects.    Perfusion:  Unremarkable as visualized.  No perfusion defects.       Impression:      Low probability of PE.        This report was finalized on 5/16/2024 2:41 PM by Dr. Juan Carlos Smith MD.       US Venous Doppler Lower Extremity Bilateral (duplex) [[id number]] Collected: 05/16/24 1249     Updated: 05/16/24 1252    Narrative:      EXAMINATION: US VENOUS DOPPLER LOWER EXTREMITY BILATERAL (DUPLEX)-      CLINICAL INDICATION:  r/o DVT; K85.90-Acute pancreatitis without  necrosis or infection, unspecified     TECHNIQUE: Multiplanar gray scale and Doppler vascular sonographic  imaging of the deep veins of  BILATERAL   lower extremity, without and  with compression.      COMPARISON: NONE      FINDINGS:   Deep Veins: The visualized deep veins demonstrate flow and are  compressible. No evidence of deep venous thrombosis.   Superficial veins: Unremarkable.  Saphenofemoral junction is patent  without thrombus.  Soft tissues: Soft tissue edema is noted.       Impression:      No DVT.     This report was finalized on 5/16/2024 12:50 PM by Dr. Juan Carlos Smith MD.       CT Abdomen Pelvis With & Without Contrast [[id number]] Collected: 05/16/24 0839     Updated: 05/16/24 0903    Narrative:      EXAM:    CT Abdomen and Pelvis Without and With Intravenous Contrast     EXAM DATE:    5/16/2024 8:05 AM     CLINICAL HISTORY:    abdominal pain, SIRS, pancreatitis; K85.90-Acute pancreatitis without  necrosis or infection, unspecified     TECHNIQUE:    Axial computed tomography images of the abdomen and pelvis without and  with intravenous contrast.  Sagittal and coronal reformatted images were  created and reviewed.  This CT exam was performed using one or more of  the following dose reduction techniques:  automated exposure control,  adjustment of the mA and/or kV according to patient size, and/or use of  iterative reconstruction technique.     COMPARISON:    5/12/2024     FINDINGS:    Lung bases:  Unremarkable as visualized.  No mass.  No consolidation.      ABDOMEN:    Liver:  Diffuse fatty liver.    Gallbladder and bile ducts:  Cholecystectomy.  No ductal dilation.    Pancreas:  Previously described subtle inflammation near the head of  the pancreas has essentially resolved on CT.  No ductal dilation.  No  pancreatic pseudocyst.    Spleen:  Unremarkable as visualized.  No splenomegaly.    Adrenals:  Unremarkable as visualized.  No mass.    Kidneys and ureters:  Bilateral renal cortical scarring record of the  left kidneys.  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 No hydronephrosis.    Stomach and bowel:  Scattered air-fluid levels throughout nondilated  small and large intestine which is nonspecific. No bowel obstruction.   No mucosal thickening.      PELVIS:    Appendix:  No findings to suggest acute appendicitis.    Bladder:   Distended urinary bladder without wall thickening.  No  stones.    Reproductive:  Unremarkable as visualized.      ABDOMEN and PELVIS:    Intraperitoneal space:  Unremarkable as visualized.  No free air.  No  ascites or abscess.    Bones/joints:  Degenerative changes lower lumbar spine.  No acute  fracture.  No dislocation.    Soft tissues:  Tubulization clips noted.    Vasculature:  Unremarkable as visualized.  No abdominal aortic  aneurysm.    Lymph nodes:  Unremarkable as visualized.  No enlarged lymph nodes.       Impression:      1.  No inflammatory changes identified involving the pancreas on current  exam.  2.  No pancreatic pseudocyst.  3.  No ascites or free air.  4.  Fatty liver.  5.  Cholecystectomy.  6.  Nonspecific but nonobstructive bowel gas pattern.  7.  Mild volume overload changes with cardiomegaly, interstitial edema,  and trace effusions.  8.  Otherwise stable exam with other nonacute findings as detailed  above.        This report was finalized on 5/16/2024 9:01 AM by Dr. Juan Carlos Smith MD.       XR Chest 1 View [[id number]] Collected: 05/15/24 0941     Updated: 05/15/24 0943    Narrative:      EXAM:    XR Chest, 1 View     EXAM DATE:    5/15/2024 8:43 AM     CLINICAL HISTORY:    fever; K85.90-Acute pancreatitis without necrosis or infection,  unspecified     TECHNIQUE:    Frontal view of the chest.     COMPARISON:    No relevant prior studies available.     FINDINGS:    Lungs and pleural spaces:  Unremarkable as visualized.  No  consolidation.  No pneumothorax.    Heart:  Unremarkable as visualized.  No cardiomegaly.    Mediastinum:  Unremarkable as visualized.  Normal mediastinal contour.    Bones/joints:  Unremarkable as visualized.  No acute fracture.       Impression:        Unremarkable exam. No acute cardiopulmonary findings identified.        This report was finalized on 5/15/2024 9:41 AM by Dr. Juan Carlos Smith MD.       CT Abdomen Pelvis With & Without Contrast [[id number]] Collected:  05/12/24 1811     Updated: 05/12/24 1816    Narrative:      Procedure: Helical CT Abdomen and Pelvis examination performed with  axial sections prior to and after administration of non-ionic IV  contrast. Coronal and sagittal 3 mm thick reformats also acquired. CT  scan performed according to ALARA(as low as reasonably achievable)dose  protocol.     Comparison: None available.     History: flank pain; LLQ     Date: 5/12/2024 5:17 PM     Findings:     Liver: Normal liver and size.  No focal lesion.  Gallbladder: Surgically absent.   Spleen: Normal in size.  Pancreas: Atrophy. Mild surrounding edema.  Adrenal glands: No nodules.  Kidneys: Symmetric bilateral renal enhancement is present. No  hydronephrosis.  Peritoneum: There is no free air or abscess.  Bowel: No obstruction. No evidence of inflamed bowel loops. Appendix is  normal.  Mesentery: No adenopathy.  Retroperitoneum: Aorta and inferior vena cava unremarkable.  Pelvis: Bladder is unremarkable.  Bones: No acute fracture.  Lung bases: Visualized lung bases are negative. Heart is normal in size.       Impression:         Findings may represent acute pancreatitis. Correlation with lab values.      This report was finalized on 5/12/2024 6:14 PM by Titus Martinez MD.               Discharge Disposition/Discharge Medications/Discharge Appointments     Discharge Disposition:   Home or Self Care    Condition at Discharge:  Stable    Discharge Medications:       Your medication list        START taking these medications        Instructions Last Dose Given Next Dose Due   amoxicillin-clavulanate 875-125 MG per tablet  Commonly known as: AUGMENTIN      Take 1 tablet by mouth Every 12 (Twelve) Hours for 14 doses. Indications: Pneumonia       doxycycline 100 MG capsule  Commonly known as: MONODOX      Take 1 capsule by mouth Every 12 (Twelve) Hours for 14 doses. Indications: Pneumonia, Tickborne illness       furosemide 40 MG tablet  Commonly known as: LASIX  Start taking  on: May 25, 2024      Take 1 tablet by mouth Daily for 30 days.       predniSONE 20 MG tablet  Commonly known as: DELTASONE  Start taking on: May 25, 2024      Take 1 tablet by mouth Daily With Breakfast for 2 doses.              CONTINUE taking these medications        Instructions Last Dose Given Next Dose Due   fexofenadine 180 MG tablet  Commonly known as: ALLEGRA      Take 1 tablet by mouth Daily.       FLUoxetine 10 MG capsule  Commonly known as: PROzac      Take 1 capsule by mouth Daily.       magnesium oxide 400 MG tablet  Commonly known as: MAG-OX      Take 1 tablet by mouth Daily.       metFORMIN  MG 24 hr tablet  Commonly known as: GLUCOPHAGE-XR      Take 2 tablets by mouth 2 (Two) Times a Day.       Toprol  MG 24 hr tablet  Generic drug: metoprolol succinate XL      Take 1 tablet by mouth Daily.       Vraylar 3 MG capsule capsule  Generic drug: Cariprazine HCl      Take 1 capsule by mouth Daily.              STOP taking these medications      chlorthalidone 25 MG tablet  Commonly known as: HYGROTON                  Where to Get Your Medications        These medications were sent to [address] - [phone] [place] [id number] [address] [facility] [address]      Phone: [phone]   amoxicillin-clavulanate 875-125 MG per tablet  doxycycline 100 MG capsule  furosemide 40 MG tablet  predniSONE 20 MG tablet          Discharge Diet:  ad boris    Discharge Activity:  ad boris      Time spent on this discharge exceeded 30 minutes.      Electronically signed by Kevin Woodard MD at 05/24/24 [id number]

## 2024-05-28 NOTE — TELEPHONE ENCOUNTER
Left voicemail with pt to confirm appt and that she will be seeing one of our APRNs with the practice this day.    HUB: if phone call is returned make they s/w either Ele or front staff

## 2024-05-29 ENCOUNTER — HOSPITAL ENCOUNTER (OUTPATIENT)
Dept: GENERAL RADIOLOGY | Facility: HOSPITAL | Age: 44
Discharge: HOME | End: 2024-05-29
Admitting: NURSE PRACTITIONER
Payer: COMMERCIAL

## 2024-05-29 ENCOUNTER — OFFICE VISIT (OUTPATIENT)
Dept: INFECTIOUS DISEASES | Facility: CLINIC | Age: 44
End: 2024-05-29
Payer: COMMERCIAL

## 2024-05-29 ENCOUNTER — READMISSION MANAGEMENT (OUTPATIENT)
Dept: CALL CENTER | Facility: HOSPITAL | Age: 44
End: 2024-05-29
Payer: COMMERCIAL

## 2024-05-29 VITALS
WEIGHT: 199.6 LBS | SYSTOLIC BLOOD PRESSURE: 124 MMHG | HEART RATE: 74 BPM | BODY MASS INDEX: 30.25 KG/M2 | HEIGHT: 68 IN | DIASTOLIC BLOOD PRESSURE: 84 MMHG | OXYGEN SATURATION: 98 %

## 2024-05-29 DIAGNOSIS — J18.9 PNEUMONIA OF BOTH LUNGS DUE TO INFECTIOUS ORGANISM, UNSPECIFIED PART OF LUNG: Primary | ICD-10-CM

## 2024-05-29 DIAGNOSIS — J18.9 PNEUMONIA OF BOTH LUNGS DUE TO INFECTIOUS ORGANISM, UNSPECIFIED PART OF LUNG: ICD-10-CM

## 2024-05-29 LAB
BASOPHILS # BLD AUTO: 0.22 10*3/MM3 (ref 0–0.2)
BASOPHILS NFR BLD AUTO: 1.4 % (ref 0–1.5)
CRP SERPL-MCNC: <0.3 MG/DL (ref 0–0.5)
DEPRECATED RDW RBC AUTO: 43.2 FL (ref 37–54)
EOSINOPHIL # BLD AUTO: 0.2 10*3/MM3 (ref 0–0.4)
EOSINOPHIL NFR BLD AUTO: 1.3 % (ref 0.3–6.2)
ERYTHROCYTE [DISTWIDTH] IN BLOOD BY AUTOMATED COUNT: 13 % (ref 12.3–15.4)
HCT VFR BLD AUTO: 45.1 % (ref 34–46.6)
HGB BLD-MCNC: 14.9 G/DL (ref 12–15.9)
IMM GRANULOCYTES # BLD AUTO: 0.07 10*3/MM3 (ref 0–0.05)
IMM GRANULOCYTES NFR BLD AUTO: 0.5 % (ref 0–0.5)
LYMPHOCYTES # BLD AUTO: 6.65 10*3/MM3 (ref 0.7–3.1)
LYMPHOCYTES NFR BLD AUTO: 43 % (ref 19.6–45.3)
MCH RBC QN AUTO: 30.2 PG (ref 26.6–33)
MCHC RBC AUTO-ENTMCNC: 33 G/DL (ref 31.5–35.7)
MCV RBC AUTO: 91.3 FL (ref 79–97)
MONOCYTES # BLD AUTO: 0.93 10*3/MM3 (ref 0.1–0.9)
MONOCYTES NFR BLD AUTO: 6 % (ref 5–12)
NEUTROPHILS NFR BLD AUTO: 47.8 % (ref 42.7–76)
NEUTROPHILS NFR BLD AUTO: 7.4 10*3/MM3 (ref 1.7–7)
NRBC BLD AUTO-RTO: 0 /100 WBC (ref 0–0.2)
PLATELET # BLD AUTO: 553 10*3/MM3 (ref 140–450)
PMV BLD AUTO: 9.9 FL (ref 6–12)
RBC # BLD AUTO: 4.94 10*6/MM3 (ref 3.77–5.28)
WBC NRBC COR # BLD AUTO: 15.47 10*3/MM3 (ref 3.4–10.8)

## 2024-05-29 PROCEDURE — 1160F RVW MEDS BY RX/DR IN RCRD: CPT | Performed by: NURSE PRACTITIONER

## 2024-05-29 PROCEDURE — 1159F MED LIST DOCD IN RCRD: CPT | Performed by: NURSE PRACTITIONER

## 2024-05-29 PROCEDURE — 86140 C-REACTIVE PROTEIN: CPT | Performed by: NURSE PRACTITIONER

## 2024-05-29 PROCEDURE — 99213 OFFICE O/P EST LOW 20 MIN: CPT | Performed by: NURSE PRACTITIONER

## 2024-05-29 PROCEDURE — 71045 X-RAY EXAM CHEST 1 VIEW: CPT

## 2024-05-29 PROCEDURE — 85025 COMPLETE CBC W/AUTO DIFF WBC: CPT | Performed by: NURSE PRACTITIONER

## 2024-05-29 NOTE — OUTREACH NOTE
Medical Week 1 Survey      Flowsheet Row Responses   [facility] patient discharged from? [facility]   Does the patient have one of the following disease processes/diagnoses(primary or secondary)? Other   Week 1 attempt successful? Yes   Call start time 1036   Call end time 1044   Discharge diagnosis Acute pancreatitis   Meds reviewed with patient/caregiver? Yes   Is the patient having any side effects they believe may be caused by any medication additions or changes? No   Does the patient have all medications ordered at discharge? Yes   Is the patient taking all medications as directed (includes completed medication regime)? No   What is preventing the patient from taking all medications as directed? Desires to consult PCP first   Nursing Interventions Nurse provided patient education  [Pt decided to hold insulin until her f/u with PCP as she reports she is no longer taking steroids, noted that she had A1C completed during her admission also which resulted 6.90, encouraged to discuss with PCP]   Comments regarding appointments Pt seen ID MD today 5/29/24   Does the patient have a primary care provider?  Yes   Does the patient have an appointment with their PCP within 7 days of discharge? Greater than 7 days   Nursing Interventions Verified appointment date/time/provider  [5/31/24]   Has the patient kept scheduled appointments due by today? Yes   Comments Cardio 6/3/24   Did the patient receive a copy of their discharge instructions? Yes   Nursing interventions Reviewed instructions with patient   What is the patient's perception of their health status since discharge? Same  [Reports she is still very weak, denies any further issues with s/s pancreatitis and continues with bland diet.  Denies resp type s/s.  Pt has no questions today.]   Week 1 call completed? Yes   Call end time 1044            RYLEE WALTON - Registered Nurse

## 2024-05-29 NOTE — PROGRESS NOTES
[facility]         Referring Provider: No referring provider defined for this encounter.    Subjective      Chief Complaint  hospital f/u (pneumonia)    History of Present Illness  Manda Al is a 43 y.o. female who presents today to [facility] for Hospital Follow Up . Past medical history is significant for hospitalization at [facility] for recurrent high-grade fever, pneumonia, suspicion of tickborne illness.  Aspergillus/galactomannan normal at 0.05.  Fungitell negative at less than 31.25. Ehrlichia PCR negative.  Rickettsial PCR negative.  Patient was de-escalated to Augmentin and oral doxycycline to continue through 2024.    Interval history:    2024: Patient overall doing well however does endorse nausea and 2 episodes of vomiting.  Denies diarrhea or abdominal pain.  Denies shortness of breath or cough.  Denies fever, chills, body aches.    Past Medical History:   Diagnosis Date    (HFpEF) heart failure with preserved ejection fraction     Anxiety     Asthma     Bipolar disorder     Crohn disease     Depression     Hypertension     Moderate mitral stenosis     Schizoaffective disorder     Severe pulmonary hypertension        Past Surgical History:   Procedure Laterality Date    APPENDECTOMY       SECTION      CHOLECYSTECTOMY      COLON SURGERY      COLONOSCOPY      MANDIBLE FRACTURE SURGERY      OVARIAN CYST SURGERY      TUBAL ABDOMINAL LIGATION         Social History     Socioeconomic History    Marital status:    Tobacco Use    Smoking status: Every Day     Current packs/day: 1.00     Average packs/day: 1 pack/day for 20.0 years (20.0 ttl pk-yrs)     Types: Cigarettes    Smokeless tobacco: Never   Vaping Use    Vaping status: Never Used   Substance and Sexual Activity    Alcohol use: No     Comment: denies    Drug use: Yes     Types: Marijuana    Sexual activity: Never       Family History  family history  includes Anxiety disorder in her mother; Bipolar disorder in her mother; COPD in her father; Depression in her mother; Diabetes in her mother; Schizophrenia in her father and paternal grandfather.    Immunization History   Administered Date(s) Administered    Influenza Quad Vaccine (Inpatient) 11/09/2023        Allergies  Allergies   Allergen Reactions    Imuran [Azathioprine] Other (See Comments)     Pt reports this medication caused her to have pancreatitis.        The medication list has been reviewed and updated.   Current Medications    Current Outpatient Medications:     Alcohol Swabs (Alcohol Pads) 70 % pads, Apply one alcohol swab to injection site of skin immediately prior to insulin injection. Formulary Compliance Approval., Disp: 100 each, Rfl: 12    amoxicillin-clavulanate (AUGMENTIN) 875-125 MG per tablet, Take 1 tablet by mouth Every 12 (Twelve) Hours for 14 doses. Indications: Pneumonia, Disp: 14 tablet, Rfl: 0    Cariprazine HCl (Vraylar) 3 MG capsule capsule, Take 1 capsule by mouth Daily., Disp: , Rfl:     doxycycline (MONODOX) 100 MG capsule, Take 1 capsule by mouth Every 12 (Twelve) Hours for 14 doses. Indications: Pneumonia, Tickborne illness, Disp: 14 capsule, Rfl: 0    fexofenadine (ALLEGRA) 180 MG tablet, Take 1 tablet by mouth Daily., Disp: , Rfl:     FLUoxetine (PROzac) 10 MG capsule, Take 1 capsule by mouth Daily., Disp: , Rfl:     furosemide (LASIX) 40 MG tablet, Take 1 tablet by mouth Daily for 30 days., Disp: 30 tablet, Rfl: 0    glucose blood test strip, Use to test blood glucose daily as needed. Formulary Compliance Approval. Diagnosis: Type 2 Diabetes - Insulin Dependent, Disp: 25 each, Rfl: 0    glucose monitor monitoring kit, Use to test blood glucose daily, Disp: 1 each, Rfl: 0    Insulin Glargine (Lantus SoloStar) 100 UNIT/ML injection pen, Inject 15 Units under the skin into the appropriate area as directed Every Night. Formulary Compliance Approval, Disp: 15 mL, Rfl: 0     "Insulin Lispro (humaLOG) 100 UNIT/ML injection, Inject 2-9 Units under the skin into the appropriate area as directed 4 (Four) Times a Day After Meals & at Bedtime., Disp: 1080 Units, Rfl: 0    Insulin Pen Needle (Pen Needles) 31G X 8 MM misc, Use 1 each Daily., Disp: 100 each, Rfl: 0    Lancets misc, Use to test blood glucose  daily as needed. Formulary Compliance Approval. Diagnosis: Type 2 Diabetes - Insulin Dependent, Disp: 100 each, Rfl: 0    magnesium oxide (MAG-OX) 400 MG tablet, Take 1 tablet by mouth Daily., Disp: , Rfl:     metFORMIN ER (GLUCOPHAGE-XR) 500 MG 24 hr tablet, Take 2 tablets by mouth 2 (Two) Times a Day., Disp: , Rfl:     metoprolol succinate XL (Toprol XL) 200 MG 24 hr tablet, Take 1 tablet by mouth Daily., Disp: , Rfl:       Review of Systems    Review of Systems   Constitutional: Negative.    HENT: Negative.     Eyes: Negative.    Respiratory: Negative.     Cardiovascular: Negative.    Gastrointestinal:  Positive for nausea and vomiting.   Endocrine: Negative.    Genitourinary: Negative.    Musculoskeletal: Negative.    Skin: Negative.    Allergic/Immunologic: Negative.    Neurological: Negative.    Hematological: Negative.    Psychiatric/Behavioral: Negative.          Objective     Vital Signs:  /84 (BP Location: Right arm, Patient Position: Sitting, Cuff Size: Small Adult)   Pulse 74   Ht 172.7 cm (68\")   Wt 90.5 kg (199 lb 9.6 oz)   SpO2 98%   BMI 30.35 kg/m²   Estimated body mass index is 30.35 kg/m² as calculated from the following:    Height as of this encounter: 172.7 cm (68\").    Weight as of this encounter: 90.5 kg (199 lb 9.6 oz).    Physical Exam  Constitutional:       General: She is not in acute distress.     Appearance: Normal appearance.   HENT:      Head: Normocephalic.      Nose: Nose normal.      Mouth/Throat:      Mouth: Mucous membranes are moist.   Eyes:      Pupils: Pupils are equal, round, and reactive to light.   Cardiovascular:      Rate and Rhythm: " "Normal rate and regular rhythm.      Pulses: Normal pulses.      Heart sounds: Normal heart sounds. No murmur heard.  Pulmonary:      Effort: Pulmonary effort is normal. No respiratory distress.      Breath sounds: Normal breath sounds. No stridor. No wheezing, rhonchi or rales.   Abdominal:      General: Bowel sounds are normal. There is no distension.      Tenderness: There is no abdominal tenderness.   Musculoskeletal:         General: Normal range of motion.      Cervical back: Normal range of motion.   Skin:     General: Skin is dry.      Capillary Refill: Capillary refill takes less than 2 seconds.   Neurological:      General: No focal deficit present.      Mental Status: She is alert and oriented to person, place, and time. Mental status is at baseline.   Psychiatric:         Mood and Affect: Mood normal.         Behavior: Behavior normal.         Thought Content: Thought content normal.          Result Review :  The following data was reviewed by DIEGO Hernandez     Lab Results  Lab Results   Component Value Date    WBC 9.55 05/22/2024    HGB 11.0 (L) 05/22/2024    HCT 33.2 (L) 05/22/2024    MCV 91.0 05/22/2024     05/22/2024     Lab Results   Component Value Date    GLUCOSE 177 (H) 05/23/2024    BUN 17 05/23/2024    CREATININE 0.96 05/23/2024    EGFRIFNONA 93 08/17/2020    BCR 17.7 05/23/2024    K 3.8 05/23/2024    CO2 24.8 05/23/2024    CALCIUM 8.2 (L) 05/23/2024    ALBUMIN 2.7 (L) 05/20/2024    LABIL2 1.4 (L) 07/06/2015    AST 20 05/20/2024    ALT 24 05/20/2024      Lab Results   Component Value Date    CRP 15.07 (H) 05/18/2024        No results found for: \"ACANTHNAEG\", \"AFBCX\", \"BPERTUSSISCX\", \"BLOODCX\"  No results found for: \"BCIDPCR\", \"CXREFLEX\", \"CSFCX\", \"CULTURETIS\"  No results found for: \"CULTURES\", \"HSVCX\", \"URCX\"  No results found for: \"EYECULTURE\", \"GCCX\", \"HSVCULTURE\", \"LABHSV\"  No results found for: \"LEGIONELLA\", \"MRSACX\", \"MUMPSCX\", \"MYCOPLASCX\"  No results found for: \"NOCARDIACX\", " "\"STOOLCX\"  No results found for: \"THROATCX\", \"UNSTIMCULT\", \"URINECX\", \"CULTURE\", \"VZVCULTUR\"  No results found for: \"VIRALCULTU\", \"WOUNDCX\"    Radiology Results  XR Chest 1 View    Result Date: 5/22/2024  Impression: Patchy bilateral airspace disease and small effusions similar in appearance to the prior exam.   This report was finalized on 5/22/2024 1:44 PM by Dmitriy Sam M.D..      XR Chest 1 View    Result Date: 5/20/2024  Impression: Trace LEFT pleural effusion, perhaps slightly more prominent than prior.     [id number]  Zip code: [id number]      This report was finalized on 5/20/2024 8:31 AM by Dr. Uday Hansen MD.      CT Angiogram Chest Pulmonary Embolism    Result Date: 5/18/2024  Impression: No evidence of PE. Extensive bilateral infiltrates.  Multifocal pneumonia versus pulmonary edema.     [id number]  ZIP Code [id number].     This report was finalized on 5/18/2024 8:17 AM by Dr. Uday Hansen MD.      XR Chest 1 View    Result Date: 5/18/2024  Impression: Development of infiltrates RIGHT lung.  New from prior.  Consistent with multifocal pneumonia     [id number]  Zip code: [id number]      This report was finalized on 5/18/2024 8:12 AM by Dr. Uday Hansen MD.      XR Chest 1 View    Result Date: 5/16/2024  Impression:  1.  Normal heart size. 2.  Mild central pulmonary vascular congestion. 3.  Small bands of scar versus atelectasis at the lung bases. 4.  No edema or pneumonia. No pleural effusion or pneumothorax.  This report was finalized on 5/16/2024 9:19 PM by Khadar Marie MD.      NM Lung Ventilation Perfusion    Result Date: 5/16/2024  Impression: Low probability of PE.   This report was finalized on 5/16/2024 2:41 PM by Dr. Juan Carlos Smith MD.      US Venous Doppler Lower Extremity Bilateral (duplex)    Result Date: 5/16/2024  Impression: No DVT.  This report was finalized on 5/16/2024 12:50 PM by Dr. Juan Carlos Smith MD.      CT Abdomen Pelvis With & Without Contrast    Result Date: 5/16/2024  Impression: 1.  " No inflammatory changes identified involving the pancreas on current exam. 2.  No pancreatic pseudocyst. 3.  No ascites or free air. 4.  Fatty liver. 5.  Cholecystectomy. 6.  Nonspecific but nonobstructive bowel gas pattern. 7.  Mild volume overload changes with cardiomegaly, interstitial edema, and trace effusions. 8.  Otherwise stable exam with other nonacute findings as detailed above.   This report was finalized on 5/16/2024 9:01 AM by Dr. Juan Carlos Smith MD.      XR Chest 1 View    Result Date: 5/15/2024  Impression:   Unremarkable exam. No acute cardiopulmonary findings identified.   This report was finalized on 5/15/2024 9:41 AM by Dr. Juan Carlos Smith MD.      CT Abdomen Pelvis With & Without Contrast    Result Date: 5/12/2024  Impression:  Findings may represent acute pancreatitis. Correlation with lab values.  This report was finalized on 5/12/2024 6:14 PM by Titus Martinez MD.      CT Abdomen Pelvis Without Contrast    Result Date: 5/8/2024  Impression:  1.  Mild enteritis and mild mesenteric adenitis. 2.  Postoperative changes to bowel segments in the right lower quadrant. 3.  Appendectomy. 4.  Bilateral adnexal clips. 5.  Cholecystectomy. 6.  No free fluid or free air. 7.  No abscess or hematoma.  This report was finalized on 5/8/2024 12:59 AM by Khadar Marie MD.              Assessment / Plan        Diagnoses and all orders for this visit:    1. Pneumonia of both lungs due to infectious organism, unspecified part of lung (Primary)  -     CBC & Differential; Future  -     C-reactive Protein; Future  -     XR Chest 1 View; Future  -     CBC & Differential  -     C-reactive Protein      CBC and CRP ordered for today.  Repeat chest x-ray to be completed today.  Patient to be notified of any concerning laboratory values or chest x-ray results.  Patient to stop doxycycline as likely this is the source of her nausea and vomiting episodes, this could be discontinued at this point due to negative Rickettsia  serologies.  Patient overall stable from ID standpoint.  Patient to follow-up as needed.  Patient agreeable to this plan.          Follow Up   Return if symptoms worsen or fail to improve.    Visit Diagnoses:    ICD-10-CM ICD-9-CM   1. Pneumonia of both lungs due to infectious organism, unspecified part of lung  J18.9 483.8       Patient was given instructions and counseling regarding her condition or for health maintenance advice. Please see specific information pulled into the AVS if appropriate.     This document has been electronically signed by DIEGO Hernandez   May 29, 2024 09:13 EDT    Dictated Utilizing Dragon Dictation: Part of this note may be an electronic transcription/translation of spoken language to printed text using the Dragon Dictation System.

## 2024-05-29 NOTE — PROGRESS NOTES
[facility]         Referring Provider: No referring provider defined for this encounter.    Subjective      Chief Complaint  No chief complaint on file.    History of Present Illness  Manda Al is a 43 y.o. female who presents today to [facility] for Hospital Follow Up . Past medical history significant for anxiety, depression, bipolar disorder, asthma, Crohn disease, HTN, schizoaffective disorder, recurrent episodes of pancreatitis, and type II DM, who presented to [facility] Emergency Department on 5/12/2024 for epigastric abdominal pain, nausea and vomiting x 1 week.  The patient reported 4 episodes of pancreatitis in the past, the first time due to gallstones, the second and third time due to medication and the fourth time no source was discovered.  The patient initially presented to the ED on 5/7 with above symptoms.  Had been recently diagnosed with UTI and started on Bactrim by PCP.  Concerned she may have pancreatitis again.  Lipase at that time was only 120 and CT reportedly showed mild enteritis and mesenteric adenitis without evidence of pancreatitis.  She received IV and oral potassium replacement and was discharged without any prescriptions.  The patient reported worsening epigastric abdominal pain as well as worsening nausea and vomiting since that time.  The patient reported the pain as gnawing and radiating to both sides of her upper abdomen as well as lower back.The patient reported urinary symptoms resolved since completion of antibiotic.     The patient became febrile on 5/15 and repeat blood cultures show no growth to date.  Upon further discussion the patient reported she found a tick to her left bottom around a week prior to admission.  No rash in the area but did have a healing scabbed over area.  The patient was initiated on doxycycline and rickettsial serologies were sent.  On the night of 5/17 the patient spiked a  fever of 102.9 °F along with intermittent tachycardia and hypoxia with O2 saturations in the mid to upper 80s on room air.  Chest x-ray reported development of infiltrates to the right lung.  New from prior.  Consistent with multifocal pneumonia.  CT angiogram chest reported no evidence of PE.  Extensive bilateral infiltrates.  Multifocal pneumonia versus pulmonary edema.     On arrival to ED CRP was 0.56.  Lactate 1.5.  Lipase 257.  CBC was unremarkable.  Urinalysis showed small leuk esterase, 6-10 RBC and 11-20 WBC with 2+ bacteria.  Did have 7-12 squamous epithelial cells.  Urine culture finalized with no growth.  Blood cultures finalized with no growth at 5 days.  COVID-19 and flu PCR was negative.  Repeat blood cultures from 5/15 report no growth at 3 days.  Respiratory panel PCR was negative.  MRSA screen was negative.  HIV panel and hepatitis panel negative. Our ID team was consulted on 5/18/24. Patient's 24 hr Tmax was 102.9 °F. The patient complains of a headache but denied neck pain, nuchal rigidity, rash.  No confusion. We recommended her to continue with Zosyn and doxycycline in the setting of sepsis and possible tickborne illness. Micafungin was added empirically in the setting of new onset pneumonia. Aspergillus galactomannan antigen is negative. Fungitell negative. ANCA panel is negative. Ehrlichia DNA PCR is negative. Fungal antibodies negative.  Rickettsial PCR negative. TTE from 5/19 does not report any evidence of vegetation but did report appearance of the anterior mitral leaflet suspicious for rheumatic mitral valve disease.     On 5/23/24, In the setting of overall clinical and laboratory improvement piperacillin/tazobactam and IV doxycycline were de-escalated to Augmentin and oral doxycycline to continue x 7 more days with tentative end date of 5/30/2024.  Micafungin was discontinued in the setting of negative fungal serologies.      Past Medical History:   Diagnosis Date    (HFpEF) heart  failure with preserved ejection fraction     Anxiety     Asthma     Bipolar disorder     Crohn disease     Depression     Hypertension     Moderate mitral stenosis     Schizoaffective disorder     Severe pulmonary hypertension        Past Surgical History:   Procedure Laterality Date    APPENDECTOMY       SECTION      CHOLECYSTECTOMY      COLON SURGERY      COLONOSCOPY      MANDIBLE FRACTURE SURGERY      OVARIAN CYST SURGERY      TUBAL ABDOMINAL LIGATION         Social History     Socioeconomic History    Marital status:    Tobacco Use    Smoking status: Every Day     Current packs/day: 1.00     Average packs/day: 1 pack/day for 20.0 years (20.0 ttl pk-yrs)     Types: Cigarettes    Smokeless tobacco: Never   Vaping Use    Vaping status: Never Used   Substance and Sexual Activity    Alcohol use: No     Comment: denies    Drug use: Yes     Types: Marijuana    Sexual activity: Never       Family History  family history includes Anxiety disorder in her mother; Bipolar disorder in her mother; COPD in her father; Depression in her mother; Diabetes in her mother; Schizophrenia in her father and paternal grandfather.    Immunization History   Administered Date(s) Administered    Influenza Quad Vaccine (Inpatient) 2023        Allergies  Allergies   Allergen Reactions    Imuran [Azathioprine] Other (See Comments)     Pt reports this medication caused her to have pancreatitis.        The medication list has been reviewed and updated.   Current Medications    Current Outpatient Medications:     Alcohol Swabs (Alcohol Pads) 70 % pads, Apply one alcohol swab to injection site of skin immediately prior to insulin injection. Formulary Compliance Approval., Disp: 100 each, Rfl: 12    amoxicillin-clavulanate (AUGMENTIN) 875-125 MG per tablet, Take 1 tablet by mouth Every 12 (Twelve) Hours for 14 doses. Indications: Pneumonia, Disp: 14 tablet, Rfl: 0    Cariprazine HCl (Vraylar) 3 MG capsule capsule, Take 1  "capsule by mouth Daily., Disp: , Rfl:     doxycycline (MONODOX) 100 MG capsule, Take 1 capsule by mouth Every 12 (Twelve) Hours for 14 doses. Indications: Pneumonia, Tickborne illness, Disp: 14 capsule, Rfl: 0    fexofenadine (ALLEGRA) 180 MG tablet, Take 1 tablet by mouth Daily., Disp: , Rfl:     FLUoxetine (PROzac) 10 MG capsule, Take 1 capsule by mouth Daily., Disp: , Rfl:     furosemide (LASIX) 40 MG tablet, Take 1 tablet by mouth Daily for 30 days., Disp: 30 tablet, Rfl: 0    glucose blood test strip, Use to test blood glucose daily as needed. Formulary Compliance Approval. Diagnosis: Type 2 Diabetes - Insulin Dependent, Disp: 25 each, Rfl: 0    glucose monitor monitoring kit, Use to test blood glucose daily, Disp: 1 each, Rfl: 0    Insulin Glargine (Lantus SoloStar) 100 UNIT/ML injection pen, Inject 15 Units under the skin into the appropriate area as directed Every Night. Formulary Compliance Approval, Disp: 15 mL, Rfl: 0    Insulin Lispro (humaLOG) 100 UNIT/ML injection, Inject 2-9 Units under the skin into the appropriate area as directed 4 (Four) Times a Day After Meals & at Bedtime., Disp: 1080 Units, Rfl: 0    Insulin Pen Needle (Pen Needles) 31G X 8 MM misc, Use 1 each Daily., Disp: 100 each, Rfl: 0    Lancets misc, Use to test blood glucose  daily as needed. Formulary Compliance Approval. Diagnosis: Type 2 Diabetes - Insulin Dependent, Disp: 100 each, Rfl: 0    magnesium oxide (MAG-OX) 400 MG tablet, Take 1 tablet by mouth Daily., Disp: , Rfl:     metFORMIN ER (GLUCOPHAGE-XR) 500 MG 24 hr tablet, Take 2 tablets by mouth 2 (Two) Times a Day., Disp: , Rfl:     metoprolol succinate XL (Toprol XL) 200 MG 24 hr tablet, Take 1 tablet by mouth Daily., Disp: , Rfl:       Review of Systems    Review of Systems     Objective     Vital Signs:  There were no vitals taken for this visit.  Estimated body mass index is 34.91 kg/m² as calculated from the following:    Height as of 5/12/24: 172.7 cm (68\").    Weight as " "of 5/24/24: 104 kg (229 lb 9.6 oz).    Physical Exam     Result Review :  The following data was reviewed by Dee Hinds PA-C     Lab Results  Lab Results   Component Value Date    WBC 9.55 05/22/2024    HGB 11.0 (L) 05/22/2024    HCT 33.2 (L) 05/22/2024    MCV 91.0 05/22/2024     05/22/2024     Lab Results   Component Value Date    GLUCOSE 177 (H) 05/23/2024    BUN 17 05/23/2024    CREATININE 0.96 05/23/2024    EGFRIFNONA 93 08/17/2020    BCR 17.7 05/23/2024    K 3.8 05/23/2024    CO2 24.8 05/23/2024    CALCIUM 8.2 (L) 05/23/2024    ALBUMIN 2.7 (L) 05/20/2024    LABIL2 1.4 (L) 07/06/2015    AST 20 05/20/2024    ALT 24 05/20/2024      Lab Results   Component Value Date    CRP 15.07 (H) 05/18/2024        No results found for: \"ACANTHNAEG\", \"AFBCX\", \"BPERTUSSISCX\", \"BLOODCX\"  No results found for: \"BCIDPCR\", \"CXREFLEX\", \"CSFCX\", \"CULTURETIS\"  No results found for: \"CULTURES\", \"HSVCX\", \"URCX\"  No results found for: \"EYECULTURE\", \"GCCX\", \"HSVCULTURE\", \"LABHSV\"  No results found for: \"LEGIONELLA\", \"MRSACX\", \"MUMPSCX\", \"MYCOPLASCX\"  No results found for: \"NOCARDIACX\", \"STOOLCX\"  No results found for: \"THROATCX\", \"UNSTIMCULT\", \"URINECX\", \"CULTURE\", \"VZVCULTUR\"  No results found for: \"VIRALCULTU\", \"WOUNDCX\"    Radiology Results  XR Chest 1 View    Result Date: 5/22/2024  Impression: Patchy bilateral airspace disease and small effusions similar in appearance to the prior exam.   This report was finalized on 5/22/2024 1:44 PM by Dmitriy Sam M.D..      XR Chest 1 View    Result Date: 5/20/2024  Impression: Trace LEFT pleural effusion, perhaps slightly more prominent than prior.     [id number]  Zip code: [id number]      This report was finalized on 5/20/2024 8:31 AM by Dr. Uday Hansen MD.      CT Angiogram Chest Pulmonary Embolism    Result Date: 5/18/2024  Impression: No evidence of PE. Extensive bilateral infiltrates.  Multifocal pneumonia versus pulmonary edema.     [id number]  ZIP Code [id number].     This report " was finalized on 5/18/2024 8:17 AM by Dr. Uday Hansen MD.      XR Chest 1 View    Result Date: 5/18/2024  Impression: Development of infiltrates RIGHT lung.  New from prior.  Consistent with multifocal pneumonia     [id number]  Zip code: [id number]      This report was finalized on 5/18/2024 8:12 AM by Dr. Uday Hansen MD.      XR Chest 1 View    Result Date: 5/16/2024  Impression:  1.  Normal heart size. 2.  Mild central pulmonary vascular congestion. 3.  Small bands of scar versus atelectasis at the lung bases. 4.  No edema or pneumonia. No pleural effusion or pneumothorax.  This report was finalized on 5/16/2024 9:19 PM by Khadar Marie MD.      NM Lung Ventilation Perfusion    Result Date: 5/16/2024  Impression: Low probability of PE.   This report was finalized on 5/16/2024 2:41 PM by Dr. Juan Carlos Smith MD.      US Venous Doppler Lower Extremity Bilateral (duplex)    Result Date: 5/16/2024  Impression: No DVT.  This report was finalized on 5/16/2024 12:50 PM by Dr. Juan Carlos Smith MD.      CT Abdomen Pelvis With & Without Contrast    Result Date: 5/16/2024  Impression: 1.  No inflammatory changes identified involving the pancreas on current exam. 2.  No pancreatic pseudocyst. 3.  No ascites or free air. 4.  Fatty liver. 5.  Cholecystectomy. 6.  Nonspecific but nonobstructive bowel gas pattern. 7.  Mild volume overload changes with cardiomegaly, interstitial edema, and trace effusions. 8.  Otherwise stable exam with other nonacute findings as detailed above.   This report was finalized on 5/16/2024 9:01 AM by Dr. Juan Carlos Smith MD.      XR Chest 1 View    Result Date: 5/15/2024  Impression:   Unremarkable exam. No acute cardiopulmonary findings identified.   This report was finalized on 5/15/2024 9:41 AM by Dr. Juan Carlos Smith MD.      CT Abdomen Pelvis With & Without Contrast    Result Date: 5/12/2024  Impression:  Findings may represent acute pancreatitis. Correlation with lab values.  This report was finalized on  5/12/2024 6:14 PM by Titus Martinez MD.      CT Abdomen Pelvis Without Contrast    Result Date: 5/8/2024  Impression:  1.  Mild enteritis and mild mesenteric adenitis. 2.  Postoperative changes to bowel segments in the right lower quadrant. 3.  Appendectomy. 4.  Bilateral adnexal clips. 5.  Cholecystectomy. 6.  No free fluid or free air. 7.  No abscess or hematoma.  This report was finalized on 5/8/2024 12:59 AM by Khadar Marie MD.              Assessment / Plan        There are no diagnoses linked to this encounter.        {Time Spent (Optional):23070}    Follow Up   No follow-ups on file.    Visit Diagnoses:  No diagnosis found.    Patient was given instructions and counseling regarding her condition or for health maintenance advice. Please see specific information pulled into the AVS if appropriate.     This document has been electronically signed by Dee Hinds PA-C   May 29, 2024 08:48 EDT    Dictated Utilizing Dragon Dictation: Part of this note may be an electronic transcription/translation of spoken language to printed text using the Dragon Dictation System.

## 2024-05-31 ENCOUNTER — TRANSCRIBE ORDERS (OUTPATIENT)
Dept: ADMINISTRATIVE | Facility: HOSPITAL | Age: 44
End: 2024-05-31
Payer: COMMERCIAL

## 2024-05-31 ENCOUNTER — HOSPITAL ENCOUNTER (OUTPATIENT)
Dept: GENERAL RADIOLOGY | Facility: HOSPITAL | Age: 44
Discharge: HOME | End: 2024-05-31
Payer: COMMERCIAL

## 2024-05-31 DIAGNOSIS — J18.9 UNRESOLVED PNEUMONIA: Primary | ICD-10-CM

## 2024-05-31 DIAGNOSIS — J18.9 UNRESOLVED PNEUMONIA: ICD-10-CM

## 2024-05-31 PROCEDURE — 71046 X-RAY EXAM CHEST 2 VIEWS: CPT

## 2024-06-03 ENCOUNTER — HOSPITAL ENCOUNTER (OUTPATIENT)
Facility: HOSPITAL | Age: 44
Setting detail: OBSERVATION
Discharge: HOME | End: 2024-06-06
Attending: STUDENT IN AN ORGANIZED HEALTH CARE EDUCATION/TRAINING PROGRAM | Admitting: STUDENT IN AN ORGANIZED HEALTH CARE EDUCATION/TRAINING PROGRAM
Payer: COMMERCIAL

## 2024-06-03 DIAGNOSIS — E87.6 HYPOKALEMIA: Primary | ICD-10-CM

## 2024-06-03 LAB
ALBUMIN SERPL-MCNC: 3.8 G/DL (ref 3.5–5.2)
ALBUMIN/GLOB SERPL: 1 G/DL
ALP SERPL-CCNC: 99 U/L (ref 39–117)
ALT SERPL W P-5'-P-CCNC: 21 U/L (ref 1–33)
ANION GAP SERPL CALCULATED.3IONS-SCNC: 15.6 MMOL/L (ref 5–15)
ANISOCYTOSIS BLD QL: ABNORMAL
AST SERPL-CCNC: 22 U/L (ref 1–32)
BILIRUB SERPL-MCNC: 0.4 MG/DL (ref 0–1.2)
BUN SERPL-MCNC: 6 MG/DL (ref 6–20)
BUN/CREAT SERPL: 5.2 (ref 7–25)
CALCIUM SPEC-SCNC: 8.3 MG/DL (ref 8.6–10.5)
CHLORIDE SERPL-SCNC: 94 MMOL/L (ref 98–107)
CO2 SERPL-SCNC: 24.4 MMOL/L (ref 22–29)
CREAT SERPL-MCNC: 1.15 MG/DL (ref 0.57–1)
DEPRECATED RDW RBC AUTO: 43.1 FL (ref 37–54)
EGFRCR SERPLBLD CKD-EPI 2021: 61.1 ML/MIN/1.73
EOSINOPHIL # BLD MANUAL: 0.15 10*3/MM3 (ref 0–0.4)
EOSINOPHIL NFR BLD MANUAL: 1 % (ref 0.3–6.2)
ERYTHROCYTE [DISTWIDTH] IN BLOOD BY AUTOMATED COUNT: 13.5 % (ref 12.3–15.4)
GLOBULIN UR ELPH-MCNC: 3.7 GM/DL
GLUCOSE SERPL-MCNC: 161 MG/DL (ref 65–99)
HCT VFR BLD AUTO: 38.7 % (ref 34–46.6)
HGB BLD-MCNC: 13.2 G/DL (ref 12–15.9)
HOLD SPECIMEN: NORMAL
HOLD SPECIMEN: NORMAL
LYMPHOCYTES # BLD MANUAL: 8.17 10*3/MM3 (ref 0.7–3.1)
LYMPHOCYTES NFR BLD MANUAL: 9 % (ref 5–12)
MAGNESIUM SERPL-MCNC: 1.1 MG/DL (ref 1.6–2.6)
MCH RBC QN AUTO: 29.9 PG (ref 26.6–33)
MCHC RBC AUTO-ENTMCNC: 34.1 G/DL (ref 31.5–35.7)
MCV RBC AUTO: 87.8 FL (ref 79–97)
MONOCYTES # BLD: 1.34 10*3/MM3 (ref 0.1–0.9)
NEUTROPHILS # BLD AUTO: 5.2 10*3/MM3 (ref 1.7–7)
NEUTROPHILS NFR BLD MANUAL: 35 % (ref 42.7–76)
PLATELET # BLD AUTO: 378 10*3/MM3 (ref 140–450)
PMV BLD AUTO: 9.9 FL (ref 6–12)
POTASSIUM SERPL-SCNC: 2.5 MMOL/L (ref 3.5–5.2)
PROT SERPL-MCNC: 7.5 G/DL (ref 6–8.5)
RBC # BLD AUTO: 4.41 10*6/MM3 (ref 3.77–5.28)
SMALL PLATELETS BLD QL SMEAR: ADEQUATE
SODIUM SERPL-SCNC: 134 MMOL/L (ref 136–145)
VARIANT LYMPHS NFR BLD MANUAL: 55 % (ref 19.6–45.3)
WBC NRBC COR # BLD AUTO: 14.85 10*3/MM3 (ref 3.4–10.8)
WHOLE BLOOD HOLD COAG: NORMAL
WHOLE BLOOD HOLD SPECIMEN: NORMAL

## 2024-06-03 PROCEDURE — G0378 HOSPITAL OBSERVATION PER HR: HCPCS

## 2024-06-03 PROCEDURE — 85007 BL SMEAR W/DIFF WBC COUNT: CPT | Performed by: PHYSICIAN ASSISTANT

## 2024-06-03 PROCEDURE — 85025 COMPLETE CBC W/AUTO DIFF WBC: CPT | Performed by: PHYSICIAN ASSISTANT

## 2024-06-03 PROCEDURE — 25810000003 SODIUM CHLORIDE 0.9 % SOLUTION: Performed by: PHYSICIAN ASSISTANT

## 2024-06-03 PROCEDURE — 83735 ASSAY OF MAGNESIUM: CPT | Performed by: PHYSICIAN ASSISTANT

## 2024-06-03 PROCEDURE — 96365 THER/PROPH/DIAG IV INF INIT: CPT

## 2024-06-03 PROCEDURE — 80053 COMPREHEN METABOLIC PANEL: CPT | Performed by: PHYSICIAN ASSISTANT

## 2024-06-03 PROCEDURE — 25010000002 MAGNESIUM SULFATE 2 GM/50ML SOLUTION: Performed by: PHYSICIAN ASSISTANT

## 2024-06-03 PROCEDURE — 36415 COLL VENOUS BLD VENIPUNCTURE: CPT

## 2024-06-03 PROCEDURE — 99285 EMERGENCY DEPT VISIT HI MDM: CPT

## 2024-06-03 PROCEDURE — 36415 COLL VENOUS BLD VENIPUNCTURE: CPT | Performed by: STUDENT IN AN ORGANIZED HEALTH CARE EDUCATION/TRAINING PROGRAM

## 2024-06-03 PROCEDURE — 93005 ELECTROCARDIOGRAM TRACING: CPT | Performed by: STUDENT IN AN ORGANIZED HEALTH CARE EDUCATION/TRAINING PROGRAM

## 2024-06-03 RX ORDER — POTASSIUM CHLORIDE 1.5 G/1.58G
40 POWDER, FOR SOLUTION ORAL ONCE
Status: COMPLETED | OUTPATIENT
Start: 2024-06-03 | End: 2024-06-03

## 2024-06-03 RX ORDER — SODIUM CHLORIDE 0.9 % (FLUSH) 0.9 %
10 SYRINGE (ML) INJECTION AS NEEDED
Status: DISCONTINUED | OUTPATIENT
Start: 2024-06-03 | End: 2024-06-06 | Stop reason: HOSPADM

## 2024-06-03 RX ORDER — MAGNESIUM SULFATE HEPTAHYDRATE 40 MG/ML
2 INJECTION, SOLUTION INTRAVENOUS ONCE
Status: COMPLETED | OUTPATIENT
Start: 2024-06-03 | End: 2024-06-04

## 2024-06-03 RX ADMIN — POTASSIUM CHLORIDE 40 MEQ: 1.5 POWDER, FOR SOLUTION ORAL at 23:08

## 2024-06-03 RX ADMIN — SODIUM CHLORIDE 1000 ML: 9 INJECTION, SOLUTION INTRAVENOUS at 23:37

## 2024-06-03 RX ADMIN — MAGNESIUM SULFATE HEPTAHYDRATE 2 G: 40 INJECTION, SOLUTION INTRAVENOUS at 23:37

## 2024-06-04 PROBLEM — E87.6 HYPOKALEMIA: Status: ACTIVE | Noted: 2024-06-04

## 2024-06-04 LAB
ALBUMIN SERPL-MCNC: 3.3 G/DL (ref 3.5–5.2)
ALBUMIN/GLOB SERPL: 1.1 G/DL
ALP SERPL-CCNC: 86 U/L (ref 39–117)
ALT SERPL W P-5'-P-CCNC: 21 U/L (ref 1–33)
ANION GAP SERPL CALCULATED.3IONS-SCNC: 11.6 MMOL/L (ref 5–15)
ANION GAP SERPL CALCULATED.3IONS-SCNC: 12.6 MMOL/L (ref 5–15)
AST SERPL-CCNC: 26 U/L (ref 1–32)
BASOPHILS # BLD AUTO: 0.11 10*3/MM3 (ref 0–0.2)
BASOPHILS NFR BLD AUTO: 1.1 % (ref 0–1.5)
BILIRUB SERPL-MCNC: 0.5 MG/DL (ref 0–1.2)
BILIRUB UR QL STRIP: NEGATIVE
BUN SERPL-MCNC: 4 MG/DL (ref 6–20)
BUN SERPL-MCNC: 5 MG/DL (ref 6–20)
BUN/CREAT SERPL: 4.4 (ref 7–25)
BUN/CREAT SERPL: 5.5 (ref 7–25)
CALCIUM SPEC-SCNC: 7.8 MG/DL (ref 8.6–10.5)
CALCIUM SPEC-SCNC: 7.8 MG/DL (ref 8.6–10.5)
CHLORIDE SERPL-SCNC: 102 MMOL/L (ref 98–107)
CHLORIDE SERPL-SCNC: 103 MMOL/L (ref 98–107)
CLARITY UR: ABNORMAL
CO2 SERPL-SCNC: 22.4 MMOL/L (ref 22–29)
CO2 SERPL-SCNC: 23.4 MMOL/L (ref 22–29)
COLOR UR: YELLOW
CREAT SERPL-MCNC: 0.91 MG/DL (ref 0.57–1)
CREAT SERPL-MCNC: 0.91 MG/DL (ref 0.57–1)
DEPRECATED RDW RBC AUTO: 43.2 FL (ref 37–54)
EGFRCR SERPLBLD CKD-EPI 2021: 80.9 ML/MIN/1.73
EGFRCR SERPLBLD CKD-EPI 2021: 80.9 ML/MIN/1.73
EOSINOPHIL # BLD AUTO: 0.1 10*3/MM3 (ref 0–0.4)
EOSINOPHIL NFR BLD AUTO: 1 % (ref 0.3–6.2)
ERYTHROCYTE [DISTWIDTH] IN BLOOD BY AUTOMATED COUNT: 13.9 % (ref 12.3–15.4)
GLOBULIN UR ELPH-MCNC: 2.9 GM/DL
GLUCOSE BLDC GLUCOMTR-MCNC: 140 MG/DL (ref 70–130)
GLUCOSE BLDC GLUCOMTR-MCNC: 148 MG/DL (ref 70–130)
GLUCOSE BLDC GLUCOMTR-MCNC: 162 MG/DL (ref 70–130)
GLUCOSE BLDC GLUCOMTR-MCNC: 164 MG/DL (ref 70–130)
GLUCOSE BLDC GLUCOMTR-MCNC: 198 MG/DL (ref 70–130)
GLUCOSE SERPL-MCNC: 152 MG/DL (ref 65–99)
GLUCOSE SERPL-MCNC: 152 MG/DL (ref 65–99)
GLUCOSE UR STRIP-MCNC: NEGATIVE MG/DL
HCT VFR BLD AUTO: 33.4 % (ref 34–46.6)
HGB BLD-MCNC: 11.7 G/DL (ref 12–15.9)
HGB UR QL STRIP.AUTO: NEGATIVE
IMM GRANULOCYTES # BLD AUTO: 0.03 10*3/MM3 (ref 0–0.05)
IMM GRANULOCYTES NFR BLD AUTO: 0.3 % (ref 0–0.5)
KETONES UR QL STRIP: ABNORMAL
LEUKOCYTE ESTERASE UR QL STRIP.AUTO: NEGATIVE
LYMPHOCYTES # BLD AUTO: 5.61 10*3/MM3 (ref 0.7–3.1)
LYMPHOCYTES NFR BLD AUTO: 54.9 % (ref 19.6–45.3)
MAGNESIUM SERPL-MCNC: 1.8 MG/DL (ref 1.6–2.6)
MCH RBC QN AUTO: 30.3 PG (ref 26.6–33)
MCHC RBC AUTO-ENTMCNC: 35 G/DL (ref 31.5–35.7)
MCV RBC AUTO: 86.5 FL (ref 79–97)
MONOCYTES # BLD AUTO: 0.66 10*3/MM3 (ref 0.1–0.9)
MONOCYTES NFR BLD AUTO: 6.5 % (ref 5–12)
NEUTROPHILS NFR BLD AUTO: 3.7 10*3/MM3 (ref 1.7–7)
NEUTROPHILS NFR BLD AUTO: 36.2 % (ref 42.7–76)
NITRITE UR QL STRIP: NEGATIVE
NRBC BLD AUTO-RTO: 0 /100 WBC (ref 0–0.2)
PH UR STRIP.AUTO: 6.5 [PH] (ref 5–8)
PLATELET # BLD AUTO: 313 10*3/MM3 (ref 140–450)
PMV BLD AUTO: 9.9 FL (ref 6–12)
POTASSIUM SERPL-SCNC: 2.6 MMOL/L (ref 3.5–5.2)
POTASSIUM SERPL-SCNC: 2.8 MMOL/L (ref 3.5–5.2)
POTASSIUM SERPL-SCNC: 2.8 MMOL/L (ref 3.5–5.2)
PROT SERPL-MCNC: 6.2 G/DL (ref 6–8.5)
PROT UR QL STRIP: ABNORMAL
QT INTERVAL: 404 MS
QTC INTERVAL: 448 MS
RBC # BLD AUTO: 3.86 10*6/MM3 (ref 3.77–5.28)
SODIUM SERPL-SCNC: 137 MMOL/L (ref 136–145)
SODIUM SERPL-SCNC: 138 MMOL/L (ref 136–145)
SP GR UR STRIP: 1.01 (ref 1–1.03)
UROBILINOGEN UR QL STRIP: ABNORMAL
WBC NRBC COR # BLD AUTO: 10.21 10*3/MM3 (ref 3.4–10.8)

## 2024-06-04 PROCEDURE — G0378 HOSPITAL OBSERVATION PER HR: HCPCS

## 2024-06-04 PROCEDURE — 25010000002 HEPARIN (PORCINE) PER 1000 UNITS: Performed by: HOSPITALIST

## 2024-06-04 PROCEDURE — 82948 REAGENT STRIP/BLOOD GLUCOSE: CPT

## 2024-06-04 PROCEDURE — 96366 THER/PROPH/DIAG IV INF ADDON: CPT

## 2024-06-04 PROCEDURE — 63710000001 INSULIN GLARGINE PER 5 UNITS: Performed by: HOSPITALIST

## 2024-06-04 PROCEDURE — 96372 THER/PROPH/DIAG INJ SC/IM: CPT

## 2024-06-04 PROCEDURE — 99223 1ST HOSP IP/OBS HIGH 75: CPT

## 2024-06-04 PROCEDURE — 80053 COMPREHEN METABOLIC PANEL: CPT | Performed by: HOSPITALIST

## 2024-06-04 PROCEDURE — 83735 ASSAY OF MAGNESIUM: CPT | Performed by: HOSPITALIST

## 2024-06-04 PROCEDURE — 84132 ASSAY OF SERUM POTASSIUM: CPT | Performed by: PHYSICIAN ASSISTANT

## 2024-06-04 PROCEDURE — 85025 COMPLETE CBC W/AUTO DIFF WBC: CPT | Performed by: STUDENT IN AN ORGANIZED HEALTH CARE EDUCATION/TRAINING PROGRAM

## 2024-06-04 PROCEDURE — 25010000002 MAGNESIUM SULFATE 4 GM/100ML SOLUTION: Performed by: STUDENT IN AN ORGANIZED HEALTH CARE EDUCATION/TRAINING PROGRAM

## 2024-06-04 PROCEDURE — 81003 URINALYSIS AUTO W/O SCOPE: CPT | Performed by: PHYSICIAN ASSISTANT

## 2024-06-04 PROCEDURE — 63710000001 INSULIN LISPRO (HUMAN) PER 5 UNITS: Performed by: HOSPITALIST

## 2024-06-04 RX ORDER — NICOTINE POLACRILEX 4 MG
15 LOZENGE BUCCAL
Status: DISCONTINUED | OUTPATIENT
Start: 2024-06-04 | End: 2024-06-06 | Stop reason: HOSPADM

## 2024-06-04 RX ORDER — SODIUM CHLORIDE 0.9 % (FLUSH) 0.9 %
10 SYRINGE (ML) INJECTION EVERY 12 HOURS SCHEDULED
Status: DISCONTINUED | OUTPATIENT
Start: 2024-06-04 | End: 2024-06-06 | Stop reason: HOSPADM

## 2024-06-04 RX ORDER — INSULIN LISPRO 100 [IU]/ML
2-7 INJECTION, SOLUTION INTRAVENOUS; SUBCUTANEOUS
Status: DISCONTINUED | OUTPATIENT
Start: 2024-06-04 | End: 2024-06-06 | Stop reason: HOSPADM

## 2024-06-04 RX ORDER — CETIRIZINE HYDROCHLORIDE 10 MG/1
10 TABLET ORAL DAILY
Status: DISCONTINUED | OUTPATIENT
Start: 2024-06-04 | End: 2024-06-06 | Stop reason: HOSPADM

## 2024-06-04 RX ORDER — HEPARIN SODIUM 5000 [USP'U]/ML
5000 INJECTION, SOLUTION INTRAVENOUS; SUBCUTANEOUS EVERY 12 HOURS SCHEDULED
Status: DISCONTINUED | OUTPATIENT
Start: 2024-06-04 | End: 2024-06-06 | Stop reason: HOSPADM

## 2024-06-04 RX ORDER — SODIUM CHLORIDE 9 MG/ML
40 INJECTION, SOLUTION INTRAVENOUS AS NEEDED
Status: DISCONTINUED | OUTPATIENT
Start: 2024-06-04 | End: 2024-06-06 | Stop reason: HOSPADM

## 2024-06-04 RX ORDER — POTASSIUM CHLORIDE 20 MEQ/1
40 TABLET, EXTENDED RELEASE ORAL ONCE
Status: COMPLETED | OUTPATIENT
Start: 2024-06-04 | End: 2024-06-04

## 2024-06-04 RX ORDER — FUROSEMIDE 40 MG/1
40 TABLET ORAL DAILY
Status: CANCELLED | OUTPATIENT
Start: 2024-06-04

## 2024-06-04 RX ORDER — METOPROLOL SUCCINATE 50 MG/1
200 TABLET, EXTENDED RELEASE ORAL DAILY
Status: CANCELLED | OUTPATIENT
Start: 2024-06-04

## 2024-06-04 RX ORDER — AMOXICILLIN 250 MG
2 CAPSULE ORAL 2 TIMES DAILY PRN
Status: DISCONTINUED | OUTPATIENT
Start: 2024-06-04 | End: 2024-06-06 | Stop reason: HOSPADM

## 2024-06-04 RX ORDER — MAGNESIUM SULFATE HEPTAHYDRATE 40 MG/ML
4 INJECTION, SOLUTION INTRAVENOUS ONCE
Status: COMPLETED | OUTPATIENT
Start: 2024-06-04 | End: 2024-06-04

## 2024-06-04 RX ORDER — POLYETHYLENE GLYCOL 3350 17 G/17G
17 POWDER, FOR SOLUTION ORAL DAILY PRN
Status: DISCONTINUED | OUTPATIENT
Start: 2024-06-04 | End: 2024-06-06 | Stop reason: HOSPADM

## 2024-06-04 RX ORDER — DEXTROSE MONOHYDRATE 25 G/50ML
25 INJECTION, SOLUTION INTRAVENOUS
Status: DISCONTINUED | OUTPATIENT
Start: 2024-06-04 | End: 2024-06-06 | Stop reason: HOSPADM

## 2024-06-04 RX ORDER — GLUCAGON 1 MG/ML
1 KIT INJECTION
Status: DISCONTINUED | OUTPATIENT
Start: 2024-06-04 | End: 2024-06-06 | Stop reason: HOSPADM

## 2024-06-04 RX ORDER — POTASSIUM CHLORIDE 20 MEQ/1
40 TABLET, EXTENDED RELEASE ORAL DAILY
Status: DISCONTINUED | OUTPATIENT
Start: 2024-06-04 | End: 2024-06-05

## 2024-06-04 RX ORDER — FLUOXETINE 10 MG/1
10 CAPSULE ORAL DAILY
Status: CANCELLED | OUTPATIENT
Start: 2024-06-04

## 2024-06-04 RX ORDER — NITROGLYCERIN 0.4 MG/1
0.4 TABLET SUBLINGUAL
Status: DISCONTINUED | OUTPATIENT
Start: 2024-06-04 | End: 2024-06-06 | Stop reason: HOSPADM

## 2024-06-04 RX ORDER — BISACODYL 5 MG/1
5 TABLET, DELAYED RELEASE ORAL DAILY PRN
Status: DISCONTINUED | OUTPATIENT
Start: 2024-06-04 | End: 2024-06-06 | Stop reason: HOSPADM

## 2024-06-04 RX ORDER — POTASSIUM CHLORIDE 1.5 G/1.58G
40 POWDER, FOR SOLUTION ORAL ONCE
Status: COMPLETED | OUTPATIENT
Start: 2024-06-04 | End: 2024-06-04

## 2024-06-04 RX ORDER — METOPROLOL SUCCINATE 50 MG/1
200 TABLET, EXTENDED RELEASE ORAL DAILY
Status: DISCONTINUED | OUTPATIENT
Start: 2024-06-04 | End: 2024-06-06 | Stop reason: HOSPADM

## 2024-06-04 RX ORDER — CETIRIZINE HYDROCHLORIDE 10 MG/1
10 TABLET ORAL DAILY
Status: CANCELLED | OUTPATIENT
Start: 2024-06-04

## 2024-06-04 RX ORDER — BISACODYL 10 MG
10 SUPPOSITORY, RECTAL RECTAL DAILY PRN
Status: DISCONTINUED | OUTPATIENT
Start: 2024-06-04 | End: 2024-06-06 | Stop reason: HOSPADM

## 2024-06-04 RX ORDER — SODIUM CHLORIDE 0.9 % (FLUSH) 0.9 %
10 SYRINGE (ML) INJECTION AS NEEDED
Status: DISCONTINUED | OUTPATIENT
Start: 2024-06-04 | End: 2024-06-06 | Stop reason: HOSPADM

## 2024-06-04 RX ORDER — INSULIN LISPRO 100 [IU]/ML
2-9 INJECTION, SOLUTION INTRAVENOUS; SUBCUTANEOUS
COMMUNITY

## 2024-06-04 RX ORDER — FLUOXETINE 10 MG/1
10 CAPSULE ORAL DAILY
Status: DISCONTINUED | OUTPATIENT
Start: 2024-06-04 | End: 2024-06-06 | Stop reason: HOSPADM

## 2024-06-04 RX ADMIN — Medication 10 ML: at 08:53

## 2024-06-04 RX ADMIN — POTASSIUM CHLORIDE 40 MEQ: 1500 TABLET, EXTENDED RELEASE ORAL at 08:52

## 2024-06-04 RX ADMIN — MAGNESIUM SULFATE HEPTAHYDRATE 4 G: 40 INJECTION, SOLUTION INTRAVENOUS at 12:27

## 2024-06-04 RX ADMIN — POTASSIUM CHLORIDE 40 MEQ: 1.5 POWDER, FOR SOLUTION ORAL at 02:48

## 2024-06-04 RX ADMIN — CETIRIZINE HYDROCHLORIDE 10 MG: 10 TABLET, FILM COATED ORAL at 12:23

## 2024-06-04 RX ADMIN — INSULIN GLARGINE 5 UNITS: 100 INJECTION, SOLUTION SUBCUTANEOUS at 21:36

## 2024-06-04 RX ADMIN — MAGNESIUM GLUCONATE 500 MG ORAL TABLET 400 MG: 500 TABLET ORAL at 12:22

## 2024-06-04 RX ADMIN — INSULIN LISPRO 2 UNITS: 100 INJECTION, SOLUTION INTRAVENOUS; SUBCUTANEOUS at 12:26

## 2024-06-04 RX ADMIN — INSULIN LISPRO 2 UNITS: 100 INJECTION, SOLUTION INTRAVENOUS; SUBCUTANEOUS at 21:37

## 2024-06-04 RX ADMIN — POTASSIUM CHLORIDE 40 MEQ: 1500 TABLET, EXTENDED RELEASE ORAL at 12:22

## 2024-06-04 RX ADMIN — Medication 10 ML: at 21:37

## 2024-06-04 RX ADMIN — FLUOXETINE HYDROCHLORIDE 10 MG: 10 CAPSULE ORAL at 12:23

## 2024-06-04 RX ADMIN — HEPARIN SODIUM 5000 UNITS: 5000 INJECTION INTRAVENOUS; SUBCUTANEOUS at 08:52

## 2024-06-04 RX ADMIN — CARIPRAZINE 3 MG: 3 CAPSULE, GELATIN COATED ORAL at 12:23

## 2024-06-04 RX ADMIN — INSULIN LISPRO 2 UNITS: 100 INJECTION, SOLUTION INTRAVENOUS; SUBCUTANEOUS at 17:03

## 2024-06-04 RX ADMIN — HEPARIN SODIUM 5000 UNITS: 5000 INJECTION INTRAVENOUS; SUBCUTANEOUS at 21:36

## 2024-06-04 NOTE — ED NOTES
MEDICAL SCREENING:    Reason for Visit: abnormal lab, low K+    Patient initially seen in triage.  The patient was advised further evaluation and diagnostic testing will be needed, some of the treatment and testing will be initiated in the lobby in order to begin the process.  The patient will be returned to the waiting area for the time being and possibly be re-assessed by a subsequent ED provider.  The patient will be brought back to the treatment area in as timely manner as possible.       Juvenal Wall II, PA  06/03/24 [id number]

## 2024-06-04 NOTE — H&P
[facility] Medicine Services  HISTORY & PHYSICAL    Patient Identification:  Name:  Manda Al  Age:  43 y.o.  Sex:  female  :  1980  MRN:  [id number]   Visit Number:  [id number]  Admit Date: 6/3/2024   Primary Care Physician:  Mi Rivera MD     Subjective     Chief complaint:   Chief Complaint   Patient presents with    Abnormal Lab     History of presenting illness:   Patient is a 43 y.o. female with past medical history significant for heart failure, hypertension, Crohn's disease, bipolar disorder, anxiety, and depression that presented to the [facility] emergency department for evaluation of abnormal lab results.  The patient advises that she went to her primary care provider yesterday and had labs collected.  She advises that yesterday evening her primary care provider's office called and advised her to go to the emergency department for low potassium.  The patient states that she has been feeling well with no complications since her last hospitalization with pancreatitis.  The patient denies abdominal pain or chest pain.  On my physical examination, patient alert and oriented x 3.  No acute distress noted.  Will initiate electrolyte replacement therapy.  The patient is in stable condition upon admission to the hospital.Upon arrival to the ED, vitals were temperature 97.4, /75, heart rate 78, respirations 17, and SpO2 96%. Labs obtained on arrival: Potassium 2.6, anion gap 15.6, creatinine 1.15, glucose 161, calcium 8.3, magnesium 1.1, WBC 14.8. Patient has been admitted to the telemetry unit for further evaluation and treatment.     Present during exam: N/A   ---------------------------------------------------------------------------------------------------------------------   Review of Systems   Constitutional: Negative.  Negative for chills, fatigue and fever.   HENT: Negative.  Negative for congestion, sore throat and trouble swallowing.    Eyes:  Negative.    Respiratory: Negative.  Negative for cough, shortness of breath and wheezing.    Cardiovascular: Negative.  Negative for chest pain, palpitations and leg swelling.   Gastrointestinal: Negative.  Negative for abdominal pain, diarrhea, nausea and vomiting.   Endocrine: Negative.    Genitourinary: Negative.  Negative for dysuria.   Musculoskeletal: Negative.  Negative for neck pain and neck stiffness.   Skin: Negative.    Allergic/Immunologic: Negative.    Neurological: Negative.  Negative for dizziness, tremors, seizures, syncope, facial asymmetry, speech difficulty, weakness, light-headedness, numbness and headaches.   Hematological: Negative.    Psychiatric/Behavioral: Negative.         Otherwise 10-system ROS reviewed and is negative except as mentioned in the HPI.    ---------------------------------------------------------------------------------------------------------------------   Past Medical History:   Diagnosis Date    (HFpEF) heart failure with preserved ejection fraction     Anxiety     Asthma     Bipolar disorder     Crohn disease     Depression     Hypertension     Moderate mitral stenosis     Schizoaffective disorder     Severe pulmonary hypertension      Past Surgical History:   Procedure Laterality Date    APPENDECTOMY       SECTION      CHOLECYSTECTOMY      COLON SURGERY      COLONOSCOPY      MANDIBLE FRACTURE SURGERY      OVARIAN CYST SURGERY      TUBAL ABDOMINAL LIGATION       Family History   Problem Relation Age of Onset    Diabetes Mother     Anxiety disorder Mother     Depression Mother     Bipolar disorder Mother     COPD Father     Schizophrenia Father     Schizophrenia Paternal Grandfather     Breast cancer Neg Hx      Social History     Socioeconomic History    Marital status:    Tobacco Use    Smoking status: Every Day     Current packs/day: 1.00     Average packs/day: 1 pack/day for 20.0 years (20.0 ttl pk-yrs)     Types: Cigarettes    Smokeless tobacco:  Never   Vaping Use    Vaping status: Never Used   Substance and Sexual Activity    Alcohol use: No     Comment: denies    Drug use: Yes     Types: Marijuana    Sexual activity: Never     ---------------------------------------------------------------------------------------------------------------------   Allergies:  Imuran [azathioprine]  ---------------------------------------------------------------------------------------------------------------------   Medications below are reported home medications pulling from within the system; at this time, these medications have not been reconciled unless otherwise specified and are in the verification process for further verifcation as current home medications.    Prior to Admission Medications       Prescriptions Last Dose Informant Patient Reported? Taking?    furosemide (LASIX) 40 MG tablet   No Yes    Take 1 tablet by mouth Daily for 30 days.    Insulin Glargine (Lantus SoloStar) 100 UNIT/ML injection pen   No Yes    Inject 15 Units under the skin into the appropriate area as directed Every Night. Formulary Compliance Approval    Insulin Lispro (humaLOG) 100 UNIT/ML injection   No Yes    Inject 2-9 Units under the skin into the appropriate area as directed 4 (Four) Times a Day After Meals & at Bedtime.    Cariprazine HCl (Vraylar) 3 MG capsule capsule  Self Yes No    Take 1 capsule by mouth Daily.    fexofenadine (ALLEGRA) 180 MG tablet  Self Yes No    Take 1 tablet by mouth Daily.    FLUoxetine (PROzac) 10 MG capsule  Self Yes No    Take 1 capsule by mouth Daily.    magnesium oxide (MAG-OX) 400 MG tablet  Self Yes No    Take 1 tablet by mouth Daily.    metFORMIN ER (GLUCOPHAGE-XR) 500 MG 24 hr tablet  Self Yes No    Take 2 tablets by mouth 2 (Two) Times a Day.    metoprolol succinate XL (Toprol XL) 200 MG 24 hr tablet  Self Yes No    Take 1 tablet by mouth Daily.           ---------------------------------------------------------------------------------------------------------------------    Objective     Hospital Scheduled Meds:  insulin glargine, 5 Units, Subcutaneous, Nightly  insulin lispro, 2-7 Units, Subcutaneous, 4x Daily AC & at Bedtime           Current listed hospital scheduled medications may not yet reflect those currently placed in orders that are signed and held, awaiting patient's arrival to floor/unit.    ---------------------------------------------------------------------------------------------------------------------   Vital Signs:  Temp:  [97.4 °F (36.3 °C)] 97.4 °F (36.3 °C)  Heart Rate:  [70-83] 77  Resp:  [17] 17  BP: (108-133)/(68-98) 115/76  Mean Arterial Pressure (Non-Invasive) for the past 24 hrs (Last 3 readings):   Noninvasive MAP (mmHg)   06/04/24 0345 87   06/04/24 0330 93   06/04/24 0300 85     SpO2 Percentage    06/04/24 0300 06/04/24 0330 06/04/24 0345   SpO2: 97% 98% 98%     SpO2:  [96 %-99 %] 98 %  on   ;   Device (Oxygen Therapy): room air    Body mass index is 30.11 kg/m².  Wt Readings from Last 3 Encounters:   06/03/24 89.8 kg (198 lb)   05/29/24 90.5 kg (199 lb 9.6 oz)   05/24/24 104 kg (229 lb 9.6 oz)       ---------------------------------------------------------------------------------------------------------------------   Physical Exam  Vitals and nursing note reviewed.   Constitutional:       General: She is awake. She is not in acute distress.     Appearance: Normal appearance. She is normal weight. She is not ill-appearing or diaphoretic.   HENT:      Head: Normocephalic and atraumatic.      Nose: Nose normal.      Mouth/Throat:      Mouth: Mucous membranes are moist.      Pharynx: Oropharynx is clear.   Eyes:      Extraocular Movements: Extraocular movements intact.      Pupils: Pupils are equal, round, and reactive to light.   Cardiovascular:      Rate and Rhythm: Normal rate and regular rhythm.      Pulses: Normal pulses.            Dorsalis pedis pulses are 2+ on the right side and 2+ on the left side.      Heart sounds: Normal heart sounds. No murmur heard.     No friction rub.   Pulmonary:      Effort: Pulmonary effort is normal. No accessory muscle usage, respiratory distress or retractions.      Breath sounds: Normal breath sounds. No wheezing, rhonchi or rales.   Abdominal:      General: Bowel sounds are normal. There is no distension.      Palpations: Abdomen is soft.      Tenderness: There is no abdominal tenderness. There is no guarding.   Musculoskeletal:         General: Normal range of motion.      Cervical back: Normal range of motion and neck supple. No rigidity.      Right lower leg: No edema.      Left lower leg: No edema.   Skin:     General: Skin is warm and dry.      Capillary Refill: Capillary refill takes 2 to 3 seconds.   Neurological:      General: No focal deficit present.      Mental Status: She is alert and oriented to person, place, and time. Mental status is at baseline.      Cranial Nerves: No dysarthria or facial asymmetry.      Sensory: Sensation is intact.      Motor: No weakness or tremor.   Psychiatric:         Attention and Perception: Attention normal.         Mood and Affect: Mood normal.         Speech: Speech normal.         Behavior: Behavior normal. Behavior is cooperative.         Thought Content: Thought content normal.         Cognition and Memory: Cognition normal.         Judgment: Judgment normal.          ---------------------------------------------------------------------------------------------------------------------  EKG: Normal sinus rhythm, unconfirmed by cardiology      Telemetry:    Normal sinus rhythm    I have personally reviewed the EKG/Telemetry strip  ---------------------------------------------------------------------------------------------------------------------             Results from last 7 days   Lab Units 06/03/24 1941 05/29/24  0921   CRP mg/dL  --  <0.30   WBC 10*3/mm3  "14.85* 15.47*   HEMOGLOBIN g/dL 13.2 14.9   HEMATOCRIT % 38.7 45.1   MCV fL 87.8 91.3   MCHC g/dL 34.1 33.0   PLATELETS 10*3/mm3 378 553*     Results from last 7 days   Lab Units 06/04/24  0144 06/03/24  1941   SODIUM mmol/L  --  134*   POTASSIUM mmol/L 2.6* 2.5*   MAGNESIUM mg/dL  --  1.1*   CHLORIDE mmol/L  --  94*   CO2 mmol/L  --  24.4   BUN mg/dL  --  6   CREATININE mg/dL  --  1.15*   CALCIUM mg/dL  --  8.3*   GLUCOSE mg/dL  --  161*   ALBUMIN g/dL  --  3.8   BILIRUBIN mg/dL  --  0.4   ALK PHOS U/L  --  99   AST (SGOT) U/L  --  22   ALT (SGPT) U/L  --  21   Estimated Creatinine Clearance: 74.7 mL/min (A) (by C-G formula based on SCr of 1.15 mg/dL (H)).  No results found for: \"AMMONIA\"    No results found for: \"HGBA1C\", \"POCGLU\"  Lab Results   Component Value Date    HGBA1C 6.90 (H) 05/12/2024     Lab Results   Component Value Date    TSH 1.840 05/16/2024       Microbiology Results (last 10 days)       ** No results found for the last 240 hours. **           Pain Management Panel  More data exists         Latest Ref Rng & Units 8/11/2020 5/9/2017   Pain Management Panel   Amphetamine, Urine Qual Negative Positive  Negative    Barbiturates Screen, Urine Negative Negative  Negative    Benzodiazepine Screen, Urine Negative Negative  Negative    Buprenorphine, Screen, Urine Negative Negative  Negative    Cocaine Screen, Urine Negative Negative  Negative    Methadone Screen , Urine Negative Negative  Negative      I have personally reviewed the above laboratory results.   ---------------------------------------------------------------------------------------------------------------------  Imaging Results (Last 7 Days)       ** No results found for the last 168 hours. **          I have personally reviewed the above radiology results.     Last Echocardiogram:  Results for orders placed during the hospital encounter of 05/12/24    Adult Transthoracic Echo Limited W/ Cont if Necessary Per Protocol    Interpretation " Summary    Left ventricular systolic function is normal. Left ventricular ejection fraction appears to be 56 - 60%.    Left ventricular diastolic dysfunction is noted.    The left atrial cavity is moderately dilated.    The right atrial cavity is mildly  dilated.    There is mild, bileaflet mitral valve thickening present.    Moderate to severe mitral valve stenosis is present with diastolic doming of the valve leaflets, thickened leaflets and rheumatic changes of the mitral valve with estimated mean gradient of 7 mmHg and mitral valve area by pressure half-time of 1.3 cm² apparatus.    ---------------------------------------------------------------------------------------------------------------------    Assessment & Plan      ACUTE HOSPITAL PROBLEMS    -Acute hypokalemia, on admission  -Acute hypomagnesemia, on admission  -Acute hypocalcemia, on admission  -Acute leukocytosis, on admission    CMP and CBC in the a.m.  Electrolyte replacement protocol initiated  Monitor WBC count with CBC  Will replace potassium by mouth      -F/E/N  Replace electrolytes per protocol as necessary.  Diabetic diet.     CHRONIC MEDICAL PROBLEMS    -Heart failure (HFpEF)  -Diabetes  -Essential hypertension  -Crohn's disease  -Asthma  -Anxiety  -Depression  -Bipolar disorder    Vital signs per hospital policy  Insulin sliding scale  Blood glucose checks before meals and at bedtime  Hold oral hypoglycemic medications  Continue home medication regimen on pharmacy reconciliation  ---------------------------------------------------  DVT Prophylaxis: Heparin 5000 and  Activity: Up with assistance  ---------------------------------------------------  The patient is considered to be a high risk patient due to: Low potassium level    OBSERVATION status; however, if further evaluation or treatment plans warrant, status may change.  Based upon current information, I predict patient's care encounter to be less than or equal to 2 midnights     Code  Status: Full Code  ---------------------------------------------------  Disposition/Discharge planning: Consult case management for discharge planning.  ---------------------------------------------------  I have discussed the patient's assessment and plan with attending physician Adin Hernandez MD Carl B Gray, APRN     06/04/24  04:07 EDT    Attending Physician: Adin Sauceda MD

## 2024-06-04 NOTE — PLAN OF CARE
Goal Outcome Evaluation:  Patient has been pleasant. Compliant with care and medications as ordered. Patient is currently resting in bed. No S&S of distress noted at this time. VSS. Plan of care ongoing.

## 2024-06-04 NOTE — PLAN OF CARE
Goal Outcome Evaluation: Pt admitted to 3S this shift, currently resting in bed on RA, saturations maintaining above 90%. No acute s/s of distress at this time. VSS. Will continue plan of care.

## 2024-06-04 NOTE — PAYOR COMM NOTE
"    Aminata Al (43 y.o. Female)       Date of Birth   1980    Social Security Number       Address   [address] [id number]    Home Phone   [phone]    MRN   [id number]       Church   None    Marital Status                               Admission Date   6/3/24    Admission Type   Emergency    Admitting Provider   Adin Sauceda MD    Attending Provider   Adin Sauceda MD    Department, Room/Bed   [address], 3309/1S       Discharge Date       Discharge Disposition       Discharge Destination                                 Attending Provider: Adin Sauceda MD    Allergies: Imuran [Azathioprine]    Isolation: None   Infection: None   Code Status: CPR    Ht: 172.7 cm (68\")   Wt: 97.8 kg (215 lb 9.6 oz)    Admission Cmt: None   Principal Problem: Hypokalemia [E87.6]                   Active Insurance as of 6/3/2024       Primary Coverage       Payor Plan Insurance Group Employer/Plan Group    [employer] [place] [employer] [place]ICA[place]        Payor Plan Address Payor Plan Phone Number Payor Plan Fax Number Effective Dates    [address] [address] [phone]  12/2/2016 - None Entered    [facility] [id number]         Subscriber Name Subscriber Birth Date Member ID       AMINATA AL 1980 [id number]                     Emergency Contacts        (Rel.) Home Phone Work Phone Mobile Phone    Itzel Ness (Mother) [phone] -- [phone]    Tran Yee (Relative) [phone] -- --                 History & Physical        Stanley Steward APRN at 06/04/24 0407       Attestation signed by Adin Sauceda MD at 06/04/24 0536    I have discussed this patient with DIEGO Manuel, and have reviewed this documentation. Suspect hypokalemia and hypomagnesemia are due to lasix use since discharge last month for acute on chronic diastolic CHF felt to be secondary to mitral valve stenosis. Does not appear fluid overloaded " clinically so hold lasix for now while replacing K+ and mag per protocol. Replace K+ by mouth to avoid causing fluid overload from IV K+ runs. WBC is mildly elevated but down slightly since last checked on , and patient was already treated for pneumonia with no signs/symptoms of ongoing or new infectious process at this time. Does not meet SIRS criteria as not febrile, tachycardic or tachypneic. Patient has some mild renal insufficiency with creatinine of 1.15, but this appears to be carried over since her hospitalization last month during which time creatinine was as high as 1.41 at time of admission before settling around 0.96-1.2 by time of discharge. Suspect diuretic usage could be contributing to this as well. Continue to monitor while holding diuretic for now.                        [facility] Medicine Services  HISTORY & PHYSICAL    Patient Identification:  Name:  Manda Al  Age:  43 y.o.  Sex:  female  :  1980  MRN:  [id number]   Visit Number:  [id number]  Admit Date: 6/3/2024   Primary Care Physician:  Mi Rivera MD     Subjective     Chief complaint:   Chief Complaint   Patient presents with    Abnormal Lab     History of presenting illness:   Patient is a 43 y.o. female with past medical history significant for heart failure, hypertension, Crohn's disease, bipolar disorder, anxiety, and depression that presented to the [facility] emergency department for evaluation of abnormal lab results.  The patient advises that she went to her primary care provider yesterday and had labs collected.  She advises that yesterday evening her primary care provider's office called and advised her to go to the emergency department for low potassium.  The patient states that she has been feeling well with no complications since her last hospitalization with pancreatitis.  The patient denies abdominal pain or chest pain.  On my physical examination, patient alert and  oriented x 3.  No acute distress noted.  Will initiate electrolyte replacement therapy.  The patient is in stable condition upon admission to the hospital.Upon arrival to the ED, vitals were temperature 97.4, /75, heart rate 78, respirations 17, and SpO2 96%. Labs obtained on arrival: Potassium 2.6, anion gap 15.6, creatinine 1.15, glucose 161, calcium 8.3, magnesium 1.1, WBC 14.8. Patient has been admitted to the telemetry unit for further evaluation and treatment.     Present during exam: N/A   ---------------------------------------------------------------------------------------------------------------------   Review of Systems   Constitutional: Negative.  Negative for chills, fatigue and fever.   HENT: Negative.  Negative for congestion, sore throat and trouble swallowing.    Eyes: Negative.    Respiratory: Negative.  Negative for cough, shortness of breath and wheezing.    Cardiovascular: Negative.  Negative for chest pain, palpitations and leg swelling.   Gastrointestinal: Negative.  Negative for abdominal pain, diarrhea, nausea and vomiting.   Endocrine: Negative.    Genitourinary: Negative.  Negative for dysuria.   Musculoskeletal: Negative.  Negative for neck pain and neck stiffness.   Skin: Negative.    Allergic/Immunologic: Negative.    Neurological: Negative.  Negative for dizziness, tremors, seizures, syncope, facial asymmetry, speech difficulty, weakness, light-headedness, numbness and headaches.   Hematological: Negative.    Psychiatric/Behavioral: Negative.         Otherwise 10-system ROS reviewed and is negative except as mentioned in the HPI.    ---------------------------------------------------------------------------------------------------------------------   Past Medical History:   Diagnosis Date    (HFpEF) heart failure with preserved ejection fraction     Anxiety     Asthma     Bipolar disorder     Crohn disease     Depression     Hypertension     Moderate mitral stenosis      Schizoaffective disorder     Severe pulmonary hypertension      Past Surgical History:   Procedure Laterality Date    APPENDECTOMY       SECTION      CHOLECYSTECTOMY      COLON SURGERY      COLONOSCOPY      MANDIBLE FRACTURE SURGERY      OVARIAN CYST SURGERY      TUBAL ABDOMINAL LIGATION       Family History   Problem Relation Age of Onset    Diabetes Mother     Anxiety disorder Mother     Depression Mother     Bipolar disorder Mother     COPD Father     Schizophrenia Father     Schizophrenia Paternal Grandfather     Breast cancer Neg Hx      Social History     Socioeconomic History    Marital status:    Tobacco Use    Smoking status: Every Day     Current packs/day: 1.00     Average packs/day: 1 pack/day for 20.0 years (20.0 ttl pk-yrs)     Types: Cigarettes    Smokeless tobacco: Never   Vaping Use    Vaping status: Never Used   Substance and Sexual Activity    Alcohol use: No     Comment: denies    Drug use: Yes     Types: Marijuana    Sexual activity: Never     ---------------------------------------------------------------------------------------------------------------------   Allergies:  Imuran [azathioprine]  ---------------------------------------------------------------------------------------------------------------------   Medications below are reported home medications pulling from within the system; at this time, these medications have not been reconciled unless otherwise specified and are in the verification process for further verifcation as current home medications.    Prior to Admission Medications       Prescriptions Last Dose Informant Patient Reported? Taking?    furosemide (LASIX) 40 MG tablet   No Yes    Take 1 tablet by mouth Daily for 30 days.    Insulin Glargine (Lantus SoloStar) 100 UNIT/ML injection pen   No Yes    Inject 15 Units under the skin into the appropriate area as directed Every Night. Formulary Compliance Approval    Insulin Lispro (humaLOG) 100 UNIT/ML injection    No Yes    Inject 2-9 Units under the skin into the appropriate area as directed 4 (Four) Times a Day After Meals & at Bedtime.    Cariprazine HCl (Vraylar) 3 MG capsule capsule  Self Yes No    Take 1 capsule by mouth Daily.    fexofenadine (ALLEGRA) 180 MG tablet  Self Yes No    Take 1 tablet by mouth Daily.    FLUoxetine (PROzac) 10 MG capsule  Self Yes No    Take 1 capsule by mouth Daily.    magnesium oxide (MAG-OX) 400 MG tablet  Self Yes No    Take 1 tablet by mouth Daily.    metFORMIN ER (GLUCOPHAGE-XR) 500 MG 24 hr tablet  Self Yes No    Take 2 tablets by mouth 2 (Two) Times a Day.    metoprolol succinate XL (Toprol XL) 200 MG 24 hr tablet  Self Yes No    Take 1 tablet by mouth Daily.          ---------------------------------------------------------------------------------------------------------------------    Objective     Hospital Scheduled Meds:  insulin glargine, 5 Units, Subcutaneous, Nightly  insulin lispro, 2-7 Units, Subcutaneous, 4x Daily AC & at Bedtime           Current listed hospital scheduled medications may not yet reflect those currently placed in orders that are signed and held, awaiting patient's arrival to floor/unit.    ---------------------------------------------------------------------------------------------------------------------   Vital Signs:  Temp:  [97.4 °F (36.3 °C)] 97.4 °F (36.3 °C)  Heart Rate:  [70-83] 77  Resp:  [17] 17  BP: (108-133)/(68-98) 115/76  Mean Arterial Pressure (Non-Invasive) for the past 24 hrs (Last 3 readings):   Noninvasive MAP (mmHg)   06/04/24 0345 87   06/04/24 0330 93   06/04/24 0300 85     SpO2 Percentage    06/04/24 0300 06/04/24 0330 06/04/24 0345   SpO2: 97% 98% 98%     SpO2:  [96 %-99 %] 98 %  on   ;   Device (Oxygen Therapy): room air    Body mass index is 30.11 kg/m².  Wt Readings from Last 3 Encounters:   06/03/24 89.8 kg (198 lb)   05/29/24 90.5 kg (199 lb 9.6 oz)   05/24/24 104 kg (229 lb 9.6 oz)        ---------------------------------------------------------------------------------------------------------------------   Physical Exam  Vitals and nursing note reviewed.   Constitutional:       General: She is awake. She is not in acute distress.     Appearance: Normal appearance. She is normal weight. She is not ill-appearing or diaphoretic.   HENT:      Head: Normocephalic and atraumatic.      Nose: Nose normal.      Mouth/Throat:      Mouth: Mucous membranes are moist.      Pharynx: Oropharynx is clear.   Eyes:      Extraocular Movements: Extraocular movements intact.      Pupils: Pupils are equal, round, and reactive to light.   Cardiovascular:      Rate and Rhythm: Normal rate and regular rhythm.      Pulses: Normal pulses.           Dorsalis pedis pulses are 2+ on the right side and 2+ on the left side.      Heart sounds: Normal heart sounds. No murmur heard.     No friction rub.   Pulmonary:      Effort: Pulmonary effort is normal. No accessory muscle usage, respiratory distress or retractions.      Breath sounds: Normal breath sounds. No wheezing, rhonchi or rales.   Abdominal:      General: Bowel sounds are normal. There is no distension.      Palpations: Abdomen is soft.      Tenderness: There is no abdominal tenderness. There is no guarding.   Musculoskeletal:         General: Normal range of motion.      Cervical back: Normal range of motion and neck supple. No rigidity.      Right lower leg: No edema.      Left lower leg: No edema.   Skin:     General: Skin is warm and dry.      Capillary Refill: Capillary refill takes 2 to 3 seconds.   Neurological:      General: No focal deficit present.      Mental Status: She is alert and oriented to person, place, and time. Mental status is at baseline.      Cranial Nerves: No dysarthria or facial asymmetry.      Sensory: Sensation is intact.      Motor: No weakness or tremor.   Psychiatric:         Attention and Perception: Attention normal.         Mood and  "Affect: Mood normal.         Speech: Speech normal.         Behavior: Behavior normal. Behavior is cooperative.         Thought Content: Thought content normal.         Cognition and Memory: Cognition normal.         Judgment: Judgment normal.          ---------------------------------------------------------------------------------------------------------------------  EKG: Normal sinus rhythm, unconfirmed by cardiology      Telemetry:    Normal sinus rhythm    I have personally reviewed the EKG/Telemetry strip  ---------------------------------------------------------------------------------------------------------------------             Results from last 7 days   Lab Units 06/03/24 1941 05/29/24  0926   CRP mg/dL  --  <0.30   WBC 10*3/mm3 14.85* 15.47*   HEMOGLOBIN g/dL 13.2 14.9   HEMATOCRIT % 38.7 45.1   MCV fL 87.8 91.3   MCHC g/dL 34.1 33.0   PLATELETS 10*3/mm3 378 553*     Results from last 7 days   Lab Units 06/04/24 0144 06/03/24 1941   SODIUM mmol/L  --  134*   POTASSIUM mmol/L 2.6* 2.5*   MAGNESIUM mg/dL  --  1.1*   CHLORIDE mmol/L  --  94*   CO2 mmol/L  --  24.4   BUN mg/dL  --  6   CREATININE mg/dL  --  1.15*   CALCIUM mg/dL  --  8.3*   GLUCOSE mg/dL  --  161*   ALBUMIN g/dL  --  3.8   BILIRUBIN mg/dL  --  0.4   ALK PHOS U/L  --  99   AST (SGOT) U/L  --  22   ALT (SGPT) U/L  --  21   Estimated Creatinine Clearance: 74.7 mL/min (A) (by C-G formula based on SCr of 1.15 mg/dL (H)).  No results found for: \"AMMONIA\"    No results found for: \"HGBA1C\", \"POCGLU\"  Lab Results   Component Value Date    HGBA1C 6.90 (H) 05/12/2024     Lab Results   Component Value Date    TSH 1.840 05/16/2024       Microbiology Results (last 10 days)       ** No results found for the last 240 hours. **           Pain Management Panel  More data exists         Latest Ref Rng & Units 8/11/2020 5/9/2017   Pain Management Panel   Amphetamine, Urine Qual Negative Positive  Negative    Barbiturates Screen, Urine Negative Negative  " Negative    Benzodiazepine Screen, Urine Negative Negative  Negative    Buprenorphine, Screen, Urine Negative Negative  Negative    Cocaine Screen, Urine Negative Negative  Negative    Methadone Screen , Urine Negative Negative  Negative      I have personally reviewed the above laboratory results.   ---------------------------------------------------------------------------------------------------------------------  Imaging Results (Last 7 Days)       ** No results found for the last 168 hours. **          I have personally reviewed the above radiology results.     Last Echocardiogram:  Results for orders placed during the hospital encounter of 05/12/24    Adult Transthoracic Echo Limited W/ Cont if Necessary Per Protocol    Interpretation Summary    Left ventricular systolic function is normal. Left ventricular ejection fraction appears to be 56 - 60%.    Left ventricular diastolic dysfunction is noted.    The left atrial cavity is moderately dilated.    The right atrial cavity is mildly  dilated.    There is mild, bileaflet mitral valve thickening present.    Moderate to severe mitral valve stenosis is present with diastolic doming of the valve leaflets, thickened leaflets and rheumatic changes of the mitral valve with estimated mean gradient of 7 mmHg and mitral valve area by pressure half-time of 1.3 cm² apparatus.    ---------------------------------------------------------------------------------------------------------------------    Assessment & Plan      ACUTE HOSPITAL PROBLEMS    -Acute hypokalemia, on admission  -Acute hypomagnesemia, on admission  -Acute hypocalcemia, on admission  -Acute leukocytosis, on admission    CMP and CBC in the a.m.  Electrolyte replacement protocol initiated  Monitor WBC count with CBC  Will replace potassium by mouth      -F/E/N  Replace electrolytes per protocol as necessary.  Diabetic diet.     CHRONIC MEDICAL PROBLEMS    -Heart failure (HFpEF)  -Diabetes  -Essential  hypertension  -Crohn's disease  -Asthma  -Anxiety  -Depression  -Bipolar disorder    Vital signs per hospital policy  Insulin sliding scale  Blood glucose checks before meals and at bedtime  Hold oral hypoglycemic medications  Continue home medication regimen on pharmacy reconciliation  ---------------------------------------------------  DVT Prophylaxis: Heparin 5000 and  Activity: Up with assistance  ---------------------------------------------------  The patient is considered to be a high risk patient due to: Low potassium level    OBSERVATION status; however, if further evaluation or treatment plans warrant, status may change.  Based upon current information, I predict patient's care encounter to be less than or equal to 2 midnights     Code Status: Full Code  ---------------------------------------------------  Disposition/Discharge planning: Consult case management for discharge planning.  ---------------------------------------------------  I have discussed the patient's assessment and plan with attending physician Adin Hernandez MD Carl B Gray, DIEGO     06/04/24  04:07 EDT    Attending Physician: Adin Sauceda MD        Electronically signed by Adin Sauceda MD at 06/04/24 5369          Emergency Department Notes        Juvenal Wall II, PA at 06/04/24 9188       Attestation signed by Antonio Yarbrough MD at 06/04/24 9949        SUPERVISE: For this patient encounter, I reviewed the APC's documentation, treatment plan, and medical decision making.  Antonio Yarbrough MD 6/4/2024 04:18 EDT                         Subjective   History of Present Illness  43-year-old female presents to the ER chief complaint of abnormal lab.  Patient verbalized she went to her PCPs office and her potassium was checked and noticed it was 2.1.  Patient was told to go to the emergency department.  Patient's past medical history is positive for recent hospital admission.  Patient had is recently been  started on Lasix secondary to heart failure with preserved ejection fraction.  Patient was not started on any type of potassium supplement.  Patient is denying any acute complaints.        Review of Systems   Constitutional: Negative.  Negative for fever.   HENT: Negative.     Respiratory: Negative.     Cardiovascular: Negative.  Negative for chest pain.   Gastrointestinal: Negative.  Negative for abdominal pain.   Endocrine: Negative.    Genitourinary: Negative.  Negative for dysuria.   Skin: Negative.    Neurological: Negative.    Psychiatric/Behavioral: Negative.     All other systems reviewed and are negative.      Past Medical History:   Diagnosis Date    (HFpEF) heart failure with preserved ejection fraction     Anxiety     Asthma     Bipolar disorder     Crohn disease     Depression     Hypertension     Moderate mitral stenosis     Schizoaffective disorder     Severe pulmonary hypertension        Allergies   Allergen Reactions    Imuran [Azathioprine] Other (See Comments)     Pt reports this medication caused her to have pancreatitis.        Past Surgical History:   Procedure Laterality Date    APPENDECTOMY       SECTION      CHOLECYSTECTOMY      COLON SURGERY      COLONOSCOPY      MANDIBLE FRACTURE SURGERY      OVARIAN CYST SURGERY      TUBAL ABDOMINAL LIGATION         Family History   Problem Relation Age of Onset    Diabetes Mother     Anxiety disorder Mother     Depression Mother     Bipolar disorder Mother     COPD Father     Schizophrenia Father     Schizophrenia Paternal Grandfather     Breast cancer Neg Hx        Social History     Socioeconomic History    Marital status:    Tobacco Use    Smoking status: Every Day     Current packs/day: 1.00     Average packs/day: 1 pack/day for 20.0 years (20.0 ttl pk-yrs)     Types: Cigarettes    Smokeless tobacco: Never   Vaping Use    Vaping status: Never Used   Substance and Sexual Activity    Alcohol use: No     Comment: denies    Drug use: Yes      Types: Marijuana    Sexual activity: Never           Objective   Physical Exam  Vitals and nursing note reviewed.   Constitutional:       General: She is not in acute distress.     Appearance: She is well-developed. She is not diaphoretic.   HENT:      Head: Normocephalic and atraumatic.      Right Ear: External ear normal.      Left Ear: External ear normal.      Nose: Nose normal.   Eyes:      Conjunctiva/sclera: Conjunctivae normal.   Neck:      Vascular: No JVD.      Trachea: No tracheal deviation.   Cardiovascular:      Rate and Rhythm: Normal rate and regular rhythm.      Heart sounds: Normal heart sounds. No murmur heard.  Pulmonary:      Effort: Pulmonary effort is normal. No respiratory distress.      Breath sounds: Normal breath sounds. No wheezing.   Abdominal:      Palpations: Abdomen is soft.      Tenderness: There is no abdominal tenderness.   Musculoskeletal:         General: No deformity. Normal range of motion.      Cervical back: Normal range of motion and neck supple.   Skin:     General: Skin is warm and dry.      Coloration: Skin is not pale.      Findings: No erythema or rash.   Neurological:      Mental Status: She is alert and oriented to person, place, and time.      Cranial Nerves: No cranial nerve deficit.   Psychiatric:         Behavior: Behavior normal.         Thought Content: Thought content normal.         Procedures          ED Course  ED Course as of 06/04/24 0411   Mon Jun 03, 2024   1935 ECG18:55 NSR, rate 74. Nonspecific ST and T abnormality. qT/qTc 404/448 [GOPI]   Tue Jun 04, 2024   0329 Discussed with Dr. Sauceda who is agreeable to admit this patient.   [RB]      ED Course User Index  [GOPI] Armando Veloz MD  [RB] Juvenal Wall II, PA                                             Medical Decision Making  43-year-old female with chief complaint of abnormal labs obtained at PCPs office.    Problems Addressed:  Hypokalemia: acute illness or injury     Details: Patient did have  noted hypokalemia.  Patient also had hypomagnesia as well.  IV magnesium given in the ED.  As well as oral potassium.  Patient's potassium was rechecked and noted to only increase 0.1.  Secondary to this patient will be admitted for hypokalemia.  Discussion with hospitalist who was agreeable to admit the patient.    Amount and/or Complexity of Data Reviewed  External Data Reviewed: notes.  Labs: ordered.  ECG/medicine tests: ordered.    Risk  Prescription drug management.        Final diagnoses:   Hypokalemia       ED Disposition  ED Disposition       ED Disposition   Decision to Admit    Condition   --    Comment   Level of Care: Telemetry [5]   Diagnosis: Hypokalemia [629768]   Admitting Physician: JOSESITO ELY [[id number]]   Attending Physician: JOSESITO ELY [[id number]]                 No follow-up provider specified.       Medication List      No changes were made to your prescriptions during this visit.            Juvenal Wall II, PA  06/04/24 0412      Electronically signed by Antonio Yarbrough MD at 06/04/24 0418       Juvenal Wall II, PA at 06/03/24 2214       Attestation signed by Antonio Yarbrough MD at 06/04/24 0305    I have reviewed the notes, assessments, and/or procedures performed by the TONI and I concur with her/his documentation.                           MEDICAL SCREENING:    Reason for Visit: abnormal lab, low K+    Patient initially seen in triage.  The patient was advised further evaluation and diagnostic testing will be needed, some of the treatment and testing will be initiated in the lobby in order to begin the process.  The patient will be returned to the waiting area for the time being and possibly be re-assessed by a subsequent ED provider.  The patient will be brought back to the treatment area in as timely manner as possible.       Juvenal Wall II, PA  06/03/24 2215      Electronically signed by Antonio Yarbrough MD at 06/04/24 0305       Kelsey Cerda, RN at 06/03/24 2035           Acuity increased at this time r/t critical lab results.     Electronically signed by Kelsey Cerda RN at 06/03/24 2108       Vital Signs (last day)       Date/Time Temp Temp src Pulse Resp BP Patient Position SpO2    06/04/24 0504 98.2 (36.8) Oral 75 -- 117/67 Sitting 98    06/04/24 0445 -- -- 75 -- 113/80 -- 96    06/04/24 0430 -- -- 78 -- 116/77 -- 96    06/04/24 0415 -- -- 78 -- 116/75 -- 96    06/04/24 0400 -- -- 78 -- 111/80 -- 94    06/04/24 0345 -- -- 77 -- 115/76 -- 98    06/04/24 0330 -- -- 78 -- 117/82 -- 98    06/04/24 0300 -- -- 75 -- 118/79 -- 97    06/04/24 0245 -- -- 77 -- 116/78 -- 97    06/04/24 0230 -- -- 76 -- 115/78 -- 97    06/04/24 0215 -- -- 75 -- 116/81 -- 97    06/04/24 0200 -- -- 75 -- 108/68 -- 96    06/04/24 0145 -- -- 75 -- 124/71 -- 98    06/04/24 0130 -- -- 81 -- 126/72 -- 99    06/04/24 0115 -- -- 77 -- 128/83 -- 96    06/04/24 0100 -- -- 77 -- 130/81 -- 98    06/04/24 0057 -- -- 78 -- 133/83 -- 99    06/04/24 0030 -- -- -- -- 127/98 -- --    06/04/24 0000 -- -- 83 -- 118/75 -- 99    06/03/24 2345 -- -- 79 -- 120/83 -- 99    06/03/24 2330 -- -- 80 -- 119/81 -- 96    06/03/24 2315 -- -- 71 -- 117/83 -- 98    06/03/24 2300 -- -- 78 -- 118/80 -- 99    06/03/24 1837 97.4 (36.3) Oral 70 17 124/75 Sitting 99          Intake & Output (last day)       None          Medication Administration Report for Manda Al as of 6/3/24 through 6/4/24     Legend:    Given Hold Not Given Due Canceled Entry Other Actions    Time Time (Time) Time Time-Action         Discontinued     Completed     Future     MAR Hold     Linked             Medications 06/03/24 06/04/24      sennosides-docusate (PERICOLACE) 8.6-50 MG per tablet 2 tablet  Dose: 2 tablet  Freq: 2 Times Daily PRN Route: PO  PRN Reason: Constipation  Start: 06/04/24 0503     Admin Instructions:   Start bowel management regimen if patient has not had a bowel movement after 12 hours.          And   polyethylene glycol (MIRALAX)  packet 17 g  Dose: 17 g  Freq: Daily PRN Route: PO  PRN Reason: Constipation  PRN Comment: Use if senna-docusate is ineffective  Start: 06/04/24 0503     Admin Instructions:   Use if no bowel movement after 12 hours. Mix in 6-8 ounces of water.  Use 4-8 ounces of water, tea, or juice for each 17 gram dose.          And   bisacodyl (DULCOLAX) EC tablet 5 mg  Dose: 5 mg  Freq: Daily PRN Route: PO  PRN Reason: Constipation  PRN Comment: Use if polyethylene glycol is ineffective  Start: 06/04/24 0503     Admin Instructions:   Use if no bowel movement after 12 hours.  Swallow whole. Do not crush, split, or chew tablet.          And   bisacodyl (DULCOLAX) suppository 10 mg  Dose: 10 mg  Freq: Daily PRN Route: RE  PRN Reason: Constipation  PRN Comment: Use if bisacodyl oral is ineffective  Start: 06/04/24 0503     Admin Instructions:   Use if no bowel movement after 12 hours.  Hold for diarrhea          dextrose (D50W) (25 g/50 mL) IV injection 25 g  Dose: 25 g  Freq: Every 15 Minutes PRN Route: IV  PRN Reason: Low Blood Sugar  PRN Comment: Blood Sugar Less Than 70  Start: 06/04/24 0330     Admin Instructions:   Blood sugar less than 70; patient has IV access - Unresponsive, NPO or Unable To Safely Swallow          dextrose (GLUTOSE) oral gel 15 g  Dose: 15 g  Freq: Every 15 Minutes PRN Route: PO  PRN Reason: Low Blood Sugar  PRN Comment: Blood sugar less than 70  Start: 06/04/24 0330     Admin Instructions:   BS<70, Patient Alert, Is not NPO, Can safely swallow.          glucagon HCl (Diagnostic) injection 1 mg  Dose: 1 mg  Freq: Every 15 Minutes PRN Route: IM  PRN Reason: Low Blood Sugar  PRN Comment: Blood Glucose Less Than 70  Start: 06/04/24 0330     Admin Instructions:   Blood Glucose Less Than 70 - Patient Without IV Access - Unresponsive, NPO or Unable To Safely Swallow  Reconstitute powder for injection by adding 1 mL of -supplied sterile diluent or sterile water for injection to a vial containing 1 mg  "of the drug, to provide solutions containing 1 mg/mL. Shake vial gently to dissolve.          Insulin Lispro (humaLOG) injection 2-7 Units  Dose: 2-7 Units  Freq: 4 Times Daily Before Meals & Nightly Route: SC  Start: 06/04/24 0730     Admin Instructions:   Correction Insulin - Low Dose - Total Insulin Dose Less Than 40 units/day (Lean, Elderly or Renal Patients)    Blood Glucose 150-199 mg/dL - 2 units  Blood Glucose 200-249 mg/dL - 3 units  Blood Glucose 250-299 mg/dL - 4 units  Blood Glucose 300-349 mg/dL - 5 units  Blood Glucose 350-400 mg/dL - 6 units  Blood Glucose Greater Than 400 mg/dL - 7 units & Call Provider   Caution: Look alike/sound alike drug alert       3190 2013 3807     2100           Magnesium Cardiology Dose Replacement - Follow Nurse / BPA Driven Protocol  Freq: As Needed Route: XX  PRN Reason: Other  Start: 06/04/24 0327     Admin Instructions:   Open Order & Select \"[facility] Electrolyte Replacement Protocol Algorithm\" to View Details          nitroglycerin (NITROSTAT) SL tablet 0.4 mg  Dose: 0.4 mg  Freq: Every 5 Minutes PRN Route: SL  PRN Reason: Chest Pain  PRN Comment: Only if SBP Greater Than 100  Start: 06/04/24 0503     Admin Instructions:   If Pain Unrelieved After 3 Doses Notify MD  May administer up to 3 doses per episode.          Potassium Replacement - Follow Nurse / BPA Driven Protocol  Freq: As Needed Route: XX  PRN Reason: Other  Start: 06/04/24 0327     Admin Instructions:   Open Order & Select \"[facility] Electrolyte Replacement Protocol Algorithm\" to View Details          sodium chloride 0.9 % flush 10 mL  Dose: 10 mL  Freq: As Needed Route: IV  PRN Reason: Line Care  Start: 06/04/24 0503           sodium chloride 0.9 % flush 10 mL  Dose: 10 mL  Freq: As Needed Route: IV  PRN Reason: Line Care  Start: 06/03/24 2245          sodium chloride 0.9 % infusion 40 mL  Dose: 40 mL  Freq: As Needed Route: IV  PRN Reason: Line Care  Start: 06/04/24 0503     Admin Instructions:   Following " administration of an IV intermittent medication, flush line with 40mL NS at 100mL/hr.          Future Medications  Medications 06/03/24 06/04/24      heparin (porcine) 5000 UNIT/ML injection 5,000 Units  Dose: 5,000 Units  Freq: Every 12 Hours Scheduled Route: SC  Indications of Use: PROPHYLAXIS OF VENOUS THROMBOEMBOLISM  Start: 06/04/24 0900       0900     2100             insulin glargine (LANTUS, SEMGLEE) injection 5 Units  Dose: 5 Units  Freq: Nightly Route: SC  Start: 06/04/24 2100     Admin Instructions:   Do not hold basal insulin without an order. Consider requesting a dose edit, if needed.         2100              sodium chloride 0.9 % flush 10 mL  Dose: 10 mL  Freq: Every 12 Hours Scheduled Route: IV  Start: 06/04/24 0900       0900     2100             Completed Medications  Medications 06/03/24 06/04/24      magnesium sulfate 2g/50 mL (PREMIX) infusion  Dose: 2 g  Freq: Once Route: IV  Start: 06/03/24 2302 End: 06/04/24 0037 2337-New Bag            0037-Stopped              potassium chloride (KLOR-CON) packet 40 mEq  Dose: 40 mEq  Freq: Once Route: PO  Start: 06/04/24 0257 End: 06/04/24 0248     Admin Instructions:   For use with a feeding tube. Mix in at least 4 oz. of liquid.       0248-Given              potassium chloride (KLOR-CON) packet 40 mEq  Dose: 40 mEq  Freq: Once Route: PO  Start: 06/03/24 2302 End: 06/03/24 2308     Admin Instructions:   For use with a feeding tube. Mix in at least 4 oz. of liquid.      2308-Given               sodium chloride 0.9 % bolus 1,000 mL  Dose: 1,000 mL  Freq: Once Route: IV  Start: 06/03/24 2309 End: 06/04/24 0007      2337-New Bag            0007-Stopped                          Lab Results (all)       Procedure Component Value Units Date/Time    POC Glucose Once [[id number]]  (Abnormal) Collected: 06/04/24 0528    Specimen: Blood Updated: 06/04/24 0534     Glucose 148 mg/dL     Potassium [[id number]]  (Abnormal) Collected: 06/04/24 0144    Specimen: Blood  from Arm, Right Updated: 06/04/24 0208     Potassium 2.6 mmol/L      Comment: Slight hemolysis detected by analyzer. Result may be falsely elevated.       Urinalysis With Microscopic If Indicated (No Culture) - Urine, Clean Catch [[id number]]  (Abnormal) Collected: 06/04/24 0035    Specimen: Urine, Clean Catch Updated: 06/04/24 0041     Color, UA Yellow     Appearance, UA Cloudy     pH, UA 6.5     Specific Gravity, UA 1.015     Glucose, UA Negative     Ketones, UA Trace     Bilirubin, UA Negative     Blood, UA Negative     Protein, UA Trace     Leuk Esterase, UA Negative     Nitrite, UA Negative     Urobilinogen, UA 0.2 E.U./dL    Narrative:      Urine microscopic not indicated.    Comprehensive Metabolic Panel [[id number]]  (Abnormal) Collected: 06/03/24 1941    Specimen: Blood Updated: 06/03/24 2031     Glucose 161 mg/dL      BUN 6 mg/dL      Creatinine 1.15 mg/dL      Sodium 134 mmol/L      Potassium 2.5 mmol/L      Comment: Slight hemolysis detected by analyzer. Result may be falsely elevated.        Chloride 94 mmol/L      CO2 24.4 mmol/L      Calcium 8.3 mg/dL      Total Protein 7.5 g/dL      Albumin 3.8 g/dL      ALT (SGPT) 21 U/L      AST (SGOT) 22 U/L      Alkaline Phosphatase 99 U/L      Total Bilirubin 0.4 mg/dL      Globulin 3.7 gm/dL      A/G Ratio 1.0 g/dL      BUN/Creatinine Ratio 5.2     Anion Gap 15.6 mmol/L      eGFR 61.1 mL/min/1.73     Narrative:      GFR Normal >60  Chronic Kidney Disease <60  Kidney Failure <15      Magnesium [[id number]]  (Abnormal) Collected: 06/03/24 1941    Specimen: Blood Updated: 06/03/24 2023     Magnesium 1.1 mg/dL     CBC & Differential [[id number]]  (Abnormal) Collected: 06/03/24 1941    Specimen: Blood Updated: 06/03/24 2006    Narrative:      The following orders were created for panel order CBC & Differential.  Procedure                               Abnormality         Status                     ---------                               -----------         ------                      CBC Auto Differential[[id number]]        Abnormal            Final result               Scan Slide[[id number]]                                                                    Please view results for these tests on the individual orders.    CBC Auto Differential [[id number]]  (Abnormal) Collected: 06/03/24 1941    Specimen: Blood Updated: 06/03/24 2005     WBC 14.85 10*3/mm3      RBC 4.41 10*6/mm3      Hemoglobin 13.2 g/dL      Hematocrit 38.7 %      MCV 87.8 fL      MCH 29.9 pg      MCHC 34.1 g/dL      RDW 13.5 %      RDW-SD 43.1 fl      MPV 9.9 fL      Platelets 378 10*3/mm3     Manual Differential [[id number]]  (Abnormal) Collected: 06/03/24 1941    Specimen: Blood Updated: 06/03/24 2005     Neutrophil % 35.0 %      Lymphocyte % 55.0 %      Comment: Few Atypical/Reactive Lymphocytes noted.         Monocyte % 9.0 %      Eosinophil % 1.0 %      Neutrophils Absolute 5.20 10*3/mm3      Lymphocytes Absolute 8.17 10*3/mm3      Monocytes Absolute 1.34 10*3/mm3      Eosinophils Absolute 0.15 10*3/mm3      Anisocytosis Slight/1+     Platelet Estimate Adequate    [place] Draw [[id number]] Collected: 06/03/24 1941    Specimen: Blood Updated: 06/03/24 1946    Narrative:      The following orders were created for panel order [place] Draw.  Procedure                               Abnormality         Status                     ---------                               -----------         ------                     Green Top (Gel)[[id number]]                                  Final result               Lavender Top[[id number]]                                     Final result               Gold Top - SST[[id number]]                                   Final result               Light Blue Top[[id number]]                                   Final result                 Please view results for these tests on the individual orders.    Green Top (Gel) [[id number]] Collected: 06/03/24 1941    Specimen: Blood Updated: 06/03/24 1946      Extra Tube Hold for add-ons.     Comment: Auto resulted.       Lavender Top [[id number]] Collected: 06/03/24 1941    Specimen: Blood Updated: 06/03/24 1946     Extra Tube hold for add-on     Comment: Auto resulted       Gold Top - SST [[id number]] Collected: 06/03/24 1941    Specimen: Blood Updated: 06/03/24 1946     Extra Tube Hold for add-ons.     Comment: Auto resulted.       Light Blue Top [[id number]] Collected: 06/03/24 1941    Specimen: Blood Updated: 06/03/24 1946     Extra Tube Hold for add-ons.     Comment: Auto resulted             Imaging Results (All)       None          ECG/EMG Results (all)       Procedure Component Value Units Date/Time    ECG 12 Lead Electrolyte Imbalance [[id number]] Collected: 06/03/24 1855     Updated: 06/03/24 1856     QT Interval 404 ms      QTC Interval 448 ms     Narrative:      Test Reason : Electrolyte Imbalance  Blood Pressure :   */*   mmHG  Vent. Rate :  74 BPM     Atrial Rate :  74 BPM     P-R Int : 144 ms          QRS Dur :  74 ms      QT Int : 404 ms       P-R-T Axes :  25  26  67 degrees     QTc Int : 448 ms    Normal sinus rhythm  Nonspecific ST and T wave abnormality  Abnormal ECG  When compared with ECG of 21-MAY-2024 08:56,  No significant change was found    Referred By: MARY JO           Confirmed By:     Telemetry Scan [[id number]] Resulted: 06/03/24     Updated: 06/04/24 0608          Orders (all)        Start     Ordered    06/04/24 2100  insulin glargine (LANTUS, SEMGLEE) injection 5 Units  Nightly         06/04/24 0330    06/04/24 0900  sodium chloride 0.9 % flush 10 mL  Every 12 Hours Scheduled         06/04/24 0503    06/04/24 0900  heparin (porcine) 5000 UNIT/ML injection 5,000 Units  Every 12 Hours Scheduled         06/04/24 0503    06/04/24 0800  Basic Metabolic Panel  Timed         06/04/24 0328    06/04/24 0800  Vital Signs  Every 8 Hours        Comments: Per per hospital policy    06/04/24 0503    06/04/24 0800  Oral Care  2 Times Daily       06/04/24  0503    06/04/24 0800  Magnesium  Timed         06/04/24 0332    06/04/24 0730  Insulin Lispro (humaLOG) injection 2-7 Units  4 Times Daily Before Meals & Nightly         06/04/24 0330    06/04/24 0700  POC Glucose 4x Daily Before Meals & at Bedtime  4 Times Daily Before Meals & at Bedtime      Comments: Complete no more than 45 minutes prior to patient eating      06/04/24 0330    06/04/24 0600  Strict Intake & Output  Every 6 Hours         06/04/24 0503    06/04/24 0535  POC Glucose Once  PROCEDURE ONCE        Comments: Complete no more than 45 minutes prior to patient eating      06/04/24 0528    06/04/24 0504  Notify Physician (with Parameters)  Until Discontinued         06/04/24 0503    06/04/24 0504  Insert Peripheral IV  Once         06/04/24 0503    06/04/24 0504  Saline Lock & Maintain IV Access  Continuous,   Status:  Canceled         06/04/24 0503    06/04/24 0504  Continuous Cardiac Monitoring  Continuous        Comments: Follow Standing Orders As Outlined in Process Instructions (Open Order Report to View Full Instructions)    06/04/24 0503    06/04/24 0504  Maintain IV Access  Continuous         06/04/24 0503    06/04/24 0504  Telemetry - Place Orders & Notify Provider of Results When Patient Experiences Acute Chest Pain, Dysrhythmia or Respiratory Distress  Continuous        Comments: Open Order Report to View Parameters Requiring Provider Notification    06/04/24 0503    06/04/24 0504  May Be Off Telemetry for Tests  Continuous         06/04/24 0503    06/04/24 0504  Continuous Pulse Oximetry  Continuous         06/04/24 0503    06/04/24 0504  Daily Weights  Daily       06/04/24 0503    06/04/24 0504  Diet: Cardiac, Diabetic; Healthy Heart (2-3 Na+); Consistent Carbohydrate; Fluid Consistency: Thin (IDDSI 0)  Diet Effective Now         06/04/24 0503    06/04/24 0503  sodium chloride 0.9 % flush 10 mL  As Needed         06/04/24 0503    06/04/24 0503  sodium chloride 0.9 % infusion 40 mL  As Needed    "      06/04/24 0503    06/04/24 0503  nitroglycerin (NITROSTAT) SL tablet 0.4 mg  Every 5 Minutes PRN         06/04/24 0503    06/04/24 0503  sennosides-docusate (PERICOLACE) 8.6-50 MG per tablet 2 tablet  2 Times Daily PRN        Placed in \"And\" Linked Group    06/04/24 0503    06/04/24 0503  polyethylene glycol (MIRALAX) packet 17 g  Daily PRN        Placed in \"And\" Linked Group    06/04/24 0503    06/04/24 0503  bisacodyl (DULCOLAX) EC tablet 5 mg  Daily PRN        Placed in \"And\" Linked Group    06/04/24 0503    06/04/24 0503  bisacodyl (DULCOLAX) suppository 10 mg  Daily PRN        Placed in \"And\" Linked Group    06/04/24 0503    06/04/24 0332  Initiate Observation Status  Once         06/04/24 0332    06/04/24 0332  Code Status and Medical Interventions:  Continuous         06/04/24 0332    06/04/24 0330  dextrose (GLUTOSE) oral gel 15 g  Every 15 Minutes PRN         06/04/24 0330    06/04/24 0330  dextrose (D50W) (25 g/50 mL) IV injection 25 g  Every 15 Minutes PRN         06/04/24 0330    06/04/24 0330  glucagon HCl (Diagnostic) injection 1 mg  Every 15 Minutes PRN         06/04/24 0330    06/04/24 0329  Only give potassium protocol orally, not IV  Nursing Communication  Once        Comments: Only give potassium protocol orally, not IV    06/04/24 0329    06/04/24 0327  Magnesium Cardiology Dose Replacement - Follow Nurse / BPA Driven Protocol  As Needed         06/04/24 0327    06/04/24 0327  Potassium Replacement - Follow Nurse / BPA Driven Protocol  As Needed         06/04/24 0327    06/04/24 0257  potassium chloride (KLOR-CON) packet 40 mEq  Once         06/04/24 0241    06/04/24 0109  Potassium  STAT         06/04/24 0108    06/03/24 2309  sodium chloride 0.9 % bolus 1,000 mL  Once         06/03/24 2253    06/03/24 2302  magnesium sulfate 2g/50 mL (PREMIX) infusion  Once         06/03/24 2246    06/03/24 2302  potassium chloride (KLOR-CON) packet 40 mEq  Once         06/03/24 2246 06/03/24 2246  " "Insert Peripheral IV  Once        Placed in \"And\" Linked Group    06/03/24 2246 06/03/24 2245  sodium chloride 0.9 % flush 10 mL  As Needed        Placed in \"And\" Linked Group    06/03/24 2246    06/03/24 2003  Manual Differential  Once         06/03/24 2002 06/03/24 1947  Scan Slide  Once,   Status:  Canceled         06/03/24 1946    06/03/24 1929  CBC & Differential  Once         06/03/24 1928    06/03/24 1929  Comprehensive Metabolic Panel  Once         06/03/24 1928    06/03/24 1929  Urinalysis With Microscopic If Indicated (No Culture) - Urine, Clean Catch  Once         06/03/24 1928    06/03/24 1929  Magnesium  Once         06/03/24 1928    06/03/24 1929  CBC Auto Differential  PROCEDURE ONCE         06/03/24 1929    06/03/24 1839  [place] Draw  Once         06/03/24 1838    06/03/24 1839  Green Top (Gel)  PROCEDURE ONCE         06/03/24 1838    06/03/24 1839  Lavender Top  PROCEDURE ONCE         06/03/24 1838    06/03/24 1839  Gold Top - SST  PROCEDURE ONCE         06/03/24 1838    06/03/24 1839  Light Blue Top  PROCEDURE ONCE         06/03/24 1838    06/03/24 1838  ECG 12 Lead Electrolyte Imbalance  STAT         06/03/24 1838    06/03/24 0000  Telemetry Scan  Once         06/03/24 0000    Unscheduled  Follow Hypoglycemia Standing Orders For Blood Glucose <70 & Notify Provider of Treatment  As Needed      Comments: Follow Hypoglycemia Orders As Outlined in Process Instructions (Open Order Report to View Full Instructions)  Notify Provider Any Time Hypoglycemia Treatment is Administered    06/04/24 0330    Unscheduled  Up With Assistance  As Needed       06/04/24 0503                  "

## 2024-06-04 NOTE — ED PROVIDER NOTES
Subjective   History of Present Illness  43-year-old female presents to the ER chief complaint of abnormal lab.  Patient verbalized she went to her PCPs office and her potassium was checked and noticed it was 2.1.  Patient was told to go to the emergency department.  Patient's past medical history is positive for recent hospital admission.  Patient had is recently been started on Lasix secondary to heart failure with preserved ejection fraction.  Patient was not started on any type of potassium supplement.  Patient is denying any acute complaints.        Review of Systems   Constitutional: Negative.  Negative for fever.   HENT: Negative.     Respiratory: Negative.     Cardiovascular: Negative.  Negative for chest pain.   Gastrointestinal: Negative.  Negative for abdominal pain.   Endocrine: Negative.    Genitourinary: Negative.  Negative for dysuria.   Skin: Negative.    Neurological: Negative.    Psychiatric/Behavioral: Negative.     All other systems reviewed and are negative.      Past Medical History:   Diagnosis Date    (HFpEF) heart failure with preserved ejection fraction     Anxiety     Asthma     Bipolar disorder     Crohn disease     Depression     Hypertension     Moderate mitral stenosis     Schizoaffective disorder     Severe pulmonary hypertension        Allergies   Allergen Reactions    Imuran [Azathioprine] Other (See Comments)     Pt reports this medication caused her to have pancreatitis.        Past Surgical History:   Procedure Laterality Date    APPENDECTOMY       SECTION      CHOLECYSTECTOMY      COLON SURGERY      COLONOSCOPY      MANDIBLE FRACTURE SURGERY      OVARIAN CYST SURGERY      TUBAL ABDOMINAL LIGATION         Family History   Problem Relation Age of Onset    Diabetes Mother     Anxiety disorder Mother     Depression Mother     Bipolar disorder Mother     COPD Father     Schizophrenia Father     Schizophrenia Paternal Grandfather     Breast cancer Neg Hx        Social History      Socioeconomic History    Marital status:    Tobacco Use    Smoking status: Every Day     Current packs/day: 1.00     Average packs/day: 1 pack/day for 20.0 years (20.0 ttl pk-yrs)     Types: Cigarettes    Smokeless tobacco: Never   Vaping Use    Vaping status: Never Used   Substance and Sexual Activity    Alcohol use: No     Comment: denies    Drug use: Yes     Types: Marijuana    Sexual activity: Never           Objective   Physical Exam  Vitals and nursing note reviewed.   Constitutional:       General: She is not in acute distress.     Appearance: She is well-developed. She is not diaphoretic.   HENT:      Head: Normocephalic and atraumatic.      Right Ear: External ear normal.      Left Ear: External ear normal.      Nose: Nose normal.   Eyes:      Conjunctiva/sclera: Conjunctivae normal.   Neck:      Vascular: No JVD.      Trachea: No tracheal deviation.   Cardiovascular:      Rate and Rhythm: Normal rate and regular rhythm.      Heart sounds: Normal heart sounds. No murmur heard.  Pulmonary:      Effort: Pulmonary effort is normal. No respiratory distress.      Breath sounds: Normal breath sounds. No wheezing.   Abdominal:      Palpations: Abdomen is soft.      Tenderness: There is no abdominal tenderness.   Musculoskeletal:         General: No deformity. Normal range of motion.      Cervical back: Normal range of motion and neck supple.   Skin:     General: Skin is warm and dry.      Coloration: Skin is not pale.      Findings: No erythema or rash.   Neurological:      Mental Status: She is alert and oriented to person, place, and time.      Cranial Nerves: No cranial nerve deficit.   Psychiatric:         Behavior: Behavior normal.         Thought Content: Thought content normal.         Procedures           ED Course  ED Course as of 06/04/24 0411   Mon Jun 03, 2024   1935 ECG18:55 NSR, rate 74. Nonspecific ST and T abnormality. qT/qTc 404/448 [GOPI]   e Jun 04, 2024   0329 Discussed with   Sohail who is agreeable to admit this patient.   [RB]      ED Course User Index  [GOPI] Armando Veloz MD  [RB] Juvenal Wall II, PA                                             Medical Decision Making  43-year-old female with chief complaint of abnormal labs obtained at PCPs office.    Problems Addressed:  Hypokalemia: acute illness or injury     Details: Patient did have noted hypokalemia.  Patient also had hypomagnesia as well.  IV magnesium given in the ED.  As well as oral potassium.  Patient's potassium was rechecked and noted to only increase 0.1.  Secondary to this patient will be admitted for hypokalemia.  Discussion with hospitalist who was agreeable to admit the patient.    Amount and/or Complexity of Data Reviewed  External Data Reviewed: notes.  Labs: ordered.  ECG/medicine tests: ordered.    Risk  Prescription drug management.        Final diagnoses:   Hypokalemia       ED Disposition  ED Disposition       ED Disposition   Decision to Admit    Condition   --    Comment   Level of Care: Telemetry [5]   Diagnosis: Hypokalemia [032946]   Admitting Physician: JOSESITO ELY [[id number]]   Attending Physician: JOSESITO ELY [[id number]]                 No follow-up provider specified.       Medication List      No changes were made to your prescriptions during this visit.            Juvenal Wall II, PA  06/04/24 3454

## 2024-06-05 ENCOUNTER — READMISSION MANAGEMENT (OUTPATIENT)
Dept: CALL CENTER | Facility: HOSPITAL | Age: 44
End: 2024-06-05
Payer: COMMERCIAL

## 2024-06-05 LAB
ALBUMIN SERPL-MCNC: 3.1 G/DL (ref 3.5–5.2)
ALBUMIN/GLOB SERPL: 1.1 G/DL
ALP SERPL-CCNC: 81 U/L (ref 39–117)
ALT SERPL W P-5'-P-CCNC: 20 U/L (ref 1–33)
ANION GAP SERPL CALCULATED.3IONS-SCNC: 9.1 MMOL/L (ref 5–15)
AST SERPL-CCNC: 21 U/L (ref 1–32)
BASOPHILS # BLD MANUAL: 0.09 10*3/MM3 (ref 0–0.2)
BASOPHILS NFR BLD MANUAL: 1 % (ref 0–1.5)
BILIRUB SERPL-MCNC: 0.3 MG/DL (ref 0–1.2)
BUN SERPL-MCNC: 8 MG/DL (ref 6–20)
BUN/CREAT SERPL: 7.3 (ref 7–25)
CALCIUM SPEC-SCNC: 8.3 MG/DL (ref 8.6–10.5)
CHLORIDE SERPL-SCNC: 103 MMOL/L (ref 98–107)
CO2 SERPL-SCNC: 24.9 MMOL/L (ref 22–29)
CREAT SERPL-MCNC: 1.09 MG/DL (ref 0.57–1)
DEPRECATED RDW RBC AUTO: 45.8 FL (ref 37–54)
EGFRCR SERPLBLD CKD-EPI 2021: 65.2 ML/MIN/1.73
EOSINOPHIL # BLD MANUAL: 0.18 10*3/MM3 (ref 0–0.4)
EOSINOPHIL NFR BLD MANUAL: 2 % (ref 0.3–6.2)
ERYTHROCYTE [DISTWIDTH] IN BLOOD BY AUTOMATED COUNT: 14 % (ref 12.3–15.4)
GLOBULIN UR ELPH-MCNC: 2.8 GM/DL
GLUCOSE BLDC GLUCOMTR-MCNC: 146 MG/DL (ref 70–130)
GLUCOSE BLDC GLUCOMTR-MCNC: 150 MG/DL (ref 70–130)
GLUCOSE BLDC GLUCOMTR-MCNC: 163 MG/DL (ref 70–130)
GLUCOSE BLDC GLUCOMTR-MCNC: 212 MG/DL (ref 70–130)
GLUCOSE SERPL-MCNC: 165 MG/DL (ref 65–99)
HCT VFR BLD AUTO: 32.1 % (ref 34–46.6)
HGB BLD-MCNC: 10.9 G/DL (ref 12–15.9)
LYMPHOCYTES # BLD MANUAL: 5.27 10*3/MM3 (ref 0.7–3.1)
LYMPHOCYTES NFR BLD MANUAL: 2 % (ref 5–12)
MAGNESIUM SERPL-MCNC: 2.6 MG/DL (ref 1.6–2.6)
MCH RBC QN AUTO: 30.4 PG (ref 26.6–33)
MCHC RBC AUTO-ENTMCNC: 34 G/DL (ref 31.5–35.7)
MCV RBC AUTO: 89.7 FL (ref 79–97)
MONOCYTES # BLD: 0.18 10*3/MM3 (ref 0.1–0.9)
NEUTROPHILS # BLD AUTO: 3.36 10*3/MM3 (ref 1.7–7)
NEUTROPHILS NFR BLD MANUAL: 35 % (ref 42.7–76)
NEUTS BAND NFR BLD MANUAL: 2 % (ref 0–5)
PLAT MORPH BLD: NORMAL
PLATELET # BLD AUTO: 282 10*3/MM3 (ref 140–450)
PMV BLD AUTO: 10.2 FL (ref 6–12)
POTASSIUM SERPL-SCNC: 2.9 MMOL/L (ref 3.5–5.2)
POTASSIUM SERPL-SCNC: 4.5 MMOL/L (ref 3.5–5.2)
PROT SERPL-MCNC: 5.9 G/DL (ref 6–8.5)
RBC # BLD AUTO: 3.58 10*6/MM3 (ref 3.77–5.28)
RBC MORPH BLD: NORMAL
SODIUM SERPL-SCNC: 137 MMOL/L (ref 136–145)
VARIANT LYMPHS NFR BLD MANUAL: 58 % (ref 19.6–45.3)
WBC NRBC COR # BLD AUTO: 9.08 10*3/MM3 (ref 3.4–10.8)

## 2024-06-05 PROCEDURE — 25010000002 HEPARIN (PORCINE) PER 1000 UNITS: Performed by: HOSPITALIST

## 2024-06-05 PROCEDURE — 84132 ASSAY OF SERUM POTASSIUM: CPT

## 2024-06-05 PROCEDURE — G0378 HOSPITAL OBSERVATION PER HR: HCPCS

## 2024-06-05 PROCEDURE — 85007 BL SMEAR W/DIFF WBC COUNT: CPT | Performed by: STUDENT IN AN ORGANIZED HEALTH CARE EDUCATION/TRAINING PROGRAM

## 2024-06-05 PROCEDURE — 63710000001 INSULIN LISPRO (HUMAN) PER 5 UNITS: Performed by: HOSPITALIST

## 2024-06-05 PROCEDURE — 25010000002 POTASSIUM CHLORIDE 10 MEQ/100ML SOLUTION: Performed by: STUDENT IN AN ORGANIZED HEALTH CARE EDUCATION/TRAINING PROGRAM

## 2024-06-05 PROCEDURE — 99232 SBSQ HOSP IP/OBS MODERATE 35: CPT | Performed by: STUDENT IN AN ORGANIZED HEALTH CARE EDUCATION/TRAINING PROGRAM

## 2024-06-05 PROCEDURE — 80053 COMPREHEN METABOLIC PANEL: CPT | Performed by: STUDENT IN AN ORGANIZED HEALTH CARE EDUCATION/TRAINING PROGRAM

## 2024-06-05 PROCEDURE — 85025 COMPLETE CBC W/AUTO DIFF WBC: CPT | Performed by: STUDENT IN AN ORGANIZED HEALTH CARE EDUCATION/TRAINING PROGRAM

## 2024-06-05 PROCEDURE — 82948 REAGENT STRIP/BLOOD GLUCOSE: CPT

## 2024-06-05 PROCEDURE — 63710000001 INSULIN GLARGINE PER 5 UNITS: Performed by: HOSPITALIST

## 2024-06-05 PROCEDURE — 96372 THER/PROPH/DIAG INJ SC/IM: CPT

## 2024-06-05 PROCEDURE — 96367 TX/PROPH/DG ADDL SEQ IV INF: CPT

## 2024-06-05 PROCEDURE — 83735 ASSAY OF MAGNESIUM: CPT | Performed by: STUDENT IN AN ORGANIZED HEALTH CARE EDUCATION/TRAINING PROGRAM

## 2024-06-05 RX ORDER — POTASSIUM CHLORIDE 20 MEQ/1
40 TABLET, EXTENDED RELEASE ORAL
Status: DISCONTINUED | OUTPATIENT
Start: 2024-06-05 | End: 2024-06-06 | Stop reason: HOSPADM

## 2024-06-05 RX ORDER — POTASSIUM CHLORIDE 7.45 MG/ML
10 INJECTION INTRAVENOUS ONCE
Status: COMPLETED | OUTPATIENT
Start: 2024-06-05 | End: 2024-06-05

## 2024-06-05 RX ORDER — POTASSIUM CHLORIDE 20 MEQ/1
40 TABLET, EXTENDED RELEASE ORAL EVERY 4 HOURS
Status: COMPLETED | OUTPATIENT
Start: 2024-06-05 | End: 2024-06-05

## 2024-06-05 RX ADMIN — POTASSIUM CHLORIDE 10 MEQ: 7.46 INJECTION, SOLUTION INTRAVENOUS at 08:50

## 2024-06-05 RX ADMIN — POTASSIUM CHLORIDE 40 MEQ: 1500 TABLET, EXTENDED RELEASE ORAL at 08:48

## 2024-06-05 RX ADMIN — INSULIN LISPRO 2 UNITS: 100 INJECTION, SOLUTION INTRAVENOUS; SUBCUTANEOUS at 12:02

## 2024-06-05 RX ADMIN — POTASSIUM CHLORIDE 40 MEQ: 1500 TABLET, EXTENDED RELEASE ORAL at 17:26

## 2024-06-05 RX ADMIN — INSULIN GLARGINE 5 UNITS: 100 INJECTION, SOLUTION SUBCUTANEOUS at 21:36

## 2024-06-05 RX ADMIN — CARIPRAZINE 3 MG: 3 CAPSULE, GELATIN COATED ORAL at 08:48

## 2024-06-05 RX ADMIN — HEPARIN SODIUM 5000 UNITS: 5000 INJECTION INTRAVENOUS; SUBCUTANEOUS at 21:36

## 2024-06-05 RX ADMIN — Medication 10 ML: at 08:49

## 2024-06-05 RX ADMIN — INSULIN LISPRO 3 UNITS: 100 INJECTION, SOLUTION INTRAVENOUS; SUBCUTANEOUS at 17:26

## 2024-06-05 RX ADMIN — Medication 10 ML: at 21:36

## 2024-06-05 RX ADMIN — HEPARIN SODIUM 5000 UNITS: 5000 INJECTION INTRAVENOUS; SUBCUTANEOUS at 08:48

## 2024-06-05 RX ADMIN — POTASSIUM CHLORIDE 40 MEQ: 1500 TABLET, EXTENDED RELEASE ORAL at 07:51

## 2024-06-05 RX ADMIN — FLUOXETINE HYDROCHLORIDE 10 MG: 10 CAPSULE ORAL at 08:49

## 2024-06-05 RX ADMIN — MAGNESIUM GLUCONATE 500 MG ORAL TABLET 400 MG: 500 TABLET ORAL at 08:49

## 2024-06-05 RX ADMIN — POTASSIUM CHLORIDE 40 MEQ: 1500 TABLET, EXTENDED RELEASE ORAL at 12:03

## 2024-06-05 RX ADMIN — CETIRIZINE HYDROCHLORIDE 10 MG: 10 TABLET, FILM COATED ORAL at 08:49

## 2024-06-05 RX ADMIN — POTASSIUM CHLORIDE 40 MEQ: 1500 TABLET, EXTENDED RELEASE ORAL at 04:12

## 2024-06-05 NOTE — PROGRESS NOTES
[facility] HOSPITALIST PROGRESS NOTE     Patient Identification:  Name:  Manda Al  Age:  43 y.o.  Sex:  female  :  1980  MRN:  [id number]  Visit Number:  [id number]  ROOM: [address] [place] [address]     Primary Care Provider:  Mi Rivera MD     Date of Admission: 6/3/2024    Length of stay in inpatient status:  0    Subjective     Chief Compliant:    Chief Complaint   Patient presents with    Abnormal Lab     History of Presenting Illness:    No major events reported to me over night  Pt's potassium is slowly trending up  She denies any CP, SOB or N/V    Objective     Current Hospital Meds:  Cariprazine HCl, 3 mg, Oral, Daily  cetirizine, 10 mg, Oral, Daily  FLUoxetine, 10 mg, Oral, Daily  heparin (porcine), 5,000 Units, Subcutaneous, Q12H  insulin glargine, 5 Units, Subcutaneous, Nightly  insulin lispro, 2-7 Units, Subcutaneous, 4x Daily AC & at Bedtime  magnesium oxide, 400 mg, Oral, Daily  metoprolol succinate XL, 200 mg, Oral, Daily  potassium chloride, 40 mEq, Oral, Daily  potassium chloride ER, 40 mEq, Oral, Q4H  potassium chloride, 10 mEq, Intravenous, Once  sodium chloride, 10 mL, Intravenous, Q12H       Current Antimicrobial Therapy:  Anti-Infectives (From admission, onward)      None          Current Diuretic Therapy:  Diuretics (From admission, onward)      None          ----------------------------------------------------------------------------------------------------------------------  Vital Signs:  Temp:  [97.6 °F (36.4 °C)-98.3 °F (36.8 °C)] 97.6 °F (36.4 °C)  Heart Rate:  [70-82] 70  Resp:  [16-18] 16  BP: ()/(52-64) 90/52  SpO2:  [96 %-99 %] 99 %  on   ;   Device (Oxygen Therapy): room air  Body mass index is 33.19 kg/m².    Wt Readings from Last 3 Encounters:   24 99 kg (218 lb 4.8 oz)   24 90.5 kg (199 lb 9.6 oz)   24 104 kg (229 lb 9.6 oz)     Intake & Output (last 3 days)          0701   0700  0701   07 07 07  0701 06/06 0700    P.O.   480     Total Intake(mL/kg)   480 (4.8)     Net   +480             Urine Unmeasured Occurrence   5 x     Stool Unmeasured Occurrence   2 x           Diet: Cardiac, Diabetic; Healthy Heart (2-3 Na+); Consistent Carbohydrate; Fluid Consistency: Thin (IDDSI 0)  ----------------------------------------------------------------------------------------------------------------------  PHYSICAL EXAMINATION:  GENERAL: The patient is well developed and nontoxic.  HEENT: Nonicteric sclerae, PERRLA, EOMI. Oropharynx clear. Moist mucous membranes. Conjunctivae appear well perfused.  CHEST: Chest wall is nontender.  HEART: Regular rate and rhythm without murmurs.  LUNGS: Clear to auscultation bilaterally.  ABDOMEN: Soft, positive bowel sounds, nontender, no organomegaly.  RECTAL: Deferred.  SKIN: No rash, no excessive bruising, petechiae, or purpura.  NEUROLOGIC: Cranial nerves II-XII intact without motor/sensory deficit.  ----------------------------------------------------------------------------------------------------------------------  Tele:    ----------------------------------------------------------------------------------------------------------------------  LABS:    CBC and coagulation:  Results from last 7 days   Lab Units 06/05/24  0105 06/04/24  0828 06/03/24 1941 05/29/24  0926   CRP mg/dL  --   --   --  <0.30   WBC 10*3/mm3 9.08 10.21 14.85* 15.47*   HEMOGLOBIN g/dL 10.9* 11.7* 13.2 14.9   HEMATOCRIT % 32.1* 33.4* 38.7 45.1   MCV fL 89.7 86.5 87.8 91.3   MCHC g/dL 34.0 35.0 34.1 33.0   PLATELETS 10*3/mm3 282 313 378 553*     Acid/base balance:      Renal and electrolytes:  Results from last 7 days   Lab Units 06/05/24  0105 06/04/24  0815 06/04/24  0813 06/04/24  0144 06/03/24 1941   SODIUM mmol/L 137 138 137  --  134*   POTASSIUM mmol/L 2.9* 2.8* 2.8* 2.6* 2.5*   MAGNESIUM mg/dL 2.6  --  1.8  --  1.1*   CHLORIDE mmol/L 103 103 102  --  94*   CO2 mmol/L 24.9 22.4 23.4  --  24.4   BUN  mg/dL 8 4* 5*  --  6   CREATININE mg/dL 1.09* 0.91 0.91  --  1.15*   CALCIUM mg/dL 8.3* 7.8* 7.8*  --  8.3*   GLUCOSE mg/dL 165* 152* 152*  --  161*     Estimated Creatinine Clearance: 82.7 mL/min (A) (by C-G formula based on SCr of 1.09 mg/dL (H)).    Liver and pancreatic function:  Results from last 7 days   Lab Units 06/05/24  0105 06/04/24  0815 06/03/24  1941   ALBUMIN g/dL 3.1* 3.3* 3.8   BILIRUBIN mg/dL 0.3 0.5 0.4   ALK PHOS U/L 81 86 99   AST (SGOT) U/L 21 26 22   ALT (SGPT) U/L 20 21 21     Endocrine function:  Lab Results   Component Value Date    HGBA1C 6.90 (H) 05/12/2024     Point of care bedside glucose levels:  Results from last 7 days   Lab Units 06/05/24  0750 06/04/24  2009 06/04/24  1615 06/04/24  1125 06/04/24  0707 06/04/24  0528   GLUCOSE mg/dL 146* 198* 164* 162* 140* 148*     Glucose levels from the [facility]:  Results from last 7 days   Lab Units 06/05/24  0105 06/04/24  0815 06/04/24  0813 06/03/24  1941   GLUCOSE mg/dL 165* 152* 152* 161*     Lab Results   Component Value Date    TSH 1.840 05/16/2024     Cardiac:        Cultures:  Lab Results   Component Value Date    COLORU Yellow 06/04/2024    CLARITYU Cloudy (A) 06/04/2024    PHUR 6.5 06/04/2024    GLUCOSEU Negative 06/04/2024    KETONESU Trace (A) 06/04/2024    BLOODU Negative 06/04/2024    NITRITEU Negative 06/04/2024    LEUKOCYTESUR Negative 06/04/2024    BILIRUBINUR Negative 06/04/2024    UROBILINOGEN 0.2 E.U./dL 06/04/2024    RBCUA 6-10 (A) 05/12/2024    WBCUA 11-20 (A) 05/12/2024    BACTERIA 2+ (A) 05/12/2024     Microbiology Results (last 10 days)       ** No results found for the last 240 hours. **            Lab Results   Component Value Date    PREGTESTUR Negative 05/09/2017     Pain Management Panel  More data exists         Latest Ref Rng & Units 8/11/2020 5/9/2017   Pain Management Panel   Amphetamine, Urine Qual Negative Positive  Negative    Barbiturates Screen, Urine Negative Negative  Negative    Benzodiazepine Screen, Urine  Negative Negative  Negative    Buprenorphine, Screen, Urine Negative Negative  Negative    Cocaine Screen, Urine Negative Negative  Negative    Methadone Screen , Urine Negative Negative  Negative        I have personally looked at the labs and they are summarized above.  ----------------------------------------------------------------------------------------------------------------------  Detailed radiology reports for the last 24 hours:    Imaging Results (Last 24 Hours)       ** No results found for the last 24 hours. **            Assessment & Plan    Patient is a 43 y.o. female with past medical history significant for heart failure, hypertension, Crohn's disease, bipolar disorder, anxiety, and depression that presented to the [facility] emergency department for evaluation of abnormal lab resul     -Acute hypokalemia, on admission  -Acute hypomagnesemia, on admission  -Acute hypocalcemia, on admission  -Acute leukocytosis, on admission     CMP and CBC in the a.m.  Electrolyte replacement protocol initiated  10 Meq of IV potassium has been ordered  Monitor WBC count with CBC  Will replace potassium by mouth        -F/E/N  Replace electrolytes per protocol as necessary.  Diabetic diet.      CHRONIC MEDICAL PROBLEMS     -Heart failure (HFpEF)  -Diabetes  -Essential hypertension  -Crohn's disease  -Asthma  -Anxiety  -Depression  -Bipolar disorder     Vital signs per hospital policy  Insulin sliding scale  Blood glucose checks before meals and at bedtime  Hold oral hypoglycemic medications  Continue home medication regimen on pharmacy reconciliation  ---------------------------------------------------  DVT Prophylaxis: Heparin 5000 and  Activity: Up with assistance  ---------------------------------------------------  The patient is considered to be a high risk patient due to: Low potassium level          VTE Prophylaxis:   Mechanical Order History:       None          Pharmalogical Order History:         Ordered     Dose Route Frequency Stop    06/04/24 0501  heparin (porcine) 5000 UNIT/ML injection 5,000 Units         5,000 Units SC Every 12 Hours Scheduled --                    Jose L Monahan MD  [facility]  06/05/24  08:27 EDT

## 2024-06-05 NOTE — PLAN OF CARE
Goal Outcome Evaluation:   Patient has been pleasant. Compliant with care and medications as ordered. Patient is currently resting in bed. No S&S of distress noted at this time. Plan of care ongoing.

## 2024-06-05 NOTE — OUTREACH NOTE
Medical Week 2 Survey      Flowsheet Row Responses   [facility] facility patient discharged from? [facility]   Does the patient have one of the following disease processes/diagnoses(primary or secondary)? Other   Week 2 attempt successful? No   Unsuccessful attempts Attempt 1   Revoke Readmitted            MARKEL SANTACRUZ - Registered Nurse

## 2024-06-06 ENCOUNTER — TELEPHONE (OUTPATIENT)
Dept: CARDIAC SURGERY | Facility: CLINIC | Age: 44
End: 2024-06-06
Payer: COMMERCIAL

## 2024-06-06 VITALS
HEIGHT: 68 IN | OXYGEN SATURATION: 98 % | RESPIRATION RATE: 20 BRPM | WEIGHT: 220.8 LBS | SYSTOLIC BLOOD PRESSURE: 111 MMHG | BODY MASS INDEX: 33.46 KG/M2 | HEART RATE: 66 BPM | DIASTOLIC BLOOD PRESSURE: 68 MMHG | TEMPERATURE: 98 F

## 2024-06-06 LAB
ALBUMIN SERPL-MCNC: 3.2 G/DL (ref 3.5–5.2)
ALBUMIN/GLOB SERPL: 1.2 G/DL
ALP SERPL-CCNC: 78 U/L (ref 39–117)
ALT SERPL W P-5'-P-CCNC: 17 U/L (ref 1–33)
ANION GAP SERPL CALCULATED.3IONS-SCNC: 6.5 MMOL/L (ref 5–15)
AST SERPL-CCNC: 19 U/L (ref 1–32)
BASOPHILS # BLD AUTO: 0.07 10*3/MM3 (ref 0–0.2)
BASOPHILS NFR BLD AUTO: 0.8 % (ref 0–1.5)
BILIRUB SERPL-MCNC: 0.3 MG/DL (ref 0–1.2)
BUN SERPL-MCNC: 9 MG/DL (ref 6–20)
BUN/CREAT SERPL: 9.9 (ref 7–25)
CALCIUM SPEC-SCNC: 8.5 MG/DL (ref 8.6–10.5)
CHLORIDE SERPL-SCNC: 109 MMOL/L (ref 98–107)
CO2 SERPL-SCNC: 22.5 MMOL/L (ref 22–29)
CREAT SERPL-MCNC: 0.91 MG/DL (ref 0.57–1)
DEPRECATED RDW RBC AUTO: 47.6 FL (ref 37–54)
EGFRCR SERPLBLD CKD-EPI 2021: 80.9 ML/MIN/1.73
EOSINOPHIL # BLD AUTO: 0.12 10*3/MM3 (ref 0–0.4)
EOSINOPHIL NFR BLD AUTO: 1.4 % (ref 0.3–6.2)
ERYTHROCYTE [DISTWIDTH] IN BLOOD BY AUTOMATED COUNT: 14.2 % (ref 12.3–15.4)
GLOBULIN UR ELPH-MCNC: 2.7 GM/DL
GLUCOSE BLDC GLUCOMTR-MCNC: 125 MG/DL (ref 70–130)
GLUCOSE SERPL-MCNC: 127 MG/DL (ref 65–99)
HCT VFR BLD AUTO: 32.9 % (ref 34–46.6)
HGB BLD-MCNC: 11.1 G/DL (ref 12–15.9)
IMM GRANULOCYTES # BLD AUTO: 0.01 10*3/MM3 (ref 0–0.05)
IMM GRANULOCYTES NFR BLD AUTO: 0.1 % (ref 0–0.5)
LYMPHOCYTES # BLD AUTO: 5 10*3/MM3 (ref 0.7–3.1)
LYMPHOCYTES NFR BLD AUTO: 58.2 % (ref 19.6–45.3)
MCH RBC QN AUTO: 31 PG (ref 26.6–33)
MCHC RBC AUTO-ENTMCNC: 33.7 G/DL (ref 31.5–35.7)
MCV RBC AUTO: 91.9 FL (ref 79–97)
MONOCYTES # BLD AUTO: 0.52 10*3/MM3 (ref 0.1–0.9)
MONOCYTES NFR BLD AUTO: 6.1 % (ref 5–12)
NEUTROPHILS NFR BLD AUTO: 2.87 10*3/MM3 (ref 1.7–7)
NEUTROPHILS NFR BLD AUTO: 33.4 % (ref 42.7–76)
NRBC BLD AUTO-RTO: 0 /100 WBC (ref 0–0.2)
PLATELET # BLD AUTO: 250 10*3/MM3 (ref 140–450)
PMV BLD AUTO: 10.2 FL (ref 6–12)
POTASSIUM SERPL-SCNC: 4.5 MMOL/L (ref 3.5–5.2)
PROT SERPL-MCNC: 5.9 G/DL (ref 6–8.5)
RBC # BLD AUTO: 3.58 10*6/MM3 (ref 3.77–5.28)
RBC MORPH BLD: NORMAL
SMALL PLATELETS BLD QL SMEAR: ADEQUATE
SODIUM SERPL-SCNC: 138 MMOL/L (ref 136–145)
WBC NRBC COR # BLD AUTO: 8.59 10*3/MM3 (ref 3.4–10.8)

## 2024-06-06 PROCEDURE — 85025 COMPLETE CBC W/AUTO DIFF WBC: CPT | Performed by: STUDENT IN AN ORGANIZED HEALTH CARE EDUCATION/TRAINING PROGRAM

## 2024-06-06 PROCEDURE — 99239 HOSP IP/OBS DSCHRG MGMT >30: CPT | Performed by: STUDENT IN AN ORGANIZED HEALTH CARE EDUCATION/TRAINING PROGRAM

## 2024-06-06 PROCEDURE — 25010000002 HEPARIN (PORCINE) PER 1000 UNITS: Performed by: HOSPITALIST

## 2024-06-06 PROCEDURE — 96372 THER/PROPH/DIAG INJ SC/IM: CPT

## 2024-06-06 PROCEDURE — 80053 COMPREHEN METABOLIC PANEL: CPT | Performed by: STUDENT IN AN ORGANIZED HEALTH CARE EDUCATION/TRAINING PROGRAM

## 2024-06-06 PROCEDURE — 82948 REAGENT STRIP/BLOOD GLUCOSE: CPT

## 2024-06-06 PROCEDURE — 85007 BL SMEAR W/DIFF WBC COUNT: CPT | Performed by: STUDENT IN AN ORGANIZED HEALTH CARE EDUCATION/TRAINING PROGRAM

## 2024-06-06 PROCEDURE — G0378 HOSPITAL OBSERVATION PER HR: HCPCS

## 2024-06-06 RX ORDER — FUROSEMIDE 40 MG/1
20 TABLET ORAL DAILY
Qty: 15 TABLET | Refills: 0 | Status: SHIPPED | OUTPATIENT
Start: 2024-06-06 | End: 2024-07-06

## 2024-06-06 RX ADMIN — Medication 10 ML: at 08:34

## 2024-06-06 RX ADMIN — POTASSIUM CHLORIDE 40 MEQ: 1500 TABLET, EXTENDED RELEASE ORAL at 08:33

## 2024-06-06 RX ADMIN — METOPROLOL SUCCINATE 200 MG: 50 TABLET, EXTENDED RELEASE ORAL at 08:33

## 2024-06-06 RX ADMIN — CARIPRAZINE 3 MG: 3 CAPSULE, GELATIN COATED ORAL at 08:33

## 2024-06-06 RX ADMIN — CETIRIZINE HYDROCHLORIDE 10 MG: 10 TABLET, FILM COATED ORAL at 08:33

## 2024-06-06 RX ADMIN — MAGNESIUM GLUCONATE 500 MG ORAL TABLET 400 MG: 500 TABLET ORAL at 08:33

## 2024-06-06 RX ADMIN — HEPARIN SODIUM 5000 UNITS: 5000 INJECTION INTRAVENOUS; SUBCUTANEOUS at 08:33

## 2024-06-06 RX ADMIN — FLUOXETINE HYDROCHLORIDE 10 MG: 10 CAPSULE ORAL at 08:33

## 2024-06-06 NOTE — DISCHARGE SUMMARY
[facility] HOSPITALIST DISCHARGE SUMMARY        Patient ID:  Manda Al  [id number]  43 y.o.  1980    Admit date: 6/3/2024    Discharge date: 6/6/2024    Admitting Physician: Adin Sauceda    Discharge Physician: Jose L Monahan    Admission Diagnoses: Hypokalemia [E87.6]    Discharge Diagnoses: Hypokalemia    Admission Condition: poor    Discharged Condition: good    Hospital Course:   Patient is a 43 y.o. female with past medical history significant for heart failure, hypertension, Crohn's disease, bipolar disorder, anxiety, and depression that presented to the [facility] emergency department for evaluation for potassium of 2.6 likely due to lasix use. He diuretics were d/c and she was replaced with both oral and IV potassium until it increased to 4.5. She was discharged home    Discharge Exam:  GENERAL: The patient is well developed and nontoxic.  HEENT: Nonicteric sclerae, PERRLA, EOMI. Oropharynx clear. Moist mucous membranes. Conjunctivae appear well perfused.  CHEST: Chest wall is nontender.  HEART: Regular rate and rhythm without murmurs.  LUNGS: Clear to auscultation bilaterally.  ABDOMEN: Soft, positive bowel sounds, nontender, no organomegaly.  RECTAL: Deferred.  SKIN: No rash, no excessive bruising, petechiae, or purpura.  NEUROLOGIC: Cranial nerves II-XII intact without motor/sensory deficit.    Consults: None    Disposition: Home    Patient Instructions:  [unfilled]  Activity: activity as tolerated  Diet: regular diet  Wound Care: none needed    Follow-up with with PCP within 2 weeks    Pending Labs at Discharge:         Discharge process took > 30 minutes.    Signed:  Jose L Monahan MD  6/6/2024  08:24 EDT

## 2024-06-06 NOTE — PLAN OF CARE
Goal Outcome Evaluation:        Patient resting in bed. No visible indicators of acute distress noted. Plan of care ongoing.

## 2024-06-06 NOTE — NURSING NOTE
Pt being discharged home today on room air. CEDRIC Davis made aware the discharge is complete. RN stated the family will not be here for a couple hours.

## 2024-06-07 NOTE — TELEPHONE ENCOUNTER
I spoke with patient- she has no transportation on Tuesdays. I rescheduled her appt to Wednesday 7/3/24. She understands that Dr. Roberts will not be seeing her this day and that she may have to come back for another appt to discuss possible surgery with him. She would still like to be seen Wednesday.

## 2024-06-07 NOTE — PAYOR COMM NOTE
"CONTACT:  STALIN SUNG MSN, RN  UTILIZATION MANAGEMENT DEPT.  [facility]  [address] [facility], [address]  PHONE:  [phone]  FAX: [phone]    PATIENT DISCHARGED TO HOME ON 6/6/24    REFER TO AUTH # [id number]     Aminata Al (43 y.o. Female)       Date of Birth   1980    Social Security Number       Address   [address] [facility] [facility] [address]    Home Phone   [phone]    MRN   [id number]       Cheondoism   None    Marital Status                               Admission Date   6/3/24    Admission Type   Emergency    Admitting Provider   Adin Sauceda MD    Attending Provider       Department, Room/Bed   [address], 3309/1S       Discharge Date   6/6/2024    Discharge Disposition   Home or Self Care    Discharge Destination   Home                              Attending Provider: (none)   Allergies: Imuran [Azathioprine]    Isolation: None   Infection: None   Code Status: Prior    Ht: 172.7 cm (68\")   Wt: 100 kg (220 lb 12.8 oz)    Admission Cmt: None   Principal Problem: Hypokalemia [E87.6]                   Active Insurance as of 6/3/2024       Primary Coverage       Payor Plan Insurance Group Employer/Plan Group    [employer] [place] [employer] MEDICA[place]        Payor Plan Address Payor Plan Phone Number Payor Plan Fax Number Effective Dates    [address] [address] [phone]  12/2/2016 - None Entered    [facility] [id number]         Subscriber Name Subscriber Birth Date Member ID       AMINATA AL 1980 [id number]                     Emergency Contacts        (Rel.) Home Phone Work Phone Mobile Phone    NessItzel garcia (Mother) [phone] -- [phone]    Tran Yee (Relative) [phone] -- --                 Discharge Summary        Jose L Monahan MD at 06/06/24 0824                [facility] HOSPITALIST DISCHARGE SUMMARY        Patient ID:  Aminata Al  [id number]  43 y.o.  1980    Admit date: " 6/3/2024    Discharge date: 6/6/2024    Admitting Physician: Adin Sauceda    Discharge Physician: Jose L Monahan    Admission Diagnoses: Hypokalemia [E87.6]    Discharge Diagnoses: Hypokalemia    Admission Condition: poor    Discharged Condition: good    Hospital Course:   Patient is a 43 y.o. female with past medical history significant for heart failure, hypertension, Crohn's disease, bipolar disorder, anxiety, and depression that presented to the [facility] emergency department for evaluation for potassium of 2.6 likely due to lasix use. He diuretics were d/c and she was replaced with both oral and IV potassium until it increased to 4.5. She was discharged home    Discharge Exam:  GENERAL: The patient is well developed and nontoxic.  HEENT: Nonicteric sclerae, PERRLA, EOMI. Oropharynx clear. Moist mucous membranes. Conjunctivae appear well perfused.  CHEST: Chest wall is nontender.  HEART: Regular rate and rhythm without murmurs.  LUNGS: Clear to auscultation bilaterally.  ABDOMEN: Soft, positive bowel sounds, nontender, no organomegaly.  RECTAL: Deferred.  SKIN: No rash, no excessive bruising, petechiae, or purpura.  NEUROLOGIC: Cranial nerves II-XII intact without motor/sensory deficit.    Consults: None    Disposition: Home    Patient Instructions:  [unfilled]  Activity: activity as tolerated  Diet: regular diet  Wound Care: none needed    Follow-up with with PCP within 2 weeks    Pending Labs at Discharge:         Discharge process took > 30 minutes.    Signed:  Jose L Monahan MD  6/6/2024  08:24 EDT         Electronically signed by Jose L Monahan MD at 06/06/24 0889

## 2024-06-12 ENCOUNTER — OFFICE VISIT (OUTPATIENT)
Dept: CARDIOLOGY | Facility: CLINIC | Age: 44
End: 2024-06-12
Payer: COMMERCIAL

## 2024-06-12 VITALS
DIASTOLIC BLOOD PRESSURE: 80 MMHG | HEART RATE: 58 BPM | SYSTOLIC BLOOD PRESSURE: 110 MMHG | OXYGEN SATURATION: 97 % | BODY MASS INDEX: 31.04 KG/M2 | HEIGHT: 68 IN | WEIGHT: 204.8 LBS

## 2024-06-12 DIAGNOSIS — I05.2 MITRAL VALVE STENOSIS AND REGURGITATION: Primary | ICD-10-CM

## 2024-06-12 PROCEDURE — 99214 OFFICE O/P EST MOD 30 MIN: CPT | Performed by: NURSE PRACTITIONER

## 2024-06-12 PROCEDURE — 1160F RVW MEDS BY RX/DR IN RCRD: CPT | Performed by: NURSE PRACTITIONER

## 2024-06-12 PROCEDURE — 1159F MED LIST DOCD IN RCRD: CPT | Performed by: NURSE PRACTITIONER

## 2024-06-12 NOTE — PROGRESS NOTES
Mi Rivera MD  Manda Al  1980 06/12/2024    Patient Active Problem List   Diagnosis    Schizoaffective disorder, bipolar type    Pneumonia of both lungs due to infectious organism    Hypokalemia       Dear Mi Rivera MD:    Subjective     Chief Complaint   Patient presents with    Follow-up    Med Management     Verified via mychart             History of Present Illness:    Manda Al is a 43 y.o. female with a past medical history of Domitila-Parkinson-White, hypertension, mitral valve stenosis and regurgitation.  She presents today for hospital follow-up.  Recently, was admitted to [facility] for acute pancreatitis.  While inpatient, did have echocardiogram which revealed an LVEF of 61 to 65%, grade 2 LV diastolic dysfunction and moderate to severe mitral valve stenosis as well as moderate mitral valve regurgitation.  She does report having history of rheumatic fever as a child.  Denies any shortness of breath or chest pains.  Recommendation was to refer the patient to CT surgery as an outpatient for possible mitral valve replacement.  He does have an appointment with CT surgery in a few weeks.          Allergies   Allergen Reactions    Imuran [Azathioprine] Other (See Comments)     Pt reports this medication caused her to have pancreatitis.    :      Current Outpatient Medications:     Cariprazine HCl (Vraylar) 3 MG capsule capsule, Take 1 capsule by mouth Daily., Disp: , Rfl:     fexofenadine (ALLEGRA) 180 MG tablet, Take 1 tablet by mouth Daily., Disp: , Rfl:     FLUoxetine (PROzac) 10 MG capsule, Take 1 capsule by mouth Daily., Disp: , Rfl:     furosemide (LASIX) 40 MG tablet, Take 0.5 tablets by mouth Daily for 30 days., Disp: 15 tablet, Rfl: 0    Insulin Glargine (Lantus SoloStar) 100 UNIT/ML injection pen, Inject 15 Units under the skin into the appropriate area as directed Every Night. Formulary Compliance Approval, Disp: 15 mL, Rfl: 0    Insulin Lispro, 1 Unit Dial,  "(HumaLOG KwikPen) 100 UNIT/ML solution pen-injector, Inject 2-9 Units under the skin into the appropriate area as directed 4 (Four) Times a Day Before Meals & at Bedtime. 150-199:2U, 200-249:4U, 250-299:6U, 300-349:7U, 350-400:8U, >400:9U AND CALL DR, MAX DAILY DOSE:60U*, Disp: , Rfl:     magnesium oxide (MAG-OX) 400 MG tablet, Take 1 tablet by mouth Daily., Disp: , Rfl:     metFORMIN ER (GLUCOPHAGE-XR) 500 MG 24 hr tablet, Take 2 tablets by mouth 2 (Two) Times a Day., Disp: , Rfl:     metoprolol succinate XL (Toprol XL) 200 MG 24 hr tablet, Take 1 tablet by mouth Daily., Disp: , Rfl:       The following portions of the patient's history were reviewed and updated as appropriate: allergies, current medications, past family history, past medical history, past social history, past surgical history and problem list.    Social History     Tobacco Use    Smoking status: Every Day     Current packs/day: 1.00     Average packs/day: 1 pack/day for 20.0 years (20.0 ttl pk-yrs)     Types: Cigarettes    Smokeless tobacco: Never   Vaping Use    Vaping status: Never Used   Substance Use Topics    Alcohol use: No     Comment: denies    Drug use: Yes     Types: Marijuana       Review of Systems   Constitutional: Negative for decreased appetite and malaise/fatigue.   Cardiovascular:  Negative for chest pain, dyspnea on exertion and palpitations.   Respiratory:  Negative for cough and shortness of breath.        Objective   Vitals:    06/12/24 1310   BP: 110/80   BP Location: Left arm   Patient Position: Sitting   Cuff Size: Adult   Pulse: 58   SpO2: 97%   Weight: 92.9 kg (204 lb 12.8 oz)   Height: 172.7 cm (67.99\")     Body mass index is 31.15 kg/m².        Vitals reviewed.   Constitutional:       Appearance: Healthy appearance. Well-developed and not in distress.   HENT:      Head: Normocephalic and atraumatic.   Neck:      Vascular: No carotid bruit or JVD.   Pulmonary:      Effort: Pulmonary effort is normal.      Breath sounds: " Normal breath sounds. No wheezing. No rales.   Cardiovascular:      Normal rate. Regular rhythm.      Murmurs: There is a grade 3/6 holosystolic murmur, radiating to the apex. There is a grade 1/4 diastolic murmur at the apex.      . No S3 and S4 gallop.   Edema:     Peripheral edema absent.   Abdominal:      General: Bowel sounds are normal.      Palpations: Abdomen is soft.   Skin:     General: Skin is warm and dry.   Neurological:      Mental Status: Alert, oriented to person, place, and time and oriented to person, place and time.   Psychiatric:         Mood and Affect: Mood normal.         Behavior: Behavior normal.         Lab Results   Component Value Date     06/06/2024    K 4.5 06/06/2024     (H) 06/06/2024    CO2 22.5 06/06/2024    BUN 9 06/06/2024    CREATININE 0.91 06/06/2024    GLUCOSE 127 (H) 06/06/2024    CALCIUM 8.5 (L) 06/06/2024    AST 19 06/06/2024    ALT 17 06/06/2024    ALKPHOS 78 06/06/2024    LABIL2 1.4 (L) 07/06/2015     Lab Results   Component Value Date    WBC 8.59 06/06/2024    HGB 11.1 (L) 06/06/2024    HCT 32.9 (L) 06/06/2024     06/06/2024     Lab Results   Component Value Date    TSH 1.840 05/16/2024          Results for orders placed during the hospital encounter of 05/12/24    Adult Transthoracic Echo Limited W/ Cont if Necessary Per Protocol    Interpretation Summary    Left ventricular systolic function is normal. Left ventricular ejection fraction appears to be 56 - 60%.    Left ventricular diastolic dysfunction is noted.    The left atrial cavity is moderately dilated.    The right atrial cavity is mildly  dilated.    There is mild, bileaflet mitral valve thickening present.    Moderate to severe mitral valve stenosis is present with diastolic doming of the valve leaflets, thickened leaflets and rheumatic changes of the mitral valve with estimated mean gradient of 7 mmHg and mitral valve area by pressure half-time of 1.3 cm² apparatus.               Procedures      Assessment & Plan    Diagnosis Plan   1. Mitral valve stenosis and regurgitation                     Recommendations:    Mitral valve stenosis and regurgitation-advised her to keep her appointment with CT surgery in a few weeks for further evaluation and treatment.  History of Domitila-Parkinson-White-diagnosed as inpatient at [facility] in 2020, beta-blocker therapy recommended at that time.  Recommended EP follow-up for surveillance.  Continue metoprolol.  Follow-up in 2 months or sooner if needed.    Return in about 2 months (around 8/12/2024) for Recheck.    As always, I appreciate very much the opportunity to participate in the cardiovascular care of your patients.      With Best Regards,    Екатерина Vergara, APRN

## 2024-06-19 ENCOUNTER — OFFICE VISIT (OUTPATIENT)
Dept: PULMONOLOGY | Facility: CLINIC | Age: 44
End: 2024-06-19
Payer: COMMERCIAL

## 2024-06-19 VITALS
BODY MASS INDEX: 31.07 KG/M2 | HEART RATE: 79 BPM | WEIGHT: 205 LBS | TEMPERATURE: 97.7 F | SYSTOLIC BLOOD PRESSURE: 115 MMHG | DIASTOLIC BLOOD PRESSURE: 80 MMHG | OXYGEN SATURATION: 99 % | HEIGHT: 68 IN

## 2024-06-19 DIAGNOSIS — J45.20 MILD INTERMITTENT ASTHMA, UNSPECIFIED WHETHER COMPLICATED: ICD-10-CM

## 2024-06-19 DIAGNOSIS — R06.02 SOB (SHORTNESS OF BREATH): Primary | ICD-10-CM

## 2024-06-19 DIAGNOSIS — R93.89 ABNORMAL CT OF THE CHEST: ICD-10-CM

## 2024-06-19 DIAGNOSIS — G47.33 OSA (OBSTRUCTIVE SLEEP APNEA): ICD-10-CM

## 2024-06-19 DIAGNOSIS — F17.200 SMOKER: ICD-10-CM

## 2024-06-19 NOTE — PROGRESS NOTES
Have you had the Influenza Vaccine? yes    Would you like to receive this Vaccine today? no    Have you had the Pneumonia Vaccine?  yes   Would you like to receive this Vaccine today? no    Are you a current smoker? yes   How much? 1/2 PPD  Quit date?Current Smoker    Evon Al presents for the following Hospital Follow Up Visit      History of Present Illness   Patient is here for a follow-up visit.  Was recently in the hospital with CHF exacerbation.  CT chest reviewed and discussed with her and her mother.  Review of Systems  Positive for shortness of breath otherwise negative.  Active Problems:  Problem List Items Addressed This Visit          Pulmonary and Pneumonias    SOB (shortness of breath) - Primary    Relevant Orders    XR Chest 2 View    Complete PFT - Pre & Post Bronchodilator    Mild intermittent asthma       Sleep    SARATH (obstructive sleep apnea)    Relevant Orders    Home Sleep Study       Symptoms and Signs    Abnormal CT of the chest       Tobacco    Smoker       Past Medical History:  Past Medical History:   Diagnosis Date    (HFpEF) heart failure with preserved ejection fraction     Anxiety     Asthma     Bipolar disorder     Crohn disease     Depression     Hypertension     Moderate mitral stenosis     Schizoaffective disorder     Severe pulmonary hypertension        Family History:  Family History   Problem Relation Age of Onset    Diabetes Mother     Anxiety disorder Mother     Depression Mother     Bipolar disorder Mother     COPD Father     Schizophrenia Father     Schizophrenia Paternal Grandfather     Breast cancer Neg Hx        Social History:  Social History     Tobacco Use    Smoking status: Every Day     Current packs/day: 1.00     Average packs/day: 1 pack/day for 20.0 years (20.0 ttl pk-yrs)     Types: Cigarettes     Passive exposure: Current    Smokeless tobacco: Never   Substance Use Topics    Alcohol use: No     Comment: denies       Current  "Medications:  Current Outpatient Medications   Medication Sig Dispense Refill    Cariprazine HCl (Vraylar) 3 MG capsule capsule Take 1 capsule by mouth Daily.      fexofenadine (ALLEGRA) 180 MG tablet Take 1 tablet by mouth Daily.      FLUoxetine (PROzac) 10 MG capsule Take 1 capsule by mouth Daily.      furosemide (LASIX) 40 MG tablet Take 0.5 tablets by mouth Daily for 30 days. 15 tablet 0    Insulin Glargine (Lantus SoloStar) 100 UNIT/ML injection pen Inject 15 Units under the skin into the appropriate area as directed Every Night. Formulary Compliance Approval 15 mL 0    Insulin Lispro, 1 Unit Dial, (HumaLOG KwikPen) 100 UNIT/ML solution pen-injector Inject 2-9 Units under the skin into the appropriate area as directed 4 (Four) Times a Day Before Meals & at Bedtime. 150-199:2U, 200-249:4U, 250-299:6U, 300-349:7U, 350-400:8U, >400:9U AND CALL DR, MAX DAILY DOSE:60U*      magnesium oxide (MAG-OX) 400 MG tablet Take 1 tablet by mouth Daily.      metFORMIN ER (GLUCOPHAGE-XR) 500 MG 24 hr tablet Take 2 tablets by mouth 2 (Two) Times a Day.      metoprolol succinate XL (Toprol XL) 200 MG 24 hr tablet Take 1 tablet by mouth Daily.       No current facility-administered medications for this visit.       Allergies:  Allergies   Allergen Reactions    Imuran [Azathioprine] Other (See Comments)     Pt reports this medication caused her to have pancreatitis.        Vitals:  /80   Pulse 79   Temp 97.7 °F (36.5 °C) (Temporal)   Ht 172.7 cm (68\")   Wt 93 kg (205 lb)   SpO2 99%   BMI 31.17 kg/m²     Imaging:    Imaging Results (Most Recent)       None            Pulmonary Functions Testing Results:    No results found for: \"FEV1\", \"FVC\", \"HIG4NEP\", \"TLC\", \"DLCO\"    Results for orders placed or performed during the hospital encounter of 06/03/24   Comprehensive Metabolic Panel    Specimen: Blood   Result Value Ref Range    Glucose 161 (H) 65 - 99 mg/dL    BUN 6 6 - 20 mg/dL    Creatinine 1.15 (H) 0.57 - 1.00 mg/dL    " Sodium 134 (L) 136 - 145 mmol/L    Potassium 2.5 (C) 3.5 - 5.2 mmol/L    Chloride 94 (L) 98 - 107 mmol/L    CO2 24.4 22.0 - 29.0 mmol/L    Calcium 8.3 (L) 8.6 - 10.5 mg/dL    Total Protein 7.5 6.0 - 8.5 g/dL    Albumin 3.8 3.5 - 5.2 g/dL    ALT (SGPT) 21 1 - 33 U/L    AST (SGOT) 22 1 - 32 U/L    Alkaline Phosphatase 99 39 - 117 U/L    Total Bilirubin 0.4 0.0 - 1.2 mg/dL    Globulin 3.7 gm/dL    A/G Ratio 1.0 g/dL    BUN/Creatinine Ratio 5.2 (L) 7.0 - 25.0    Anion Gap 15.6 (H) 5.0 - 15.0 mmol/L    eGFR 61.1 >60.0 mL/min/1.73   Urinalysis With Microscopic If Indicated (No Culture) - Urine, Clean Catch    Specimen: Urine, Clean Catch   Result Value Ref Range    Color, UA Yellow Yellow, Straw    Appearance, UA Cloudy (A) Clear    pH, UA 6.5 5.0 - 8.0    Specific Gravity, UA 1.015 1.005 - 1.030    Glucose, UA Negative Negative    Ketones, UA Trace (A) Negative    Bilirubin, UA Negative Negative    Blood, UA Negative Negative    Protein, UA Trace (A) Negative    Leuk Esterase, UA Negative Negative    Nitrite, UA Negative Negative    Urobilinogen, UA 0.2 E.U./dL 0.2 - 1.0 E.U./dL   Magnesium    Specimen: Blood   Result Value Ref Range    Magnesium 1.1 (L) 1.6 - 2.6 mg/dL   CBC Auto Differential    Specimen: Blood   Result Value Ref Range    WBC 14.85 (H) 3.40 - 10.80 10*3/mm3    RBC 4.41 3.77 - 5.28 10*6/mm3    Hemoglobin 13.2 12.0 - 15.9 g/dL    Hematocrit 38.7 34.0 - 46.6 %    MCV 87.8 79.0 - 97.0 fL    MCH 29.9 26.6 - 33.0 pg    MCHC 34.1 31.5 - 35.7 g/dL    RDW 13.5 12.3 - 15.4 %    RDW-SD 43.1 37.0 - 54.0 fl    MPV 9.9 6.0 - 12.0 fL    Platelets 378 140 - 450 10*3/mm3   Manual Differential    Specimen: Blood   Result Value Ref Range    Neutrophil % 35.0 (L) 42.7 - 76.0 %    Lymphocyte % 55.0 (H) 19.6 - 45.3 %    Monocyte % 9.0 5.0 - 12.0 %    Eosinophil % 1.0 0.3 - 6.2 %    Neutrophils Absolute 5.20 1.70 - 7.00 10*3/mm3    Lymphocytes Absolute 8.17 (H) 0.70 - 3.10 10*3/mm3    Monocytes Absolute 1.34 (H) 0.10 - 0.90  10*3/mm3    Eosinophils Absolute 0.15 0.00 - 0.40 10*3/mm3    Anisocytosis Slight/1+ None Seen    Platelet Estimate Adequate Normal   Potassium    Specimen: Arm, Right; Blood   Result Value Ref Range    Potassium 2.6 (C) 3.5 - 5.2 mmol/L   Basic Metabolic Panel    Specimen: Blood   Result Value Ref Range    Glucose 152 (H) 65 - 99 mg/dL    BUN 5 (L) 6 - 20 mg/dL    Creatinine 0.91 0.57 - 1.00 mg/dL    Sodium 137 136 - 145 mmol/L    Potassium 2.8 (L) 3.5 - 5.2 mmol/L    Chloride 102 98 - 107 mmol/L    CO2 23.4 22.0 - 29.0 mmol/L    Calcium 7.8 (L) 8.6 - 10.5 mg/dL    BUN/Creatinine Ratio 5.5 (L) 7.0 - 25.0    Anion Gap 11.6 5.0 - 15.0 mmol/L    eGFR 80.9 >60.0 mL/min/1.73   Magnesium    Specimen: Blood   Result Value Ref Range    Magnesium 1.8 1.6 - 2.6 mg/dL   Comprehensive Metabolic Panel    Specimen: Blood   Result Value Ref Range    Glucose 152 (H) 65 - 99 mg/dL    BUN 4 (L) 6 - 20 mg/dL    Creatinine 0.91 0.57 - 1.00 mg/dL    Sodium 138 136 - 145 mmol/L    Potassium 2.8 (L) 3.5 - 5.2 mmol/L    Chloride 103 98 - 107 mmol/L    CO2 22.4 22.0 - 29.0 mmol/L    Calcium 7.8 (L) 8.6 - 10.5 mg/dL    Total Protein 6.2 6.0 - 8.5 g/dL    Albumin 3.3 (L) 3.5 - 5.2 g/dL    ALT (SGPT) 21 1 - 33 U/L    AST (SGOT) 26 1 - 32 U/L    Alkaline Phosphatase 86 39 - 117 U/L    Total Bilirubin 0.5 0.0 - 1.2 mg/dL    Globulin 2.9 gm/dL    A/G Ratio 1.1 g/dL    BUN/Creatinine Ratio 4.4 (L) 7.0 - 25.0    Anion Gap 12.6 5.0 - 15.0 mmol/L    eGFR 80.9 >60.0 mL/min/1.73   CBC Auto Differential    Specimen: Blood   Result Value Ref Range    WBC 10.21 3.40 - 10.80 10*3/mm3    RBC 3.86 3.77 - 5.28 10*6/mm3    Hemoglobin 11.7 (L) 12.0 - 15.9 g/dL    Hematocrit 33.4 (L) 34.0 - 46.6 %    MCV 86.5 79.0 - 97.0 fL    MCH 30.3 26.6 - 33.0 pg    MCHC 35.0 31.5 - 35.7 g/dL    RDW 13.9 12.3 - 15.4 %    RDW-SD 43.2 37.0 - 54.0 fl    MPV 9.9 6.0 - 12.0 fL    Platelets 313 140 - 450 10*3/mm3    Neutrophil % 36.2 (L) 42.7 - 76.0 %    Lymphocyte % 54.9 (H)  19.6 - 45.3 %    Monocyte % 6.5 5.0 - 12.0 %    Eosinophil % 1.0 0.3 - 6.2 %    Basophil % 1.1 0.0 - 1.5 %    Immature Grans % 0.3 0.0 - 0.5 %    Neutrophils, Absolute 3.70 1.70 - 7.00 10*3/mm3    Lymphocytes, Absolute 5.61 (H) 0.70 - 3.10 10*3/mm3    Monocytes, Absolute 0.66 0.10 - 0.90 10*3/mm3    Eosinophils, Absolute 0.10 0.00 - 0.40 10*3/mm3    Basophils, Absolute 0.11 0.00 - 0.20 10*3/mm3    Immature Grans, Absolute 0.03 0.00 - 0.05 10*3/mm3    nRBC 0.0 0.0 - 0.2 /100 WBC   Magnesium    Specimen: Blood   Result Value Ref Range    Magnesium 2.6 1.6 - 2.6 mg/dL   Comprehensive Metabolic Panel    Specimen: Blood   Result Value Ref Range    Glucose 165 (H) 65 - 99 mg/dL    BUN 8 6 - 20 mg/dL    Creatinine 1.09 (H) 0.57 - 1.00 mg/dL    Sodium 137 136 - 145 mmol/L    Potassium 2.9 (L) 3.5 - 5.2 mmol/L    Chloride 103 98 - 107 mmol/L    CO2 24.9 22.0 - 29.0 mmol/L    Calcium 8.3 (L) 8.6 - 10.5 mg/dL    Total Protein 5.9 (L) 6.0 - 8.5 g/dL    Albumin 3.1 (L) 3.5 - 5.2 g/dL    ALT (SGPT) 20 1 - 33 U/L    AST (SGOT) 21 1 - 32 U/L    Alkaline Phosphatase 81 39 - 117 U/L    Total Bilirubin 0.3 0.0 - 1.2 mg/dL    Globulin 2.8 gm/dL    A/G Ratio 1.1 g/dL    BUN/Creatinine Ratio 7.3 7.0 - 25.0    Anion Gap 9.1 5.0 - 15.0 mmol/L    eGFR 65.2 >60.0 mL/min/1.73   CBC Auto Differential    Specimen: Blood   Result Value Ref Range    WBC 9.08 3.40 - 10.80 10*3/mm3    RBC 3.58 (L) 3.77 - 5.28 10*6/mm3    Hemoglobin 10.9 (L) 12.0 - 15.9 g/dL    Hematocrit 32.1 (L) 34.0 - 46.6 %    MCV 89.7 79.0 - 97.0 fL    MCH 30.4 26.6 - 33.0 pg    MCHC 34.0 31.5 - 35.7 g/dL    RDW 14.0 12.3 - 15.4 %    RDW-SD 45.8 37.0 - 54.0 fl    MPV 10.2 6.0 - 12.0 fL    Platelets 282 140 - 450 10*3/mm3   Manual Differential    Specimen: Blood   Result Value Ref Range    Neutrophil % 35.0 (L) 42.7 - 76.0 %    Lymphocyte % 58.0 (H) 19.6 - 45.3 %    Monocyte % 2.0 (L) 5.0 - 12.0 %    Eosinophil % 2.0 0.3 - 6.2 %    Basophil % 1.0 0.0 - 1.5 %    Bands %  2.0 0.0  - 5.0 %    Neutrophils Absolute 3.36 1.70 - 7.00 10*3/mm3    Lymphocytes Absolute 5.27 (H) 0.70 - 3.10 10*3/mm3    Monocytes Absolute 0.18 0.10 - 0.90 10*3/mm3    Eosinophils Absolute 0.18 0.00 - 0.40 10*3/mm3    Basophils Absolute 0.09 0.00 - 0.20 10*3/mm3    RBC Morphology Normal Normal    Platelet Morphology Normal Normal   Potassium    Specimen: Blood   Result Value Ref Range    Potassium 4.5 3.5 - 5.2 mmol/L   Comprehensive Metabolic Panel    Specimen: Blood   Result Value Ref Range    Glucose 127 (H) 65 - 99 mg/dL    BUN 9 6 - 20 mg/dL    Creatinine 0.91 0.57 - 1.00 mg/dL    Sodium 138 136 - 145 mmol/L    Potassium 4.5 3.5 - 5.2 mmol/L    Chloride 109 (H) 98 - 107 mmol/L    CO2 22.5 22.0 - 29.0 mmol/L    Calcium 8.5 (L) 8.6 - 10.5 mg/dL    Total Protein 5.9 (L) 6.0 - 8.5 g/dL    Albumin 3.2 (L) 3.5 - 5.2 g/dL    ALT (SGPT) 17 1 - 33 U/L    AST (SGOT) 19 1 - 32 U/L    Alkaline Phosphatase 78 39 - 117 U/L    Total Bilirubin 0.3 0.0 - 1.2 mg/dL    Globulin 2.7 gm/dL    A/G Ratio 1.2 g/dL    BUN/Creatinine Ratio 9.9 7.0 - 25.0    Anion Gap 6.5 5.0 - 15.0 mmol/L    eGFR 80.9 >60.0 mL/min/1.73   CBC Auto Differential    Specimen: Blood   Result Value Ref Range    WBC 8.59 3.40 - 10.80 10*3/mm3    RBC 3.58 (L) 3.77 - 5.28 10*6/mm3    Hemoglobin 11.1 (L) 12.0 - 15.9 g/dL    Hematocrit 32.9 (L) 34.0 - 46.6 %    MCV 91.9 79.0 - 97.0 fL    MCH 31.0 26.6 - 33.0 pg    MCHC 33.7 31.5 - 35.7 g/dL    RDW 14.2 12.3 - 15.4 %    RDW-SD 47.6 37.0 - 54.0 fl    MPV 10.2 6.0 - 12.0 fL    Platelets 250 140 - 450 10*3/mm3    Neutrophil % 33.4 (L) 42.7 - 76.0 %    Lymphocyte % 58.2 (H) 19.6 - 45.3 %    Monocyte % 6.1 5.0 - 12.0 %    Eosinophil % 1.4 0.3 - 6.2 %    Basophil % 0.8 0.0 - 1.5 %    Immature Grans % 0.1 0.0 - 0.5 %    Neutrophils, Absolute 2.87 1.70 - 7.00 10*3/mm3    Lymphocytes, Absolute 5.00 (H) 0.70 - 3.10 10*3/mm3    Monocytes, Absolute 0.52 0.10 - 0.90 10*3/mm3    Eosinophils, Absolute 0.12 0.00 - 0.40 10*3/mm3     Basophils, Absolute 0.07 0.00 - 0.20 10*3/mm3    Immature Grans, Absolute 0.01 0.00 - 0.05 10*3/mm3    nRBC 0.0 0.0 - 0.2 /100 WBC   Scan Slide    Specimen: Blood   Result Value Ref Range    RBC Morphology Normal Normal    Platelet Estimate Adequate Normal   POC Glucose Once    Specimen: Blood   Result Value Ref Range    Glucose 148 (H) 70 - 130 mg/dL   POC Glucose Once    Specimen: Blood   Result Value Ref Range    Glucose 140 (H) 70 - 130 mg/dL   POC Glucose Once    Specimen: Blood   Result Value Ref Range    Glucose 162 (H) 70 - 130 mg/dL   POC Glucose Once    Specimen: Blood   Result Value Ref Range    Glucose 164 (H) 70 - 130 mg/dL   POC Glucose Once    Specimen: Blood   Result Value Ref Range    Glucose 198 (H) 70 - 130 mg/dL   POC Glucose Once    Specimen: Blood   Result Value Ref Range    Glucose 146 (H) 70 - 130 mg/dL   POC Glucose Once    Specimen: Blood   Result Value Ref Range    Glucose 163 (H) 70 - 130 mg/dL   POC Glucose Once    Specimen: Blood   Result Value Ref Range    Glucose 212 (H) 70 - 130 mg/dL   POC Glucose Once    Specimen: Blood   Result Value Ref Range    Glucose 150 (H) 70 - 130 mg/dL   POC Glucose Once    Specimen: Blood   Result Value Ref Range    Glucose 125 70 - 130 mg/dL   ECG 12 Lead Electrolyte Imbalance   Result Value Ref Range    QT Interval 404 ms    QTC Interval 448 ms   Green Top (Gel)   Result Value Ref Range    Extra Tube Hold for add-ons.    Lavender Top   Result Value Ref Range    Extra Tube hold for add-on    Gold Top - SST   Result Value Ref Range    Extra Tube Hold for add-ons.    Light Blue Top   Result Value Ref Range    Extra Tube Hold for add-ons.        Objective   Physical Exam  General- normal in appearance, not in any acute distress    HEENT- pupils equally reactive to light, normal in size, no scleral icterus    Neck-supple    Respiratory-respirations normal-on auscultation no wheezing no crackles,     Cardiovascular-  Normal S1 and S2. No S3, S4 or murmurs. No  JVD, no carotid bruit and no edema, pulses normal bilaterally     GI-nontender nondistended bowel sounds positive    CNS-nonfocal    Musculoskeletal -no edema  Extremities- no obvious deformity noticed     Psychiatric-mood good, good eye contact, alert awake oriented  Skin- no visible rash        Assessment & Plan   Abnormal CT chest-likely due to CHF as the CT chest showed bilateral pulmonary infiltrates with pleural effusions.  Will get a chest x-ray if it shows any pulmonary infiltrates or pleural effusions will consider getting a dedicated CT chest.      Asthma-mild intermittent-continue as and when required albuterol.  She was educated about asthma  Currently just needing albuterol once in a month.  Will get PFTs.    Smoker-did extensive smoking cessation counseling patient is not ready to quit yet.    Shortness of breath-likely multifactoral related to patient's deconditioning, body habitus and volume overloaded state.  Will get chest x-ray and PFTs and treat accordingly.    Matthew-daytime fatigue and tiredness.  Will get sleep study to rule out sleep apnea.    was educated about ill health effects of sleep apnea and what all effects it can have on health in long-term.  Which includes blood clots, arrhythmias, stroke, depression, hypothyroidism. was also educated about the pathophysiology of sleep apnea and how weight contributes in it.  Patient understood the conversation well and will try and do exercise and eat right kind of food to lose weight.       Obese-did diet and exercise counseling.      ICD-10-CM ICD-9-CM   1. SOB (shortness of breath)  R06.02 786.05   2. MATTHEW (obstructive sleep apnea)  G47.33 327.23   3. Mild intermittent asthma, unspecified whether complicated  J45.20 493.90   4. Smoker  F17.200 305.1   5. Abnormal CT of the chest  R93.89 793.2       Return in about 3 months (around 9/19/2024).

## 2024-07-03 ENCOUNTER — OFFICE VISIT (OUTPATIENT)
Dept: CARDIAC SURGERY | Facility: CLINIC | Age: 44
End: 2024-07-03
Payer: COMMERCIAL

## 2024-07-03 VITALS
HEART RATE: 64 BPM | TEMPERATURE: 98 F | OXYGEN SATURATION: 100 % | HEIGHT: 68 IN | WEIGHT: 210 LBS | DIASTOLIC BLOOD PRESSURE: 100 MMHG | SYSTOLIC BLOOD PRESSURE: 120 MMHG | BODY MASS INDEX: 31.83 KG/M2

## 2024-07-03 DIAGNOSIS — I05.9 RHEUMATIC DISEASE OF MITRAL VALVE: Primary | ICD-10-CM

## 2024-07-03 DIAGNOSIS — I50.32 CHRONIC HEART FAILURE WITH PRESERVED EJECTION FRACTION: ICD-10-CM

## 2024-07-03 RX ORDER — POTASSIUM CHLORIDE 1500 MG/1
20 TABLET, EXTENDED RELEASE ORAL DAILY
COMMUNITY
Start: 2024-06-07 | End: 2025-06-07

## 2024-07-03 NOTE — PROGRESS NOTES
[facility] Cardiothoracic Surgery New Patient Office Note     Date of Encounter: 2024     Name: Manda Al  : 1980     Referred By: No ref. provider found  PCP: Mi Rivera MD    Chief Complaint:    Chief Complaint   Patient presents with    Consult     NP per Zach Alves PA-C for Mitral Valve Stenosis-Complains of Fatigue       Subjective      History of Present Illness:    Manda Al is a 44 y.o. female current smoker referred to Dr. Roberts per Hospital Medicine @ [facility], Brenton Alves PA-C, related to mitral valve disorder.  PMH: Domitila-Parkinson-White syndrome, HTN, rheumatic mitral valve stenosis/mitral valve regurgitation, HFpEF, bipolar disorder, mild intermittent asthma, Crohn's disease, pulmonary hypertension (followed by Dr. Zapata), obesity (BMI 31.1 kg/m²), and suspected SARATH with pending PSG.  Patient hospitalized at [facility] 2024 and 2024 related to acute on chronic HFpEF.      During the May hospital stay, limited transthoracic echocardiogram  demonstrated LVEF 56 to 60% with abnormal mitral valve: Moderate to severe stenosis/thickened leaflets and rheumatic changes/mean gradient 7 mmHg and mitral valve area by pressure half-time 1.3 cm².  Complete TTE per Dr. Cox 2024: moderate bileaflet valve thickening, Bernardo score 8, LVEF 61 to 65%, MV mean gradient 14 mmHg, moderate to severe mitral valve stenosis, moderate mitral valve regurgitation with centrally directed jet, trileaflet aortic valve with mild AI and no aortic stenosis, mild to moderate tricuspid valve regurgitation with LVEF greater than 55 mmHg.      Per [facility] records review, patient was evaluated at [facility] 2020 with trans esophageal echocardiography: rheumatic mitral valve leaflet thickening, Dunn score 2.  Mitral stenosis was mild to moderate at that time with mitral valve area estimated 2.6 cm².      Current medical therapy includes Lasix and beta-blocker. States CHF symptoms  predominantly fatigue.  Since recent inpatient diuresis she states she is bothered by GOMEZ less than once per week.  Denies orthopnea.  Currently utilizing nicotine gum in efforts to stop smoking.      Review of Systems:  Review of Systems   Constitutional: Positive for malaise/fatigue. Negative for chills, decreased appetite, diaphoresis, fever, night sweats, weight gain and weight loss.   HENT:  Positive for tinnitus. Negative for hoarse voice.    Eyes:  Negative for blurred vision, double vision and visual disturbance.   Cardiovascular:  Negative for chest pain, claudication, dyspnea on exertion, irregular heartbeat, leg swelling, near-syncope, orthopnea, palpitations, paroxysmal nocturnal dyspnea and syncope.   Respiratory:  Negative for cough, hemoptysis, shortness of breath, sputum production and wheezing.    Endocrine: Positive for polydipsia.   Hematologic/Lymphatic: Negative for adenopathy and bleeding problem. Bruises/bleeds easily.   Skin:  Negative for color change, nail changes, poor wound healing and rash.   Musculoskeletal:  Positive for back pain and neck pain. Negative for falls and muscle cramps.   Gastrointestinal:  Negative for abdominal pain, dysphagia and heartburn.   Genitourinary:  Negative for flank pain.   Neurological:  Positive for weakness. Negative for brief paralysis, disturbances in coordination, dizziness, focal weakness, headaches, light-headedness, loss of balance, numbness, paresthesias, sensory change and vertigo.   Psychiatric/Behavioral:  Negative for depression and suicidal ideas.    Allergic/Immunologic: Negative for persistent infections.       I have reviewed the following portions of the patient's history: problem list, current medications, allergies, past surgical history, past medical history, past social history, past family history, and ROS and confirm it's accurate.    Allergies:  Allergies   Allergen Reactions    Imuran [Azathioprine] Other (See Comments)     Pt  reports this medication caused her to have pancreatitis.        Medications:      Current Outpatient Medications:     Cariprazine HCl (Vraylar) 3 MG capsule capsule, Take 1 capsule by mouth Daily., Disp: , Rfl:     fexofenadine (ALLEGRA) 180 MG tablet, Take 1 tablet by mouth Daily., Disp: , Rfl:     FLUoxetine (PROzac) 10 MG capsule, Take 1 capsule by mouth Daily., Disp: , Rfl:     furosemide (LASIX) 40 MG tablet, Take 0.5 tablets by mouth Daily for 30 days., Disp: 15 tablet, Rfl: 0    Klor-Con M20 20 MEQ CR tablet, Take 1 tablet by mouth Daily., Disp: , Rfl:     magnesium oxide (MAG-OX) 400 MG tablet, Take 1 tablet by mouth Daily., Disp: , Rfl:     metFORMIN ER (GLUCOPHAGE-XR) 500 MG 24 hr tablet, Take 2 tablets by mouth 2 (Two) Times a Day., Disp: , Rfl:     metoprolol succinate XL (Toprol XL) 200 MG 24 hr tablet, Take 1 tablet by mouth Daily., Disp: , Rfl:     Insulin Glargine (Lantus SoloStar) 100 UNIT/ML injection pen, Inject 15 Units under the skin into the appropriate area as directed Every Night. Formulary Compliance Approval (Patient not taking: Reported on 7/3/2024), Disp: 15 mL, Rfl: 0    Insulin Lispro, 1 Unit Dial, (HumaLOG KwikPen) 100 UNIT/ML solution pen-injector, Inject 2-9 Units under the skin into the appropriate area as directed 4 (Four) Times a Day Before Meals & at Bedtime. 150-199:2U, 200-249:4U, 250-299:6U, 300-349:7U, 350-400:8U, >400:9U AND CALL , MAX DAILY DOSE:60U* (Patient not taking: Reported on 7/3/2024), Disp: , Rfl:     History:   Past Medical History:   Diagnosis Date    (HFpEF) heart failure with preserved ejection fraction     Anxiety     Asthma     Bipolar disorder     Colitis     Crohn disease     Depression     Diabetes mellitus     Gallstones     GERD (gastroesophageal reflux disease)     Heart murmur     Hypertension     Migraines     Moderate mitral stenosis     Ovarian cyst     Pancreatitis     Pneumonia     Scarlet fever     Schizoaffective disorder     Severe pulmonary  "hypertension     Domitila-Parkinson-White syndrome        Past Surgical History:   Procedure Laterality Date    APPENDECTOMY       SECTION      CHOLECYSTECTOMY      COLON SURGERY      COLONOSCOPY      MANDIBLE FRACTURE SURGERY      OVARIAN CYST SURGERY      TUBAL ABDOMINAL LIGATION         Social History     Socioeconomic History    Marital status:     Number of children: 3   Tobacco Use    Smoking status: Every Day     Current packs/day: 1.00     Average packs/day: 1 pack/day for 20.0 years (20.0 ttl pk-yrs)     Types: Cigarettes     Passive exposure: Current    Smokeless tobacco: Never   Vaping Use    Vaping status: Never Used   Substance and Sexual Activity    Alcohol use: No     Comment: denies    Drug use: Yes     Types: Marijuana    Sexual activity: Never        Family History   Problem Relation Age of Onset    Diabetes Mother     Anxiety disorder Mother     Depression Mother     Bipolar disorder Mother     Heart attack Father     COPD Father     Schizophrenia Father     Schizophrenia Paternal Grandfather     Breast cancer Neg Hx        Objective   Physical Exam:  Vitals:    24 0950 24 1000   BP: 140/100 120/100   BP Location: Left arm Right arm   Patient Position: Sitting Sitting   Pulse: 64    Temp: 98 °F (36.7 °C)    SpO2: 100%    Weight: 95.3 kg (210 lb)    Height: 172.7 cm (68\")  Comment: patient reported       Body mass index is 31.93 kg/m².    Physical Exam  Vitals reviewed.   Constitutional:       General: She is not in acute distress.     Appearance: She is well-developed and well-groomed. She is obese.   HENT:      Head: Normocephalic and atraumatic.   Eyes:      General: Lids are normal.      Conjunctiva/sclera: Conjunctivae normal.      Pupils: Pupils are equal, round, and reactive to light.   Neck:      Vascular: No carotid bruit or JVD.   Cardiovascular:      Rate and Rhythm: Normal rate and regular rhythm.      Pulses:           Dorsalis pedis pulses are 2+ on the right " side and 2+ on the left side.        Posterior tibial pulses are 2+ on the right side and 2+ on the left side.      Heart sounds: Murmur heard.      Systolic murmur is present with a grade of 2/6.      Diastolic murmur is present with a grade of 2/4.   Pulmonary:      Effort: Pulmonary effort is normal. No respiratory distress.      Breath sounds: Normal breath sounds.   Musculoskeletal:         General: Normal range of motion.      Cervical back: Normal range of motion and neck supple.      Right lower leg: No edema.      Left lower leg: No edema.   Skin:     General: Skin is warm and dry.      Capillary Refill: Capillary refill takes less than 2 seconds.   Neurological:      General: No focal deficit present.      Mental Status: She is alert and oriented to person, place, and time.   Psychiatric:         Attention and Perception: Attention normal.         Mood and Affect: Mood normal.         Speech: Speech normal.         Behavior: Behavior is cooperative.         Imaging/Labs:  TTE 5/24/2024 Interpretation Summary (Personally reviewed)    Left ventricular systolic function is normal. Left ventricular ejection fraction appears to be 56 - 60%.    Left ventricular diastolic dysfunction is noted.    The left atrial cavity is moderately dilated.    The right atrial cavity is mildly  dilated.    There is mild, bileaflet mitral valve thickening present.    Moderate to severe mitral valve stenosis is present with diastolic doming of the valve leaflets, thickened leaflets and rheumatic changes of the mitral valve with estimated mean gradient of 7 mmHg and mitral valve area by pressure half-time of 1.3 cm² apparatus.    TTE 5/20/2024 Interpretation Summary (Personally reviewed)    Normal left ventricular cavity size and wall thickness noted. All left ventricular wall segments contract normally.    Left ventricular ejection fraction appears to be 61 - 65%.    Left ventricular diastolic function is consistent with (grade II  w/high LAP) pseudonormalization.    There is tethering of both anterior and posterior mitral leaflets with reverse hockey-stick appearance of the anterior mitral leaflet suspicious for rheumatic mitral valve disease.    There is moderate, bileaflet mitral valve thickening present at the tip of the leaflets (commissure).  Tiera score of 8    MV max PG 22.3 mmHg MV mean PG 14 mmHg MV V2 VTI 69.8 cm MVA(VTI) 1.08 cm2 MV dec slope 687 cm/sec2 MV dec time 0.39 sec at a heart rate of about 95 bpm.    Moderate to severe mitral valve stenosis is present. The calculated mitral valve Broderick' score is 7.    Moderate mitral valve regurgitation is present with a centrally-directed jet noted. The calculated mitral valve morphology score is 7.    The aortic valve was poorly visualized but appears trileaflet. Mild aortic valve regurgitation is present. No aortic valve stenosis is present.    Tricuspid valve not well visualized. Mild to moderate tricuspid valve regurgitation is present. Estimated right ventricular systolic pressure from tricuspid regurgitation is markedly elevated (>55 mmHg).    Severe pulmonary hypertension is present.    The IVC is mildly dilated with partial collapse indicator of right atrial pressures of about 15 mmHg.    There is no evidence of pericardial effusion. .    Comments: The peak and mean gradients across the mitral valve seem to be about the same as on the transthoracic echo back in August of 2020 given the heart rate in the 90s although the mitral valve stenosis seem to have improved on the DONNA done at that time with improvement in the heart rate.  The degree of mitral regurgitation seem to have gotten worse.  Hence would consider repeating the study with focus on the mitral valve after improving the heart rate.  If patient still has significantly elevated gradients across the mitral valve, may have to consider referral to [facility] for possible mitral valvuloplasty.  Assessment / Plan       Assessment / Plan:  1. Rheumatic disease of mitral valve  2. Chronic heart failure with preserved ejection fraction  - 44 y.o. female current smoker referred to Dr. Roberts per Hospital Medicine @ [facility], Brenton Alves PA-C, related to mitral valve disorder.    - PMH: Domitila-Parkinson-White syndrome, HTN, rheumatic mitral valve stenosis/mitral valve regurgitation, HFpEF, bipolar disorder, mild intermittent asthma, Crohn's disease, pulmonary hypertension (followed by Dr. Zapata), obesity (BMI 31.1 kg/m²), and suspected SARATH with pending PSG.   - Hospitalized at [facility] 5/12/2024 and 6/4/2024 related to acute on chronic HFpEF.   - Limited transthoracic echocardiogram 5/24/24 demonstrated LVEF 56 to 60% with abnormal mitral valve: Moderate to severe stenosis/thickened leaflets and rheumatic changes/mean gradient 7 mmHg and mitral valve area by pressure half-time 1.3 cm².    - Complete TTE per Dr. Cox 5/19/2024: moderate bileaflet valve thickening, Halifax score 8, LVEF 61 to 65%, MV mean gradient 14 mmHg, moderate to severe mitral valve stenosis, moderate mitral valve regurgitation with centrally directed jet, trileaflet aortic valve with mild AI and no aortic stenosis, mild to moderate tricuspid valve regurgitation with LVEF greater than 55 mmHg.    - Per [facility] records review, patient was evaluated at [facility] August 2020 with trans esophageal echocardiography: rheumatic mitral valve leaflet thickening, Bernardo score 2.  Mitral stenosis was mild to moderate at that time with mitral valve area estimated 2.6 cm².   - Current medical therapy includes Lasix and beta-blocker.   - Current CHF symptoms predominantly fatigue and sometimes GOMEZ.    - Currently utilizing nicotine gum in efforts to stop smoking.  - Will review transthoracic echo's w/ Dr. Roberts regarding potential surgical intervention vs short interval follow up TTE or DONNA.        Follow Up:   Return Per Dr. Roberts recommendations.   Or sooner for any further  concerns or worsening sign and symptoms. If unable to reach us in the office please dial 911 or go to the nearest emergency department.      DIEGO Pino  [facility] Cardiothoracic Surgery    Time Spent: I spent 50 minutes caring for Manda on this date of service. This time includes time spent by me in the following activities: preparing for the visit, reviewing tests, obtaining and/or reviewing a separately obtained history, performing a medically appropriate examination and/or evaluation, counseling and educating the patient/family/caregiver, referring and communicating with other health care professionals, documenting information in the medical record, independently interpreting results and communicating that information with the patient/family/caregiver, and care coordination.

## 2024-07-26 ENCOUNTER — TELEPHONE (OUTPATIENT)
Dept: CARDIAC SURGERY | Facility: CLINIC | Age: 44
End: 2024-07-26
Payer: COMMERCIAL

## 2024-07-26 NOTE — TELEPHONE ENCOUNTER
DONE    ----- Message from Radha Arceo sent at 7/25/2024  3:25 PM EDT -----  Regarding: additional test for January follow up  Dr. Roberts would like this patient to have a transthoracic echocardiogram prior to her follow up on 1/8/25.  It can be done in [place] with Dr. Cox to read please.      Please and thank you,  Radha

## 2024-08-07 ENCOUNTER — TELEPHONE (OUTPATIENT)
Dept: PULMONOLOGY | Facility: CLINIC | Age: 44
End: 2024-08-07
Payer: COMMERCIAL

## 2024-08-07 DIAGNOSIS — G47.33 OSA (OBSTRUCTIVE SLEEP APNEA): Primary | ICD-10-CM

## 2024-08-07 NOTE — TELEPHONE ENCOUNTER
Reached patient on fourth attempt (work phone number).     Explained sleep study results and that we need to start AutoPap therapy as she had both apnea and hypopnea events.      Will send order to local DME for set up.   Reminded that next follow-up appointment is scheduled for September and we will check in with her progress at that time.

## 2024-08-07 NOTE — TELEPHONE ENCOUNTER
Attempted to call patient and alternate contact listed (mother).     Only able to leave a message.    Left a message explaining that I had a recent test result to discuss with her, and requested a call back as soon as available.   Will then discuss sleep study (mild sleep apnea) and recommendation for autopap therapy.

## 2024-10-31 DIAGNOSIS — I05.9 RHEUMATIC DISEASE OF MITRAL VALVE: Primary | ICD-10-CM

## 2024-11-15 ENCOUNTER — TELEPHONE (OUTPATIENT)
Dept: CARDIAC SURGERY | Facility: CLINIC | Age: 44
End: 2024-11-15
Payer: COMMERCIAL

## 2025-02-24 ENCOUNTER — TRANSCRIBE ORDERS (OUTPATIENT)
Dept: LAB | Facility: HOSPITAL | Age: 45
End: 2025-02-24
Payer: COMMERCIAL

## 2025-02-24 ENCOUNTER — HOSPITAL ENCOUNTER (OUTPATIENT)
Dept: GENERAL RADIOLOGY | Facility: HOSPITAL | Age: 45
Discharge: HOME | End: 2025-02-24
Admitting: FAMILY MEDICINE
Payer: COMMERCIAL

## 2025-02-24 DIAGNOSIS — S86.912A MUSCLE STRAIN OF LEFT KNEE, INITIAL ENCOUNTER: ICD-10-CM

## 2025-02-24 DIAGNOSIS — S86.912A MUSCLE STRAIN OF LEFT KNEE, INITIAL ENCOUNTER: Primary | ICD-10-CM

## 2025-02-24 PROCEDURE — 73562 X-RAY EXAM OF KNEE 3: CPT | Performed by: RADIOLOGY

## 2025-02-24 PROCEDURE — 73562 X-RAY EXAM OF KNEE 3: CPT

## 2025-02-25 ENCOUNTER — TRANSCRIBE ORDERS (OUTPATIENT)
Dept: ADMINISTRATIVE | Facility: HOSPITAL | Age: 45
End: 2025-02-25
Payer: COMMERCIAL

## 2025-02-25 DIAGNOSIS — Z12.31 SCREENING MAMMOGRAM FOR BREAST CANCER: Primary | ICD-10-CM
